# Patient Record
Sex: FEMALE | Race: WHITE | Employment: OTHER | URBAN - METROPOLITAN AREA
[De-identification: names, ages, dates, MRNs, and addresses within clinical notes are randomized per-mention and may not be internally consistent; named-entity substitution may affect disease eponyms.]

---

## 2017-02-21 PROBLEM — D36.9 MULTIPLE ADENOMATOUS POLYPS: Status: ACTIVE | Noted: 2017-02-21

## 2017-02-21 PROBLEM — K59.04 CHRONIC IDIOPATHIC CONSTIPATION: Status: ACTIVE | Noted: 2017-02-21

## 2018-03-14 ENCOUNTER — TELEPHONE (OUTPATIENT)
Dept: PRIMARY CARE CLINIC | Age: 73
End: 2018-03-14

## 2018-03-14 NOTE — TELEPHONE ENCOUNTER
Pt called need you to write her a new rx for her handicap to [facility]. Will  at the end of the week.  Thank you

## 2018-03-15 ENCOUNTER — TELEPHONE (OUTPATIENT)
Dept: PRIMARY CARE CLINIC | Age: 73
End: 2018-03-15

## 2018-04-24 ENCOUNTER — OFFICE VISIT (OUTPATIENT)
Dept: PRIMARY CARE CLINIC | Age: 73
End: 2018-04-24
Payer: MEDICARE

## 2018-04-24 VITALS
WEIGHT: 173 LBS | SYSTOLIC BLOOD PRESSURE: 134 MMHG | HEIGHT: 61 IN | BODY MASS INDEX: 32.66 KG/M2 | HEART RATE: 67 BPM | TEMPERATURE: 97 F | OXYGEN SATURATION: 96 % | DIASTOLIC BLOOD PRESSURE: 86 MMHG

## 2018-04-24 DIAGNOSIS — Z98.890 S/P KYPHOPLASTY: ICD-10-CM

## 2018-04-24 DIAGNOSIS — M94.0 COSTOCHONDRITIS, ACUTE: Primary | ICD-10-CM

## 2018-04-24 PROCEDURE — 4004F PT TOBACCO SCREEN RCVD TLK: CPT | Performed by: PHYSICIAN ASSISTANT

## 2018-04-24 PROCEDURE — 1090F PRES/ABSN URINE INCON ASSESS: CPT | Performed by: PHYSICIAN ASSISTANT

## 2018-04-24 PROCEDURE — G8427 DOCREV CUR MEDS BY ELIG CLIN: HCPCS | Performed by: PHYSICIAN ASSISTANT

## 2018-04-24 PROCEDURE — G8399 PT W/DXA RESULTS DOCUMENT: HCPCS | Performed by: PHYSICIAN ASSISTANT

## 2018-04-24 PROCEDURE — 4040F PNEUMOC VAC/ADMIN/RCVD: CPT | Performed by: PHYSICIAN ASSISTANT

## 2018-04-24 PROCEDURE — G8417 CALC BMI ABV UP PARAM F/U: HCPCS | Performed by: PHYSICIAN ASSISTANT

## 2018-04-24 PROCEDURE — 3017F COLORECTAL CA SCREEN DOC REV: CPT | Performed by: PHYSICIAN ASSISTANT

## 2018-04-24 PROCEDURE — 99213 OFFICE O/P EST LOW 20 MIN: CPT | Performed by: PHYSICIAN ASSISTANT

## 2018-04-24 PROCEDURE — 1123F ACP DISCUSS/DSCN MKR DOCD: CPT | Performed by: PHYSICIAN ASSISTANT

## 2018-04-24 RX ORDER — METHYLPREDNISOLONE 4 MG/1
TABLET ORAL
Qty: 1 KIT | Refills: 0 | Status: SHIPPED | OUTPATIENT
Start: 2018-04-24 | End: 2018-04-30

## 2018-04-24 RX ORDER — TRAMADOL HYDROCHLORIDE 50 MG/1
50 TABLET ORAL EVERY 6 HOURS PRN
Status: ON HOLD | COMMUNITY
Start: 2018-04-20 | End: 2018-12-19 | Stop reason: ALTCHOICE

## 2018-04-24 ASSESSMENT — PATIENT HEALTH QUESTIONNAIRE - PHQ9
8. MOVING OR SPEAKING SO SLOWLY THAT OTHER PEOPLE COULD HAVE NOTICED. OR THE OPPOSITE, BEING SO FIGETY OR RESTLESS THAT YOU HAVE BEEN MOVING AROUND A LOT MORE THAN USUAL: 0
5. POOR APPETITE OR OVEREATING: 0
1. LITTLE INTEREST OR PLEASURE IN DOING THINGS: 3
3. TROUBLE FALLING OR STAYING ASLEEP: 3
SUM OF ALL RESPONSES TO PHQ9 QUESTIONS 1 & 2: 3
6. FEELING BAD ABOUT YOURSELF - OR THAT YOU ARE A FAILURE OR HAVE LET YOURSELF OR YOUR FAMILY DOWN: 0
10. IF YOU CHECKED OFF ANY PROBLEMS, HOW DIFFICULT HAVE THESE PROBLEMS MADE IT FOR YOU TO DO YOUR WORK, TAKE CARE OF THINGS AT HOME, OR GET ALONG WITH OTHER PEOPLE: 2
SUM OF ALL RESPONSES TO PHQ QUESTIONS 1-9: 9
9. THOUGHTS THAT YOU WOULD BE BETTER OFF DEAD, OR OF HURTING YOURSELF: 0
2. FEELING DOWN, DEPRESSED OR HOPELESS: 0
4. FEELING TIRED OR HAVING LITTLE ENERGY: 3
7. TROUBLE CONCENTRATING ON THINGS, SUCH AS READING THE NEWSPAPER OR WATCHING TELEVISION: 0

## 2018-06-29 ENCOUNTER — OFFICE VISIT (OUTPATIENT)
Dept: PRIMARY CARE CLINIC | Age: 73
End: 2018-06-29
Payer: MEDICARE

## 2018-06-29 VITALS
OXYGEN SATURATION: 97 % | HEART RATE: 81 BPM | BODY MASS INDEX: 31.28 KG/M2 | HEIGHT: 62 IN | SYSTOLIC BLOOD PRESSURE: 150 MMHG | WEIGHT: 170 LBS | DIASTOLIC BLOOD PRESSURE: 90 MMHG

## 2018-06-29 DIAGNOSIS — F32.9 REACTIVE DEPRESSION: ICD-10-CM

## 2018-06-29 PROCEDURE — 3017F COLORECTAL CA SCREEN DOC REV: CPT | Performed by: PHYSICIAN ASSISTANT

## 2018-06-29 PROCEDURE — 1123F ACP DISCUSS/DSCN MKR DOCD: CPT | Performed by: PHYSICIAN ASSISTANT

## 2018-06-29 PROCEDURE — 4004F PT TOBACCO SCREEN RCVD TLK: CPT | Performed by: PHYSICIAN ASSISTANT

## 2018-06-29 PROCEDURE — 1090F PRES/ABSN URINE INCON ASSESS: CPT | Performed by: PHYSICIAN ASSISTANT

## 2018-06-29 PROCEDURE — G8427 DOCREV CUR MEDS BY ELIG CLIN: HCPCS | Performed by: PHYSICIAN ASSISTANT

## 2018-06-29 PROCEDURE — 4040F PNEUMOC VAC/ADMIN/RCVD: CPT | Performed by: PHYSICIAN ASSISTANT

## 2018-06-29 PROCEDURE — G8399 PT W/DXA RESULTS DOCUMENT: HCPCS | Performed by: PHYSICIAN ASSISTANT

## 2018-06-29 PROCEDURE — G8417 CALC BMI ABV UP PARAM F/U: HCPCS | Performed by: PHYSICIAN ASSISTANT

## 2018-06-29 PROCEDURE — 99213 OFFICE O/P EST LOW 20 MIN: CPT | Performed by: PHYSICIAN ASSISTANT

## 2018-06-29 RX ORDER — SERTRALINE HYDROCHLORIDE 100 MG/1
TABLET, FILM COATED ORAL
Qty: 90 TABLET | Refills: 1 | Status: SHIPPED | OUTPATIENT
Start: 2018-06-29 | End: 2018-08-10 | Stop reason: SDUPTHER

## 2018-08-10 ENCOUNTER — OFFICE VISIT (OUTPATIENT)
Dept: PRIMARY CARE CLINIC | Age: 73
End: 2018-08-10
Payer: MEDICARE

## 2018-08-10 ENCOUNTER — HOSPITAL ENCOUNTER (OUTPATIENT)
Age: 73
Discharge: HOME | End: 2018-08-12
Payer: MEDICARE

## 2018-08-10 VITALS
HEART RATE: 76 BPM | OXYGEN SATURATION: 93 % | TEMPERATURE: 97.2 F | BODY MASS INDEX: 31.09 KG/M2 | SYSTOLIC BLOOD PRESSURE: 136 MMHG | WEIGHT: 170 LBS | DIASTOLIC BLOOD PRESSURE: 84 MMHG

## 2018-08-10 DIAGNOSIS — E55.9 VITAMIN D DEFICIENCY: ICD-10-CM

## 2018-08-10 DIAGNOSIS — I10 HYPERTENSION, ESSENTIAL, BENIGN: Chronic | ICD-10-CM

## 2018-08-10 DIAGNOSIS — E78.5 HYPERLIPIDEMIA WITH TARGET LDL LESS THAN 100: Chronic | ICD-10-CM

## 2018-08-10 DIAGNOSIS — F32.9 REACTIVE DEPRESSION: ICD-10-CM

## 2018-08-10 DIAGNOSIS — G35 MS (MULTIPLE SCLEROSIS) (HCC): Primary | Chronic | ICD-10-CM

## 2018-08-10 LAB
BASOPHILS ABSOLUTE: 0.04 E9/L (ref 0–0.2)
BASOPHILS RELATIVE PERCENT: 0.8 % (ref 0–2)
EOSINOPHILS ABSOLUTE: 0.19 E9/L (ref 0.05–0.5)
EOSINOPHILS RELATIVE PERCENT: 3.6 % (ref 0–6)
FOLATE: 8.5 NG/ML (ref 4.8–24.2)
HCT VFR BLD CALC: 36.9 % (ref 34–48)
HEMOGLOBIN: 11.6 G/DL (ref 11.5–15.5)
IMMATURE GRANULOCYTES #: 0.01 E9/L
IMMATURE GRANULOCYTES %: 0.2 % (ref 0–5)
LYMPHOCYTES ABSOLUTE: 2.2 E9/L (ref 1.5–4)
LYMPHOCYTES RELATIVE PERCENT: 41.4 % (ref 20–42)
MCH RBC QN AUTO: 30.3 PG (ref 26–35)
MCHC RBC AUTO-ENTMCNC: 31.4 % (ref 32–34.5)
MCV RBC AUTO: 96.3 FL (ref 80–99.9)
MONOCYTES ABSOLUTE: 0.54 E9/L (ref 0.1–0.95)
MONOCYTES RELATIVE PERCENT: 10.2 % (ref 2–12)
NEUTROPHILS ABSOLUTE: 2.34 E9/L (ref 1.8–7.3)
NEUTROPHILS RELATIVE PERCENT: 43.8 % (ref 43–80)
PDW BLD-RTO: 13 FL (ref 11.5–15)
PLATELET # BLD: 290 E9/L (ref 130–450)
PMV BLD AUTO: 10 FL (ref 7–12)
RBC # BLD: 3.83 E12/L (ref 3.5–5.5)
VITAMIN B-12: 349 PG/ML (ref 211–946)
WBC # BLD: 5.3 E9/L (ref 4.5–11.5)

## 2018-08-10 PROCEDURE — 82607 VITAMIN B-12: CPT

## 2018-08-10 PROCEDURE — G8417 CALC BMI ABV UP PARAM F/U: HCPCS | Performed by: PHYSICIAN ASSISTANT

## 2018-08-10 PROCEDURE — G8427 DOCREV CUR MEDS BY ELIG CLIN: HCPCS | Performed by: PHYSICIAN ASSISTANT

## 2018-08-10 PROCEDURE — G8399 PT W/DXA RESULTS DOCUMENT: HCPCS | Performed by: PHYSICIAN ASSISTANT

## 2018-08-10 PROCEDURE — 4004F PT TOBACCO SCREEN RCVD TLK: CPT | Performed by: PHYSICIAN ASSISTANT

## 2018-08-10 PROCEDURE — 85025 COMPLETE CBC W/AUTO DIFF WBC: CPT

## 2018-08-10 PROCEDURE — 1101F PT FALLS ASSESS-DOCD LE1/YR: CPT | Performed by: PHYSICIAN ASSISTANT

## 2018-08-10 PROCEDURE — 4040F PNEUMOC VAC/ADMIN/RCVD: CPT | Performed by: PHYSICIAN ASSISTANT

## 2018-08-10 PROCEDURE — 1123F ACP DISCUSS/DSCN MKR DOCD: CPT | Performed by: PHYSICIAN ASSISTANT

## 2018-08-10 PROCEDURE — 82746 ASSAY OF FOLIC ACID SERUM: CPT

## 2018-08-10 PROCEDURE — 1090F PRES/ABSN URINE INCON ASSESS: CPT | Performed by: PHYSICIAN ASSISTANT

## 2018-08-10 PROCEDURE — 80053 COMPREHEN METABOLIC PANEL: CPT

## 2018-08-10 PROCEDURE — 3017F COLORECTAL CA SCREEN DOC REV: CPT | Performed by: PHYSICIAN ASSISTANT

## 2018-08-10 PROCEDURE — 84443 ASSAY THYROID STIM HORMONE: CPT

## 2018-08-10 PROCEDURE — 80061 LIPID PANEL: CPT

## 2018-08-10 PROCEDURE — 82306 VITAMIN D 25 HYDROXY: CPT

## 2018-08-10 PROCEDURE — 99214 OFFICE O/P EST MOD 30 MIN: CPT | Performed by: PHYSICIAN ASSISTANT

## 2018-08-10 RX ORDER — SERTRALINE HYDROCHLORIDE 100 MG/1
TABLET, FILM COATED ORAL
Qty: 90 TABLET | Refills: 1
Start: 2018-08-10 | End: 2018-10-08 | Stop reason: SDUPTHER

## 2018-08-10 RX ORDER — BUPROPION HYDROCHLORIDE 150 MG/1
150 TABLET ORAL EVERY MORNING
Qty: 30 TABLET | Refills: 0 | COMMUNITY
Start: 2018-08-10 | End: 2018-08-13 | Stop reason: SDUPTHER

## 2018-08-10 NOTE — PROGRESS NOTES
sertraline (ZOLOFT) 100 MG tablet; 1 1/2 tab PO QD  -     buPROPion (WELLBUTRIN XL) 150 MG extended release tablet; Take 1 tablet by mouth every morning  -     VITAMIN B12 & FOLATE; Future  -     TSH; Future  -     CBC WITH AUTO DIFFERENTIAL; Future  -     COMPREHENSIVE METABOLIC PANEL; Future    MS (multiple sclerosis) ([facility] [address]    Hyperlipidemia with target LDL less than 100  -     LIPID PANEL; Future    Hypertension, essential, benign  -     CBC WITH AUTO DIFFERENTIAL; Future  -     COMPREHENSIVE METABOLIC PANEL; Future    Vitamin D deficiency  -     Vitamin D 25 Hydrox, D2 & D3; Future    pt is due for labs for her routine, we will check as above, and she is to FU for routine care in 4 weeks when we FU with her med check as well. I discussed thew wellbutrin and she would like to try this. Samples were given and if she feels well needs to call me in 2 weeks for a refill. Call sooner if FU sooner if needed. Return in about 4 weeks (around 9/7/2018).

## 2018-08-11 LAB
ALBUMIN SERPL-MCNC: 4.2 G/DL (ref 3.5–5.2)
ALP BLD-CCNC: 41 U/L (ref 35–104)
ALT SERPL-CCNC: 14 U/L (ref 0–32)
ANION GAP SERPL CALCULATED.3IONS-SCNC: 17 MMOL/L (ref 7–16)
AST SERPL-CCNC: 20 U/L (ref 0–31)
BILIRUB SERPL-MCNC: 0.3 MG/DL (ref 0–1.2)
BUN BLDV-MCNC: 29 MG/DL (ref 8–23)
CALCIUM SERPL-MCNC: 9.1 MG/DL (ref 8.6–10.2)
CHLORIDE BLD-SCNC: 97 MMOL/L (ref 98–107)
CHOLESTEROL, TOTAL: 179 MG/DL (ref 0–199)
CO2: 26 MMOL/L (ref 22–29)
CREAT SERPL-MCNC: 1.3 MG/DL (ref 0.5–1)
GFR AFRICAN AMERICAN: 49
GFR NON-AFRICAN AMERICAN: 40 ML/MIN/1.73
GLUCOSE BLD-MCNC: 90 MG/DL (ref 74–109)
HDLC SERPL-MCNC: 73 MG/DL
LDL CHOLESTEROL CALCULATED: 89 MG/DL (ref 0–99)
POTASSIUM SERPL-SCNC: 4.3 MMOL/L (ref 3.5–5)
SODIUM BLD-SCNC: 140 MMOL/L (ref 132–146)
TOTAL PROTEIN: 7.2 G/DL (ref 6.4–8.3)
TRIGL SERPL-MCNC: 87 MG/DL (ref 0–149)
TSH SERPL DL<=0.05 MIU/L-ACNC: 0.77 UIU/ML (ref 0.27–4.2)
VITAMIN D 25-HYDROXY: 67 NG/ML (ref 30–100)
VLDLC SERPL CALC-MCNC: 17 MG/DL

## 2018-08-13 DIAGNOSIS — F32.9 REACTIVE DEPRESSION: ICD-10-CM

## 2018-08-13 RX ORDER — BUPROPION HYDROCHLORIDE 150 MG/1
150 TABLET ORAL EVERY MORNING
Qty: 90 TABLET | Refills: 0 | Status: SHIPPED | OUTPATIENT
Start: 2018-08-13 | End: 2018-10-08 | Stop reason: SDUPTHER

## 2018-09-04 RX ORDER — ALENDRONATE SODIUM 70 MG/1
70 TABLET ORAL
Qty: 12 TABLET | Refills: 1 | Status: SHIPPED | OUTPATIENT
Start: 2018-09-04 | End: 2018-12-03 | Stop reason: SDUPTHER

## 2018-09-07 ENCOUNTER — TELEPHONE (OUTPATIENT)
Dept: PRIMARY CARE CLINIC | Age: 73
End: 2018-09-07

## 2018-09-07 DIAGNOSIS — Z12.31 SCREENING MAMMOGRAM, ENCOUNTER FOR: Primary | ICD-10-CM

## 2018-09-07 DIAGNOSIS — G35 MS (MULTIPLE SCLEROSIS) (HCC): Chronic | ICD-10-CM

## 2018-10-08 DIAGNOSIS — F32.9 REACTIVE DEPRESSION: ICD-10-CM

## 2018-10-08 DIAGNOSIS — Z12.31 SCREENING MAMMOGRAM, ENCOUNTER FOR: ICD-10-CM

## 2018-10-08 RX ORDER — SIMVASTATIN 40 MG
40 TABLET ORAL NIGHTLY
Qty: 90 TABLET | Refills: 1 | Status: SHIPPED | OUTPATIENT
Start: 2018-10-08 | End: 2019-01-25 | Stop reason: SDUPTHER

## 2018-10-08 RX ORDER — SERTRALINE HYDROCHLORIDE 100 MG/1
TABLET, FILM COATED ORAL
Qty: 135 TABLET | Refills: 1 | Status: SHIPPED | OUTPATIENT
Start: 2018-10-08 | End: 2019-01-25 | Stop reason: SDUPTHER

## 2018-10-08 RX ORDER — BUPROPION HYDROCHLORIDE 150 MG/1
150 TABLET ORAL EVERY MORNING
Qty: 90 TABLET | Refills: 1 | Status: SHIPPED | OUTPATIENT
Start: 2018-10-08 | End: 2018-10-15 | Stop reason: SDUPTHER

## 2018-10-08 RX ORDER — GABAPENTIN 400 MG/1
CAPSULE ORAL
Qty: 180 CAPSULE | Refills: 1 | Status: SHIPPED | OUTPATIENT
Start: 2018-10-08 | End: 2019-01-25 | Stop reason: SDUPTHER

## 2018-10-08 RX ORDER — CARVEDILOL 25 MG/1
TABLET ORAL
Qty: 180 TABLET | Refills: 1 | Status: SHIPPED | OUTPATIENT
Start: 2018-10-08 | End: 2019-01-25 | Stop reason: SDUPTHER

## 2018-10-15 DIAGNOSIS — F32.9 REACTIVE DEPRESSION: ICD-10-CM

## 2018-10-15 RX ORDER — BUPROPION HYDROCHLORIDE 150 MG/1
150 TABLET ORAL EVERY MORNING
Qty: 90 TABLET | Refills: 1 | Status: SHIPPED | OUTPATIENT
Start: 2018-10-15 | End: 2018-12-19 | Stop reason: ALTCHOICE

## 2018-10-16 ENCOUNTER — NURSE ONLY (OUTPATIENT)
Dept: PRIMARY CARE CLINIC | Age: 73
End: 2018-10-16
Payer: MEDICARE

## 2018-10-16 DIAGNOSIS — Z23 NEED FOR IMMUNIZATION AGAINST INFLUENZA: Primary | ICD-10-CM

## 2018-10-16 PROCEDURE — 90662 IIV NO PRSV INCREASED AG IM: CPT | Performed by: PHYSICIAN ASSISTANT

## 2018-10-16 PROCEDURE — G0008 ADMIN INFLUENZA VIRUS VAC: HCPCS | Performed by: PHYSICIAN ASSISTANT

## 2018-10-19 ENCOUNTER — TELEPHONE (OUTPATIENT)
Dept: NEUROLOGY | Age: 73
End: 2018-10-19

## 2018-10-19 NOTE — TELEPHONE ENCOUNTER
Pt called stating she sent a record request to [facility] 4 weeks ago to obtain records prior to her 10/31 MS clinic appt. Pt called [facility] today for an update on record status and was told she will not get records in time for her appt but our office can send something to [facility] in order for pt's records to be sent directly to our office. MA fax[facility] to [facility]   Electronically signed by Soni Mcknight on 10/19/18 at 9:29 AM

## 2018-10-31 ENCOUNTER — OFFICE VISIT (OUTPATIENT)
Dept: NEUROLOGY | Age: 73
End: 2018-10-31
Payer: MEDICARE

## 2018-10-31 VITALS
SYSTOLIC BLOOD PRESSURE: 153 MMHG | BODY MASS INDEX: 30.96 KG/M2 | HEIGHT: 61 IN | HEART RATE: 87 BPM | DIASTOLIC BLOOD PRESSURE: 87 MMHG | WEIGHT: 164 LBS

## 2018-10-31 VITALS
HEIGHT: 61 IN | DIASTOLIC BLOOD PRESSURE: 70 MMHG | BODY MASS INDEX: 30.96 KG/M2 | WEIGHT: 164 LBS | SYSTOLIC BLOOD PRESSURE: 153 MMHG | HEART RATE: 87 BPM

## 2018-10-31 DIAGNOSIS — M21.372 BILATERAL FOOT-DROP: ICD-10-CM
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
## 2018-10-31 DIAGNOSIS — M21.969 DEFORMITY OF FOOT, UNSPECIFIED LATERALITY: ICD-10-CM

## 2018-10-31 DIAGNOSIS — R26.9 GAIT ABNORMALITY: ICD-10-CM

## 2018-10-31 DIAGNOSIS — G35 MS (MULTIPLE SCLEROSIS) (HCC): Primary | Chronic | ICD-10-CM

## 2018-10-31 DIAGNOSIS — M21.371 BILATERAL FOOT-DROP: ICD-10-CM

## 2018-10-31 DIAGNOSIS — R26.89 BALANCE DISORDER: ICD-10-CM

## 2018-10-31 PROCEDURE — 1101F PT FALLS ASSESS-DOCD LE1/YR: CPT | Performed by: PHYSICAL MEDICINE & REHABILITATION

## 2018-10-31 PROCEDURE — G8417 CALC BMI ABV UP PARAM F/U: HCPCS | Performed by: PHYSICAL MEDICINE & REHABILITATION

## 2018-10-31 PROCEDURE — 99205 OFFICE O/P NEW HI 60 MIN: CPT | Performed by: PSYCHIATRY & NEUROLOGY

## 2018-10-31 PROCEDURE — 4004F PT TOBACCO SCREEN RCVD TLK: CPT | Performed by: PHYSICAL MEDICINE & REHABILITATION

## 2018-10-31 PROCEDURE — 1090F PRES/ABSN URINE INCON ASSESS: CPT | Performed by: PHYSICAL MEDICINE & REHABILITATION

## 2018-10-31 PROCEDURE — 3017F COLORECTAL CA SCREEN DOC REV: CPT | Performed by: PHYSICAL MEDICINE & REHABILITATION

## 2018-10-31 PROCEDURE — 99204 OFFICE O/P NEW MOD 45 MIN: CPT | Performed by: PHYSICAL MEDICINE & REHABILITATION

## 2018-10-31 PROCEDURE — 3017F COLORECTAL CA SCREEN DOC REV: CPT | Performed by: PSYCHIATRY & NEUROLOGY

## 2018-10-31 PROCEDURE — G8427 DOCREV CUR MEDS BY ELIG CLIN: HCPCS | Performed by: PSYCHIATRY & NEUROLOGY

## 2018-10-31 PROCEDURE — G8399 PT W/DXA RESULTS DOCUMENT: HCPCS | Performed by: PHYSICAL MEDICINE & REHABILITATION

## 2018-10-31 PROCEDURE — 1101F PT FALLS ASSESS-DOCD LE1/YR: CPT | Performed by: PSYCHIATRY & NEUROLOGY

## 2018-10-31 PROCEDURE — 4040F PNEUMOC VAC/ADMIN/RCVD: CPT | Performed by: PHYSICAL MEDICINE & REHABILITATION

## 2018-10-31 PROCEDURE — G8482 FLU IMMUNIZE ORDER/ADMIN: HCPCS | Performed by: PHYSICAL MEDICINE & REHABILITATION

## 2018-10-31 PROCEDURE — 1123F ACP DISCUSS/DSCN MKR DOCD: CPT | Performed by: PHYSICAL MEDICINE & REHABILITATION

## 2018-10-31 PROCEDURE — 1090F PRES/ABSN URINE INCON ASSESS: CPT | Performed by: PSYCHIATRY & NEUROLOGY

## 2018-10-31 PROCEDURE — G8482 FLU IMMUNIZE ORDER/ADMIN: HCPCS | Performed by: PSYCHIATRY & NEUROLOGY

## 2018-10-31 PROCEDURE — G8417 CALC BMI ABV UP PARAM F/U: HCPCS | Performed by: PSYCHIATRY & NEUROLOGY

## 2018-10-31 PROCEDURE — G8427 DOCREV CUR MEDS BY ELIG CLIN: HCPCS | Performed by: PHYSICAL MEDICINE & REHABILITATION

## 2018-11-01 ENCOUNTER — TELEPHONE (OUTPATIENT)
Dept: NEUROLOGY | Age: 73
End: 2018-11-01

## 2018-11-01 NOTE — TELEPHONE ENCOUNTER
LM for pt that her MRI brain/spine without contrast was scheduled Nov 9 @3:30pm at the [facility]. Pt would need to arrive at 2:45pm.  Labs were mailed to the patient.

## 2018-11-08 ENCOUNTER — TELEPHONE (OUTPATIENT)
Dept: NEUROLOGY | Age: 73
End: 2018-11-08

## 2018-11-08 NOTE — TELEPHONE ENCOUNTER
11/8 Spoke with pt to remind her that she is scheduled for her MRI's at [facility]. She states that she will also have her labs completed tomorrow. Instructed her that her lab orders are in [facility].

## 2018-11-09 ENCOUNTER — HOSPITAL ENCOUNTER (OUTPATIENT)
Dept: MRI IMAGING | Age: 73
Discharge: HOME | End: 2018-11-11
Payer: MEDICARE

## 2018-11-09 ENCOUNTER — HOSPITAL ENCOUNTER (OUTPATIENT)
Age: 73
Discharge: HOME | End: 2018-11-09
Payer: MEDICARE

## 2018-11-09 DIAGNOSIS — G35 MS (MULTIPLE SCLEROSIS) (HCC): Chronic | ICD-10-CM

## 2018-11-09 PROCEDURE — 87798 DETECT AGENT NOS DNA AMP: CPT

## 2018-11-09 PROCEDURE — 72141 MRI NECK SPINE W/O DYE: CPT

## 2018-11-09 PROCEDURE — 70551 MRI BRAIN STEM W/O DYE: CPT

## 2018-11-09 PROCEDURE — 82652 VIT D 1 25-DIHYDROXY: CPT

## 2018-11-13 ENCOUNTER — TELEPHONE (OUTPATIENT)
Dept: NEUROLOGY | Age: 73
End: 2018-11-13

## 2018-11-13 LAB — VITAMIN D 1,25-DIHYDROXY: 40.1 PG/ML (ref 19.9–79.3)

## 2018-11-19 LAB
Lab: NORMAL
REPORT: NORMAL
THIS TEST SENT TO: NORMAL

## 2018-11-20 ENCOUNTER — TELEPHONE (OUTPATIENT)
Dept: NEUROLOGY | Age: 73
End: 2018-11-20

## 2018-11-26 ENCOUNTER — TELEPHONE (OUTPATIENT)
Dept: PRIMARY CARE CLINIC | Age: 73
End: 2018-11-26

## 2018-12-03 RX ORDER — HYDROCHLOROTHIAZIDE 25 MG/1
25 TABLET ORAL DAILY
Qty: 90 TABLET | Refills: 1 | Status: ON HOLD | OUTPATIENT
Start: 2018-12-03 | End: 2018-12-20 | Stop reason: HOSPADM

## 2018-12-03 RX ORDER — ALENDRONATE SODIUM 70 MG/1
70 TABLET ORAL
Qty: 12 TABLET | Refills: 1 | Status: ON HOLD | OUTPATIENT
Start: 2018-12-03 | End: 2018-12-20 | Stop reason: HOSPADM

## 2018-12-19 ENCOUNTER — HOSPITAL ENCOUNTER (INPATIENT)
Age: 73
LOS: 1 days | Discharge: HOME HEALTH | DRG: 684 | End: 2018-12-20
Attending: EMERGENCY MEDICINE | Admitting: INTERNAL MEDICINE
Payer: MEDICARE

## 2018-12-19 ENCOUNTER — APPOINTMENT (OUTPATIENT)
Dept: GENERAL RADIOLOGY | Age: 73
DRG: 684 | End: 2018-12-19
Payer: MEDICARE

## 2018-12-19 DIAGNOSIS — M25.551 RIGHT HIP PAIN: ICD-10-CM

## 2018-12-19 DIAGNOSIS — N17.9 AKI (ACUTE KIDNEY INJURY) (HCC): Primary | ICD-10-CM

## 2018-12-19 DIAGNOSIS — E86.0 DEHYDRATION: ICD-10-CM

## 2018-12-19 LAB
ALBUMIN SERPL-MCNC: 4 G/DL (ref 3.5–5.2)
ALP BLD-CCNC: 41 U/L (ref 35–104)
ALT SERPL-CCNC: 11 U/L (ref 0–32)
ANION GAP SERPL CALCULATED.3IONS-SCNC: 13 MMOL/L (ref 7–16)
AST SERPL-CCNC: 18 U/L (ref 0–31)
BACTERIA: NORMAL /HPF
BASOPHILS ABSOLUTE: 0.04 E9/L (ref 0–0.2)
BASOPHILS RELATIVE PERCENT: 0.5 % (ref 0–2)
BILIRUB SERPL-MCNC: 0.2 MG/DL (ref 0–1.2)
BILIRUBIN URINE: NEGATIVE
BLOOD, URINE: NEGATIVE
BUN BLDV-MCNC: 43 MG/DL (ref 8–23)
CALCIUM SERPL-MCNC: 8.9 MG/DL (ref 8.6–10.2)
CHLORIDE BLD-SCNC: 99 MMOL/L (ref 98–107)
CLARITY: CLEAR
CO2: 26 MMOL/L (ref 22–29)
COLOR: YELLOW
CREAT SERPL-MCNC: 2.1 MG/DL (ref 0.5–1)
EOSINOPHILS ABSOLUTE: 0.27 E9/L (ref 0.05–0.5)
EOSINOPHILS RELATIVE PERCENT: 3.5 % (ref 0–6)
GFR AFRICAN AMERICAN: 28
GFR NON-AFRICAN AMERICAN: 23 ML/MIN/1.73
GLUCOSE BLD-MCNC: 117 MG/DL (ref 74–99)
GLUCOSE URINE: NEGATIVE MG/DL
HCT VFR BLD CALC: 32.2 % (ref 34–48)
HEMOGLOBIN: 10.7 G/DL (ref 11.5–15.5)
IMMATURE GRANULOCYTES #: 0.03 E9/L
IMMATURE GRANULOCYTES %: 0.4 % (ref 0–5)
KETONES, URINE: NEGATIVE MG/DL
LEUKOCYTE ESTERASE, URINE: NEGATIVE
LYMPHOCYTES ABSOLUTE: 1.71 E9/L (ref 1.5–4)
LYMPHOCYTES RELATIVE PERCENT: 22.2 % (ref 20–42)
MCH RBC QN AUTO: 31.5 PG (ref 26–35)
MCHC RBC AUTO-ENTMCNC: 33.2 % (ref 32–34.5)
MCV RBC AUTO: 94.7 FL (ref 80–99.9)
MONOCYTES ABSOLUTE: 0.8 E9/L (ref 0.1–0.95)
MONOCYTES RELATIVE PERCENT: 10.4 % (ref 2–12)
NEUTROPHILS ABSOLUTE: 4.86 E9/L (ref 1.8–7.3)
NEUTROPHILS RELATIVE PERCENT: 63 % (ref 43–80)
NITRITE, URINE: NEGATIVE
PDW BLD-RTO: 12.9 FL (ref 11.5–15)
PH UA: 5.5 (ref 5–9)
PLATELET # BLD: 230 E9/L (ref 130–450)
PMV BLD AUTO: 9.3 FL (ref 7–12)
POTASSIUM SERPL-SCNC: 3.9 MMOL/L (ref 3.5–5)
PROTEIN UA: NEGATIVE MG/DL
RBC # BLD: 3.4 E12/L (ref 3.5–5.5)
RBC UA: NORMAL /HPF (ref 0–2)
SODIUM BLD-SCNC: 138 MMOL/L (ref 132–146)
SPECIFIC GRAVITY UA: 1.01 (ref 1–1.03)
TOTAL PROTEIN: 6.8 G/DL (ref 6.4–8.3)
UROBILINOGEN, URINE: 0.2 E.U./DL
WBC # BLD: 7.7 E9/L (ref 4.5–11.5)
WBC UA: NORMAL /HPF (ref 0–5)

## 2018-12-19 PROCEDURE — 2580000003 HC RX 258: Performed by: EMERGENCY MEDICINE

## 2018-12-19 PROCEDURE — 81001 URINALYSIS AUTO W/SCOPE: CPT

## 2018-12-19 PROCEDURE — 6370000000 HC RX 637 (ALT 250 FOR IP): Performed by: STUDENT IN AN ORGANIZED HEALTH CARE EDUCATION/TRAINING PROGRAM

## 2018-12-19 PROCEDURE — 85025 COMPLETE CBC W/AUTO DIFF WBC: CPT

## 2018-12-19 PROCEDURE — 80053 COMPREHEN METABOLIC PANEL: CPT

## 2018-12-19 PROCEDURE — 73552 X-RAY EXAM OF FEMUR 2/>: CPT

## 2018-12-19 PROCEDURE — 2060000000 HC ICU INTERMEDIATE R&B

## 2018-12-19 PROCEDURE — 6360000002 HC RX W HCPCS: Performed by: EMERGENCY MEDICINE

## 2018-12-19 PROCEDURE — 73502 X-RAY EXAM HIP UNI 2-3 VIEWS: CPT

## 2018-12-19 PROCEDURE — 71045 X-RAY EXAM CHEST 1 VIEW: CPT

## 2018-12-19 PROCEDURE — 6360000002 HC RX W HCPCS: Performed by: INTERNAL MEDICINE

## 2018-12-19 PROCEDURE — 96375 TX/PRO/DX INJ NEW DRUG ADDON: CPT

## 2018-12-19 PROCEDURE — 99222 1ST HOSP IP/OBS MODERATE 55: CPT | Performed by: INTERNAL MEDICINE

## 2018-12-19 PROCEDURE — 96361 HYDRATE IV INFUSION ADD-ON: CPT

## 2018-12-19 PROCEDURE — 96374 THER/PROPH/DIAG INJ IV PUSH: CPT

## 2018-12-19 PROCEDURE — 2580000003 HC RX 258: Performed by: STUDENT IN AN ORGANIZED HEALTH CARE EDUCATION/TRAINING PROGRAM

## 2018-12-19 PROCEDURE — 93005 ELECTROCARDIOGRAM TRACING: CPT | Performed by: EMERGENCY MEDICINE

## 2018-12-19 PROCEDURE — 2580000003 HC RX 258: Performed by: INTERNAL MEDICINE

## 2018-12-19 PROCEDURE — 94664 DEMO&/EVAL PT USE INHALER: CPT

## 2018-12-19 PROCEDURE — 94760 N-INVAS EAR/PLS OXIMETRY 1: CPT

## 2018-12-19 PROCEDURE — 72100 X-RAY EXAM L-S SPINE 2/3 VWS: CPT

## 2018-12-19 PROCEDURE — 6370000000 HC RX 637 (ALT 250 FOR IP): Performed by: INTERNAL MEDICINE

## 2018-12-19 PROCEDURE — 73060 X-RAY EXAM OF HUMERUS: CPT

## 2018-12-19 PROCEDURE — 99285 EMERGENCY DEPT VISIT HI MDM: CPT

## 2018-12-19 PROCEDURE — 94640 AIRWAY INHALATION TREATMENT: CPT

## 2018-12-19 RX ORDER — SODIUM CHLORIDE 9 MG/ML
INJECTION, SOLUTION INTRAVENOUS CONTINUOUS
Status: ACTIVE | OUTPATIENT
Start: 2018-12-19 | End: 2018-12-20

## 2018-12-19 RX ORDER — SERTRALINE HYDROCHLORIDE 100 MG/1
100 TABLET, FILM COATED ORAL DAILY
Status: DISCONTINUED | OUTPATIENT
Start: 2018-12-19 | End: 2018-12-20 | Stop reason: HOSPADM

## 2018-12-19 RX ORDER — SODIUM CHLORIDE 0.9 % (FLUSH) 0.9 %
10 SYRINGE (ML) INJECTION PRN
Status: DISCONTINUED | OUTPATIENT
Start: 2018-12-19 | End: 2018-12-20 | Stop reason: HOSPADM

## 2018-12-19 RX ORDER — SIMVASTATIN 40 MG
40 TABLET ORAL NIGHTLY
Status: DISCONTINUED | OUTPATIENT
Start: 2018-12-19 | End: 2018-12-20 | Stop reason: HOSPADM

## 2018-12-19 RX ORDER — ONDANSETRON 2 MG/ML
4 INJECTION INTRAMUSCULAR; INTRAVENOUS
Status: COMPLETED | OUTPATIENT
Start: 2018-12-19 | End: 2018-12-19

## 2018-12-19 RX ORDER — CARVEDILOL 25 MG/1
25 TABLET ORAL 2 TIMES DAILY
Status: DISCONTINUED | OUTPATIENT
Start: 2018-12-19 | End: 2018-12-20 | Stop reason: HOSPADM

## 2018-12-19 RX ORDER — BACLOFEN 10 MG/1
10 TABLET ORAL 2 TIMES DAILY
Status: DISCONTINUED | OUTPATIENT
Start: 2018-12-19 | End: 2018-12-20 | Stop reason: HOSPADM

## 2018-12-19 RX ORDER — CHOLECALCIFEROL (VITAMIN D3) 50 MCG
4000 TABLET ORAL EVERY MORNING
Status: DISCONTINUED | OUTPATIENT
Start: 2018-12-20 | End: 2018-12-20 | Stop reason: HOSPADM

## 2018-12-19 RX ORDER — GABAPENTIN 400 MG/1
400 CAPSULE ORAL 2 TIMES DAILY
Status: DISCONTINUED | OUTPATIENT
Start: 2018-12-19 | End: 2018-12-20 | Stop reason: HOSPADM

## 2018-12-19 RX ORDER — OXYCODONE HYDROCHLORIDE AND ACETAMINOPHEN 5; 325 MG/1; MG/1
1 TABLET ORAL EVERY 6 HOURS PRN
Status: DISCONTINUED | OUTPATIENT
Start: 2018-12-19 | End: 2018-12-20 | Stop reason: HOSPADM

## 2018-12-19 RX ORDER — 0.9 % SODIUM CHLORIDE 0.9 %
500 INTRAVENOUS SOLUTION INTRAVENOUS ONCE
Status: COMPLETED | OUTPATIENT
Start: 2018-12-19 | End: 2018-12-19

## 2018-12-19 RX ORDER — TRAMADOL HYDROCHLORIDE 50 MG/1
50 TABLET ORAL EVERY 8 HOURS PRN
Status: DISCONTINUED | OUTPATIENT
Start: 2018-12-19 | End: 2018-12-19

## 2018-12-19 RX ORDER — ASPIRIN 81 MG/1
81 TABLET ORAL EVERY MORNING
Status: DISCONTINUED | OUTPATIENT
Start: 2018-12-20 | End: 2018-12-20 | Stop reason: HOSPADM

## 2018-12-19 RX ORDER — SODIUM CHLORIDE 9 MG/ML
INJECTION, SOLUTION INTRAVENOUS CONTINUOUS
Status: DISCONTINUED | OUTPATIENT
Start: 2018-12-19 | End: 2018-12-19

## 2018-12-19 RX ORDER — HEPARIN SODIUM 10000 [USP'U]/ML
5000 INJECTION, SOLUTION INTRAVENOUS; SUBCUTANEOUS EVERY 12 HOURS
Status: DISCONTINUED | OUTPATIENT
Start: 2018-12-19 | End: 2018-12-20 | Stop reason: HOSPADM

## 2018-12-19 RX ORDER — 0.9 % SODIUM CHLORIDE 0.9 %
1500 INTRAVENOUS SOLUTION INTRAVENOUS ONCE
Status: COMPLETED | OUTPATIENT
Start: 2018-12-19 | End: 2018-12-19

## 2018-12-19 RX ORDER — ACETAMINOPHEN 325 MG/1
650 TABLET ORAL EVERY 4 HOURS PRN
Status: DISCONTINUED | OUTPATIENT
Start: 2018-12-19 | End: 2018-12-20 | Stop reason: HOSPADM

## 2018-12-19 RX ORDER — SODIUM CHLORIDE 0.9 % (FLUSH) 0.9 %
10 SYRINGE (ML) INJECTION EVERY 12 HOURS SCHEDULED
Status: DISCONTINUED | OUTPATIENT
Start: 2018-12-19 | End: 2018-12-20 | Stop reason: HOSPADM

## 2018-12-19 RX ORDER — MORPHINE SULFATE 2 MG/ML
2 INJECTION, SOLUTION INTRAMUSCULAR; INTRAVENOUS
Status: COMPLETED | OUTPATIENT
Start: 2018-12-19 | End: 2018-12-19

## 2018-12-19 RX ADMIN — HEPARIN SODIUM 5000 UNITS: 10000 INJECTION, SOLUTION INTRAVENOUS; SUBCUTANEOUS at 23:01

## 2018-12-19 RX ADMIN — GABAPENTIN 400 MG: 400 CAPSULE ORAL at 20:26

## 2018-12-19 RX ADMIN — SODIUM CHLORIDE 500 ML: 9 INJECTION, SOLUTION INTRAVENOUS at 07:14

## 2018-12-19 RX ADMIN — OXYCODONE AND ACETAMINOPHEN 1 TABLET: 5; 325 TABLET ORAL at 19:21

## 2018-12-19 RX ADMIN — SIMVASTATIN 40 MG: 40 TABLET, FILM COATED ORAL at 20:27

## 2018-12-19 RX ADMIN — ONDANSETRON 4 MG: 2 INJECTION INTRAMUSCULAR; INTRAVENOUS at 08:52

## 2018-12-19 RX ADMIN — SODIUM CHLORIDE 1500 ML: 9 INJECTION, SOLUTION INTRAVENOUS at 11:29

## 2018-12-19 RX ADMIN — SODIUM CHLORIDE 500 ML: 9 INJECTION, SOLUTION INTRAVENOUS at 08:51

## 2018-12-19 RX ADMIN — BACLOFEN 10 MG: 10 TABLET ORAL at 20:27

## 2018-12-19 RX ADMIN — HEPARIN SODIUM 5000 UNITS: 10000 INJECTION, SOLUTION INTRAVENOUS; SUBCUTANEOUS at 13:14

## 2018-12-19 RX ADMIN — SODIUM CHLORIDE: 9 INJECTION, SOLUTION INTRAVENOUS at 15:34

## 2018-12-19 RX ADMIN — SERTRALINE 100 MG: 100 TABLET, FILM COATED ORAL at 20:27

## 2018-12-19 RX ADMIN — Medication 10 ML: at 20:27

## 2018-12-19 RX ADMIN — MORPHINE SULFATE 2 MG: 2 INJECTION, SOLUTION INTRAMUSCULAR; INTRAVENOUS at 08:52

## 2018-12-19 RX ADMIN — MOMETASONE FUROATE AND FORMOTEROL FUMARATE DIHYDRATE 2 PUFF: 200; 5 AEROSOL RESPIRATORY (INHALATION) at 20:07

## 2018-12-19 RX ADMIN — TRAMADOL HYDROCHLORIDE 50 MG: 50 TABLET, FILM COATED ORAL at 17:04

## 2018-12-19 ASSESSMENT — PAIN DESCRIPTION - ORIENTATION
ORIENTATION: RIGHT

## 2018-12-19 ASSESSMENT — PAIN DESCRIPTION - LOCATION
LOCATION: HIP

## 2018-12-19 ASSESSMENT — PAIN DESCRIPTION - FREQUENCY
FREQUENCY: CONTINUOUS
FREQUENCY: OTHER (COMMENT)
FREQUENCY: INTERMITTENT

## 2018-12-19 ASSESSMENT — ENCOUNTER SYMPTOMS
NAUSEA: 0
ABDOMINAL PAIN: 0
SHORTNESS OF BREATH: 0
ABDOMINAL DISTENTION: 0
EYE DISCHARGE: 0
DIARRHEA: 0
COUGH: 0
WHEEZING: 0
BLOOD IN STOOL: 0
RHINORRHEA: 0
PHOTOPHOBIA: 0
EYE PAIN: 0
EYE REDNESS: 0
VOMITING: 0
SORE THROAT: 0
DOUBLE VISION: 0
SINUS PRESSURE: 0
BACK PAIN: 0
CONSTIPATION: 0

## 2018-12-19 ASSESSMENT — PAIN DESCRIPTION - DESCRIPTORS
DESCRIPTORS: STABBING
DESCRIPTORS: STABBING
DESCRIPTORS: ACHING

## 2018-12-19 ASSESSMENT — PAIN DESCRIPTION - PAIN TYPE
TYPE: ACUTE PAIN

## 2018-12-19 ASSESSMENT — PAIN SCALES - GENERAL
PAINLEVEL_OUTOF10: 10
PAINLEVEL_OUTOF10: 0
PAINLEVEL_OUTOF10: 10

## 2018-12-19 ASSESSMENT — PAIN SCALES - WONG BAKER: WONGBAKER_NUMERICALRESPONSE: 10

## 2018-12-20 VITALS
HEIGHT: 61 IN | RESPIRATION RATE: 18 BRPM | HEART RATE: 83 BPM | SYSTOLIC BLOOD PRESSURE: 114 MMHG | BODY MASS INDEX: 33.36 KG/M2 | OXYGEN SATURATION: 98 % | WEIGHT: 176.7 LBS | TEMPERATURE: 97.7 F | DIASTOLIC BLOOD PRESSURE: 54 MMHG

## 2018-12-20 LAB
ANION GAP SERPL CALCULATED.3IONS-SCNC: 11 MMOL/L (ref 7–16)
BASOPHILS ABSOLUTE: 0.03 E9/L (ref 0–0.2)
BASOPHILS RELATIVE PERCENT: 0.5 % (ref 0–2)
BUN BLDV-MCNC: 35 MG/DL (ref 8–23)
CALCIUM SERPL-MCNC: 8 MG/DL (ref 8.6–10.2)
CHLORIDE BLD-SCNC: 104 MMOL/L (ref 98–107)
CO2: 25 MMOL/L (ref 22–29)
CREAT SERPL-MCNC: 1.9 MG/DL (ref 0.5–1)
EOSINOPHILS ABSOLUTE: 0.18 E9/L (ref 0.05–0.5)
EOSINOPHILS RELATIVE PERCENT: 3.3 % (ref 0–6)
FERRITIN: 46 NG/ML
FOLATE: 9.8 NG/ML (ref 4.8–24.2)
GFR AFRICAN AMERICAN: 31
GFR NON-AFRICAN AMERICAN: 26 ML/MIN/1.73
GLUCOSE BLD-MCNC: 108 MG/DL (ref 74–99)
HCT VFR BLD CALC: 30.9 % (ref 34–48)
HEMOGLOBIN: 9.8 G/DL (ref 11.5–15.5)
IMMATURE GRANULOCYTES #: 0.01 E9/L
IMMATURE GRANULOCYTES %: 0.2 % (ref 0–5)
IRON SATURATION: 13 % (ref 15–50)
IRON: 36 MCG/DL (ref 37–145)
LYMPHOCYTES ABSOLUTE: 2.22 E9/L (ref 1.5–4)
LYMPHOCYTES RELATIVE PERCENT: 40.6 % (ref 20–42)
MCH RBC QN AUTO: 31.3 PG (ref 26–35)
MCHC RBC AUTO-ENTMCNC: 31.7 % (ref 32–34.5)
MCV RBC AUTO: 98.7 FL (ref 80–99.9)
MONOCYTES ABSOLUTE: 0.59 E9/L (ref 0.1–0.95)
MONOCYTES RELATIVE PERCENT: 10.8 % (ref 2–12)
NEUTROPHILS ABSOLUTE: 2.44 E9/L (ref 1.8–7.3)
NEUTROPHILS RELATIVE PERCENT: 44.6 % (ref 43–80)
PDW BLD-RTO: 12.9 FL (ref 11.5–15)
PLATELET # BLD: 225 E9/L (ref 130–450)
PMV BLD AUTO: 9.9 FL (ref 7–12)
POTASSIUM SERPL-SCNC: 3.6 MMOL/L (ref 3.5–5)
RBC # BLD: 3.13 E12/L (ref 3.5–5.5)
SODIUM BLD-SCNC: 140 MMOL/L (ref 132–146)
TOTAL IRON BINDING CAPACITY: 267 MCG/DL (ref 250–450)
WBC # BLD: 5.5 E9/L (ref 4.5–11.5)

## 2018-12-20 PROCEDURE — 97161 PT EVAL LOW COMPLEX 20 MIN: CPT

## 2018-12-20 PROCEDURE — G8981 BODY POS CURRENT STATUS: HCPCS

## 2018-12-20 PROCEDURE — G8983 BODY POS D/C STATUS: HCPCS

## 2018-12-20 PROCEDURE — 82728 ASSAY OF FERRITIN: CPT

## 2018-12-20 PROCEDURE — 36415 COLL VENOUS BLD VENIPUNCTURE: CPT

## 2018-12-20 PROCEDURE — G8988 SELF CARE GOAL STATUS: HCPCS

## 2018-12-20 PROCEDURE — 99239 HOSP IP/OBS DSCHRG MGMT >30: CPT | Performed by: INTERNAL MEDICINE

## 2018-12-20 PROCEDURE — 85025 COMPLETE CBC W/AUTO DIFF WBC: CPT

## 2018-12-20 PROCEDURE — 82746 ASSAY OF FOLIC ACID SERUM: CPT

## 2018-12-20 PROCEDURE — 83540 ASSAY OF IRON: CPT

## 2018-12-20 PROCEDURE — 97535 SELF CARE MNGMENT TRAINING: CPT

## 2018-12-20 PROCEDURE — G8982 BODY POS GOAL STATUS: HCPCS

## 2018-12-20 PROCEDURE — 97165 OT EVAL LOW COMPLEX 30 MIN: CPT

## 2018-12-20 PROCEDURE — 6360000002 HC RX W HCPCS: Performed by: INTERNAL MEDICINE

## 2018-12-20 PROCEDURE — 6370000000 HC RX 637 (ALT 250 FOR IP): Performed by: STUDENT IN AN ORGANIZED HEALTH CARE EDUCATION/TRAINING PROGRAM

## 2018-12-20 PROCEDURE — G8987 SELF CARE CURRENT STATUS: HCPCS

## 2018-12-20 PROCEDURE — 99222 1ST HOSP IP/OBS MODERATE 55: CPT | Performed by: ORTHOPAEDIC SURGERY

## 2018-12-20 PROCEDURE — APPSS45 APP SPLIT SHARED TIME 31-45 MINUTES: Performed by: NURSE PRACTITIONER

## 2018-12-20 PROCEDURE — G8989 SELF CARE D/C STATUS: HCPCS

## 2018-12-20 PROCEDURE — 2580000003 HC RX 258: Performed by: INTERNAL MEDICINE

## 2018-12-20 PROCEDURE — 80048 BASIC METABOLIC PNL TOTAL CA: CPT

## 2018-12-20 PROCEDURE — 6370000000 HC RX 637 (ALT 250 FOR IP): Performed by: INTERNAL MEDICINE

## 2018-12-20 PROCEDURE — 83550 IRON BINDING TEST: CPT

## 2018-12-20 RX ORDER — OXYCODONE HYDROCHLORIDE AND ACETAMINOPHEN 5; 325 MG/1; MG/1
1 TABLET ORAL EVERY 6 HOURS PRN
Qty: 12 TABLET | Refills: 0 | Status: SHIPPED | OUTPATIENT
Start: 2018-12-20 | End: 2018-12-23

## 2018-12-20 RX ORDER — 0.9 % SODIUM CHLORIDE 0.9 %
500 INTRAVENOUS SOLUTION INTRAVENOUS ONCE
Status: COMPLETED | OUTPATIENT
Start: 2018-12-20 | End: 2018-12-20

## 2018-12-20 RX ADMIN — GABAPENTIN 400 MG: 400 CAPSULE ORAL at 09:58

## 2018-12-20 RX ADMIN — HEPARIN SODIUM 5000 UNITS: 10000 INJECTION, SOLUTION INTRAVENOUS; SUBCUTANEOUS at 11:25

## 2018-12-20 RX ADMIN — CHOLECALCIFEROL TAB 50 MCG (2000 UNIT) 4000 UNITS: 50 TAB at 09:57

## 2018-12-20 RX ADMIN — OXYCODONE AND ACETAMINOPHEN 1 TABLET: 5; 325 TABLET ORAL at 10:03

## 2018-12-20 RX ADMIN — BACLOFEN 10 MG: 10 TABLET ORAL at 09:57

## 2018-12-20 RX ADMIN — ASPIRIN 81 MG: 81 TABLET, COATED ORAL at 09:58

## 2018-12-20 RX ADMIN — OXYCODONE AND ACETAMINOPHEN 1 TABLET: 5; 325 TABLET ORAL at 03:14

## 2018-12-20 RX ADMIN — SODIUM CHLORIDE 500 ML: 9 INJECTION, SOLUTION INTRAVENOUS at 13:36

## 2018-12-20 RX ADMIN — Medication 10 ML: at 09:57

## 2018-12-20 RX ADMIN — MOMETASONE FUROATE AND FORMOTEROL FUMARATE DIHYDRATE 2 PUFF: 200; 5 AEROSOL RESPIRATORY (INHALATION) at 08:24

## 2018-12-20 ASSESSMENT — PAIN SCALES - GENERAL
PAINLEVEL_OUTOF10: 0
PAINLEVEL_OUTOF10: 7
PAINLEVEL_OUTOF10: 9

## 2018-12-21 ENCOUNTER — CARE COORDINATION (OUTPATIENT)
Dept: CASE MANAGEMENT | Age: 73
End: 2018-12-21

## 2018-12-21 ENCOUNTER — TELEPHONE (OUTPATIENT)
Dept: PRIMARY CARE CLINIC | Age: 73
End: 2018-12-21

## 2018-12-21 DIAGNOSIS — M25.559 ARTHRALGIA OF HIP, UNSPECIFIED LATERALITY: Primary | ICD-10-CM

## 2018-12-21 DIAGNOSIS — N17.9 AKI (ACUTE KIDNEY INJURY) (HCC): Primary | ICD-10-CM

## 2018-12-21 LAB
EKG ATRIAL RATE: 76 BPM
EKG P AXIS: 60 DEGREES
EKG P-R INTERVAL: 162 MS
EKG Q-T INTERVAL: 380 MS
EKG QRS DURATION: 80 MS
EKG QTC CALCULATION (BAZETT): 427 MS
EKG R AXIS: 15 DEGREES
EKG T AXIS: 38 DEGREES
EKG VENTRICULAR RATE: 76 BPM

## 2018-12-21 PROCEDURE — 1111F DSCHRG MED/CURRENT MED MERGE: CPT | Performed by: PHYSICIAN ASSISTANT

## 2018-12-21 RX ORDER — HYDROCODONE BITARTRATE AND ACETAMINOPHEN 5; 325 MG/1; MG/1
1 TABLET ORAL EVERY 8 HOURS PRN
Qty: 21 TABLET | Refills: 0 | Status: SHIPPED | OUTPATIENT
Start: 2018-12-21 | End: 2018-12-27 | Stop reason: SDUPTHER

## 2018-12-21 NOTE — TELEPHONE ENCOUNTER
Patient called and said she was discharged from hospital has severe pain groin area with possible tear she has appointment to see you Thursday and needs you to give her some pain meds to last her until she see's you. She was given percocet 5-325 mg.

## 2018-12-27 DIAGNOSIS — M25.559 ARTHRALGIA OF HIP, UNSPECIFIED LATERALITY: ICD-10-CM

## 2018-12-28 NOTE — TELEPHONE ENCOUNTER
Patient was on reschedule list for hospital follow up for kidney infection. She says she still has pain and was hoping that you would refill this for her.

## 2018-12-29 RX ORDER — HYDROCODONE BITARTRATE AND ACETAMINOPHEN 5; 325 MG/1; MG/1
1 TABLET ORAL EVERY 8 HOURS PRN
Qty: 21 TABLET | Refills: 0 | Status: SHIPPED | OUTPATIENT
Start: 2018-12-29 | End: 2019-01-07 | Stop reason: SDUPTHER

## 2019-01-07 ENCOUNTER — OFFICE VISIT (OUTPATIENT)
Dept: PRIMARY CARE CLINIC | Age: 74
End: 2019-01-07
Payer: MEDICARE

## 2019-01-07 ENCOUNTER — HOSPITAL ENCOUNTER (OUTPATIENT)
Age: 74
Discharge: HOME | End: 2019-01-09
Payer: MEDICARE

## 2019-01-07 VITALS
OXYGEN SATURATION: 95 % | HEART RATE: 76 BPM | DIASTOLIC BLOOD PRESSURE: 82 MMHG | TEMPERATURE: 98.2 F | BODY MASS INDEX: 28.91 KG/M2 | WEIGHT: 153 LBS | SYSTOLIC BLOOD PRESSURE: 130 MMHG

## 2019-01-07 DIAGNOSIS — N17.9 AKI (ACUTE KIDNEY INJURY) (HCC): Primary | ICD-10-CM

## 2019-01-07 DIAGNOSIS — M25.559 ARTHRALGIA OF HIP, UNSPECIFIED LATERALITY: ICD-10-CM

## 2019-01-07 DIAGNOSIS — S76.011D MUSCLE STRAIN OF RIGHT GLUTEAL REGION, SUBSEQUENT ENCOUNTER: ICD-10-CM

## 2019-01-07 DIAGNOSIS — N17.9 AKI (ACUTE KIDNEY INJURY) (HCC): ICD-10-CM

## 2019-01-07 DIAGNOSIS — M25.551 RIGHT HIP PAIN: ICD-10-CM

## 2019-01-07 DIAGNOSIS — M70.61 TROCHANTERIC BURSITIS OF RIGHT HIP: ICD-10-CM

## 2019-01-07 LAB
ALBUMIN SERPL-MCNC: 3.8 G/DL (ref 3.5–5.2)
ALP BLD-CCNC: 65 U/L (ref 35–104)
ALT SERPL-CCNC: 10 U/L (ref 0–32)
ANION GAP SERPL CALCULATED.3IONS-SCNC: 13 MMOL/L (ref 7–16)
AST SERPL-CCNC: 18 U/L (ref 0–31)
BILIRUB SERPL-MCNC: 0.2 MG/DL (ref 0–1.2)
BUN BLDV-MCNC: 30 MG/DL (ref 8–23)
CALCIUM SERPL-MCNC: 9.6 MG/DL (ref 8.6–10.2)
CHLORIDE BLD-SCNC: 100 MMOL/L (ref 98–107)
CO2: 28 MMOL/L (ref 22–29)
CREAT SERPL-MCNC: 1.5 MG/DL (ref 0.5–1)
GFR AFRICAN AMERICAN: 41
GFR NON-AFRICAN AMERICAN: 34 ML/MIN/1.73
GLUCOSE BLD-MCNC: 94 MG/DL (ref 74–99)
POTASSIUM SERPL-SCNC: 4.6 MMOL/L (ref 3.5–5)
SODIUM BLD-SCNC: 141 MMOL/L (ref 132–146)
TOTAL PROTEIN: 7.4 G/DL (ref 6.4–8.3)

## 2019-01-07 PROCEDURE — 80053 COMPREHEN METABOLIC PANEL: CPT

## 2019-01-07 PROCEDURE — 99214 OFFICE O/P EST MOD 30 MIN: CPT | Performed by: PHYSICIAN ASSISTANT

## 2019-01-07 RX ORDER — HYDROCODONE BITARTRATE AND ACETAMINOPHEN 5; 325 MG/1; MG/1
1-2 TABLET ORAL EVERY 6 HOURS PRN
Qty: 30 TABLET | Refills: 0 | Status: SHIPPED | OUTPATIENT
Start: 2019-01-07 | End: 2019-01-14

## 2019-01-10 ENCOUNTER — OFFICE VISIT (OUTPATIENT)
Dept: ORTHOPEDIC SURGERY | Age: 74
End: 2019-01-10
Payer: MEDICARE

## 2019-01-10 VITALS
TEMPERATURE: 97.6 F | HEIGHT: 62 IN | BODY MASS INDEX: 28.52 KG/M2 | WEIGHT: 155 LBS | SYSTOLIC BLOOD PRESSURE: 126 MMHG | DIASTOLIC BLOOD PRESSURE: 70 MMHG | HEART RATE: 76 BPM

## 2019-01-10 DIAGNOSIS — M25.551 RIGHT HIP PAIN: Primary | ICD-10-CM

## 2019-01-10 DIAGNOSIS — S72.111K: ICD-10-CM

## 2019-01-10 DIAGNOSIS — Z96.641 H/O TOTAL HIP ARTHROPLASTY, RIGHT: ICD-10-CM

## 2019-01-10 PROBLEM — S72.113K: Status: ACTIVE | Noted: 2019-01-10

## 2019-01-10 PROCEDURE — 99213 OFFICE O/P EST LOW 20 MIN: CPT | Performed by: ORTHOPAEDIC SURGERY

## 2019-01-25 DIAGNOSIS — F32.9 REACTIVE DEPRESSION: ICD-10-CM

## 2019-01-25 RX ORDER — ASPIRIN 81 MG/1
81 TABLET ORAL DAILY
Qty: 90 TABLET | Refills: 1 | Status: ON HOLD | OUTPATIENT
Start: 2019-01-25 | End: 2022-03-26 | Stop reason: HOSPADM

## 2019-01-25 RX ORDER — CARVEDILOL 25 MG/1
TABLET ORAL
Qty: 180 TABLET | Refills: 1 | Status: SHIPPED | OUTPATIENT
Start: 2019-01-25 | End: 2019-09-16 | Stop reason: SDUPTHER

## 2019-01-25 RX ORDER — FLUTICASONE FUROATE AND VILANTEROL 100; 25 UG/1; UG/1
1 POWDER RESPIRATORY (INHALATION) DAILY
Qty: 3 EACH | Refills: 1 | Status: SHIPPED | OUTPATIENT
Start: 2019-01-25 | End: 2019-03-21 | Stop reason: SDUPTHER

## 2019-01-25 RX ORDER — SERTRALINE HYDROCHLORIDE 100 MG/1
TABLET, FILM COATED ORAL
Qty: 135 TABLET | Refills: 1 | Status: SHIPPED
Start: 2019-01-25 | End: 2020-03-26 | Stop reason: SDUPTHER

## 2019-01-25 RX ORDER — BACLOFEN 10 MG/1
TABLET ORAL
Qty: 180 TABLET | Refills: 1 | Status: SHIPPED | OUTPATIENT
Start: 2019-01-25 | End: 2019-07-30 | Stop reason: SDUPTHER

## 2019-01-25 RX ORDER — GABAPENTIN 400 MG/1
400 CAPSULE ORAL 2 TIMES DAILY
Qty: 180 CAPSULE | Refills: 1 | Status: SHIPPED | OUTPATIENT
Start: 2019-01-25 | End: 2019-10-11 | Stop reason: SDUPTHER

## 2019-01-25 RX ORDER — SIMVASTATIN 40 MG
40 TABLET ORAL NIGHTLY
Qty: 90 TABLET | Refills: 1 | Status: SHIPPED
Start: 2019-01-25 | End: 2020-05-15 | Stop reason: SDUPTHER

## 2019-02-18 RX ORDER — HYDROCHLOROTHIAZIDE 25 MG/1
25 TABLET ORAL EVERY MORNING
Qty: 90 TABLET | Refills: 1 | Status: SHIPPED | OUTPATIENT
Start: 2019-02-18 | End: 2019-07-30

## 2019-02-18 RX ORDER — ALENDRONATE SODIUM 70 MG/1
70 TABLET ORAL
Qty: 12 TABLET | Refills: 1 | Status: SHIPPED | OUTPATIENT
Start: 2019-02-18 | End: 2019-08-04 | Stop reason: SDUPTHER

## 2019-03-21 RX ORDER — FLUTICASONE FUROATE AND VILANTEROL 100; 25 UG/1; UG/1
1 POWDER RESPIRATORY (INHALATION) DAILY
Qty: 1 EACH | Refills: 0 | Status: SHIPPED | OUTPATIENT
Start: 2019-03-21 | End: 2019-06-27 | Stop reason: SDUPTHER

## 2019-05-24 ENCOUNTER — TELEPHONE (OUTPATIENT)
Dept: NEUROLOGY | Age: 74
End: 2019-05-24

## 2019-05-24 DIAGNOSIS — G35 MS (MULTIPLE SCLEROSIS) (HCC): Primary | ICD-10-CM

## 2019-05-24 DIAGNOSIS — H93.13 TINNITUS OF BOTH EARS: ICD-10-CM

## 2019-05-28 NOTE — TELEPHONE ENCOUNTER
Please refer to an ENT doctor ----all these specialists are excellent. MS rarely causes buzzing and ringing in her ears. Her difficulties are likely related to ear problems alone.   Thank you

## 2019-06-27 RX ORDER — FLUTICASONE FUROATE AND VILANTEROL 100; 25 UG/1; UG/1
1 POWDER RESPIRATORY (INHALATION) DAILY
Qty: 1 EACH | Refills: 3 | Status: SHIPPED | OUTPATIENT
Start: 2019-06-27 | End: 2019-07-30 | Stop reason: ALTCHOICE

## 2019-07-29 ENCOUNTER — HOSPITAL ENCOUNTER (OUTPATIENT)
Age: 74
Discharge: HOME | End: 2019-07-31
Payer: MEDICARE

## 2019-07-29 ENCOUNTER — HOSPITAL ENCOUNTER (OUTPATIENT)
Dept: GENERAL RADIOLOGY | Age: 74
Discharge: HOME | End: 2019-07-31
Payer: MEDICARE

## 2019-07-29 ENCOUNTER — TELEPHONE (OUTPATIENT)
Dept: PRIMARY CARE CLINIC | Age: 74
End: 2019-07-29
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
## 2019-07-29 PROCEDURE — 72072 X-RAY EXAM THORAC SPINE 3VWS: CPT

## 2019-07-29 PROCEDURE — 72110 X-RAY EXAM L-2 SPINE 4/>VWS: CPT

## 2019-07-29 NOTE — TELEPHONE ENCOUNTER
Patient notified, orders sent to [facility], she has an appt for tomorrow

## 2019-07-30 ENCOUNTER — OFFICE VISIT (OUTPATIENT)
Dept: PRIMARY CARE CLINIC | Age: 74
End: 2019-07-30
Payer: MEDICARE

## 2019-07-30 ENCOUNTER — HOSPITAL ENCOUNTER (OUTPATIENT)
Age: 74
Discharge: HOME | End: 2019-08-01
Payer: MEDICARE

## 2019-07-30 VITALS
HEIGHT: 62 IN | HEART RATE: 73 BPM | BODY MASS INDEX: 30.73 KG/M2 | OXYGEN SATURATION: 98 % | SYSTOLIC BLOOD PRESSURE: 154 MMHG | WEIGHT: 167 LBS | TEMPERATURE: 97.8 F | DIASTOLIC BLOOD PRESSURE: 88 MMHG

## 2019-07-30 DIAGNOSIS — M54.9 OTHER ACUTE BACK PAIN: ICD-10-CM

## 2019-07-30 DIAGNOSIS — Z23 NEED FOR PNEUMOCOCCAL VACCINATION: ICD-10-CM

## 2019-07-30 DIAGNOSIS — E78.5 HYPERLIPIDEMIA, UNSPECIFIED HYPERLIPIDEMIA TYPE: Chronic | ICD-10-CM

## 2019-07-30 DIAGNOSIS — I10 HYPERTENSION, ESSENTIAL, BENIGN: Primary | Chronic | ICD-10-CM

## 2019-07-30 DIAGNOSIS — J44.9 CHRONIC OBSTRUCTIVE PULMONARY DISEASE, UNSPECIFIED COPD TYPE (HCC): ICD-10-CM

## 2019-07-30 DIAGNOSIS — G35 MS (MULTIPLE SCLEROSIS) (HCC): Chronic | ICD-10-CM

## 2019-07-30 DIAGNOSIS — I10 HYPERTENSION, ESSENTIAL, BENIGN: Chronic | ICD-10-CM

## 2019-07-30 LAB
ALBUMIN SERPL-MCNC: 4.2 G/DL (ref 3.5–5.2)
ALP BLD-CCNC: 41 U/L (ref 35–104)
ALT SERPL-CCNC: 12 U/L (ref 0–32)
ANION GAP SERPL CALCULATED.3IONS-SCNC: 17 MMOL/L (ref 7–16)
AST SERPL-CCNC: 21 U/L (ref 0–31)
BILIRUB SERPL-MCNC: 0.3 MG/DL (ref 0–1.2)
BUN BLDV-MCNC: 38 MG/DL (ref 8–23)
CALCIUM SERPL-MCNC: 9.4 MG/DL (ref 8.6–10.2)
CHLORIDE BLD-SCNC: 97 MMOL/L (ref 98–107)
CHOLESTEROL, TOTAL: 175 MG/DL (ref 0–199)
CO2: 25 MMOL/L (ref 22–29)
CREAT SERPL-MCNC: 1.6 MG/DL (ref 0.5–1)
GFR AFRICAN AMERICAN: 38
GFR NON-AFRICAN AMERICAN: 32 ML/MIN/1.73
GLUCOSE BLD-MCNC: 88 MG/DL (ref 74–99)
HCT VFR BLD CALC: 38.8 % (ref 34–48)
HDLC SERPL-MCNC: 65 MG/DL
HEMOGLOBIN: 12.8 G/DL (ref 11.5–15.5)
LDL CHOLESTEROL CALCULATED: 91 MG/DL (ref 0–99)
MCH RBC QN AUTO: 31.7 PG (ref 26–35)
MCHC RBC AUTO-ENTMCNC: 33 % (ref 32–34.5)
MCV RBC AUTO: 96 FL (ref 80–99.9)
PDW BLD-RTO: 12.8 FL (ref 11.5–15)
PLATELET # BLD: 282 E9/L (ref 130–450)
PMV BLD AUTO: 10.3 FL (ref 7–12)
POTASSIUM SERPL-SCNC: 4 MMOL/L (ref 3.5–5)
RBC # BLD: 4.04 E12/L (ref 3.5–5.5)
SODIUM BLD-SCNC: 139 MMOL/L (ref 132–146)
TOTAL PROTEIN: 7.6 G/DL (ref 6.4–8.3)
TRIGL SERPL-MCNC: 94 MG/DL (ref 0–149)
VLDLC SERPL CALC-MCNC: 19 MG/DL
WBC # BLD: 4.8 E9/L (ref 4.5–11.5)

## 2019-07-30 PROCEDURE — 80061 LIPID PANEL: CPT

## 2019-07-30 PROCEDURE — G0009 ADMIN PNEUMOCOCCAL VACCINE: HCPCS | Performed by: PHYSICIAN ASSISTANT

## 2019-07-30 PROCEDURE — 90670 PCV13 VACCINE IM: CPT | Performed by: PHYSICIAN ASSISTANT

## 2019-07-30 PROCEDURE — 85027 COMPLETE CBC AUTOMATED: CPT

## 2019-07-30 PROCEDURE — 99214 OFFICE O/P EST MOD 30 MIN: CPT | Performed by: PHYSICIAN ASSISTANT

## 2019-07-30 PROCEDURE — 80053 COMPREHEN METABOLIC PANEL: CPT

## 2019-07-30 RX ORDER — BUDESONIDE AND FORMOTEROL FUMARATE DIHYDRATE 160; 4.5 UG/1; UG/1
2 AEROSOL RESPIRATORY (INHALATION) 2 TIMES DAILY
Qty: 1 INHALER | Refills: 0 | COMMUNITY
Start: 2019-07-30 | End: 2019-08-12 | Stop reason: SDUPTHER

## 2019-07-30 RX ORDER — BACLOFEN 10 MG/1
TABLET ORAL
Qty: 180 TABLET | Refills: 1 | Status: SHIPPED | OUTPATIENT
Start: 2019-07-30 | End: 2019-10-16

## 2019-07-30 RX ORDER — ALBUTEROL SULFATE 90 UG/1
2 AEROSOL, METERED RESPIRATORY (INHALATION) 4 TIMES DAILY PRN
Qty: 1 INHALER | Refills: 1 | Status: SHIPPED | OUTPATIENT
Start: 2019-07-30 | End: 2020-01-28

## 2019-07-30 RX ORDER — TRAMADOL HYDROCHLORIDE 50 MG/1
50 TABLET ORAL EVERY 8 HOURS PRN
Qty: 90 TABLET | Refills: 0 | Status: SHIPPED | OUTPATIENT
Start: 2019-07-30 | End: 2019-08-29

## 2019-07-30 RX ORDER — HYDROCHLOROTHIAZIDE 25 MG/1
TABLET ORAL
Qty: 90 TABLET | Refills: 1
Start: 2019-07-30 | End: 2019-12-20

## 2019-07-30 NOTE — PROGRESS NOTES
ELLIPTA) 100-25 MCG/INH AEPB inhaler Inhale 1 puff into the lungs daily 1 each 3    alendronate (FOSAMAX) 70 MG tablet Take 1 tablet by mouth every 7 days 12 tablet 1    simvastatin (ZOCOR) 40 MG tablet Take 1 tablet by mouth nightly 90 tablet 1    sertraline (ZOLOFT) 100 MG tablet 1 1/2 tab PO  tablet 1    gabapentin (NEURONTIN) 400 MG capsule Take 1 capsule by mouth 2 times daily for 181 days. . 180 capsule 1    carvedilol (COREG) 25 MG tablet Take 1 tablet by mouth  twice a day 180 tablet 1    aspirin EC 81 MG EC tablet Take 1 tablet by mouth daily 90 tablet 1    Handicap Placard MISC by Does not apply route Expires 2/2023 1 each 0    Cholecalciferol 4000 UNITS CAPS Take 4,000 Units by mouth every morning        No current facility-administered medications for this visit.         Allergies   Allergen Reactions    Macrodantin [Nitrofurantoin Macrocrystal] Shortness Of Breath       Family History   Problem Relation Age of Onset    Mult Sclerosis Father     Thyroid Cancer Mother     Cirrhosis Mother        Social History     Socioeconomic History    Marital status:      Spouse name: Not on file    Number of children: Not on file    Years of education: Not on file    Highest education level: Not on file   Occupational History     Employer: RETIRED   Social Needs    Financial resource strain: Not on file    Food insecurity:     Worry: Not on file     Inability: Not on file    Transportation needs:     Medical: Not on file     Non-medical: Not on file   Tobacco Use    Smoking status: Current Every Day Smoker     Packs/day: 0.50     Years: 40.00     Pack years: 20.00    Smokeless tobacco: Never Used   Substance and Sexual Activity    Alcohol use: No    Drug use: No    Sexual activity: Yes   Lifestyle    Physical activity:     Days per week: Not on file     Minutes per session: Not on file    Stress: Not on file   Relationships    Social connections:     Talks on phone: Not on file Gets together: Not on file     Attends Cheondoism service: Not on file     Active member of club or organization: Not on file     Attends meetings of clubs or organizations: Not on file     Relationship status: Not on file    Intimate partner violence:     Fear of current or ex partner: Not on file     Emotionally abused: Not on file     Physically abused: Not on file     Forced sexual activity: Not on file   Other Topics Concern    Not on file   Social History Narrative    Not on file       Review of Systems :    Constitutional: Negative forincreased fatigue, malaise, fever, chills, appetite change and unexpected weight change. HENT: Negative. Eyes: Negative for vision changes, double vision, pain  Respiratory: Negative for chest tightness, shortness of breath, chest heaviness, pleuritic pain, no hemoptysis. Cardiovascular: Negative for diaphoresis, chest pain, palpitations, or edema   Gastrointestinal: Negative for nausea, vomiting, abdominal pain, diarrhea, constipation, blood in stool, melena, changes in bowel habits. Musculoskeletal: chronic pain. Uses walker for gait. Skin: Negative for rash, lesions, hair or nail changes. Allergic/Immunologic: Negative for environmental allergies and food allergies. Neurological: Negative for dizziness, weakness, tremors, syncope, speech difficulty, light-headedness, bowel or bladder control changes, numbness and headaches. Hematological: Negative for adenopathy. Does not bruise/bleed easily. Psychiatric/Behavioral: Negative for suicidal ideas, behavioral problems, sleep disturbance and decreased concentration.      Unless otherwise stated in this report or unable to obtain because of the patient's clinical or mental status as evidenced by the medical record, this patients's positive and negative responses for Review of Systems, constitutional, psych, eyes, ENT, cardiovascular, respiratory, gastrointestinal, neurological, genitourinary, musculoskeletal, integument systems and systems related to the presenting problem are either stated in the preceding or were not pertinent or were negative for the symptoms and/or complaints related to the medical problem. Physical Exam:   Vitals:    07/30/19 1302 07/30/19 1312   BP: (!) 158/94 (!) 154/88   Site: Right Upper Arm Left Upper Arm   Position: Sitting Sitting   Cuff Size: Large Adult Large Adult   Pulse: 73    Temp: 97.8 °F (36.6 °C)    SpO2: 98%    Weight: 167 lb (75.8 kg)    Height: 5' 2\" (1.575 m)      Constitutional:  A and O x 3, in NAD. Afebrile. Appears stated age. Head:  NCAT. Eyes:  PERRLA, EOMI without nystagmus. Conjunctiva normal. Sclera anicteric. Ears:  External ears without lesions. TM's clear bilaterally. Throat:  Pharynx without lesions. Airway patient. Mucous membranes pink and moist without lesions. Neck:  Supple. Nontender, no lymphadenopathy noted, no thyromegaly. Lungs:  Clear to auscultation and breath sounds equal.  Heart:  Regular rate and rhythm, normal S1 and S2, no m/r/g.   UE and LE distal pulses intact. Abdomen:  Soft, NTND, NABS x 4, no masses or organomegaly, no rebound or guarding. MS: painful ROM of lumbar spine, no bony or midline ttp, +paraspious muscles ttp edita in lumbar area  No neurovascular deficit. 5/5 strength in UE's/LE's/ bilaterally. Skin:  No abrasions, ecchymoses, or lacerations unless noted elsewhere. Extremities  No cyanosis, clubbing, or edema noted. Neurological:   Oriented. Motor functions intact. Assessment/Plan:     April Diop was seen today for blood work and medication adjustment. Diagnoses and all orders for this visit:    Hypertension, essential, benign  -     hydrochlorothiazide (HYDRODIURIL) 25 MG tablet; Take 2 Po QD in AM    MS (multiple sclerosis) (HCC)    Chronic obstructive pulmonary disease, unspecified COPD type (HCC)  -     albuterol sulfate  (90 Base) MCG/ACT inhaler;  Inhale 2 puffs into

## 2019-08-05 RX ORDER — ALENDRONATE SODIUM 70 MG/1
TABLET ORAL
Qty: 12 TABLET | Refills: 1 | Status: SHIPPED | OUTPATIENT
Start: 2019-08-05 | End: 2020-01-23 | Stop reason: SDUPTHER

## 2019-08-12 ENCOUNTER — PATIENT MESSAGE (OUTPATIENT)
Dept: PRIMARY CARE CLINIC | Age: 74
End: 2019-08-12

## 2019-08-12 DIAGNOSIS — J44.9 CHRONIC OBSTRUCTIVE PULMONARY DISEASE, UNSPECIFIED COPD TYPE (HCC): ICD-10-CM

## 2019-08-12 RX ORDER — BUDESONIDE AND FORMOTEROL FUMARATE DIHYDRATE 160; 4.5 UG/1; UG/1
2 AEROSOL RESPIRATORY (INHALATION) 2 TIMES DAILY
Qty: 1 INHALER | Refills: 1 | Status: SHIPPED
Start: 2019-08-12 | End: 2020-06-24 | Stop reason: SDUPTHER

## 2019-08-30 RX ORDER — AMLODIPINE BESYLATE 5 MG/1
5 TABLET ORAL DAILY
Qty: 30 TABLET | Refills: 5 | Status: SHIPPED | OUTPATIENT
Start: 2019-08-30 | End: 2019-10-28 | Stop reason: SDUPTHER

## 2019-09-16 RX ORDER — CARVEDILOL 25 MG/1
TABLET ORAL
Qty: 180 TABLET | Refills: 4 | Status: SHIPPED
Start: 2019-09-16 | End: 2020-09-25 | Stop reason: SDUPTHER

## 2019-10-08 ENCOUNTER — TELEPHONE (OUTPATIENT)
Dept: PRIMARY CARE CLINIC | Age: 74
End: 2019-10-08

## 2019-10-08 DIAGNOSIS — Z12.31 SCREENING MAMMOGRAM, ENCOUNTER FOR: Primary | ICD-10-CM

## 2019-10-10 ENCOUNTER — NURSE ONLY (OUTPATIENT)
Dept: PRIMARY CARE CLINIC | Age: 74
End: 2019-10-10
Payer: MEDICARE

## 2019-10-10 DIAGNOSIS — Z23 NEED FOR INFLUENZA VACCINATION: Primary | ICD-10-CM

## 2019-10-10 PROCEDURE — 90653 IIV ADJUVANT VACCINE IM: CPT | Performed by: PHYSICIAN ASSISTANT

## 2019-10-10 PROCEDURE — G0008 ADMIN INFLUENZA VIRUS VAC: HCPCS | Performed by: PHYSICIAN ASSISTANT

## 2019-10-11 RX ORDER — GABAPENTIN 400 MG/1
CAPSULE ORAL
Qty: 180 CAPSULE | Refills: 1 | Status: SHIPPED | OUTPATIENT
Start: 2019-10-11 | End: 2020-01-28 | Stop reason: SDUPTHER

## 2019-10-16 ENCOUNTER — OFFICE VISIT (OUTPATIENT)
Dept: NEUROLOGY | Age: 74
End: 2019-10-16
Payer: MEDICARE

## 2019-10-16 VITALS
SYSTOLIC BLOOD PRESSURE: 137 MMHG | OXYGEN SATURATION: 96 % | HEART RATE: 81 BPM | HEIGHT: 62 IN | RESPIRATION RATE: 15 BRPM | BODY MASS INDEX: 29.26 KG/M2 | WEIGHT: 159 LBS | DIASTOLIC BLOOD PRESSURE: 67 MMHG

## 2019-10-16 DIAGNOSIS — G35 MS (MULTIPLE SCLEROSIS) (HCC): Primary | ICD-10-CM

## 2019-10-16 PROCEDURE — 99215 OFFICE O/P EST HI 40 MIN: CPT | Performed by: PSYCHIATRY & NEUROLOGY

## 2019-10-16 RX ORDER — BACLOFEN 20 MG/1
20 TABLET ORAL 2 TIMES DAILY
Qty: 60 TABLET | Refills: 5 | Status: SHIPPED | OUTPATIENT
Start: 2019-10-16 | End: 2020-01-28 | Stop reason: SDUPTHER

## 2019-10-17 ENCOUNTER — TELEPHONE (OUTPATIENT)
Dept: NEUROLOGY | Age: 74
End: 2019-10-17

## 2019-10-21 ENCOUNTER — TELEPHONE (OUTPATIENT)
Dept: PRIMARY CARE CLINIC | Age: 74
End: 2019-10-21

## 2019-10-21 DIAGNOSIS — G35 MS (MULTIPLE SCLEROSIS) (HCC): Primary | Chronic | ICD-10-CM

## 2019-10-28 RX ORDER — AMLODIPINE BESYLATE 5 MG/1
5 TABLET ORAL DAILY
Qty: 90 TABLET | Refills: 1 | Status: SHIPPED
Start: 2019-10-28 | End: 2020-03-26

## 2019-11-25 ENCOUNTER — OFFICE VISIT (OUTPATIENT)
Dept: PRIMARY CARE CLINIC | Age: 74
End: 2019-11-25
Payer: MEDICARE

## 2019-11-25 VITALS
HEART RATE: 74 BPM | WEIGHT: 166 LBS | OXYGEN SATURATION: 96 % | TEMPERATURE: 97.2 F | DIASTOLIC BLOOD PRESSURE: 74 MMHG | BODY MASS INDEX: 30.36 KG/M2 | SYSTOLIC BLOOD PRESSURE: 122 MMHG

## 2019-11-25 DIAGNOSIS — G35 MS (MULTIPLE SCLEROSIS) (HCC): Chronic | ICD-10-CM

## 2019-11-25 DIAGNOSIS — L08.9 INFECTED INCLUSION CYST: Primary | ICD-10-CM

## 2019-11-25 DIAGNOSIS — L72.0 INFECTED INCLUSION CYST: Primary | ICD-10-CM

## 2019-11-25 PROCEDURE — 99213 OFFICE O/P EST LOW 20 MIN: CPT | Performed by: PHYSICIAN ASSISTANT

## 2019-11-25 RX ORDER — CEPHALEXIN 500 MG/1
500 CAPSULE ORAL 3 TIMES DAILY
Qty: 30 CAPSULE | Refills: 0 | Status: SHIPPED | OUTPATIENT
Start: 2019-11-25 | End: 2019-12-05

## 2019-12-20 DIAGNOSIS — I10 HYPERTENSION, ESSENTIAL, BENIGN: Chronic | ICD-10-CM

## 2019-12-20 RX ORDER — HYDROCHLOROTHIAZIDE 25 MG/1
TABLET ORAL
Qty: 90 TABLET | Refills: 4 | Status: SHIPPED
Start: 2019-12-20 | End: 2020-12-29 | Stop reason: SDUPTHER

## 2020-01-23 RX ORDER — ALENDRONATE SODIUM 70 MG/1
TABLET ORAL
Qty: 12 TABLET | Refills: 1 | Status: SHIPPED
Start: 2020-01-23 | End: 2020-07-06

## 2020-01-28 ENCOUNTER — OFFICE VISIT (OUTPATIENT)
Dept: NEUROLOGY | Age: 75
End: 2020-01-28
Payer: MEDICARE

## 2020-01-28 VITALS
TEMPERATURE: 97.9 F | HEIGHT: 62 IN | WEIGHT: 167 LBS | RESPIRATION RATE: 18 BRPM | HEART RATE: 75 BPM | BODY MASS INDEX: 30.73 KG/M2 | OXYGEN SATURATION: 93 % | SYSTOLIC BLOOD PRESSURE: 116 MMHG | DIASTOLIC BLOOD PRESSURE: 69 MMHG

## 2020-01-28 PROCEDURE — 99214 OFFICE O/P EST MOD 30 MIN: CPT | Performed by: PSYCHIATRY & NEUROLOGY

## 2020-01-28 RX ORDER — GABAPENTIN 400 MG/1
400 CAPSULE ORAL 3 TIMES DAILY
Qty: 90 CAPSULE | Refills: 0 | Status: SHIPPED
Start: 2020-01-28 | End: 2020-03-26 | Stop reason: SDUPTHER

## 2020-01-28 RX ORDER — BACLOFEN 20 MG/1
20 TABLET ORAL 3 TIMES DAILY
Qty: 90 TABLET | Refills: 0 | Status: SHIPPED
Start: 2020-01-28 | End: 2020-09-30

## 2020-01-28 RX ORDER — MEMANTINE HYDROCHLORIDE 10 MG/1
10 TABLET ORAL 2 TIMES DAILY
Qty: 30 TABLET | Refills: 0 | Status: SHIPPED
Start: 2020-01-28 | End: 2020-05-07 | Stop reason: SDUPTHER

## 2020-01-28 ASSESSMENT — ENCOUNTER SYMPTOMS
PHOTOPHOBIA: 0
NAUSEA: 0
VOMITING: 0
TROUBLE SWALLOWING: 0
SHORTNESS OF BREATH: 0

## 2020-01-28 NOTE — PROGRESS NOTES
gaze evoked nystagmus and oscillopsia, reports that her vision is jumping especially when she looks to the left or right side. She also complains of jumpy vision when looking upwards. She complains of increased fatigue. She is currently on baclofen and gabapentin. No other aggravating or relieving factors  No other associated symptoms    I have personally reviewed her blood work  WBC: 4.8, RBC: 4.0, hemoglobin: 12.8, hematocrit: 38.8  LDL: 91  I have personally reviewed her medical records    I have personally reviewed her MRI images    MRI brain November 2018:  Periventricular greater than subcortical white matter FLAIR and T2   signal in the cerebral hemispheres is suspicious for multiple   sclerosis, and is slightly more prominent in the right parietal   region.             MRI cervical spine November 2018:  C5-6: The AP diameter of the central canal is narrowed at this level   with the hyperostosis of the vertebra extending transversely, greater   on the right side, and there is narrowing of the posterior central   canal bilaterally at this level with left-sided foraminal stenosis for   the C6 nerve root by hyperostosis. 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 PAST MEDICAL HISTORY:   Past Medical History:   Diagnosis Date    Arthritis     Hyperlipidemia     Hypertension     Multiple sclerosis ([address]     diagnosised 8  yrs ago [facility]     PAST SURGICAL HISTORY:   Past Surgical History:   Procedure Laterality Date    APPENDECTOMY      BREAST SURGERY  4/13/2012    biopsy - negative    HIP ARTHROPLASTY Right 04/13/2011    Right AIDEE  ALLISON Carlton MD    HYSTERECTOMY      KNEE ARTHROPLASTY Left 10/01/2013    Left TKA  ALLISON Carlton MD    KNEE ARTHROPLASTY Right 08/29/2012    Right TKA  ALLISON Carlton MD     FAMILY MEDICAL HISTORY:   MS in father  Family History   Problem Relation Age of Onset    Mult Sclerosis Father     Thyroid Cancer Mother     Cirrhosis Mother      SOCIAL HISTORY:   Social History     Socioeconomic History    Marital status:      Spouse name: None    Number of children: None    Years of education: None    Highest education level: None   Occupational History     Employer: RETIRED   Social Needs    Financial resource strain: None    Food insecurity:     Worry: None     Inability: None    Transportation needs:     Medical: None     Non-medical: None   Tobacco Use    Smoking status: Current Every Day Smoker     Packs/day: 0.50     Years: 40.00     Pack years: 20.00    Smokeless tobacco: Never Used   Substance and Sexual Activity    Alcohol use: No    Drug use: No    Sexual activity: Yes   Lifestyle    Physical activity:     Days per week: None     Minutes per session: None    Stress: None   Relationships    Social connections:     Talks on phone: None     Gets together: None     Attends Gnosticist service: None     Active member of club or organization: None     Attends meetings of clubs or organizations: None     Relationship status: None    Intimate partner violence:     Fear of current or ex partner: None     Emotionally abused: None     Physically abused: None     Forced sexual activity: None   Other Topics Concern    None   Social History Narrative    None     Allergy:   Allergies   Allergen Reactions    Macrodantin [Nitrofurantoin Macrocrystal] Shortness Of Breath     MEDS:   Current Outpatient Medications:     gabapentin (NEURONTIN) 400 MG capsule, Take 1 capsule by mouth 3 times daily for 30 days. , Disp: 90 capsule, Rfl: 0    baclofen (LIORESAL) 20 MG tablet, Take 1 tablet by mouth 3 times daily, Disp: 90 tablet, Rfl: 0    memantine (NAMENDA) 10 MG tablet, Take 1 tablet by mouth 2 times daily, Disp: 30 tablet, Rfl: 0    alendronate (FOSAMAX) 70 MG tablet, TAKE 1 TABLET EVERY 7 DAYS, Disp: 12 tablet, Rfl: 1    hydrochlorothiazide (HYDRODIURIL) 25 MG tablet, TAKE 1 TABLET EVERY MORNING, 141 132 - 146 mmol/L Final   12/20/2018 140 132 - 146 mmol/L Final     Potassium   Date Value Ref Range Status   07/30/2019 4.0 3.5 - 5.0 mmol/L Final   01/07/2019 4.6 3.5 - 5.0 mmol/L Final   12/20/2018 3.6 3.5 - 5.0 mmol/L Final     Chloride   Date Value Ref Range Status   07/30/2019 97 (L) 98 - 107 mmol/L Final   01/07/2019 100 98 - 107 mmol/L Final   12/20/2018 104 98 - 107 mmol/L Final     CO2   Date Value Ref Range Status   07/30/2019 25 22 - 29 mmol/L Final   01/07/2019 28 22 - 29 mmol/L Final   12/20/2018 25 22 - 29 mmol/L Final     BUN   Date Value Ref Range Status   07/30/2019 38 (H) 8 - 23 mg/dL Final   01/07/2019 30 (H) 8 - 23 mg/dL Final   12/20/2018 35 (H) 8 - 23 mg/dL Final     CREATININE   Date Value Ref Range Status   07/30/2019 1.6 (H) 0.5 - 1.0 mg/dL Final   01/07/2019 1.5 (H) 0.5 - 1.0 mg/dL Final   12/20/2018 1.9 (H) 0.5 - 1.0 mg/dL Final     GFR Non-   Date Value Ref Range Status   07/30/2019 32 >=60 mL/min/1.73 Final     Comment:     Chronic Kidney Disease: less than 60 ml/min/1.73 sq.m. Kidney Failure: less than 15 ml/min/1.73 sq.m. Results valid for patients 18 years and older. 01/07/2019 34 >=60 mL/min/1.73 Final     Comment:     Chronic Kidney Disease: less than 60 ml/min/1.73 sq.m. Kidney Failure: less than 15 ml/min/1.73 sq.m. Results valid for patients 18 years and older. 12/20/2018 26 >=60 mL/min/1.73 Final     Comment:     Chronic Kidney Disease: less than 60 ml/min/1.73 sq.m. Kidney Failure: less than 15 ml/min/1.73 sq.m. Results valid for patients 18 years and older.        Calcium   Date Value Ref Range Status   07/30/2019 9.4 8.6 - 10.2 mg/dL Final   01/07/2019 9.6 8.6 - 10.2 mg/dL Final   12/20/2018 8.0 (L) 8.6 - 10.2 mg/dL Final     WBC   Date Value Ref Range Status   07/30/2019 4.8 4.5 - 11.5 E9/L Final   12/20/2018 5.5 4.5 - 11.5 E9/L Final   12/19/2018 7.7 4.5 - 11.5 E9/L Final     Hemoglobin   Date Value Ref Range Status

## 2020-02-06 ENCOUNTER — HOSPITAL ENCOUNTER (OUTPATIENT)
Dept: MRI IMAGING | Age: 75
Discharge: HOME | End: 2020-02-08
Payer: MEDICARE

## 2020-02-06 PROCEDURE — 70544 MR ANGIOGRAPHY HEAD W/O DYE: CPT

## 2020-02-06 PROCEDURE — 70547 MR ANGIOGRAPHY NECK W/O DYE: CPT

## 2020-02-07 ENCOUNTER — TELEPHONE (OUTPATIENT)
Dept: NEUROLOGY | Age: 75
End: 2020-02-07

## 2020-02-07 NOTE — RESULT ENCOUNTER NOTE
I Called the patient and discussed the results of the MRA with her. I informed her about the aneurysms. And has a slight increase in the aneurysm in the left MCA. Compared to her MRI report 12 years ago. And also there is a junctional dilatation of the P-comm origin. I discussed the possibility of seeing a neurosurgeon at this time however currently she would like to hold off on it.

## 2020-02-07 NOTE — TELEPHONE ENCOUNTER
I Called the patient and discussed the results of the MRA with her. I informed her about the aneurysms. And has a slight increase in the aneurysm in the left MCA. Compared to her MRI report 12 years ago. I discussed the possibility of seeing a neurosurgeon at this time however currently she would like to hold off on it.

## 2020-03-26 RX ORDER — BACLOFEN 10 MG/1
TABLET ORAL
Qty: 180 TABLET | Refills: 1 | Status: SHIPPED
Start: 2020-03-26 | End: 2020-11-09

## 2020-03-26 RX ORDER — SERTRALINE HYDROCHLORIDE 100 MG/1
TABLET, FILM COATED ORAL
Qty: 135 TABLET | Refills: 1 | Status: SHIPPED
Start: 2020-03-26 | End: 2020-12-01 | Stop reason: SDUPTHER

## 2020-03-26 RX ORDER — GABAPENTIN 400 MG/1
400 CAPSULE ORAL 3 TIMES DAILY
Qty: 90 CAPSULE | Refills: 0 | Status: SHIPPED
Start: 2020-03-26 | End: 2020-05-15 | Stop reason: SDUPTHER

## 2020-03-26 RX ORDER — AMLODIPINE BESYLATE 5 MG/1
TABLET ORAL
Qty: 90 TABLET | Refills: 1 | Status: SHIPPED
Start: 2020-03-26 | End: 2020-07-10

## 2020-05-15 RX ORDER — SIMVASTATIN 40 MG
40 TABLET ORAL NIGHTLY
Qty: 90 TABLET | Refills: 1 | Status: SHIPPED
Start: 2020-05-15 | End: 2020-05-22 | Stop reason: SDUPTHER

## 2020-05-15 RX ORDER — GABAPENTIN 400 MG/1
400 CAPSULE ORAL 3 TIMES DAILY
Qty: 270 CAPSULE | Refills: 1 | Status: SHIPPED
Start: 2020-05-15 | End: 2020-05-22 | Stop reason: SDUPTHER

## 2020-05-15 RX ORDER — GABAPENTIN 400 MG/1
CAPSULE ORAL
Qty: 90 CAPSULE | Refills: 0 | OUTPATIENT
Start: 2020-05-15

## 2020-05-22 RX ORDER — GABAPENTIN 400 MG/1
400 CAPSULE ORAL 3 TIMES DAILY
Qty: 30 CAPSULE | Refills: 0 | Status: SHIPPED
Start: 2020-05-22 | End: 2020-11-20

## 2020-05-22 RX ORDER — SIMVASTATIN 40 MG
40 TABLET ORAL NIGHTLY
Qty: 10 TABLET | Refills: 0 | Status: SHIPPED
Start: 2020-05-22 | End: 2020-12-29 | Stop reason: SDUPTHER

## 2020-06-01 ENCOUNTER — OFFICE VISIT (OUTPATIENT)
Dept: NEUROLOGY | Age: 75
End: 2020-06-01
Payer: MEDICARE

## 2020-06-01 VITALS
HEART RATE: 74 BPM | RESPIRATION RATE: 16 BRPM | WEIGHT: 174.9 LBS | BODY MASS INDEX: 32.18 KG/M2 | HEIGHT: 62 IN | TEMPERATURE: 98 F | DIASTOLIC BLOOD PRESSURE: 77 MMHG | SYSTOLIC BLOOD PRESSURE: 128 MMHG | OXYGEN SATURATION: 95 %

## 2020-06-01 PROCEDURE — 99213 OFFICE O/P EST LOW 20 MIN: CPT | Performed by: PSYCHIATRY & NEUROLOGY

## 2020-06-01 ASSESSMENT — ENCOUNTER SYMPTOMS
VOMITING: 0
SHORTNESS OF BREATH: 0
PHOTOPHOBIA: 0
TROUBLE SWALLOWING: 0
NAUSEA: 0

## 2020-06-01 NOTE — PROGRESS NOTES
NEUROLOGY FOLLOW UP CONSULTATION NOTE     Date: 6/1/2020  Name: Diana Alarcon  MRN: [id number]  Patient's PCP: Michael Cabrera PA-C     REASON FOR VISIT/CHIEF COMPLAINT: Multiple sclerosis, brain aneurysms    The patient is coming in for a follow-up visit. Reports that she continues to have symptoms of oscillopsia, nystagmus, fatigue, difficulty walking. She is currently on baclofen and gabapentin and did not tolerate the higher dosages. She also tried Namenda for oscillopsia which did not help her symptoms and because of side effects of dizziness and drowsiness and fatigue. Repeat MRA head and neck, February 2020: Showed 3.5 mm left SCA aneurysm and 3 mm aneurysm at the left MCA trifurcation. Currently the patient is asymptomatic. Denies any nausea, vomiting, headache, blurred vision, tingling numbness, speech or swallowing problems. Energy level decreased  Sleep poor  Exercise capacity decreased, she is unsteady on her feet and walks with a Rollator  Cognition normal  Ambulation walks with a Rollator. Motor symptoms no upper or lower extremity weakness, she does have unsteady gait. Sensory symptoms none  Visual symptoms significant horizontal gaze evoked nystagmus and upbeat nystagmus. Bowel and bladder symptoms none  Dysarthria or dysphagia none  Mood problems normal    No other aggravating or real factors  No other associated symptoms     Disease course:     Diana Alarcon is a 69 y.o.  female with a past medical history of multiple sclerosis    Onset of Multiple Sclerosis in 2008 with initial symptom of nystagmus and gait difficulty  Diagnosed with  Multiple Sclerosis in 2010 after MRI brain and CSF testing testing  Disease course at onset, clinically isolated syndrome, current disease course: Progressive,  CSF: 4/4/2008: Normal IgG index, no real clonal bands.   FAUZIA virus serology and VZV IgG: Not done    Previous Disease modifying treatments include none: High-dose disease biotin compromise. Pulses present. No cyanosis, clubbing or edema. SKIN: Clear; no rashes, lesions or skin breaks in exposed areas. NEURO:     Neurological examination     MENTAL STATUS: Patient awake and oriented to time, place, and person. Recent/remote memory normal. Attention span/concentration normal. Speech fluent. Good comprehension, naming, and repetition. Fund of knowledge appropriate for patient's level of education. Affect normal.    CRANIAL NERVES:  CN I: Not tested. CN II: Fundoscopic exam not performed. CN III, IV, VI: Pupils equal, round and reactive to light, the patient is noted to have gaze evoked nystagmus bilaterally and upbeat nystagmus. CN V: Facial sensation normal.  CN VII: No facial asymmetry. CN VIII:  Hearing grossly normal bilaterally. No pathologic nystagmus or skew deviation. CN IX, X: Palate elevates symmetrically. CN XI: Shoulder shrug and chin rotation equal with intact strength. CN XII: Tongue protrusion midline. MOTOR: Normal bulk. Tone is increased and symmetric throughout. Strength 5/5 throughout. ABNORMAL MOVEMENTS/TREMORS: No     REFLEXES: DTRs 3+; normal and symmetric throughout. Plantar response downgoing. SENSATION: Sensation grossly intact to fine touch, pain/temperature, vibration and position. COORDINATION: Finger-to-nose and heel to shin normal for age and symmetric. Finger tapping and alternating movements normal.    STATION: Negative Romberg. GAIT:  Normal heel, toe and tandem; no ataxia.      DIAGNOSTIC TESTS:     I have personally reviewed the most recent lab results:    Sodium   Date Value Ref Range Status   07/30/2019 139 132 - 146 mmol/L Final   01/07/2019 141 132 - 146 mmol/L Final   12/20/2018 140 132 - 146 mmol/L Final     Potassium   Date Value Ref Range Status   07/30/2019 4.0 3.5 - 5.0 mmol/L Final   01/07/2019 4.6 3.5 - 5.0 mmol/L Final   12/20/2018 3.6 3.5 - 5.0 mmol/L Final     Chloride   Date Value Ref Range Status   07/30/2019 97 Final     Platelets   Date Value Ref Range Status   07/30/2019 282 130 - 450 E9/L Final   12/20/2018 225 130 - 450 E9/L Final   12/19/2018 230 130 - 450 E9/L Final     Neutrophils %   Date Value Ref Range Status   12/20/2018 44.6 43.0 - 80.0 % Final   12/19/2018 63.0 43.0 - 80.0 % Final   08/10/2018 43.8 43.0 - 80.0 % Final     Monocytes %   Date Value Ref Range Status   12/20/2018 10.8 2.0 - 12.0 % Final   12/19/2018 10.4 2.0 - 12.0 % Final   08/10/2018 10.2 2.0 - 12.0 % Final     Total Protein   Date Value Ref Range Status   07/30/2019 7.6 6.4 - 8.3 g/dL Final   01/07/2019 7.4 6.4 - 8.3 g/dL Final   12/19/2018 6.8 6.4 - 8.3 g/dL Final     Total Bilirubin   Date Value Ref Range Status   07/30/2019 0.3 0.0 - 1.2 mg/dL Final   01/07/2019 0.2 0.0 - 1.2 mg/dL Final   12/19/2018 0.2 0.0 - 1.2 mg/dL Final     Alkaline Phosphatase   Date Value Ref Range Status   07/30/2019 41 35 - 104 U/L Final   01/07/2019 65 35 - 104 U/L Final   12/19/2018 41 35 - 104 U/L Final     ALT   Date Value Ref Range Status   07/30/2019 12 0 - 32 U/L Final   01/07/2019 10 0 - 32 U/L Final   12/19/2018 11 0 - 32 U/L Final     AST   Date Value Ref Range Status   07/30/2019 21 0 - 31 U/L Final   01/07/2019 18 0 - 31 U/L Final   12/19/2018 18 0 - 31 U/L Final     Lab Results   Component Value Date    INR 1.1 02/23/2018     Lab Results   Component Value Date    TRIG 94 07/30/2019    HDL 65 07/30/2019     No components found for: HGBA1C  No results found for: PROTEINCSF, GLUCCSF, WBCCSF    Controlled Substance Monitoring:    Acute and Chronic Pain Monitoring:   RX Monitoring 1/7/2019   Attestation The Prescription Monitoring Report for this patient was reviewed today. Periodic Controlled Substance Monitoring No signs of potential drug abuse or diversion identified. MEDICAL DECISION MAKING  ASSESSMENT/PLAN    Alyssa Mireya was seen today for multiple sclerosis.     Diagnoses and all orders for this visit:    Primary progressive MS    Brain aneurysms:

## 2020-06-24 RX ORDER — BUDESONIDE AND FORMOTEROL FUMARATE DIHYDRATE 160; 4.5 UG/1; UG/1
2 AEROSOL RESPIRATORY (INHALATION) 2 TIMES DAILY
Qty: 1 INHALER | Refills: 5 | Status: SHIPPED
Start: 2020-06-24 | End: 2021-03-26 | Stop reason: SDUPTHER

## 2020-07-06 RX ORDER — ALENDRONATE SODIUM 70 MG/1
TABLET ORAL
Qty: 12 TABLET | Refills: 1 | Status: SHIPPED
Start: 2020-07-06 | End: 2020-12-21 | Stop reason: SDUPTHER

## 2020-07-10 RX ORDER — AMLODIPINE BESYLATE 5 MG/1
TABLET ORAL
Qty: 90 TABLET | Refills: 1 | Status: SHIPPED
Start: 2020-07-10 | End: 2021-03-26 | Stop reason: SDUPTHER

## 2020-09-25 RX ORDER — CARVEDILOL 25 MG/1
TABLET ORAL
Qty: 180 TABLET | Refills: 0 | Status: SHIPPED
Start: 2020-09-25 | End: 2021-01-04

## 2020-09-25 RX ORDER — CARVEDILOL 25 MG/1
25 TABLET ORAL 2 TIMES DAILY
Qty: 60 TABLET | Refills: 0 | Status: SHIPPED
Start: 2020-09-25 | End: 2020-09-30 | Stop reason: SDUPTHER

## 2020-09-30 ENCOUNTER — OFFICE VISIT (OUTPATIENT)
Dept: ORTHOPEDIC SURGERY | Age: 75
End: 2020-09-30
Payer: MEDICARE

## 2020-09-30 VITALS — HEIGHT: 62 IN | TEMPERATURE: 97.5 F | WEIGHT: 170 LBS | BODY MASS INDEX: 31.28 KG/M2

## 2020-09-30 PROBLEM — Z96.652 HISTORY OF TOTAL KNEE REPLACEMENT, LEFT: Status: ACTIVE | Noted: 2020-09-30

## 2020-09-30 PROCEDURE — 99213 OFFICE O/P EST LOW 20 MIN: CPT | Performed by: ORTHOPAEDIC SURGERY

## 2020-09-30 NOTE — PROGRESS NOTES
Chief Complaint:   Chief Complaint   Patient presents with    Knee Pain     Lt knee injury. Hx Lt TKA 10/1/2013. Tripped and fell 2 weeks ago on concrete floor, left knee took brunt of fall. Pain and swelling entire left knee and shin. No x-rays       HPI   67-year-old woman with 2 weeks of left knee pain after falling forward when she tripped over a rug. She has chronically impaired gait with history of bilateral total knee arthroplasties and right total hip arthroplasty. She does however try to remain active including doing yoga. 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 History of multiple sclerosis. 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    History of total knee replacement, left       Past Medical History:   Diagnosis Date    Arthritis     Hyperlipidemia     Hypertension     Multiple sclerosis ([address]     diagnosised 8  yrs ago [facility]       Past Surgical History:   Procedure Laterality Date    APPENDECTOMY      BREAST SURGERY  4/13/2012    biopsy - negative    HIP ARTHROPLASTY Right 04/13/2011    Right AIDEE  ALLISON Hobbs MD    HYSTERECTOMY      KNEE ARTHROPLASTY Left 10/01/2013    Left TKA  ALLISON Hobbs MD    KNEE ARTHROPLASTY Right 08/29/2012    Right TKA  ALLISON Hernandez MD       Current Outpatient Medications   Medication Sig Dispense Refill    carvedilol (COREG) 25 MG tablet TAKE 1 TABLET TWICE A  tablet 0    amLODIPine (NORVASC) 5 MG tablet TAKE 1 TABLET DAILY 90 tablet 1    alendronate (FOSAMAX) 70 MG tablet TAKE 1 TABLET EVERY 7 DAYS 12 tablet 1    budesonide-formoterol (SYMBICORT) 160-4.5 MCG/ACT AERO Inhale 2 puffs into the lungs 2 times daily 1 Inhaler 5    gabapentin (NEURONTIN) 400 MG capsule Take 1 capsule by mouth 3 times daily for 30 days. (Patient taking differently: Take 400 mg by mouth 2 times daily. ) 30 capsule 0    simvastatin (ZOCOR) 40 MG tablet Take 1 tablet by mouth nightly 10 tablet 0    baclofen (LIORESAL) 10 MG tablet TAKE 1 TABLET TWICE A  tablet 1    sertraline (ZOLOFT) 100 MG tablet 1 1/2 tab PO  tablet 1    hydrochlorothiazide (HYDRODIURIL) 25 MG tablet TAKE 1 TABLET EVERY MORNING 90 tablet 4    aspirin EC 81 MG EC tablet Take 1 tablet by mouth daily 90 tablet 1    Cholecalciferol 4000 UNITS CAPS Take 4,000 Units by mouth every morning        No current facility-administered medications for this visit.         Allergies   Allergen Reactions    Macrodantin [Nitrofurantoin Macrocrystal] Shortness Of Breath       Social History     Socioeconomic History    Marital status:      Spouse name: None    Number of children: None    Years of education: None    Highest education level: None   Occupational History     Employer: RETIRED   Social Needs    Financial resource strain: None    Food insecurity     Worry: None     Inability: None    Transportation needs     Medical: None     Non-medical: None   Tobacco Use    Smoking status: Current Every Day Smoker     Packs/day: 1.00     Years: 40.00     Pack years: 40.00    Smokeless tobacco: Never Used   Substance and Sexual Activity    Alcohol use: No    Drug use: No    Sexual activity: Yes   Lifestyle    Physical activity     Days per week: None     Minutes per session: None    Stress: None   Relationships    Social connections     Talks on phone: None     Gets together: None     Attends Confucianist service: None     Active member of club or organization: None     Attends meetings of clubs or organizations: None     Relationship status: None    Intimate partner violence     Fear of current or ex partner: None     Emotionally abused: None     Physically abused: None     Forced sexual activity: None   Other Topics Concern    None   Social History Narrative    None       Family History   Problem Relation Age of Onset    Mult Sclerosis Father     Thyroid Cancer Mother     Cirrhosis Mother          Review of Systems   No fever, chills, or other constitutionalsymptoms. No numbness or other neuro symptoms. No chest pain. No dyspnea. [unfilled]   No acute distress. Walking full weightbearing with the wheeled walker she typically uses for balance. Left knee exam shows a well-healed surgical incision. No residual discoloration ecchymosis or erythema. Tender to palpation over the tibial tubercle but she is able to actively straight leg raise and actively flex to 130 degrees with little objective pain. There is no pain elicited on varus valgus stress of the left knee. Physical Exam    Patient is alert and oriented. Well-developed well-nourished. Pupils equal and reactive. Scleraeanicteric. Neck supple  Lungs clear. Cardiac rate and rhythm regular. Abdomen soft and nontender. Skin warm and dry. XRAY: Deferred at patient request.                    ASSESSMENT/PLAN:    Siri Kc was seen today for knee pain. Diagnoses and all orders for this visit:    Injury of left knee, initial encounter    History of total knee replacement, left    Clinically she has left knee contusion. Clinical exam does not suggest fracture or implant disruption. Recommend heat, knee exercise program.  May resume yoga as comfort allows. I do anticipate this may be uncomfortable for a number of weeks but no structural disruption is identified clinically. Return if symptoms worsen or fail to improve.        Naresh Lucio MD    9/30/2020  2:09 PM

## 2020-10-12 ENCOUNTER — TELEPHONE (OUTPATIENT)
Dept: PRIMARY CARE CLINIC | Age: 75
End: 2020-10-12

## 2020-10-12 NOTE — TELEPHONE ENCOUNTER
Patient is requesting script for screening mammogram to be sent to [facility]

## 2020-10-14 ENCOUNTER — TELEPHONE (OUTPATIENT)
Dept: PRIMARY CARE CLINIC | Age: 75
End: 2020-10-14

## 2020-11-09 RX ORDER — BACLOFEN 10 MG/1
TABLET ORAL
Qty: 180 TABLET | Refills: 1 | Status: SHIPPED
Start: 2020-11-09 | End: 2021-05-07

## 2020-11-20 RX ORDER — SERTRALINE HYDROCHLORIDE 100 MG/1
TABLET, FILM COATED ORAL
Qty: 135 TABLET | Refills: 1 | OUTPATIENT
Start: 2020-11-20

## 2020-11-20 RX ORDER — GABAPENTIN 400 MG/1
CAPSULE ORAL
Qty: 270 CAPSULE | Refills: 1 | Status: SHIPPED
Start: 2020-11-20 | End: 2021-04-27

## 2020-12-01 RX ORDER — SERTRALINE HYDROCHLORIDE 100 MG/1
TABLET, FILM COATED ORAL
Qty: 135 TABLET | Refills: 1 | Status: SHIPPED
Start: 2020-12-01 | End: 2020-12-08

## 2020-12-08 RX ORDER — SERTRALINE HYDROCHLORIDE 100 MG/1
TABLET, FILM COATED ORAL
Qty: 135 TABLET | Refills: 1 | Status: SHIPPED
Start: 2020-12-08 | End: 2021-01-18 | Stop reason: SDUPTHER

## 2020-12-21 RX ORDER — ALENDRONATE SODIUM 70 MG/1
TABLET ORAL
Qty: 12 TABLET | Refills: 0 | Status: SHIPPED
Start: 2020-12-21 | End: 2020-12-29 | Stop reason: SDUPTHER

## 2020-12-29 RX ORDER — HYDROCHLOROTHIAZIDE 25 MG/1
TABLET ORAL
Qty: 90 TABLET | Refills: 1 | Status: SHIPPED
Start: 2020-12-29 | End: 2021-02-08 | Stop reason: SDUPTHER

## 2020-12-29 RX ORDER — ALENDRONATE SODIUM 70 MG/1
TABLET ORAL
Qty: 12 TABLET | Refills: 0 | Status: SHIPPED
Start: 2020-12-29 | End: 2021-05-07

## 2020-12-29 RX ORDER — SIMVASTATIN 40 MG
40 TABLET ORAL NIGHTLY
Qty: 90 TABLET | Refills: 1 | Status: SHIPPED
Start: 2020-12-29 | End: 2021-03-15

## 2021-01-04 RX ORDER — CARVEDILOL 25 MG/1
TABLET ORAL
Qty: 180 TABLET | Refills: 0 | Status: SHIPPED
Start: 2021-01-04 | End: 2021-04-05

## 2021-01-15 DIAGNOSIS — F32.9 REACTIVE DEPRESSION: ICD-10-CM

## 2021-01-18 RX ORDER — SERTRALINE HYDROCHLORIDE 100 MG/1
TABLET, FILM COATED ORAL
Qty: 135 TABLET | Refills: 1 | Status: SHIPPED
Start: 2021-01-18 | End: 2021-05-14 | Stop reason: SDUPTHER

## 2021-02-08 DIAGNOSIS — I10 HYPERTENSION, ESSENTIAL, BENIGN: Chronic | ICD-10-CM

## 2021-02-08 RX ORDER — HYDROCHLOROTHIAZIDE 25 MG/1
TABLET ORAL
Qty: 90 TABLET | Refills: 1 | Status: SHIPPED
Start: 2021-02-08 | End: 2021-02-09 | Stop reason: SDUPTHER

## 2021-02-08 RX ORDER — HYDROCHLOROTHIAZIDE 25 MG/1
TABLET ORAL
Qty: 30 TABLET | Refills: 0 | OUTPATIENT
Start: 2021-02-08

## 2021-02-09 DIAGNOSIS — I10 HYPERTENSION, ESSENTIAL, BENIGN: Chronic | ICD-10-CM

## 2021-02-09 RX ORDER — HYDROCHLOROTHIAZIDE 25 MG/1
TABLET ORAL
Qty: 90 TABLET | Refills: 1 | Status: SHIPPED
Start: 2021-02-09 | End: 2021-09-03

## 2021-03-26 DIAGNOSIS — J44.9 CHRONIC OBSTRUCTIVE PULMONARY DISEASE, UNSPECIFIED COPD TYPE (HCC): ICD-10-CM

## 2021-03-26 RX ORDER — AMLODIPINE BESYLATE 5 MG/1
TABLET ORAL
Qty: 90 TABLET | Refills: 0 | Status: SHIPPED
Start: 2021-03-26 | End: 2021-07-06

## 2021-03-26 RX ORDER — BUDESONIDE AND FORMOTEROL FUMARATE DIHYDRATE 160; 4.5 UG/1; UG/1
2 AEROSOL RESPIRATORY (INHALATION) 2 TIMES DAILY
Qty: 1 INHALER | Refills: 0 | Status: SHIPPED
Start: 2021-03-26 | End: 2021-12-13

## 2021-04-05 RX ORDER — CARVEDILOL 25 MG/1
TABLET ORAL
Qty: 180 TABLET | Refills: 0 | Status: SHIPPED
Start: 2021-04-05 | End: 2021-07-02

## 2021-04-27 ENCOUNTER — OFFICE VISIT (OUTPATIENT)
Dept: PRIMARY CARE CLINIC | Age: 76
End: 2021-04-27
Payer: MEDICARE

## 2021-04-27 VITALS
SYSTOLIC BLOOD PRESSURE: 134 MMHG | TEMPERATURE: 98.6 F | DIASTOLIC BLOOD PRESSURE: 82 MMHG | BODY MASS INDEX: 32.56 KG/M2 | WEIGHT: 178 LBS | OXYGEN SATURATION: 92 % | RESPIRATION RATE: 16 BRPM | HEART RATE: 76 BPM

## 2021-04-27 DIAGNOSIS — I67.1 BRAIN ANEURYSM: ICD-10-CM

## 2021-04-27 DIAGNOSIS — E78.5 HYPERLIPIDEMIA, UNSPECIFIED HYPERLIPIDEMIA TYPE: ICD-10-CM

## 2021-04-27 DIAGNOSIS — J41.0 SIMPLE CHRONIC BRONCHITIS (HCC): ICD-10-CM

## 2021-04-27 DIAGNOSIS — G35 MS (MULTIPLE SCLEROSIS) (HCC): ICD-10-CM

## 2021-04-27 DIAGNOSIS — I10 HYPERTENSION, ESSENTIAL, BENIGN: ICD-10-CM

## 2021-04-27 DIAGNOSIS — Z72.0 TOBACCO USE: ICD-10-CM

## 2021-04-27 DIAGNOSIS — H65.02 NON-RECURRENT ACUTE SEROUS OTITIS MEDIA OF LEFT EAR: ICD-10-CM

## 2021-04-27 DIAGNOSIS — M81.0 AGE RELATED OSTEOPOROSIS, UNSPECIFIED PATHOLOGICAL FRACTURE PRESENCE: ICD-10-CM

## 2021-04-27 DIAGNOSIS — G35 MS (MULTIPLE SCLEROSIS) (HCC): Primary | ICD-10-CM

## 2021-04-27 DIAGNOSIS — H55.00 NYSTAGMUS: ICD-10-CM

## 2021-04-27 DIAGNOSIS — J02.9 PHARYNGITIS, UNSPECIFIED ETIOLOGY: ICD-10-CM

## 2021-04-27 PROBLEM — N17.9 AKI (ACUTE KIDNEY INJURY) (HCC): Status: RESOLVED | Noted: 2018-12-19 | Resolved: 2021-04-27

## 2021-04-27 PROBLEM — S72.113K: Status: RESOLVED | Noted: 2019-01-10 | Resolved: 2021-04-27

## 2021-04-27 LAB
ALBUMIN SERPL-MCNC: 4.2 G/DL (ref 3.5–5.2)
ALP BLD-CCNC: 41 U/L (ref 35–104)
ALT SERPL-CCNC: 15 U/L (ref 0–32)
ANION GAP SERPL CALCULATED.3IONS-SCNC: 13 MMOL/L (ref 7–16)
AST SERPL-CCNC: 20 U/L (ref 0–31)
BILIRUB SERPL-MCNC: 0.2 MG/DL (ref 0–1.2)
BUN BLDV-MCNC: 31 MG/DL (ref 6–23)
CALCIUM SERPL-MCNC: 9.7 MG/DL (ref 8.6–10.2)
CHLORIDE BLD-SCNC: 102 MMOL/L (ref 98–107)
CHOLESTEROL, TOTAL: 222 MG/DL (ref 0–199)
CO2: 30 MMOL/L (ref 22–29)
CREAT SERPL-MCNC: 1.3 MG/DL (ref 0.5–1)
GFR AFRICAN AMERICAN: 48
GFR NON-AFRICAN AMERICAN: 40 ML/MIN/1.73
GLUCOSE BLD-MCNC: 90 MG/DL (ref 74–99)
HCT VFR BLD CALC: 42.2 % (ref 34–48)
HDLC SERPL-MCNC: 88 MG/DL
HEMOGLOBIN: 13.6 G/DL (ref 11.5–15.5)
LDL CHOLESTEROL CALCULATED: 113 MG/DL (ref 0–99)
MCH RBC QN AUTO: 31.1 PG (ref 26–35)
MCHC RBC AUTO-ENTMCNC: 32.2 % (ref 32–34.5)
MCV RBC AUTO: 96.6 FL (ref 80–99.9)
PDW BLD-RTO: 12.7 FL (ref 11.5–15)
PLATELET # BLD: 253 E9/L (ref 130–450)
PMV BLD AUTO: 9.9 FL (ref 7–12)
POTASSIUM SERPL-SCNC: 4.7 MMOL/L (ref 3.5–5)
RBC # BLD: 4.37 E12/L (ref 3.5–5.5)
SODIUM BLD-SCNC: 145 MMOL/L (ref 132–146)
TOTAL PROTEIN: 7.6 G/DL (ref 6.4–8.3)
TRIGL SERPL-MCNC: 106 MG/DL (ref 0–149)
VLDLC SERPL CALC-MCNC: 21 MG/DL
WBC # BLD: 5.3 E9/L (ref 4.5–11.5)

## 2021-04-27 PROCEDURE — 99214 OFFICE O/P EST MOD 30 MIN: CPT | Performed by: PHYSICIAN ASSISTANT

## 2021-04-27 PROCEDURE — 96372 THER/PROPH/DIAG INJ SC/IM: CPT | Performed by: PHYSICIAN ASSISTANT

## 2021-04-27 RX ORDER — METHYLPREDNISOLONE ACETATE 80 MG/ML
80 INJECTION, SUSPENSION INTRA-ARTICULAR; INTRALESIONAL; INTRAMUSCULAR; SOFT TISSUE ONCE
Status: COMPLETED | OUTPATIENT
Start: 2021-04-27 | End: 2021-04-27

## 2021-04-27 RX ORDER — AZITHROMYCIN 250 MG/1
250 TABLET, FILM COATED ORAL SEE ADMIN INSTRUCTIONS
Qty: 6 TABLET | Refills: 0 | Status: SHIPPED | OUTPATIENT
Start: 2021-04-27 | End: 2021-05-02

## 2021-04-27 RX ORDER — ALBUTEROL SULFATE 90 UG/1
2 AEROSOL, METERED RESPIRATORY (INHALATION) 4 TIMES DAILY PRN
Qty: 1 INHALER | Refills: 5 | Status: SHIPPED | OUTPATIENT
Start: 2021-04-27

## 2021-04-27 RX ORDER — GABAPENTIN 400 MG/1
400 CAPSULE ORAL 2 TIMES DAILY
Qty: 180 CAPSULE | Refills: 1
Start: 2021-04-27 | End: 2021-09-03

## 2021-04-27 RX ADMIN — METHYLPREDNISOLONE ACETATE 80 MG: 80 INJECTION, SUSPENSION INTRA-ARTICULAR; INTRALESIONAL; INTRAMUSCULAR; SOFT TISSUE at 11:47

## 2021-04-27 NOTE — PROGRESS NOTES
Laney Edison  1945 4/27/21      HPI:  Patient presents for her check on her chronic medication ocnditions, HTN, which has been controlled on multiple meds, as well as her lipids which have been treated with statin. She came fastig nfor labs. Since last visit pt did see Dr. Reva Aguilar, for he Real, still on baclofen and gabapentin for this, but however had further assesement of multiple brain aneurysms, and is now see [facility], Dr. Gabriela Roa. Has stable aneurysms, was offered surgery, but refused. States has been stable, and foes for MRAs every 6-8 months per pt. She denies headaches. State sBPs at home have been  stable, and is compliant with her meds. Pt is complaining today of worsening COPD Sx, states last 6 months has had more wheezes, and feels her overall stimna with her lungs not the same, notse wheezes with exertion, and some SOB. Denies CP, no palps, no edema. No pnd, no orthopnea. Last CXR was years ago. She is on Symbicort, which she states helps, but not like it used to. She continues to smoke, is not ready to quit. Denies any purulent drainage, no f/c/n/v, +sore throat, and L otalgia the last 2 months. Denies drainage. Past Medical History:   Diagnosis Date    Arthritis     Hyperlipidemia     Hypertension     Multiple sclerosis ([address]     diagnosised 8  yrs ago [facility]        Past Surgical History:   Procedure Laterality Date    APPENDECTOMY      BREAST SURGERY  4/13/2012    biopsy - negative    HIP ARTHROPLASTY Right 04/13/2011    Right AIDEE  ALLISON Shah MD    HYSTERECTOMY      KNEE ARTHROPLASTY Left 10/01/2013    Left TKA  ALLISON Shah MD    KNEE ARTHROPLASTY Right 08/29/2012    Right TKA  ALLISON Hernandez MD       Current Outpatient Medications   Medication Sig Dispense Refill    gabapentin (NEURONTIN) 400 MG capsule Take 1 capsule by mouth 2 times daily for 90 days.  180 capsule 1    azithromycin (ZITHROMAX) 250 MG tablet Take 1 tablet by mouth See Admin Instructions for 5 days 500mg on day 1 followed by 250mg on days 2 - 5 6 tablet 0    Fluticasone-Umeclidin-Vilant 200-62.5-25 MCG/INH AEPB 1 inhalation once a day 1 each 5    albuterol sulfate HFA (VENTOLIN HFA) 108 (90 Base) MCG/ACT inhaler Inhale 2 puffs into the lungs 4 times daily as needed for Wheezing 1 Inhaler 5    carvedilol (COREG) 25 MG tablet TAKE 1 TABLET TWICE A  tablet 0    amLODIPine (NORVASC) 5 MG tablet TAKE 1 TABLET DAILY 90 tablet 0    budesonide-formoterol (SYMBICORT) 160-4.5 MCG/ACT AERO Inhale 2 puffs into the lungs 2 times daily 1 Inhaler 0    simvastatin (ZOCOR) 40 MG tablet TAKE 1 TABLET NIGHTLY 90 tablet 0    hydroCHLOROthiazide (HYDRODIURIL) 25 MG tablet TAKE 1 TABLET EVERY MORNING 90 tablet 1    sertraline (ZOLOFT) 100 MG tablet TAKE 1 AND 1/2 TABLETS     DAILY 135 tablet 1    alendronate (FOSAMAX) 70 MG tablet One tablet every 7 days 12 tablet 0    baclofen (LIORESAL) 10 MG tablet TAKE 1 TABLET TWICE A  tablet 1    aspirin EC 81 MG EC tablet Take 1 tablet by mouth daily 90 tablet 1    Cholecalciferol 4000 UNITS CAPS Take 4,000 Units by mouth every morning        No current facility-administered medications for this visit.         Allergies   Allergen Reactions    Macrodantin [Nitrofurantoin Macrocrystal] Shortness Of Breath       Family History   Problem Relation Age of Onset    Mult Sclerosis Father     Thyroid Cancer Mother     Cirrhosis Mother        Social History     Socioeconomic History    Marital status:      Spouse name: Not on file    Number of children: Not on file    Years of education: Not on file    Highest education level: Not on file   Occupational History     Employer: RETIRED   Social Needs    Financial resource strain: Not on file    Food insecurity     Worry: Not on file     Inability: Not on file    Transportation needs     Medical: Not on file     Non-medical: Not on file   Tobacco Use    Smoking status: Current Every Day Smoker     Packs/day: 1.00     Years: 40.00     Pack years: 40.00    Smokeless tobacco: Never Used   Substance and Sexual Activity    Alcohol use: No    Drug use: No    Sexual activity: Yes   Lifestyle    Physical activity     Days per week: Not on file     Minutes per session: Not on file    Stress: Not on file   Relationships    Social connections     Talks on phone: Not on file     Gets together: Not on file     Attends Uatsdin service: Not on file     Active member of club or organization: Not on file     Attends meetings of clubs or organizations: Not on file     Relationship status: Not on file    Intimate partner violence     Fear of current or ex partner: Not on file     Emotionally abused: Not on file     Physically abused: Not on file     Forced sexual activity: Not on file   Other Topics Concern    Not on file   Social History Narrative    Not on file       Review of Systems :    Constitutional: Negative for fatigue, malaise, fever, chills, appetite change and unexpected weight change. HENT: Negative for congestion, ear discharge, ear pain, facial swelling, mouth sores, postnasal drip, rhinorrhea, sinus pressure, sore throat, tinnitus and trouble swallowing. Eyes: Negative for vision changes, double vision, pain  Respiratory: Negative for chest heaviness, pleuritic pain. No hemoptysis. Cardiovascular: Negative for diaphoresis, chest pain, palpitations, or edema   Gastrointestinal: Negative for nausea, vomiting, abdominal pain, diarrhea, constipation, blood in stool, melena, changes in bowel habits, abdominal distention, anal bleeding and rectal pain. Endocrine: Negative for polydipsia, polyphagia and polyuria. No heat or cold intolerance. Genitourinary: Negative for dysuria, urgency, frequency, hematuria, difficulty urinating, nocturia. Musculoskeletal: Negative for myalgias, back pain, joint swelling and arthralgias. No gait disturbance. Skin: Negative for rash, lesions, hair or nail changes. Allergic/Immunologic: Negative for environmental allergies and food allergies. Neurological: Negative for dizziness, weakness, tremors, syncope, speech difficulty, light-headedness, bowel or bladder control changes, numbness and headaches. Hematological: Negative for adenopathy. Does not bruise/bleed easily. Psychiatric/Behavioral: Negative for suicidal ideas, behavioral problems, sleep disturbance and decreased concentration. The patient is not nervous/anxious. Unless otherwise stated in this report or unable to obtain because of the patient's clinical or mental status as evidenced by the medical record, this patients's positive and negative responses for Review of Systems, constitutional, psych, eyes, ENT, cardiovascular, respiratory, gastrointestinal, neurological, genitourinary, musculoskeletal, integument systems and systems related to the presenting problem are either stated in the preceding or were not pertinent or were negative for the symptoms and/or complaints related to the medical problem. Physical Exam:   Vitals:    04/27/21 1121   BP: 134/82   Pulse: 76   Resp: 16   Temp: 98.6 °F (37 °C)   SpO2: 92%   Weight: 178 lb (80.7 kg)     Constitutional:  A and O x 3, in NAD. Afebrile. Appears stated age. Head:  NCAT. Eyes:  PERRLA, EOMI without nystagmus. Conjunctiva normal. Sclera anicteric. Ears:  External ears without lesions. TM's clear bilaterally, but +serous fluid noted. Throat:  Pharynx showing +erythema and inflammation to the L tonsil, no exudate. Airway patient. Mucous membranes pink and moist without lesions. Neck:  Supple. Nontender, no lymphadenopathy noted, no thyromegaly. No JVD. Chest:  Symmetrical without visible rash or tenderness. Lungs:  Scattered wheezes throughout with decreased BS throughout, breath sounds equal.  Heart:  Regular rate and rhythm, normal S1 and S2, no m/r/g. NoUE and LE distal pulses intact.    Abdomen:  Soft, NTND, NABS x 4, no masses or organomegaly, no rebound or guarding. MS:  No neurovascular deficit. Using a walker. Skin:  No abrasions, ecchymoses, or lacerations unless noted elsewhere. Extremities  No cyanosis, clubbing, or edema noted. Neurological:   Oriented. Motor functions intact. CN II-XII intact. Finger to nose test intact bilaterally. Heel to shin test intact bilaterally. Negative pronator drift. No nystagmus noted. Ambulates without ataxia. UE/LE/ strength 5/5 bilaterally. DTRs UE and LE intact. Assessment/Plan:     Jose Tubbs was seen today for otalgia, hypertension, hyperlipidemia and blood work. Diagnoses and all orders for this visit:    MS (multiple sclerosis) ([facility]  -     gabapentin (NEURONTIN) 400 MG capsule; Take 1 capsule by mouth 2 times daily for 90 days. -     CBC; Future    Hypertension, essential, benign  -     COMPREHENSIVE METABOLIC PANEL; Future  -     CBC; Future    Hyperlipidemia, unspecified hyperlipidemia type  -     LIPID PANEL; Future    Simple chronic bronchitis (HCC)  -     methylPREDNISolone acetate (DEPO-MEDROL) injection 80 mg. She refuses oral steroids.   -     Fluticasone-Umeclidin-Vilant 200-62.5-25 MCG/INH AEPB; 1 inhalation once a day  -     CT CHEST WO CONTRAST; Future  -     albuterol sulfate HFA (VENTOLIN HFA) 108 (90 Base) MCG/ACT inhaler; Inhale 2 puffs into the lungs 4 times daily as needed for Wheezing   --smoking cessation important, or at least try and first to cut down. She understands. Tobacco use  -     CT CHEST WO CONTRAST; Future    Brain aneurysm, multiple, under care of [facility] neurology, has MRAs, visits every 6 months. Non-recurrent acute serous otitis media of left ear  Pharyngitis, unspecified etiology  -     azithromycin (ZITHROMAX) 250 MG tablet; Take 1 tablet by mouth See Admin Instructions for 5 days 500mg on day 1 followed by 250mg on days 2 - 5  -     methylPREDNISolone acetate (DEPO-MEDROL) injection 80 mg    --?  Related to allergies, to trial OTC

## 2021-04-28 DIAGNOSIS — J41.0 SIMPLE CHRONIC BRONCHITIS (HCC): Primary | ICD-10-CM

## 2021-04-30 DIAGNOSIS — J41.0 SIMPLE CHRONIC BRONCHITIS (HCC): ICD-10-CM

## 2021-05-04 ENCOUNTER — TELEPHONE (OUTPATIENT)
Dept: PRIMARY CARE CLINIC | Age: 76
End: 2021-05-04

## 2021-05-04 DIAGNOSIS — J44.9 CHRONIC OBSTRUCTIVE PULMONARY DISEASE, UNSPECIFIED COPD TYPE (HCC): Primary | ICD-10-CM

## 2021-05-04 DIAGNOSIS — J35.01 TONSILLITIS, CHRONIC: ICD-10-CM

## 2021-05-04 NOTE — TELEPHONE ENCOUNTER
Pt is willing to f/u with ENT and lung dr she would like to go to Dr. Cristiana Fernandez and would like to know who you would recommend for ENT. She would also like us to mail her the Chest Xray results.

## 2021-05-07 RX ORDER — BACLOFEN 10 MG/1
TABLET ORAL
Qty: 180 TABLET | Refills: 1 | Status: SHIPPED
Start: 2021-05-07 | End: 2021-05-14 | Stop reason: SDUPTHER

## 2021-05-07 RX ORDER — ALENDRONATE SODIUM 70 MG/1
TABLET ORAL
Qty: 12 TABLET | Refills: 1 | Status: SHIPPED
Start: 2021-05-07 | End: 2021-05-14

## 2021-05-14 DIAGNOSIS — F32.9 REACTIVE DEPRESSION: ICD-10-CM

## 2021-05-14 RX ORDER — SERTRALINE HYDROCHLORIDE 100 MG/1
TABLET, FILM COATED ORAL
Qty: 135 TABLET | Refills: 0 | Status: SHIPPED
Start: 2021-05-14 | End: 2022-03-07

## 2021-05-14 RX ORDER — BACLOFEN 10 MG/1
TABLET ORAL
Qty: 180 TABLET | Refills: 0 | Status: SHIPPED
Start: 2021-05-14 | End: 2021-11-01

## 2021-05-14 RX ORDER — ALENDRONATE SODIUM 70 MG/1
TABLET ORAL
Qty: 12 TABLET | Refills: 0 | Status: SHIPPED
Start: 2021-05-14 | End: 2021-10-29

## 2021-06-03 ENCOUNTER — OFFICE VISIT (OUTPATIENT)
Dept: ENT CLINIC | Age: 76
End: 2021-06-03
Payer: MEDICARE

## 2021-06-03 VITALS
SYSTOLIC BLOOD PRESSURE: 150 MMHG | WEIGHT: 170 LBS | HEART RATE: 96 BPM | BODY MASS INDEX: 31.09 KG/M2 | DIASTOLIC BLOOD PRESSURE: 83 MMHG

## 2021-06-03 DIAGNOSIS — J35.1 TONSILLAR HYPERTROPHY: ICD-10-CM

## 2021-06-03 DIAGNOSIS — R13.10 DYSPHAGIA, UNSPECIFIED TYPE: ICD-10-CM

## 2021-06-03 DIAGNOSIS — J38.00 VOCAL CORD PARALYSIS: Primary | ICD-10-CM

## 2021-06-03 PROCEDURE — 99204 OFFICE O/P NEW MOD 45 MIN: CPT | Performed by: NURSE PRACTITIONER

## 2021-06-03 PROCEDURE — 31575 DIAGNOSTIC LARYNGOSCOPY: CPT | Performed by: NURSE PRACTITIONER

## 2021-06-03 NOTE — PROGRESS NOTES
daily for 90 days. , Disp: 180 capsule, Rfl: 1    Fluticasone-Umeclidin-Vilant 200-62.5-25 MCG/INH AEPB, 1 inhalation once a day, Disp: 1 each, Rfl: 5    carvedilol (COREG) 25 MG tablet, TAKE 1 TABLET TWICE A DAY, Disp: 180 tablet, Rfl: 0    amLODIPine (NORVASC) 5 MG tablet, TAKE 1 TABLET DAILY, Disp: 90 tablet, Rfl: 0    simvastatin (ZOCOR) 40 MG tablet, TAKE 1 TABLET NIGHTLY, Disp: 90 tablet, Rfl: 0    hydroCHLOROthiazide (HYDRODIURIL) 25 MG tablet, TAKE 1 TABLET EVERY MORNING, Disp: 90 tablet, Rfl: 1    aspirin EC 81 MG EC tablet, Take 1 tablet by mouth daily, Disp: 90 tablet, Rfl: 1    albuterol sulfate HFA (VENTOLIN HFA) 108 (90 Base) MCG/ACT inhaler, Inhale 2 puffs into the lungs 4 times daily as needed for Wheezing (Patient not taking: Reported on 6/3/2021), Disp: 1 Inhaler, Rfl: 5    budesonide-formoterol (SYMBICORT) 160-4.5 MCG/ACT AERO, Inhale 2 puffs into the lungs 2 times daily (Patient not taking: Reported on 6/3/2021), Disp: 1 Inhaler, Rfl: 0    Cholecalciferol 4000 UNITS CAPS, Take 4,000 Units by mouth every morning  (Patient not taking: Reported on 6/3/2021), Disp: , Rfl:   Macrodantin [nitrofurantoin macrocrystal]  Social History     Tobacco Use    Smoking status: Current Every Day Smoker     Packs/day: 1.00     Years: 40.00     Pack years: 40.00    Smokeless tobacco: Never Used   Vaping Use    Vaping Use: Never used   Substance Use Topics    Alcohol use: No    Drug use: No     Family History   Problem Relation Age of Onset    Mult Sclerosis Father     Thyroid Cancer Mother     Cirrhosis Mother        Review of Systems   Constitutional: Negative. Negative for activity change and appetite change. HENT: Positive for sore throat and trouble swallowing. Negative for congestion, postnasal drip and rhinorrhea. Eyes: Negative. Respiratory: Negative. Negative for shortness of breath and stridor. Cardiovascular: Negative. Negative for chest pain and palpitations.    Endocrine: Negative. Musculoskeletal: Negative. Skin: Negative. Neurological: Negative. Negative for dizziness. Hematological: Negative. Psychiatric/Behavioral: Negative. BP (!) 150/83   Pulse 96   Wt 170 lb (77.1 kg)   BMI 31.09 kg/m²   Physical Exam  Constitutional:       Appearance: Normal appearance. HENT:      Head: Normocephalic. Right Ear: Tympanic membrane, ear canal and external ear normal.      Left Ear: Tympanic membrane, ear canal and external ear normal.      Nose: No rhinorrhea. Right Turbinates: Not pale. Left Turbinates: Not pale. Mouth/Throat:      Mouth: Mucous membranes are moist.      Tongue: No lesions. Palate: No mass. Pharynx: Pharyngeal swelling present. No oropharyngeal exudate or posterior oropharyngeal erythema. Tonsils: 1+ on the right. 2+ on the left. Eyes:      Conjunctiva/sclera: Conjunctivae normal.      Pupils: Pupils are equal, round, and reactive to light. Cardiovascular:      Rate and Rhythm: Normal rate and regular rhythm. Pulses: Normal pulses. Pulmonary:      Effort: Pulmonary effort is normal. No respiratory distress. Breath sounds: No stridor. Musculoskeletal:         General: Normal range of motion. Cervical back: Normal range of motion. No rigidity. No muscular tenderness. Skin:     General: Skin is warm and dry. Neurological:      General: No focal deficit present. Mental Status: She is alert and oriented to person, place, and time. Psychiatric:         Mood and Affect: Mood normal.         Behavior: Behavior normal.         Thought Content:  Thought content normal.         Judgment: Judgment normal.         IMPRESSION/PLAN:    Endoscopy Procedure Note    Pre-operative Diagnosis: Dysphagia, globus    Post-operative Diagnosis: Vocal cord paralysis    Indications: Hoarseness, dysphagia or aspiration - not able to be clearly evaluated by indirect laryngoscopy    Anesthesia: Lidocaine 4% and Trace-Synephrine 1/2%    Endoscopy Type:  laryngoscopy    Procedure Details   With the patient sitting upright in the examining chair informed consent was obtained. The right side(s) of the nose was topically anesthetized with spray. After waiting an appropriate period of time for anesthesia/ vasoconstriction to become effective, the flexible fiberoptic  flexible laryngoscope was passed through the right side(s) of the nose, and the nose, nasopharynx, oropharynx, hypopharynx and larynx were examined. An identical procedure was performed on the contralateral side. Examination was performed during quiet respiration and with phonation. I was present for the entire procedure. The following findings were noted. Findings:  Mucosa:  without erythema or discharge   Nasal septum:  normal   Turbinates:  normal   Adenoid:  normal   Eustachian tubes:  normal   Mucous stranding:  present   Lesions:   Small white patch at superior pole of left palentine tonsil   Modified Yoon's Maneuver not indicated   Larynx Supraglottis, false and true vocal cord were normal.  Subglottis is patent. Hooding of right arytenoid with lack of motion in the left vocal cord       Condition:  Stable    Complications:  None    Pictures:              Eugenio Flynn was seen today for other. Diagnoses and all orders for this visit:    Vocal cord paralysis  -     CT SOFT TISSUE NECK W WO CONTRAST; Future  -     CT CHEST W WO CONTRAST; Future  -     76275 - CT LARYNGOSCOPY,FLEX FIBER,DIAGNOSTIC    Tonsillar hypertrophy  -     CT SOFT TISSUE NECK W WO CONTRAST; Future  -     70168 - CT LARYNGOSCOPY,FLEX FIBER,DIAGNOSTIC    Dysphagia, unspecified type  -     CT SOFT TISSUE NECK W WO CONTRAST; Future  -     16269 - CT LARYNGOSCOPY,FLEX FIBER,DIAGNOSTIC        Mild asymmetry noted with the left tonsil on physical exam.  There is a white patch at the superior pole of left palantine tonsil.     Flexible laryngoscopy is performed revealing left vocal cord

## 2021-06-04 ENCOUNTER — TELEPHONE (OUTPATIENT)
Dept: ENT CLINIC | Age: 76
End: 2021-06-04

## 2021-06-19 ENCOUNTER — HOSPITAL ENCOUNTER (OUTPATIENT)
Dept: CT IMAGING | Age: 76
Discharge: HOME | End: 2021-06-21
Payer: MEDICARE

## 2021-06-19 DIAGNOSIS — J35.1 TONSILLAR HYPERTROPHY: ICD-10-CM

## 2021-06-19 DIAGNOSIS — R06.02 SOB (SHORTNESS OF BREATH): ICD-10-CM

## 2021-06-19 DIAGNOSIS — R13.10 DYSPHAGIA, UNSPECIFIED TYPE: ICD-10-CM

## 2021-06-19 DIAGNOSIS — J38.00 VOCAL CORD PARALYSIS: ICD-10-CM

## 2021-06-19 PROCEDURE — 6360000004 HC RX CONTRAST MEDICATION: Performed by: RADIOLOGY

## 2021-06-19 PROCEDURE — 70492 CT SFT TSUE NCK W/O & W/DYE: CPT

## 2021-06-19 PROCEDURE — 71270 CT THORAX DX C-/C+: CPT

## 2021-06-19 RX ADMIN — IOPAMIDOL 75 ML: 755 INJECTION, SOLUTION INTRAVENOUS at 09:52

## 2021-06-30 ASSESSMENT — ENCOUNTER SYMPTOMS
TROUBLE SWALLOWING: 1
SHORTNESS OF BREATH: 0
STRIDOR: 0
RHINORRHEA: 0
EYES NEGATIVE: 1
RESPIRATORY NEGATIVE: 1
SORE THROAT: 1

## 2021-07-02 RX ORDER — CARVEDILOL 25 MG/1
TABLET ORAL
Qty: 180 TABLET | Refills: 0 | Status: SHIPPED
Start: 2021-07-02 | End: 2021-09-17

## 2021-07-06 RX ORDER — AMLODIPINE BESYLATE 5 MG/1
TABLET ORAL
Qty: 90 TABLET | Refills: 1 | Status: SHIPPED
Start: 2021-07-06 | End: 2022-01-03

## 2021-08-30 DIAGNOSIS — G89.29 CHRONIC SHOULDER PAIN, UNSPECIFIED LATERALITY: Primary | ICD-10-CM

## 2021-08-30 DIAGNOSIS — M25.519 CHRONIC SHOULDER PAIN, UNSPECIFIED LATERALITY: Primary | ICD-10-CM

## 2021-09-03 DIAGNOSIS — I10 HYPERTENSION, ESSENTIAL, BENIGN: Chronic | ICD-10-CM

## 2021-09-03 DIAGNOSIS — G35 MS (MULTIPLE SCLEROSIS) (HCC): ICD-10-CM

## 2021-09-03 RX ORDER — SIMVASTATIN 40 MG
TABLET ORAL
Qty: 90 TABLET | Refills: 1 | Status: SHIPPED
Start: 2021-09-03 | End: 2022-04-30 | Stop reason: SDUPTHER

## 2021-09-03 RX ORDER — GABAPENTIN 400 MG/1
CAPSULE ORAL
Qty: 270 CAPSULE | Refills: 1 | Status: ON HOLD
Start: 2021-09-03 | End: 2022-03-27 | Stop reason: HOSPADM

## 2021-09-03 RX ORDER — HYDROCHLOROTHIAZIDE 25 MG/1
TABLET ORAL
Qty: 90 TABLET | Refills: 1 | Status: ON HOLD
Start: 2021-09-03 | End: 2022-03-27 | Stop reason: HOSPADM

## 2021-09-17 RX ORDER — CARVEDILOL 25 MG/1
TABLET ORAL
Qty: 180 TABLET | Refills: 0 | Status: SHIPPED
Start: 2021-09-17 | End: 2021-12-30

## 2021-10-13 ENCOUNTER — TELEPHONE (OUTPATIENT)
Dept: PALLATIVE CARE | Age: 76
End: 2021-10-13

## 2021-10-13 NOTE — TELEPHONE ENCOUNTER
Call from Fred Machuca with questions about Palliative care. Explained Palliative care service, Answered questions to her satisfaction. Suggested she may be better served with pain management for her chronic pain related to osteoarthritis. Fred Machuca appreciated call back.

## 2021-11-01 RX ORDER — BACLOFEN 10 MG/1
TABLET ORAL
Qty: 180 TABLET | Refills: 1 | Status: SHIPPED
Start: 2021-11-01 | End: 2022-03-03

## 2021-11-17 ENCOUNTER — OFFICE VISIT (OUTPATIENT)
Dept: ORTHOPEDIC SURGERY | Age: 76
End: 2021-11-17
Payer: MEDICARE

## 2021-11-17 VITALS — WEIGHT: 160 LBS | BODY MASS INDEX: 29.44 KG/M2 | HEIGHT: 62 IN

## 2021-11-17 DIAGNOSIS — G89.29 HIP PAIN, CHRONIC, LEFT: ICD-10-CM

## 2021-11-17 DIAGNOSIS — M25.552 HIP PAIN, CHRONIC, LEFT: ICD-10-CM

## 2021-11-17 DIAGNOSIS — M25.562 CHRONIC PAIN OF LEFT KNEE: ICD-10-CM

## 2021-11-17 DIAGNOSIS — G89.29 CHRONIC PAIN OF LEFT KNEE: ICD-10-CM

## 2021-11-17 PROCEDURE — 99214 OFFICE O/P EST MOD 30 MIN: CPT | Performed by: ORTHOPAEDIC SURGERY

## 2021-11-17 NOTE — PROGRESS NOTES
Chief Complaint:   Chief Complaint   Patient presents with    Hip Pain     Left hip / groin pain. Pain radiates down into thigh.  Knee Pain     Left knee pain. Had two falls in early October. Hx Bilateral TKA's. Landed on left knee in first fall and fractured right foot in seoond fall. HPI 80-year-old woman here for evaluation of left hip groin and knee pain. Had 2 falls in the last 6 weeks. She does have a history of bilateral total knee arthroplasties and right total hip arthroplasty all 8 or more years before. She has chronically impaired gait due to multiple sclerosis. She states that the left lower extremity pain is improving in the last 2 days. She states the right lower extremity \"feels fine\"    Patient Active Problem List   Diagnosis    Hypotension due to hypovolemia    MS (multiple sclerosis) ([address]    Hyperlipidemia    Hypertension, essential, benign    Multiple adenomatous polyps    Chronic idiopathic constipation    Reactive depression    H/O total hip arthroplasty, right    History of total knee replacement, left    Simple chronic bronchitis ([address]    Brain aneurysm       Past Medical History:   Diagnosis Date    Arthritis     Hyperlipidemia     Hypertension     Multiple sclerosis ([address]     diagnosised 8  yrs ago [facility]       Past Surgical History:   Procedure Laterality Date    APPENDECTOMY      BREAST SURGERY  4/13/2012    biopsy - negative    HIP ARTHROPLASTY Right 04/13/2011    Right AIDEE  ALLISON Bond MD    HYSTERECTOMY      KNEE ARTHROPLASTY Left 10/01/2013    Left TKA  ALLISON Bond MD    KNEE ARTHROPLASTY Right 08/29/2012    Right TKA  ALLISON Hernandez MD       Current Outpatient Medications   Medication Sig Dispense Refill    baclofen (LIORESAL) 10 MG tablet TAKE 1 TABLET TWICE A  tablet 1    alendronate (FOSAMAX) 70 MG tablet TAKE 1 TABLET EVERY 7 DAYS 12 tablet 0    carvedilol (COREG) 25 MG tablet TAKE 1 TABLET TWICE A  tablet 0    simvastatin (ZOCOR) 40 MG tablet TAKE 1 TABLET NIGHTLY 90 tablet 1    gabapentin (NEURONTIN) 400 MG capsule TAKE 1 CAPSULE 3 TIMES A    capsule 1    hydroCHLOROthiazide (HYDRODIURIL) 25 MG tablet TAKE 1 TABLET EVERY MORNING 90 tablet 1    amLODIPine (NORVASC) 5 MG tablet TAKE 1 TABLET DAILY 90 tablet 1    sertraline (ZOLOFT) 100 MG tablet TAKE 1 AND 1/2 TABLETS     DAILY 135 tablet 0    albuterol sulfate HFA (VENTOLIN HFA) 108 (90 Base) MCG/ACT inhaler Inhale 2 puffs into the lungs 4 times daily as needed for Wheezing 1 Inhaler 5    aspirin EC 81 MG EC tablet Take 1 tablet by mouth daily 90 tablet 1    Cholecalciferol 50 MCG (2000 UT) CAPS Take 2,000 Units by mouth every morning       Fluticasone-Umeclidin-Vilant 200-62.5-25 MCG/INH AEPB 1 inhalation once a day (Patient not taking: Reported on 11/17/2021) 1 each 5    budesonide-formoterol (SYMBICORT) 160-4.5 MCG/ACT AERO Inhale 2 puffs into the lungs 2 times daily (Patient not taking: Reported on 6/3/2021) 1 Inhaler 0     No current facility-administered medications for this visit.        Allergies   Allergen Reactions    Macrodantin [Nitrofurantoin Macrocrystal] Shortness Of Breath       Social History     Socioeconomic History    Marital status:      Spouse name: None    Number of children: None    Years of education: None    Highest education level: None   Occupational History     Employer: RETIRED   Tobacco Use    Smoking status: Current Every Day Smoker     Packs/day: 1.00     Years: 40.00     Pack years: 40.00    Smokeless tobacco: Never Used   Vaping Use    Vaping Use: Never used   Substance and Sexual Activity    Alcohol use: No    Drug use: No    Sexual activity: Yes   Other Topics Concern    None   Social History Narrative    None     Social Determinants of Health     Financial Resource Strain:     Difficulty of Paying Living Expenses: Not on file   Food Insecurity:     Worried About Running Out of Food in the Last Year: Not on file    Ran Out of Food in the Last Year: Not on file   Transportation Needs:     Lack of Transportation (Medical): Not on file    Lack of Transportation (Non-Medical): Not on file   Physical Activity:     Days of Exercise per Week: Not on file    Minutes of Exercise per Session: Not on file   Stress:     Feeling of Stress : Not on file   Social Connections:     Frequency of Communication with Friends and Family: Not on file    Frequency of Social Gatherings with Friends and Family: Not on file    Attends Evangelical Services: Not on file    Active Member of [address] [place] [address] [email or web address] or Organizations: Not on file    Attends Club or Organization Meetings: Not on file    Marital Status: Not on file   Intimate Partner Violence:     Fear of Current or Ex-Partner: Not on file    Emotionally Abused: Not on file    Physically Abused: Not on file    Sexually Abused: Not on file   Housing Stability:     Unable to Pay for Housing in the Last Year: Not on file    Number of [place] in the Last Year: Not on file    Unstable Housing in the Last Year: Not on file       Family History   Problem Relation Age of Onset    Mult Sclerosis Father     Thyroid Cancer Mother     Cirrhosis Mother          Review of Systems   No fever, chills, or other constitutionalsymptoms. No numbness or other neuro symptoms. No chest pain. No dyspnea. [unfilled]   Ambulating full weightbearing with a wheeled walker. No acute distress. There is no pain on right hip or knee range of motion. There is some left thigh discomfort primarily lateral and mid thigh with palpation and left hip rotation. There is some stiffness limited to about 10 degrees of internal rotation of the left hip. Left knee has a well-healed incision no acute edema or ecchymosis. Diffuse mild soft tissue tenderness anteriorly. She has full active extension without lag and is able to flex to about 125 degrees with minimal discomfort.     Physical Exam Patient is alert and oriented. Well-developed well-nourished. Pupils equal and reactive. Scleraeanicteric. Neck supple  Lungs clear. Cardiac rate and rhythm regular. Abdomen soft and nontender. Skin warm and dry. XRAY: X-ray today AP pelvis with AP and lateral views left hip. Right total hip arthroplasty with good fit and alignment no evidence of lucency or subsidence. The chronic avulsion of the tip of the greater trochanter is noted. Left hip demonstrate stage IV femoral acetabular joint space loss. This appears chronic. I see no fractures and the femoral head architecture is intact. Impression: Significant osteoarthritic changes left hip. Intact right total hip arthroplasty without acute change. AP and lateral x-ray views left femur today. Some osteopenia but no bone lesions or fractures are seen. Impression: Left femur x-ray negative for fracture. AP and lateral x-ray views left knee today. Cemented total knee arthroplasty with native patella. No wear subsidence lucency or fracture seen. There is narrowing of the anterior joint space but no fractures in the patella. Impression: Intact left total knee arthroplasty in good alignment. ASSESSMENT/PLAN:    Lior Stern was seen today for hip pain and knee pain. Diagnoses and all orders for this visit:    Hip pain, chronic, left  -     XR HIP 2-3 VW W PELVIS LEFT; Future  -     XR FEMUR LEFT (MIN 2 VIEWS); Future    Chronic pain of left knee  -     XR FEMUR LEFT (MIN 2 VIEWS); Future  -     XR KNEE LEFT (1-2 VIEWS); Future    Findings and images reviewed with the patient. She likely has left knee contusion left thigh strain related to her falls. They appear to be improving. She does have significant underlying osteoarthritis left hip. She is very reluctant to consider further surgical treatment. Offered referral to PT but she states she will do home exercises.     She will follow-up if persistent or increasing symptoms. Return if symptoms worsen or fail to improve.        Bibi Aguero MD    11/17/2021  1:39 PM

## 2021-12-13 ENCOUNTER — OFFICE VISIT (OUTPATIENT)
Dept: FAMILY MEDICINE CLINIC | Age: 76
End: 2021-12-13
Payer: MEDICARE

## 2021-12-13 VITALS
SYSTOLIC BLOOD PRESSURE: 144 MMHG | WEIGHT: 170 LBS | HEART RATE: 79 BPM | TEMPERATURE: 97.5 F | DIASTOLIC BLOOD PRESSURE: 70 MMHG | BODY MASS INDEX: 31.09 KG/M2 | OXYGEN SATURATION: 92 %

## 2021-12-13 DIAGNOSIS — M54.50 ACUTE LEFT-SIDED LOW BACK PAIN WITHOUT SCIATICA: Primary | ICD-10-CM

## 2021-12-13 PROCEDURE — 99214 OFFICE O/P EST MOD 30 MIN: CPT | Performed by: PHYSICIAN ASSISTANT

## 2021-12-13 PROCEDURE — 96372 THER/PROPH/DIAG INJ SC/IM: CPT | Performed by: PHYSICIAN ASSISTANT

## 2021-12-13 RX ORDER — PREDNISONE 10 MG/1
TABLET ORAL
Qty: 18 TABLET | Refills: 0 | Status: ON HOLD
Start: 2021-12-13 | End: 2022-03-26 | Stop reason: HOSPADM

## 2021-12-13 RX ORDER — METHYLPREDNISOLONE ACETATE 80 MG/ML
60 INJECTION, SUSPENSION INTRA-ARTICULAR; INTRALESIONAL; INTRAMUSCULAR; SOFT TISSUE ONCE
Status: COMPLETED | OUTPATIENT
Start: 2021-12-13 | End: 2021-12-13

## 2021-12-13 RX ORDER — TIZANIDINE 2 MG/1
2 TABLET ORAL 4 TIMES DAILY PRN
Qty: 20 TABLET | Refills: 0 | Status: SHIPPED
Start: 2021-12-13 | End: 2022-04-29

## 2021-12-13 RX ADMIN — METHYLPREDNISOLONE ACETATE 60 MG: 80 INJECTION, SUSPENSION INTRA-ARTICULAR; INTRALESIONAL; INTRAMUSCULAR; SOFT TISSUE at 11:29

## 2021-12-13 NOTE — PROGRESS NOTES
21  Maru Caldera : 1945 Sex: female  Age 77 y.o. Subjective:  Chief Complaint   Patient presents with    Groin Pain     hx sciatica/has MS/L leg/would like medrol dose pack and pain med          HPI:   Maru Caldera , 77 y.o. female presents to [facility] care for evaluation of back pain    HPI  60-year-old female presents to [facility] for evaluation of left low back pain. The patient has had this left low back pain ongoing for the last couple of weeks. The last 5 days it has gotten worse. The patient does have a history of MS. The patient was worried about sciatica. The patient does have a history of this. Does seem similar nature. The patient did see orthopedic surgeon a couple of weeks ago who did x-rays and all of these were negative. The patient is not having any weakness. The patient denies any recent falls. She did fall back in October. The patient has not any chest pain, shortness of breath. The patient pulse ox is 92%. The patient does have a history of COPD. This is at her baseline according to previous numbers. ROS:   Unless otherwise stated in this report the patient's positive and negative responses for review of systems for constitutional, eyes, ENT, cardiovascular, respiratory, gastrointestinal, neurological, , musculoskeletal, and integument systems and related systems to the presenting problem are either stated in the history of present illness or were not pertinent or were negative for the symptoms and/or complaints related to the presenting medical problem. Positives and pertinent negatives as per HPI. All others reviewed and are negative.       PMH:     Past Medical History:   Diagnosis Date    Arthritis     Hyperlipidemia     Hypertension     Multiple sclerosis ([address]     diagnosised 8  yrs ago [facility]       Past Surgical History:   Procedure Laterality Date    APPENDECTOMY      BREAST SURGERY  2012    biopsy - negative    HIP ARTHROPLASTY Right 04/13/2011    Right AIDEE  ALLISON Cao MD    HYSTERECTOMY      KNEE ARTHROPLASTY Left 10/01/2013    Left TKA  ALLISON Cao MD    KNEE ARTHROPLASTY Right 08/29/2012    Right TKA  ALLISON Cao MD       Family History   Problem Relation Age of Onset    Mult Sclerosis Father     Thyroid Cancer Mother     Cirrhosis Mother        Medications:     Current Outpatient Medications:     predniSONE (DELTASONE) 10 MG tablet, 3 tabs once daily for 3 days, 2 tabs once daily for 3 days, 1 tab once daily for 3 days, Disp: 18 tablet, Rfl: 0    tiZANidine (ZANAFLEX) 2 MG tablet, Take 1 tablet by mouth 4 times daily as needed (back pain), Disp: 20 tablet, Rfl: 0    baclofen (LIORESAL) 10 MG tablet, TAKE 1 TABLET TWICE A DAY, Disp: 180 tablet, Rfl: 1    alendronate (FOSAMAX) 70 MG tablet, TAKE 1 TABLET EVERY 7 DAYS, Disp: 12 tablet, Rfl: 0    carvedilol (COREG) 25 MG tablet, TAKE 1 TABLET TWICE A DAY, Disp: 180 tablet, Rfl: 0    simvastatin (ZOCOR) 40 MG tablet, TAKE 1 TABLET NIGHTLY, Disp: 90 tablet, Rfl: 1    gabapentin (NEURONTIN) 400 MG capsule, TAKE 1 CAPSULE 3 TIMES A   DAY, Disp: 270 capsule, Rfl: 1    hydroCHLOROthiazide (HYDRODIURIL) 25 MG tablet, TAKE 1 TABLET EVERY MORNING, Disp: 90 tablet, Rfl: 1    amLODIPine (NORVASC) 5 MG tablet, TAKE 1 TABLET DAILY, Disp: 90 tablet, Rfl: 1    sertraline (ZOLOFT) 100 MG tablet, TAKE 1 AND 1/2 TABLETS     DAILY, Disp: 135 tablet, Rfl: 0    albuterol sulfate HFA (VENTOLIN HFA) 108 (90 Base) MCG/ACT inhaler, Inhale 2 puffs into the lungs 4 times daily as needed for Wheezing, Disp: 1 Inhaler, Rfl: 5    aspirin EC 81 MG EC tablet, Take 1 tablet by mouth daily, Disp: 90 tablet, Rfl: 1    Cholecalciferol 50 MCG (2000 UT) CAPS, Take 2,000 Units by mouth every morning , Disp: , Rfl:     Allergies:      Allergies   Allergen Reactions    Macrodantin [Nitrofurantoin Macrocrystal] Shortness Of Breath       Social History:     Social History     Tobacco Use    Smoking status: Current Every Day Smoker     Packs/day: 1.00     Years: 40.00     Pack years: 40.00    Smokeless tobacco: Never Used   Vaping Use    Vaping Use: Never used   Substance Use Topics    Alcohol use: No    Drug use: No       Patient lives at home. Physical Exam:     Vitals:    12/13/21 1114   BP: (!) 144/70   Pulse: 79   Temp: 97.5 °F (36.4 °C)   SpO2: 92%   Weight: 170 lb (77.1 kg)       Exam:  Physical Exam  Nurses note and vital signs reviewed and patient is not hypoxic. General: The patient appears well and in no apparent distress. Patient is resting comfortably on cart. Skin: Warm, dry, no pallor noted. There is no rash noted. Head: Normocephalic, atraumatic. Eye: Normal conjunctiva  Ears, Nose, Mouth, and Throat: Wearing a mask  Cardiovascular: Regular Rate and Rhythm  Respiratory: Patient is in no distress, no accessory muscle use, speaks in full complete sentences, no respiratory distress  Back: The patient has diffuse tenderness noted to the left paralumbar musculature into the left SI joint, no right-sided tenderness, no midline tenderness  GI: Normal bowel sounds, no tenderness to palpation, no masses appreciated. No rebound, guarding, or rigidity noted. Musculoskeletal: The patient has no evidence of calf tenderness, no pitting edema, symmetrical pulses noted bilaterally. Positive straight leg raise bilaterally. Neurological: A&O x4, normal speech      Testing:           Medical Decision Making:     Vital signs reviewed    Past medical history reviewed. Allergies reviewed. Medications reviewed. Patient on arrival does not appear to be in any apparent distress or discomfort. The patient has been seen and evaluated. The patient does not appear to be toxic or lethargic. The patient will be treated with IM injection of methylprednisone. She had been using large amounts of ibuprofen at home.   She does have a history of renal insufficiency and we will caution her with proceeding with the ibuprofen. The patient additionally will be given a tapering dose of prednisone and tizanidine for home. Side effects of the medication were discussed with her. The patient will monitor symptoms closely. Follow-up with PCP. The patient is to return to express care or go directly to the emergency department should any of the signs or symptoms worsen. The patient is to followup with primary care physician in 2-3 days for repeat evaluation. The patient has no other questions or concerns at this time the patient will be discharged home. Clinical Impression:   Faiza Toro was seen today for groin pain. Diagnoses and all orders for this visit:    Acute left-sided low back pain without sciatica    Other orders  -     methylPREDNISolone acetate (DEPO-MEDROL) injection 60 mg  -     predniSONE (DELTASONE) 10 MG tablet; 3 tabs once daily for 3 days, 2 tabs once daily for 3 days, 1 tab once daily for 3 days  -     tiZANidine (ZANAFLEX) 2 MG tablet; Take 1 tablet by mouth 4 times daily as needed (back pain)        The patient is to call for any concerns or return if any of the signs or symptoms worsen. The patient is to follow-up with PCP in the next 2-3 days for repeat evaluation repeat assessment or go directly to the emergency department.      SIGNATURE: Argenis Kramer III, PA-C

## 2021-12-19 ENCOUNTER — OFFICE VISIT (OUTPATIENT)
Dept: FAMILY MEDICINE CLINIC | Age: 76
End: 2021-12-19
Payer: MEDICARE

## 2021-12-19 VITALS
OXYGEN SATURATION: 95 % | RESPIRATION RATE: 18 BRPM | TEMPERATURE: 97.7 F | HEART RATE: 80 BPM | DIASTOLIC BLOOD PRESSURE: 68 MMHG | SYSTOLIC BLOOD PRESSURE: 118 MMHG

## 2021-12-19 DIAGNOSIS — M54.42 ACUTE LEFT-SIDED LOW BACK PAIN WITH LEFT-SIDED SCIATICA: Primary | ICD-10-CM

## 2021-12-19 DIAGNOSIS — Z98.890 HISTORY OF KYPHOPLASTY: ICD-10-CM

## 2021-12-19 PROCEDURE — 99214 OFFICE O/P EST MOD 30 MIN: CPT | Performed by: PHYSICIAN ASSISTANT

## 2021-12-19 RX ORDER — TRAMADOL HYDROCHLORIDE 50 MG/1
50 TABLET ORAL EVERY 6 HOURS PRN
Qty: 12 TABLET | Refills: 0 | Status: SHIPPED
Start: 2021-12-19 | End: 2021-12-28 | Stop reason: SDUPTHER

## 2021-12-19 NOTE — PROGRESS NOTES
Chief Complaint:   Leg Problem (left leg pain, hurt it doing yoga)    History of Present Illness   Source of history provided by:  patient. Humberto Sterling is a 77 y.o. old female who has a past medical history of:   Past Medical History:   Diagnosis Date    Arthritis     Hyperlipidemia     Hypertension     Multiple sclerosis ([address]     diagnosised 8  yrs ago [facility]    presents to the walk in clinic for evaluation of lower left back pain. Reports pain and tingling that goes down left leg. Was doing yoga and felt that she pulled something- 3 weeks ago. Saw Dr. Juan Carlos Coronel because she thought it was her hip. Left hip, knee, and femur XR were negative. Came to [facility] last week. Was given steroid shot, medrol dose pack, and tizandine. She states that medications aren't helping much. Pt has had back problems in the past. Had kyphoplasty in the past. Pt states the pain is worse with movement and improves with lying flat. There is radiation of the pain into the buttocks/leg. Pt denies any bowel/bladder incontinence, abdominal pain, hematuria, N/V/D, fever, chills, HA, neck pain, recent illness, dysuria, or lethargy. History of MS.     ROS    Unless otherwise stated in this report or unable to obtain because of the patient's clinical or mental status as evidenced by the medical record, this patients's positive and negative responses for Review of Systems, constitutional, psych, eyes, ENT, cardiovascular, respiratory, gastrointestinal, neurological, genitourinary, musculoskeletal, integument systems and systems related to the presenting problem are either stated in the preceding or were not pertinent or were negative for the symptoms and/or complaints related to the medical problem. Past Medical History:   Past Surgical History:   Procedure Laterality Date    APPENDECTOMY      BREAST SURGERY  4/13/2012    biopsy - negative    HIP ARTHROPLASTY Right 04/13/2011    Right AIDEE  ALLISON Coronel MD    HYSTERECTOMY  KNEE ARTHROPLASTY Left 10/01/2013    Left TKA  ALLISON Coronel MD    KNEE ARTHROPLASTY Right 08/29/2012    Right TKA  ALLISON Coronel MD     Social History:  reports that she has been smoking. She has a 40.00 pack-year smoking history. She has never used smokeless tobacco. She reports that she does not drink alcohol and does not use drugs. Family History: family history includes Cirrhosis in her mother; Mult Sclerosis in her father; Thyroid Cancer in her mother. Allergies: Macrodantin [nitrofurantoin macrocrystal]    Physical Exam         VS:  /68   Pulse 80   Temp 97.7 °F (36.5 °C)   Resp 18   SpO2 95%    Oxygen Saturation Interpretation: Normal.    Constitutional:  Alert, development consistent with age. Neck:  Normal ROM. Supple. No TTP. Lungs:  Clear to auscultation and breath sounds equal.  Heart:  Regular rate and rhythm, normal heart sounds, without pathological murmurs, ectopy, gallops, or rubs. Abdomen:  Soft, nontender, good bowel sounds. No firm or pulsatile mass. Back: Tenderness: TTP over left lumbar paraspinal musculature with no appreciable midline tenderness. Swelling: No edema. Range of Motion: Decreased lateral and front to back ROM due to pain. CVA Tenderness: No CVA tenderness bilaterally. Skin:  No bruising, redness, abrasions, or rashes. Distal Function:              Motor deficit: Strength 5/5 in LE's bilaterally. Sensory deficit: Distal sensation intact. Pulse deficit: Distal pulses 2+ and bounding. Calf Tenderness:  No bilateral calf tenderness. Negative La's sign bilaterally               Reflexes: Intact at knee and achilles bilaterally. Gait:  No antalgia noted. Skin:  Normal turgor. Warm, dry, without visible rash. Neurological:  Alert and oriented. Motor functions intact.     Lab / Imaging Results   (All laboratory and radiology results have been personally reviewed by myself)  Labs:    Imaging: All Radiology results interpreted by Radiologist unless otherwise noted. Assessment / Plan     Impression(s):  1. Acute left-sided low back pain with left-sided sciatica    2. History of kyphoplasty      Disposition:  Disposition: Discharge to home. XR order provided. Discussed possible need for CT scan if XR is unremarkable and symptoms do not improve. Script written for very short course of tramadol, side effects discussed. Advise rest, ice, and/or moist heat for additional relief. PCP in 1-2 weeks if symptoms persist. ED sooner if symptoms worsen or change. ED immediately with any fever, severe or worsening back pain, paresthesias, weakness, or GI/ incontinence. Pt states understanding and is in agreement with this care plan. All questions answered. Controlled Substance Monitoring:  Acute and Chronic Pain Monitoring:   RX Monitoring 12/19/2021   Attestation -   Periodic Controlled Substance Monitoring Possible medication side effects, risk of tolerance/dependence & alternative treatments discussed. ;No signs of potential drug abuse or diversion identified.

## 2021-12-20 ENCOUNTER — HOSPITAL ENCOUNTER (OUTPATIENT)
Dept: GENERAL RADIOLOGY | Age: 76
Discharge: HOME | End: 2021-12-22
Payer: MEDICARE

## 2021-12-20 ENCOUNTER — HOSPITAL ENCOUNTER (OUTPATIENT)
Age: 76
Discharge: HOME | End: 2021-12-20
Payer: MEDICARE

## 2021-12-20 DIAGNOSIS — M54.42 ACUTE LEFT-SIDED LOW BACK PAIN WITH LEFT-SIDED SCIATICA: ICD-10-CM

## 2021-12-20 PROCEDURE — 72110 X-RAY EXAM L-2 SPINE 4/>VWS: CPT

## 2021-12-28 DIAGNOSIS — M54.16 LUMBAR RADICULOPATHY: Primary | ICD-10-CM

## 2021-12-28 DIAGNOSIS — M54.42 ACUTE LEFT-SIDED LOW BACK PAIN WITH LEFT-SIDED SCIATICA: ICD-10-CM

## 2021-12-28 RX ORDER — TRAMADOL HYDROCHLORIDE 50 MG/1
50 TABLET ORAL EVERY 8 HOURS PRN
Qty: 21 TABLET | Refills: 0 | Status: SHIPPED | OUTPATIENT
Start: 2021-12-28 | End: 2022-01-04

## 2021-12-30 RX ORDER — CARVEDILOL 25 MG/1
TABLET ORAL
Qty: 180 TABLET | Refills: 0 | Status: SHIPPED
Start: 2021-12-30 | End: 2022-01-31 | Stop reason: SDUPTHER

## 2022-01-03 RX ORDER — AMLODIPINE BESYLATE 5 MG/1
TABLET ORAL
Qty: 90 TABLET | Refills: 1 | Status: ON HOLD
Start: 2022-01-03 | End: 2022-03-27 | Stop reason: HOSPADM

## 2022-01-17 ENCOUNTER — HOSPITAL ENCOUNTER (OUTPATIENT)
Dept: MRI IMAGING | Age: 77
Discharge: HOME | End: 2022-01-19
Payer: MEDICARE

## 2022-01-17 DIAGNOSIS — M54.16 LUMBAR RADICULOPATHY: ICD-10-CM

## 2022-01-17 PROCEDURE — 72148 MRI LUMBAR SPINE W/O DYE: CPT

## 2022-01-18 DIAGNOSIS — M51.36 DDD (DEGENERATIVE DISC DISEASE), LUMBAR: Primary | ICD-10-CM

## 2022-01-18 DIAGNOSIS — M54.16 LUMBAR RADICULOPATHY: ICD-10-CM

## 2022-01-21 DIAGNOSIS — M54.16 LUMBAR RADICULOPATHY: Primary | ICD-10-CM

## 2022-01-21 RX ORDER — ALENDRONATE SODIUM 70 MG/1
TABLET ORAL
Qty: 12 TABLET | Refills: 0 | Status: SHIPPED
Start: 2022-01-21 | End: 2022-04-19

## 2022-01-21 RX ORDER — TRAMADOL HYDROCHLORIDE 50 MG/1
50 TABLET ORAL EVERY 8 HOURS PRN
Qty: 21 TABLET | Refills: 0 | Status: SHIPPED
Start: 2022-01-21 | End: 2022-02-18 | Stop reason: SDUPTHER

## 2022-01-25 ENCOUNTER — PATIENT MESSAGE (OUTPATIENT)
Dept: PRIMARY CARE CLINIC | Age: 77
End: 2022-01-25

## 2022-01-25 NOTE — TELEPHONE ENCOUNTER
From: Abdias Cervantes  To: Ignacio Bassett  Sent: 1/25/2022 9:06 AM EST  Subject: prior auth    [id number] has not received prior auth for my visit tomorrow. If they don.t get it, I have to private pay a lot of money. Could someone please address ASAP!!

## 2022-01-31 RX ORDER — CARVEDILOL 25 MG/1
TABLET ORAL
Qty: 180 TABLET | Refills: 1 | Status: ON HOLD
Start: 2022-01-31 | End: 2022-03-27 | Stop reason: HOSPADM

## 2022-02-18 DIAGNOSIS — M54.16 LUMBAR RADICULOPATHY: ICD-10-CM

## 2022-02-18 RX ORDER — TRAMADOL HYDROCHLORIDE 50 MG/1
50 TABLET ORAL EVERY 8 HOURS PRN
Qty: 21 TABLET | Refills: 0 | Status: SHIPPED | OUTPATIENT
Start: 2022-02-18 | End: 2022-02-25

## 2022-03-03 RX ORDER — BACLOFEN 10 MG/1
TABLET ORAL
Qty: 180 TABLET | Refills: 1 | Status: SHIPPED
Start: 2022-03-03 | End: 2022-03-31 | Stop reason: SDUPTHER

## 2022-03-04 DIAGNOSIS — M16.12 OSTEOARTHRITIS OF LEFT HIP, UNSPECIFIED OSTEOARTHRITIS TYPE: Primary | ICD-10-CM

## 2022-03-05 DIAGNOSIS — F32.9 REACTIVE DEPRESSION: ICD-10-CM

## 2022-03-07 RX ORDER — SERTRALINE HYDROCHLORIDE 100 MG/1
TABLET, FILM COATED ORAL
Qty: 135 TABLET | Refills: 0 | Status: SHIPPED
Start: 2022-03-07 | End: 2022-03-31

## 2022-03-23 ENCOUNTER — APPOINTMENT (OUTPATIENT)
Dept: GENERAL RADIOLOGY | Age: 77
DRG: 469 | End: 2022-03-23
Payer: MEDICARE

## 2022-03-23 ENCOUNTER — HOSPITAL ENCOUNTER (INPATIENT)
Age: 77
LOS: 4 days | Discharge: NURSING, REHAB OR LONG-TERM CARE | DRG: 469 | End: 2022-03-27
Attending: EMERGENCY MEDICINE | Admitting: INTERNAL MEDICINE
Payer: MEDICARE

## 2022-03-23 DIAGNOSIS — G89.18 POST-OPERATIVE PAIN: ICD-10-CM

## 2022-03-23 DIAGNOSIS — M16.12 OSTEOARTHRITIS OF LEFT HIP, UNSPECIFIED OSTEOARTHRITIS TYPE: Primary | ICD-10-CM

## 2022-03-23 DIAGNOSIS — N17.9 AKI (ACUTE KIDNEY INJURY) (HCC): ICD-10-CM

## 2022-03-23 DIAGNOSIS — R26.2 UNABLE TO AMBULATE: ICD-10-CM

## 2022-03-23 PROBLEM — S72.059A: Status: ACTIVE | Noted: 2022-03-23

## 2022-03-23 LAB
ALBUMIN SERPL-MCNC: 3.6 G/DL (ref 3.5–5.2)
ALP BLD-CCNC: 80 U/L (ref 35–104)
ALT SERPL-CCNC: 8 U/L (ref 0–32)
ANION GAP SERPL CALCULATED.3IONS-SCNC: 13 MMOL/L (ref 7–16)
AST SERPL-CCNC: 16 U/L (ref 0–31)
BASOPHILS ABSOLUTE: 0.04 E9/L (ref 0–0.2)
BASOPHILS RELATIVE PERCENT: 0.6 % (ref 0–2)
BILIRUB SERPL-MCNC: 0.3 MG/DL (ref 0–1.2)
BUN BLDV-MCNC: 30 MG/DL (ref 6–23)
CALCIUM SERPL-MCNC: 8.6 MG/DL (ref 8.6–10.2)
CHLORIDE BLD-SCNC: 96 MMOL/L (ref 98–107)
CO2: 27 MMOL/L (ref 22–29)
CREAT SERPL-MCNC: 1.9 MG/DL (ref 0.5–1)
EOSINOPHILS ABSOLUTE: 0.1 E9/L (ref 0.05–0.5)
EOSINOPHILS RELATIVE PERCENT: 1.5 % (ref 0–6)
GFR AFRICAN AMERICAN: 31
GFR NON-AFRICAN AMERICAN: 26 ML/MIN/1.73
GLUCOSE BLD-MCNC: 96 MG/DL (ref 74–99)
HCT VFR BLD CALC: 39.8 % (ref 34–48)
HEMOGLOBIN: 13 G/DL (ref 11.5–15.5)
IMMATURE GRANULOCYTES #: 0.02 E9/L
IMMATURE GRANULOCYTES %: 0.3 % (ref 0–5)
LYMPHOCYTES ABSOLUTE: 1.82 E9/L (ref 1.5–4)
LYMPHOCYTES RELATIVE PERCENT: 27.5 % (ref 20–42)
MCH RBC QN AUTO: 31.2 PG (ref 26–35)
MCHC RBC AUTO-ENTMCNC: 32.7 % (ref 32–34.5)
MCV RBC AUTO: 95.4 FL (ref 80–99.9)
MONOCYTES ABSOLUTE: 0.72 E9/L (ref 0.1–0.95)
MONOCYTES RELATIVE PERCENT: 10.9 % (ref 2–12)
NEUTROPHILS ABSOLUTE: 3.92 E9/L (ref 1.8–7.3)
NEUTROPHILS RELATIVE PERCENT: 59.2 % (ref 43–80)
PDW BLD-RTO: 13.4 FL (ref 11.5–15)
PLATELET # BLD: 284 E9/L (ref 130–450)
PMV BLD AUTO: 9.3 FL (ref 7–12)
POTASSIUM SERPL-SCNC: 3.2 MMOL/L (ref 3.5–5)
RBC # BLD: 4.17 E12/L (ref 3.5–5.5)
SODIUM BLD-SCNC: 136 MMOL/L (ref 132–146)
TOTAL PROTEIN: 7 G/DL (ref 6.4–8.3)
TROPONIN, HIGH SENSITIVITY: 23 NG/L (ref 0–9)
TROPONIN, HIGH SENSITIVITY: 25 NG/L (ref 0–9)
WBC # BLD: 6.6 E9/L (ref 4.5–11.5)

## 2022-03-23 PROCEDURE — 1200000000 HC SEMI PRIVATE

## 2022-03-23 PROCEDURE — 6370000000 HC RX 637 (ALT 250 FOR IP): Performed by: EMERGENCY MEDICINE

## 2022-03-23 PROCEDURE — 73502 X-RAY EXAM HIP UNI 2-3 VIEWS: CPT

## 2022-03-23 PROCEDURE — 84484 ASSAY OF TROPONIN QUANT: CPT

## 2022-03-23 PROCEDURE — 99223 1ST HOSP IP/OBS HIGH 75: CPT | Performed by: INTERNAL MEDICINE

## 2022-03-23 PROCEDURE — 93005 ELECTROCARDIOGRAM TRACING: CPT | Performed by: EMERGENCY MEDICINE

## 2022-03-23 PROCEDURE — 96376 TX/PRO/DX INJ SAME DRUG ADON: CPT

## 2022-03-23 PROCEDURE — 96374 THER/PROPH/DIAG INJ IV PUSH: CPT

## 2022-03-23 PROCEDURE — 6360000002 HC RX W HCPCS: Performed by: EMERGENCY MEDICINE

## 2022-03-23 PROCEDURE — 71045 X-RAY EXAM CHEST 1 VIEW: CPT

## 2022-03-23 PROCEDURE — 80053 COMPREHEN METABOLIC PANEL: CPT

## 2022-03-23 PROCEDURE — 99285 EMERGENCY DEPT VISIT HI MDM: CPT

## 2022-03-23 PROCEDURE — 85025 COMPLETE CBC W/AUTO DIFF WBC: CPT

## 2022-03-23 RX ORDER — SODIUM CHLORIDE 9 MG/ML
25 INJECTION, SOLUTION INTRAVENOUS PRN
Status: DISCONTINUED | OUTPATIENT
Start: 2022-03-23 | End: 2022-03-27 | Stop reason: SDUPTHER

## 2022-03-23 RX ORDER — ACETAMINOPHEN 650 MG/1
650 SUPPOSITORY RECTAL EVERY 6 HOURS PRN
Status: DISCONTINUED | OUTPATIENT
Start: 2022-03-23 | End: 2022-03-27 | Stop reason: HOSPADM

## 2022-03-23 RX ORDER — MORPHINE SULFATE 8 MG/ML
5 INJECTION, SOLUTION INTRAMUSCULAR; INTRAVENOUS ONCE
Status: DISCONTINUED | OUTPATIENT
Start: 2022-03-23 | End: 2022-03-23

## 2022-03-23 RX ORDER — SODIUM CHLORIDE 0.9 % (FLUSH) 0.9 %
5-40 SYRINGE (ML) INJECTION EVERY 12 HOURS SCHEDULED
Status: DISCONTINUED | OUTPATIENT
Start: 2022-03-24 | End: 2022-03-27 | Stop reason: SDUPTHER

## 2022-03-23 RX ORDER — POTASSIUM CHLORIDE 20 MEQ/1
40 TABLET, EXTENDED RELEASE ORAL ONCE
Status: COMPLETED | OUTPATIENT
Start: 2022-03-23 | End: 2022-03-23

## 2022-03-23 RX ORDER — IBUPROFEN 400 MG/1
400 TABLET ORAL ONCE
Status: DISCONTINUED | OUTPATIENT
Start: 2022-03-23 | End: 2022-03-23

## 2022-03-23 RX ORDER — MORPHINE SULFATE 4 MG/ML
4 INJECTION, SOLUTION INTRAMUSCULAR; INTRAVENOUS ONCE
Status: COMPLETED | OUTPATIENT
Start: 2022-03-23 | End: 2022-03-23

## 2022-03-23 RX ORDER — ONDANSETRON 2 MG/ML
4 INJECTION INTRAMUSCULAR; INTRAVENOUS EVERY 6 HOURS PRN
Status: DISCONTINUED | OUTPATIENT
Start: 2022-03-23 | End: 2022-03-27 | Stop reason: SDUPTHER

## 2022-03-23 RX ORDER — CARVEDILOL 6.25 MG/1
12.5 TABLET ORAL 2 TIMES DAILY
Status: DISCONTINUED | OUTPATIENT
Start: 2022-03-24 | End: 2022-03-27 | Stop reason: HOSPADM

## 2022-03-23 RX ORDER — SODIUM CHLORIDE 9 MG/ML
INJECTION, SOLUTION INTRAVENOUS CONTINUOUS
Status: DISCONTINUED | OUTPATIENT
Start: 2022-03-24 | End: 2022-03-27 | Stop reason: HOSPADM

## 2022-03-23 RX ORDER — ACETAMINOPHEN 325 MG/1
650 TABLET ORAL EVERY 6 HOURS PRN
Status: DISCONTINUED | OUTPATIENT
Start: 2022-03-23 | End: 2022-03-27 | Stop reason: HOSPADM

## 2022-03-23 RX ORDER — SODIUM CHLORIDE 0.9 % (FLUSH) 0.9 %
5-40 SYRINGE (ML) INJECTION PRN
Status: DISCONTINUED | OUTPATIENT
Start: 2022-03-23 | End: 2022-03-27 | Stop reason: SDUPTHER

## 2022-03-23 RX ORDER — POLYETHYLENE GLYCOL 3350 17 G/17G
17 POWDER, FOR SOLUTION ORAL DAILY PRN
Status: DISCONTINUED | OUTPATIENT
Start: 2022-03-23 | End: 2022-03-27 | Stop reason: HOSPADM

## 2022-03-23 RX ORDER — ONDANSETRON 4 MG/1
4 TABLET, ORALLY DISINTEGRATING ORAL EVERY 8 HOURS PRN
Status: DISCONTINUED | OUTPATIENT
Start: 2022-03-23 | End: 2022-03-27 | Stop reason: SDUPTHER

## 2022-03-23 RX ORDER — MORPHINE SULFATE 4 MG/ML
4 INJECTION, SOLUTION INTRAMUSCULAR; INTRAVENOUS DAILY
Status: DISCONTINUED | OUTPATIENT
Start: 2022-03-23 | End: 2022-03-23

## 2022-03-23 RX ADMIN — POTASSIUM CHLORIDE 40 MEQ: 1500 TABLET, EXTENDED RELEASE ORAL at 22:55

## 2022-03-23 RX ADMIN — MORPHINE SULFATE 4 MG: 4 INJECTION, SOLUTION INTRAMUSCULAR; INTRAVENOUS at 19:54

## 2022-03-23 RX ADMIN — MORPHINE SULFATE 4 MG: 4 INJECTION, SOLUTION INTRAMUSCULAR; INTRAVENOUS at 17:51

## 2022-03-23 RX ADMIN — MORPHINE SULFATE 4 MG: 4 INJECTION, SOLUTION INTRAMUSCULAR; INTRAVENOUS at 16:20

## 2022-03-23 ASSESSMENT — ENCOUNTER SYMPTOMS
WHEEZING: 0
EYE REDNESS: 0
EYE DISCHARGE: 0
VOMITING: 0
ABDOMINAL DISTENTION: 0
BACK PAIN: 0
NAUSEA: 0
SORE THROAT: 0
SINUS PRESSURE: 0
SHORTNESS OF BREATH: 0
DIARRHEA: 0
EYE PAIN: 0
COUGH: 0

## 2022-03-23 ASSESSMENT — PAIN DESCRIPTION - PROGRESSION: CLINICAL_PROGRESSION: NOT CHANGED

## 2022-03-23 ASSESSMENT — PAIN SCALES - GENERAL
PAINLEVEL_OUTOF10: 9
PAINLEVEL_OUTOF10: 6
PAINLEVEL_OUTOF10: 0
PAINLEVEL_OUTOF10: 6

## 2022-03-23 ASSESSMENT — PAIN DESCRIPTION - FREQUENCY: FREQUENCY: CONTINUOUS

## 2022-03-23 ASSESSMENT — PAIN DESCRIPTION - ONSET: ONSET: ON-GOING

## 2022-03-23 ASSESSMENT — PAIN - FUNCTIONAL ASSESSMENT: PAIN_FUNCTIONAL_ASSESSMENT: PREVENTS OR INTERFERES WITH ALL ACTIVE AND SOME PASSIVE ACTIVITIES

## 2022-03-23 ASSESSMENT — PAIN DESCRIPTION - LOCATION: LOCATION: HIP

## 2022-03-23 ASSESSMENT — PAIN DESCRIPTION - PAIN TYPE: TYPE: ACUTE PAIN

## 2022-03-23 ASSESSMENT — PAIN DESCRIPTION - DESCRIPTORS: DESCRIPTORS: ACHING;DISCOMFORT

## 2022-03-23 ASSESSMENT — PAIN DESCRIPTION - ORIENTATION: ORIENTATION: LEFT

## 2022-03-23 NOTE — ED PROVIDER NOTES
This patient is scheduled for total left total hip arthroplasty by Dr. Bear Zamora on April 15, 2022. She states her last week, the pain left hip has become worse and worse. Is not to the point where she cannot bear any weight on the hip secondary to pain. There is been no involved trauma. She did not contact Dr. Rhea Vegas office prior to being transported here by ambulance. The history is provided by the patient. Leg Injury  Location:  Leg  Leg location:  L upper leg  Pain details:     Quality:  Aching    Radiates to:  Does not radiate    Severity:  Severe    Onset quality:  Sudden    Timing:  Constant    Progression:  Worsening  Chronicity:  Chronic  Prior injury to area:  No  Relieved by:  None tried  Worsened by: Adduction, abduction, bearing weight, extension, rotation and flexion  Ineffective treatments:  None tried  Associated symptoms: no back pain, no fever, no numbness, no stiffness, no swelling and no tingling    Risk factors: obesity         Review of Systems   Constitutional: Negative for chills and fever. HENT: Negative for sinus pressure and sore throat. Eyes: Negative for pain, discharge and redness. Respiratory: Negative for cough, shortness of breath and wheezing. Cardiovascular: Negative for chest pain. Gastrointestinal: Negative for abdominal distention, diarrhea, nausea and vomiting. Genitourinary: Negative for dysuria and frequency. Musculoskeletal: Positive for arthralgias. Negative for back pain and stiffness. Skin: Negative for rash and wound. Neurological: Negative for weakness and headaches. Hematological: Negative for adenopathy. All other systems reviewed and are negative. Physical Exam  Vitals and nursing note reviewed. Constitutional:       Appearance: She is well-developed. HENT:      Head: Normocephalic and atraumatic. Eyes:      Pupils: Pupils are equal, round, and reactive to light.    Cardiovascular:      Rate and Rhythm: Normal rate and regular rhythm. Heart sounds: Normal heart sounds. No murmur heard. Pulmonary:      Effort: Pulmonary effort is normal. No respiratory distress. Breath sounds: Normal breath sounds. No wheezing or rales. Abdominal:      General: Bowel sounds are normal.      Palpations: Abdomen is soft. Tenderness: There is no abdominal tenderness. There is no guarding or rebound. Musculoskeletal:         General: Tenderness present. No swelling. Cervical back: Normal range of motion and neck supple. Right lower leg: No edema. Left lower leg: No edema. Comments: Tenderness to palpation of the left lateral hip. Decreased range of motion with worsening pain on passive range of motion testing. Skin:     General: Skin is warm and dry. Neurological:      Mental Status: She is alert and oriented to person, place, and time. Cranial Nerves: No cranial nerve deficit. Coordination: Coordination normal.          Procedures     MDM     3:56 PM EDT  Patient refused morphine for pain. Motrin has been accepted in its place. 4:13 PM EDT  Patient has again changed her mind. She now does request the morphine. 6:17 PM EDT  Discussed with Dr. Hernesto Freed for Dr. Liam Conklin. He will discuss this case with Dr. Liam Conklin and call back. 7:29 PM EDT  Discussed with Dr. Stephanie Gatica. Okay for admission. He has to get some basic blood work and if renal function and potassium is okay, general medical.  Otherwise to the monitored  Floor. --------------------------------------------- PAST HISTORY ---------------------------------------------  Past Medical History:  has a past medical history of Arthritis, Hyperlipidemia, Hypertension, and Multiple sclerosis ([facility] [address] Past Surgical History:  has a past surgical history that includes Hysterectomy; Breast surgery (4/13/2012); Appendectomy; Knee Arthroplasty (Left, 10/01/2013);  Knee Arthroplasty (Right, 08/29/2012); and Hip Arthroplasty (Right, 04/13/2011). Social History:  reports that she has been smoking. She has a 40.00 pack-year smoking history. She has never used smokeless tobacco. She reports that she does not drink alcohol and does not use drugs. Family History: family history includes Cirrhosis in her mother; Mult Sclerosis in her father; Thyroid Cancer in her mother. The patients home medications have been reviewed. Allergies: Macrodantin [nitrofurantoin macrocrystal]    -------------------------------------------------- RESULTS -------------------------------------------------    LABS:  No results found for this visit on 03/23/22. RADIOLOGY:  XR HIP 2-3 VW W PELVIS LEFT   Final Result   1. New deformity involving left femoral head with severe femoral head   flattening. 2. Satisfactory alignment of right hip arthroplasty. XR CHEST 1 VIEW    (Results Pending)     EKG: This EKG is signed and interpreted by me. Rate: 75  Rhythm: Sinus  Interpretation: no acute changes and non-specific EKG  Comparison: changes compared to previous EKG of 12/ ------------------------- NURSING NOTES AND VITALS REVIEWED ---------------------------  Date / Time Roomed:  3/23/2022  3:44 PM  ED Bed Assignment:  [facility]/[place] [id number]    The nursing notes within the ED encounter and vital signs as below have been reviewed. Patient Vitals for the past 24 hrs:   BP Temp Pulse Resp SpO2   03/23/22 1557 134/78 97.6 °F (36.4 °C) 73 15 97 %       Oxygen Saturation Interpretation: Normal    ------------------------------------------ PROGRESS NOTES ------------------------------------------  Re-evaluation(s):  Time: 2000  Patients symptoms are improving  Repeat physical examination is improved    Counseling:  I have spoken with the patient and discussed todays results, in addition to providing specific details for the plan of care and counseling regarding the diagnosis and prognosis.   Their questions are answered at this time and they are agreeable with the plan of admission.    --------------------------------- ADDITIONAL PROVIDER NOTES ---------------------------------  Consultations:   Spoke with Dr. Michael Bower. Discussed case. They will admit the patient. This patient's ED course included: a personal history and physicial examination and multiple bedside re-evaluations    This patient has remained hemodynamically stable during their ED course. Diagnosis:  1. Osteoarthritis of left hip, unspecified osteoarthritis type    2. Unable to ambulate        Disposition:  Patient's disposition: Admit  Patient's condition is stable.          Duane Sanchez DO  03/23/22 2003

## 2022-03-23 NOTE — Clinical Note
Discharge Plan[de-identified] Other/[facility])   Telemetry/Cardiac Monitoring Required?: No

## 2022-03-24 ENCOUNTER — APPOINTMENT (OUTPATIENT)
Dept: ULTRASOUND IMAGING | Age: 77
DRG: 469 | End: 2022-03-24
Payer: MEDICARE

## 2022-03-24 ENCOUNTER — ANESTHESIA EVENT (OUTPATIENT)
Dept: OPERATING ROOM | Age: 77
DRG: 469 | End: 2022-03-24
Payer: MEDICARE

## 2022-03-24 ENCOUNTER — APPOINTMENT (OUTPATIENT)
Dept: NUCLEAR MEDICINE | Age: 77
DRG: 469 | End: 2022-03-24
Payer: MEDICARE

## 2022-03-24 PROBLEM — M16.12 PRIMARY OSTEOARTHRITIS OF LEFT HIP: Chronic | Status: ACTIVE | Noted: 2022-03-24

## 2022-03-24 LAB
ADENOVIRUS BY PCR: NOT DETECTED
ALBUMIN SERPL-MCNC: 3.4 G/DL (ref 3.5–5.2)
ALP BLD-CCNC: 72 U/L (ref 35–104)
ALT SERPL-CCNC: 9 U/L (ref 0–32)
ANION GAP SERPL CALCULATED.3IONS-SCNC: 11 MMOL/L (ref 7–16)
AST SERPL-CCNC: 14 U/L (ref 0–31)
B.E.: 0.2 MMOL/L (ref -3–3)
BASOPHILS ABSOLUTE: 0.04 E9/L (ref 0–0.2)
BASOPHILS RELATIVE PERCENT: 0.6 % (ref 0–2)
BILIRUB SERPL-MCNC: 0.3 MG/DL (ref 0–1.2)
BORDETELLA PARAPERTUSSIS BY PCR: NOT DETECTED
BORDETELLA PERTUSSIS BY PCR: NOT DETECTED
BUN BLDV-MCNC: 32 MG/DL (ref 6–23)
CALCIUM SERPL-MCNC: 8.4 MG/DL (ref 8.6–10.2)
CHLAMYDOPHILIA PNEUMONIAE BY PCR: NOT DETECTED
CHLORIDE BLD-SCNC: 100 MMOL/L (ref 98–107)
CO2: 28 MMOL/L (ref 22–29)
COHB: 2.2 % (ref 0–1.5)
CORONAVIRUS 229E BY PCR: NOT DETECTED
CORONAVIRUS HKU1 BY PCR: NOT DETECTED
CORONAVIRUS NL63 BY PCR: NOT DETECTED
CORONAVIRUS OC43 BY PCR: NOT DETECTED
CREAT SERPL-MCNC: 1.8 MG/DL (ref 0.5–1)
CRITICAL: ABNORMAL
D DIMER: 494 NG/ML DDU
DATE ANALYZED: ABNORMAL
DATE OF COLLECTION: ABNORMAL
EKG ATRIAL RATE: 75 BPM
EKG P AXIS: 66 DEGREES
EKG P-R INTERVAL: 168 MS
EKG Q-T INTERVAL: 396 MS
EKG QRS DURATION: 86 MS
EKG QTC CALCULATION (BAZETT): 442 MS
EKG R AXIS: 12 DEGREES
EKG T AXIS: 23 DEGREES
EKG VENTRICULAR RATE: 75 BPM
EOSINOPHILS ABSOLUTE: 0.16 E9/L (ref 0.05–0.5)
EOSINOPHILS RELATIVE PERCENT: 2.4 % (ref 0–6)
GFR AFRICAN AMERICAN: 33
GFR NON-AFRICAN AMERICAN: 27 ML/MIN/1.73
GLUCOSE BLD-MCNC: 91 MG/DL (ref 74–99)
HCO3: 26.2 MMOL/L (ref 22–26)
HCT VFR BLD CALC: 36.6 % (ref 34–48)
HEMOGLOBIN: 12 G/DL (ref 11.5–15.5)
HHB: 7.3 % (ref 0–5)
HUMAN METAPNEUMOVIRUS BY PCR: NOT DETECTED
HUMAN RHINOVIRUS/ENTEROVIRUS BY PCR: NOT DETECTED
IMMATURE GRANULOCYTES #: 0.02 E9/L
IMMATURE GRANULOCYTES %: 0.3 % (ref 0–5)
INFLUENZA A BY PCR: NOT DETECTED
INFLUENZA B BY PCR: NOT DETECTED
LAB: ABNORMAL
LV EF: 60 %
LVEF MODALITY: NORMAL
LYMPHOCYTES ABSOLUTE: 1.57 E9/L (ref 1.5–4)
LYMPHOCYTES RELATIVE PERCENT: 23.6 % (ref 20–42)
Lab: ABNORMAL
MAGNESIUM: 1.8 MG/DL (ref 1.6–2.6)
MCH RBC QN AUTO: 31.4 PG (ref 26–35)
MCHC RBC AUTO-ENTMCNC: 32.8 % (ref 32–34.5)
MCV RBC AUTO: 95.8 FL (ref 80–99.9)
METHB: 0.3 % (ref 0–1.5)
MODE: ABNORMAL
MONOCYTES ABSOLUTE: 0.77 E9/L (ref 0.1–0.95)
MONOCYTES RELATIVE PERCENT: 11.6 % (ref 2–12)
MYCOPLASMA PNEUMONIAE BY PCR: NOT DETECTED
NEUTROPHILS ABSOLUTE: 4.09 E9/L (ref 1.8–7.3)
NEUTROPHILS RELATIVE PERCENT: 61.5 % (ref 43–80)
O2 CONTENT: 16.9 ML/DL
O2 SATURATION: 92.5 % (ref 92–98.5)
O2HB: 90.2 % (ref 94–97)
OPERATOR ID: 3342
PARAINFLUENZA VIRUS 1 BY PCR: NOT DETECTED
PARAINFLUENZA VIRUS 2 BY PCR: NOT DETECTED
PARAINFLUENZA VIRUS 3 BY PCR: NOT DETECTED
PARAINFLUENZA VIRUS 4 BY PCR: NOT DETECTED
PATIENT TEMP: 37 C
PCO2: 47.9 MMHG (ref 35–45)
PDW BLD-RTO: 13.4 FL (ref 11.5–15)
PH BLOOD GAS: 7.36 (ref 7.35–7.45)
PHOSPHORUS: 3.8 MG/DL (ref 2.5–4.5)
PLATELET # BLD: 253 E9/L (ref 130–450)
PMV BLD AUTO: 9.3 FL (ref 7–12)
PO2: 68.2 MMHG (ref 75–100)
POTASSIUM REFLEX MAGNESIUM: 3.6 MMOL/L (ref 3.5–5)
PROCALCITONIN: 0.1 NG/ML (ref 0–0.08)
RBC # BLD: 3.82 E12/L (ref 3.5–5.5)
RESPIRATORY SYNCYTIAL VIRUS BY PCR: NOT DETECTED
SARS-COV-2, NAAT: NOT DETECTED
SARS-COV-2, PCR: NOT DETECTED
SODIUM BLD-SCNC: 139 MMOL/L (ref 132–146)
SOURCE, BLOOD GAS: ABNORMAL
THB: 13.3 G/DL (ref 11.5–16.5)
TIME ANALYZED: 1159
TOTAL PROTEIN: 6.7 G/DL (ref 6.4–8.3)
WBC # BLD: 6.7 E9/L (ref 4.5–11.5)

## 2022-03-24 PROCEDURE — A9540 TC99M MAA: HCPCS | Performed by: RADIOLOGY

## 2022-03-24 PROCEDURE — 80053 COMPREHEN METABOLIC PANEL: CPT

## 2022-03-24 PROCEDURE — 2700000000 HC OXYGEN THERAPY PER DAY

## 2022-03-24 PROCEDURE — 85025 COMPLETE CBC W/AUTO DIFF WBC: CPT

## 2022-03-24 PROCEDURE — 6360000002 HC RX W HCPCS: Performed by: NURSE PRACTITIONER

## 2022-03-24 PROCEDURE — 6370000000 HC RX 637 (ALT 250 FOR IP): Performed by: INTERNAL MEDICINE

## 2022-03-24 PROCEDURE — 85378 FIBRIN DEGRADE SEMIQUANT: CPT

## 2022-03-24 PROCEDURE — 94664 DEMO&/EVAL PT USE INHALER: CPT

## 2022-03-24 PROCEDURE — 1200000000 HC SEMI PRIVATE

## 2022-03-24 PROCEDURE — 2580000003 HC RX 258: Performed by: INTERNAL MEDICINE

## 2022-03-24 PROCEDURE — 84145 PROCALCITONIN (PCT): CPT

## 2022-03-24 PROCEDURE — 87070 CULTURE OTHR SPECIMN AEROBIC: CPT

## 2022-03-24 PROCEDURE — 93010 ELECTROCARDIOGRAM REPORT: CPT | Performed by: INTERNAL MEDICINE

## 2022-03-24 PROCEDURE — 93970 EXTREMITY STUDY: CPT

## 2022-03-24 PROCEDURE — 36415 COLL VENOUS BLD VENIPUNCTURE: CPT

## 2022-03-24 PROCEDURE — 99232 SBSQ HOSP IP/OBS MODERATE 35: CPT | Performed by: INTERNAL MEDICINE

## 2022-03-24 PROCEDURE — 6370000000 HC RX 637 (ALT 250 FOR IP): Performed by: ORTHOPAEDIC SURGERY

## 2022-03-24 PROCEDURE — 3430000000 HC RX DIAGNOSTIC RADIOPHARMACEUTICAL: Performed by: RADIOLOGY

## 2022-03-24 PROCEDURE — 6360000002 HC RX W HCPCS: Performed by: INTERNAL MEDICINE

## 2022-03-24 PROCEDURE — 84100 ASSAY OF PHOSPHORUS: CPT

## 2022-03-24 PROCEDURE — 82805 BLOOD GASES W/O2 SATURATION: CPT

## 2022-03-24 PROCEDURE — APPSS60 APP SPLIT SHARED TIME 46-60 MINUTES: Performed by: NURSE PRACTITIONER

## 2022-03-24 PROCEDURE — 78580 LUNG PERFUSION IMAGING: CPT

## 2022-03-24 PROCEDURE — 0202U NFCT DS 22 TRGT SARS-COV-2: CPT

## 2022-03-24 PROCEDURE — 2580000003 HC RX 258: Performed by: ORTHOPAEDIC SURGERY

## 2022-03-24 PROCEDURE — 99223 1ST HOSP IP/OBS HIGH 75: CPT | Performed by: INTERNAL MEDICINE

## 2022-03-24 PROCEDURE — 94640 AIRWAY INHALATION TREATMENT: CPT

## 2022-03-24 PROCEDURE — 83735 ASSAY OF MAGNESIUM: CPT

## 2022-03-24 PROCEDURE — 87635 SARS-COV-2 COVID-19 AMP PRB: CPT

## 2022-03-24 PROCEDURE — 93306 TTE W/DOPPLER COMPLETE: CPT

## 2022-03-24 RX ORDER — METHYLPREDNISOLONE SODIUM SUCCINATE 40 MG/ML
40 INJECTION, POWDER, LYOPHILIZED, FOR SOLUTION INTRAMUSCULAR; INTRAVENOUS EVERY 12 HOURS
Status: DISCONTINUED | OUTPATIENT
Start: 2022-03-24 | End: 2022-03-25

## 2022-03-24 RX ORDER — ACETAMINOPHEN 500 MG
1000 TABLET ORAL ONCE
Status: COMPLETED | OUTPATIENT
Start: 2022-03-24 | End: 2022-03-24

## 2022-03-24 RX ORDER — IPRATROPIUM BROMIDE AND ALBUTEROL SULFATE 2.5; .5 MG/3ML; MG/3ML
1 SOLUTION RESPIRATORY (INHALATION) EVERY 4 HOURS PRN
Status: DISCONTINUED | OUTPATIENT
Start: 2022-03-24 | End: 2022-03-24

## 2022-03-24 RX ORDER — DEXAMETHASONE SODIUM PHOSPHATE 10 MG/ML
8 INJECTION, SOLUTION INTRAMUSCULAR; INTRAVENOUS ONCE
Status: DISCONTINUED | OUTPATIENT
Start: 2022-03-24 | End: 2022-03-25

## 2022-03-24 RX ORDER — MORPHINE SULFATE 2 MG/ML
2 INJECTION, SOLUTION INTRAMUSCULAR; INTRAVENOUS EVERY 4 HOURS PRN
Status: DISCONTINUED | OUTPATIENT
Start: 2022-03-24 | End: 2022-03-27 | Stop reason: HOSPADM

## 2022-03-24 RX ORDER — SODIUM CHLORIDE, SODIUM LACTATE, POTASSIUM CHLORIDE, CALCIUM CHLORIDE 600; 310; 30; 20 MG/100ML; MG/100ML; MG/100ML; MG/100ML
INJECTION, SOLUTION INTRAVENOUS CONTINUOUS
Status: DISCONTINUED | OUTPATIENT
Start: 2022-03-24 | End: 2022-03-25

## 2022-03-24 RX ORDER — ALBUTEROL SULFATE 2.5 MG/3ML
2.5 SOLUTION RESPIRATORY (INHALATION) EVERY 6 HOURS PRN
Status: DISCONTINUED | OUTPATIENT
Start: 2022-03-24 | End: 2022-03-27 | Stop reason: HOSPADM

## 2022-03-24 RX ORDER — SODIUM CHLORIDE 9 MG/ML
25 INJECTION, SOLUTION INTRAVENOUS PRN
Status: DISCONTINUED | OUTPATIENT
Start: 2022-03-24 | End: 2022-03-25

## 2022-03-24 RX ORDER — GUAIFENESIN 400 MG/1
400 TABLET ORAL 4 TIMES DAILY PRN
Status: DISCONTINUED | OUTPATIENT
Start: 2022-03-24 | End: 2022-03-27 | Stop reason: HOSPADM

## 2022-03-24 RX ORDER — SODIUM CHLORIDE 0.9 % (FLUSH) 0.9 %
5-40 SYRINGE (ML) INJECTION PRN
Status: DISCONTINUED | OUTPATIENT
Start: 2022-03-24 | End: 2022-03-25

## 2022-03-24 RX ORDER — IPRATROPIUM BROMIDE AND ALBUTEROL SULFATE 2.5; .5 MG/3ML; MG/3ML
1 SOLUTION RESPIRATORY (INHALATION)
Status: DISCONTINUED | OUTPATIENT
Start: 2022-03-24 | End: 2022-03-27 | Stop reason: HOSPADM

## 2022-03-24 RX ORDER — SODIUM CHLORIDE 0.9 % (FLUSH) 0.9 %
5-40 SYRINGE (ML) INJECTION EVERY 12 HOURS SCHEDULED
Status: DISCONTINUED | OUTPATIENT
Start: 2022-03-24 | End: 2022-03-25

## 2022-03-24 RX ORDER — ARFORMOTEROL TARTRATE 15 UG/2ML
15 SOLUTION RESPIRATORY (INHALATION) 2 TIMES DAILY
Status: DISCONTINUED | OUTPATIENT
Start: 2022-03-24 | End: 2022-03-27 | Stop reason: HOSPADM

## 2022-03-24 RX ADMIN — ARFORMOTEROL TARTRATE 15 MCG: 15 SOLUTION RESPIRATORY (INHALATION) at 12:22

## 2022-03-24 RX ADMIN — SODIUM CHLORIDE, PRESERVATIVE FREE 10 ML: 5 INJECTION INTRAVENOUS at 10:50

## 2022-03-24 RX ADMIN — CARVEDILOL 12.5 MG: 6.25 TABLET, FILM COATED ORAL at 09:10

## 2022-03-24 RX ADMIN — Medication 6 MILLICURIE: at 10:14

## 2022-03-24 RX ADMIN — METHYLPREDNISOLONE SODIUM SUCCINATE 40 MG: 40 INJECTION, POWDER, LYOPHILIZED, FOR SOLUTION INTRAMUSCULAR; INTRAVENOUS at 17:25

## 2022-03-24 RX ADMIN — IPRATROPIUM BROMIDE AND ALBUTEROL SULFATE 1 AMPULE: .5; 3 SOLUTION RESPIRATORY (INHALATION) at 07:42

## 2022-03-24 RX ADMIN — SODIUM CHLORIDE, POTASSIUM CHLORIDE, SODIUM LACTATE AND CALCIUM CHLORIDE: 600; 310; 30; 20 INJECTION, SOLUTION INTRAVENOUS at 18:45

## 2022-03-24 RX ADMIN — ACETAMINOPHEN 1000 MG: 500 TABLET ORAL at 10:52

## 2022-03-24 RX ADMIN — MORPHINE SULFATE 2 MG: 2 INJECTION, SOLUTION INTRAMUSCULAR; INTRAVENOUS at 16:28

## 2022-03-24 RX ADMIN — ARFORMOTEROL TARTRATE 15 MCG: 15 SOLUTION RESPIRATORY (INHALATION) at 19:07

## 2022-03-24 RX ADMIN — SODIUM CHLORIDE, POTASSIUM CHLORIDE, SODIUM LACTATE AND CALCIUM CHLORIDE: 600; 310; 30; 20 INJECTION, SOLUTION INTRAVENOUS at 10:51

## 2022-03-24 RX ADMIN — ACETAMINOPHEN 650 MG: 325 TABLET ORAL at 05:48

## 2022-03-24 RX ADMIN — CARVEDILOL 12.5 MG: 6.25 TABLET, FILM COATED ORAL at 21:04

## 2022-03-24 RX ADMIN — IPRATROPIUM BROMIDE AND ALBUTEROL SULFATE 1 AMPULE: .5; 3 SOLUTION RESPIRATORY (INHALATION) at 12:22

## 2022-03-24 RX ADMIN — MORPHINE SULFATE 2 MG: 2 INJECTION, SOLUTION INTRAMUSCULAR; INTRAVENOUS at 04:38

## 2022-03-24 RX ADMIN — ENOXAPARIN SODIUM 40 MG: 40 INJECTION SUBCUTANEOUS at 09:09

## 2022-03-24 RX ADMIN — GUAIFENESIN 400 MG: 400 TABLET ORAL at 16:24

## 2022-03-24 RX ADMIN — IPRATROPIUM BROMIDE AND ALBUTEROL SULFATE 1 AMPULE: .5; 3 SOLUTION RESPIRATORY (INHALATION) at 19:07

## 2022-03-24 RX ADMIN — SODIUM CHLORIDE: 9 INJECTION, SOLUTION INTRAVENOUS at 00:44

## 2022-03-24 RX ADMIN — IPRATROPIUM BROMIDE AND ALBUTEROL SULFATE 1 AMPULE: .5; 3 SOLUTION RESPIRATORY (INHALATION) at 15:31

## 2022-03-24 RX ADMIN — CARVEDILOL 12.5 MG: 6.25 TABLET, FILM COATED ORAL at 00:44

## 2022-03-24 RX ADMIN — METHYLPREDNISOLONE SODIUM SUCCINATE 40 MG: 40 INJECTION, POWDER, LYOPHILIZED, FOR SOLUTION INTRAMUSCULAR; INTRAVENOUS at 05:48

## 2022-03-24 ASSESSMENT — LIFESTYLE VARIABLES: SMOKING_STATUS: 1

## 2022-03-24 ASSESSMENT — PAIN SCALES - GENERAL
PAINLEVEL_OUTOF10: 7
PAINLEVEL_OUTOF10: 2
PAINLEVEL_OUTOF10: 3
PAINLEVEL_OUTOF10: 6
PAINLEVEL_OUTOF10: 6

## 2022-03-24 NOTE — PROGRESS NOTES
Dr. Diego Boland covering for pulmonology was notified of new consult via perfect serve. Beto Lopez NP covering for cardiology was notified of new consult via perfect serve.

## 2022-03-24 NOTE — PROGRESS NOTES
Spoke to floor this am regarding poor kidney function outside of injectable range. GFR is only 27.   Advised the floor to contact ordering Dr for an alternative exam.

## 2022-03-24 NOTE — PLAN OF CARE
Problem: Pain:  Goal: Control of acute pain  Description: Control of acute pain  Outcome: Met This Shift     Problem: Falls - Risk of:  Goal: Will remain free from falls  Description: Will remain free from falls  Outcome: Met This Shift  Goal: Absence of physical injury  Description: Absence of physical injury  Outcome: Met This Shift     Problem: Skin Integrity:  Goal: Will show no infection signs and symptoms  Description: Will show no infection signs and symptoms  Outcome: Met This Shift  Goal: Absence of new skin breakdown  Description: Absence of new skin breakdown  Outcome: Met This Shift

## 2022-03-24 NOTE — PROGRESS NOTES
[facility] Hospitalist   Progress Note    Admitting Date and Time: 3/23/2022  3:44 PM  Admit Dx: Unable to ambulate [R26.2]  Closed fracture of head of femur, unspecified laterality, initial encounter ([facility] [S72.059A]  Ambulatory dysfunction [R26.2]  Osteoarthritis of left hip, unspecified osteoarthritis type [M16.12]    Subjective:    Patient was admitted with Unable to ambulate [R26.2]  Closed fracture of head of femur, unspecified laterality, initial encounter ([facility] Thor Agee  Ambulatory dysfunction [R26.2]  Osteoarthritis of left hip, unspecified osteoarthritis type [M16.12].  Patient denies fever, chills, cp, sob, n/v. Pain controlled     ipratropium-albuterol  1 ampule Inhalation Q4H WA    methylPREDNISolone  40 mg IntraVENous Q12H    dexamethasone  8 mg IntraVENous Once    sodium chloride flush  5-40 mL IntraVENous 2 times per day    ceFAZolin (ANCEF) IVPB  2,000 mg IntraVENous 30 Min Pre-Op    tranexamic acid (CYCLOKAPRON) IVPB  1,000 mg IntraVENous On Call to OR    Arformoterol Tartrate  15 mcg Nebulization BID    carvedilol  12.5 mg Oral BID    sodium chloride flush  5-40 mL IntraVENous 2 times per day    enoxaparin  40 mg SubCUTAneous Daily     morphine, 2 mg, Q4H PRN  albuterol, 2.5 mg, Q6H PRN  sodium chloride flush, 5-40 mL, PRN  sodium chloride, 25 mL, PRN  guaiFENesin, 400 mg, 4x Daily PRN  perflutren lipid microspheres, 1.5 mL, ONCE PRN  sodium chloride flush, 5-40 mL, PRN  sodium chloride, 25 mL, PRN  ondansetron, 4 mg, Q8H PRN   Or  ondansetron, 4 mg, Q6H PRN  polyethylene glycol, 17 g, Daily PRN  acetaminophen, 650 mg, Q6H PRN   Or  acetaminophen, 650 mg, Q6H PRN         Objective:    BP 95/65   Pulse 81   Temp 98.6 °F (37 °C) (Oral)   Resp 20   Ht 5' 2\" (1.575 m)   SpO2 93%   BMI 31.09 kg/m²   Skin: warm and dry, no rash or erythema  Pulmonary/Chest: clear to auscultation bilaterally- no wheezes, rales or rhonchi, normal air movement, no respiratory distress  Cardiovascular: rhythm reg at rate of 84  Abdomen: soft, non-tender, non-distended, normal bowel sounds, no masses or organomegaly  Extremities: no cyanosis, no clubbing and no edema      Recent Labs     03/23/22 1941 03/24/22 0630    139   K 3.2* 3.6   CL 96* 100   CO2 27 28   BUN 30* 32*   CREATININE 1.9* 1.8*   GLUCOSE 96 91   CALCIUM 8.6 8.4*       Recent Labs     03/23/22 1941 03/24/22 0630   WBC 6.6 6.7   RBC 4.17 3.82   HGB 13.0 12.0   HCT 39.8 36.6   MCV 95.4 95.8   MCH 31.2 31.4   MCHC 32.7 32.8   RDW 13.4 13.4    253   MPV 9.3 9.3       CBC with Differential:    Lab Results   Component Value Date    WBC 6.7 03/24/2022    RBC 3.82 03/24/2022    HGB 12.0 03/24/2022    HCT 36.6 03/24/2022     03/24/2022    MCV 95.8 03/24/2022    MCH 31.4 03/24/2022    MCHC 32.8 03/24/2022    RDW 13.4 03/24/2022    SEGSPCT 54 10/04/2013    LYMPHOPCT 23.6 03/24/2022    MONOPCT 11.6 03/24/2022    BASOPCT 0.6 03/24/2022    MONOSABS 0.77 03/24/2022    LYMPHSABS 1.57 03/24/2022    EOSABS 0.16 03/24/2022    BASOSABS 0.04 03/24/2022     CMP:    Lab Results   Component Value Date     03/24/2022    K 3.6 03/24/2022     03/24/2022    CO2 28 03/24/2022    BUN 32 03/24/2022    CREATININE 1.8 03/24/2022    GFRAA 33 03/24/2022    LABGLOM 27 03/24/2022    GLUCOSE 91 03/24/2022    GLUCOSE 116 05/30/2012    PROT 6.7 03/24/2022    LABALBU 3.4 03/24/2022    LABALBU 3.9 05/30/2012    CALCIUM 8.4 03/24/2022    BILITOT 0.3 03/24/2022    ALKPHOS 72 03/24/2022    AST 14 03/24/2022    ALT 9 03/24/2022     Magnesium:    Lab Results   Component Value Date    MG 1.8 03/24/2022     Phosphorus:    Lab Results   Component Value Date    PHOS 3.8 03/24/2022        Radiology:   NM LUNG SCAN PERFUSION ONLY   Final Result   Low Probability for Pulmonary Embolus. US DUP LOWER EXTREMITIES BILATERAL VENOUS   Final Result   No evidence of DVT in either lower extremity.       RECOMMENDATIONS:   Unavailable XR CHEST 1 VIEW   Final Result   Focal opacity in the left upper midlung may represent chronic scarring versus   early infiltrates. Follow-up study recommended. XR HIP 2-3 VW W PELVIS LEFT   Final Result   1. New deformity involving left femoral head with severe femoral head   flattening. 2. Satisfactory alignment of right hip arthroplasty. X-ray chest PA and lateral    (Results Pending)   CTA CHEST W CONTRAST    (Results Pending)       Assessment:    Principal Problem:    Ambulatory dysfunction  Active Problems:    Closed fracture of head of femur (HCC)    Acute respiratory failure with hypoxia (HCC)    MARCELLO (acute kidney injury) ([address]    Primary hypertension  Resolved Problems:    * No resolved hospital problems. *      Plan:    1. Acute respiratory failure with hypoxia(o2 sat 78%)POA  Will ask pulmonary to see especially in light of anticipated surgery. Adjust o2 as needed. Will give nebs and IS to maximize pt. Will give trial of steroids as well. 2. Possible copd exacerbation  Nebs and steroids. rec smoking cessation. 3. Femur fracture  Ortho on consult. Anticipate surgery tomorrow. Pain control  4. ekg changes of t wave inversion not present before. Will ask cardio to evaluate. 5. Hypokalemia montior and replace prn  6. marcello iv fluids. 7. htn adjust and continue coreg perioperatively  8. MS monitor    Discussed case with pulmonary. Had pt use IS 10 times while in room. Continue to encourage IS use.        Electronically signed by Davin West DO on 3/24/2022 at 11:52 AM

## 2022-03-24 NOTE — ANESTHESIA PRE PROCEDURE
Department of Anesthesiology  Preprocedure Note       Name:  Tomasz Willingham   Age:  77 y.o.  :  1945                                          MRN:  [id number]         Date:  3/24/2022      Surgeon: Raquel Burns):  Katheryn Perez MD    Procedure: Procedure(s):  LEFT HIP TOTAL ARTHROPLASTY    +++MINH+++    Medications prior to admission:   Prior to Admission medications    Medication Sig Start Date End Date Taking? Authorizing Provider   sertraline (ZOLOFT) 100 MG tablet TAKE 1 AND 1/2 TABLETS     DAILY 3/7/22   Anaheim Favors, PA-C   baclofen (LIORESAL) 10 MG tablet TAKE 1 TABLET TWICE A DAY 3/3/22   Shad Favors, PA-C   carvedilol (COREG) 25 MG tablet TAKE 1 TABLET TWICE A DAY 22   Nannette To PA-C   alendronate (FOSAMAX) 70 MG tablet TAKE 1 TABLET EVERY 7 DAYS 22   Shad Favors, PA-C   amLODIPine (NORVASC) 5 MG tablet TAKE 1 TABLET DAILY 1/3/22   Shad Evaristo, ELISSA   predniSONE (DELTASONE) 10 MG tablet 3 tabs once daily for 3 days, 2 tabs once daily for 3 days, 1 tab once daily for 3 days  Patient not taking: Reported on 3/24/2022 12/13/21   CHAZ Aldrich III   tiZANidine (ZANAFLEX) 2 MG tablet Take 1 tablet by mouth 4 times daily as needed (back pain)  Patient not taking: Reported on 3/24/2022 12/13/21   CHAZ Aldrich III   simvastatin (ZOCOR) 40 MG tablet TAKE 1 TABLET NIGHTLY 9/3/21   Shad Loera PA-C   gabapentin (NEURONTIN) 400 MG capsule TAKE 1 CAPSULE 3 TIMES A   DAY  Patient taking differently: 400 mg in the morning and at bedtime.   9/3/21 3/2/22  Shad Loera, PA-C   hydroCHLOROthiazide (HYDRODIURIL) 25 MG tablet TAKE 1 TABLET EVERY MORNING 9/3/21   Shad Evaristo, PA-C   albuterol sulfate HFA (VENTOLIN HFA) 108 (90 Base) MCG/ACT inhaler Inhale 2 puffs into the lungs 4 times daily as needed for Wheezing 21   Shad Loera PA-C   aspirin EC 81 MG EC tablet Take 1 tablet by mouth daily 19   Shad Loera PA-C   Cholecalciferol 50 MCG ( UT) CAPS Take 2,000 Units by mouth every morning     Historical Provider, MD       Current medications:    Current Facility-Administered Medications   Medication Dose Route Frequency Provider Last Rate Last Admin    morphine (PF) injection 2 mg  2 mg IntraVENous Q4H PRN Memory MelissaHIRA campbell CNP   2 mg at 03/24/22 0438    ipratropium-albuterol (DUONEB) nebulizer solution 1 ampule  1 ampule Inhalation Q4H WA Hugh Hric, DO   1 ampule at 03/24/22 1222    albuterol (PROVENTIL) nebulizer solution 2.5 mg  2.5 mg Nebulization Q6H PRN Hugh Hric, DO        methylPREDNISolone sodium (SOLU-MEDROL) injection 40 mg  40 mg IntraVENous Q12H Hugh Hric, DO   40 mg at 03/24/22 0548    dexamethasone (PF) (DECADRON) injection 8 mg  8 mg IntraVENous Once Rocio Gutierrez MD        lactated ringers infusion   IntraVENous Continuous Rocio Gutierrez  mL/hr at 03/24/22 1051 New Bag at 03/24/22 1051    sodium chloride flush 0.9 % injection 5-40 mL  5-40 mL IntraVENous 2 times per day Rocio Gutierrez MD   10 mL at 03/24/22 1050    sodium chloride flush 0.9 % injection 5-40 mL  5-40 mL IntraVENous PRN Rocio Gutierrez MD        0.9 % sodium chloride infusion  25 mL IntraVENous PRN Rocio Gutierrez MD        ceFAZolin (ANCEF) 2000 mg in sterile water 20 mL IV syringe  2,000 mg IntraVENous 30 Min Pre-Op Rocio Gutierrez MD        tranexamic acid (CYKLOKAPRON) 1,000 mg in dextrose 5 % 100 mL IVPB  1,000 mg IntraVENous On Call to Eliel Royal MD        Arformoterol Tartrate [facility]) nebulizer solution 15 mcg  15 mcg Nebulization BID Ale López MD   15 mcg at 03/24/22 1222    guaiFENesin tablet 400 mg  400 mg Oral 4x Daily PRN Ale López MD        perflutren lipid microspheres (DEFINITY) injection 1.65 mg  1.5 mL IntraVENous ONCE PRN HIRA Richards CNP        carvedilol (COREG) tablet 12.5 mg  12.5 mg Oral BID Hugh Hric, DO   12.5 mg at 03/24/22 0910    sodium chloride flush 0.9 % injection 5-40 mL  5-40 mL IntraVENous 2 times per day Hugh Hric, DO        sodium chloride flush 0.9 % injection 5-40 mL  5-40 mL IntraVENous PRN Hugh Hric, DO        0.9 % sodium chloride infusion  25 mL IntraVENous PRN Hugh Hric, DO        enoxaparin (LOVENOX) injection 40 mg  40 mg SubCUTAneous Daily Hugh Hric, DO   40 mg at 03/24/22 0909    ondansetron (ZOFRAN-ODT) disintegrating tablet 4 mg  4 mg Oral Q8H PRN Hugh Hric, DO        Or    ondansetron (ZOFRAN) injection 4 mg  4 mg IntraVENous Q6H PRN Hugh Hric, DO        polyethylene glycol (GLYCOLAX) packet 17 g  17 g Oral Daily PRN Hugh Hric, DO        acetaminophen (TYLENOL) tablet 650 mg  650 mg Oral Q6H PRN Hugh Hric, DO   650 mg at 03/24/22 0548    Or    acetaminophen (TYLENOL) suppository 650 mg  650 mg Rectal Q6H PRN Hugh Hric, DO        0.9 % sodium chloride infusion   IntraVENous Continuous Hugh Hric, DO   Paused at 03/24/22 1009       Allergies:     Allergies   Allergen Reactions    Macrodantin [Nitrofurantoin Macrocrystal] Shortness Of Breath       Problem List:    Patient Active Problem List   Diagnosis Code    Hypotension due to hypovolemia I95.89, E86.1    MS (multiple sclerosis) ([facility] [address] G35    Hyperlipidemia E78.5    Hypertension, essential, benign I10    Multiple adenomatous polyps D36.9    Chronic idiopathic constipation K59.04    Reactive depression F32.9    H/O total hip arthroplasty, right Z96.641    History of total knee replacement, left Z96.652    Simple chronic bronchitis (HCC) J41.0    Brain aneurysm I67.1    Ambulatory dysfunction R26.2    Closed fracture of head of femur (HCC) S72.059A    [facility] failure with hypoxia ([facility]) J96.01    MARCELLO (acute kidney injury) ([facility] [address] N17.9    Primary hypertension I10       Past Medical History:        Diagnosis Date    Arthritis     Hyperlipidemia     Hypertension     Multiple sclerosis ([facility] [address]     diagnosised 8  yrs ago [facility]       Past Surgical History: Procedure Laterality Date    APPENDECTOMY      BREAST SURGERY  4/13/2012    biopsy - negative    HIP ARTHROPLASTY Right 04/13/2011    Right AIDEE  ALLISON Shaw MD    HYSTERECTOMY      KNEE ARTHROPLASTY Left 10/01/2013    Left TKA  ALLISON Shaw MD    KNEE ARTHROPLASTY Right 08/29/2012    Right TKA  ALLISON Shaw MD       Social History:    Social History     Tobacco Use    Smoking status: Current Every Day Smoker     Packs/day: 1.00     Years: 40.00     Pack years: 40.00    Smokeless tobacco: Never Used   Substance Use Topics    Alcohol use: No                                Ready to quit: Not Answered  Counseling given: Not Answered      Vital Signs (Current):   Vitals:    03/24/22 0727 03/24/22 0744 03/24/22 1222 03/24/22 1239   BP: 95/65   (!) 110/54   Pulse: 81   78   Resp: 20 20 20 18   Temp: 37 °C (98.6 °F)   36.7 °C (98 °F)   TempSrc: Oral   Oral   SpO2: 93% 93% 91%    Height: 5' 2\" (1.575 m)                                                 BP Readings from Last 3 Encounters:   03/24/22 (!) 110/54   12/19/21 118/68   12/13/21 (!) 144/70       NPO Status:                                                                                 BMI:   Wt Readings from Last 3 Encounters:   12/13/21 170 lb (77.1 kg)   11/17/21 160 lb (72.6 kg)   06/03/21 170 lb (77.1 kg)     Body mass index is 31.09 kg/m².     CBC:   Lab Results   Component Value Date    WBC 6.7 03/24/2022    RBC 3.82 03/24/2022    HGB 12.0 03/24/2022    HCT 36.6 03/24/2022    MCV 95.8 03/24/2022    RDW 13.4 03/24/2022     03/24/2022       CMP:   Lab Results   Component Value Date     03/24/2022    K 3.6 03/24/2022     03/24/2022    CO2 28 03/24/2022    BUN 32 03/24/2022    CREATININE 1.8 03/24/2022    GFRAA 33 03/24/2022    LABGLOM 27 03/24/2022    GLUCOSE 91 03/24/2022    GLUCOSE 116 05/30/2012    PROT 6.7 03/24/2022    CALCIUM 8.4 03/24/2022    BILITOT 0.3 03/24/2022    ALKPHOS 72 03/24/2022    AST 14 03/24/2022    ALT 9 03/24/2022       POC Tests: No results for input(s): POCGLU, POCNA, POCK, POCCL, POCBUN, POCHEMO, POCHCT in the last 72 hours. Coags:   Lab Results   Component Value Date    PROTIME 11.6 02/23/2018    PROTIME 13.0 05/30/2012    INR 1.1 02/23/2018    APTT 26.9 05/30/2012       HCG (If Applicable): No results found for: PREGTESTUR, PREGSERUM, HCG, HCGQUANT     ABGs: No results found for: PHART, PO2ART, ETR4TDZ, SDU0MBB, BEART, O0LSNWYO     Type & Screen (If Applicable):  No results found for: LABABO, LABRH    Drug/Infectious Status (If Applicable):  No results found for: HIV, HEPCAB    COVID-19 Screening (If Applicable):   Lab Results   Component Value Date    COVID19 Not Detected 03/24/2022    COVID19 Not Detected 03/24/2022           Anesthesia Evaluation  Patient summary reviewed and Nursing notes reviewed no history of anesthetic complications:   Airway: Mallampati: III  TM distance: >3 FB   Neck ROM: limited  Mouth opening: > = 3 FB Dental:    (+) caps  Comment: Veneers top 4 front teeth    Pulmonary: breath sounds clear to auscultation  (+) COPD: severe,  current smoker          Patient did not smoke on day of surgery. ROS comment: Currently on 4L O2 NC    Uses albuterol inhaler once daily  Chronic hypoxic resp failure   Cardiovascular:  Exercise tolerance: poor (<4 METS),   (+) hypertension:, hyperlipidemia      ECG reviewed  Rhythm: regular  Rate: normal           Beta Blocker:  Dose within 24 Hrs         Neuro/Psych:   (+) neuromuscular disease: multiple sclerosis, psychiatric history: stable with treatmentdepression/anxiety              ROS comment: H/o brain aneurysm following with neuro outpatient  Decreased balance/vision GI/Hepatic/Renal:   (+) GERD:, renal disease: CRI,           Endo/Other:    (+) : arthritis: OA., malignancy/cancer (h/o cervical CA). ROS comment: obese Abdominal:             Vascular: negative vascular ROS.          Other Findings:      EKG 3/23/22  Normal sinus rhythm  Nonspecific ST and T wave abnormality  Abnormal ECG     Anesthesia Plan      general and spinal     ASA 3     (Prefers spinal; understands possible risk d/t MS. GA as backup.)  Induction: intravenous. MIPS: Postoperative opioids intended and Prophylactic antiemetics administered. Anesthetic plan and risks discussed with patient, child/children and spouse. Use of blood products discussed with patient whom consented to blood products. Plan discussed with CRNA. Bobby Zavala RN   3/24/2022    DOS STAFF ADDENDUM:    Patient seen and examined, physical exam updated as needed, chart reviewed. NPO status confirmed. Anesthetic options and risks discussed with patient/legal guardian. Patient/legal guardian verbalized understanding and agrees to proceed.      Diana Britton MD  Staff Anesthesiologist  March 25, 2022  6:16 AM

## 2022-03-24 NOTE — CARE COORDINATION
Social Work/Discharge Planning:  Social Work consult noted. Met with patient and completed initial assessment. Explained Social Work role and discussed transition of care/discharge planning. She lives with her  in a one story house with a ramp to enter. PTA she uses a rollator walker. She has a home health care history with [address] [facility]. She has a snf history with [address] [facility] [address] and [address] at the [place]. Patient PCP is Dr. Manuela Santiago and pharmacy is [facility] on [phone]. She is currently requiring four liters of oxygen. She will have hip surgery tomorrow. Discussed snf choices if rehab is needed and she prefers [address]. Tentative referral made to [facility] with [facility] and facility will review. Will continue to follow and assist with discharge planning. Electronically signed by JIAN Britt on 3/24/2022 at 8:56 AM    Addendum:  [facility] with [facility] facility can accept patient and will follow. Patient will need a pre-cert. Will continue to follow.   Electronically signed by JIAN Britt on 3/24/2022 at 10:29 AM

## 2022-03-24 NOTE — CONSULTS
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 Scheduled for AIDEE on 4/15. Could not manage at home with walker and medication - admitted last evening. Planning to proceed with AIDEE tmrw am.  Her hip pain has been unresponsive to conservative tx including use of assistive device, pain medication, physical therapy, and intra-articular injection. PMH:  Past Medical History[]Expand by Default        Past Medical History:   Diagnosis Date    Arthritis      Hyperlipidemia      Hypertension      Multiple sclerosis ([address]       diagnosised 8  yrs ago [facility]        Surgical History:  Past Surgical History[]Expand by Default         Past Surgical History:   Procedure Laterality Date    APPENDECTOMY        BREAST SURGERY   4/13/2012     biopsy - negative    HIP ARTHROPLASTY Right 04/13/2011     Right AIDEE  ALLISON Painter MD    HYSTERECTOMY        KNEE ARTHROPLASTY Left 10/01/2013     Left TKA  ALLISON Painter MD    KNEE ARTHROPLASTY Right 08/29/2012     Right TKA  ALLISON Painter MD        Medications Prior to Admission:    Home Medications[]Expand by Default           Prior to Admission medications    Medication Sig Start Date End Date Taking?  Authorizing Provider   sertraline (ZOLOFT) 100 MG tablet TAKE 1 AND 1/2 TABLETS     DAILY 3/7/22     Mandy Chew PA-C   baclofen (LIORESAL) 10 MG tablet TAKE 1 TABLET TWICE A DAY 3/3/22     Nannette To PA-C   carvedilol (COREG) 25 MG tablet TAKE 1 TABLET TWICE A DAY 1/31/22     Nannette To PA-C   alendronate (FOSAMAX) 70 MG tablet TAKE 1 TABLET EVERY 7 DAYS 1/21/22     Mandy Chew PA-C   amLODIPine (NORVASC) 5 MG tablet TAKE 1 TABLET DAILY 1/3/22     Mandy Chew PA-C   predniSONE (DELTASONE) 10 MG tablet 3 tabs once daily for 3 days, 2 tabs once daily for 3 days, 1 tab once daily for 3 days 12/13/21     CHAZ Trimble III   tiZANidine (ZANAFLEX) 2 MG tablet Take 1 tablet by mouth 4 times daily as needed (back pain) 12/13/21     CHAZ Yun III   simvastatin (ZOCOR) 40 MG tablet TAKE 1 TABLET NIGHTLY 9/3/21     Verito Roy PA-C   gabapentin (NEURONTIN) 400 MG capsule TAKE 1 CAPSULE 3 TIMES A   DAY 9/3/21 3/2/22   Nannette To PA-C   hydroCHLOROthiazide (HYDRODIURIL) 25 MG tablet TAKE 1 TABLET EVERY MORNING 9/3/21     Verito Roy PA-C   albuterol sulfate HFA (VENTOLIN HFA) 108 (90 Base) MCG/ACT inhaler Inhale 2 puffs into the lungs 4 times daily as needed for Wheezing 4/27/21     Nannette Resendiz PA-C   aspirin EC 81 MG EC tablet Take 1 tablet by mouth daily 1/25/19     Verito Roy PA-C   Cholecalciferol 50 MCG (2000 UT) CAPS Take 2,000 Units by mouth every morning             Allergies:    Macrodantin [nitrofurantoin macrocrystal]     Social History:    reports that she has been smoking. She has a 40.00 pack-year smoking history. She has never used smokeless tobacco. She reports that she does not drink alcohol and does not use drugs. O:    A&O  T-98; P 78, R 18; /54  On O2  Head  Normocephalic  Breathing nonlabored  RRR  Abdomen soft/NT  +L hip pain with logroll  Soft tissues L hip intact  No knee tenderness  Neg Homans  Femoral / sciatic intact  Palpable pedal pulses    Cr -1.8  Wbc 6.7  Hgb 12.0  Platelets 960    X-ray:  Severe Left hip DJD with bone on bone contact and femoral head deformity and superimposed AVN. P:  The nature and prognosis of dx discussed. Discussed role of AIDEE as definitive tx for reduction of pain and improvement in function. Risks, benefits, alternatives, and limitations discussed - informed consent obtained. May need BENSON placement vs HHC. Post op DVT prophylaxis x 30 days. All questions addressed.

## 2022-03-24 NOTE — H&P
[place] [address] [facility]   History and Physical      CHIEF COMPLAINT:  Left hip pain    History of Present Illness: pt is a 77 y.o. female who presents for left hip pain. Pt has been having pain in the hip and has had been noted to have bone on bone on imaging according to pt. Pt has had tried other treatments including injections. Pt had been scheduled for orthopedic surgery mid-April. However, pt presented acutely with severe pain that she could not bear wt. Pt denies any traumatic event. Pt denies fevers, chills, abd pain, n/v, diarrhea, constipation, melena, hematochezia. Pt has hx of copd and using trelegy at home. Pt states she does not see a pulmonologist. Pt denies previous usage of o2. Pt's last cigarette was prior to coming to hospital.     REVIEW OF SYSTEMS:    A detailed system review was conducted with patient and is negative unless stated in hpi. PMH:  Past Medical History:   Diagnosis Date    Arthritis     Hyperlipidemia     Hypertension     Multiple sclerosis ([facility]     diagnosised 8  yrs ago [facility]       Surgical History:  Past Surgical History:   Procedure Laterality Date    APPENDECTOMY      BREAST SURGERY  4/13/2012    biopsy - negative    HIP ARTHROPLASTY Right 04/13/2011    Right AIDEE  ALLISON Vieira MD    HYSTERECTOMY      KNEE ARTHROPLASTY Left 10/01/2013    Left TKA  ALLISON Vieira MD    KNEE ARTHROPLASTY Right 08/29/2012    Right TKA  ALLISON Vieira MD       Medications Prior to Admission:    Prior to Admission medications    Medication Sig Start Date End Date Taking?  Authorizing Provider   sertraline (ZOLOFT) 100 MG tablet TAKE 1 AND 1/2 TABLETS     DAILY 3/7/22   Juan Black PA-C   baclofen (LIORESAL) 10 MG tablet TAKE 1 TABLET TWICE A DAY 3/3/22   Juan Black PA-C   carvedilol (COREG) 25 MG tablet TAKE 1 TABLET TWICE A DAY 1/31/22   Nannette To PA-C   alendronate (FOSAMAX) 70 MG tablet TAKE 1 TABLET EVERY 7 DAYS 1/21/22   Juan Black ELISSA   amLODIPine (NORVASC) 5 MG tablet TAKE 1 TABLET DAILY 1/3/22   Flory Roque PA-C   predniSONE (DELTASONE) 10 MG tablet 3 tabs once daily for 3 days, 2 tabs once daily for 3 days, 1 tab once daily for 3 days 12/13/21   CHAZ Silverman III   tiZANidine (ZANAFLEX) 2 MG tablet Take 1 tablet by mouth 4 times daily as needed (back pain) 12/13/21   CHAZ Silverman III   simvastatin (ZOCOR) 40 MG tablet TAKE 1 TABLET NIGHTLY 9/3/21   Flory Roque PA-C   gabapentin (NEURONTIN) 400 MG capsule TAKE 1 CAPSULE 3 TIMES A   DAY 9/3/21 3/2/22  Flory Roque PA-C   hydroCHLOROthiazide (HYDRODIURIL) 25 MG tablet TAKE 1 TABLET EVERY MORNING 9/3/21   Flory Roque PA-C   albuterol sulfate HFA (VENTOLIN HFA) 108 (90 Base) MCG/ACT inhaler Inhale 2 puffs into the lungs 4 times daily as needed for Wheezing 4/27/21   Flory Roque PA-C   aspirin EC 81 MG EC tablet Take 1 tablet by mouth daily 1/25/19   Flory Roque PA-C   Cholecalciferol 50 MCG (2000 UT) CAPS Take 2,000 Units by mouth every morning     Historical Provider, MD       Allergies:    Macrodantin [nitrofurantoin macrocrystal]    Social History:    reports that she has been smoking. She has a 40.00 pack-year smoking history. She has never used smokeless tobacco. She reports that she does not drink alcohol and does not use drugs. Family History:       Problem Relation Age of Onset    Mult Sclerosis Father     Thyroid Cancer Mother     Cirrhosis Mother              PHYSICAL EXAM:    Vitals:  BP (!) 111/53   Pulse 77   Temp 97.6 °F (36.4 °C)   Resp 15   SpO2 94%     General:  Appears comfortable. Answers questions appropriately and cooperative with exam  HEENT:  Mucous membranes moist. No erythema, rhinorrhea, or post-nasal drip noted. Neck:  No carotid bruits. Heart:  Rhythm regular at rate of 78  Lungs:  Positive for wheeze and rhonchi  Abdomen:  Positive bowel sounds positive. Soft. Non-tender.  No guarding, rebound or rigidity. Breast/Rectal/Genitourinary: not pertinent. Extremities:  Negative for lower extremity edema  Skin:  Warm and dry  Vascular: 2/4 Dorsalis Pedis pulses bilaterally. Neuro:  Cranial nerves 2-12 grossly intact, no focal weakness or change in sensation noted. Extraocular muscles intact. Pupils equal, round, reactive to light. LABS:  Recent Labs     03/23/22 1941      K 3.2*   CL 96*   CO2 27   BUN 30*   CREATININE 1.9*   GLUCOSE 96   CALCIUM 8.6       Recent Labs     03/23/22 1941   WBC 6.6   RBC 4.17   HGB 13.0   HCT 39.8   MCV 95.4   MCH 31.2   MCHC 32.7   RDW 13.4      MPV 9.3       No results for input(s): POCGLU in the last 72 hours. CBC with Differential:    Lab Results   Component Value Date    WBC 6.6 03/23/2022    RBC 4.17 03/23/2022    HGB 13.0 03/23/2022    HCT 39.8 03/23/2022     03/23/2022    MCV 95.4 03/23/2022    MCH 31.2 03/23/2022    MCHC 32.7 03/23/2022    RDW 13.4 03/23/2022    SEGSPCT 54 10/04/2013    LYMPHOPCT 27.5 03/23/2022    MONOPCT 10.9 03/23/2022    BASOPCT 0.6 03/23/2022    MONOSABS 0.72 03/23/2022    LYMPHSABS 1.82 03/23/2022    EOSABS 0.10 03/23/2022    BASOSABS 0.04 03/23/2022     CMP:    Lab Results   Component Value Date     03/23/2022    K 3.2 03/23/2022    CL 96 03/23/2022    CO2 27 03/23/2022    BUN 30 03/23/2022    CREATININE 1.9 03/23/2022    GFRAA 31 03/23/2022    LABGLOM 26 03/23/2022    GLUCOSE 96 03/23/2022    GLUCOSE 116 05/30/2012    PROT 7.0 03/23/2022    LABALBU 3.6 03/23/2022    LABALBU 3.9 05/30/2012    CALCIUM 8.6 03/23/2022    BILITOT 0.3 03/23/2022    ALKPHOS 80 03/23/2022    AST 16 03/23/2022    ALT 8 03/23/2022     Troponin:    Lab Results   Component Value Date    TROPONINI <0.01 05/30/2012       Radiology: XR CHEST 1 VIEW    Result Date: 3/23/2022  EXAMINATION: ONE XRAY VIEW OF THE CHEST 3/23/2022 7:38 pm COMPARISON: None.  HISTORY: ORDERING SYSTEM PROVIDED HISTORY: pre op TECHNOLOGIST PROVIDED HISTORY: Reason for exam:->pre op FINDINGS: There is focal opacity in the left upper midlung and adjacent pleural thickening. No pneumothorax. The heart is prominent in size. The costophrenic angles are clear. Focal opacity in the left upper midlung may represent chronic scarring versus early infiltrates. Follow-up study recommended. XR HIP 2-3 VW W PELVIS LEFT    Result Date: 3/23/2022  EXAMINATION: ONE XRAY VIEW OF THE PELVIS AND TWO XRAY VIEWS LEFT HIP 3/23/2022 4:29 pm COMPARISON: November 17, 2021 HISTORY: ORDERING SYSTEM PROVIDED HISTORY: atraumatic pain TECHNOLOGIST PROVIDED HISTORY: Reason for exam:->atraumatic pain FINDINGS: Stable alignment of right hip arthroplasty. New deformity involving left femoral head with significant flattening and superior migration in left acetabulum. Sclerosis is associated with left femoral head and severe narrowing of superior aspect of left femoroacetabular joint. No evidence of pelvis fracture. 1. New deformity involving left femoral head with severe femoral head flattening. 2. Satisfactory alignment of right hip arthroplasty. ASSESSMENT:      Principal Problem:    Ambulatory dysfunction  Active Problems:    Closed fracture of head of femur (HCC)  Resolved Problems:    * No resolved hospital problems. *      PLAN:    1. Acute respiratory failure with hypoxia(o2 sat 78%)POA  Will ask pulmonary to see especially in light of anticipated surgery. Adjust o2 as needed. Will give nebs and IS to maximize pt. Will give trial of steroids as well. 2. Possible copd exacerbation  Nebs and steroids. rec smoking cessation. 3. Femur fracture  Ortho on consult. Anticipate surgery. Pain control  4. ekg changes of t wave inversion not present before. Will ask cardio to evaluate. 5. Hypokalemia montior and replace prn  6. briana iv fluids. 7. htn adjust and continue coreg perioperatively  8.  MS monitor          Electronically signed by Damion Crowley DO on 3/23/2022 at 10:08 PM

## 2022-03-24 NOTE — CONSULTS
Pulmonary Consultation    Admit Date: 3/23/2022  Requesting Physician: Saulo Gillis PA-C    CC:  New hypoxia in the presence of COPD and anticipated hip surgery. HPI:  This is a 77year old female with a history noted below and significant for COPD, on Trelegy at home who is anticipating a hip replacement on April 15th. She was at home, independent, but with worsening weakness and pain of her legs. She sat in her recliner chair and was unable to get up. She called her daughter to her home and when she got there they required 911 services to help. On EMS arrival she was noted to be hypoxic and placed on O2, and now remains on 4L, pox 92-93%. She denies any shortness of breath, chest pain, leg edema, or new cough leading up to this admission; she denies sick contacts. Her underlying COPD symptoms include daily productive cough of white/gray sputum, no dyspnea. She admits to poor oral intake and poor mobility for the past 2-3 days due to having so much hip discomfort. Denies snoring and daytime sleepiness when in a stable state. Labs and xrays on admission viewed, afebrile, CXR showing a disk like focal opacity in the left upper midlung, WBC 6.6, hgb 12, bun/cr 30/1.9, currently infusing IVF at 100cchr. D-dimer elevated 494. PMH:    Past Medical History:   Diagnosis Date    Arthritis     Hyperlipidemia     Hypertension     Multiple sclerosis [address] [address] [address]     diagnosised 8  yrs ago [facility]     PSH:    Past Surgical History:   Procedure Laterality Date    APPENDECTOMY      BREAST SURGERY  4/13/2012    biopsy - negative    HIP ARTHROPLASTY Right 04/13/2011    Right AIDEE  ALLISON Shah MD    HYSTERECTOMY      KNEE ARTHROPLASTY Left 10/01/2013    Left TKA  ALLISON Shah MD    KNEE ARTHROPLASTY Right 08/29/2012    Right TKA  ALLISON Hernandez MD          Respiratory ROS: no cough, shortness of breath, or wheezing Otherwise, a complete review of systems is undertaken and is negative.     Social History:  Social History     Socioeconomic History    Marital status:      Spouse name: Not on file    Number of children: Not on file    Years of education: Not on file    Highest education level: Not on file   Occupational History     Employer: RETIRED   Tobacco Use    Smoking status: Current Every Day Smoker     Packs/day: 1.00     Years: 40.00     Pack years: 40.00    Smokeless tobacco: Never Used   Vaping Use    Vaping Use: Never used   Substance and Sexual Activity    Alcohol use: No    Drug use: No    Sexual activity: Yes   Other Topics Concern    Not on file   Social History Narrative    Not on file     Social Determinants of Health     Financial Resource Strain:     Difficulty of Paying Living Expenses: Not on file   Food Insecurity:     Worried About Running Out of Food in the Last Year: Not on file    Arleen of Food in the Last Year: Not on file   Transportation Needs:     Lack of Transportation (Medical): Not on file    Lack of Transportation (Non-Medical):  Not on file   Physical Activity:     Days of Exercise per Week: Not on file    Minutes of Exercise per Session: Not on file   Stress:     Feeling of Stress : Not on file   Social Connections:     Frequency of Communication with Friends and Family: Not on file    Frequency of Social Gatherings with Friends and Family: Not on file    Attends Rastafarian Services: Not on file    Active Member of [address] [place] [address] or Organizations: Not on file    Attends Club or Organization Meetings: Not on file    Marital Status: Not on file   Intimate Partner Violence:     Fear of Current or Ex-Partner: Not on file    Emotionally Abused: Not on file    Physically Abused: Not on file    Sexually Abused: Not on file   Housing Stability:     Unable to Pay for Housing in the Last Year: Not on file    Number of [place] in the Last Year: Not on file    Unstable Housing in the Last Year: Not on file       Family History:  Family History Problem Relation Age of Onset    Mult Sclerosis Father     Thyroid Cancer Mother     Cirrhosis Mother        Medications:     sodium chloride      sodium chloride 100 mL/hr at 22 0044      ipratropium-albuterol  1 ampule Inhalation Q4H WA    methylPREDNISolone  40 mg IntraVENous Q12H    carvedilol  12.5 mg Oral BID    sodium chloride flush  5-40 mL IntraVENous 2 times per day    enoxaparin  40 mg SubCUTAneous Daily         Vitals:    VITALS:  BP 95/65   Pulse 81   Temp 98.6 °F (37 °C)   Resp 14   SpO2 93%   24HR INTAKE/OUTPUT:  No intake or output data in the 24 hours ending 22 0741  CURRENT PULSE OXIMETRY:  SpO2: 93 %  24HR PULSE OXIMETRY RANGE:  SpO2  Av %  Min: 78 %  Max: 97 %      EXAM:  General: No distress. Eyes:  No sclera icterus. No conjunctival injection. ENT: No discharge. Pharynx clear. Dry mouth. Neck: Trachea midline. Normal thyroid. Resp: No accessory muscle use. No crackles. No wheezing. No rhonchi. CV: Regular rate. Regular rhythm. No mumur or rub. ABD: Non-tender. Non-distended. No masses. No organmegaly. Normal bowel sounds. Skin: Warm and dry. No nodule on exposed extremities. No rash on exposed extremities. Lymph: No cervical LAD. No supraclavicular LAD. Ext: No joint deformity. No clubbing. No cyanosis. No edema. Neuro: Awake. Follows commands. Pleasantly interactive. Lab Results:  CBC:   Recent Labs     22  0630   WBC 6.6 6.7   HGB 13.0 12.0   HCT 39.8 36.6   MCV 95.4 95.8    253     BMP:   Recent Labs     22      K 3.2*   CL 96*   CO2 27   BUN 30*   CREATININE 1.9*     LFT:   Recent Labs     22   ALKPHOS 80   ALT 8   AST 16   PROT 7.0   BILITOT 0.3   LABALBU 3.6     PT/INR: No results for input(s): PROTIME, INR in the last 72 hours. Cultures:  No results for input(s): CULTRESP in the last 72 hours.     ABG:   No results for input(s): PH, PO2, PCO2, HCO3, BE, O2SAT in the last 72 hours.    Films:  XR CHEST 1 VIEW    Result Date: 3/23/2022  EXAMINATION: ONE XRAY VIEW OF THE CHEST 3/23/2022 7:38 pm COMPARISON: None. HISTORY: ORDERING SYSTEM PROVIDED HISTORY: pre op TECHNOLOGIST PROVIDED HISTORY: Reason for exam:->pre op FINDINGS: There is focal opacity in the left upper midlung and adjacent pleural thickening. No pneumothorax. The heart is prominent in size. The costophrenic angles are clear. Focal opacity in the left upper midlung may represent chronic scarring versus early infiltrates. Follow-up study recommended. 12/2018      Chest CT 6/19/21  There is no pulmonary consolidation or pleural effusion.  No   acute abnormality of the airways.  No lung nodule or mass. Judy Pilon is chronic   scarring in the left upper lobe. XR HIP 2-3 VW W PELVIS LEFT    Result Date: 3/23/2022  EXAMINATION: ONE XRAY VIEW OF THE PELVIS AND TWO XRAY VIEWS LEFT HIP 3/23/2022 4:29 pm COMPARISON: November 17, 2021 HISTORY: ORDERING SYSTEM PROVIDED HISTORY: atraumatic pain TECHNOLOGIST PROVIDED HISTORY: Reason for exam:->atraumatic pain FINDINGS: Stable alignment of right hip arthroplasty. New deformity involving left femoral head with significant flattening and superior migration in left acetabulum. Sclerosis is associated with left femoral head and severe narrowing of superior aspect of left femoroacetabular joint. No evidence of pelvis fracture. 1. New deformity involving left femoral head with severe femoral head flattening. 2. Satisfactory alignment of right hip arthroplasty.          Assessment:  · Hypoxemia, new with d-dimer 494  · COPD, questionable exacerbation  · Current smoker, 1ppd x 40 years  · Deformity of left femoral head with flattening - for possible ortho surgery this week (per patient)  · Multiple sclerosis, dx 8 years ago at [facility]  · Hypertension  · Hyperlipidemia  · Arthritis      Plan:  · Keep pox 90-94%  · On daily lovenox for prophylaxis  · Defer pain control to pcp/ortho team  · Continue duonebs q 4 hours WA (on Trelegy at home)  · Continue solu-medrol 40mg for now  · Procalcitonin pending, with check respiratory panel, and US BLE  · Outside of hypoxemia, not having dyspnea or cough so will attempt PFTs today to evaluate baseline lung function  · Smoking cessation councelling    D-dimer 494, will do chest cta, caution with bun/cr now 32/1.8 (getting IVF). Thank you for allowing us to see this patient in consultation. The above will be reviewed in collaboration with Dr. Radha Vernon. Please contact us with any questions. Addendum: cr 1.8, ct will be deferred and will do VQ scan. Electronically signed by HIRA Mckeon CNP on 3/24/2022 at 7:41 AM     Seen and evaluated, and agree with above assessment and plan  Hypoxemia, unknown acute versus chronic. Intermittent D-dimer, low probability VQ scan. Continue Lovenox as per DVT prophylaxis. Patient with a history of COPD, unknown severity. Poor functional capacity due to multiple reasons, still heavy smoker. Arterial blood gas to follow, may need noninvasive ventilatory assistance.   Bronchodilators nebulizers while in the hospital.  Patient with intermediate to high risk for pulmonary complications during the postop, favor to keep in the ICU overnight after hip surgery

## 2022-03-24 NOTE — CONSULTS
Inpatient Cardiology Consultation      Reason for Consult:  T wave inversions     Consulting Physician: Dr. Emerita Rose     Requesting Physician:  Dr. Althea Reilly     Date of Consultation: 3/24/2022    HISTORY OF PRESENT ILLNESS:   Ms. Cathleen Hurtado is a 42-year-old obese  female who is new to [facility] cardiology physicians. PMH: Hypertension, hyperlipidemia, arthritis, multiple sclerosis (follows with [facility]) and reported history of \"Mitral Valve Prolapse. \"     Patient reported that she has been having worsening left hip pain with gait instability over the past week. She was seen by her orthopedic surgeon and was scheduled for left total hip arthroplasty on 4/15/2022. She reported that over the past 48 hours she was unable to get herself out of her recliner chair and even with assist from her family she was unable to get up or put weight on her hip. Therefore EMS was summoned and she was brought to [facility] on 3/23/2022. Patient normally ambulates with a walker. She has no steps in her home and is able to go from room to room without CRENSHAW or chest pain. She denies lower extremity edema, orthopnea, PND, palpitations or feeling of her heart racing. Upon arrival to the ED: Blood pressure 134/70, heart rate 73, afebrile, 97% on room air. Labs: Sodium 136, potassium 3.2, BUN 30, creatinine 1.9, high-sensitivity troponin 23,25. WBC 6.6, H/H stable, platelet count 427. X-ray left hip: New deformity involving the left femoral head with severe femoral head flattening, satisfactory alignment of the right hip arthroplasty. Chest x-ray: Focal opacity in the left upper midlung may represent chronic scarring versus early infiltrates. EKG: 3/23/2022, normal sinus rhythm, nonspecific ST to T wave changes, rate 75 bpm.  ER medications: Morphine sulfate IV 4 mg x 2, potassium chloride 40 mEq. Patient was started on IV fluids 100 cc/h continuous.   She was admitted to a nontelemetry monitoring unit for further evaluation management. Cardiology was consulted for preoperative or stratification. Please note: past medical records were reviewed per electronic medical record (EMR) - see detailed reports under Past Medical/ Surgical History. Past Medical History:    1. Arthritis  2. Hypertension  3. Hyperlipidemia  4. Multiple sclerosis (progressive): On  No medications; follows with [facility]. 5. History of appendectomy, right knee arthroplasty, left knee arthroplasty, breast biopsy (-). 6. Obesity: BMI 31.09 kg/m2  7. Anxiety/depression  8. Osteoporosis  9. Peripheral neuropathy  10. Cerebral aneurysm (follows with [facility]) ---> MRA neck/head (2/2020) 3.5 mm left SCA aneurysm and a 3 mm aneurysm of the left MCA trifurcation, on aspirin. 11. No prior LHC. 12. Patient reports history of \"Mitral Valve prolapse\"   13. Reported \"Normal stress test > 10 years ago. \"   14. Current smoker: 1 PPD x50 years. Medications Prior to admit:  Prior to Admission medications    Medication Sig Start Date End Date Taking?  Authorizing Provider   sertraline (ZOLOFT) 100 MG tablet TAKE 1 AND 1/2 TABLETS     DAILY 3/7/22   Jn Gaytan PA-C   baclofen (LIORESAL) 10 MG tablet TAKE 1 TABLET TWICE A DAY 3/3/22   Jn Gaytan PA-C   carvedilol (COREG) 25 MG tablet TAKE 1 TABLET TWICE A DAY 1/31/22   Nannette To PA-C   alendronate (FOSAMAX) 70 MG tablet TAKE 1 TABLET EVERY 7 DAYS 1/21/22   Jn Gaytan PA-C   amLODIPine (NORVASC) 5 MG tablet TAKE 1 TABLET DAILY 1/3/22   Jn Gaytan PA-C   predniSONE (DELTASONE) 10 MG tablet 3 tabs once daily for 3 days, 2 tabs once daily for 3 days, 1 tab once daily for 3 days 12/13/21   CHAZ Moreira III   tiZANidine (ZANAFLEX) 2 MG tablet Take 1 tablet by mouth 4 times daily as needed (back pain) 12/13/21   CHAZ Moreira III   simvastatin (ZOCOR) 40 MG tablet TAKE 1 TABLET NIGHTLY 9/3/21   Jn Gaytan PA-C   gabapentin (NEURONTIN) 400 MG capsule TAKE 1 CAPSULE 3 TIMES A   DAY 9/3/21 3/2/22  Mu Rodríguez prolapse\"  3. Hypertension  4. Hyperlipidemia  5. Obesity: BMI 31.09 kg/m2  6. Anxiety/depression  7. Osteoporosis  8. History of cerebral aneurysm (follows with [facility]) --> MRI neck/head (2/2020) 3.5 mm left SCA aneurysm and a 3 mm aneurysm of the left MCA trifurcation. 9. Current smoker: 1 PPD x50 years  10. CKD -- most recent Cr 1.8  11. Multiple sclerosis -- follows at [facility]      For this patient, risk of significant josie-op cardiac complications is Class 1 Risk, 0.4% risk of MACE. Cardiac risk not prohibitive if non-operative risk is greater. No cardiac test or change in medication likely to alter risk. Patient is at an acceptable risk to proceed with surgery. Continue BB. Will order ECHO (can be done post-operatively) to further assess ventricular function and VHD. Continue current cardiac medications. Smoking cessation   Will follow. The above assessment/plan was made in collaboration with Dr. Mitchell Pereira.  Electronically signed by HIRA Cortez CNP on 3/24/22 at 10:52 AM EDT    _______________________________________________________________________________________  I independently interviewed and examined the patient. I have reviewed the above documentation completed by the INDIO. Please see my additional contributions to the HPI, physical exam, and assessment / medical decision making. HPI, ROS, PMH, PSH, [address], [place], and medications independently reviewed (agree; see above documentation)    History of Present Illness:  Currently with no chest pain, respiratory distress, or palpitations. SR on EKG and telemetry.     Review of Systems:   Cardiac: As per HPI  General: No fever, chills  Pulmonary: As per HPI  HEENT: No visual disturbances, difficult swallowing  GI: No nausea, vomiting  : No dysuria, hematuria  Endocrine: No thyroid disease or DM  Musculoskeletal: GARCIA x 4, no focal motor deficits  Skin: Intact, no rashes  Neuro: No headache, seizures  Psych: Currently with no depression, Results   Component Value Date    TRIG 106 04/27/2021    TRIG 94 07/30/2019    TRIG 87 08/10/2018     Lab Results   Component Value Date    HDL 88 04/27/2021    HDL 65 07/30/2019    HDL 73 08/10/2018     Lab Results   Component Value Date    LDLCALC 113 (H) 04/27/2021    LDLCALC 91 07/30/2019    LDLCALC 89 08/10/2018     Lab Results   Component Value Date    LABVLDL 21 04/27/2021    LABVLDL 19 07/30/2019    LABVLDL 17 08/10/2018     No results found for: CHOLHDLRATIO  No results for input(s): PROBNP in the last 72 hours. EKG reviewed: SR, rate 75, PRWP, NSSTT changes    Telemetry reviewed: not on telemetry    - She may proceed with surgery from a cardiology standpoint  - Continue beta blocker in the perioperative period  - Will order echocardiogram  - Continue to replace electrolytes PRN  - Aggressive risk factor modifications / tobacco cessation    Thank you for allowing me to participate in your patient's care. Please feel free to contact me if you have any questions or concerns.     Giovanna Ohara MD  [facility]) Cardiology

## 2022-03-25 ENCOUNTER — APPOINTMENT (OUTPATIENT)
Dept: GENERAL RADIOLOGY | Age: 77
DRG: 469 | End: 2022-03-25
Payer: MEDICARE

## 2022-03-25 ENCOUNTER — ANESTHESIA (OUTPATIENT)
Dept: OPERATING ROOM | Age: 77
DRG: 469 | End: 2022-03-25
Payer: MEDICARE

## 2022-03-25 VITALS — DIASTOLIC BLOOD PRESSURE: 90 MMHG | OXYGEN SATURATION: 95 % | SYSTOLIC BLOOD PRESSURE: 139 MMHG | TEMPERATURE: 99.5 F

## 2022-03-25 LAB
ABO/RH: NORMAL
ANTIBODY SCREEN: NORMAL

## 2022-03-25 PROCEDURE — 6370000000 HC RX 637 (ALT 250 FOR IP): Performed by: ORTHOPAEDIC SURGERY

## 2022-03-25 PROCEDURE — 2580000003 HC RX 258: Performed by: ORTHOPAEDIC SURGERY

## 2022-03-25 PROCEDURE — 6360000002 HC RX W HCPCS: Performed by: ORTHOPAEDIC SURGERY

## 2022-03-25 PROCEDURE — 2580000003 HC RX 258: Performed by: INTERNAL MEDICINE

## 2022-03-25 PROCEDURE — 6360000002 HC RX W HCPCS: Performed by: INTERNAL MEDICINE

## 2022-03-25 PROCEDURE — 86900 BLOOD TYPING SEROLOGIC ABO: CPT

## 2022-03-25 PROCEDURE — 6360000002 HC RX W HCPCS: Performed by: NURSE PRACTITIONER

## 2022-03-25 PROCEDURE — 97530 THERAPEUTIC ACTIVITIES: CPT

## 2022-03-25 PROCEDURE — 3600000015 HC SURGERY LEVEL 5 ADDTL 15MIN: Performed by: ORTHOPAEDIC SURGERY

## 2022-03-25 PROCEDURE — 6360000002 HC RX W HCPCS: Performed by: NURSE ANESTHETIST, CERTIFIED REGISTERED

## 2022-03-25 PROCEDURE — 1200000000 HC SEMI PRIVATE

## 2022-03-25 PROCEDURE — 0SRB0JZ REPLACEMENT OF LEFT HIP JOINT WITH SYNTHETIC SUBSTITUTE, OPEN APPROACH: ICD-10-PCS | Performed by: ORTHOPAEDIC SURGERY

## 2022-03-25 PROCEDURE — 2500000003 HC RX 250 WO HCPCS: Performed by: NURSE ANESTHETIST, CERTIFIED REGISTERED

## 2022-03-25 PROCEDURE — 73501 X-RAY EXAM HIP UNI 1 VIEW: CPT

## 2022-03-25 PROCEDURE — 86901 BLOOD TYPING SEROLOGIC RH(D): CPT

## 2022-03-25 PROCEDURE — 7100000000 HC PACU RECOVERY - FIRST 15 MIN: Performed by: ORTHOPAEDIC SURGERY

## 2022-03-25 PROCEDURE — 2500000003 HC RX 250 WO HCPCS: Performed by: ORTHOPAEDIC SURGERY

## 2022-03-25 PROCEDURE — 3600000005 HC SURGERY LEVEL 5 BASE: Performed by: ORTHOPAEDIC SURGERY

## 2022-03-25 PROCEDURE — 97165 OT EVAL LOW COMPLEX 30 MIN: CPT

## 2022-03-25 PROCEDURE — 7100000001 HC PACU RECOVERY - ADDTL 15 MIN: Performed by: ORTHOPAEDIC SURGERY

## 2022-03-25 PROCEDURE — 3700000001 HC ADD 15 MINUTES (ANESTHESIA): Performed by: ORTHOPAEDIC SURGERY

## 2022-03-25 PROCEDURE — 2700000000 HC OXYGEN THERAPY PER DAY

## 2022-03-25 PROCEDURE — 3700000000 HC ANESTHESIA ATTENDED CARE: Performed by: ORTHOPAEDIC SURGERY

## 2022-03-25 PROCEDURE — 36415 COLL VENOUS BLD VENIPUNCTURE: CPT

## 2022-03-25 PROCEDURE — 2709999900 HC NON-CHARGEABLE SUPPLY: Performed by: ORTHOPAEDIC SURGERY

## 2022-03-25 PROCEDURE — A4217 STERILE WATER/SALINE, 500 ML: HCPCS | Performed by: ORTHOPAEDIC SURGERY

## 2022-03-25 PROCEDURE — 97535 SELF CARE MNGMENT TRAINING: CPT

## 2022-03-25 PROCEDURE — 94640 AIRWAY INHALATION TREATMENT: CPT

## 2022-03-25 PROCEDURE — A4216 STERILE WATER/SALINE, 10 ML: HCPCS | Performed by: ORTHOPAEDIC SURGERY

## 2022-03-25 PROCEDURE — 99232 SBSQ HOSP IP/OBS MODERATE 35: CPT | Performed by: INTERNAL MEDICINE

## 2022-03-25 PROCEDURE — C1776 JOINT DEVICE (IMPLANTABLE): HCPCS | Performed by: ORTHOPAEDIC SURGERY

## 2022-03-25 PROCEDURE — 88304 TISSUE EXAM BY PATHOLOGIST: CPT

## 2022-03-25 PROCEDURE — 88311 DECALCIFY TISSUE: CPT

## 2022-03-25 PROCEDURE — 97161 PT EVAL LOW COMPLEX 20 MIN: CPT

## 2022-03-25 PROCEDURE — 86850 RBC ANTIBODY SCREEN: CPT

## 2022-03-25 PROCEDURE — 2580000003 HC RX 258: Performed by: NURSE ANESTHETIST, CERTIFIED REGISTERED

## 2022-03-25 DEVICE — G7 DUAL MOBILITY LINER 40MM D: Type: IMPLANTABLE DEVICE | Site: HIP | Status: FUNCTIONAL

## 2022-03-25 DEVICE — SHELL ACET SZ D DIA50MM HIP PPS LIMIT H G7: Type: IMPLANTABLE DEVICE | Site: HIP | Status: FUNCTIONAL

## 2022-03-25 DEVICE — HEAD FEM DIA28MM HIP BIOLOX DELT OPT FOR G7 ACET SYS: Type: IMPLANTABLE DEVICE | Site: HIP | Status: FUNCTIONAL

## 2022-03-25 DEVICE — SLEEVE FEM -3MM OFFSET HIP TYP 1 TAPR FOR CERAMIC BIOLOX: Type: IMPLANTABLE DEVICE | Site: HIP | Status: FUNCTIONAL

## 2022-03-25 DEVICE — LINER ACET SZ D OD40MM ID28MM ACT ARTC E1 BEAR G7: Type: IMPLANTABLE DEVICE | Site: HIP | Status: FUNCTIONAL

## 2022-03-25 DEVICE — TPRLC 133 FP TYPE1 PPS SO 6.0: Type: IMPLANTABLE DEVICE | Site: HIP | Status: FUNCTIONAL

## 2022-03-25 RX ORDER — SODIUM CHLORIDE 9 MG/ML
25 INJECTION, SOLUTION INTRAVENOUS PRN
Status: DISCONTINUED | OUTPATIENT
Start: 2022-03-25 | End: 2022-03-25

## 2022-03-25 RX ORDER — DIPHENHYDRAMINE HYDROCHLORIDE 50 MG/ML
25 INJECTION INTRAMUSCULAR; INTRAVENOUS EVERY 6 HOURS PRN
Status: DISCONTINUED | OUTPATIENT
Start: 2022-03-25 | End: 2022-03-27 | Stop reason: HOSPADM

## 2022-03-25 RX ORDER — DEXAMETHASONE SODIUM PHOSPHATE 4 MG/ML
INJECTION, SOLUTION INTRA-ARTICULAR; INTRALESIONAL; INTRAMUSCULAR; INTRAVENOUS; SOFT TISSUE PRN
Status: DISCONTINUED | OUTPATIENT
Start: 2022-03-25 | End: 2022-03-25 | Stop reason: SDUPTHER

## 2022-03-25 RX ORDER — DIPHENHYDRAMINE HYDROCHLORIDE 50 MG/ML
12.5 INJECTION INTRAMUSCULAR; INTRAVENOUS
Status: DISCONTINUED | OUTPATIENT
Start: 2022-03-25 | End: 2022-03-25

## 2022-03-25 RX ORDER — SODIUM CHLORIDE 0.9 % (FLUSH) 0.9 %
5-40 SYRINGE (ML) INJECTION EVERY 12 HOURS SCHEDULED
Status: DISCONTINUED | OUTPATIENT
Start: 2022-03-25 | End: 2022-03-25

## 2022-03-25 RX ORDER — OXYCODONE HYDROCHLORIDE 5 MG/1
5 TABLET ORAL EVERY 4 HOURS PRN
Status: DISCONTINUED | OUTPATIENT
Start: 2022-03-25 | End: 2022-03-27 | Stop reason: HOSPADM

## 2022-03-25 RX ORDER — PROPOFOL 10 MG/ML
INJECTION, EMULSION INTRAVENOUS PRN
Status: DISCONTINUED | OUTPATIENT
Start: 2022-03-25 | End: 2022-03-25 | Stop reason: SDUPTHER

## 2022-03-25 RX ORDER — SODIUM CHLORIDE 9 MG/ML
INJECTION, SOLUTION INTRAVENOUS CONTINUOUS PRN
Status: DISCONTINUED | OUTPATIENT
Start: 2022-03-25 | End: 2022-03-25 | Stop reason: SDUPTHER

## 2022-03-25 RX ORDER — ACETAMINOPHEN 325 MG/1
650 TABLET ORAL EVERY 6 HOURS
Status: DISCONTINUED | OUTPATIENT
Start: 2022-03-25 | End: 2022-03-27 | Stop reason: HOSPADM

## 2022-03-25 RX ORDER — FAMOTIDINE 20 MG/1
20 TABLET, FILM COATED ORAL DAILY
Status: DISCONTINUED | OUTPATIENT
Start: 2022-03-25 | End: 2022-03-27 | Stop reason: HOSPADM

## 2022-03-25 RX ORDER — GLYCOPYRROLATE 1 MG/5 ML
SYRINGE (ML) INTRAVENOUS PRN
Status: DISCONTINUED | OUTPATIENT
Start: 2022-03-25 | End: 2022-03-25 | Stop reason: SDUPTHER

## 2022-03-25 RX ORDER — SODIUM CHLORIDE 0.9 % (FLUSH) 0.9 %
5-40 SYRINGE (ML) INJECTION PRN
Status: DISCONTINUED | OUTPATIENT
Start: 2022-03-25 | End: 2022-03-25

## 2022-03-25 RX ORDER — ONDANSETRON 4 MG/1
4 TABLET, ORALLY DISINTEGRATING ORAL EVERY 8 HOURS PRN
Status: DISCONTINUED | OUTPATIENT
Start: 2022-03-25 | End: 2022-03-27 | Stop reason: HOSPADM

## 2022-03-25 RX ORDER — FENTANYL CITRATE 50 UG/ML
INJECTION, SOLUTION INTRAMUSCULAR; INTRAVENOUS PRN
Status: DISCONTINUED | OUTPATIENT
Start: 2022-03-25 | End: 2022-03-25 | Stop reason: SDUPTHER

## 2022-03-25 RX ORDER — FENTANYL CITRATE 50 UG/ML
50 INJECTION, SOLUTION INTRAMUSCULAR; INTRAVENOUS EVERY 5 MIN PRN
Status: DISCONTINUED | OUTPATIENT
Start: 2022-03-25 | End: 2022-03-25

## 2022-03-25 RX ORDER — METHYLPREDNISOLONE SODIUM SUCCINATE 40 MG/ML
40 INJECTION, POWDER, LYOPHILIZED, FOR SOLUTION INTRAMUSCULAR; INTRAVENOUS DAILY
Status: DISCONTINUED | OUTPATIENT
Start: 2022-03-26 | End: 2022-03-26

## 2022-03-25 RX ORDER — SODIUM CHLORIDE 9 MG/ML
25 INJECTION, SOLUTION INTRAVENOUS PRN
Status: DISCONTINUED | OUTPATIENT
Start: 2022-03-25 | End: 2022-03-27 | Stop reason: HOSPADM

## 2022-03-25 RX ORDER — SODIUM CHLORIDE, SODIUM LACTATE, POTASSIUM CHLORIDE, CALCIUM CHLORIDE 600; 310; 30; 20 MG/100ML; MG/100ML; MG/100ML; MG/100ML
INJECTION, SOLUTION INTRAVENOUS CONTINUOUS
Status: DISCONTINUED | OUTPATIENT
Start: 2022-03-25 | End: 2022-03-27 | Stop reason: HOSPADM

## 2022-03-25 RX ORDER — MIDAZOLAM HYDROCHLORIDE 1 MG/ML
INJECTION INTRAMUSCULAR; INTRAVENOUS PRN
Status: DISCONTINUED | OUTPATIENT
Start: 2022-03-25 | End: 2022-03-25 | Stop reason: SDUPTHER

## 2022-03-25 RX ORDER — KETAMINE HYDROCHLORIDE 10 MG/ML
INJECTION, SOLUTION INTRAMUSCULAR; INTRAVENOUS PRN
Status: DISCONTINUED | OUTPATIENT
Start: 2022-03-25 | End: 2022-03-25 | Stop reason: SDUPTHER

## 2022-03-25 RX ORDER — SENNA AND DOCUSATE SODIUM 50; 8.6 MG/1; MG/1
1 TABLET, FILM COATED ORAL 2 TIMES DAILY
Status: DISCONTINUED | OUTPATIENT
Start: 2022-03-25 | End: 2022-03-27 | Stop reason: HOSPADM

## 2022-03-25 RX ORDER — MEPERIDINE HYDROCHLORIDE 25 MG/ML
12.5 INJECTION INTRAMUSCULAR; INTRAVENOUS; SUBCUTANEOUS EVERY 10 MIN PRN
Status: DISCONTINUED | OUTPATIENT
Start: 2022-03-25 | End: 2022-03-25

## 2022-03-25 RX ORDER — VANCOMYCIN HYDROCHLORIDE 1 G/20ML
INJECTION, POWDER, LYOPHILIZED, FOR SOLUTION INTRAVENOUS PRN
Status: DISCONTINUED | OUTPATIENT
Start: 2022-03-25 | End: 2022-03-25 | Stop reason: ALTCHOICE

## 2022-03-25 RX ORDER — ONDANSETRON 2 MG/ML
INJECTION INTRAMUSCULAR; INTRAVENOUS PRN
Status: DISCONTINUED | OUTPATIENT
Start: 2022-03-25 | End: 2022-03-25 | Stop reason: SDUPTHER

## 2022-03-25 RX ORDER — ASPIRIN 81 MG/1
81 TABLET ORAL 2 TIMES DAILY
Status: DISCONTINUED | OUTPATIENT
Start: 2022-03-25 | End: 2022-03-27 | Stop reason: HOSPADM

## 2022-03-25 RX ORDER — SODIUM CHLORIDE 0.9 % (FLUSH) 0.9 %
5-40 SYRINGE (ML) INJECTION PRN
Status: DISCONTINUED | OUTPATIENT
Start: 2022-03-25 | End: 2022-03-27 | Stop reason: HOSPADM

## 2022-03-25 RX ORDER — DIPHENHYDRAMINE HCL 25 MG
25 TABLET ORAL EVERY 6 HOURS PRN
Status: DISCONTINUED | OUTPATIENT
Start: 2022-03-25 | End: 2022-03-27 | Stop reason: HOSPADM

## 2022-03-25 RX ORDER — MEPERIDINE HYDROCHLORIDE 25 MG/ML
12.5 INJECTION INTRAMUSCULAR; INTRAVENOUS; SUBCUTANEOUS EVERY 5 MIN PRN
Status: DISCONTINUED | OUTPATIENT
Start: 2022-03-25 | End: 2022-03-25

## 2022-03-25 RX ORDER — SODIUM CHLORIDE 0.9 % (FLUSH) 0.9 %
5-40 SYRINGE (ML) INJECTION EVERY 12 HOURS SCHEDULED
Status: DISCONTINUED | OUTPATIENT
Start: 2022-03-25 | End: 2022-03-27 | Stop reason: HOSPADM

## 2022-03-25 RX ORDER — ONDANSETRON 2 MG/ML
4 INJECTION INTRAMUSCULAR; INTRAVENOUS EVERY 6 HOURS PRN
Status: DISCONTINUED | OUTPATIENT
Start: 2022-03-25 | End: 2022-03-27 | Stop reason: HOSPADM

## 2022-03-25 RX ADMIN — Medication 0.2 MG: at 07:50

## 2022-03-25 RX ADMIN — TRANEXAMIC ACID 1000 MG: 1 INJECTION, SOLUTION INTRAVENOUS at 09:16

## 2022-03-25 RX ADMIN — OXYCODONE 5 MG: 5 TABLET ORAL at 13:11

## 2022-03-25 RX ADMIN — SODIUM CHLORIDE: 9 INJECTION, SOLUTION INTRAVENOUS at 07:00

## 2022-03-25 RX ADMIN — MIDAZOLAM 1 MG: 1 INJECTION INTRAMUSCULAR; INTRAVENOUS at 07:45

## 2022-03-25 RX ADMIN — OXYCODONE 5 MG: 5 TABLET ORAL at 21:30

## 2022-03-25 RX ADMIN — DEXAMETHASONE SODIUM PHOSPHATE 10 MG: 4 INJECTION, SOLUTION INTRAMUSCULAR; INTRAVENOUS at 07:45

## 2022-03-25 RX ADMIN — PROPOFOL 50 MG: 10 INJECTION, EMULSION INTRAVENOUS at 08:17

## 2022-03-25 RX ADMIN — SODIUM CHLORIDE, POTASSIUM CHLORIDE, SODIUM LACTATE AND CALCIUM CHLORIDE: 600; 310; 30; 20 INJECTION, SOLUTION INTRAVENOUS at 13:10

## 2022-03-25 RX ADMIN — ONDANSETRON 4 MG: 2 INJECTION INTRAMUSCULAR; INTRAVENOUS at 07:45

## 2022-03-25 RX ADMIN — PROPOFOL 50 MG: 10 INJECTION, EMULSION INTRAVENOUS at 08:00

## 2022-03-25 RX ADMIN — SODIUM CHLORIDE, POTASSIUM CHLORIDE, SODIUM LACTATE AND CALCIUM CHLORIDE: 600; 310; 30; 20 INJECTION, SOLUTION INTRAVENOUS at 02:42

## 2022-03-25 RX ADMIN — PROPOFOL 50 MG: 10 INJECTION, EMULSION INTRAVENOUS at 08:15

## 2022-03-25 RX ADMIN — IPRATROPIUM BROMIDE AND ALBUTEROL SULFATE 1 AMPULE: .5; 3 SOLUTION RESPIRATORY (INHALATION) at 20:11

## 2022-03-25 RX ADMIN — IPRATROPIUM BROMIDE AND ALBUTEROL SULFATE 1 AMPULE: .5; 3 SOLUTION RESPIRATORY (INHALATION) at 17:30

## 2022-03-25 RX ADMIN — ACETAMINOPHEN 650 MG: 325 TABLET ORAL at 13:15

## 2022-03-25 RX ADMIN — MORPHINE SULFATE 2 MG: 2 INJECTION, SOLUTION INTRAMUSCULAR; INTRAVENOUS at 03:39

## 2022-03-25 RX ADMIN — ACETAMINOPHEN 650 MG: 325 TABLET ORAL at 19:29

## 2022-03-25 RX ADMIN — ASPIRIN 81 MG: 81 TABLET, COATED ORAL at 21:30

## 2022-03-25 RX ADMIN — Medication 2000 MG: at 07:50

## 2022-03-25 RX ADMIN — DOCUSATE SODIUM 50 MG AND SENNOSIDES 8.6 MG 1 TABLET: 8.6; 5 TABLET, FILM COATED ORAL at 13:16

## 2022-03-25 RX ADMIN — TRANEXAMIC ACID 1000 MG: 1 INJECTION, SOLUTION INTRAVENOUS at 12:06

## 2022-03-25 RX ADMIN — KETAMINE HYDROCHLORIDE 30 MG: 10 INJECTION INTRAMUSCULAR; INTRAVENOUS at 08:17

## 2022-03-25 RX ADMIN — Medication 10 ML: at 05:36

## 2022-03-25 RX ADMIN — METHYLPREDNISOLONE SODIUM SUCCINATE 40 MG: 40 INJECTION, POWDER, LYOPHILIZED, FOR SOLUTION INTRAMUSCULAR; INTRAVENOUS at 05:36

## 2022-03-25 RX ADMIN — Medication 2000 MG: at 17:04

## 2022-03-25 RX ADMIN — ARFORMOTEROL TARTRATE 15 MCG: 15 SOLUTION RESPIRATORY (INHALATION) at 20:11

## 2022-03-25 RX ADMIN — FENTANYL CITRATE 25 MCG: 50 INJECTION, SOLUTION INTRAMUSCULAR; INTRAVENOUS at 07:56

## 2022-03-25 RX ADMIN — FAMOTIDINE 20 MG: 10 INJECTION, SOLUTION INTRAVENOUS at 13:16

## 2022-03-25 RX ADMIN — MIDAZOLAM 1 MG: 1 INJECTION INTRAMUSCULAR; INTRAVENOUS at 08:17

## 2022-03-25 RX ADMIN — CARVEDILOL 12.5 MG: 6.25 TABLET, FILM COATED ORAL at 21:30

## 2022-03-25 ASSESSMENT — PULMONARY FUNCTION TESTS
PIF_VALUE: 1

## 2022-03-25 ASSESSMENT — PAIN DESCRIPTION - FREQUENCY: FREQUENCY: INTERMITTENT

## 2022-03-25 ASSESSMENT — PAIN SCALES - GENERAL
PAINLEVEL_OUTOF10: 7
PAINLEVEL_OUTOF10: 8
PAINLEVEL_OUTOF10: 7

## 2022-03-25 ASSESSMENT — COPD QUESTIONNAIRES: CAT_SEVERITY: SEVERE

## 2022-03-25 ASSESSMENT — PAIN DESCRIPTION - ONSET: ONSET: ON-GOING

## 2022-03-25 ASSESSMENT — PAIN - FUNCTIONAL ASSESSMENT
PAIN_FUNCTIONAL_ASSESSMENT: 0-10
PAIN_FUNCTIONAL_ASSESSMENT: PREVENTS OR INTERFERES SOME ACTIVE ACTIVITIES AND ADLS

## 2022-03-25 ASSESSMENT — PAIN DESCRIPTION - PROGRESSION: CLINICAL_PROGRESSION: NOT CHANGED

## 2022-03-25 ASSESSMENT — PAIN DESCRIPTION - PAIN TYPE: TYPE: ACUTE PAIN

## 2022-03-25 ASSESSMENT — PAIN DESCRIPTION - ORIENTATION: ORIENTATION: LEFT

## 2022-03-25 ASSESSMENT — PAIN DESCRIPTION - DESCRIPTORS: DESCRIPTORS: ACHING;DISCOMFORT

## 2022-03-25 ASSESSMENT — PAIN DESCRIPTION - LOCATION: LOCATION: HIP

## 2022-03-25 NOTE — PROGRESS NOTES
Physical Therapy    Facility/Department: [facility] SURGERY  Initial Assessment    NAME: Sue Mccollum  : 1945  MRN: [id number]    Date of Service: 3/25/2022      Attending Provider:  Victoria Martin DO    Evaluating PT:  Alanna Erazo P.T. Room #:  9697/9220-X  Diagnosis:  Unable to ambulate [R26.2]  Closed fracture of head of femur, unspecified laterality, initial encounter ([facility] [address] [S76.426J]  Ambulatory dysfunction [R26.2]  Osteoarthritis of left hip, unspecified osteoarthritis type [M16.12]  Pertinent PMHx/PSHx:  R AIDEE, Multiple Sclerosis  Procedure/Surgery:  3/25/22 L AIDEE  Precautions:  WBAT LLE, L hip precautions, Falls  Equipment Needs:  Wheeled walker if she needs to reduce WB L hip due to pain control other wise she may be able to use her rollator ww    SUBJECTIVE:    Pt lives with  (he has poor health and unable to assist her physically) in a 1 story home with a ramp to enter. Pt ambulated with a rollator ww PTA. OBJECTIVE:   Initial Evaluation  Date: 3/25/22 Treatment Short Term/ Long Term   Goals   Was pt agreeable to Eval/treatment? yes     Does pt have pain? No c/o pain     Bed Mobility  Rolling: MIN A  Supine to sit: MOD A  Sit to supine: NA  Scooting: MOD A  Independent    Transfers Sit to stand: MOD A  Stand to sit: MOD A  Stand pivot: MIN A with ww  supervision   Ambulation   3 feet with ww MIN A    150 feet with ww SBA   Stair negotiation: ascended and descended NA  NA, pt has ramp to enter   AM-PAC 6 Clicks 93/77       BLE ROM is WFL, and L hip is WFL allowed by hip precautions. RLE strength is grossly 4-/5 to 4/5 and L hip is 2/5 to 3-/5, knee is 3-/5, ankle is 4-/5.    Sensation:  Pt denies numbness and tingling to extremities  Balance: sitting is SBA used BUE to support herself and prevent posterior lean and standing with ww is MIN A  Endurance: fair    Patient education  Pt educated on bed mobility, transfers, gait sequence with ww, L hip precautions, and WBAT treatment:  Increase amb distance as pt is able. Current Treatment Recommendations:     [x] Strengthening to improve independence with functional mobility   [x] ROM to maintain ROM and decrease spasm and pain which will help promote independence with functional mobility   [x] Balance Training to improve static/dynamic balance and to reduce fall risk  [x] Endurance Training to improve activity tolerance during functional mobility   [x] Transfer Training to improve safety and independence with all functional transfers   [x] Gait Training to improve gait mechanics, endurance and assess need for appropriate assistive device  [] Stair Training in preparation for safe discharge home and/or into the community   [] Positioning to prevent skin breakdown and contractures  [] Safety and Education Training   [x] Patient/Caregiver Education   [x] HEP  [] Other     PT long term treatment goals are located in above grid    Frequency of treatments: 2-5x/week x 1-2 weeks. Time in  14:00  Time out  14:30    Total Treatment Time 12 minutes     Evaluation Time includes thorough review of current medical information, gathering information on past medical history/social history and prior level of function, completion of standardized testing/informal observation of tasks, assessment of data and education on plan of care and goals. CPT codes:  [x] Low Complexity PT evaluation 67940  [] Moderate Complexity PT evaluation 57007  [] High Complexity PT evaluation 14920  [] PT Re-evaluation 07932  [] Gait training 52587 ** minutes  [] Manual therapy 45403 ** minutes  [x] Therapeutic activities 85433 12 minutes  [] Therapeutic exercises 65133 ** minutes  [] Neuromuscular reeducation 79692 ** minutes     Hugh Lopez, P.T.   License Number: [id number]

## 2022-03-25 NOTE — ANESTHESIA PRE PROCEDURE
Department of Anesthesiology  Preprocedure Note       Name:  Liliane Motta   Age:  77 y.o.  :  1945                                          MRN:  [id number]         Date:  3/25/2022      Surgeon: Nisa Roche):  Donna Neville MD    Procedure: Procedure(s):  LEFT HIP TOTAL ARTHROPLASTY    +++MINH+++    Medications prior to admission:   Prior to Admission medications    Medication Sig Start Date End Date Taking? Authorizing Provider   sertraline (ZOLOFT) 100 MG tablet TAKE 1 AND 1/2 TABLETS     DAILY 3/7/22   Rancho Tejada PA-C   baclofen (LIORESAL) 10 MG tablet TAKE 1 TABLET TWICE A DAY 3/3/22   Rancho Tejada PA-C   carvedilol (COREG) 25 MG tablet TAKE 1 TABLET TWICE A DAY 22   Nannette To PA-C   alendronate (FOSAMAX) 70 MG tablet TAKE 1 TABLET EVERY 7 DAYS 22   Rancho Tejada PA-C   amLODIPine (NORVASC) 5 MG tablet TAKE 1 TABLET DAILY 1/3/22   Rancho Tejada PA-C   predniSONE (DELTASONE) 10 MG tablet 3 tabs once daily for 3 days, 2 tabs once daily for 3 days, 1 tab once daily for 3 days  Patient not taking: Reported on 3/24/2022 12/13/21   CHAZ Rene III   tiZANidine (ZANAFLEX) 2 MG tablet Take 1 tablet by mouth 4 times daily as needed (back pain)  Patient not taking: Reported on 3/24/2022 12/13/21   CHAZ Rene III   simvastatin (ZOCOR) 40 MG tablet TAKE 1 TABLET NIGHTLY 9/3/21   Rancho Tejada PA-C   gabapentin (NEURONTIN) 400 MG capsule TAKE 1 CAPSULE 3 TIMES A   DAY  Patient taking differently: 400 mg in the morning and at bedtime.   9/3/21 3/2/22  Rancho Tejada PA-C   hydroCHLOROthiazide (HYDRODIURIL) 25 MG tablet TAKE 1 TABLET EVERY MORNING 9/3/21   Rancho Tejada PA-C   albuterol sulfate HFA (VENTOLIN HFA) 108 (90 Base) MCG/ACT inhaler Inhale 2 puffs into the lungs 4 times daily as needed for Wheezing 21   Rancho Tejada PA-C   aspirin EC 81 MG EC tablet Take 1 tablet by mouth daily 19   Rancho Tejada PA-C   Cholecalciferol 50 MCG ( UT) CAPS Take 2,000 Units by mouth every morning     Historical Provider, MD       Current medications:    Current Facility-Administered Medications   Medication Dose Route Frequency Provider Last Rate Last Admin    sodium chloride flush 0.9 % injection 5-40 mL  5-40 mL IntraVENous 2 times per day Piter German MD        sodium chloride flush 0.9 % injection 5-40 mL  5-40 mL IntraVENous PRN Piter German MD        0.9 % sodium chloride infusion  25 mL IntraVENous PRN Piter German MD        meperidine (DEMEROL) injection 12.5 mg  12.5 mg IntraVENous Q5 Min PRN Piter German MD        diphenhydrAMINE (BENADRYL) injection 12.5 mg  12.5 mg IntraVENous Once PRN Piter German MD        HYDROmorphone (DILAUDID) injection 0.5 mg  0.5 mg IntraVENous Q5 Min PRN Piter German MD        HYDROmorphone (DILAUDID) injection 0.25 mg  0.25 mg IntraVENous Q5 Min PRN Piter German MD        sodium chloride flush 0.9 % injection 5-40 mL  5-40 mL IntraVENous 2 times per day Priya Lyons MD        sodium chloride flush 0.9 % injection 5-40 mL  5-40 mL IntraVENous PRN Priya Lyons MD        0.9 % sodium chloride infusion  25 mL IntraVENous PRN Priya Lyons MD        meperidine (DEMEROL) injection 12.5 mg  12.5 mg IntraVENous Q10 Min PRN Priya Lyons MD        fentaNYL (SUBLIMAZE) injection 50 mcg  50 mcg IntraVENous Q5 Min PRN Priya Lyons MD        prochlorperazine (COMPAZINE) 5 mg in sodium chloride (PF) 10 mL injection  5 mg IntraVENous Once PRN Priya Lyons MD        diphenhydrAMINE (BENADRYL) injection 12.5 mg  12.5 mg IntraVENous Once PRN Priya Lyons MD        morphine (PF) injection 2 mg  2 mg IntraVENous Q4H PRN HIRA Malagon - CNP   2 mg at 03/25/22 0339    ipratropium-albuterol (DUONEB) nebulizer solution 1 ampule  1 ampule Inhalation Q4H TSERING Ferrell DO   1 ampule at 03/24/22 1907    albuterol (PROVENTIL) nebulizer solution 2.5 mg  2.5 mg [facility]) injection 4 mg  4 mg IntraVENous Q6H PRN Hugh Hric, DO        polyethylene glycol (GLYCOLAX) packet 17 g  17 g Oral Daily PRN Hugh Hric, DO        acetaminophen (TYLENOL) tablet 650 mg  650 mg Oral Q6H PRN Hugh Hric, DO   650 mg at 03/24/22 0548    Or    acetaminophen (TYLENOL) suppository 650 mg  650 mg Rectal Q6H PRN Hugh Hric, DO        0.9 % sodium chloride infusion   IntraVENous Continuous Hugh Hric, DO   Paused at 03/24/22 1009       Allergies: Allergies   Allergen Reactions    Macrodantin [Nitrofurantoin Macrocrystal] Shortness Of Breath       Problem List:    Patient Active Problem List   Diagnosis Code    Hypotension due to hypovolemia I95.89, E86.1    MS (multiple sclerosis) ([facility] [address] G35    Hyperlipidemia E78.5    Hypertension, essential, benign I10    Multiple adenomatous polyps D36.9    Chronic idiopathic constipation K59.04    Reactive depression F32.9    H/O total hip arthroplasty, right Z96.641    History of total knee replacement, left Z96.652    Simple chronic bronchitis (HCC) J41.0    Brain aneurysm I67.1    Ambulatory dysfunction R26.2    Closed fracture of head of femur ([facility]) S72.059A    Acute respiratory failure with hypoxia ([facility]) J96.01    MARCELLO (acute kidney injury) ([facility] [address] N17.9    Primary hypertension I10    Primary osteoarthritis of left hip M16.12       Past Medical History:        Diagnosis Date    Arthritis     Hyperlipidemia     Hypertension     Multiple sclerosis ([facility] [address]     diagnosised 8  yrs ago [facility]       Past Surgical History:        Procedure Laterality Date    APPENDECTOMY      BREAST SURGERY  4/13/2012    biopsy - negative    HIP ARTHROPLASTY Right 04/13/2011    Right AIDEE  ALLISON Garcia MD    HYSTERECTOMY      KNEE ARTHROPLASTY Left 10/01/2013    Left TKA  ALLISON Garcia MD    KNEE ARTHROPLASTY Right 08/29/2012    Right TKA  ALLISON Garcia MD       Social History:    Social History     Tobacco Use    Smoking status: Current Every Day Smoker     Packs/day: 1.00     Years: 40.00     Pack years: 40.00    Smokeless tobacco: Never Used   Substance Use Topics    Alcohol use: No                                Ready to quit: Not Answered  Counseling given: Not Answered      Vital Signs (Current):   Vitals:    03/24/22 1642 03/24/22 1930 03/24/22 2345 03/25/22 0403   BP: 131/62 (!) 147/68 (!) 148/69 135/66   Pulse: 79 76 82 80   Resp: 16 18 18 18   Temp: 36.5 °C (97.7 °F) 36.9 °C (98.4 °F) 36.7 °C (98 °F) 36.7 °C (98 °F)   TempSrc: Oral Oral Oral Oral   SpO2: 96% 94% 95% 93%   Height:                                                  BP Readings from Last 3 Encounters:   03/25/22 135/66   12/19/21 118/68   12/13/21 (!) 144/70       NPO Status: Time of last liquid consumption: 2200                        Time of last solid consumption: 1800                        Date of last liquid consumption: 03/24/22                        Date of last solid food consumption: 03/24/22    BMI:   Wt Readings from Last 3 Encounters:   12/13/21 170 lb (77.1 kg)   11/17/21 160 lb (72.6 kg)   06/03/21 170 lb (77.1 kg)     Body mass index is 31.09 kg/m². CBC:   Lab Results   Component Value Date    WBC 6.7 03/24/2022    RBC 3.82 03/24/2022    HGB 12.0 03/24/2022    HCT 36.6 03/24/2022    MCV 95.8 03/24/2022    RDW 13.4 03/24/2022     03/24/2022       CMP:   Lab Results   Component Value Date     03/24/2022    K 3.6 03/24/2022     03/24/2022    CO2 28 03/24/2022    BUN 32 03/24/2022    CREATININE 1.8 03/24/2022    GFRAA 33 03/24/2022    LABGLOM 27 03/24/2022    GLUCOSE 91 03/24/2022    GLUCOSE 116 05/30/2012    PROT 6.7 03/24/2022    CALCIUM 8.4 03/24/2022    BILITOT 0.3 03/24/2022    ALKPHOS 72 03/24/2022    AST 14 03/24/2022    ALT 9 03/24/2022       POC Tests: No results for input(s): POCGLU, POCNA, POCK, POCCL, POCBUN, POCHEMO, POCHCT in the last 72 hours.     Coags:   Lab Results   Component Value Date    PROTIME 11.6 02/23/2018 PROTIME 13.0 05/30/2012    INR 1.1 02/23/2018    APTT 26.9 05/30/2012       HCG (If Applicable): No results found for: PREGTESTUR, PREGSERUM, HCG, HCGQUANT     ABGs: No results found for: PHART, PO2ART, ULA0ICD, UYJ2QSX, BEART, C2XEZCFN     Type & Screen (If Applicable):  No results found for: LABABO, LABRH    Drug/Infectious Status (If Applicable):  No results found for: HIV, HEPCAB    COVID-19 Screening (If Applicable):   Lab Results   Component Value Date    COVID19 Not Detected 03/24/2022    COVID19 Not Detected 03/24/2022           Anesthesia Evaluation     Anesthesia Plan        dimas mccarthy, APRN - CRNA   3/25/2022

## 2022-03-25 NOTE — PROGRESS NOTES
Report given to RN on 7W. Patient delayed return to floor due to 2nd dose TXA start time. My manager is aware of this, and family has been updated.

## 2022-03-25 NOTE — CARE COORDINATION
Social Work:    Reviewed chart notes and met with Mrs. Rashid & her daughter Ankur Gallegos. Social service update patient & daughter about acceptance at [place] [id number] pending insurance auth. Social service prompted for PT/OT for ins. Auth.  ([place], [address], ambulance/ambulette done)    Electronically signed by JIAN Brink on 3/25/2022 at 3:14 PM

## 2022-03-25 NOTE — PROGRESS NOTES
[facility]   [facility] Note    Admitting Date and Time: 3/23/2022  3:44 PM  Admit Dx: Unable to ambulate [R26.2]  Closed fracture of head of femur, unspecified laterality, initial encounter ([facility] [address] [S72.059A]  Ambulatory dysfunction [R26.2]  Osteoarthritis of left hip, unspecified osteoarthritis type [M16.12]     Pt seen in preop earlier prior to surgery    Subjective:    Patient was admitted with Unable to ambulate [R26.2]  Closed fracture of head of femur, unspecified laterality, initial encounter ([facility] Harshil Witt  Ambulatory dysfunction [R26.2]  Osteoarthritis of left hip, unspecified osteoarthritis type [M16.12].  Patient denies fever, chills, cp, sob, n/v.     sodium chloride flush  5-40 mL IntraVENous 2 times per day    sodium chloride flush  5-40 mL IntraVENous 2 times per day    tranexamic acid (CYCLOKAPRON) IVPB  1,000 mg IntraVENous Once    ipratropium-albuterol  1 ampule Inhalation Q4H WA    methylPREDNISolone  40 mg IntraVENous Q12H    dexamethasone  8 mg IntraVENous Once    sodium chloride flush  5-40 mL IntraVENous 2 times per day    ceFAZolin (ANCEF) IVPB  2,000 mg IntraVENous 30 Min Pre-Op    Arformoterol Tartrate  15 mcg Nebulization BID    carvedilol  12.5 mg Oral BID    sodium chloride flush  5-40 mL IntraVENous 2 times per day     sodium chloride flush, 5-40 mL, PRN  sodium chloride, 25 mL, PRN  meperidine, 12.5 mg, Q5 Min PRN  diphenhydrAMINE, 12.5 mg, Once PRN  HYDROmorphone, 0.5 mg, Q5 Min PRN  HYDROmorphone, 0.25 mg, Q5 Min PRN  sodium chloride flush, 5-40 mL, PRN  sodium chloride, 25 mL, PRN  meperidine, 12.5 mg, Q10 Min PRN  fentanNYL, 50 mcg, Q5 Min PRN  prochlorperazine (COMPAZINE) with normal saline injection, 5 mg, Once PRN  diphenhydrAMINE, 12.5 mg, Once PRN  ortho joint cocktail custom mixture, , PRN  vancomycin, , PRN  tranexamic acid (CYCLOKAPRON) IVPB, , Continuous PRN  morphine, 2 mg, Q4H PRN  albuterol, 2.5 mg, Q6H PRN  sodium chloride flush, 5-40 mL, PRN  sodium chloride, 25 mL, PRN  guaiFENesin, 400 mg, 4x Daily PRN  perflutren lipid microspheres, 1.5 mL, ONCE PRN  sodium chloride flush, 5-40 mL, PRN  sodium chloride, 25 mL, PRN  ondansetron, 4 mg, Q8H PRN   Or  ondansetron, 4 mg, Q6H PRN  polyethylene glycol, 17 g, Daily PRN  acetaminophen, 650 mg, Q6H PRN   Or  acetaminophen, 650 mg, Q6H PRN         Objective:    /69   Pulse 77   Temp 99 °F (37.2 °C) (Temporal)   Resp 13   Ht 5' 2\" (1.575 m)   SpO2 95%   BMI 31.09 kg/m²   Skin: warm and dry, no rash or erythema  Pulmonary/Chest: clear to auscultation bilaterally- no wheezes, rales or rhonchi, normal air movement, no respiratory distress  Cardiovascular: rhythm reg at rate of 78  Abdomen: soft, non-tender, non-distended, normal bowel sounds, no masses or organomegaly  Extremities: no cyanosis, no clubbing and no edema      Recent Labs     03/23/22 1941 03/24/22  0630    139   K 3.2* 3.6   CL 96* 100   CO2 27 28   BUN 30* 32*   CREATININE 1.9* 1.8*   GLUCOSE 96 91   CALCIUM 8.6 8.4*       Recent Labs     03/23/22 1941 03/24/22  0630   WBC 6.6 6.7   RBC 4.17 3.82   HGB 13.0 12.0   HCT 39.8 36.6   MCV 95.4 95.8   MCH 31.2 31.4   MCHC 32.7 32.8   RDW 13.4 13.4    253   MPV 9.3 9.3            Radiology:   NM LUNG SCAN PERFUSION ONLY   Final Result   Low Probability for Pulmonary Embolus. US DUP LOWER EXTREMITIES BILATERAL VENOUS   Final Result   No evidence of DVT in either lower extremity. RECOMMENDATIONS:   Unavailable         XR CHEST 1 VIEW   Final Result   Focal opacity in the left upper midlung may represent chronic scarring versus   early infiltrates. Follow-up study recommended. XR HIP 2-3 VW W PELVIS LEFT   Final Result   1. New deformity involving left femoral head with severe femoral head   flattening. 2. Satisfactory alignment of right hip arthroplasty.          X-ray chest PA and lateral    (Results Pending)   XR HIP LEFT (1 VIEW)    (Results Pending)       Assessment:    Principal Problem:    Primary osteoarthritis of left hip  Active Problems:    Ambulatory dysfunction    Closed fracture of head of femur (HCC)    Acute respiratory failure with hypoxia (HCC)    MARCELLO (acute kidney injury) ([address]    Primary hypertension  Resolved Problems:    * No resolved hospital problems. *      Plan:    1. Acute respiratory failure with hypoxia(o2 sat 78%)POA  Will ask pulmonary to see especially in light of anticipated surgery. Adjust o2 as needed. Will give nebs and IS to maximize pt. Will give trial of steroids as well. 2. Possible copd exacerbation  Nebs and steroids. rec smoking cessation. 3. 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 Pt had surgery. . Pain control. Encourage IS  4. ekg changes of t wave inversion not present before. Appreciate cardiology's input. 5. Hypokalemia montior and replace prn  6. marcello iv fluids. 7. htn adjust and continue coreg perioperatively  8.  MS monitor          Electronically signed by Connie Gagnon DO on 3/25/2022 at 11:09 AM

## 2022-03-25 NOTE — PROGRESS NOTES
Occupational Therapy  OCCUPATIONAL THERAPY INITIAL EVALUATION  [facility] [address]    Date: 3/25/2022     Patient Name: Antonina Childers  MRN: [id number]  : 1945  Room: [address] [place] [address]    Evaluating OT: Ny Sellers, OTR/L - [id number]    Referring Provider: Martin Hammans, MD  Specific Provider Orders/Date: \"OT eval and treat\" - 3/25/2022    Diagnosis: Unable to ambulate [R26.2], Closed fracture of head of femur, unspecified laterality, initial encounter ([facility] [address] [S72.059A], Ambulatory dysfunction [R26.2], Osteoarthritis of left hip, unspecified osteoarthritis type [M16.12]    Surgery: Patient underwent LEFT HIP TOTAL ARTHROPLASTY on 3/24/2022. Pertinent Medical History: multiple sclerosis (MS), HTN, arthritis, hyperlipidemia, h/o R AIDEE ()    Precautions: fall risk, FWBAT, posterior hip precautions    Assessment of Current Deficits:   [x] Functional mobility   [x]ADLs  [x] Strength               [x]Cognition   [x] Functional transfers   [x] IADLs         [x] Safety Awareness   [x]Endurance   [] Fine Coordination              [x] Balance      [] Vision/perception   [x]Sensation    []Gross Motor Coordination  [] ROM  [] Delirium                   [] Motor Control     OT PLAN OF CARE   OT POC is based on physician orders, patient diagnosis, and results of clinical assessment.   Frequency/Duration 2-5 days/week for 2 weeks PRN   Specific OT Treatment Interventions to Include:   * Instruction/training on adapted ADL techniques and AE recommendations to increase functional independence within precautions       * Training on energy conservation strategies, correct breathing pattern and techniques to improve independence/tolerance for self-care routine  * Functional transfer/mobility training/DME recommendations for increased independence, safety, and fall prevention  * Patient/Family education to increase follow through with safety techniques and functional independence  * Recommendation of environmental modifications for increased safety with functional transfers/mobility and ADLs  * Therapeutic exercise to improve motor endurance, ROM, and functional strength for ADLs/functional transfers  * Therapeutic activities to facilitate/challenge dynamic balance, stand tolerance for increased safety and independence with ADLs  * Neuro-muscular re-education: facilitation of righting/equilibrium reactions, midline orientation, scapular stability/mobility, normalization of muscle tone, and facilitation of volitional active controled movement    Recommended Adaptive Equipment: TBD    Home Living: Patient lives with her  (who is unable to provide physical assistance) in a one-floor home (ramp entry). Laundry is on the main living level. Bathroom Setup: walk-in shower (with grab bars, but no seat)  Equipment Owned: rollator    Prior Level of Function (PLOF): Patient was independent with ADLs, primarily responsible for completion of IADLs, and functional mobility (with rollator) prior to this hospitalization. Patient's daughter lives locally and can provide intermittent assistance. Driving: Yes    Pain Level: Patient reported experiencing pain, but did not rate her pain. Cognition: Patient alert and oriented x3. WFL command follow demonstrated. Patient pleasant, cooperative, and motivated to return to [facility] and home environment. Memory: WFL  Sequencing: WFL  Problem Solving: WFL  Judgement/Safety: WFL grossly    Functional Assessment:  AM-PAC Daily Activity Raw Score: 16/24   Initial Eval Status  Date: 3/25/2022 Treatment Status  Date:  Short Term Goals = Long Term Goals   Feeding Independent  N/A   Grooming Min A  Mod I  (seated/standing at sink)   UB Dressing SBA  Mod I  (including item retrieval)   LB Dressing Max to thread feet into disposable brief. Max A to pull up and arrange brief while standing with walker.   Mod I - while demonstrating Good adherence to posterior hip precautions and Good understanding of AE use. Bathing Mod A  Mod I - while demonstrating Good adherence to posterior hip precautions and Good understanding of AE use. Toileting Max A needed for posterior perineal hygiene following BSC use. Cues given to maximize safety with transfer. Mod I   Bed Mobility  Not assessed. Patient seated on [facility] upon start of session; patient seated in bedside chair at conclusion of this session. Mod I / Whitney Diver in order to maximize patient's independence with ADLs, re-positioning, and other functional tasks. Functional Transfers Sit-to-Stand: Mod A   from [facility]  Cues given to maximize safety. Independent   Functional Mobility Min A   (with walker) for few steps from [facility] to bedside chair. Increased time and cues needed to maximize safety. Mod I with functional mobility (with device, as needed/appropriate) in order to maximize independence with ADLs/IADLs and other functional tasks. Balance Sitting: Good  (at edge of chair)  Standing: Fair-  (with walker)  Fair+ dynamic standing balance during completion of ADLs/IADLs and other functional tasks. Activity Tolerance Fair  Patient will demonstrate Good understanding and consistent implementation of energy conservation techniques and work simplification techniques into ADL/IADL routines. Visual/  Perceptual WFL     N/A   B UE Strength R Shoulder: 2-/5  L Shoulder: 3/5  Distal B UEs: 3+/5 grossly  Patient will demonstrate 4/5 B UE strength in order to maximize independence with ADLs/IADLs and functional transfers. Additional Long-Term Goal: Patient will increase functional independence to PLOF in order to allow patient to live in least restrictive environment. ROM: Additional Information:    R UE  WFL    L UE WFL      Hearing: WFL  Sensation: No complaints of numbness/tingling in B UEs. Tone: WFL  Edema: No    Comments: RN approved patient's participation in [address] [place] [address] activities.  Upon arrival, patient seated on BSC. At end of session, patient seated in bedside chair with call light and phone within reach, patient's daughter present, and all lines and tubes intact. Patient would benefit from continued skilled OT to increase safety and independence with completion of ADL/IADL tasks for functional independence and quality of life. Treatment: OT treatment provided this date included:    Instruction/training on safety and adapted techniques for completion of ADLs.   Instruction/training on safe functional mobility/transfer techniques.   Instruction/training on posterior hip precautions and implications of these precautions on ADLs, IADLs, and functional transfers/mobility. Patient education provided regardin) importance of having staff assistance with ADLs and other OOB activities to prevent falls/injury during hospitalization. Patient verbalized understanding. Further skilled OT treatment indicated to increase patient's safety and independence with completion of ADL/IADL tasks in order to maximize patient's functional independence and quality of life. Rehab Potential: Good for established goals. Patient / Family Goal: Patient anticipates going to SNF/[facility] upon discharge. Patient and/or family were instructed on functional diagnosis, prognosis/goals, and OT plan of care. Demonstrated Good understanding.     Eval Complexity: Low    Time In: 1455  Time Out: 1525  Total Treatment Time: 20 minutes      Minutes Units   OT Eval Low 42788 15 1   OT Eval Medium 18027     OT Eval High 22260     OT Re-Eval [id number]     Therapeutic Ex 06579     Therapeutic Activities 67392     ADL/Self Care 84967 15 1   Orthotic Management 09708     Neuro Re-Ed 08866     Non-Billable Time N/A ---     Evaluation time includes thorough review of current medical information, gathering information on past medical history/social history and prior level of function, completion of standardized testing/informal observation of tasks, assessment of data, and education on plan of care and goals. Aliza Ashraf, OTR/L  License Number: [id number]

## 2022-03-25 NOTE — PLAN OF CARE
Problem: Pain:  Goal: Pain level will decrease  Description: Pain level will decrease  Outcome: Met This Shift     Problem: Falls - Risk of:  Goal: Will remain free from falls  Description: Will remain free from falls  3/25/2022 0013 by Eusebio Jay RN  Outcome: Met This Shift     Problem: Skin Integrity:  Goal: Will show no infection signs and symptoms  Description: Will show no infection signs and symptoms  3/25/2022 0013 by Eusebio Jay RN  Outcome: Met This Shift

## 2022-03-25 NOTE — BRIEF OP NOTE
Brief Postoperative Note      Patient: Andrew Guillermo  YOB: 1945  MRN: [id number]    Date of Procedure: 3/25/2022    Pre-Op Diagnosis: Severe left hip OSTEOARTHRITIS with superimposed avascular necrosis and femoral head collapse. Post-Op Diagnosis: Same       Procedure(s):  LEFT HIP TOTAL ARTHROPLASTY    Surgeon(s):  Yuki Maya MD    Assistant:  Surgical Assistant: Isabelle Vásquez RN    Anesthesia: Spinal    Estimated Blood Loss (mL): 436     Complications: None    Specimens:   ID Type Source Tests Collected by Time Destination   A :  Bone Bone SURGICAL PATHOLOGY Yuki Maya MD 3/25/2022 6903        Implants:  Implant Name Type Inv.  Item Serial No.  Lot No. LRB No. Used Action   SHELL ACET SZ D [id number] HIP PPS LIMIT H G7 - [id number]  SHELL ACET SZ D [id number] HIP PPS LIMIT H G7  [employer]- [id number] Left 1 Implanted   G7 DUAL MOBILITY LINER 40MM D - [id number]  G7 DUAL MOBILITY LINER 40MM D  [employer]- [id number] Left 1 Implanted   SLEEVE FEM -3MM OFFSET HIP TYP 1 TAPR FOR CERAMIC BIOLOX - [id number]  SLEEVE FEM -3MM OFFSET HIP TYP 1 TAPR FOR CERAMIC BIOLOX  [employer]- [id number] Left 1 Implanted   TPRLC 133 FP TYPE1 PPS SO 6.0 - [id number]  TPRLC 133 FP TYPE1 PPS SO 6.0  [employer]- [id number] Left 1 Implanted   LINER ACET SZ D OD40MM [id number] ACT ARTC E1 BEAR G7 - [id number]  LINER ACET SZ D OD40MM ID28MM ACT ARTC E1 BEAR G7  [employer]- [id number] Left 1 Implanted   HEAD FEM [id number] HIP BIOLOX DELT OPT FOR G7 ACET SYS - [id number]  HEAD FEM [id number] HIP BIOLOX DELT OPT FOR G7 ACET SYS  [employer]- [id number] Left 1 Implanted         Drains: * No LDAs found *    Findings: As above    Electronically signed by Yuki Maya MD on 3/25/2022 at 9:36 AM

## 2022-03-25 NOTE — PROGRESS NOTES
Denise Mello MD, 1 ampule at 03/24/22 1907    albuterol (PROVENTIL) nebulizer solution 2.5 mg, 2.5 mg, Nebulization, Q6H PRN, Denise Mello MD    Arformoterol Tartrate [facility]) nebulizer solution 15 mcg, 15 mcg, Nebulization, BID, Denise Mello MD, 15 mcg at 03/24/22 1907    guaiFENesin tablet 400 mg, 400 mg, Oral, 4x Daily PRN, Denise Mello MD, 400 mg at 03/24/22 1624    perflutren lipid microspheres (DEFINITY) injection 1.65 mg, 1.5 mL, IntraVENous, ONCE PRN, Denise Mello MD    carvedilol (COREG) tablet 12.5 mg, 12.5 mg, Oral, BID, Denise Mello MD, 12.5 mg at 03/24/22 2104    sodium chloride flush 0.9 % injection 5-40 mL, 5-40 mL, IntraVENous, 2 times per day, Denise Mello MD    sodium chloride flush 0.9 % injection 5-40 mL, 5-40 mL, IntraVENous, PRN, Denise Mello MD, 10 mL at 03/25/22 0536    0.9 % sodium chloride infusion, 25 mL, IntraVENous, PRN, Denise Mello MD    ondansetron (ZOFRAN-ODT) disintegrating tablet 4 mg, 4 mg, Oral, Q8H PRN **OR** ondansetron (ZOFRAN) injection 4 mg, 4 mg, IntraVENous, Q6H PRN, Denise Mello MD    polyethylene glycol (GLYCOLAX) packet 17 g, 17 g, Oral, Daily PRN, Denise Mello MD    acetaminophen (TYLENOL) tablet 650 mg, 650 mg, Oral, Q6H PRN, 650 mg at 03/24/22 0548 **OR** acetaminophen (TYLENOL) suppository 650 mg, 650 mg, Rectal, Q6H PRN, Denise Mello MD    0.9 % sodium chloride infusion, , IntraVENous, Continuous, Denise Mello MD, Last Rate: 75 mL/hr at 03/25/22 1207, Rate Change at 03/25/22 1207  /74   Pulse 78   Temp 97.9 °F (36.6 °C) (Oral)   Resp 16   Ht 5' 2\" (1.575 m)   SpO2 94%   BMI 31.09 kg/m²     General: No distress  Chest: Symmetric, no accessory use  Heart: RRR  Lungs: Couple of soft wheezes anteriorly  Abdomen: Soft, nt  Extremities: No edema    Intake/Output Summary (Last 24 hours) at 3/25/2022 1303  Last data filed at 3/25/2022 1002  Gross per 24 hour   Intake 3325.42 ml   Output 1500 ml   Net 1825.42 ml       IMPRESSION:   S/P hip arthroplasty doing great  Hypoxemia is improving. COPD with probable acute exacerbation is better and will drop steroids to daily while continuing nebs.   Katarina Yip MD  3/25/2022  1:03 PM

## 2022-03-25 NOTE — CARE COORDINATION
Social Work:    Received word from liaison at [facility] that patient meets click six under [place] and can transfer this weekend if stable.     Electronically signed by Lucinda Bamberger, LSW on 3/25/2022 at 3:29 PM

## 2022-03-25 NOTE — DISCHARGE INSTR - COC
Continuity of Care Form    Patient Name: Marcia Gregory   :  1945  MRN:  [id number]    Admit date:  3/23/2022  Discharge date:  3/27/22    Code Status Order: Full Code   Advance Directives:   Advance Care Flowsheet Documentation       Date/Time Healthcare Directive Type of Healthcare Directive Copy in [address] Agent's Name Healthcare Agent's Phone Number    22 3636 No, patient does not have an advance directive for healthcare treatment -- -- -- -- --    22 0615 No, patient does not have an advance directive for healthcare treatment -- -- -- -- --            Admitting Physician:  Ibeth Valverde DO  PCP: Lisy Quiles PA-C    Discharging Nurse: [facility] Unit/Room#: 0510/4603-N  Discharging Unit Phone Number: [phone]    Emergency Contact:   Extended Emergency Contact Information  Primary Emergency Contact: LanceSharif reyes  Address: [address] [place] [address] [address] [facility] Phone: [phone]  Mobile Phone: [phone]  Relation: Spouse  Secondary Emergency Contact: [facility] [address] [facility] Phone: [phone]  Mobile Phone: [phone]  Relation: Child  Preferred language: English   needed? No    Past Surgical History:  Past Surgical History:   Procedure Laterality Date    APPENDECTOMY      BREAST SURGERY  2012    biopsy - negative    HIP ARTHROPLASTY Right 2011    Right AIDEE  ALLISON Herzog MD    HYSTERECTOMY      KNEE ARTHROPLASTY Left 10/01/2013    Left TKA  ALLISON Herzog MD    KNEE ARTHROPLASTY Right 2012    Right TKA  ALLISON Herzog MD    TOTAL HIP ARTHROPLASTY Left 3/25/2022    LEFT HIP TOTAL ARTHROPLASTY performed by Mary Recinos MD at [address]       Immunization History:   Immunization History   Administered Date(s) Administered    COVID-19, Chriss Alvarez, Primary or Immunocompromised, PF, 100mcg/0.5mL 2021, 2021    Influenza, High Dose (Fluzone 72 yrs and older) 10/16/2018    Influenza, Triv, inactivated, subunit, adjuvanted, IM (Fluad 65 yrs and older) 10/10/2019    Pneumococcal Conjugate 13-valent (Betancourt Clear) 07/30/2019       Active Problems:  Patient Active Problem List   Diagnosis Code    Hypotension due to hypovolemia I95.89, E86.1    MS (multiple sclerosis) ([facility] G35    Hyperlipidemia E78.5    Hypertension, essential, benign I10    Multiple adenomatous polyps D36.9    Chronic idiopathic constipation K59.04    Reactive depression F32.9    H/O total hip arthroplasty, right Z96.641    History of total knee replacement, left Z96.652    Simple chronic bronchitis ([facility] J41.0    Brain aneurysm I67.1    Ambulatory dysfunction R26.2    Closed fracture of head of femur (HCC) S72.059A    Acute respiratory failure with hypoxia ([facility]) J96.01    MARCELLO (acute kidney injury) ([facility] N17.9    Primary hypertension I10    Primary osteoarthritis of left hip M16.12       Isolation/Infection:   Isolation            No Isolation          Patient Infection Status       Infection Onset Added Last Indicated Last Indicated By Review Planned Expiration Resolved Resolved By    None active    Resolved    COVID-19 (Rule Out) 03/24/22 03/24/22 03/24/22 Respiratory Panel, Molecular, with COVID-19 (Restricted: peds pts or suitable admitted adults) (Ordered)   03/24/22 Rule-Out Test Resulted            Nurse Assessment:  Last Vital Signs: /74   Pulse 78   Temp 97.9 °F (36.6 °C) (Oral)   Resp 16   Ht 5' 2\" (1.575 m)   SpO2 94%   BMI 31.09 kg/m²     Last documented pain score (0-10 scale): Pain Level: 5  Last Weight:   Wt Readings from Last 1 Encounters:   12/13/21 170 lb (77.1 kg)     Mental Status:  oriented    IV Access:  - None    Nursing Mobility/ADLs:  Walking   Assisted  Transfer  Assisted  Bathing  Assisted  Dressing  Assisted  Toileting  Assisted  Feeding  Independent  Med Admin  Independent  Med Delivery   whole    Wound Care Documentation and Therapy:  Incision 08/29/12 Knee Right (Active)   Number of days: 3495       Incision 10/01/13 Knee Left (Active)   Number of days: 8872        Elimination:  Continence: Bowel: Yes  Bladder: Yes  Urinary Catheter: None   Colostomy/Ileostomy/Ileal Conduit: No       Date of Last BM: 3/26/22    Intake/Output Summary (Last 24 hours) at 3/25/2022 1447  Last data filed at 3/25/2022 1002  Gross per 24 hour   Intake 3325.42 ml   Output 1500 ml   Net 1825.42 ml     I/O last 3 completed shifts: In: 3334.6 [I.V.:3334.6]  Out: 1250 [Urine:1250]    Safety Concerns:     None    Impairments/Disabilities:      None    Nutrition Therapy:  Current Nutrition Therapy:   - Oral Diet:  General    Routes of Feeding: Oral  Liquids: No Restrictions  Daily Fluid Restriction: no  Last Modified Barium Swallow with Video (Video Swallowing Test): not done    Treatments at the Time of Hospital Discharge:   Respiratory Treatments: ***  Oxygen Therapy:  is not on home oxygen therapy.   Ventilator:    - No ventilator support    Rehab Therapies: Physical Therapy  Weight Bearing Status/Restrictions: No weight bearing restrictions  Other Medical Equipment (for information only, NOT a DME order):  walker  Other Treatments: ***    Patient's personal belongings (please select all that are sent with patient):  None    RN SIGNATURE:  Electronically signed by Nj Garibay RN on 3/27/22 at 11:45 AM EDT    CASE MANAGEMENT/SOCIAL WORK SECTION    Inpatient Status Date: ***    Readmission Risk Assessment Score:  Readmission Risk              Risk of Unplanned Readmission:  21           Discharging to Facility/ Agency   Name: [address]         Phone: [phone]       Fax: [phone]          Dialysis Facility (if applicable)   Name:  Address:  Dialysis Schedule:  Phone:  Fax:    / signature: Electronically signed by JIAN Avalos on 3/25/2022 at 2:47 PM    PHYSICIAN SECTION    Prognosis: {Prognosis:3338809064}    Condition at Discharge: [address] Patient Condition:258325227}    Rehab Potential (if transferring to Rehab): {Prognosis:7532214606}    Recommended Labs or Other Treatments After Discharge: ***    Physician Certification: I certify the above information and transfer of Abdias Cervantes  is necessary for the continuing treatment of the diagnosis listed and that she requires [place] for less 30 days.      Update Admission H&P: {CHP DME Changes in AMCPS:534257484}    PHYSICIAN SIGNATURE:  Electronically signed by Jesu Maloney DO on 3/27/22 at 10:11 AM EDTElectronically signed by Carmenza Lawson MD on 3/26/2022 at 10:24 AM

## 2022-03-25 NOTE — ANESTHESIA PROCEDURE NOTES
Spinal Block    Patient location during procedure: OR  Start time: 3/25/2022 7:50 AM  End time: 3/25/2022 7:55 AM  Reason for block: primary anesthetic  Staffing  Anesthesiologist: Blaine De Guzman MD  Resident/CRNA: HIRA Cervantes CRNA  Preanesthetic Checklist  Completed: patient identified, IV checked, site marked, risks and benefits discussed, surgical consent, monitors and equipment checked, pre-op evaluation, timeout performed, anesthesia consent given, oxygen available and patient being monitored  Spinal Block  Patient position: sitting  Prep: ChloraPrep and site prepped and draped  Patient monitoring: continuous pulse ox  Approach: midline  Location: L3/L4  Guidance: paresthesia technique  Provider prep: mask and sterile gloves  Local infiltration: lidocaine  Dose: 0.5  Agent: bupivacaine  Adjuvant: fentanyl  Dose: 2  Dose: 2  Needle  Needle type: Quincke   Needle gauge: 22 G  Needle length: 3.5 in  Catheter size: 25G.   Catheter at skin depth: 0 cm  Kit: [id number]  Lot number: [id number]  Expiration date: 9/6/2023  Assessment  Sensory level: T4  Events: paresthesia  Swirl obtained: Yes  CSF: clear  Attempts: 1  Hemodynamics: stable

## 2022-03-25 NOTE — OP NOTE
right side, secured on a pegboard, and axillary was placed. All  bony prominences were well padded. The left lower extremity prepped and  draped in the sterile fashion. Following a surgical timeout, I accessed  the left hip through a posterolateral incision. Skin was incised  sharply through subcutaneous and adipose tissue. Fascia was split in  line with the incision. Charnley retractor was placed. Posterior  capsule was released with Bovie electrocautery and tagged with #1  Ethibond for later repair. The hip was then gently dislocated  posteriorly. She had advanced collapse of the femoral head with loss of  about 50% of the height as well as delaminated cartilage and soft  cancellus bone. I reproduced the femoral neck osteotomy above the  lesser trochanter. Bone was then sent as specimen. The femur was then  mobilized anteriorly. I placed our acetabular retractors. The labrum  and the pulvinar were excised. I carefully and sequentially reamed the  socket into a hemisphere up to size 49 mm. Size 50 G7 acetabular shell  was impacted in about 40 to 45 degrees of vertical opening and 20  degrees of anteversion. There was full seating of the cup with a firm  press fit. The 40 mm metal liner was then inserted for dual mobility  with an intact taper. I then turned our attention to the femur. I  gained access to the canal with an awl. I used a tapered reamer and bur  for appropriate lateralization. I broached the hip in native  anteversion up to size 6, at which time we had good rotational stability  of the broach. Trial reduction with the standard offset neck and the  dual mobility trial produced a stable hip in all planes with no evidence  of bony or component impingement. Leg-lengths were restored. Trials  were removed. The definitive femoral stem was seated. Final trial  reduction confirmed the 40 dual mobility bearing with the 28 head, -3  neck.   The dual mobility components were mated on the back table in  recommended fashion. The hip was lavaged with dilute one liter of  Betadine solution followed by saline. The construct was then impacted  over a clean and dry trunnion. The hip was reduced with stability  confirmed in all planes. I then closed the posterior capsule with #1  Ethibond. One gram of vancomycin powder was placed in the joint. The  fascial and adipose layers were closed with a running #1 Stratafix. I  injected a gram of diluted tranexamic acid deep to the fascia to aid  with postoperative hemostasis. The dermal layer was closed with 2-0  Vicryl followed by 3-0 Stratafix subcuticular suture for the skin. Sterile dressing was applied followed by hip abduction pillow. The  patient tolerated the procedure well without intraoperative  complications. At the conclusion of the case, all sponge and needle  counts were correct. She was transferred from the operating room table  onto her hospital bed and transported to the recovery room in stable  condition.         Kaylen Kuhn MD    D: 03/25/2022 9:44:50       T: 03/25/2022 9:47:22     [email or web address]  Job#: [id number]     Doc#: [id number]    CC:

## 2022-03-25 NOTE — ANESTHESIA POSTPROCEDURE EVALUATION
Department of Anesthesiology  Postprocedure Note    Patient: Milla Rivera  MRN: [id number]  YOB: 1945  Date of evaluation: 3/25/2022  Time:  10:01 AM     Procedure Summary     Date: 03/25/22 Room / Location: [address]    Anesthesia Start: 4569 Anesthesia Stop:     Procedure: LEFT HIP TOTAL ARTHROPLASTY (Left Hip) Diagnosis: (OSTEOARTHRITIS)    Surgeons: Reagan Olivares MD Responsible Provider: Wilma Enciso MD    Anesthesia Type: Not recorded ASA Status: 3          Anesthesia Type: No value filed. Florinda Phase I: Florinda Score: 8    Florinda Phase II:      Last vitals: Reviewed and per EMR flowsheets.        Anesthesia Post Evaluation    Patient location during evaluation: PACU  Patient participation: complete - patient participated  Level of consciousness: awake and alert  Pain score: 0  Airway patency: patent  Nausea & Vomiting: no nausea and no vomiting  Complications: no  Cardiovascular status: hemodynamically stable  Respiratory status: acceptable  Hydration status: euvolemic

## 2022-03-26 LAB
ALBUMIN SERPL-MCNC: 2.9 G/DL (ref 3.5–5.2)
ALP BLD-CCNC: 58 U/L (ref 35–104)
ALT SERPL-CCNC: 8 U/L (ref 0–32)
ANION GAP SERPL CALCULATED.3IONS-SCNC: 9 MMOL/L (ref 7–16)
AST SERPL-CCNC: 17 U/L (ref 0–31)
BASOPHILS ABSOLUTE: 0.01 E9/L (ref 0–0.2)
BASOPHILS RELATIVE PERCENT: 0.1 % (ref 0–2)
BILIRUB SERPL-MCNC: <0.2 MG/DL (ref 0–1.2)
BUN BLDV-MCNC: 27 MG/DL (ref 6–23)
CALCIUM SERPL-MCNC: 8 MG/DL (ref 8.6–10.2)
CHLORIDE BLD-SCNC: 98 MMOL/L (ref 98–107)
CO2: 27 MMOL/L (ref 22–29)
CREAT SERPL-MCNC: 1.5 MG/DL (ref 0.5–1)
EOSINOPHILS ABSOLUTE: 0 E9/L (ref 0.05–0.5)
EOSINOPHILS RELATIVE PERCENT: 0 % (ref 0–6)
GFR AFRICAN AMERICAN: 41
GFR NON-AFRICAN AMERICAN: 34 ML/MIN/1.73
GLUCOSE BLD-MCNC: 113 MG/DL (ref 74–99)
HCT VFR BLD CALC: 29.8 % (ref 34–48)
HEMOGLOBIN: 10.1 G/DL (ref 11.5–15.5)
IMMATURE GRANULOCYTES #: 0.05 E9/L
IMMATURE GRANULOCYTES %: 0.6 % (ref 0–5)
LYMPHOCYTES ABSOLUTE: 1.2 E9/L (ref 1.5–4)
LYMPHOCYTES RELATIVE PERCENT: 15 % (ref 20–42)
MAGNESIUM: 1.4 MG/DL (ref 1.6–2.6)
MCH RBC QN AUTO: 31.9 PG (ref 26–35)
MCHC RBC AUTO-ENTMCNC: 33.9 % (ref 32–34.5)
MCV RBC AUTO: 94 FL (ref 80–99.9)
MONOCYTES ABSOLUTE: 0.73 E9/L (ref 0.1–0.95)
MONOCYTES RELATIVE PERCENT: 9.1 % (ref 2–12)
NEUTROPHILS ABSOLUTE: 6.02 E9/L (ref 1.8–7.3)
NEUTROPHILS RELATIVE PERCENT: 75.2 % (ref 43–80)
PDW BLD-RTO: 13.3 FL (ref 11.5–15)
PHOSPHORUS: 3.1 MG/DL (ref 2.5–4.5)
PLATELET # BLD: 264 E9/L (ref 130–450)
PMV BLD AUTO: 9.5 FL (ref 7–12)
POTASSIUM SERPL-SCNC: 3.5 MMOL/L (ref 3.5–5)
RBC # BLD: 3.17 E12/L (ref 3.5–5.5)
SODIUM BLD-SCNC: 134 MMOL/L (ref 132–146)
TOTAL PROTEIN: 5.5 G/DL (ref 6.4–8.3)
WBC # BLD: 8 E9/L (ref 4.5–11.5)

## 2022-03-26 PROCEDURE — 6370000000 HC RX 637 (ALT 250 FOR IP): Performed by: ORTHOPAEDIC SURGERY

## 2022-03-26 PROCEDURE — 36415 COLL VENOUS BLD VENIPUNCTURE: CPT

## 2022-03-26 PROCEDURE — 83735 ASSAY OF MAGNESIUM: CPT

## 2022-03-26 PROCEDURE — 6360000002 HC RX W HCPCS: Performed by: INTERNAL MEDICINE

## 2022-03-26 PROCEDURE — 97530 THERAPEUTIC ACTIVITIES: CPT

## 2022-03-26 PROCEDURE — 99232 SBSQ HOSP IP/OBS MODERATE 35: CPT | Performed by: INTERNAL MEDICINE

## 2022-03-26 PROCEDURE — 2580000003 HC RX 258: Performed by: ORTHOPAEDIC SURGERY

## 2022-03-26 PROCEDURE — 85025 COMPLETE CBC W/AUTO DIFF WBC: CPT

## 2022-03-26 PROCEDURE — 2700000000 HC OXYGEN THERAPY PER DAY

## 2022-03-26 PROCEDURE — 1200000000 HC SEMI PRIVATE

## 2022-03-26 PROCEDURE — 97116 GAIT TRAINING THERAPY: CPT

## 2022-03-26 PROCEDURE — 6360000002 HC RX W HCPCS: Performed by: ORTHOPAEDIC SURGERY

## 2022-03-26 PROCEDURE — 80053 COMPREHEN METABOLIC PANEL: CPT

## 2022-03-26 PROCEDURE — 6370000000 HC RX 637 (ALT 250 FOR IP): Performed by: INTERNAL MEDICINE

## 2022-03-26 PROCEDURE — 94640 AIRWAY INHALATION TREATMENT: CPT

## 2022-03-26 PROCEDURE — 84100 ASSAY OF PHOSPHORUS: CPT

## 2022-03-26 PROCEDURE — 6370000000 HC RX 637 (ALT 250 FOR IP): Performed by: NURSE PRACTITIONER

## 2022-03-26 RX ORDER — PREDNISONE 20 MG/1
40 TABLET ORAL
Status: DISCONTINUED | OUTPATIENT
Start: 2022-03-26 | End: 2022-03-27 | Stop reason: HOSPADM

## 2022-03-26 RX ORDER — BACLOFEN 10 MG/1
10 TABLET ORAL 2 TIMES DAILY
Status: DISCONTINUED | OUTPATIENT
Start: 2022-03-26 | End: 2022-03-27 | Stop reason: HOSPADM

## 2022-03-26 RX ORDER — GABAPENTIN 400 MG/1
400 CAPSULE ORAL 2 TIMES DAILY
Status: DISCONTINUED | OUTPATIENT
Start: 2022-03-26 | End: 2022-03-27 | Stop reason: HOSPADM

## 2022-03-26 RX ORDER — OXYCODONE HYDROCHLORIDE 5 MG/1
5 TABLET ORAL EVERY 4 HOURS PRN
Qty: 42 TABLET | Refills: 0 | Status: SHIPPED | OUTPATIENT
Start: 2022-03-26 | End: 2022-03-28 | Stop reason: SDUPTHER

## 2022-03-26 RX ORDER — SENNA AND DOCUSATE SODIUM 50; 8.6 MG/1; MG/1
1 TABLET, FILM COATED ORAL 2 TIMES DAILY
Qty: 60 TABLET | Refills: 0 | Status: SHIPPED | OUTPATIENT
Start: 2022-03-26 | End: 2022-04-25

## 2022-03-26 RX ORDER — MAGNESIUM SULFATE IN WATER 40 MG/ML
2000 INJECTION, SOLUTION INTRAVENOUS ONCE
Status: COMPLETED | OUTPATIENT
Start: 2022-03-26 | End: 2022-03-26

## 2022-03-26 RX ORDER — ASPIRIN 81 MG/1
81 TABLET ORAL 2 TIMES DAILY
Qty: 60 TABLET | Refills: 0 | Status: SHIPPED | OUTPATIENT
Start: 2022-03-26 | End: 2022-10-27

## 2022-03-26 RX ADMIN — ACETAMINOPHEN 650 MG: 325 TABLET ORAL at 15:57

## 2022-03-26 RX ADMIN — Medication 2000 MG: at 00:44

## 2022-03-26 RX ADMIN — CARVEDILOL 12.5 MG: 6.25 TABLET, FILM COATED ORAL at 21:11

## 2022-03-26 RX ADMIN — ARFORMOTEROL TARTRATE 15 MCG: 15 SOLUTION RESPIRATORY (INHALATION) at 07:50

## 2022-03-26 RX ADMIN — SERTRALINE HYDROCHLORIDE 100 MG: 50 TABLET ORAL at 12:27

## 2022-03-26 RX ADMIN — CARVEDILOL 12.5 MG: 6.25 TABLET, FILM COATED ORAL at 08:47

## 2022-03-26 RX ADMIN — ACETAMINOPHEN 650 MG: 325 TABLET ORAL at 08:45

## 2022-03-26 RX ADMIN — GABAPENTIN 400 MG: 400 CAPSULE ORAL at 21:12

## 2022-03-26 RX ADMIN — Medication 10 ML: at 12:54

## 2022-03-26 RX ADMIN — IPRATROPIUM BROMIDE AND ALBUTEROL SULFATE 1 AMPULE: .5; 3 SOLUTION RESPIRATORY (INHALATION) at 19:45

## 2022-03-26 RX ADMIN — ONDANSETRON 4 MG: 2 INJECTION INTRAMUSCULAR; INTRAVENOUS at 04:42

## 2022-03-26 RX ADMIN — MAGNESIUM SULFATE HEPTAHYDRATE 2000 MG: 40 INJECTION, SOLUTION INTRAVENOUS at 13:03

## 2022-03-26 RX ADMIN — IPRATROPIUM BROMIDE AND ALBUTEROL SULFATE 1 AMPULE: .5; 3 SOLUTION RESPIRATORY (INHALATION) at 16:13

## 2022-03-26 RX ADMIN — ASPIRIN 81 MG: 81 TABLET, COATED ORAL at 08:46

## 2022-03-26 RX ADMIN — PREDNISONE 40 MG: 20 TABLET ORAL at 08:59

## 2022-03-26 RX ADMIN — BACLOFEN 10 MG: 10 TABLET ORAL at 21:11

## 2022-03-26 RX ADMIN — DOCUSATE SODIUM 50 MG AND SENNOSIDES 8.6 MG 1 TABLET: 8.6; 5 TABLET, FILM COATED ORAL at 21:11

## 2022-03-26 RX ADMIN — OXYCODONE 5 MG: 5 TABLET ORAL at 08:47

## 2022-03-26 RX ADMIN — BACLOFEN 10 MG: 10 TABLET ORAL at 12:27

## 2022-03-26 RX ADMIN — ASPIRIN 81 MG: 81 TABLET, COATED ORAL at 21:12

## 2022-03-26 RX ADMIN — IPRATROPIUM BROMIDE AND ALBUTEROL SULFATE 1 AMPULE: .5; 3 SOLUTION RESPIRATORY (INHALATION) at 11:32

## 2022-03-26 RX ADMIN — GABAPENTIN 400 MG: 400 CAPSULE ORAL at 12:27

## 2022-03-26 RX ADMIN — SODIUM CHLORIDE, PRESERVATIVE FREE 10 ML: 5 INJECTION INTRAVENOUS at 21:15

## 2022-03-26 RX ADMIN — ARFORMOTEROL TARTRATE 15 MCG: 15 SOLUTION RESPIRATORY (INHALATION) at 19:45

## 2022-03-26 RX ADMIN — IPRATROPIUM BROMIDE AND ALBUTEROL SULFATE 1 AMPULE: .5; 3 SOLUTION RESPIRATORY (INHALATION) at 07:50

## 2022-03-26 RX ADMIN — ACETAMINOPHEN 650 MG: 325 TABLET ORAL at 21:10

## 2022-03-26 ASSESSMENT — PAIN DESCRIPTION - PROGRESSION
CLINICAL_PROGRESSION: NOT CHANGED

## 2022-03-26 ASSESSMENT — PAIN DESCRIPTION - PAIN TYPE
TYPE: ACUTE PAIN
TYPE: ACUTE PAIN;SURGICAL PAIN
TYPE: ACUTE PAIN;SURGICAL PAIN

## 2022-03-26 ASSESSMENT — PAIN SCALES - GENERAL
PAINLEVEL_OUTOF10: 2
PAINLEVEL_OUTOF10: 6
PAINLEVEL_OUTOF10: 3
PAINLEVEL_OUTOF10: 5
PAINLEVEL_OUTOF10: 0
PAINLEVEL_OUTOF10: 3

## 2022-03-26 ASSESSMENT — PAIN DESCRIPTION - ORIENTATION
ORIENTATION: LEFT

## 2022-03-26 ASSESSMENT — PAIN DESCRIPTION - FREQUENCY
FREQUENCY: INTERMITTENT

## 2022-03-26 ASSESSMENT — PAIN DESCRIPTION - DESCRIPTORS
DESCRIPTORS: DISCOMFORT;SORE;TENDER
DESCRIPTORS: ACHING;DISCOMFORT;CRUSHING

## 2022-03-26 ASSESSMENT — PAIN DESCRIPTION - ONSET
ONSET: ON-GOING

## 2022-03-26 ASSESSMENT — PAIN DESCRIPTION - LOCATION
LOCATION: HIP

## 2022-03-26 ASSESSMENT — PAIN - FUNCTIONAL ASSESSMENT
PAIN_FUNCTIONAL_ASSESSMENT: ACTIVITIES ARE NOT PREVENTED
PAIN_FUNCTIONAL_ASSESSMENT: ACTIVITIES ARE NOT PREVENTED

## 2022-03-26 NOTE — PROGRESS NOTES
bed rail assistance. No dizziness expressed during and post all positional changes. Transfer to stand from bed was CGA in the beginning of Tx and was later @ Mod assist from stack chair; walk and there-ex with reported fatigue prior to chair transfer mod assist.  Amb with [employer] CGA/SBA in a partial step through pattern slow but steady. No drifting, lateral veering or LOB with [employer]. No static standing rest breaks PM session as had 3 AM session 2/2 reported fatigue. There-ex in chair was highly engaged and motivated. No inc pain with ROM/ther-ex LT hip expressed. AA ROM LT hip 85*, knee/ankle WNL AROM. MMT LT hip 3-/5, knee/ankle 5/5. Wanted to return to bed following Tx. Mod assist for sit-supine positioning. Call light and wall phone on bed. Dtr in room assisting care. Very appreciative for care. Comments AM :  In chair on arrival in no distress. Off NCO2 from 2L prior day. Has Hx COPD and sats lied in low 90's with .5 to 1L and reports sits there off w/o O2. No conversational dyspnea with care this AM.   Transfers with CGA w/o dizziness and no dizziness during and post all positional changes. Amb with [employer] with ultra slow step to gait pattern with no drifting, lateral veering or LOB. Wanted to take static standing rest break after first 35-40' walk 2/2 c/o fatigue. Gait was non-antalgic LTLE. There-ex was well tolerated. MMT LT hip 3-/5, knee 4-5/5. 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 Call light and wall phone on bed to LT and reachable. Tray to RT side and height good for seated positions. Very appreciative for care. Treatment:  Patient practiced and was instructed in the following treatment:     Education   Transfers/bed mob   Amb   There-ex    PLAN:    Pt is making + progress toward established Physical Therapy goals. Continue with physical therapy current plan of care.     Total Treatment Time  30 minutes     Evaluation Time includes thorough review of current medical information, gathering information on past medical history/social history and prior level of function, completion of standardized testing/informal observation of tasks, assessment of data and education on plan of care and goals.     CPT codes:  [] Low Complexity PT evaluation 53490  [] Moderate Complexity PT evaluation 33964  [] High Complexity PT evaluation 41426  [] PT Re-evaluation 40154  [x] Gait training 94458 ** minutes  [] Manual therapy 39067 ** minutes  [x] Therapeutic activities 31963 ** minutes  [x] Therapeutic exercises 35439 ** minutes  [] Neuromuscular reeducation 02051 ** minutes       Junaid Pay PT

## 2022-03-26 NOTE — PROGRESS NOTES
Total Joint -left AIDEE    Post op Day 1      S: Pain controlled. Mobilizing with walker. Tolerating diet. Positive flatus. O:     Vitals:    03/26/22 0845   BP:    Pulse:    Resp:    Temp:    SpO2: 90%        Dressing c/d/i   NV exam -intact left lower extremity   Calf - soft/nontender     H/H:   Recent Labs     03/26/22  0350   HGB 10.1*   HCT 29.8*        PT/INR:   Recent Labs     03/26/22  0350   PROT 5.5*       A/P:   Acute postsurgical blood loss anemia-expected   Renal function improving   Con't PT/OT-weightbearing as tolerated   Dressing change postoperative day #7             Aspirin for DVT prophylaxis x30 days   D/C planning-stable for transfer to [facility] from Ortho standpoint. Follow-up office 2 weeks.

## 2022-03-26 NOTE — PROGRESS NOTES
Daily Treat            Evaluating PT:  Melodie Markham. Eboni Bernabe PDANTE.     Room #:  8546/5113-L  Diagnosis:  Unable to ambulate [R26.2]  Closed fracture of head of femur, unspecified laterality, initial encounter [facility]) [U86.759S]  Ambulatory dysfunction [R26.2]  Osteoarthritis of left hip, unspecified osteoarthritis type [M16.12]  Pertinent PMHx/PSHx:  R AIDEE, Multiple Sclerosis  Procedure/Surgery:  3/25/22 L AIDEE  Precautions:  WBAT LLE, L hip precautions, Falls  Equipment Needs:  Wheeled walker if she needs to reduce WB L hip due to pain control other wise she may be able to use her rollator ww     SUBJECTIVE:     Pt lives with  (he has poor health and unable to assist her physically) in a 1 story home with a ramp to enter. Pt ambulated with a rollator ww PTA.     OBJECTIVE:    Initial Evaluation  Date: 3/25/22 Treatment  3/26/22 AM Short Term/ Long Term   Goals   Was pt agreeable to Eval/treatment? yes  Yes     Does pt have pain? No c/o pain Min LT Hip      Bed Mobility  Rolling: MIN A  Supine to sit: MOD A  Sit to supine: NA  Scooting: MOD A  NA-in chair on arrival Independent    Transfers Sit to stand: MOD A  Stand to sit: MOD A  Stand pivot: MIN A with ww  Chair-stand CGA   Stand-chair: SBA/S supervision   Ambulation   3 feet with ww MIN A    WW CGA 60' total 150 feet with ww SBA   Stair negotiation: ascended and descended NA  NA NA, pt has ramp to enter   AM-PAC 6 Clicks 15/49  40/32      Pt is alert and oriented x3  Balance: Requires [employer] @ CGA      Therapeutic Exercises:  LTLE x 30 reps ea    Patient education  Pt educated on Universal hip, gait pattern,     Patient response to education:   Pt verbalized understanding Pt demonstrated skill Pt requires further education in this area   Y Y N     ASSESSMENT:    Comments: In chair on arrival in no distress. Off NCO2 from 2L prior day. Has Hx COPD and sats lied in low 90's with .5 to 1L and reports sits there off w/o O2.    No conversational dyspnea with care

## 2022-03-26 NOTE — PROGRESS NOTES
[facility] Hospitalist   Progress Note    Admitting Date and Time: 3/23/2022  3:44 PM  Admit Dx: Unable to ambulate [R26.2]  Closed fracture of head of femur, unspecified laterality, initial encounter ([facility] [S72.059A]  Ambulatory dysfunction [R26.2]  Osteoarthritis of left hip, unspecified osteoarthritis type [M16.12]    Subjective:    Patient was admitted with Unable to ambulate [R26.2]  Closed fracture of head of femur, unspecified laterality, initial encounter ([facility] Isaackg Luzber  Ambulatory dysfunction [R26.2]  Osteoarthritis of left hip, unspecified osteoarthritis type [M16.12].  Patient denies fever, chills, cp, sob, n/v.     predniSONE  40 mg Oral Daily with breakfast    magnesium sulfate  2,000 mg IntraVENous Once    sodium chloride flush  5-40 mL IntraVENous 2 times per day    acetaminophen  650 mg Oral Q6H    sennosides-docusate sodium  1 tablet Oral BID    famotidine  20 mg Oral Daily    Or    famotidine (PEPCID) injection  20 mg IntraVENous Daily    aspirin  81 mg Oral BID    ipratropium-albuterol  1 ampule Inhalation Q4H WA    Arformoterol Tartrate  15 mcg Nebulization BID    carvedilol  12.5 mg Oral BID    sodium chloride flush  5-40 mL IntraVENous 2 times per day     sodium chloride flush, 5-40 mL, PRN  sodium chloride, 25 mL, PRN  ondansetron, 4 mg, Q8H PRN   Or  ondansetron, 4 mg, Q6H PRN  oxyCODONE, 5 mg, Q4H PRN  diphenhydrAMINE, 25 mg, Q6H PRN   Or  diphenhydrAMINE, 25 mg, Q6H PRN  morphine, 2 mg, Q4H PRN  albuterol, 2.5 mg, Q6H PRN  guaiFENesin, 400 mg, 4x Daily PRN  perflutren lipid microspheres, 1.5 mL, ONCE PRN  sodium chloride flush, 5-40 mL, PRN  sodium chloride, 25 mL, PRN  ondansetron, 4 mg, Q8H PRN   Or  ondansetron, 4 mg, Q6H PRN  polyethylene glycol, 17 g, Daily PRN  acetaminophen, 650 mg, Q6H PRN   Or  acetaminophen, 650 mg, Q6H PRN         Objective:    /62   Pulse 73   Temp 97.6 °F (36.4 °C) (Oral)   Resp 16   Ht 5' 2\" (1.575 m)   Wt 179 lb 9.6 oz (81.5 kg)   SpO2 90%   BMI 32.85 kg/m²   Skin: warm and dry, no rash or erythema  Pulmonary/Chest: clear to auscultation bilaterally- no wheezes, rales or rhonchi, normal air movement, no respiratory distress  Cardiovascular: rhythm reg at rate of 76  Abdomen: soft, non-tender, non-distended, normal bowel sounds, no masses or organomegaly  Extremities: no cyanosis, no clubbing and no edema      Recent Labs     03/23/22 1941 03/24/22  0630 03/26/22  0350    139 134   K 3.2* 3.6 3.5   CL 96* 100 98   CO2 27 28 27   BUN 30* 32* 27*   CREATININE 1.9* 1.8* 1.5*   GLUCOSE 96 91 113*   CALCIUM 8.6 8.4* 8.0*       Recent Labs     03/23/22 1941 03/24/22 0630 03/26/22  0350   WBC 6.6 6.7 8.0   RBC 4.17 3.82 3.17*   HGB 13.0 12.0 10.1*   HCT 39.8 36.6 29.8*   MCV 95.4 95.8 94.0   MCH 31.2 31.4 31.9   MCHC 32.7 32.8 33.9   RDW 13.4 13.4 13.3    253 264   MPV 9.3 9.3 9.5       CBC with Differential:    Lab Results   Component Value Date    WBC 8.0 03/26/2022    RBC 3.17 03/26/2022    HGB 10.1 03/26/2022    HCT 29.8 03/26/2022     03/26/2022    MCV 94.0 03/26/2022    MCH 31.9 03/26/2022    MCHC 33.9 03/26/2022    RDW 13.3 03/26/2022    SEGSPCT 54 10/04/2013    LYMPHOPCT 15.0 03/26/2022    MONOPCT 9.1 03/26/2022    BASOPCT 0.1 03/26/2022    MONOSABS 0.73 03/26/2022    LYMPHSABS 1.20 03/26/2022    EOSABS 0.00 03/26/2022    BASOSABS 0.01 03/26/2022     CMP:    Lab Results   Component Value Date     03/26/2022    K 3.5 03/26/2022    K 3.6 03/24/2022    CL 98 03/26/2022    CO2 27 03/26/2022    BUN 27 03/26/2022    CREATININE 1.5 03/26/2022    GFRAA 41 03/26/2022    LABGLOM 34 03/26/2022    GLUCOSE 113 03/26/2022    GLUCOSE 116 05/30/2012    PROT 5.5 03/26/2022    LABALBU 2.9 03/26/2022    LABALBU 3.9 05/30/2012    CALCIUM 8.0 03/26/2022    BILITOT <0.2 03/26/2022    ALKPHOS 58 03/26/2022    AST 17 03/26/2022    ALT 8 03/26/2022     Magnesium:    Lab Results   Component Value Date    MG 1.4 03/26/2022 Phosphorus:    Lab Results   Component Value Date    PHOS 3.1 03/26/2022        Radiology:   XR HIP LEFT (1 VIEW)   Final Result   Placement of left hip arthroplasty expected alignment. NM LUNG SCAN PERFUSION ONLY   Final Result   Low Probability for Pulmonary Embolus. US DUP LOWER EXTREMITIES BILATERAL VENOUS   Final Result   No evidence of DVT in either lower extremity. RECOMMENDATIONS:   Unavailable         XR CHEST 1 VIEW   Final Result   Focal opacity in the left upper midlung may represent chronic scarring versus   early infiltrates. Follow-up study recommended. XR HIP 2-3 VW W PELVIS LEFT   Final Result   1. New deformity involving left femoral head with severe femoral head   flattening. 2. Satisfactory alignment of right hip arthroplasty. X-ray chest PA and lateral    (Results Pending)       Assessment:    Principal Problem:    Primary osteoarthritis of left hip  Active Problems:    Ambulatory dysfunction    Closed fracture of head of femur (HCC)    Acute respiratory failure with hypoxia (HCC)    MARCELLO (acute kidney injury) ([facility]    Primary hypertension  Resolved Problems:    * No resolved hospital problems. *      Plan:  1. Acute respiratory failure with hypoxia(o2 sat 78%)POA  Will ask pulmonary to see especially in light of anticipated surgery. Adjust o2 as needed. improving  2. Possible copd exacerbation  Nebs and steroids. rec smoking cessation. 3. 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 Pt had surgery. . Pain control. Encourage IS  4. ekg changes of t wave inversion not present before. Appreciate cardiology's input. 5. Hypokalemia montior and replace prn  6. marcello iv fluids. 7. htn adjust and continue coreg perioperatively  8. MS monitor  9. Hypomagnesemia monitor and replace prn      Continue to use IS. Pt switched to prednisone. if tolerates, consider discharge tomorrow.          Electronically signed by Krystyna Olguin DO on 3/26/2022 at 10:19 AM

## 2022-03-26 NOTE — PLAN OF CARE
Problem: Pain:  Goal: Pain level will decrease  Description: Pain level will decrease  3/26/2022 1159 by Nahed Urban RN  Outcome: Met This Shift     Problem: Falls - Risk of:  Goal: Will remain free from falls  Description: Will remain free from falls  3/26/2022 1159 by Nahed Urban RN  Outcome: Met This Shift     Problem: Skin Integrity:  Goal: Will show no infection signs and symptoms  Description: Will show no infection signs and symptoms  Outcome: Met This Shift

## 2022-03-26 NOTE — PROGRESS NOTES
Pulmonary Progress Note      Sitting up in chair, pain controlled  On room air with pox 90-91%  Denies dyspnea but has a moist cough with clear sputum  No wheezing  Did not sleep well due to  IV beeping      Vent settings:Vent Information  SpO2: 91 %  Additional Respiratory  Assessments  Pulse: 73  Resp: 16  SpO2: 91 %      Current Facility-Administered Medications:     sodium chloride flush 0.9 % injection 5-40 mL, 5-40 mL, IntraVENous, 2 times per day, Yuki Maya MD    sodium chloride flush 0.9 % injection 5-40 mL, 5-40 mL, IntraVENous, PRN, Yuki Maya MD    0.9 % sodium chloride infusion, 25 mL, IntraVENous, PRN, Yuki Maya MD    acetaminophen (TYLENOL) tablet 650 mg, 650 mg, Oral, Q6H, Yuki Maya MD, 650 mg at 03/25/22 1929    ondansetron (ZOFRAN-ODT) disintegrating tablet 4 mg, 4 mg, Oral, Q8H PRN **OR** ondansetron (ZOFRAN) injection 4 mg, 4 mg, IntraVENous, Q6H PRN, Yuki Maya MD, 4 mg at 03/26/22 5282    lactated ringers infusion, , IntraVENous, Continuous, Yuki Maya MD, Last Rate: 75 mL/hr at 03/25/22 1310, New Bag at 03/25/22 1310    oxyCODONE (ROXICODONE) immediate release tablet 5 mg, 5 mg, Oral, Q4H PRN, Yuki Maya MD, 5 mg at 03/25/22 2130    diphenhydrAMINE (BENADRYL) tablet 25 mg, 25 mg, Oral, Q6H PRN **OR** diphenhydrAMINE (BENADRYL) injection 25 mg, 25 mg, IntraVENous, Q6H PRN, Yuki Maya MD    sennosides-docusate sodium (SENOKOT-S) 8.6-50 MG tablet 1 tablet, 1 tablet, Oral, BID, Yuki Maya MD, 1 tablet at 03/25/22 1316    famotidine (PEPCID) tablet 20 mg, 20 mg, Oral, Daily **OR** famotidine (PEPCID) 20 mg in sodium chloride (PF) 10 mL injection, 20 mg, IntraVENous, Daily, Yuki Maya MD, 20 mg at 03/25/22 1316    aspirin EC tablet 81 mg, 81 mg, Oral, BID, Yuki Maya MD, 81 mg at 03/25/22 2130    methylPREDNISolone sodium (SOLU-MEDROL) injection 40 mg, 40 mg, IntraVENous, Daily, Jimmy Andrade MD    morphine (PF) injection 2 mg, 2 mg, IntraVENous, Q4H PRN, Bobo Oden MD, 2 mg at 03/25/22 0339    ipratropium-albuterol (DUONEB) nebulizer solution 1 ampule, 1 ampule, Inhalation, Q4H WA, Bobo Oden MD, 1 ampule at 03/26/22 0750    albuterol (PROVENTIL) nebulizer solution 2.5 mg, 2.5 mg, Nebulization, Q6H PRN, Bobo Oden MD    Arformoterol Tartrate [facility]) nebulizer solution 15 mcg, 15 mcg, Nebulization, BID, Bobo Oden MD, 15 mcg at 03/26/22 0750    guaiFENesin tablet 400 mg, 400 mg, Oral, 4x Daily PRN, Bobo Oden MD, 400 mg at 03/24/22 1624    perflutren lipid microspheres (DEFINITY) injection 1.65 mg, 1.5 mL, IntraVENous, ONCE PRN, Bobo Oden MD    carvedilol (COREG) tablet 12.5 mg, 12.5 mg, Oral, BID, Bobo Oden MD, 12.5 mg at 03/25/22 2130    sodium chloride flush 0.9 % injection 5-40 mL, 5-40 mL, IntraVENous, 2 times per day, Bobo Oden MD    sodium chloride flush 0.9 % injection 5-40 mL, 5-40 mL, IntraVENous, PRN, Bobo Oden MD, 10 mL at 03/25/22 0536    0.9 % sodium chloride infusion, 25 mL, IntraVENous, PRN, Bobo Oden MD    ondansetron (ZOFRAN-ODT) disintegrating tablet 4 mg, 4 mg, Oral, Q8H PRN **OR** ondansetron (ZOFRAN) injection 4 mg, 4 mg, IntraVENous, Q6H PRN, Bobo Oden MD    polyethylene glycol (GLYCOLAX) packet 17 g, 17 g, Oral, Daily PRN, Bobo Oden MD    acetaminophen (TYLENOL) tablet 650 mg, 650 mg, Oral, Q6H PRN, 650 mg at 03/24/22 0548 **OR** acetaminophen (TYLENOL) suppository 650 mg, 650 mg, Rectal, Q6H PRN, Bobo Oden MD    0.9 % sodium chloride infusion, , IntraVENous, Continuous, Bobo Oden MD, Last Rate: 75 mL/hr at 03/25/22 1207, Rate Change at 03/25/22 1207  /62   Pulse 73   Temp 97.6 °F (36.4 °C) (Oral)   Resp 16   Ht 5' 2\" (1.575 m)   Wt 179 lb 9.6 oz (81.5 kg)   SpO2 91%   BMI 32.85 kg/m²     General: No distress  Chest: Symmetric, no accessory use  Heart: RRR  Lungs: Diminished throughout with no wheezing or rhonchi. L base with few faint rales  Abdomen: Soft, nt  Extremities: BLE trace edema R>L, L hip with drsg and edema    Intake/Output Summary (Last 24 hours) at 3/26/2022 0840  Last data filed at 3/25/2022 1002  Gross per 24 hour   Intake 925 ml   Output 250 ml   Net 675 ml       IMPRESSION:   S/P hip arthroplasty doing well - per orthopedics  Hypoxemia resolved - now on room air. O2 as needed to keep pox >90%  COPD with probable acute exacerbation - improving with no wheezing. Continue brovana and cheng. Will change solu medrol to prednisone. IS - correct use  Ambulate as able    Karla Porter, HIRA - CNP  3/26/2022  8:40 AM     Seen and examined, agree with above. Off O2 and breathing comfortably. Okay to resume Trelegy inhaler on discharge. Continue IS. Agree with rehab placement and steroid taper.

## 2022-03-27 VITALS
DIASTOLIC BLOOD PRESSURE: 75 MMHG | WEIGHT: 179.6 LBS | SYSTOLIC BLOOD PRESSURE: 155 MMHG | HEIGHT: 62 IN | TEMPERATURE: 98.2 F | RESPIRATION RATE: 16 BRPM | HEART RATE: 91 BPM | BODY MASS INDEX: 33.05 KG/M2 | OXYGEN SATURATION: 93 %

## 2022-03-27 LAB
ALBUMIN SERPL-MCNC: 3 G/DL (ref 3.5–5.2)
ALP BLD-CCNC: 75 U/L (ref 35–104)
ALT SERPL-CCNC: <5 U/L (ref 0–32)
ANION GAP SERPL CALCULATED.3IONS-SCNC: 11 MMOL/L (ref 7–16)
AST SERPL-CCNC: 28 U/L (ref 0–31)
BASOPHILS ABSOLUTE: 0.01 E9/L (ref 0–0.2)
BASOPHILS RELATIVE PERCENT: 0.1 % (ref 0–2)
BILIRUB SERPL-MCNC: 0.3 MG/DL (ref 0–1.2)
BUN BLDV-MCNC: 27 MG/DL (ref 6–23)
CALCIUM SERPL-MCNC: 8.4 MG/DL (ref 8.6–10.2)
CHLORIDE BLD-SCNC: 100 MMOL/L (ref 98–107)
CO2: 26 MMOL/L (ref 22–29)
CREAT SERPL-MCNC: 1.4 MG/DL (ref 0.5–1)
CULTURE NOSE: NORMAL
EOSINOPHILS ABSOLUTE: 0.01 E9/L (ref 0.05–0.5)
EOSINOPHILS RELATIVE PERCENT: 0.1 % (ref 0–6)
GFR AFRICAN AMERICAN: 44
GFR NON-AFRICAN AMERICAN: 37 ML/MIN/1.73
GLUCOSE BLD-MCNC: 111 MG/DL (ref 74–99)
HCT VFR BLD CALC: 28.5 % (ref 34–48)
HEMOGLOBIN: 9.7 G/DL (ref 11.5–15.5)
IMMATURE GRANULOCYTES #: 0.03 E9/L
IMMATURE GRANULOCYTES %: 0.4 % (ref 0–5)
LYMPHOCYTES ABSOLUTE: 1.49 E9/L (ref 1.5–4)
LYMPHOCYTES RELATIVE PERCENT: 20.5 % (ref 20–42)
MAGNESIUM: 2 MG/DL (ref 1.6–2.6)
MCH RBC QN AUTO: 31.7 PG (ref 26–35)
MCHC RBC AUTO-ENTMCNC: 34 % (ref 32–34.5)
MCV RBC AUTO: 93.1 FL (ref 80–99.9)
MONOCYTES ABSOLUTE: 0.79 E9/L (ref 0.1–0.95)
MONOCYTES RELATIVE PERCENT: 10.9 % (ref 2–12)
NEUTROPHILS ABSOLUTE: 4.95 E9/L (ref 1.8–7.3)
NEUTROPHILS RELATIVE PERCENT: 68 % (ref 43–80)
PDW BLD-RTO: 13.4 FL (ref 11.5–15)
PLATELET # BLD: 249 E9/L (ref 130–450)
PMV BLD AUTO: 9.2 FL (ref 7–12)
POTASSIUM SERPL-SCNC: 3.6 MMOL/L (ref 3.5–5)
RBC # BLD: 3.06 E12/L (ref 3.5–5.5)
SODIUM BLD-SCNC: 137 MMOL/L (ref 132–146)
TOTAL PROTEIN: 5.7 G/DL (ref 6.4–8.3)
WBC # BLD: 7.3 E9/L (ref 4.5–11.5)

## 2022-03-27 PROCEDURE — 6360000002 HC RX W HCPCS: Performed by: ORTHOPAEDIC SURGERY

## 2022-03-27 PROCEDURE — 83735 ASSAY OF MAGNESIUM: CPT

## 2022-03-27 PROCEDURE — 6370000000 HC RX 637 (ALT 250 FOR IP): Performed by: ORTHOPAEDIC SURGERY

## 2022-03-27 PROCEDURE — 85025 COMPLETE CBC W/AUTO DIFF WBC: CPT

## 2022-03-27 PROCEDURE — 97530 THERAPEUTIC ACTIVITIES: CPT

## 2022-03-27 PROCEDURE — 6370000000 HC RX 637 (ALT 250 FOR IP): Performed by: NURSE PRACTITIONER

## 2022-03-27 PROCEDURE — 99239 HOSP IP/OBS DSCHRG MGMT >30: CPT | Performed by: INTERNAL MEDICINE

## 2022-03-27 PROCEDURE — 80053 COMPREHEN METABOLIC PANEL: CPT

## 2022-03-27 PROCEDURE — 6370000000 HC RX 637 (ALT 250 FOR IP): Performed by: INTERNAL MEDICINE

## 2022-03-27 PROCEDURE — 97116 GAIT TRAINING THERAPY: CPT

## 2022-03-27 PROCEDURE — 94640 AIRWAY INHALATION TREATMENT: CPT

## 2022-03-27 PROCEDURE — 36415 COLL VENOUS BLD VENIPUNCTURE: CPT

## 2022-03-27 PROCEDURE — 2580000003 HC RX 258: Performed by: ORTHOPAEDIC SURGERY

## 2022-03-27 RX ORDER — SERTRALINE HYDROCHLORIDE 100 MG/1
100 TABLET, FILM COATED ORAL DAILY
Status: DISCONTINUED | OUTPATIENT
Start: 2022-03-27 | End: 2022-03-27 | Stop reason: HOSPADM

## 2022-03-27 RX ORDER — PREDNISONE 10 MG/1
TABLET ORAL
Qty: 10 TABLET | Refills: 0 | DISCHARGE
Start: 2022-03-27 | End: 2022-04-29

## 2022-03-27 RX ORDER — CARVEDILOL 12.5 MG/1
12.5 TABLET ORAL 2 TIMES DAILY
Qty: 60 TABLET | Refills: 3 | DISCHARGE
Start: 2022-03-27 | End: 2022-04-11 | Stop reason: SDUPTHER

## 2022-03-27 RX ORDER — GABAPENTIN 400 MG/1
400 CAPSULE ORAL 2 TIMES DAILY
Qty: 90 CAPSULE | Refills: 3 | DISCHARGE
Start: 2022-03-27 | End: 2022-04-16

## 2022-03-27 RX ADMIN — IPRATROPIUM BROMIDE AND ALBUTEROL SULFATE 1 AMPULE: .5; 3 SOLUTION RESPIRATORY (INHALATION) at 08:23

## 2022-03-27 RX ADMIN — ASPIRIN 81 MG: 81 TABLET, COATED ORAL at 09:02

## 2022-03-27 RX ADMIN — ACETAMINOPHEN 650 MG: 325 TABLET ORAL at 06:55

## 2022-03-27 RX ADMIN — BACLOFEN 10 MG: 10 TABLET ORAL at 09:02

## 2022-03-27 RX ADMIN — IPRATROPIUM BROMIDE AND ALBUTEROL SULFATE 1 AMPULE: .5; 3 SOLUTION RESPIRATORY (INHALATION) at 11:52

## 2022-03-27 RX ADMIN — SERTRALINE 100 MG: 100 TABLET, FILM COATED ORAL at 09:02

## 2022-03-27 RX ADMIN — CARVEDILOL 12.5 MG: 6.25 TABLET, FILM COATED ORAL at 10:07

## 2022-03-27 RX ADMIN — ACETAMINOPHEN 650 MG: 325 TABLET ORAL at 00:40

## 2022-03-27 RX ADMIN — ARFORMOTEROL TARTRATE 15 MCG: 15 SOLUTION RESPIRATORY (INHALATION) at 08:23

## 2022-03-27 RX ADMIN — GABAPENTIN 400 MG: 400 CAPSULE ORAL at 09:04

## 2022-03-27 RX ADMIN — SODIUM CHLORIDE, PRESERVATIVE FREE 10 ML: 5 INJECTION INTRAVENOUS at 09:01

## 2022-03-27 RX ADMIN — OXYCODONE 5 MG: 5 TABLET ORAL at 13:01

## 2022-03-27 RX ADMIN — PREDNISONE 40 MG: 20 TABLET ORAL at 09:01

## 2022-03-27 RX ADMIN — DOCUSATE SODIUM 50 MG AND SENNOSIDES 8.6 MG 1 TABLET: 8.6; 5 TABLET, FILM COATED ORAL at 09:03

## 2022-03-27 ASSESSMENT — PAIN DESCRIPTION - PROGRESSION
CLINICAL_PROGRESSION: NOT CHANGED

## 2022-03-27 ASSESSMENT — PAIN DESCRIPTION - DESCRIPTORS
DESCRIPTORS: DULL;DISCOMFORT;SORE
DESCRIPTORS: DULL;SORE;TENDER
DESCRIPTORS: ACHING;DISCOMFORT;CRUSHING

## 2022-03-27 ASSESSMENT — PAIN DESCRIPTION - FREQUENCY
FREQUENCY: INTERMITTENT

## 2022-03-27 ASSESSMENT — PAIN SCALES - GENERAL
PAINLEVEL_OUTOF10: 5
PAINLEVEL_OUTOF10: 3
PAINLEVEL_OUTOF10: 5
PAINLEVEL_OUTOF10: 3
PAINLEVEL_OUTOF10: 4

## 2022-03-27 ASSESSMENT — PAIN DESCRIPTION - PAIN TYPE
TYPE: ACUTE PAIN
TYPE: ACUTE PAIN;SURGICAL PAIN
TYPE: ACUTE PAIN;SURGICAL PAIN

## 2022-03-27 ASSESSMENT — PAIN DESCRIPTION - LOCATION
LOCATION: HIP

## 2022-03-27 ASSESSMENT — PAIN DESCRIPTION - ONSET
ONSET: ON-GOING
ONSET: ON-GOING
ONSET: GRADUAL

## 2022-03-27 ASSESSMENT — PAIN DESCRIPTION - ORIENTATION
ORIENTATION: LEFT

## 2022-03-27 NOTE — PROGRESS NOTES
Daily Treat            Evaluating PT:  Reddy . Lory Iniguez P.TElinor     Room #:  3167/8502-K  Diagnosis:  Unable to ambulate [R26.2]  Closed fracture of head of femur, unspecified laterality, initial encounter [facility]) [B12.665T]  Ambulatory dysfunction [R26.2]  Osteoarthritis of left hip, unspecified osteoarthritis type [M16.12]  Pertinent PMHx/PSHx:  R AIDEE, Multiple Sclerosis  Procedure/Surgery:  3/25/22 L AIDEE  Precautions:  WBAT LLE, L hip precautions, Falls  Equipment Needs:  Wheeled walker if she needs to reduce WB L hip due to pain control other wise she may be able to use her rollator ww     SUBJECTIVE:     Pt lives with  (he has poor health and unable to assist her physically) in a 1 story home with a ramp to enter. Pt ambulated with a rollator ww PTA.     OBJECTIVE:    Initial Evaluation  Date: 3/25/22 Treatment  3/27/22 PM Short Term/ Long Term   Goals   Was pt agreeable to Eval/treatment? yes  Yes     Does pt have pain? No c/o pain Min LT Hip      Bed Mobility  Rolling: MIN A  Supine to sit: MOD A  Sit to supine: NA  Scooting: MOD A  Sup-sit EOB CGA/Min   Sit-sup: Mod A-after walk and there-ex was highly fatigued Independent    Transfers Sit to stand: MOD A  Stand to sit: MOD A  Stand pivot: MIN A with ww   Bed-stand CGA/SBA    Chair-stand CGA/SBA  supervision   Ambulation   3 feet with ww MIN A    WW S/'  150 feet with ww SBA   Stair negotiation: ascended and descended NA  NA NA, pt has ramp to enter   AM-PAC 6 Clicks 32/59 28/20      Pt is alert and oriented x3  Balance: Requires [employer] @ CGA/SBA      Therapeutic Exercises:  LTLE x 30 reps ea    Patient education  Pt educated on Universal hip, gait pattern,     Patient response to education:   Pt verbalized understanding Pt demonstrated skill Pt requires further education in this area   Y Y N     ASSESSMENT:    Comments 3/27/22 PM: In bed on arrival receptive for PM session.   Pain under control and has not taken any pain meds 2/2 low BP in the AM; does not need any as pain good. Going to [facility] at 3 PM today. Requiring min to mod assist into bed and Min/CGA out of bed. No dizziness expressed during and post all positional changes. Transfer to stand was also w/o dizziness. Amb with [employer] with reciprocal step through pattern boid of any drifting, lateral veering or LOB. No requests to stop and break as walk continued. Increased walk distances over weekend. Some conversational dyspnea PM session but this only occurred at end stage 200' walk and had a rapid recovery once seated @ walk. There-ex was well tolerated w/o any inc pain expressed. Wanted to return to bed until transfer to [facility]. Mod assisted into bed LE as well as trunk. Call light and wall phone on bed. Tray centrally to access lunch which just arrived. Very appreciative weekend care. Comments 3/27/22 AM: In stack chair on arrival in no distress. Going to rehab later today. Reviewed universal hip prec with good understanding and payton over knowledge of all of them. Had RT AIDEE and remembered them from that. SPO2 check on RA prior to mobility @ 90%; no cyanosis or SOB. No conversational dyspnea prior to mobility or throughout mobility. SPO2 drop to 71% on RA post walk and again no SOB. Rapid rise to 90% within 1' or <.  Transfer to stand was SBA/CGA without any positional change dizziness. Amb with [employer] with improved pace from prior day with a reciprocal step through pattern. No drifting, lateral veering or LOB observed. No requests to stop and break while walking and inc walk distance to 225' from [de-identified]' prior day. Reports slept well and feels much stronger this AM.  There-x in chair was well tolerated. Cont with weakness LT hip 3-/5 with distal 5/5. Highly engaged and motivated. Call light and wall phone on bed to LT and reachable. Tray to RT side. Very appreciative for care. Comments 3/26/22 PM: In bed on arrival receptive for PM session. Pain reported as under control. Supine to sit was CGA with bed rail assistance. No dizziness expressed during and post all positional changes. Transfer to stand from bed was CGA in the beginning of Tx and was later @ Mod assist from stack chair; walk and there-ex with reported fatigue prior to chair transfer mod assist.  Amb with [employer] CGA/SBA in a partial step through pattern slow but steady. No drifting, lateral veering or LOB with [employer]. No static standing rest breaks PM session as had 3 AM session 2/2 reported fatigue. There-ex in chair was highly engaged and motivated. No inc pain with ROM/ther-ex LT hip expressed. AA ROM LT hip 85*, knee/ankle WNL AROM. MMT LT hip 3-/5, knee/ankle 5/5. Wanted to return to bed following Tx. Mod assist for sit-supine positioning. Call light and wall phone on bed. Dtr in room assisting care. Very appreciative for care. Comments 3/26/22 AM :  In chair on arrival in no distress. Off NCO2 from 2L prior day. Has Hx COPD and sats lied in low 90's with .5 to 1L and reports sits there off w/o O2. No conversational dyspnea with care this AM.   Transfers with CGA w/o dizziness and no dizziness during and post all positional changes. Amb with [employer] with ultra slow step to gait pattern with no drifting, lateral veering or LOB. Wanted to take static standing rest break after first 35-40' walk 2/2 c/o fatigue. Gait was non-antalgic LTLE. There-ex was well tolerated. MMT LT hip 3-/5, knee 4-5/5. 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 Call light and wall phone on bed to LT and reachable. Tray to RT side and height good for seated positions. Very appreciative for care. Treatment:  Patient practiced and was instructed in the following treatment:     Education   Transfers   Amb   Bed mob   There-ex    PLAN:    Pt is making + progress toward established Physical Therapy goals. Continue with physical therapy current plan of care.     Total Treatment Time  30 minutes

## 2022-03-27 NOTE — PROGRESS NOTES
Call placed to Dr. Ivan Gomez (covering ) to clarify if ok to administer morning coreg dose that was held in am

## 2022-03-27 NOTE — PROGRESS NOTES
2nd attempt to call nurse to nurse report to [facility] was made. Message left. Awaiting callback.

## 2022-03-27 NOTE — CARE COORDINATION
CASE MANAGEMENT. .. Discharge order on chart. Met with patient at the bedside. She confirmed dc plan is [facility]. Requesting transport  later today. Arrangements made for [facility] to  at 3p via ambulette. Confirmed with Salena from the facility that no precert is required and no covid test needed. Patient and nursing updated on plans. N 16 in epic. Forms/HENS in chart.

## 2022-03-27 NOTE — DISCHARGE SUMMARY
[facility]       Hospitalist Physician Discharge Summary       [facility] [address]  [phone]          Activity level: as katie    Diet: ADULT DIET; Regular  ADULT ORAL NUTRITION SUPPLEMENT; Dinner; Renal Oral Supplement    Dispo:snf    Condition at discharge: fair        Patient ID:  Henrique Sargent  [id number]  77 y.o.  1945    Admit date: 3/23/2022    Discharge date and time:  3/27/2022  11:35 AM    Admission Diagnoses: Principal Problem:    Primary osteoarthritis of left hip  Active Problems:    Ambulatory dysfunction    Closed fracture of head of femur (HCC)    Acute respiratory failure with hypoxia (HCC)    MARCELLO (acute kidney injury) ([facility] [address]    Primary hypertension    COPD exacerbation (HCC)  Resolved Problems:    * No resolved hospital problems. *      Discharge Diagnoses: Principal Problem:    Primary osteoarthritis of left hip  Active Problems:    Ambulatory dysfunction    Closed fracture of head of femur (HCC)    Acute respiratory failure with hypoxia (HCC)    MARCELLO (acute kidney injury) ([facility] [address]    Primary hypertension    COPD exacerbation (HCC)  Resolved Problems:    * No resolved hospital problems. 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 Ejection fraction is visually estimated at 60%. Normal right ventricular size and function (TAPSE 1.9 cm). There is doppler evidence of stage I diastolic dysfunction. No evidence of hemodynamically significant valve disease.     Left total hip arthroplasty      Hospital Course: Patient was admitted with Unable to ambulate [R26.2]  Closed fracture of head of femur, unspecified laterality, initial encounter ([facility] [address] Sendy Cherry  Ambulatory dysfunction [R26.2]  Osteoarthritis of left hip, unspecified osteoarthritis type [M16.12]. Patient is a 77 y.o. female who presents for left hip pain. Pt has been having pain in the hip and has had been noted to have bone on bone on imaging according to pt. Pt has had tried other treatments including injections. Pt had been scheduled for orthopedic surgery mid-April. However, pt presented acutely with severe pain that she could not bear wt. Pt denies any traumatic event. Pt denies fevers, chills, abd pain, n/v, diarrhea, constipation, melena, hematochezia. Pt has hx of copd and using trelegy at home. Pt states she does not see a pulmonologist. Pt denies previous usage of o2. Pt's last cigarette was prior to coming to hospital.     Pt seen and examined by consultants. Pt started on nebs, iv fluids, and steroids. Pt had procedure as noted above. Pt improved s/p surgery. On day of discharge, pt denied fevers, chills,n/v. Discharge planning d/w pt. Time given for questions and all questions answered. Discharge Exam:  Vitals:    03/27/22 0415 03/27/22 0824 03/27/22 0845 03/27/22 0915   BP: (!) 148/75  (!) 97/52 (!) 103/46   Pulse: 84  81 79   Resp: 16  16    Temp: 98 °F (36.7 °C)  97.7 °F (36.5 °C)    TempSrc: Oral  Oral    SpO2: 93% 92% 93%    Weight:       Height:           Skin: warm and dry, no rash or erythema  Pulmonary/Chest: clear to auscultation bilaterally- no wheezes, rales or rhonchi, normal air movement, no respiratory distress  Cardiovascular: rhythm reg at rate of 82  Abdomen: soft, non-tender, non-distended, normal bowel sounds, no masses or organomegaly  Extremities: no cyanosis, no clubbing and no edema  I/O last 3 completed shifts:   In: 9361 [I.V.:1064.1; IV Piggyback:44.9]  Out: 200 [Urine:200]  I/O this shift:  In: -   Out: 300 [Urine:300]      LABS:  Recent Labs     03/26/22  0350 03/27/22 0417    137   K 3.5 3.6   CL 98 100   CO2 27 26   BUN 27* 27*   CREATININE 1.5* 1.4*   GLUCOSE 113* 111*   CALCIUM 8.0* 8.4*       Recent Labs     03/26/22  0350 03/27/22 0417   WBC 8.0 7.3   RBC 3.17* 3.06*   HGB 10.1* 9.7*   HCT 29.8* 28.5*   MCV 94.0 93.1   MCH 31.9 31.7   MCHC 33.9 34.0   RDW 13.3 13.4    249   MPV 9.5 9.2       No results for input(s): POCGLU in the last 72 hours. CBC with Differential:    Lab Results   Component Value Date    WBC 7.3 03/27/2022    RBC 3.06 03/27/2022    HGB 9.7 03/27/2022    HCT 28.5 03/27/2022     03/27/2022    MCV 93.1 03/27/2022    MCH 31.7 03/27/2022    MCHC 34.0 03/27/2022    RDW 13.4 03/27/2022    SEGSPCT 54 10/04/2013    LYMPHOPCT 20.5 03/27/2022    MONOPCT 10.9 03/27/2022    BASOPCT 0.1 03/27/2022    MONOSABS 0.79 03/27/2022    LYMPHSABS 1.49 03/27/2022    EOSABS 0.01 03/27/2022    BASOSABS 0.01 03/27/2022     CMP:    Lab Results   Component Value Date     03/27/2022    K 3.6 03/27/2022    K 3.6 03/24/2022     03/27/2022    CO2 26 03/27/2022    BUN 27 03/27/2022    CREATININE 1.4 03/27/2022    GFRAA 44 03/27/2022    LABGLOM 37 03/27/2022    GLUCOSE 111 03/27/2022    GLUCOSE 116 05/30/2012    PROT 5.7 03/27/2022    LABALBU 3.0 03/27/2022    LABALBU 3.9 05/30/2012    CALCIUM 8.4 03/27/2022    BILITOT 0.3 03/27/2022    ALKPHOS 75 03/27/2022    AST 28 03/27/2022    ALT <5 03/27/2022     Magnesium:    Lab Results   Component Value Date    MG 2.0 03/27/2022       Imaging:   XR HIP LEFT (1 VIEW)   Final Result   Placement of left hip arthroplasty expected alignment. NM LUNG SCAN PERFUSION ONLY   Final Result   Low Probability for Pulmonary Embolus. US DUP LOWER EXTREMITIES BILATERAL VENOUS   Final Result   No evidence of DVT in either lower extremity.       RECOMMENDATIONS:   Unavailable         XR CHEST 1 VIEW   Final Result Focal opacity in the left upper midlung may represent chronic scarring versus   early infiltrates. Follow-up study recommended. XR HIP 2-3 VW W PELVIS LEFT   Final Result   1. New deformity involving left femoral head with severe femoral head   flattening. 2. Satisfactory alignment of right hip arthroplasty. X-ray chest PA and lateral    (Results Pending)       Patient Instructions:      Medication List      START taking these medications    oxyCODONE 5 MG immediate release tablet  Commonly known as: ROXICODONE  Take 1 tablet by mouth every 4 hours as needed for Pain for up to 3 days. sennosides-docusate sodium 8.6-50 MG tablet  Commonly known as: SENOKOT-S  Take 1 tablet by mouth 2 times daily Prevention of constipation        CHANGE how you take these medications    aspirin 81 MG EC tablet  Take 1 tablet by mouth 2 times daily for 30 doses DVT prophylaxis x30 days  What changed:   · when to take this  · additional instructions     carvedilol 12.5 MG tablet  Commonly known as: COREG  Take 1 tablet by mouth 2 times daily  What changed:   · medication strength  · how much to take  · how to take this  · when to take this  · additional instructions     gabapentin 400 MG capsule  Commonly known as: NEURONTIN  Take 1 capsule by mouth 2 times daily for 180 days. What changed: See the new instructions.      predniSONE 10 MG tablet  Commonly known as: DELTASONE  Take 4 tabs on 3/28-3/29; 3 tabs on 3/30-4/1; 2 tabs on 4/2-4/4; 1 tab on 4/5-4/7  What changed: additional instructions        CONTINUE taking these medications    albuterol sulfate  (90 Base) MCG/ACT inhaler  Commonly known as: Ventolin HFA  Inhale 2 puffs into the lungs 4 times daily as needed for Wheezing     alendronate 70 MG tablet  Commonly known as: FOSAMAX  TAKE 1 TABLET EVERY 7 DAYS     baclofen 10 MG tablet  Commonly known as: LIORESAL  TAKE 1 TABLET TWICE A DAY     Cholecalciferol 50 MCG (2000 UT) Caps     sertraline 100 MG tablet  Commonly known as: ZOLOFT  TAKE 1 AND 1/2 TABLETS     DAILY     simvastatin 40 MG tablet  Commonly known as: ZOCOR  TAKE 1 TABLET NIGHTLY     tiZANidine 2 MG tablet  Commonly known as: ZANAFLEX  Take 1 tablet by mouth 4 times daily as needed (back pain)        STOP taking these medications    amLODIPine 5 MG tablet  Commonly known as: NORVASC     hydroCHLOROthiazide 25 MG tablet  Commonly known as: HYDRODIURIL           Where to Get Your Medications      You can get these medications from any pharmacy    Bring a paper prescription for each of these medications  · aspirin 81 MG EC tablet  · oxyCODONE 5 MG immediate release tablet  · sennosides-docusate sodium 8.6-50 MG tablet     Information about where to get these medications is not yet available    Ask your nurse or doctor about these medications  · carvedilol 12.5 MG tablet  · gabapentin 400 MG capsule  · predniSONE 10 MG tablet         Total time for discharge is 37 min    Signed:  Electronically signed by Jai Qureshi DO on 3/27/2022 at 11:35 AM

## 2022-03-27 NOTE — PROGRESS NOTES
Pulmonary Progress Note      Sitting up in chair, pain controlled  On room air  To go to rehab today, kept yesterday for low mag  Breathing much better, still coughing but improved      Vent settings:Vent Information  SpO2: 93 %  Additional Respiratory  Assessments  Pulse: 81  Resp: 16  SpO2: 93 %      Current Facility-Administered Medications:     sertraline (ZOLOFT) tablet 100 mg, 100 mg, Oral, Daily, Hugh Hric, DO, 100 mg at 03/27/22 0902    predniSONE (DELTASONE) tablet 40 mg, 40 mg, Oral, Daily with breakfast, Rochelle Hickey, APRN - CNP, 40 mg at 03/27/22 0901    baclofen (LIORESAL) tablet 10 mg, 10 mg, Oral, BID, Hugh Hric, DO, 10 mg at 03/27/22 0902    gabapentin (NEURONTIN) capsule 400 mg, 400 mg, Oral, BID, Hugh Hric, DO, 400 mg at 03/27/22 0904    sodium chloride flush 0.9 % injection 5-40 mL, 5-40 mL, IntraVENous, 2 times per day, Opal Lepe MD, 10 mL at 03/27/22 0901    sodium chloride flush 0.9 % injection 5-40 mL, 5-40 mL, IntraVENous, PRN, Opal Lepe MD, 10 mL at 03/26/22 2115    0.9 % sodium chloride infusion, 25 mL, IntraVENous, PRN, Opal Lepe MD    acetaminophen (TYLENOL) tablet 650 mg, 650 mg, Oral, Q6H, Opal Lepe MD, 650 mg at 03/27/22 0655    ondansetron (ZOFRAN-ODT) disintegrating tablet 4 mg, 4 mg, Oral, Q8H PRN **OR** ondansetron (ZOFRAN) injection 4 mg, 4 mg, IntraVENous, Q6H PRN, Opal Lepe MD, 4 mg at 03/26/22 9811    lactated ringers infusion, , IntraVENous, Continuous, Opal Lepe MD, Paused at 03/26/22 1379    oxyCODONE (ROXICODONE) immediate release tablet 5 mg, 5 mg, Oral, Q4H PRN, Opal Lepe MD, 5 mg at 03/26/22 0847    diphenhydrAMINE (BENADRYL) tablet 25 mg, 25 mg, Oral, Q6H PRN **OR** diphenhydrAMINE (BENADRYL) injection 25 mg, 25 mg, IntraVENous, Q6H PRN, Opal Lepe MD    sennosides-docusate sodium (SENOKOT-S) 8.6-50 MG tablet 1 tablet, 1 tablet, Oral, BID, Opal Lepe MD, 1 tablet at 03/27/22 7634    famotidine (PEPCID) tablet 20 mg, 20 mg, Oral, Daily **OR** famotidine (PEPCID) 20 mg in sodium chloride (PF) 10 mL injection, 20 mg, IntraVENous, Daily, Jese Mejia MD, 20 mg at 03/25/22 1316    aspirin EC tablet 81 mg, 81 mg, Oral, BID, Jese Mejia MD, 81 mg at 03/27/22 0902    morphine (PF) injection 2 mg, 2 mg, IntraVENous, Q4H PRN, Jese Mejia MD, 2 mg at 03/25/22 0339    ipratropium-albuterol (DUONEB) nebulizer solution 1 ampule, 1 ampule, Inhalation, Q4H WA, Jese Mejia MD, 1 ampule at 03/27/22 8276    albuterol (PROVENTIL) nebulizer solution 2.5 mg, 2.5 mg, Nebulization, Q6H PRN, Jese Mejia MD    Arformoterol Tartrate [facility]) nebulizer solution 15 mcg, 15 mcg, Nebulization, BID, Jese Mejia MD, 15 mcg at 03/27/22 8164    guaiFENesin tablet 400 mg, 400 mg, Oral, 4x Daily PRN, Jese Mejia MD, 400 mg at 03/24/22 1624    perflutren lipid microspheres (DEFINITY) injection 1.65 mg, 1.5 mL, IntraVENous, ONCE PRN, Jese Mejia MD    carvedilol (COREG) tablet 12.5 mg, 12.5 mg, Oral, BID, Jese Mejia MD, 12.5 mg at 03/26/22 2111    polyethylene glycol (GLYCOLAX) packet 17 g, 17 g, Oral, Daily PRN, Jese Mejia MD    acetaminophen (TYLENOL) tablet 650 mg, 650 mg, Oral, Q6H PRN, 650 mg at 03/24/22 0548 **OR** acetaminophen (TYLENOL) suppository 650 mg, 650 mg, Rectal, Q6H PRN, Jese Mejia MD    0.9 % sodium chloride infusion, , IntraVENous, Continuous, Jese Mejia MD, Last Rate: 75 mL/hr at 03/25/22 1207, Rate Change at 03/25/22 1207  BP (!) 97/52   Pulse 81   Temp 97.7 °F (36.5 °C) (Oral)   Resp 16   Ht 5' 2\" (1.575 m)   Wt 179 lb 9.6 oz (81.5 kg)   SpO2 93%   BMI 32.85 kg/m²     General: No distress  Chest: Symmetric, no accessory use  Heart: RRR  Lungs: Diminished throughout with no wheezing or rhonchi.  L base with few faint rales  Abdomen: Soft, nt  Extremities: BLE trace edema R>L, L hip with drsg and edema    Intake/Output Summary (Last 24 hours) at 3/27/2022 6901  Last data filed at 3/27/2022 7315  Gross per 24 hour   Intake 1109.01 ml   Output 500 ml   Net 609.01 ml       IMPRESSION:   S/P hip arthroplasty doing well - per orthopedics. To go to rehab today. Hypoxemia resolved - now on room air. O2 as needed to keep pox >90%  COPD with probable acute exacerbation - improving with no wheezing. Continue brovana and duonebs. To resume Trelegy at discharge. Taper prednisone 40mg x2 days, 30mgx3 days, 20mgx3 days, 10mgx3 days on discharge  IS - correct use  Ambulate as able  Ok to rehab. Casandra Barboza, APRN - CNP  3/27/2022  9:23 AM       Seen and examined, agree with above. Okay to resume Trelegy at [facility] from rehab while continuing nebs there. Taper prednisone as above. Okay to dc and fu with me 4 weeks with a spirometry in office.

## 2022-03-27 NOTE — PROGRESS NOTES
Educated patient on the importance of Incentive Spirometer, Patient returned demonstration of 1250, tolerated good.  Will continue to promote hourly usage during hourly rounds

## 2022-03-27 NOTE — PROGRESS NOTES
Daily Treat            Evaluating PT:  Deshawn Weller. John Steinberg, P.T.     Room #:  5818/4313-S  Diagnosis:  Unable to ambulate [R26.2]  Closed fracture of head of femur, unspecified laterality, initial encounter [facility]) [H77.935A]  Ambulatory dysfunction [R26.2]  Osteoarthritis of left hip, unspecified osteoarthritis type [M16.12]  Pertinent PMHx/PSHx:  R AIDEE, Multiple Sclerosis  Procedure/Surgery:  3/25/22 L AIDEE  Precautions:  WBAT LLE, L hip precautions, Falls  Equipment Needs:  Wheeled walker if she needs to reduce WB L hip due to pain control other wise she may be able to use her rollator ww     SUBJECTIVE:     Pt lives with  (he has poor health and unable to assist her physically) in a 1 story home with a ramp to enter. Pt ambulated with a rollator ww PTA.     OBJECTIVE:    Initial Evaluation  Date: 3/25/22 Treatment  3/27/22 AM Short Term/ Long Term   Goals   Was pt agreeable to Eval/treatment? yes  Yes     Does pt have pain? No c/o pain Min LT Hip      Bed Mobility  Rolling: MIN A  Supine to sit: MOD A  Sit to supine: NA  Scooting: MOD A  Sup-sit EOB CGA   Sit-sup: Mod A-after walk and there-ex was highly fatigued Independent    Transfers Sit to stand: MOD A  Stand to sit: MOD A  Stand pivot: MIN A with ww   Bed-stand CGA    Chair-stand @ walk, there-ex was mod assist  supervision   Ambulation   3 feet with ww MIN A    WW CGA/SBA 80' total 150 feet with ww SBA   Stair negotiation: ascended and descended NA  NA NA, pt has ramp to enter   AM-PAC 6 Clicks 50/73  79/51      Pt is alert and oriented x3  Balance: Requires [employer] @ CGA      Therapeutic Exercises:  LTLE x 30 reps ea    Patient education  Pt educated on Universal hip, gait pattern,     Patient response to education:   Pt verbalized understanding Pt demonstrated skill Pt requires further education in this area   Y Y N     ASSESSMENT:    Comments 3/27/22 AM: In stack chair on arrival in no distress. Going to rehab later today.   Reviewed universal hip prec with good understanding and payton over knowledge of all of them. Had RT AIDEE and remembered them from that. SPO2 check on RA prior to mobility @ 90%; no cyanosis or SOB. No conversational dyspnea prior to mobility or throughout mobility. SPO2 drop to 71% on RA post walk and again no SOB. Rapid rise to 90% within 1' or <.  Transfer to stand was SBA/CGA without any positional change dizziness. Amb with [employer] with improved pace from prior day with a reciprocal step through pattern. No drifting, lateral veering or LOB observed. No requests to stop and break while walking and inc walk distance to 225' from [de-identified]' prior day. Reports slept well and feels much stronger this AM.  There-x in chair was well tolerated. Cont with weakness LT hip 3-/5 with distal 5/5. Highly engaged and motivated. Call light and wall phone on bed to LT and reachable. Tray to RT side. Very appreciative for care. Comments 3/26/22 PM: In bed on arrival receptive for PM session. Pain reported as under control. Supine to sit was CGA with bed rail assistance. No dizziness expressed during and post all positional changes. Transfer to stand from bed was CGA in the beginning of Tx and was later @ Mod assist from stack chair; walk and there-ex with reported fatigue prior to chair transfer mod assist.  Amb with [employer] CGA/SBA in a partial step through pattern slow but steady. No drifting, lateral veering or LOB with [employer]. No static standing rest breaks PM session as had 3 AM session 2/2 reported fatigue. There-ex in chair was highly engaged and motivated. No inc pain with ROM/ther-ex LT hip expressed. AA ROM LT hip 85*, knee/ankle WNL AROM. MMT LT hip 3-/5, knee/ankle 5/5. Wanted to return to bed following Tx. Mod assist for sit-supine positioning. Call light and wall phone on bed. Dtr in room assisting care. Very appreciative for care. Comments 3/26/22 AM :  In chair on arrival in no distress. Off NCO2 from 2L prior day.   Has Hx COPD and sats lied in low 90's with .5 to 1L and reports sits there off w/o O2. No conversational dyspnea with care this AM.   Transfers with CGA w/o dizziness and no dizziness during and post all positional changes. Amb with [employer] with ultra slow step to gait pattern with no drifting, lateral veering or LOB. Wanted to take static standing rest break after first 35-40' walk 2/2 c/o fatigue. Gait was non-antalgic LTLE. There-ex was well tolerated. MMT LT hip 3-/5, knee 4-5/5. 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 Call light and wall phone on bed to LT and reachable. Tray to RT side and height good for seated positions. Very appreciative for care. Treatment:  Patient practiced and was instructed in the following treatment:     Education   Transfers   Amb   There-ex    PLAN:    Pt is making + progress toward established Physical Therapy goals. Continue with physical therapy current plan of care. Total Treatment Time  30 minutes     Evaluation Time includes thorough review of current medical information, gathering information on past medical history/social history and prior level of function, completion of standardized testing/informal observation of tasks, assessment of data and education on plan of care and goals.     CPT codes:  [] Low Complexity PT evaluation 26348  [] Moderate Complexity PT evaluation 29993  [] High Complexity PT evaluation 60403  [] PT Re-evaluation 68940  [x] Gait training 07708 ** minutes  [] Manual therapy 73318 ** minutes  [x] Therapeutic activities 47622 ** minutes  [] Therapeutic exercises 96073 ** minutes  [] Neuromuscular reeducation 46050 ** minutes       Renetta More PT

## 2022-03-28 ENCOUNTER — OUTSIDE SERVICES (OUTPATIENT)
Dept: PRIMARY CARE CLINIC | Age: 77
End: 2022-03-28
Payer: MEDICARE

## 2022-03-28 VITALS
OXYGEN SATURATION: 93 % | RESPIRATION RATE: 18 BRPM | HEART RATE: 82 BPM | TEMPERATURE: 97.3 F | SYSTOLIC BLOOD PRESSURE: 130 MMHG | DIASTOLIC BLOOD PRESSURE: 87 MMHG

## 2022-03-28 DIAGNOSIS — M16.12 PRIMARY OSTEOARTHRITIS OF LEFT HIP: ICD-10-CM

## 2022-03-28 DIAGNOSIS — I10 ESSENTIAL HYPERTENSION: ICD-10-CM

## 2022-03-28 DIAGNOSIS — S72.052A CLOSED FRACTURE OF HEAD OF LEFT FEMUR, INITIAL ENCOUNTER (HCC): Primary | ICD-10-CM

## 2022-03-28 DIAGNOSIS — G89.18 POST-OPERATIVE PAIN: ICD-10-CM

## 2022-03-28 DIAGNOSIS — J44.1 ACUTE EXACERBATION OF CHRONIC OBSTRUCTIVE PULMONARY DISEASE (COPD) (HCC): ICD-10-CM

## 2022-03-28 DIAGNOSIS — N18.32 STAGE 3B CHRONIC KIDNEY DISEASE (HCC): ICD-10-CM

## 2022-03-28 DIAGNOSIS — E78.5 HYPERLIPIDEMIA, UNSPECIFIED HYPERLIPIDEMIA TYPE: ICD-10-CM

## 2022-03-28 DIAGNOSIS — F32.A DEPRESSION, UNSPECIFIED DEPRESSION TYPE: ICD-10-CM

## 2022-03-28 DIAGNOSIS — Z72.0 TOBACCO ABUSE: ICD-10-CM

## 2022-03-28 DIAGNOSIS — G35 MULTIPLE SCLEROSIS (HCC): ICD-10-CM

## 2022-03-28 DIAGNOSIS — R53.81 DEBILITY: ICD-10-CM

## 2022-03-28 PROCEDURE — 99306 1ST NF CARE HIGH MDM 50: CPT | Performed by: STUDENT IN AN ORGANIZED HEALTH CARE EDUCATION/TRAINING PROGRAM

## 2022-03-28 RX ORDER — OXYCODONE HYDROCHLORIDE 5 MG/1
5 TABLET ORAL EVERY 4 HOURS PRN
Qty: 120 TABLET | Refills: 0 | Status: SHIPPED
Start: 2022-03-28 | End: 2022-04-07 | Stop reason: SDUPTHER

## 2022-03-28 ASSESSMENT — ENCOUNTER SYMPTOMS
SORE THROAT: 0
WHEEZING: 0
NAUSEA: 0
ABDOMINAL PAIN: 0
BACK PAIN: 0
CONSTIPATION: 0
TROUBLE SWALLOWING: 0
VOMITING: 0
SHORTNESS OF BREATH: 0
COUGH: 1

## 2022-03-28 NOTE — PROGRESS NOTES
[facility] Admission History and Physical  [address] [facility]  : 1945  Visit Date: 3/28/2022  Facility:  [facility]      CC: Admission History and Physical for:   Chief Complaint   Patient presents with    Arthritis    Hip Pain    H&P     for skilled rehabilitation       History of Present Illness:      Hospital Course:  Liliane Motta is a 77 y.o. female with a past medical history of multiple sclerosis, hypertension, COPD, arthritis, hyperlipidemia who was admitted to [facility] on 3/23/2022 for evaluation and treatment of severe hip pain. She was noted to have a hip fracture and was seen by orthopedic surgery. She was also found to be hypoxic on presentation and was started on oxygen with pulmonology evaluation. Pulmonology felt that it was low probability for pulmonary embolism and noted an unknown baseline severity of COPD. She was started on bronchodilators. She was seen by cardiology preoperatively. On 3/25/2022 she underwent left total hip arthroplasty which was uncomplicated. She was able to be weaned off of supplemental oxygen and pulmonology felt that she likely had an acute exacerbation of COPD. She was placed on a steroid taper. Her condition stabilized and she was discharged to [facility] on 3/27/2022 for skilled rehabilitation. Functional Status Prior to Admission: The patient lives with her  in a one-story home. She ambulates with a Rollator. Hospital Labs: Labs on presentation: CBC with WBC 6.6, hemoglobin 13.0, MCV 95.4, platelets 660; CMP with potassium 3.2, BUN 30, creatinine 1.9. Labs prior to discharge: CBC with diabetes of 0.3, hemoglobin 9.7, MCV 93.1; CMP with BUN 27, creatinine 1.4, calcium 8.4, protein 5.7, albumin 3.0. Hospital Imaging: XR hip: Left femoral head flattening. CXR: Focal opacity in left upper midlung may represent scarring versus infiltrates.     [address] duplex lower extremities: No evidence of DVT. VQ scan: Low probability for PE.    XR hip: New left hip arthroplasty with expected alignment. Interval History: The patient was seen and examined in her room. Nursing reports no acute issues since arrival to the facility. She states that she is doing well. She says that she was critically ill when she presented to the hospital, but feels she is doing back on her feet. Prior to admission, her pain was increasing significantly and she found herself unable to ambulate or perform her normal functions. Due to this, she has had deconditioning. She reports that she has been on Trelegy for COPD for about 6 months. She states that she was diagnosed by her PCP with COPD due to smoking history however has not had formal PFTs. She states that she has not had a cigarette since admission to the hospital, and is not currently experiencing cravings or withdrawals. Currently her hip is doing well. She has some pain when getting up, but has been able to ambulate to the bathroom independently. Her biggest concern functionally is of decreased endurance that she feels is related to deconditioning. Oxycodone was effective in the hospital.  The pain gets up to a 6-7/10. She is taking alendronate but states she has had osteoporosis for years. She has had previous replacements of her other hip and both knees. She reports that she has had multiple sclerosis for years. She previously was followed at [facility] but now does not see a neurologist.  She takes baclofen and gabapentin for spasms which are helpful. She does not feel her current weakness is related to MS but instead to deconditioning.     Advance Care Planning: Full code    Lab Results   Component Value Date    WBC 7.3 03/27/2022    HGB 9.7 (L) 03/27/2022    HCT 28.5 (L) 03/27/2022    MCV 93.1 03/27/2022     03/27/2022    LYMPHOPCT 20.5 03/27/2022    RBC 3.06 (L) 03/27/2022    MCH 31.7 03/27/2022    MCHC 34.0 03/27/2022    RDW 13.4 03/27/2022       Lab Results   Component Value Date     03/27/2022    K 3.6 03/27/2022     03/27/2022    CO2 26 03/27/2022    BUN 27 (H) 03/27/2022    CREATININE 1.4 (H) 03/27/2022    GLUCOSE 111 (H) 03/27/2022    CALCIUM 8.4 (L) 03/27/2022    PROT 5.7 (L) 03/27/2022    LABALBU 3.0 (L) 03/27/2022    BILITOT 0.3 03/27/2022    ALKPHOS 75 03/27/2022    AST 28 03/27/2022    ALT <5 03/27/2022    LABGLOM 37 03/27/2022    GFRAA 44 03/27/2022       Lab Results   Component Value Date    VITD25 67 08/10/2018    TSH 0.769 08/10/2018       Hemoglobin A1C   Date Value Ref Range Status   08/30/2012 5.5 4.8 - 6.0 % Final       Lab Results   Component Value Date    INR 1.1 02/23/2018    INR 1.3 05/30/2012    PROTIME 11.6 02/23/2018    PROTIME 13.0 (H) 05/30/2012         Allergies:  Macrodantin [nitrofurantoin macrocrystal]    Past Medical History:   Diagnosis Date    Arthritis     [facility]     COPD (chronic obstructive pulmonary disease) ([facility]     Hyperlipidemia     Hypertension     [facility]     diagnosised 8  yrs ago [facility]       Current Outpatient Medications   Medication Sig Dispense Refill    oxyCODONE (ROXICODONE) 5 MG immediate release tablet Take 1 tablet by mouth every 4 hours as needed for Pain for up to 30 days. 120 tablet 0    gabapentin (NEURONTIN) 400 MG capsule Take 1 capsule by mouth 2 times daily for 180 days.  90 capsule 3    carvedilol (COREG) 12.5 MG tablet Take 1 tablet by mouth 2 times daily 60 tablet 3    predniSONE (DELTASONE) 10 MG tablet Take 4 tabs on 3/28-3/29; 3 tabs on 3/30-4/1; 2 tabs on 4/2-4/4; 1 tab on 4/5-4/7 10 tablet 0    aspirin 81 MG EC tablet Take 1 tablet by mouth 2 times daily for 30 doses DVT prophylaxis x30 days 60 tablet 0    sennosides-docusate sodium (SENOKOT-S) 8.6-50 MG tablet Take 1 tablet by mouth 2 times daily Prevention of constipation 60 tablet 0    sertraline (ZOLOFT) 100 MG tablet TAKE 1 AND 1/2 TABLETS     DAILY 135 tablet 0    baclofen (LIORESAL) 10 MG tablet TAKE 1 TABLET TWICE A  tablet 1    alendronate (FOSAMAX) 70 MG tablet TAKE 1 TABLET EVERY 7 DAYS 12 tablet 0    tiZANidine (ZANAFLEX) 2 MG tablet Take 1 tablet by mouth 4 times daily as needed (back pain) 20 tablet 0    simvastatin (ZOCOR) 40 MG tablet TAKE 1 TABLET NIGHTLY 90 tablet 1    albuterol sulfate HFA (VENTOLIN HFA) 108 (90 Base) MCG/ACT inhaler Inhale 2 puffs into the lungs 4 times daily as needed for Wheezing 1 Inhaler 5    Cholecalciferol 50 MCG (2000 UT) CAPS Take 2,000 Units by mouth every morning        No current facility-administered medications for this visit. Past Surgical History:   Procedure Laterality Date    APPENDECTOMY      BREAST SURGERY  4/13/2012    biopsy - negative    HIP ARTHROPLASTY Right 04/13/2011    Right AIDEE  ALLISON Macedo MD    HYSTERECTOMY      KNEE ARTHROPLASTY Left 10/01/2013    Left TKA  ALLISON Macedo MD    KNEE ARTHROPLASTY Right 08/29/2012    Right TKA  ALLISON Macedo MD    TOTAL HIP ARTHROPLASTY Left 3/25/2022    LEFT HIP TOTAL ARTHROPLASTY performed by Jese Mejia MD at [address] [place] [address] History   Problem Relation Age of Onset    Mult Sclerosis Father     Thyroid Cancer Mother     Cirrhosis Mother        Social History     Socioeconomic History    Marital status:      Spouse name: None    Number of children: None    Years of education: None    Highest education level: None   Occupational History     Employer: RETIRED   Tobacco Use    Smoking status: Current Every Day Smoker     Packs/day: 1.00     Years: 40.00     Pack years: 40.00    Smokeless tobacco: Never Used   Vaping Use    Vaping Use: Never used   Substance and Sexual Activity    Alcohol use: No    Drug use: No    Sexual activity: Yes   Other Topics Concern    None   Social History Narrative    None     Social Determinants of Health     Financial Resource Strain:     Difficulty of Paying Living Expenses: Not on file   Food Insecurity:     Worried About Running Out of Food in the Last Year: Not on file    Arleen of Food in the Last Year: Not on file   Transportation Needs:     Lack of Transportation (Medical): Not on file    Lack of Transportation (Non-Medical): Not on file   Physical Activity:     Days of Exercise per Week: Not on file    Minutes of Exercise per Session: Not on file   Stress:     Feeling of Stress : Not on file   Social Connections:     Frequency of Communication with Friends and Family: Not on file    Frequency of Social Gatherings with Friends and Family: Not on file    Attends Gnosticist Services: Not on file    Active Member of [address] [place] [address] [employer] or Organizations: Not on file    Attends Club or Organization Meetings: Not on file    Marital Status: Not on file   Intimate Partner Violence:     Fear of Current or Ex-Partner: Not on file    Emotionally Abused: Not on file    Physically Abused: Not on file    Sexually Abused: Not on file   Housing Stability:     Unable to Pay for Housing in the Last Year: Not on file    Number of [place] in the Last Year: Not on file    Unstable Housing in the Last Year: Not on file       Review of Systems   Constitutional: Negative for appetite change, chills and fever. HENT: Negative for congestion, sore throat and trouble swallowing. Eyes: Positive for visual disturbance (diplopia looking sideways, related to MS). Respiratory: Positive for cough. Negative for shortness of breath and wheezing. Cardiovascular: Positive for leg swelling (mild since the surgery). Negative for chest pain. Gastrointestinal: Negative for abdominal pain, constipation, nausea and vomiting. Genitourinary: Negative for dysuria and frequency. Musculoskeletal: Positive for arthralgias. Negative for back pain and myalgias. Skin: Positive for wound. Neurological: Positive for numbness (chronic in hands from MS).  Negative for dizziness, light-headedness and headaches. Psychiatric/Behavioral: Negative for dysphoric mood. The patient is not nervous/anxious. /87   Pulse 82   Temp 97.3 °F (36.3 °C)   Resp 18   SpO2 93%     Physical Exam  Vitals reviewed. Constitutional:       General: She is not in acute distress. Appearance: She is well-developed. HENT:      Head: Normocephalic and atraumatic. Right Ear: External ear normal.      Left Ear: External ear normal.      Nose: Nose normal.      Mouth/Throat:      Mouth: Mucous membranes are moist.      Pharynx: Oropharynx is clear. Eyes:      Extraocular Movements: Extraocular movements intact. Right eye: Nystagmus present. Left eye: Nystagmus present. Conjunctiva/sclera: Conjunctivae normal.      Pupils: Pupils are equal, round, and reactive to light. Neck:      Thyroid: No thyromegaly. Vascular: No carotid bruit. Cardiovascular:      Rate and Rhythm: Normal rate and regular rhythm. Pulses: Normal pulses. Heart sounds: Normal heart sounds. Pulmonary:      Effort: Pulmonary effort is normal.      Breath sounds: Examination of the right-lower field reveals wheezing. Examination of the left-lower field reveals wheezing. Wheezing present. No rales. Abdominal:      General: Bowel sounds are normal. There is no distension. Palpations: Abdomen is soft. There is no hepatomegaly or splenomegaly. Tenderness: There is no abdominal tenderness. Musculoskeletal:         General: Tenderness present. Cervical back: Neck supple. Right lower leg: No edema. Left lower leg: No edema. Lymphadenopathy:      Cervical: No cervical adenopathy. Skin:     General: Skin is warm and dry. Neurological:      Mental Status: She is alert. Cranial Nerves: Cranial nerves are intact. Sensory: Sensory deficit (fingertips) present. Motor: Motor function is intact. No weakness or tremor.       Deep Tendon Reflexes:      Reflex Scores: Bicep reflexes are 2+ on the right side and 2+ on the left side. Patellar reflexes are 1+ on the right side and 1+ on the left side. Psychiatric:         Attention and Perception: Attention normal.         Mood and Affect: Mood and affect normal.         Behavior: Behavior normal. Behavior is cooperative. Cognition and Memory: Cognition normal.         Assessment and Plan:    I have spent over 51% of the total time (50 minutes) of this patient encounter reviewing hospital, EPIC and facility records, coordinating care with facility staff and counseling/educating the patient/family regarding the patient's plan of care as outlined below. Left hip fracture and osteoarthritis with postoperative pain  · Patient with left femoral head fracture related to osteoporosis and osteoarthritis now status post AIDEE. · Patient reports she is able to ambulate and that her pain is controlled on oxycodone. · Continue oxycodone 5 mg every 4 hours as needed. · PT/OT evaluation and treatment. · Follow-up with orthopedic surgery. Acute exacerbation of chronic obstructive pulmonary disease  · Patient with history of COPD related to tobacco history and on Trelegy at home. · Patient presented to the hospital with hypoxia and was felt to have a COPD. · Patient evaluated by pulmonology with plans for outpatient follow-up. · Continue steroid taper. Continue albuterol as needed. · Anticipate patient will resume Trelegy after discharge. Acute blood loss anemia  · Patient with acute drop in hemoglobin levels after surgery likely related to surgical losses. · Start iron every other day and monitor CBC. Chronic kidney disease, stage 3B  · Likely secondary to longstanding hypertension. · Avoid nephrotoxic medications and monitor chemistry. Essential hypertension  · Per history with blood pressure controlled since arrival to the facility. · Continue carvedilol 12.5 mg twice daily.     Hyperlipidemia  · Continue home simvastatin 40 mg nightly. Depression  · Continue home sertraline 150 mg daily. Multiple sclerosis  · Per history without obvious exacerbation. · Patient is not currently following with neurology or taking disease modifying agents. · Continue gabapentin and baclofen and follow-up as outpatient. Osteoporosis  · Likely contributing to patient's femoral head fracture. · Continue alendronate weekly. Tobacco abuse  · Contributing to patient's history of COPD. · Patient has not had a cigarette since admission to the hospital.  · No obvious signs of withdrawal or cravings. · Monitor for need for nicotine replacement. Encourage complete cessation. Debility  · Multifactorial related to recent hip fracture, osteoarthritis with chronic pain, multiple sclerosis, advancing age. · PT/OT evaluation and treatment. · Patient is appropriate for skilled rehabilitation level of care. Diagnosis Orders   1. Closed fracture of head of left femur, initial encounter ([facility]     2. Post-operative pain  oxyCODONE (ROXICODONE) 5 MG immediate release tablet   3. Primary osteoarthritis of left hip     4. Acute exacerbation of chronic obstructive pulmonary disease (COPD) ([facility]     5. Stage 3b chronic kidney disease ([facility]     6. Essential hypertension     7. Hyperlipidemia, unspecified hyperlipidemia type     8. Depression, unspecified depression type     9. Multiple sclerosis ([facility]     10. Tobacco abuse     11. [address]  continues to require nursing level care due to need for assistance with ADLs. Advanced directives, recent labs, MAR, TAR were reviewed. At risk for unavoidable skin breakdown due to incontinence and limited mobility. Continue preventive measures. Follow up: Return in about 1 week (around 4/4/2022), or for acute issues. Isabelle Odonnell MD  3/28/2022     This report was generated using a computer driven dictation system.  This system may results in transcription errors. Every effort is made to identify and correct errors, however errors may still occur.

## 2022-03-30 ENCOUNTER — CARE COORDINATION (OUTPATIENT)
Dept: CASE MANAGEMENT | Age: 77
End: 2022-03-30

## 2022-03-30 ENCOUNTER — TELEPHONE (OUTPATIENT)
Dept: CARDIOLOGY CLINIC | Age: 77
End: 2022-03-30

## 2022-03-30 NOTE — CARE COORDINATION
[facility] Transitions Initial Follow Up Call    Call within 2 business days of discharge: Yes    Patient: Efren Moody Patient : 1945   MRN: <[id number]>  Reason for Admission: L Hip replacement  Discharge Date: 3/27/22 RARS: Readmission Risk Score: 14.6 ( )      Last Discharge [facility]       Complaint Diagnosis Description Type Department Provider    3/23/22 Hip Pain Osteoarthritis of left hip, unspecified osteoarthritis type . .. ED to Hosp-Admission (Discharged) (ADMITTED) DORI Ferrell DO; Mario Horn,... Acute Care Course: Patient admitted to [address] [place] [address] for a L hip replacement from 3/23-3/27. She discharged [facility] for 2 days. Sig Hx: Osteoarthritis of L hip    DME: Rollator    Conversation: Pleasant 77 old female s/p L Hip replacement. Patient states she is doing okay. States she is weak. Denies sob, pain, fever, chills. Patient has not had a BM in 5 days. 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 States she drinks warm prune juice with Miralax. She will start It today. States that usually takes care of it. States she is taking stool softeners. Appetite fair. States she is hydrating. Medications reviewed. Patient is weight bearing, She ambulates using a rollator. She performs ADL's with assistance of spouse if needed. [facility] has made contact. She states she was picking the phone to schedule a f/u when this call came in. Patient has family support. She states the only thing she needs to do is work on weakness. States she left snf early due to conditions were bad. States she understands because she was in the same line of work. Follow up plan: Will hand off.     Non-face-to-face services provided:      Care Transitions 24 Hour Call    Do you have any ongoing symptoms?: No  Do you have a copy of your discharge instructions?: Yes  Do you have all of your prescriptions and are they filled?: Yes  Have you been contacted by a [facility]?: No  Have you scheduled your follow up appointment?: Yes (Comment: Patient will call following interview)  Were you discharged with any Home Care or Post Acute Services: Yes  Post Acute Services: Home Health (Comment: [facility])  Do you feel like you have everything you need to keep you well at home?: Yes  Care Transitions Interventions       Transitions of Care Initial Call    Was this an external facility discharge? No Discharge Facility: [facility] to be reviewed by the provider   Additional needs identified to be addressed with provider: No  none             Method of communication with provider : none      Advance Care Planning:   Does patient have an Advance Directive: reviewed and current, reviewed and needs to be updated, not on file; education provided, not on file, patient declined education, decision maker updated and referral to internal ACP facilitator. Was this a readmission? No  Patient stated reason for admission: L Hip replacement  Patients top risk factors for readmission: Chronic illnesses    Care Transition Nurse (CTN) contacted the patient by telephone to perform post hospital discharge assessment. Verified name and  with patient as identifiers. Provided introduction to self, and explanation of the CTN role. CTN reviewed discharge instructions, medical action plan and red flags with patient who verbalized understanding. Patient given an opportunity to ask questions and does not have any further questions or concerns at this time. Were discharge instructions available to patient? Yes. Reviewed appropriate site of care based on symptoms and resources available to patient including: PCP  Specialist  When to call 911. The patient agrees to contact the PCP office for questions related to their healthcare. Medication reconciliation was performed with patient, who verbalizes understanding of administration of home medications.  Advised obtaining a 90-day supply of all daily and as-needed medications. Covid Risk Education     Educated patient about risk for severe COVID-19 due to risk factors according to CDC guidelines. CTN reviewed discharge instructions, medical action plan and red flag symptoms with the patient who verbalized understanding. Discussed COVID vaccination status: Yes. Education provided on COVID-19 vaccination as appropriate. Discussed exposure protocols and quarantine with CDC Guidelines. Patient was given an opportunity to verbalize any questions and concerns and agrees to contact CTN or health care provider for questions related to their healthcare. Reviewed and educated patient on any new and changed medications related to discharge diagnosis. Was patient discharged with a pulse oximeter? No Discussed and confirmed pulse oximeter discharge instructions and when to notify provider or seek emergency care. CTN provided contact information. Plan for follow-up call in 3-5 days based on severity of symptoms and risk factors. Plan for next call: symptom management-weakness/ changes in care requirements        Follow Up  No future appointments.     Jade Miranda RN

## 2022-03-31 DIAGNOSIS — F32.9 REACTIVE DEPRESSION: ICD-10-CM

## 2022-03-31 RX ORDER — BACLOFEN 10 MG/1
TABLET ORAL
Qty: 60 TABLET | Refills: 0 | Status: SHIPPED | OUTPATIENT
Start: 2022-03-31 | End: 2022-06-17 | Stop reason: SDUPTHER

## 2022-03-31 RX ORDER — SERTRALINE HYDROCHLORIDE 100 MG/1
TABLET, FILM COATED ORAL
Qty: 135 TABLET | Refills: 0 | Status: SHIPPED | OUTPATIENT
Start: 2022-03-31

## 2022-03-31 RX ORDER — AMLODIPINE BESYLATE 5 MG/1
TABLET ORAL
Qty: 90 TABLET | Refills: 1 | OUTPATIENT
Start: 2022-03-31

## 2022-04-06 DIAGNOSIS — J41.0 SIMPLE CHRONIC BRONCHITIS (HCC): ICD-10-CM

## 2022-04-06 RX ORDER — FLUTICASONE FUROATE, UMECLIDINIUM BROMIDE AND VILANTEROL TRIFENATATE 200; 62.5; 25 UG/1; UG/1; UG/1
POWDER RESPIRATORY (INHALATION)
Qty: 1 EACH | Refills: 3 | OUTPATIENT
Start: 2022-04-06

## 2022-04-07 DIAGNOSIS — G89.18 POST-OPERATIVE PAIN: ICD-10-CM

## 2022-04-07 RX ORDER — OXYCODONE HYDROCHLORIDE 5 MG/1
5 TABLET ORAL EVERY 4 HOURS PRN
Qty: 12 TABLET | Refills: 0 | Status: SHIPPED
Start: 2022-04-07 | End: 2022-04-12

## 2022-04-07 NOTE — TELEPHONE ENCOUNTER
Patient was provided an emergency authorization fell on admission to the nursing home due to a prescription not being sent from the hospital.  Received request from pharmacy for this prescription. Apparently the previous attempt to fax her oxycodone prescription was unsuccessful. New prescription sent to pharmacy to reconcile the emergency fill.     Mike Oscar MD  4/7/2022

## 2022-04-11 RX ORDER — CARVEDILOL 12.5 MG/1
12.5 TABLET ORAL 2 TIMES DAILY
Qty: 180 TABLET | Refills: 0 | Status: SHIPPED
Start: 2022-04-11 | End: 2022-05-02

## 2022-04-12 ENCOUNTER — CARE COORDINATION (OUTPATIENT)
Dept: CARE COORDINATION | Age: 77
End: 2022-04-12

## 2022-04-12 RX ORDER — OXYCODONE HYDROCHLORIDE 5 MG/1
5 TABLET ORAL EVERY 4 HOURS PRN
Qty: 12 TABLET | Refills: 0 | Status: SHIPPED | OUTPATIENT
Start: 2022-04-12 | End: 2022-04-15

## 2022-04-12 NOTE — TELEPHONE ENCOUNTER
Received notification that the emergency prescription was sent to the incorrect long-term care pharmacy. Prescription was sent to the correct pharmacy for her facility.     Lara Dominguez MD  4/12/2022

## 2022-04-12 NOTE — CARE COORDINATION
AC contacted Kelly Austin to offer enrollment in care coordination. Care coordination services explained to Kelly Austin. She states she has had a follow up with Dr. Aisha Wasserman and has been released from restrictions. She denies any needs and declines enrollment in care coordination.

## 2022-04-13 DIAGNOSIS — I10 HYPERTENSION, ESSENTIAL, BENIGN: Chronic | ICD-10-CM

## 2022-04-19 RX ORDER — ALENDRONATE SODIUM 70 MG/1
TABLET ORAL
Qty: 12 TABLET | Refills: 0 | Status: SHIPPED | OUTPATIENT
Start: 2022-04-19 | End: 2022-08-23 | Stop reason: ALTCHOICE

## 2022-04-19 RX ORDER — HYDROCHLOROTHIAZIDE 25 MG/1
TABLET ORAL
Qty: 90 TABLET | Refills: 1 | OUTPATIENT
Start: 2022-04-19

## 2022-04-19 RX ORDER — AMLODIPINE BESYLATE 5 MG/1
TABLET ORAL
Qty: 90 TABLET | Refills: 1 | OUTPATIENT
Start: 2022-04-19

## 2022-04-19 RX ORDER — GABAPENTIN 400 MG/1
CAPSULE ORAL
Qty: 270 CAPSULE | Refills: 0 | Status: SHIPPED | OUTPATIENT
Start: 2022-04-19 | End: 2022-10-27

## 2022-04-29 ENCOUNTER — OFFICE VISIT (OUTPATIENT)
Dept: FAMILY MEDICINE CLINIC | Age: 77
End: 2022-04-29
Payer: COMMERCIAL

## 2022-04-29 VITALS
HEART RATE: 82 BPM | RESPIRATION RATE: 18 BRPM | TEMPERATURE: 97.3 F | WEIGHT: 170.4 LBS | SYSTOLIC BLOOD PRESSURE: 142 MMHG | OXYGEN SATURATION: 90 % | BODY MASS INDEX: 31.36 KG/M2 | DIASTOLIC BLOOD PRESSURE: 70 MMHG | HEIGHT: 62 IN

## 2022-04-29 DIAGNOSIS — M25.511 ACUTE PAIN OF RIGHT SHOULDER: Primary | ICD-10-CM

## 2022-04-29 PROCEDURE — 99214 OFFICE O/P EST MOD 30 MIN: CPT | Performed by: PHYSICIAN ASSISTANT

## 2022-04-29 RX ORDER — METHYLPREDNISOLONE 4 MG/1
TABLET ORAL
Qty: 1 KIT | Refills: 0 | Status: SHIPPED
Start: 2022-04-29 | End: 2022-06-06

## 2022-05-02 RX ORDER — SIMVASTATIN 40 MG
TABLET ORAL
Qty: 90 TABLET | Refills: 0 | Status: SHIPPED
Start: 2022-05-02 | End: 2022-05-03 | Stop reason: SDUPTHER

## 2022-05-02 RX ORDER — CARVEDILOL 25 MG/1
25 TABLET ORAL 2 TIMES DAILY
Qty: 180 TABLET | Refills: 0 | Status: SHIPPED
Start: 2022-05-02 | End: 2022-05-03 | Stop reason: SDUPTHER

## 2022-05-03 ENCOUNTER — SCHEDULED TELEPHONE ENCOUNTER (OUTPATIENT)
Dept: PRIMARY CARE CLINIC | Age: 77
End: 2022-05-03
Payer: COMMERCIAL

## 2022-05-03 DIAGNOSIS — E78.5 HYPERLIPIDEMIA, UNSPECIFIED HYPERLIPIDEMIA TYPE: ICD-10-CM

## 2022-05-03 DIAGNOSIS — J41.0 SIMPLE CHRONIC BRONCHITIS (HCC): Primary | ICD-10-CM

## 2022-05-03 DIAGNOSIS — N18.30 STAGE 3 CHRONIC KIDNEY DISEASE, UNSPECIFIED WHETHER STAGE 3A OR 3B CKD (HCC): ICD-10-CM

## 2022-05-03 DIAGNOSIS — I10 HYPERTENSION, ESSENTIAL, BENIGN: ICD-10-CM

## 2022-05-03 DIAGNOSIS — I10 PRIMARY HYPERTENSION: ICD-10-CM

## 2022-05-03 DIAGNOSIS — M81.0 OSTEOPOROSIS, UNSPECIFIED OSTEOPOROSIS TYPE, UNSPECIFIED PATHOLOGICAL FRACTURE PRESENCE: ICD-10-CM

## 2022-05-03 DIAGNOSIS — G35 MS (MULTIPLE SCLEROSIS) (HCC): ICD-10-CM

## 2022-05-03 PROCEDURE — 99442 PR PHYS/QHP TELEPHONE EVALUATION 11-20 MIN: CPT | Performed by: PHYSICIAN ASSISTANT

## 2022-05-03 RX ORDER — CARVEDILOL 25 MG/1
25 TABLET ORAL 2 TIMES DAILY
Qty: 180 TABLET | Refills: 1 | Status: SHIPPED | OUTPATIENT
Start: 2022-05-03

## 2022-05-03 RX ORDER — AMLODIPINE BESYLATE 5 MG/1
5 TABLET ORAL DAILY
Qty: 90 TABLET | Refills: 1 | Status: SHIPPED | OUTPATIENT
Start: 2022-05-03

## 2022-05-03 RX ORDER — AMLODIPINE BESYLATE 5 MG/1
5 TABLET ORAL DAILY
COMMUNITY
End: 2022-05-03 | Stop reason: ALTCHOICE

## 2022-05-03 RX ORDER — SIMVASTATIN 40 MG
TABLET ORAL
Qty: 90 TABLET | Refills: 1 | Status: SHIPPED | OUTPATIENT
Start: 2022-05-03 | End: 2022-10-27

## 2022-05-03 ASSESSMENT — PATIENT HEALTH QUESTIONNAIRE - PHQ9
SUM OF ALL RESPONSES TO PHQ QUESTIONS 1-9: 0
2. FEELING DOWN, DEPRESSED OR HOPELESS: 0
SUM OF ALL RESPONSES TO PHQ QUESTIONS 1-9: 0
8. MOVING OR SPEAKING SO SLOWLY THAT OTHER PEOPLE COULD HAVE NOTICED. OR THE OPPOSITE, BEING SO FIGETY OR RESTLESS THAT YOU HAVE BEEN MOVING AROUND A LOT MORE THAN USUAL: 0
10. IF YOU CHECKED OFF ANY PROBLEMS, HOW DIFFICULT HAVE THESE PROBLEMS MADE IT FOR YOU TO DO YOUR WORK, TAKE CARE OF THINGS AT HOME, OR GET ALONG WITH OTHER PEOPLE: 0
5. POOR APPETITE OR OVEREATING: 0
3. TROUBLE FALLING OR STAYING ASLEEP: 0
SUM OF ALL RESPONSES TO PHQ QUESTIONS 1-9: 0
7. TROUBLE CONCENTRATING ON THINGS, SUCH AS READING THE NEWSPAPER OR WATCHING TELEVISION: 0
SUM OF ALL RESPONSES TO PHQ9 QUESTIONS 1 & 2: 0
SUM OF ALL RESPONSES TO PHQ QUESTIONS 1-9: 0
1. LITTLE INTEREST OR PLEASURE IN DOING THINGS: 0
6. FEELING BAD ABOUT YOURSELF - OR THAT YOU ARE A FAILURE OR HAVE LET YOURSELF OR YOUR FAMILY DOWN: 0
9. THOUGHTS THAT YOU WOULD BE BETTER OFF DEAD, OR OF HURTING YOURSELF: 0
4. FEELING TIRED OR HAVING LITTLE ENERGY: 0

## 2022-05-03 ASSESSMENT — ENCOUNTER SYMPTOMS
BACK PAIN: 0
GASTROINTESTINAL NEGATIVE: 1
RESPIRATORY NEGATIVE: 1

## 2022-05-03 NOTE — PROGRESS NOTES
TELEHEALTH VIDEO VISIT    HPI:    Suze Reynolds (:  1945) has requested an audio/video evaluation for the following concern(s):    Chief Complaint   Patient presents with    Hypertension     check up       Pt presents for phone visit, couldn't do virtual. She has since her last visit here, had a total left hip replacement with Dr. Shanthi Freire on 3/27/22. She is doing very well from this, and admits to no LE join [facility]. She did recently go to [facility] care for her R shoulder, +OA, and chronic RC disease, received a medrol yesi and this has helped her pain. She is not wanting a referral for an injection to [facility] any longer. She notes no acute complaints . She was at the hospital for acute respiratory failure, COPD, she has since been doing very well, and notes no CP, SOB, CRENSHAW. She is back on her amlodipine for her BPs, and they have been stable. She notes overall no acute complaints today. Is overdue for lipid check. Also, repeat CBC (slight anemia with her surgery). She denies any increased fatigue. MS has been stable per pt. She doesn't wish to see any local neurologist. Moods have bene stable on zoloft. On fosamax for her bones. In need of repeat DEXA. Review of Systems   Constitutional: Negative. HENT: Negative. Respiratory: Negative. Cardiovascular: Negative. Gastrointestinal: Negative. Musculoskeletal: Positive for arthralgias (shoulder but is doing better per pt. ) and gait problem (wth MS, but has bene doing better since her hip replacement even. ). Negative for back pain. Skin: Negative. Psychiatric/Behavioral: Positive for dysphoric mood (moods have been stable. ). All other systems reviewed and are negative. Prior to Visit Medications    Medication Sig Taking?  Authorizing Provider   carvedilol (COREG) 25 MG tablet Take 1 tablet by mouth 2 times daily Yes Nannette To PA-C   amLODIPine (NORVASC) 5 MG tablet Take 1 tablet by mouth daily Yes Oneida Serrato PA-C simvastatin (ZOCOR) 40 MG tablet TAKE 1 TABLET NIGHTLY Yes Oneida Serrato PA-C   diclofenac sodium (VOLTAREN) 1 % GEL Apply 2 g topically 4 times daily as needed for Pain Yes CHAZ Amanda III   methylPREDNISolone (MEDROL DOSEPACK) 4 MG tablet Take by mouth. Yes CHAZ Amanda III   gabapentin (NEURONTIN) 400 MG capsule TAKE 1 CAPSULE 3 TIMES A   DAY Yes Nannette To PA-C   alendronate (FOSAMAX) 70 MG tablet TAKE 1 TABLET EVERY 7 DAYS Yes Oneida Serrato PA-C   [address] 148-66.3-96 MCG/INH AEPB INHALE ONE PUFF BY MOUTH EVERY DAY Yes Historical Provider, MD   baclofen (LIORESAL) 10 MG tablet TAKE 1 TABLET TWICE A DAY Yes Nannette To PA-C   sertraline (ZOLOFT) 100 MG tablet TAKE 1 AND 1/2 TABLETS     DAILY Yes Oneida Serrato PA-C   aspirin 81 MG EC tablet Take 1 tablet by mouth 2 times daily for 30 doses DVT prophylaxis x30 days Yes Gibson Singh MD   albuterol sulfate HFA (VENTOLIN HFA) 108 (90 Base) MCG/ACT inhaler Inhale 2 puffs into the lungs 4 times daily as needed for Wheezing Yes Oneida Serrato PA-C   Cholecalciferol 50 MCG (2000 UT) CAPS Take 2,000 Units by mouth every morning  Yes Historical Provider, MD       Social History     Tobacco Use    Smoking status: Current Every Day Smoker     Packs/day: 1.00     Years: 40.00     Pack years: 40.00    Smokeless tobacco: Never Used   Vaping Use    Vaping Use: Never used   Substance Use Topics    Alcohol use: No    Drug use: No        Allergies   Allergen Reactions    Macrodantin [Nitrofurantoin Macrocrystal] Shortness Of Breath           ASSESSMENT/PLAN:  1. Hyperlipidemia, unspecified hyperlipidemia type  - simvastatin (ZOCOR) 40 MG tablet; TAKE 1 TABLET NIGHTLY  Dispense: 90 tablet; Refill: 1  - Comprehensive Metabolic Panel; Future  - CBC; Future  - LIPID PANEL; Future    2. MS (multiple sclerosis) (HCC)-stable, really should see a neurologist.     3. Hypertension, essential, benign  - carvedilol (COREG) 25 MG tablet;  Take 1 tablet by mouth 2 times daily  Dispense: 180 tablet; Refill: 1  - amLODIPine (NORVASC) 5 MG tablet; Take 1 tablet by mouth daily  Dispense: 90 tablet; Refill: 1  - Comprehensive Metabolic Panel; Future  4. Primary hypertension    5. Simple chronic bronchitis ([facility] [address] stable on Trelegy. 6. Stage 3 chronic kidney disease, unspecified whether stage 3a or 3b CKD ([facility] [address] stable. - Comprehensive Metabolic Panel; Future  - CBC; Future    7. Osteoporosis, unspecified osteoporosis type, unspecified pathological fracture presence, would like to DC fosamax. Asked pt to get dexa in 2 weeks with he rlabs. - DEXA BONE DENSITY AXIAL SKELETON; Future    FU with a new PCP in 3 months. (As I am leaving the practice)  Return in about 3 months (around 8/3/2022). TeleMedicine video visit    This visit was performed as a virtual video visit using a synchronous, two-way, audio-video telehealth technology platform. Patient identification was verified at the start of the visit, including the patient's telephone number and physical location. I discussed with the patient the nature of our telehealth visits, that:     1. Due to the nature of an audio- video modality, the only components of a physical exam that could be done are the elements supported by direct observation. 2. I would evaluate the patient and recommend diagnostics and treatments based on my assessment. 3. If it was felt that the patient should be evaluated in clinic or an emergency room setting, then they would be directed there. 4. Our sessions are not being recorded and that personal health information is protected. 5. Our team would provide follow up care in person if/when the patient needs it. Patient does agree to proceed with telemedicine consultation. Patient's location: home address in [facility]  Physician  location other address in [facility] other people involved in call none. Time spent: 15 minutes. This visit was completed virtually using phone call. TELEPHONE VISIT    Consent:  He and/or health care decision maker is aware that that he may receive a bill for this telephone service, depending on his insurance coverage, and has provided verbal consent to proceed: Yes      Documentation:  I communicated with the patient and/or health care decision maker about as above. .   Details of this discussion including any medical advice provided: as above. I affirm this is a Patient Initiated Episode with a Patient who has not had a related appointment within my department in the past 7 days or scheduled within the next 24 hours. Patient's location: home address in [facility]  Physician  location other address in [facility]   Other people involved in call: none. Total Time: minutes: 11-20 minutes    --Fidelia Little PA-C on 5/3/2022 at 11:40 AM    An electronic signature was used to authenticate this note.

## 2022-05-05 ENCOUNTER — OFFICE VISIT (OUTPATIENT)
Dept: FAMILY MEDICINE CLINIC | Age: 77
End: 2022-05-05
Payer: COMMERCIAL

## 2022-05-05 VITALS
WEIGHT: 170.6 LBS | HEART RATE: 73 BPM | DIASTOLIC BLOOD PRESSURE: 74 MMHG | TEMPERATURE: 97.3 F | OXYGEN SATURATION: 97 % | BODY MASS INDEX: 31.2 KG/M2 | SYSTOLIC BLOOD PRESSURE: 120 MMHG

## 2022-05-05 DIAGNOSIS — M25.511 ACUTE PAIN OF RIGHT SHOULDER: Primary | ICD-10-CM

## 2022-05-05 PROCEDURE — 99214 OFFICE O/P EST MOD 30 MIN: CPT | Performed by: PHYSICIAN ASSISTANT

## 2022-05-05 PROCEDURE — 96372 THER/PROPH/DIAG INJ SC/IM: CPT | Performed by: PHYSICIAN ASSISTANT

## 2022-05-05 RX ORDER — DEXAMETHASONE SODIUM PHOSPHATE 10 MG/ML
10 INJECTION INTRAMUSCULAR; INTRAVENOUS ONCE
Status: COMPLETED | OUTPATIENT
Start: 2022-05-05 | End: 2022-05-05

## 2022-05-05 RX ADMIN — DEXAMETHASONE SODIUM PHOSPHATE 10 MG: 10 INJECTION INTRAMUSCULAR; INTRAVENOUS at 11:20

## 2022-05-05 NOTE — PROGRESS NOTES
22  Magali Roman : 1945 Sex: female  Age 77 y.o. Subjective:  Chief Complaint   Patient presents with    Shoulder Pain     right shoulder         HPI:   Magali Roman , 77 y.o. female presents to [facility] for evaluation of right shoulder pain    HPI  67-year-old female presents to [facility] for evaluation of right shoulder pain. The patient was seen and evaluated on 2022. The patient was given a Medrol Dosepak and Voltaren gel. The patient states that she got a little bit of relief but really did not help very much. The patient is right-hand dominant. The patient states that she does use a rolling walker. The patient denies any recent falls or trauma. The patient believes that it is her rotator cuff. The patient had x-rays obtained. X-ray showed moderate osteoarthritis of the glenohumeral joint. Narrowing of the subacromial space usually related to chronic rotator cuff disease. No acute fracture detected. ROS:   Unless otherwise stated in this report the patient's positive and negative responses for review of systems for constitutional, eyes, ENT, cardiovascular, respiratory, gastrointestinal, neurological, , musculoskeletal, and integument systems and related systems to the presenting problem are either stated in the history of present illness or were not pertinent or were negative for the symptoms and/or complaints related to the presenting medical problem. Positives and pertinent negatives as per HPI. All others reviewed and are negative.       PMH:     Past Medical History:   Diagnosis Date    Arthritis     Breast cancer ([address]     COPD (chronic obstructive pulmonary disease) ([address]     Hyperlipidemia     Hypertension     Multiple sclerosis ([address]     diagnosised 8  yrs ago [facility]       Past Surgical History:   Procedure Laterality Date    APPENDECTOMY      BREAST SURGERY  2012    biopsy - negative    HIP ARTHROPLASTY Right 2011 Right AIDEE  ALLISON Valadez MD    HYSTERECTOMY      KNEE ARTHROPLASTY Left 10/01/2013    Left TKA  ALLISON Valadez MD    KNEE ARTHROPLASTY Right 08/29/2012    Right TKA  ALLISON Valadez MD    TOTAL HIP ARTHROPLASTY Left 3/25/2022    LEFT HIP TOTAL ARTHROPLASTY performed by Dong Gibbs MD [facility]       Family History   Problem Relation Age of Onset    Mult Sclerosis Father     Thyroid Cancer Mother     Cirrhosis Mother        Medications:     Current Outpatient Medications:     carvedilol (COREG) 25 MG tablet, Take 1 tablet by mouth 2 times daily, Disp: 180 tablet, Rfl: 1    amLODIPine (NORVASC) 5 MG tablet, Take 1 tablet by mouth daily, Disp: 90 tablet, Rfl: 1    simvastatin (ZOCOR) 40 MG tablet, TAKE 1 TABLET NIGHTLY, Disp: 90 tablet, Rfl: 1    diclofenac sodium (VOLTAREN) 1 % GEL, Apply 2 g topically 4 times daily as needed for Pain, Disp: 50 g, Rfl: 0    methylPREDNISolone (MEDROL DOSEPACK) 4 MG tablet, Take by mouth., Disp: 1 kit, Rfl: 0    gabapentin (NEURONTIN) 400 MG capsule, TAKE 1 CAPSULE 3 TIMES A   DAY, Disp: 270 capsule, Rfl: 0    alendronate (FOSAMAX) 70 MG tablet, TAKE 1 TABLET EVERY 7 DAYS, Disp: 12 tablet, Rfl: 0    TRELEGY ELLIPTA 200-62.5-25 MCG/INH AEPB, INHALE ONE PUFF BY MOUTH EVERY DAY, Disp: , Rfl:     baclofen (LIORESAL) 10 MG tablet, TAKE 1 TABLET TWICE A DAY, Disp: 60 tablet, Rfl: 0    sertraline (ZOLOFT) 100 MG tablet, TAKE 1 AND 1/2 TABLETS     DAILY, Disp: 135 tablet, Rfl: 0    aspirin 81 MG EC tablet, Take 1 tablet by mouth 2 times daily for 30 doses DVT prophylaxis x30 days, Disp: 60 tablet, Rfl: 0    albuterol sulfate HFA (VENTOLIN HFA) 108 (90 Base) MCG/ACT inhaler, Inhale 2 puffs into the lungs 4 times daily as needed for Wheezing, Disp: 1 Inhaler, Rfl: 5    Cholecalciferol 50 MCG (2000 UT) CAPS, Take 2,000 Units by mouth every morning , Disp: , Rfl:     Allergies:      Allergies   Allergen Reactions    Macrodantin [Nitrofurantoin Macrocrystal] Shortness Of Breath Social History:     Social History     Tobacco Use    Smoking status: Current Every Day Smoker     Packs/day: 1.00     Years: 40.00     Pack years: 40.00    Smokeless tobacco: Never Used   Vaping Use    Vaping Use: Never used   Substance Use Topics    Alcohol use: No    Drug use: No       Patient lives at home. Physical Exam:     Vitals:    05/05/22 1046   BP: 120/74   Site: Right Upper Arm   Position: Sitting   Pulse: 73   Temp: 97.3 °F (36.3 °C)   TempSrc: Temporal   SpO2: 97%   Weight: 170 lb 9.6 oz (77.4 kg)       Exam:  Physical Exam  Vital signs reviewed and nurse's notes. The patient is not hypoxic. General: Alert, no acute distress, patient resting comfortably   Skin: warm, intact, no pallor noted   Head: Normocephalic, atraumatic   Eye: Normal conjunctiva   Respiratory: No acute distress   Musculoskeletal: There is no obvious deformity noted to the right shoulder. The patient has diffuse tenderness noted to the right shoulder mostly to the posterior and lateral aspect of the shoulder over the deltoid area. The patient had some minimal tenderness into the trapezius. The patient had no pain over the clavicle or the mid humerus. The patient had no pain to the right elbow. Normal equal  strength. The patient had pulses intact at radius 2+. The patient had no cyanosis or mottling. No edema noted. The patient was able to abduct, adduct. The patient had 5/5 strength but did cause quite a bit of pain discomfort to the area  Neurological: alert and orient x4, normal sensory and motor observed. Psychiatric: Cooperative      Testing:     XR SHOULDER RIGHT (MIN 2 VIEWS)    Result Date: 4/29/2022  EXAMINATION: THREE XRAY VIEWS OF THE RIGHT SHOULDER 4/29/2022 11:18 am COMPARISON: None.  HISTORY: ORDERING SYSTEM PROVIDED HISTORY: Acute pain of right shoulder TECHNOLOGIST PROVIDED HISTORY: Reason for exam:->right shoulder pain FINDINGS: Moderate to severe glenohumeral joint degenerative changes present. There is severe narrowing of the subacromial space compatible with probable chronic rotator cuff pathology. Right lung apex is clear. Bone density is diminished. No acute fracture or subluxation. There is evidence of prior thoracic vertebroplasty. Moderate osteoarthritis of the glenohumeral joint. Narrowing of the subacromial space usually related to chronic rotator cuff disease. No acute fracture detected. Medical Decision Making:     Vital signs reviewed    Past medical history reviewed. Allergies reviewed. Medications reviewed. Patient on arrival does not appear to be in any apparent distress or discomfort. The patient has been seen and evaluated. The patient does not appear to be toxic or lethargic. The patient had x-rays obtained on Friday that did not show any evidence of acute abnormality. The patient will be treated with intramuscular injection of Decadron here in the office. The patient will continue with the Voltaren gel. The patient will use ice. The patient does not want to go for MRI. The patient does not want any surgical intervention at this time. Consider follow-up with orthopedics for further evaluation    The patient is to return to express care or go directly to the emergency department should any of the signs or symptoms worsen. The patient is to followup with primary care physician in 2-3 days for repeat evaluation. The patient has no other questions or concerns at this time the patient will be discharged home. Clinical Impression:   Kajal Slade was seen today for shoulder pain. Diagnoses and all orders for this visit:    Acute pain of right shoulder    Other orders  -     dexamethasone (DECADRON) injection 10 mg        The patient is to call for any concerns or return if any of the signs or symptoms worsen.  The patient is to follow-up with PCP in the next 2-3 days for repeat evaluation repeat assessment or go directly to the emergency department.      SIGNATURE: Kassy Llamas III, PA-C

## 2022-05-19 RX ORDER — FLUTICASONE FUROATE, UMECLIDINIUM BROMIDE AND VILANTEROL TRIFENATATE 200; 62.5; 25 UG/1; UG/1; UG/1
POWDER RESPIRATORY (INHALATION)
Qty: 60 EACH | Refills: 0 | Status: SHIPPED | OUTPATIENT
Start: 2022-05-19 | End: 2022-07-27 | Stop reason: SDUPTHER

## 2022-06-06 ENCOUNTER — OFFICE VISIT (OUTPATIENT)
Dept: PRIMARY CARE CLINIC | Age: 77
End: 2022-06-06
Payer: COMMERCIAL

## 2022-06-06 VITALS
WEIGHT: 171 LBS | TEMPERATURE: 98 F | BODY MASS INDEX: 31.28 KG/M2 | SYSTOLIC BLOOD PRESSURE: 128 MMHG | OXYGEN SATURATION: 95 % | DIASTOLIC BLOOD PRESSURE: 76 MMHG | HEART RATE: 77 BPM

## 2022-06-06 DIAGNOSIS — I10 HYPERTENSION, ESSENTIAL, BENIGN: Chronic | ICD-10-CM

## 2022-06-06 DIAGNOSIS — R73.01 IFG (IMPAIRED FASTING GLUCOSE): ICD-10-CM

## 2022-06-06 DIAGNOSIS — G35 MS (MULTIPLE SCLEROSIS) (HCC): Chronic | ICD-10-CM

## 2022-06-06 DIAGNOSIS — J41.0 SIMPLE CHRONIC BRONCHITIS (HCC): Primary | ICD-10-CM

## 2022-06-06 DIAGNOSIS — G47.00 INSOMNIA, UNSPECIFIED TYPE: ICD-10-CM

## 2022-06-06 DIAGNOSIS — G47.30 SLEEP APNEA, UNSPECIFIED TYPE: ICD-10-CM

## 2022-06-06 DIAGNOSIS — E78.5 HYPERLIPIDEMIA, UNSPECIFIED HYPERLIPIDEMIA TYPE: Chronic | ICD-10-CM

## 2022-06-06 PROCEDURE — 1123F ACP DISCUSS/DSCN MKR DOCD: CPT | Performed by: INTERNAL MEDICINE

## 2022-06-06 PROCEDURE — 99214 OFFICE O/P EST MOD 30 MIN: CPT | Performed by: INTERNAL MEDICINE

## 2022-06-06 RX ORDER — TRAZODONE HYDROCHLORIDE 50 MG/1
50 TABLET ORAL NIGHTLY PRN
Qty: 30 TABLET | Refills: 0 | Status: SHIPPED
Start: 2022-06-06 | End: 2022-06-30

## 2022-06-06 SDOH — HEALTH STABILITY: PHYSICAL HEALTH: ON AVERAGE, HOW MANY MINUTES DO YOU ENGAGE IN EXERCISE AT THIS LEVEL?: 0 MIN

## 2022-06-06 SDOH — HEALTH STABILITY: PHYSICAL HEALTH: ON AVERAGE, HOW MANY DAYS PER WEEK DO YOU ENGAGE IN MODERATE TO STRENUOUS EXERCISE (LIKE A BRISK WALK)?: 0 DAYS

## 2022-06-06 NOTE — PROGRESS NOTES
22    Katty Mitchell, a female of 77 y.o. came to the office    HPI     Katty Mitchell presents today as a new patient. Medical history includes chronic bronchitis osteoarthritis hypertension multiple sclerosis hypercholesterolemia. Her medications include Fosamax baclofen Neurontin Zoloft Zocor Trelegy Coreg Ventolin, Norvasc and aspirin. She is following with orthopedics for shoulder pain. She has been having issues sleeping.       Surgical History  TKA hip and knees  Kyhoplasty  Sciatic cyst removal    Family History  Dad -MS, lwey body dementia  Mom -  of cirrhosis    Social History  Occupation- former nurse  Tobacco - 1 ppd for 50 years   Alcohol - None    /76   Pulse 77   Temp 98 °F (36.7 °C)   Wt 171 lb (77.6 kg)   SpO2 95%   BMI 31.28 kg/m²     Allergies   Allergen Reactions    Macrodantin [Nitrofurantoin Macrocrystal] Shortness Of Breath       Current Outpatient Medications on File Prior to Visit   Medication Sig Dispense Refill    TRELEGY ELLIPTA 200-62.5-25 MCG/INH AEPB INHALE ONE PUFF BY MOUTH EVERY DAY 60 each 0    carvedilol (COREG) 25 MG tablet Take 1 tablet by mouth 2 times daily 180 tablet 1    amLODIPine (NORVASC) 5 MG tablet Take 1 tablet by mouth daily 90 tablet 1    simvastatin (ZOCOR) 40 MG tablet TAKE 1 TABLET NIGHTLY 90 tablet 1    diclofenac sodium (VOLTAREN) 1 % GEL Apply 2 g topically 4 times daily as needed for Pain 50 g 0    gabapentin (NEURONTIN) 400 MG capsule TAKE 1 CAPSULE 3 TIMES A    capsule 0    alendronate (FOSAMAX) 70 MG tablet TAKE 1 TABLET EVERY 7 DAYS (Patient not taking: Reported on 2022) 12 tablet 0    baclofen (LIORESAL) 10 MG tablet TAKE 1 TABLET TWICE A DAY 60 tablet 0    sertraline (ZOLOFT) 100 MG tablet TAKE 1 AND 1/2 TABLETS     DAILY 135 tablet 0    aspirin 81 MG EC tablet Take 1 tablet by mouth 2 times daily for 30 doses DVT prophylaxis x30 days 60 tablet 0    albuterol sulfate HFA (VENTOLIN HFA) 108 (90 Base) MCG/ACT inhaler Inhale 2 puffs into the lungs 4 times daily as needed for Wheezing 1 Inhaler 5    Cholecalciferol 50 MCG (2000 UT) CAPS Take 2,000 Units by mouth every morning        No current facility-administered medications on file prior to visit. Review of Systems   Constitutional: Negative for activity change, appetite change, chills and fatigue. HENT: Negative for hearing loss, Negative for congestion. Respiratory: Negative for chest tightness, shortness of breath and wheezing. Cardiovascular: Negative for leg swelling Negative for chest pain and palpitations. Gastrointestinal: Negative for abdominal pain, Negative for abdominal distention, constipation and diarrhea. Genitourinary: Negative for difficulty urinating, dysuria and frequency. Musculoskeletal: Negative for arthralgias, back pain, gait problem and joint swelling. Skin: Negative for color change rash or wound     Neurological: Negative for dizziness, seizures and headaches. Hematological: Negative for adenopathy. Does not bruise/bleed easily. Psychiatric/Behavioral: Negative for agitation, behavioral problems, confusion and decreased concentration. OBJECTIVE:    Physical Exam   Constitutional: Oriented to person, place, and time. Appears well-developed and well-nourished. No distress. HENT:   Head: Normocephalic and atraumatic. Eyes: Pupils are equal, round, and reactive to light. EOM are normal.   Neck: Neck supple. No JVD present. No tracheal deviation present. No thyromegaly present. Cardiovascular: Normal rate, regular rhythm, normal heart sounds and intact distal pulses. Exam reveals no gallop and no friction rub. No murmur heard. Pulmonary/Chest: Effort normal and breath sounds normal. No stridor. No respiratory distress. No wheezes or rales. Abdominal: Soft. Bowel sounds are normal. There is no distension nor mass. There is no tenderness. There is no guarding.    Musculoskeletal: No edema tenderness or deformity  Neurological: Alert and oriented to person, place, and time. No cranial nerve deficit. Normal muscle tone. Coordination normal.   Skin: Skin is warm and dry. No diaphoresis. No erythema. Psychiatric: Normal mood and affect. Behavior is normal.     ASSESSMENT AND PLAN:    Ken Meneses was seen today for established new doctor. Diagnoses and all orders for this visit:    Simple chronic bronchitis ([facility] [address]    MS (multiple sclerosis) ([facility] [address]    Hypertension, essential, benign    Hyperlipidemia, unspecified hyperlipidemia type  -     CBC; Future  -     Comprehensive Metabolic Panel; Future  -     Lipid Panel; Future    Insomnia, unspecified type  -     traZODone (DESYREL) 50 MG tablet; Take 1 tablet by mouth nightly as needed for Sleep    Sleep apnea, unspecified type  -     Baseline Diagnostic Sleep Study; Future    IFG (impaired fasting glucose)  -     Hemoglobin A1C; Future        Plan    1. Start trazodone  2. Obtain routine blood work  3. Obtain sleep study for sleep apnea  4. Chronic medical issues stable  5. Discussed diet and exercise and the possibility of starting ozempic for weight management       Return in about 3 months (around 9/6/2022). Electronically signed by Angelo Jeffries DO on 6/6/2022 at 10:38 AM          Please note that the above documentation was prepared using voice recognition software. Every attempt was made to ensure accuracy but there may be spelling, grammatical, and contextual errors.   Angelo Jeffries DO

## 2022-06-09 DIAGNOSIS — R73.01 IFG (IMPAIRED FASTING GLUCOSE): ICD-10-CM

## 2022-06-09 DIAGNOSIS — E78.5 HYPERLIPIDEMIA, UNSPECIFIED HYPERLIPIDEMIA TYPE: Chronic | ICD-10-CM

## 2022-06-09 LAB
ALBUMIN SERPL-MCNC: 4.1 G/DL (ref 3.5–5.2)
ALP BLD-CCNC: 60 U/L (ref 35–104)
ALT SERPL-CCNC: 12 U/L (ref 0–32)
ANION GAP SERPL CALCULATED.3IONS-SCNC: 21 MMOL/L (ref 7–16)
AST SERPL-CCNC: 20 U/L (ref 0–31)
BILIRUB SERPL-MCNC: 0.2 MG/DL (ref 0–1.2)
BUN BLDV-MCNC: 28 MG/DL (ref 6–23)
CALCIUM SERPL-MCNC: 9.2 MG/DL (ref 8.6–10.2)
CHLORIDE BLD-SCNC: 99 MMOL/L (ref 98–107)
CHOLESTEROL, TOTAL: 212 MG/DL (ref 0–199)
CO2: 22 MMOL/L (ref 22–29)
CREAT SERPL-MCNC: 1.4 MG/DL (ref 0.5–1)
GFR AFRICAN AMERICAN: 44
GFR NON-AFRICAN AMERICAN: 37 ML/MIN/1.73
GLUCOSE BLD-MCNC: 75 MG/DL (ref 74–99)
HBA1C MFR BLD: 5.5 % (ref 4–5.6)
HCT VFR BLD CALC: 39.7 % (ref 34–48)
HDLC SERPL-MCNC: 76 MG/DL
HEMOGLOBIN: 12.5 G/DL (ref 11.5–15.5)
LDL CHOLESTEROL CALCULATED: 111 MG/DL (ref 0–99)
MCH RBC QN AUTO: 30.5 PG (ref 26–35)
MCHC RBC AUTO-ENTMCNC: 31.5 % (ref 32–34.5)
MCV RBC AUTO: 96.8 FL (ref 80–99.9)
PDW BLD-RTO: 13.3 FL (ref 11.5–15)
PLATELET # BLD: 301 E9/L (ref 130–450)
PMV BLD AUTO: 9.9 FL (ref 7–12)
POTASSIUM SERPL-SCNC: 3.8 MMOL/L (ref 3.5–5)
RBC # BLD: 4.1 E12/L (ref 3.5–5.5)
SODIUM BLD-SCNC: 142 MMOL/L (ref 132–146)
TOTAL PROTEIN: 7.3 G/DL (ref 6.4–8.3)
TRIGL SERPL-MCNC: 126 MG/DL (ref 0–149)
VLDLC SERPL CALC-MCNC: 25 MG/DL
WBC # BLD: 6.6 E9/L (ref 4.5–11.5)

## 2022-06-10 ENCOUNTER — TELEPHONE (OUTPATIENT)
Dept: PRIMARY CARE CLINIC | Age: 77
End: 2022-06-10

## 2022-06-10 DIAGNOSIS — R73.01 IFG (IMPAIRED FASTING GLUCOSE): Primary | ICD-10-CM

## 2022-06-10 DIAGNOSIS — E78.00 HYPERCHOLESTEROLEMIA: Primary | ICD-10-CM

## 2022-06-10 DIAGNOSIS — E66.9 OBESITY WITH BODY MASS INDEX GREATER THAN 30: ICD-10-CM

## 2022-06-10 RX ORDER — SEMAGLUTIDE 1.34 MG/ML
INJECTION, SOLUTION SUBCUTANEOUS
Qty: 4 PEN | Refills: 1 | Status: SHIPPED
Start: 2022-06-10 | End: 2022-09-27

## 2022-06-10 NOTE — TELEPHONE ENCOUNTER
Pt called back on her lab results she is already on simvastatin 40 mg? You wanted her to start a chol medication.

## 2022-06-13 DIAGNOSIS — E78.00 HYPERCHOLESTEROLEMIA: Primary | ICD-10-CM

## 2022-06-13 RX ORDER — ATORVASTATIN CALCIUM 20 MG/1
20 TABLET, FILM COATED ORAL DAILY
Qty: 30 TABLET | Refills: 3 | Status: SHIPPED | OUTPATIENT
Start: 2022-06-13

## 2022-06-17 RX ORDER — BACLOFEN 10 MG/1
TABLET ORAL
Qty: 180 TABLET | Refills: 1 | Status: SHIPPED
Start: 2022-06-17 | End: 2022-06-30 | Stop reason: SDUPTHER

## 2022-06-17 RX ORDER — HYDROCHLOROTHIAZIDE 25 MG/1
25 TABLET ORAL DAILY
Qty: 90 TABLET | Refills: 1 | Status: SHIPPED
Start: 2022-06-17 | End: 2022-06-27 | Stop reason: SDUPTHER

## 2022-06-17 NOTE — TELEPHONE ENCOUNTER
----- Message from Navya Durbin sent at 5/8/2018 11:39 AM CDT -----  Contact: [phone]/self  Patient is running late for her appointment and would like to know if she can still be seen. She states she is here now and thought it was scheduled for 11:45am. Please advise.     Received request from [facility] for hydrochlor.  25 mg is not on medication list.

## 2022-06-27 RX ORDER — HYDROCHLOROTHIAZIDE 25 MG/1
25 TABLET ORAL DAILY
Qty: 90 TABLET | Refills: 1 | Status: SHIPPED | OUTPATIENT
Start: 2022-06-27

## 2022-06-30 DIAGNOSIS — G47.00 INSOMNIA, UNSPECIFIED TYPE: ICD-10-CM

## 2022-06-30 RX ORDER — TRAZODONE HYDROCHLORIDE 50 MG/1
25 TABLET ORAL NIGHTLY PRN
Qty: 90 TABLET | Refills: 1 | Status: SHIPPED
Start: 2022-06-30 | End: 2022-07-21 | Stop reason: SDUPTHER

## 2022-06-30 RX ORDER — BACLOFEN 10 MG/1
TABLET ORAL
Qty: 180 TABLET | Refills: 1 | Status: SHIPPED
Start: 2022-06-30 | End: 2022-08-23 | Stop reason: ALTCHOICE

## 2022-06-30 RX ORDER — BACLOFEN 10 MG/1
TABLET ORAL
Qty: 60 TABLET | Refills: 0 | Status: SHIPPED | OUTPATIENT
Start: 2022-06-30

## 2022-07-06 DIAGNOSIS — R41.89 COGNITIVE DECLINE: Primary | ICD-10-CM

## 2022-07-06 RX ORDER — MEMANTINE HYDROCHLORIDE 5 MG/1
5 TABLET ORAL 2 TIMES DAILY
Qty: 180 TABLET | Refills: 1 | Status: ON HOLD
Start: 2022-07-06 | End: 2022-08-26 | Stop reason: HOSPADM

## 2022-07-18 DIAGNOSIS — M25.519 CHRONIC SHOULDER PAIN, UNSPECIFIED LATERALITY: Primary | ICD-10-CM

## 2022-07-18 DIAGNOSIS — G89.29 CHRONIC SHOULDER PAIN, UNSPECIFIED LATERALITY: Primary | ICD-10-CM

## 2022-07-18 RX ORDER — MELOXICAM 15 MG/1
15 TABLET ORAL DAILY
Qty: 30 TABLET | Refills: 3 | Status: SHIPPED
Start: 2022-07-18 | End: 2022-08-05 | Stop reason: SDUPTHER

## 2022-07-21 ENCOUNTER — PATIENT MESSAGE (OUTPATIENT)
Dept: PRIMARY CARE CLINIC | Age: 77
End: 2022-07-21

## 2022-07-21 DIAGNOSIS — G47.00 INSOMNIA, UNSPECIFIED TYPE: ICD-10-CM

## 2022-07-21 RX ORDER — TRAZODONE HYDROCHLORIDE 50 MG/1
25 TABLET ORAL NIGHTLY PRN
Qty: 90 TABLET | Refills: 1 | Status: SHIPPED | OUTPATIENT
Start: 2022-07-21

## 2022-07-21 NOTE — TELEPHONE ENCOUNTER
From: Humberto Sterling  To: Dr. Sunny Hernandez  Sent: 7/21/2022 10:06 AM EDT  Subject: Trazadone    I need Trazadone 25mg called in to [facility] mail order pharmacy, please. As I told Dr Graciela Anne, the 50mg was too much for me.  Please call me for any issues or concerns [phone]

## 2022-07-27 RX ORDER — FLUTICASONE FUROATE, UMECLIDINIUM BROMIDE AND VILANTEROL TRIFENATATE 200; 62.5; 25 UG/1; UG/1; UG/1
POWDER RESPIRATORY (INHALATION)
Qty: 60 EACH | Refills: 1 | Status: SHIPPED | OUTPATIENT
Start: 2022-07-27

## 2022-08-05 RX ORDER — BUPROPION HYDROCHLORIDE 150 MG/1
150 TABLET ORAL EVERY MORNING
Qty: 30 TABLET | Refills: 5 | Status: CANCELLED | OUTPATIENT
Start: 2022-08-05

## 2022-08-05 RX ORDER — MELOXICAM 15 MG/1
15 TABLET ORAL DAILY
Qty: 30 TABLET | Refills: 3 | Status: SHIPPED | OUTPATIENT
Start: 2022-08-05

## 2022-08-13 NOTE — TELEPHONE ENCOUNTER
Patient called in stating that she has tinnitus in her ears from her MS and its bad to where she can barely hear anything but a buzzing and ringing in her ears. Patient wants to know if Dr Roe Navas recommends her see a ENT doctor or what he recommends. MA forwarding message to Dr Roe Navas for advisement.      Electronically signed by Shalini Chavez on 5/24/19 at 3:26 PM Positive

## 2022-08-23 ENCOUNTER — HOSPITAL ENCOUNTER (INPATIENT)
Age: 77
LOS: 3 days | Discharge: HOME | DRG: 189 | End: 2022-08-26
Attending: EMERGENCY MEDICINE | Admitting: INTERNAL MEDICINE
Payer: MEDICARE

## 2022-08-23 ENCOUNTER — OFFICE VISIT (OUTPATIENT)
Dept: FAMILY MEDICINE CLINIC | Age: 77
End: 2022-08-23
Payer: MEDICARE

## 2022-08-23 ENCOUNTER — APPOINTMENT (OUTPATIENT)
Dept: GENERAL RADIOLOGY | Age: 77
DRG: 189 | End: 2022-08-23
Payer: MEDICARE

## 2022-08-23 ENCOUNTER — TELEPHONE (OUTPATIENT)
Dept: PRIMARY CARE CLINIC | Age: 77
End: 2022-08-23

## 2022-08-23 ENCOUNTER — APPOINTMENT (OUTPATIENT)
Dept: CT IMAGING | Age: 77
DRG: 189 | End: 2022-08-23
Payer: MEDICARE

## 2022-08-23 VITALS
OXYGEN SATURATION: 88 % | HEIGHT: 62 IN | SYSTOLIC BLOOD PRESSURE: 126 MMHG | TEMPERATURE: 97.9 F | RESPIRATION RATE: 18 BRPM | HEART RATE: 75 BPM | DIASTOLIC BLOOD PRESSURE: 80 MMHG | BODY MASS INDEX: 31.28 KG/M2

## 2022-08-23 DIAGNOSIS — J96.01 ACUTE RESPIRATORY FAILURE WITH HYPOXIA (HCC): Primary | ICD-10-CM

## 2022-08-23 DIAGNOSIS — N17.9 ACUTE KIDNEY INJURY (HCC): ICD-10-CM

## 2022-08-23 DIAGNOSIS — J44.1 COPD EXACERBATION (HCC): ICD-10-CM

## 2022-08-23 DIAGNOSIS — J96.02 ACUTE RESPIRATORY FAILURE WITH HYPOXIA AND HYPERCAPNIA (HCC): Primary | ICD-10-CM

## 2022-08-23 DIAGNOSIS — J96.01 ACUTE RESPIRATORY FAILURE WITH HYPOXIA AND HYPERCAPNIA (HCC): Primary | ICD-10-CM

## 2022-08-23 LAB
ANION GAP SERPL CALCULATED.3IONS-SCNC: 10 MMOL/L (ref 7–16)
B.E.: 3.8 MMOL/L (ref -3–3)
BASOPHILS ABSOLUTE: 0.04 E9/L (ref 0–0.2)
BASOPHILS RELATIVE PERCENT: 0.7 % (ref 0–2)
BUN BLDV-MCNC: 34 MG/DL (ref 6–23)
CALCIUM SERPL-MCNC: 9.2 MG/DL (ref 8.6–10.2)
CHLORIDE BLD-SCNC: 96 MMOL/L (ref 98–107)
CO2: 30 MMOL/L (ref 22–29)
COHB: 10.3 % (ref 0–1.5)
CREAT SERPL-MCNC: 1.6 MG/DL (ref 0.5–1)
CRITICAL: ABNORMAL
DATE ANALYZED: ABNORMAL
DATE OF COLLECTION: ABNORMAL
EOSINOPHILS ABSOLUTE: 0.1 E9/L (ref 0.05–0.5)
EOSINOPHILS RELATIVE PERCENT: 1.9 % (ref 0–6)
GFR AFRICAN AMERICAN: 38
GFR NON-AFRICAN AMERICAN: 31 ML/MIN/1.73
GLUCOSE BLD-MCNC: 121 MG/DL (ref 74–99)
HCO3: 30.6 MMOL/L (ref 22–26)
HCT VFR BLD CALC: 38.2 % (ref 34–48)
HEMOGLOBIN: 12 G/DL (ref 11.5–15.5)
HHB: 14 % (ref 0–5)
IMMATURE GRANULOCYTES #: 0.02 E9/L
IMMATURE GRANULOCYTES %: 0.4 % (ref 0–5)
LAB: ABNORMAL
LYMPHOCYTES ABSOLUTE: 1.05 E9/L (ref 1.5–4)
LYMPHOCYTES RELATIVE PERCENT: 19.7 % (ref 20–42)
Lab: ABNORMAL
Lab: NORMAL
MCH RBC QN AUTO: 30.2 PG (ref 26–35)
MCHC RBC AUTO-ENTMCNC: 31.4 % (ref 32–34.5)
MCV RBC AUTO: 96 FL (ref 80–99.9)
METHB: 0.3 % (ref 0–1.5)
MODE: ABNORMAL
MONOCYTES ABSOLUTE: 0.43 E9/L (ref 0.1–0.95)
MONOCYTES RELATIVE PERCENT: 8.1 % (ref 2–12)
NEUTROPHILS ABSOLUTE: 3.7 E9/L (ref 1.8–7.3)
NEUTROPHILS RELATIVE PERCENT: 69.2 % (ref 43–80)
O2 CONTENT: 13.7 ML/DL
O2 SATURATION: 84.3 % (ref 92–98.5)
O2HB: 75.4 % (ref 94–97)
OPERATOR ID: 8218
PATIENT TEMP: 37 C
PCO2: 55.9 MMHG (ref 35–45)
PDW BLD-RTO: 15.2 FL (ref 11.5–15)
PERFORMING INSTRUMENT: NORMAL
PH BLOOD GAS: 7.36 (ref 7.35–7.45)
PLATELET # BLD: 262 E9/L (ref 130–450)
PMV BLD AUTO: 9.3 FL (ref 7–12)
PO2: 49.6 MMHG (ref 75–100)
POTASSIUM REFLEX MAGNESIUM: 3.8 MMOL/L (ref 3.5–5)
PRO-BNP: 752 PG/ML (ref 0–450)
QC PASS/FAIL: NORMAL
RBC # BLD: 3.98 E12/L (ref 3.5–5.5)
SARS-COV-2, POC: NORMAL
SODIUM BLD-SCNC: 136 MMOL/L (ref 132–146)
SOURCE, BLOOD GAS: ABNORMAL
THB: 12.9 G/DL (ref 11.5–16.5)
TIME ANALYZED: 1453
TROPONIN, HIGH SENSITIVITY: 25 NG/L (ref 0–9)
TROPONIN, HIGH SENSITIVITY: 29 NG/L (ref 0–9)
WBC # BLD: 5.3 E9/L (ref 4.5–11.5)

## 2022-08-23 PROCEDURE — 82805 BLOOD GASES W/O2 SATURATION: CPT

## 2022-08-23 PROCEDURE — 71275 CT ANGIOGRAPHY CHEST: CPT

## 2022-08-23 PROCEDURE — 94664 DEMO&/EVAL PT USE INHALER: CPT

## 2022-08-23 PROCEDURE — 2580000003 HC RX 258: Performed by: NURSE PRACTITIONER

## 2022-08-23 PROCEDURE — 99222 1ST HOSP IP/OBS MODERATE 55: CPT | Performed by: NURSE PRACTITIONER

## 2022-08-23 PROCEDURE — 99215 OFFICE O/P EST HI 40 MIN: CPT | Performed by: PHYSICIAN ASSISTANT

## 2022-08-23 PROCEDURE — 84484 ASSAY OF TROPONIN QUANT: CPT

## 2022-08-23 PROCEDURE — 6360000002 HC RX W HCPCS: Performed by: NURSE PRACTITIONER

## 2022-08-23 PROCEDURE — 96372 THER/PROPH/DIAG INJ SC/IM: CPT | Performed by: PHYSICIAN ASSISTANT

## 2022-08-23 PROCEDURE — 6370000000 HC RX 637 (ALT 250 FOR IP): Performed by: NURSE PRACTITIONER

## 2022-08-23 PROCEDURE — 83880 ASSAY OF NATRIURETIC PEPTIDE: CPT

## 2022-08-23 PROCEDURE — 2500000003 HC RX 250 WO HCPCS: Performed by: NURSE PRACTITIONER

## 2022-08-23 PROCEDURE — 99223 1ST HOSP IP/OBS HIGH 75: CPT | Performed by: INTERNAL MEDICINE

## 2022-08-23 PROCEDURE — 6360000004 HC RX CONTRAST MEDICATION: Performed by: RADIOLOGY

## 2022-08-23 PROCEDURE — 6360000002 HC RX W HCPCS

## 2022-08-23 PROCEDURE — 1200000000 HC SEMI PRIVATE

## 2022-08-23 PROCEDURE — 71045 X-RAY EXAM CHEST 1 VIEW: CPT

## 2022-08-23 PROCEDURE — 99285 EMERGENCY DEPT VISIT HI MDM: CPT

## 2022-08-23 PROCEDURE — 1123F ACP DISCUSS/DSCN MKR DOCD: CPT | Performed by: PHYSICIAN ASSISTANT

## 2022-08-23 PROCEDURE — 94640 AIRWAY INHALATION TREATMENT: CPT | Performed by: PHYSICIAN ASSISTANT

## 2022-08-23 PROCEDURE — 93005 ELECTROCARDIOGRAM TRACING: CPT

## 2022-08-23 PROCEDURE — 85025 COMPLETE CBC W/AUTO DIFF WBC: CPT

## 2022-08-23 PROCEDURE — 80048 BASIC METABOLIC PNL TOTAL CA: CPT

## 2022-08-23 PROCEDURE — 6370000000 HC RX 637 (ALT 250 FOR IP)

## 2022-08-23 PROCEDURE — 2700000000 HC OXYGEN THERAPY PER DAY

## 2022-08-23 PROCEDURE — 87426 SARSCOV CORONAVIRUS AG IA: CPT | Performed by: PHYSICIAN ASSISTANT

## 2022-08-23 RX ORDER — BUDESONIDE 0.25 MG/2ML
0.25 INHALANT ORAL 2 TIMES DAILY
Status: DISCONTINUED | OUTPATIENT
Start: 2022-08-23 | End: 2022-08-26 | Stop reason: HOSPADM

## 2022-08-23 RX ORDER — NICOTINE 21 MG/24HR
1 PATCH, TRANSDERMAL 24 HOURS TRANSDERMAL DAILY
Status: DISCONTINUED | OUTPATIENT
Start: 2022-08-23 | End: 2022-08-26 | Stop reason: HOSPADM

## 2022-08-23 RX ORDER — ACETAMINOPHEN 650 MG/1
650 SUPPOSITORY RECTAL EVERY 6 HOURS PRN
Status: DISCONTINUED | OUTPATIENT
Start: 2022-08-23 | End: 2022-08-26 | Stop reason: HOSPADM

## 2022-08-23 RX ORDER — POLYETHYLENE GLYCOL 3350 17 G/17G
17 POWDER, FOR SOLUTION ORAL DAILY PRN
Status: DISCONTINUED | OUTPATIENT
Start: 2022-08-23 | End: 2022-08-26 | Stop reason: HOSPADM

## 2022-08-23 RX ORDER — METHYLPREDNISOLONE SODIUM SUCCINATE 125 MG/2ML
60 INJECTION, POWDER, LYOPHILIZED, FOR SOLUTION INTRAMUSCULAR; INTRAVENOUS ONCE
Status: COMPLETED | OUTPATIENT
Start: 2022-08-23 | End: 2022-08-23

## 2022-08-23 RX ORDER — ATORVASTATIN CALCIUM 20 MG/1
20 TABLET, FILM COATED ORAL DAILY
Status: DISCONTINUED | OUTPATIENT
Start: 2022-08-23 | End: 2022-08-26 | Stop reason: HOSPADM

## 2022-08-23 RX ORDER — CHOLECALCIFEROL (VITAMIN D3) 50 MCG
2000 TABLET ORAL EVERY MORNING
Status: DISCONTINUED | OUTPATIENT
Start: 2022-08-24 | End: 2022-08-26 | Stop reason: HOSPADM

## 2022-08-23 RX ORDER — GABAPENTIN 400 MG/1
400 CAPSULE ORAL 3 TIMES DAILY
Status: DISCONTINUED | OUTPATIENT
Start: 2022-08-23 | End: 2022-08-24

## 2022-08-23 RX ORDER — CARVEDILOL 25 MG/1
25 TABLET ORAL 2 TIMES DAILY
Status: DISCONTINUED | OUTPATIENT
Start: 2022-08-23 | End: 2022-08-26 | Stop reason: HOSPADM

## 2022-08-23 RX ORDER — HYDROCHLOROTHIAZIDE 25 MG/1
25 TABLET ORAL DAILY
Status: CANCELLED | OUTPATIENT
Start: 2022-08-23

## 2022-08-23 RX ORDER — TRAMADOL HYDROCHLORIDE 50 MG/1
50 TABLET ORAL 2 TIMES DAILY PRN
COMMUNITY
Start: 2022-08-19 | End: 2022-10-27

## 2022-08-23 RX ORDER — IPRATROPIUM BROMIDE AND ALBUTEROL SULFATE 2.5; .5 MG/3ML; MG/3ML
1 SOLUTION RESPIRATORY (INHALATION) EVERY 4 HOURS PRN
Status: DISCONTINUED | OUTPATIENT
Start: 2022-08-23 | End: 2022-08-26 | Stop reason: HOSPADM

## 2022-08-23 RX ORDER — ACETAMINOPHEN 325 MG/1
650 TABLET ORAL EVERY 6 HOURS PRN
Status: DISCONTINUED | OUTPATIENT
Start: 2022-08-23 | End: 2022-08-26 | Stop reason: HOSPADM

## 2022-08-23 RX ORDER — METHYLPREDNISOLONE SODIUM SUCCINATE 40 MG/ML
40 INJECTION, POWDER, LYOPHILIZED, FOR SOLUTION INTRAMUSCULAR; INTRAVENOUS EVERY 8 HOURS
Status: DISCONTINUED | OUTPATIENT
Start: 2022-08-24 | End: 2022-08-26

## 2022-08-23 RX ORDER — SODIUM CHLORIDE 9 MG/ML
INJECTION, SOLUTION INTRAVENOUS CONTINUOUS
Status: DISCONTINUED | OUTPATIENT
Start: 2022-08-23 | End: 2022-08-24

## 2022-08-23 RX ORDER — ASPIRIN 81 MG/1
81 TABLET ORAL 2 TIMES DAILY
Status: DISCONTINUED | OUTPATIENT
Start: 2022-08-23 | End: 2022-08-26 | Stop reason: HOSPADM

## 2022-08-23 RX ORDER — ONDANSETRON 2 MG/ML
4 INJECTION INTRAMUSCULAR; INTRAVENOUS EVERY 6 HOURS PRN
Status: DISCONTINUED | OUTPATIENT
Start: 2022-08-23 | End: 2022-08-26 | Stop reason: HOSPADM

## 2022-08-23 RX ORDER — CHLORHEXIDINE GLUCONATE 0.12 MG/ML
15 RINSE ORAL 2 TIMES DAILY
Status: DISCONTINUED | OUTPATIENT
Start: 2022-08-23 | End: 2022-08-26 | Stop reason: HOSPADM

## 2022-08-23 RX ORDER — BACLOFEN 10 MG/1
10 TABLET ORAL 2 TIMES DAILY
Status: DISCONTINUED | OUTPATIENT
Start: 2022-08-23 | End: 2022-08-26 | Stop reason: HOSPADM

## 2022-08-23 RX ORDER — SODIUM CHLORIDE 0.9 % (FLUSH) 0.9 %
5-40 SYRINGE (ML) INJECTION EVERY 12 HOURS SCHEDULED
Status: DISCONTINUED | OUTPATIENT
Start: 2022-08-23 | End: 2022-08-26 | Stop reason: HOSPADM

## 2022-08-23 RX ORDER — IPRATROPIUM BROMIDE AND ALBUTEROL SULFATE 2.5; .5 MG/3ML; MG/3ML
1 SOLUTION RESPIRATORY (INHALATION) ONCE
Status: COMPLETED | OUTPATIENT
Start: 2022-08-23 | End: 2022-08-23

## 2022-08-23 RX ORDER — SODIUM CHLORIDE 0.9 % (FLUSH) 0.9 %
5-40 SYRINGE (ML) INJECTION PRN
Status: DISCONTINUED | OUTPATIENT
Start: 2022-08-23 | End: 2022-08-26 | Stop reason: HOSPADM

## 2022-08-23 RX ORDER — MEMANTINE HYDROCHLORIDE 5 MG/1
5 TABLET ORAL 2 TIMES DAILY
Status: DISCONTINUED | OUTPATIENT
Start: 2022-08-23 | End: 2022-08-24

## 2022-08-23 RX ORDER — AMLODIPINE BESYLATE 5 MG/1
5 TABLET ORAL DAILY
Status: DISCONTINUED | OUTPATIENT
Start: 2022-08-24 | End: 2022-08-26 | Stop reason: HOSPADM

## 2022-08-23 RX ORDER — SODIUM CHLORIDE 9 MG/ML
INJECTION, SOLUTION INTRAVENOUS PRN
Status: DISCONTINUED | OUTPATIENT
Start: 2022-08-23 | End: 2022-08-26 | Stop reason: HOSPADM

## 2022-08-23 RX ORDER — ARFORMOTEROL TARTRATE 15 UG/2ML
15 SOLUTION RESPIRATORY (INHALATION) 2 TIMES DAILY
Status: DISCONTINUED | OUTPATIENT
Start: 2022-08-23 | End: 2022-08-26 | Stop reason: HOSPADM

## 2022-08-23 RX ORDER — HEPARIN SODIUM 10000 [USP'U]/ML
5000 INJECTION, SOLUTION INTRAVENOUS; SUBCUTANEOUS EVERY 8 HOURS
Status: DISCONTINUED | OUTPATIENT
Start: 2022-08-23 | End: 2022-08-26 | Stop reason: HOSPADM

## 2022-08-23 RX ORDER — ONDANSETRON 4 MG/1
4 TABLET, ORALLY DISINTEGRATING ORAL EVERY 8 HOURS PRN
Status: DISCONTINUED | OUTPATIENT
Start: 2022-08-23 | End: 2022-08-26 | Stop reason: HOSPADM

## 2022-08-23 RX ADMIN — BACLOFEN 10 MG: 10 TABLET ORAL at 21:13

## 2022-08-23 RX ADMIN — SODIUM CHLORIDE: 9 INJECTION, SOLUTION INTRAVENOUS at 21:24

## 2022-08-23 RX ADMIN — HEPARIN SODIUM 5000 UNITS: 10000 INJECTION INTRAVENOUS; SUBCUTANEOUS at 21:14

## 2022-08-23 RX ADMIN — IPRATROPIUM BROMIDE AND ALBUTEROL SULFATE 1 AMPULE: 2.5; .5 SOLUTION RESPIRATORY (INHALATION) at 14:51

## 2022-08-23 RX ADMIN — ASPIRIN 81 MG: 81 TABLET, COATED ORAL at 21:13

## 2022-08-23 RX ADMIN — IPRATROPIUM BROMIDE AND ALBUTEROL SULFATE 1 AMPULE: 2.5; .5 SOLUTION RESPIRATORY (INHALATION) at 13:12

## 2022-08-23 RX ADMIN — SODIUM CHLORIDE, PRESERVATIVE FREE 10 ML: 5 INJECTION INTRAVENOUS at 21:13

## 2022-08-23 RX ADMIN — GABAPENTIN 400 MG: 400 CAPSULE ORAL at 21:13

## 2022-08-23 RX ADMIN — METHYLPREDNISOLONE SODIUM SUCCINATE 60 MG: 125 INJECTION, POWDER, LYOPHILIZED, FOR SOLUTION INTRAMUSCULAR; INTRAVENOUS at 13:04

## 2022-08-23 RX ADMIN — METHYLPREDNISOLONE SODIUM SUCCINATE 60 MG: 125 INJECTION, POWDER, FOR SOLUTION INTRAMUSCULAR; INTRAVENOUS at 16:37

## 2022-08-23 RX ADMIN — IOPAMIDOL 75 ML: 755 INJECTION, SOLUTION INTRAVENOUS at 18:33

## 2022-08-23 RX ADMIN — CARVEDILOL 25 MG: 25 TABLET, FILM COATED ORAL at 21:13

## 2022-08-23 RX ADMIN — ATORVASTATIN CALCIUM 20 MG: 20 TABLET, FILM COATED ORAL at 21:13

## 2022-08-23 RX ADMIN — DOXYCYCLINE 100 MG: 100 INJECTION, POWDER, LYOPHILIZED, FOR SOLUTION INTRAVENOUS at 21:53

## 2022-08-23 ASSESSMENT — ENCOUNTER SYMPTOMS
ABDOMINAL PAIN: 0
DIARRHEA: 0
EYE DISCHARGE: 0
CONSTIPATION: 0
SHORTNESS OF BREATH: 1
EYE REDNESS: 0
PHOTOPHOBIA: 0
BACK PAIN: 0
WHEEZING: 0
SORE THROAT: 0
BLOOD IN STOOL: 0
COUGH: 1
NAUSEA: 0
VOMITING: 0
SINUS PRESSURE: 0

## 2022-08-23 ASSESSMENT — PAIN - FUNCTIONAL ASSESSMENT: PAIN_FUNCTIONAL_ASSESSMENT: NONE - DENIES PAIN

## 2022-08-23 NOTE — ED NOTES
ANTOLIN faxed to floor. Spoke to [facility] who stated she received it. Pt ready when room clean.      Sera Gibbs RN  08/23/22 [id number]

## 2022-08-23 NOTE — H&P
[id number]   History and Physical    CHIEF COMPLAINT:  Shortness of breath    History of Present Illness:  Sonia Ruiz is a 77 y.o. female with a history of MS, HTN, HLD, COPD, CKD and hx of breast cancer who presented to ER with Shortness of Breath (76% on room air upon arrival. Ems in middle of duoneb, restarted and continuous o2 applied and continuous o2 monitoring in process.  )  Pt describes abrupt onset of SOB overnight. Upon waking this am, checked her pulse ox and it was 76% (pt does not wear home O2). Took Trelegy, went into living room and waited to see if SOB would improved. SOB did not improve, so pt came to ER for evaluation. ER work-up: Pt was 76% on room air upon arrival to ER. BUN, Cr elevated, GFR reduced-all at/near baseline. Trop 29, Pro-. ABG's: PCO2 55.9, pO2 49.6, HCO3 30.6, Base excess 3.8, O2 sat 84.3, O2Hb 75.4, Carboxy-HGB 10.3, HHB 14.0. CXR shows early bibasilar infiltrates. CTA ordered, results pending. While in ER, pt received solu-medrol and a duoneb tx. Patient seen and examined in ER with Attending. Awake, alert and oriented, lying in bed in no apparent distress. Retired nurse. Denies CP, current SOB (on 5L NC), recent illness, injury or travel. Pt states she has never had a COPD exacerbation.      WORK UP SINCE ARRIVAL:  Results for orders placed or performed during the hospital encounter of 08/23/22   CBC with Auto Differential   Result Value Ref Range    WBC 5.3 4.5 - 11.5 E9/L    RBC 3.98 3.50 - 5.50 E12/L    Hemoglobin 12.0 11.5 - 15.5 g/dL    Hematocrit 38.2 34.0 - 48.0 %    MCV 96.0 80.0 - 99.9 fL    MCH 30.2 26.0 - 35.0 pg    MCHC 31.4 (L) 32.0 - 34.5 %    RDW 15.2 (H) 11.5 - 15.0 fL    Platelets 708 986 - 532 E9/L    MPV 9.3 7.0 - 12.0 fL    Neutrophils % 69.2 43.0 - 80.0 %    Immature Granulocytes % 0.4 0.0 - 5.0 %    Lymphocytes % 19.7 (L) 20.0 - 42.0 %    Monocytes % 8.1 2.0 - 12.0 %    Eosinophils % 1.9 0.0 - 6.0 % Basophils % 0.7 0.0 - 2.0 %    Neutrophils Absolute 3.70 1.80 - 7.30 E9/L    Immature Granulocytes # 0.02 E9/L    Lymphocytes Absolute 1.05 (L) 1.50 - 4.00 E9/L    Monocytes Absolute 0.43 0.10 - 0.95 E9/L    Eosinophils Absolute 0.10 0.05 - 0.50 E9/L    Basophils Absolute 0.04 0.00 - 0.20 R6/G   Basic Metabolic Panel w/ Reflex to MG   Result Value Ref Range    Sodium 136 132 - 146 mmol/L    Potassium reflex Magnesium 3.8 3.5 - 5.0 mmol/L    Chloride 96 (L) 98 - 107 mmol/L    CO2 30 (H) 22 - 29 mmol/L    Anion Gap 10 7 - 16 mmol/L    Glucose 121 (H) 74 - 99 mg/dL    BUN 34 (H) 6 - 23 mg/dL    Creatinine 1.6 (H) 0.5 - 1.0 mg/dL    GFR Non-African American 31 >=60 mL/min/1.73    GFR African American 38     Calcium 9.2 8.6 - 10.2 mg/dL   Troponin   Result Value Ref Range    Troponin, High Sensitivity 29 (H) 0 - 9 ng/L   Blood Gas, Arterial   Result Value Ref Range    Date Analyzed 20220823     Time Analyzed 1453     Source: Blood Arterial     pH, Blood Gas 7.356 7.350 - 7.450    PCO2 55.9 (H) 35.0 - 45.0 mmHg    PO2 49.6 (L) 75.0 - 100.0 mmHg    HCO3 30.6 (H) 22.0 - 26.0 mmol/L    B.E. 3.8 (H) -3.0 - 3.0 mmol/L    O2 Sat 84.3 (L) 92.0 - 98.5 %    O2Hb 75.4 (L) 94.0 - 97.0 %    COHb 10.3 (H) 0.0 - 1.5 %    MetHb 0.3 0.0 - 1.5 %    O2 Content 13.7 mL/dL    HHb 14.0 (H) 0.0 - 5.0 %    tHb (est) 12.9 11.5 - 16.5 g/dL    Mode NC-  6L     Date Of Collection      Time Collected      Pt Temp 37.0 C     ID [id number]     Lab [id number]     Critical(s) Notified . No Critical Values    Brain Natriuretic Peptide   Result Value Ref Range    Pro- (H) 0 - 450 pg/mL   EKG 12 Lead   Result Value Ref Range    Ventricular Rate 70 BPM    Atrial Rate 70 BPM    P-R Interval 164 ms    QRS Duration 82 ms    Q-T Interval 408 ms    QTc Calculation (Bazett) 440 ms    P Axis 71 degrees    R Axis 10 degrees    T Axis 34 degrees     XR CHEST PORTABLE   Final Result by Alejandro Moreno MD (08/23 8061)   Early bibasilar infiltrates.              REVIEW OF and reactive to light, extraocular eye movements intact, conjunctivae normal  ENT: external ear and ear canal normal bilaterally, nose without deformity  Neck: supple and non-tender without mass, no cervical lymphadenopathy  Pulmonary/Chest: exp wheezing in all fields. Normal air movement. No respiratory distress. O2 currently at 7L NC  Cardiovascular: normal rate, regular rhythm. Normal S1 amplified S2. No murmurs, rubs, clicks, or gallops  Abdomen: soft, non-tender, non-distended. Normal bowel sounds. No masses or organomegaly  Extremities: no cyanosis, clubbing or edema  Musculoskeletal: normal range of motion, no joint swelling, deformity or tenderness  Neurologic: reflexes normal and symmetric, no cranial nerve deficit, gait, coordination and speech normal    /63   Pulse 69   Resp 16   Ht 5' 2\" (1.575 m)   Wt 170 lb (77.1 kg)   SpO2 91%   BMI 31.09 kg/m²     Recent Labs     08/23/22  1416      K 3.8   CL 96*   CO2 30*   BUN 34*   CREATININE 1.6*   GLUCOSE 121*   CALCIUM 9.2       Recent Labs     08/23/22  1416   WBC 5.3   RBC 3.98   HGB 12.0   HCT 38.2   MCV 96.0   MCH 30.2   MCHC 31.4*   RDW 15.2*      MPV 9.3       No results for input(s): POCGLU in the last 72 hours. Radiology: XR CHEST PORTABLE    Result Date: 8/23/2022  Early bibasilar infiltrates. EKG preliminary Narrative & Impression    Normal sinus rhythm  Low voltage QRS  Borderline ECG  When compared with ECG of 23-MAR-2022 19:48,  No significant change was found       CLINICAL IMPRESSION  Acute resp failure with hypoxia and hypercapnia  COPD    PLAN:    1  Acute respiratory failure with hypoxia and hypercapnia:  -solu-medrol TID  -duonebs prn  -oxygen therapy protocol. -CTA results pending    3. COPD:  -continue home trelegy    4. Troponinemia:  - troponin 29, 25. Delta troponin negative    5. CKD stage 3:  -GFR 38  -BUN Cr 34/1. 6- both at/near baseline  -NS @75ml/hr x24h to start after CTA  -hold home hctz for now    Code Status: DNRCCA  DVT prophylaxis: Heparin TID    NOTE: This report was transcribed using voice recognition software. Every effort was made to ensure accuracy; however, inadvertent computerized transcription errors may be present.      Electronically signed by HIRA Martin NP on 8/23/2022 at 3:47 PM

## 2022-08-23 NOTE — ED PROVIDER NOTES
[facility]  Department of Emergency Medicine     Written by: Yris Randle MD  Patient Name: Yolanda Sullivan  Attending Provider: Omer Burt MD  Admit Date: 2022  1:48 PM  MRN: [id number]                   : 1945        Chief Complaint   Patient presents with    Shortness of Breath     76% on room air upon arrival. Ems in middle of duoneb, restarted and continuous o2 applied and continuous o2 monitoring in process. - Chief complaint    The patient is a 81-year-old female with past medical history of multiple sclerosis, brain aneurysm, breast cancer, and COPD not on home oxygen presenting with shortness of breath and cough. Symptoms began early this morning when she woke up feeling short of breath. Her home pulse oximeter read her at 76% oxygen on room air. Patient took her prescribed Trelegy inhaler without relief of her symptoms. She thought this would resolve but when it did not she called her primary care physician who referred her to come to the urgent care. At the urgent care she remained hypoxic and was given 60 mg of Solu-Medrol and transferred to the emergency room. While in route she started a DuoNeb treatment. When she first arrived in the ER her oxygen saturation was 76%. She was placed on nonrebreather at 6 L and was saturating in the mid 80s. Patient ambulates with a walker at home and had a left hip replacement surgery performed back in May with Dr. Samantha Marquez. Patient denies fever, headache, lightheadedness, dizziness, sore throat, chest pain, nausea, vomiting, abdominal pain, dysuria, hematuria, increasing or decreasing urinary frequency, constipation, diarrhea, blood in the stool, leg swelling, leg pain, recent travel, recent immobilization, or sick contacts. Review of Systems   Constitutional:  Negative for activity change, chills, fatigue and fever. HENT:  Negative for congestion, postnasal drip, sinus pressure and sore throat.     Eyes: Negative for photophobia, discharge, redness and visual disturbance. Respiratory:  Positive for cough and shortness of breath. Negative for wheezing. Cardiovascular:  Negative for chest pain and leg swelling. Gastrointestinal:  Negative for abdominal pain, blood in stool, constipation, diarrhea, nausea and vomiting. Endocrine: Negative for cold intolerance and heat intolerance. Genitourinary:  Negative for decreased urine volume, difficulty urinating, dysuria, flank pain, frequency, hematuria and urgency. Musculoskeletal:  Negative for arthralgias, back pain, joint swelling and myalgias. Skin:  Negative for rash and wound. Allergic/Immunologic: Negative for immunocompromised state. Neurological:  Negative for dizziness, syncope, facial asymmetry, weakness, light-headedness, numbness and headaches. Hematological:  Does not bruise/bleed easily. Psychiatric/Behavioral:  Negative for behavioral problems and hallucinations. Physical Exam  Constitutional:       General: She is not in acute distress. Appearance: Normal appearance. She is not toxic-appearing. HENT:      Head: Normocephalic and atraumatic. Right Ear: Hearing normal.      Left Ear: Hearing normal.      Nose: Nose normal.      Mouth/Throat:      Lips: Pink. Mouth: Mucous membranes are moist.      Pharynx: Oropharynx is clear. Eyes:      Extraocular Movements: Extraocular movements intact. Conjunctiva/sclera: Conjunctivae normal.      Pupils: Pupils are equal, round, and reactive to light. Cardiovascular:      Rate and Rhythm: Normal rate and regular rhythm. Pulses: Normal pulses. Radial pulses are 2+ on the right side and 2+ on the left side. Dorsalis pedis pulses are 2+ on the right side and 2+ on the left side. Heart sounds: S1 normal and S2 normal.   Pulmonary:      Effort: Pulmonary effort is normal. No tachypnea, accessory muscle usage or respiratory distress.       Breath sounds: Normal air entry. Examination of the right-middle field reveals wheezing and rhonchi. Examination of the right-lower field reveals wheezing and rhonchi. Examination of the left-lower field reveals wheezing and rhonchi. Wheezing and rhonchi present. No rales. Abdominal:      General: Abdomen is flat. Bowel sounds are normal.      Palpations: Abdomen is soft. There is no mass. Tenderness: There is no abdominal tenderness. There is no right CVA tenderness, left CVA tenderness or guarding. Musculoskeletal:         General: No swelling or tenderness. Normal range of motion. Cervical back: Normal range of motion and neck supple. Right lower leg: No edema. Left lower leg: No edema. Skin:     General: Skin is warm and dry. Capillary Refill: Capillary refill takes less than 2 seconds. Findings: No bruising, lesion or rash. Neurological:      General: No focal deficit present. Mental Status: She is alert and oriented to person, place, and time. Mental status is at baseline. Motor: No weakness. Psychiatric:         Attention and Perception: Attention normal.         Mood and Affect: Mood and affect normal.         Behavior: Behavior is cooperative. EKG Interpretation    Interpreted by emergency department physician    Rhythm: normal sinus   Rate: normal  Axis: normal  Ectopy: none  Conduction: normal  ST Segments: no acute change  T Waves: no acute change and inversion in  v1  Q Waves: none    Clinical Impression: no acute changes; T wave inversion in V1 is chronic; compared with old EKG 3/23/2022. Subha Contreras MD    Procedures       MDM  Number of Diagnoses or Management Options  Acute kidney injury ([address]  Acute respiratory failure with hypoxia and hypercapnia ([address]  Diagnosis management comments: The patient is a 67-year-old female with past medical history of multiple sclerosis, brain aneurysm, breast cancer, and COPD presenting with shortness of breath.   At the time of examination the patient is hypoxic. She was moved to nasal cannula after finishing her DuoNeb treatment and is saturating at 85% and tolerating it well. She will be given another DuoNeb treatment as well as another 60 mg of Solu-Medrol. CBC is reassuring. EKG as above. CBC was reassuring. CXR shows early bibasilar infiltrates. BMP shows elevated BUN/Creatinine. Initial troponin is 29. Delta troponin was 25. BNP elevated at 752. CTA chest shows no PE, no thoracic aortic aneurysm or dissection. Small amount of fluid in right bronchus intermedius. No aspiration pneumonia. Patient was reassessed and is still requiring oxygen supplementation. At 2 L she is sitting comfortably but saturating at 85%. The patient likely had subclinical hypoxia which allowed her to establish new baseline at a lower oxygen saturation. Case was discussed with Dr. Maria Teresa Jeter who agreed to admit the patient. The patient was educated about her condition and demonstrated good understanding of her condition and the need for admission. The patient was had no questions and was vitally stable at admission. ED Course as of 08/24/22 1015   Tue Aug 23, 2022   1551 Patient explained her imaging and blood results and the need for further work-up in the hospital.  Although she was reluctant for she understands that she needs further work-up to determine the cause of her condition. She was agreeable for admission. She remains vitally stable at this time. [VG]      ED Course User Index  [VG] Jessica Hernandez MD       --------------------------------------------- PAST HISTORY ---------------------------------------------  Past Medical History:  has a past medical history of Arthritis, Breast cancer ([facility] CKD (chronic kidney disease), COPD (chronic obstructive pulmonary disease) ([facility] Hyperlipidemia, Hypertension, and Multiple sclerosis ([facility]     Past Surgical History:  has a past surgical history that includes Hysterectomy; Breast surgery (4/13/2012); Appendectomy; Knee Arthroplasty (Left, 10/01/2013); Knee Arthroplasty (Right, 08/29/2012); Hip Arthroplasty (Right, 04/13/2011); and Total hip arthroplasty (Left, 3/25/2022). Social History:  reports that she has been smoking cigarettes. She has a 50.00 pack-year smoking history. She has never used smokeless tobacco. She reports that she does not drink alcohol and does not use drugs. Family History: family history includes Cirrhosis in her mother; Mult Sclerosis in her father; Thyroid Cancer in her mother. The patients home medications have been reviewed.     Allergies: Macrodantin [nitrofurantoin macrocrystal]    -------------------------------------------------- RESULTS -------------------------------------------------    LABS:  Results for orders placed or performed during the hospital encounter of 08/23/22   CBC with Auto Differential   Result Value Ref Range    WBC 5.3 4.5 - 11.5 E9/L    RBC 3.98 3.50 - 5.50 E12/L    Hemoglobin 12.0 11.5 - 15.5 g/dL    Hematocrit 38.2 34.0 - 48.0 %    MCV 96.0 80.0 - 99.9 fL    MCH 30.2 26.0 - 35.0 pg    MCHC 31.4 (L) 32.0 - 34.5 %    RDW 15.2 (H) 11.5 - 15.0 fL    Platelets 067 247 - 329 E9/L    MPV 9.3 7.0 - 12.0 fL    Neutrophils % 69.2 43.0 - 80.0 %    Immature Granulocytes % 0.4 0.0 - 5.0 %    Lymphocytes % 19.7 (L) 20.0 - 42.0 %    Monocytes % 8.1 2.0 - 12.0 %    Eosinophils % 1.9 0.0 - 6.0 %    Basophils % 0.7 0.0 - 2.0 %    Neutrophils Absolute 3.70 1.80 - 7.30 E9/L    Immature Granulocytes # 0.02 E9/L    Lymphocytes Absolute 1.05 (L) 1.50 - 4.00 E9/L    Monocytes Absolute 0.43 0.10 - 0.95 E9/L    Eosinophils Absolute 0.10 0.05 - 0.50 E9/L    Basophils Absolute 0.04 0.00 - 0.20 L2/W   Basic Metabolic Panel w/ Reflex to MG   Result Value Ref Range    Sodium 136 132 - 146 mmol/L    Potassium reflex Magnesium 3.8 3.5 - 5.0 mmol/L    Chloride 96 (L) 98 - 107 mmol/L    CO2 30 (H) 22 - 29 mmol/L    Anion Gap 10 7 - 16 mmol/L    Glucose 121 (H) 74 - 99 mg/dL    BUN 34 (H) 6 - 23 mg/dL    Creatinine 1.6 (H) 0.5 - 1.0 mg/dL    GFR Non-African American 31 >=60 mL/min/1.73    GFR African American 38     Calcium 9.2 8.6 - 10.2 mg/dL   Troponin   Result Value Ref Range    Troponin, High Sensitivity 29 (H) 0 - 9 ng/L   Blood Gas, Arterial   Result Value Ref Range    Date Analyzed 20220823     Time Analyzed 1453     Source: Blood Arterial     pH, Blood Gas 7.356 7.350 - 7.450    PCO2 55.9 (H) 35.0 - 45.0 mmHg    PO2 49.6 (L) 75.0 - 100.0 mmHg    HCO3 30.6 (H) 22.0 - 26.0 mmol/L    B.E. 3.8 (H) -3.0 - 3.0 mmol/L    O2 Sat 84.3 (L) 92.0 - 98.5 %    O2Hb 75.4 (L) 94.0 - 97.0 %    COHb 10.3 (H) 0.0 - 1.5 %    MetHb 0.3 0.0 - 1.5 %    O2 Content 13.7 mL/dL    HHb 14.0 (H) 0.0 - 5.0 %    tHb (est) 12.9 11.5 - 16.5 g/dL    Mode NC-  6L     Date Of Collection      Time Collected      Pt Temp 37.0 C     ID [id number]     Lab [id number]     Critical(s) Notified .  No Critical Values    Brain Natriuretic Peptide   Result Value Ref Range    Pro- (H) 0 - 450 pg/mL   Troponin   Result Value Ref Range    Troponin, High Sensitivity 25 (H) 0 - 9 ng/L   Basic Metabolic Panel   Result Value Ref Range    Sodium 136 132 - 146 mmol/L    Potassium 3.9 3.5 - 5.0 mmol/L    Chloride 98 98 - 107 mmol/L    CO2 28 22 - 29 mmol/L    Anion Gap 10 7 - 16 mmol/L    Glucose 159 (H) 74 - 99 mg/dL    BUN 25 (H) 6 - 23 mg/dL    Creatinine 1.3 (H) 0.5 - 1.0 mg/dL    GFR Non-African American 40 >=60 mL/min/1.73    GFR African American 48     Calcium 8.8 8.6 - 10.2 mg/dL   CBC with Auto Differential   Result Value Ref Range    WBC 4.2 (L) 4.5 - 11.5 E9/L    RBC 4.09 3.50 - 5.50 E12/L    Hemoglobin 12.1 11.5 - 15.5 g/dL    Hematocrit 38.4 34.0 - 48.0 %    MCV 93.9 80.0 - 99.9 fL    MCH 29.6 26.0 - 35.0 pg    MCHC 31.5 (L) 32.0 - 34.5 %    RDW 15.1 (H) 11.5 - 15.0 fL    Platelets 185 308 - 929 E9/L    MPV 9.6 7.0 - 12.0 fL    Neutrophils % 88.8 (H) 43.0 - 80.0 %    Lymphocytes % 6.9 (L) 20.0 - 42.0 % Monocytes % 1.7 (L) 2.0 - 12.0 %    Eosinophils % 0.0 0.0 - 6.0 %    Basophils % 1.7 0.0 - 2.0 %    Neutrophils Absolute 3.74 1.80 - 7.30 E9/L    Lymphocytes Absolute 0.34 (L) 1.50 - 4.00 E9/L    Monocytes Absolute 0.08 (L) 0.10 - 0.95 E9/L    Eosinophils Absolute 0.00 (L) 0.05 - 0.50 E9/L    Basophils Absolute 0.07 0.00 - 0.20 E9/L    Atypical Lymphocytes Relative 0.9 0.0 - 4.0 %    nRBC 0.0 /100 WBC    Polychromasia 1+     Hypochromia 1+     Ovalocytes 1+    EKG 12 Lead   Result Value Ref Range    Ventricular Rate 70 BPM    Atrial Rate 70 BPM    P-R Interval 164 ms    QRS Duration 82 ms    Q-T Interval 408 ms    QTc Calculation (Bazett) 440 ms    P Axis 71 degrees    R Axis 10 degrees    T Axis 34 degrees       RADIOLOGY:  CTA CHEST W CONTRAST   Final Result   No pulmonary embolism. No thoracic aortic aneurysm or dissection. Small amount of fluid in the right bronchus intermedius. No aspiration   pneumonia currently. Incidental findings as above. XR CHEST PORTABLE   Final Result   Early bibasilar infiltrates. ------------------------- NURSING NOTES AND VITALS REVIEWED ---------------------------  Date / Time Roomed:  8/23/2022  1:48 PM  ED Bed Assignment:  [id number]    The nursing notes within the ED encounter and vital signs as below have been reviewed.      Patient Vitals for the past 24 hrs:   BP Temp Temp src Pulse Resp SpO2 Height Weight   08/24/22 0746 (!) 141/64 98 °F (36.7 °C) Oral 87 18 98 % -- --   08/24/22 0020 -- -- -- -- -- -- -- 175 lb (79.4 kg)   08/23/22 1937 (!) 166/71 97.5 °F (36.4 °C) Oral 76 18 95 % 5' 2\" (1.575 m) 175 lb 3.2 oz (79.5 kg)   08/23/22 1630 (!) 157/65 97.2 °F (36.2 °C) -- 76 20 94 % -- --   08/23/22 1621 -- 98.7 °F (37.1 °C) Oral -- -- -- -- --   08/23/22 1459 -- -- -- 69 16 91 % -- --   08/23/22 1451 -- -- -- 70 16 92 % -- --   08/23/22 1359 108/63 -- -- 71 18 -- 5' 2\" (1.575 m) 170 lb (77.1 kg)       Oxygen Saturation Interpretation: Improved after treatment    ------------------------------------------ PROGRESS NOTES ------------------------------------------  Re-evaluation(s):  Time: 2074  Patients symptoms show no change  Repeat physical examination is not changed. She is still requiring O2 supplementation and desaturating when at 2L HFNC    Counseling:  I have spoken with the patient and discussed todays results, in addition to providing specific details for the plan of care and counseling regarding the diagnosis and prognosis. Their questions are answered at this time and they are agreeable with the plan of admission.    --------------------------------- ADDITIONAL PROVIDER NOTES ---------------------------------  Consultations:  Time: 1540. Spoke with Dr. Tiffany Mcelroy. Discussed case. They will admit the patient. This patient's ED course included: a personal history and physicial examination, re-evaluation prior to disposition, multiple bedside re-evaluations, IV medications, cardiac monitoring, and continuous pulse oximetry    This patient has remained hemodynamically stable during their ED course. Diagnosis:  1. Acute respiratory failure with hypoxia and hypercapnia (HCC)    2. Acute kidney injury ([facility]        Disposition:  Patient's disposition: Admit to telemetry  Patient's condition is stable. Patient was seen and evaluated by myself and my attending Primo Almeida DO. Assessment and Plan discussed with attending provider, please see attestation for final plan of care.      Janan Libman, MD Janan Libman, MD  Resident  08/24/22 2080

## 2022-08-23 NOTE — PROGRESS NOTES
Database initiated pharmacy and medications verified with the patient. She is A&O from home with . Sates she ambulates with a walker and is RA at baseline.

## 2022-08-23 NOTE — TELEPHONE ENCOUNTER
Pt was nurse triaged for a pulse ox ranging in the 60-70s. Upon the pt being transferred to the office she explained to me that she felt very winded while in bed and had a hard time catching her breath. She explained the highest reading she could get was low 80s. Pt was advised to go to the ER.

## 2022-08-23 NOTE — PROGRESS NOTES
22  Coral Curry : 1945 Sex: female  Age 77 y.o. Subjective:  Chief Complaint   Patient presents with    Shortness of Breath     Started last night          HPI:   Coral Curry , 77 y.o. female presents to express care for evaluation of shortness of breath    HPI  52-year-old female presents to express care for evaluation of shortness of breath. The patient has a history of COPD, MS, hyperlipidemia, hypertension presenting today for evaluation of shortness of breath. The patient has a pulse ox at 64% on arrival.  The patient had repeat pulse ox that was in the low 70s anywhere between 71 and 74%. The patient states that she is really not coughing. She just increasingly short of breath. The patient has not had any fever, chills. The patient does not wear oxygen at home. ROS:   Unless otherwise stated in this report the patient's positive and negative responses for review of systems for constitutional, eyes, ENT, cardiovascular, respiratory, gastrointestinal, neurological, , musculoskeletal, and integument systems and related systems to the presenting problem are either stated in the history of present illness or were not pertinent or were negative for the symptoms and/or complaints related to the presenting medical problem. Positives and pertinent negatives as per HPI. All others reviewed and are negative. PMH:     Past Medical History:   Diagnosis Date    Arthritis     Breast cancer ([address]     COPD (chronic obstructive pulmonary disease) ([address]     Hyperlipidemia     Hypertension     Multiple sclerosis ([address]     diagnosised 8  yrs ago [facility]       Past Surgical History:   Procedure Laterality Date    APPENDECTOMY      BREAST SURGERY  2012    biopsy - negative    HIP ARTHROPLASTY Right 2011    Right AIDEE  ALLISON Llanos MD    HYSTERECTOMY (CERVIX STATUS UNKNOWN)      KNEE ARTHROPLASTY Left 10/01/2013    Left TKA  ALLISON Llanos MD    KNEE ARTHROPLASTY Right 08/29/2012    Right TKA  ALLISON Hernandez MD    TOTAL HIP ARTHROPLASTY Left 3/25/2022    LEFT HIP TOTAL ARTHROPLASTY performed by Nicolás Bah MD at [facility] OR       Family History   Problem Relation Age of Onset    Mult Sclerosis Father     Thyroid Cancer Mother     Cirrhosis Mother        Medications:     Current Outpatient Medications:     baclofen (LIORESAL) 10 MG tablet, TAKE 1 TABLET TWICE A DAY, Disp: 60 tablet, Rfl: 0    meloxicam (MOBIC) 15 MG tablet, Take 1 tablet by mouth in the morning., Disp: 30 tablet, Rfl: 3    Fluticasone-Umeclidin-Vilant (TRELEGY ELLIPTA) 200-62.5-25 MCG/INH AEPB, INHALE ONE PUFF BY MOUTH EVERY DAY, Disp: 60 each, Rfl: 1    diclofenac sodium (VOLTAREN) 1 % GEL, Apply 2 g topically 4 times daily as needed for Pain, Disp: 50 g, Rfl: 1    traZODone (DESYREL) 25 mg TABS, Take 25 mg by mouth nightly, Disp: , Rfl:     traZODone (DESYREL) 50 MG tablet, Take 0.5 tablets by mouth nightly as needed for Sleep, Disp: 90 tablet, Rfl: 1    memantine (NAMENDA) 5 MG tablet, Take 1 tablet by mouth 2 times daily, Disp: 180 tablet, Rfl: 1    baclofen (LIORESAL) 10 MG tablet, TAKE 1 TABLET TWICE A DAY, Disp: 180 tablet, Rfl: 1    hydroCHLOROthiazide (HYDRODIURIL) 25 MG tablet, Take 1 tablet by mouth daily, Disp: 90 tablet, Rfl: 1    atorvastatin (LIPITOR) 20 MG tablet, Take 1 tablet by mouth daily, Disp: 30 tablet, Rfl: 3    Semaglutide,0.25 or 0.5MG/DOS, (OZEMPIC, 0.25 OR 0.5 MG/DOSE,) 2 MG/1.5ML SOPN, Inject 0.25 mg once a week for 4 weeks.   Then increase to 0.5 mg once a week thereafter, Disp: 4 pen, Rfl: 1    carvedilol (COREG) 25 MG tablet, Take 1 tablet by mouth 2 times daily, Disp: 180 tablet, Rfl: 1    amLODIPine (NORVASC) 5 MG tablet, Take 1 tablet by mouth daily, Disp: 90 tablet, Rfl: 1    simvastatin (ZOCOR) 40 MG tablet, TAKE 1 TABLET NIGHTLY, Disp: 90 tablet, Rfl: 1    gabapentin (NEURONTIN) 400 MG capsule, TAKE 1 CAPSULE 3 TIMES A   DAY, Disp: 270 capsule, Rfl: 0    alendronate (FOSAMAX) 70 MG tablet, TAKE 1 TABLET EVERY 7 DAYS (Patient not taking: Reported on 6/6/2022), Disp: 12 tablet, Rfl: 0    sertraline (ZOLOFT) 100 MG tablet, TAKE 1 AND 1/2 TABLETS     DAILY, Disp: 135 tablet, Rfl: 0    aspirin 81 MG EC tablet, Take 1 tablet by mouth 2 times daily for 30 doses DVT prophylaxis x30 days, Disp: 60 tablet, Rfl: 0    albuterol sulfate HFA (VENTOLIN HFA) 108 (90 Base) MCG/ACT inhaler, Inhale 2 puffs into the lungs 4 times daily as needed for Wheezing, Disp: 1 Inhaler, Rfl: 5    Cholecalciferol 50 MCG (2000 UT) CAPS, Take 2,000 Units by mouth every morning , Disp: , Rfl:     Allergies: Allergies   Allergen Reactions    Macrodantin [Nitrofurantoin Macrocrystal] Shortness Of Breath       Social History:     Social History     Tobacco Use    Smoking status: Every Day     Packs/day: 1.00     Years: 40.00     Pack years: 40.00     Types: Cigarettes    Smokeless tobacco: Never   Vaping Use    Vaping Use: Never used   Substance Use Topics    Alcohol use: No    Drug use: No       Patient lives at home. Physical Exam:     Vitals:    08/23/22 1248 08/23/22 1311   BP: 126/80    Pulse: 75    Resp: 18    Temp: 97.9 °F (36.6 °C)    SpO2: (!) 70% (!) 88%   Weight: Comment: cant stand    Height: 5' 2\" (1.575 m)        Exam:  Physical Exam  Nurses note reviewed, the patient is hypoxic. The patient's not tachycardic. General: The patient appears well and in no apparent distress. Patient is resting comfortably on cart. Skin: Warm, dry, no pallor noted. There is no rash noted. Head: Normocephalic, atraumatic. Eye: Normal conjunctiva  Ears, Nose, Mouth, and Throat: Oral mucosa is moist  Cardiovascular: Regular Rate and Rhythm  Respiratory: Patient is in no distress, rhonchi and crackles noted throughout, mild wheeze, diminished lung sounds  Back: Non-tender, no CVA tenderness bilaterally to percussion. GI: Normal bowel sounds, no tenderness to palpation, no masses appreciated.  No rebound, guarding, or rigidity noted. Musculoskeletal: Trace to 1+ edema noted to the bilateral lower extremities, no significant calf tenderness  Neurological: A&O x4, normal speech        Testing:           Medical Decision Making:     Vital signs reviewed    Past medical history reviewed. Allergies reviewed. Medications reviewed. The patient is noted to be hypoxic. The patient's pulse ox is 70% on room air. The patient was fluctuating weight between 64% and 74% on room air. The patient will be given DuoNeb treatment, Solu-Medrol, and had COVID obtained. EMS was contacted. The patient was placed on 2 L nasal cannula. The patient was increased to 4 L and went up to 88%. The patient was taken off the breathing treatment and had decreased to 84%. The patient will be increased to 6 L. We are awaiting EMS arrival.    COVID test was negative. The patient is now on 6 L, EMS is here at  for transport      Clinical Impression:   Lan Gary was seen today for shortness of breath.     Diagnoses and all orders for this visit:    Acute respiratory failure with hypoxia ([facility]  -     [address] - RI NONINVASV OXYGEN SATUR,MULTIPLE  -     POCT COVID-19, Antigen    COPD exacerbation ([facility]    Other orders  -     ipratropium-albuterol (DUONEB) nebulizer solution 1 ampule  -     methylPREDNISolone sodium (SOLU-MEDROL) injection 60 mg      Go directly to the ED via EMS    SIGNATURE: Elida Adams III, PA-C

## 2022-08-24 LAB
ANION GAP SERPL CALCULATED.3IONS-SCNC: 10 MMOL/L (ref 7–16)
ATYPICAL LYMPHOCYTE RELATIVE PERCENT: 0.9 % (ref 0–4)
BASOPHILS ABSOLUTE: 0.07 E9/L (ref 0–0.2)
BASOPHILS RELATIVE PERCENT: 1.7 % (ref 0–2)
BUN BLDV-MCNC: 25 MG/DL (ref 6–23)
CALCIUM SERPL-MCNC: 8.8 MG/DL (ref 8.6–10.2)
CHLORIDE BLD-SCNC: 98 MMOL/L (ref 98–107)
CO2: 28 MMOL/L (ref 22–29)
CREAT SERPL-MCNC: 1.3 MG/DL (ref 0.5–1)
EKG ATRIAL RATE: 70 BPM
EKG P AXIS: 71 DEGREES
EKG P-R INTERVAL: 164 MS
EKG Q-T INTERVAL: 408 MS
EKG QRS DURATION: 82 MS
EKG QTC CALCULATION (BAZETT): 440 MS
EKG R AXIS: 10 DEGREES
EKG T AXIS: 34 DEGREES
EKG VENTRICULAR RATE: 70 BPM
EOSINOPHILS ABSOLUTE: 0 E9/L (ref 0.05–0.5)
EOSINOPHILS RELATIVE PERCENT: 0 % (ref 0–6)
GFR AFRICAN AMERICAN: 48
GFR NON-AFRICAN AMERICAN: 40 ML/MIN/1.73
GLUCOSE BLD-MCNC: 159 MG/DL (ref 74–99)
HCT VFR BLD CALC: 38.4 % (ref 34–48)
HEMOGLOBIN: 12.1 G/DL (ref 11.5–15.5)
HYPOCHROMIA: ABNORMAL
LYMPHOCYTES ABSOLUTE: 0.34 E9/L (ref 1.5–4)
LYMPHOCYTES RELATIVE PERCENT: 6.9 % (ref 20–42)
MCH RBC QN AUTO: 29.6 PG (ref 26–35)
MCHC RBC AUTO-ENTMCNC: 31.5 % (ref 32–34.5)
MCV RBC AUTO: 93.9 FL (ref 80–99.9)
MONOCYTES ABSOLUTE: 0.08 E9/L (ref 0.1–0.95)
MONOCYTES RELATIVE PERCENT: 1.7 % (ref 2–12)
NEUTROPHILS ABSOLUTE: 3.74 E9/L (ref 1.8–7.3)
NEUTROPHILS RELATIVE PERCENT: 88.8 % (ref 43–80)
NUCLEATED RED BLOOD CELLS: 0 /100 WBC
OVALOCYTES: ABNORMAL
PDW BLD-RTO: 15.1 FL (ref 11.5–15)
PLATELET # BLD: 266 E9/L (ref 130–450)
PMV BLD AUTO: 9.6 FL (ref 7–12)
POLYCHROMASIA: ABNORMAL
POTASSIUM SERPL-SCNC: 3.9 MMOL/L (ref 3.5–5)
RBC # BLD: 4.09 E12/L (ref 3.5–5.5)
SODIUM BLD-SCNC: 136 MMOL/L (ref 132–146)
WBC # BLD: 4.2 E9/L (ref 4.5–11.5)

## 2022-08-24 PROCEDURE — 94640 AIRWAY INHALATION TREATMENT: CPT

## 2022-08-24 PROCEDURE — 6370000000 HC RX 637 (ALT 250 FOR IP): Performed by: INTERNAL MEDICINE

## 2022-08-24 PROCEDURE — 36415 COLL VENOUS BLD VENIPUNCTURE: CPT

## 2022-08-24 PROCEDURE — APPSS30 APP SPLIT SHARED TIME 16-30 MINUTES: Performed by: NURSE PRACTITIONER

## 2022-08-24 PROCEDURE — 2580000003 HC RX 258: Performed by: NURSE PRACTITIONER

## 2022-08-24 PROCEDURE — 6360000002 HC RX W HCPCS: Performed by: NURSE PRACTITIONER

## 2022-08-24 PROCEDURE — 6370000000 HC RX 637 (ALT 250 FOR IP): Performed by: NURSE PRACTITIONER

## 2022-08-24 PROCEDURE — 85025 COMPLETE CBC W/AUTO DIFF WBC: CPT

## 2022-08-24 PROCEDURE — 97530 THERAPEUTIC ACTIVITIES: CPT

## 2022-08-24 PROCEDURE — 1200000000 HC SEMI PRIVATE

## 2022-08-24 PROCEDURE — 80048 BASIC METABOLIC PNL TOTAL CA: CPT

## 2022-08-24 PROCEDURE — 97161 PT EVAL LOW COMPLEX 20 MIN: CPT

## 2022-08-24 PROCEDURE — 2500000003 HC RX 250 WO HCPCS: Performed by: NURSE PRACTITIONER

## 2022-08-24 PROCEDURE — 99232 SBSQ HOSP IP/OBS MODERATE 35: CPT | Performed by: INTERNAL MEDICINE

## 2022-08-24 PROCEDURE — 2700000000 HC OXYGEN THERAPY PER DAY

## 2022-08-24 RX ORDER — DOXYCYCLINE HYCLATE 100 MG/1
100 CAPSULE ORAL EVERY 12 HOURS SCHEDULED
Status: DISCONTINUED | OUTPATIENT
Start: 2022-08-24 | End: 2022-08-26 | Stop reason: HOSPADM

## 2022-08-24 RX ORDER — GABAPENTIN 400 MG/1
400 CAPSULE ORAL 2 TIMES DAILY
Status: DISCONTINUED | OUTPATIENT
Start: 2022-08-24 | End: 2022-08-24 | Stop reason: SDUPTHER

## 2022-08-24 RX ORDER — GABAPENTIN 300 MG/1
300 CAPSULE ORAL 3 TIMES DAILY
Status: DISCONTINUED | OUTPATIENT
Start: 2022-08-24 | End: 2022-08-24

## 2022-08-24 RX ORDER — GABAPENTIN 400 MG/1
400 CAPSULE ORAL 2 TIMES DAILY
Status: DISCONTINUED | OUTPATIENT
Start: 2022-08-24 | End: 2022-08-26 | Stop reason: HOSPADM

## 2022-08-24 RX ADMIN — BACLOFEN 10 MG: 10 TABLET ORAL at 10:08

## 2022-08-24 RX ADMIN — CARVEDILOL 25 MG: 25 TABLET, FILM COATED ORAL at 20:30

## 2022-08-24 RX ADMIN — HEPARIN SODIUM 5000 UNITS: 10000 INJECTION INTRAVENOUS; SUBCUTANEOUS at 20:31

## 2022-08-24 RX ADMIN — SODIUM CHLORIDE, PRESERVATIVE FREE 10 ML: 5 INJECTION INTRAVENOUS at 20:38

## 2022-08-24 RX ADMIN — METHYLPREDNISOLONE SODIUM SUCCINATE 40 MG: 40 INJECTION, POWDER, LYOPHILIZED, FOR SOLUTION INTRAMUSCULAR; INTRAVENOUS at 16:39

## 2022-08-24 RX ADMIN — SODIUM CHLORIDE: 9 INJECTION, SOLUTION INTRAVENOUS at 10:06

## 2022-08-24 RX ADMIN — ARFORMOTEROL TARTRATE 15 MCG: 15 SOLUTION RESPIRATORY (INHALATION) at 08:15

## 2022-08-24 RX ADMIN — HEPARIN SODIUM 5000 UNITS: 10000 INJECTION INTRAVENOUS; SUBCUTANEOUS at 12:10

## 2022-08-24 RX ADMIN — IPRATROPIUM BROMIDE 0.5 MG: 0.5 SOLUTION RESPIRATORY (INHALATION) at 08:15

## 2022-08-24 RX ADMIN — HEPARIN SODIUM 5000 UNITS: 10000 INJECTION INTRAVENOUS; SUBCUTANEOUS at 04:30

## 2022-08-24 RX ADMIN — ARFORMOTEROL TARTRATE 15 MCG: 15 SOLUTION RESPIRATORY (INHALATION) at 19:52

## 2022-08-24 RX ADMIN — ATORVASTATIN CALCIUM 20 MG: 20 TABLET, FILM COATED ORAL at 10:08

## 2022-08-24 RX ADMIN — CARVEDILOL 25 MG: 25 TABLET, FILM COATED ORAL at 10:07

## 2022-08-24 RX ADMIN — SERTRALINE 150 MG: 100 TABLET, FILM COATED ORAL at 10:08

## 2022-08-24 RX ADMIN — GABAPENTIN 400 MG: 400 CAPSULE ORAL at 20:28

## 2022-08-24 RX ADMIN — SODIUM CHLORIDE, PRESERVATIVE FREE 10 ML: 5 INJECTION INTRAVENOUS at 10:09

## 2022-08-24 RX ADMIN — BACLOFEN 10 MG: 10 TABLET ORAL at 20:28

## 2022-08-24 RX ADMIN — ACETAMINOPHEN 650 MG: 325 TABLET ORAL at 20:27

## 2022-08-24 RX ADMIN — GABAPENTIN 400 MG: 400 CAPSULE ORAL at 10:06

## 2022-08-24 RX ADMIN — IPRATROPIUM BROMIDE 0.5 MG: 0.5 SOLUTION RESPIRATORY (INHALATION) at 19:52

## 2022-08-24 RX ADMIN — DOXYCYCLINE 100 MG: 100 INJECTION, POWDER, LYOPHILIZED, FOR SOLUTION INTRAVENOUS at 10:05

## 2022-08-24 RX ADMIN — DOXYCYCLINE HYCLATE 100 MG: 100 CAPSULE ORAL at 18:54

## 2022-08-24 RX ADMIN — ASPIRIN 81 MG: 81 TABLET, COATED ORAL at 20:27

## 2022-08-24 RX ADMIN — IPRATROPIUM BROMIDE 0.5 MG: 0.5 SOLUTION RESPIRATORY (INHALATION) at 11:52

## 2022-08-24 RX ADMIN — ASPIRIN 81 MG: 81 TABLET, COATED ORAL at 10:06

## 2022-08-24 RX ADMIN — AMLODIPINE BESYLATE 5 MG: 5 TABLET ORAL at 10:08

## 2022-08-24 RX ADMIN — ACETAMINOPHEN 650 MG: 325 TABLET ORAL at 10:07

## 2022-08-24 RX ADMIN — IPRATROPIUM BROMIDE 0.5 MG: 0.5 SOLUTION RESPIRATORY (INHALATION) at 17:07

## 2022-08-24 RX ADMIN — METHYLPREDNISOLONE SODIUM SUCCINATE 40 MG: 40 INJECTION, POWDER, LYOPHILIZED, FOR SOLUTION INTRAMUSCULAR; INTRAVENOUS at 00:31

## 2022-08-24 RX ADMIN — BUDESONIDE 250 MCG: 0.25 SUSPENSION RESPIRATORY (INHALATION) at 08:15

## 2022-08-24 RX ADMIN — BUDESONIDE 250 MCG: 0.25 SUSPENSION RESPIRATORY (INHALATION) at 19:53

## 2022-08-24 RX ADMIN — Medication 2000 UNITS: at 10:08

## 2022-08-24 RX ADMIN — METHYLPREDNISOLONE SODIUM SUCCINATE 40 MG: 40 INJECTION, POWDER, LYOPHILIZED, FOR SOLUTION INTRAMUSCULAR; INTRAVENOUS at 10:09

## 2022-08-24 ASSESSMENT — PAIN DESCRIPTION - FREQUENCY: FREQUENCY: INTERMITTENT

## 2022-08-24 ASSESSMENT — PAIN DESCRIPTION - PAIN TYPE: TYPE: CHRONIC PAIN

## 2022-08-24 ASSESSMENT — PAIN SCALES - GENERAL
PAINLEVEL_OUTOF10: 7
PAINLEVEL_OUTOF10: 7

## 2022-08-24 ASSESSMENT — PAIN DESCRIPTION - ORIENTATION
ORIENTATION: RIGHT
ORIENTATION: RIGHT

## 2022-08-24 ASSESSMENT — PAIN DESCRIPTION - DESCRIPTORS
DESCRIPTORS: ACHING
DESCRIPTORS: ACHING

## 2022-08-24 ASSESSMENT — PAIN DESCRIPTION - LOCATION
LOCATION: SHOULDER
LOCATION: HEAD

## 2022-08-24 ASSESSMENT — PULMONARY FUNCTION TESTS: PEFR_L/MIN: 16

## 2022-08-24 ASSESSMENT — PAIN - FUNCTIONAL ASSESSMENT: PAIN_FUNCTIONAL_ASSESSMENT: ACTIVITIES ARE NOT PREVENTED

## 2022-08-24 NOTE — PROGRESS NOTES
Physical Therapy  Facility/Department: [facility] SURG  Physical Therapy Initial Assessment    Name: Chelly Terry  : 1945  MRN: [id number]  Date of Service: 2022          Patient Diagnosis(es): The primary encounter diagnosis was Acute respiratory failure with hypoxia and hypercapnia ([facility] A diagnosis of Acute kidney injury [facility]) was also pertinent to this visit. Past Medical History:  has a past medical history of Arthritis, Breast cancer ([facility] CKD (chronic kidney disease), COPD (chronic obstructive pulmonary disease) ([facility] Hyperlipidemia, Hypertension, and Multiple sclerosis ([facility] Past Surgical History:  has a past surgical history that includes Hysterectomy; Breast surgery (2012); Appendectomy; Knee Arthroplasty (Left, 10/01/2013); Knee Arthroplasty (Right, 2012); Hip Arthroplasty (Right, 2011); and Total hip arthroplasty (Left, 3/25/2022). Requires PT Follow-Up: Yes     Evaluating Therapist: Jadon Martin, PT     Rec ww     Referring Provider:  Mar Ponce MD    PT order : PT eval and treat     Room #: 503   DIAGNOSIS:  acute respiratory failure     PRECAUTIONS: falls, O2     Social:  Pt lives with  spouse  in a  1  floor plan  and ramp  to enter. Prior to admission pt walked with  rollator      Initial Evaluation  Date:  2022  Treatment      Short Term/ Long Term   Goals   Was pt agreeable to Eval/treatment? Yes      Does pt have pain? L shoulder pain      Bed Mobility  Rolling:  NT   Supine to sit:  min assist   Sit to supine: NT   Scooting: independent in sit    Independent    Transfers Sit to stand:  CGA   Stand to sit:  CGA   Stand pivot:  NT    Independent    Ambulation     15 and 50  feet with ww  with  SBA   150  feet with  ww  with  independent        Stair negotiation: ascended and descended NT   N/A    LE ROM  WFL     LE strength  4-/5   4/ 5    AM- PAC RAW score              Pt is alert and Oriented x  4      Balance: SBA .  Fall risk due to  decreased activity tolerance, weakness, and pain   Endurance: decreased   Bed/Chair alarm:  yes      ASSESSMENT  Pt displays functional ability as noted in the objective portion of this evaluation. Conditions Requiring Skilled Therapeutic Intervention:    [x]Decreased strength     []Decreased ROM  [x]Decreased functional mobility  [x]Decreased balance   [x]Decreased endurance   []Decreased posture  []Decreased sensation  []Decreased coordination   []Decreased vision  []Decreased safety awareness   [x]Increased pain         Treatment/Education:    Pt in bed   upon arrival ; agreeable to PT. Mobility as above. Cues for hand placement with transfers  and ww safety with gait. Pt's daughter present for session and reports pt has difficulty folding her rollator when going out. Pt and daughter feel ww may be easier to manage due to lighter weight ; less cumbersome. O2@ 3 LNC. SpO2 at rest 90%, decreased to 85% with gait, recovered to 91%. Instructed on PLB. Pt educated on fall risk, safe and proper technique with mobility        Patient response to education:   Pt verbalized understanding Pt demonstrated skill Pt requires further education in this area   x With cues   x       Comments:  Pt left  in chair after session, with call light in reach. Waffle cushion and chair alarm placed       Rehab potential is Good for reaching above PT goals. Pts/ family goals   1. Breathe better     Patient and or family understand(s) diagnosis, prognosis, and plan of care. -  yes     PLAN  PT care will be provided in accordance with the objectives noted above. Whenever appropriate, clear delegation orders will be provided for nursing staff. Exercises and functional mobility practice will be used as well as appropriate assistive devices or modalities to obtain goals. Patient and family education will also be administered as needed.         PLAN OF CARE:    Current Treatment Recommendations     [x] Strengthening to improve independence with functional mobility   [] ROM to improve independence with functional mobility   [x] Balance Training to improve static/dynamic balance and to reduce fall risk  [x] Endurance Training to improve activity tolerance during functional mobility   [x] Transfer Training to improve safety and independence with all functional transfers   [x] Gait Training to improve gait mechanics, endurance and assess need for appropriate assistive device  [] Stair Training in preparation for safe discharge home and/or into the community   [x] Positioning to prevent skin breakdown and contractures  [x] Safety and Education Training   [x] Patient/Caregiver Education   [] HEP  [] Other     Frequency of treatments will be 2-5x/week x  7 -10 days. Time in: 1435   Time out: 1459      Evaluation Time includes thorough review of current medical information, gathering information on past medical history/social history and prior level of function, completion of standardized testing/informal observation of tasks, assessment of data and education on plan of care and goals.     CPT codes:  [x] Low Complexity PT evaluation 55265  [] Moderate Complexity PT evaluation 85736  [] High Complexity PT evaluation 96361  [] PT Re-evaluation 90524  [] Gait training 37341  minutes  [x] Therapeutic activities 81247 10 minutes  [] Therapeutic exercises 53110  minutes  [] Neuromuscular reeducation 51183  minutes       Donya 18 number:  PT 5725

## 2022-08-24 NOTE — PROGRESS NOTES
Patient takes Trazodone at home and was not ordered it here on admission. Spoke with Diana Dolan NP and she said she will review patient's chart and decide if it should be ordered or if it can depress respirations. She will order it if feels patient can have it.

## 2022-08-24 NOTE — PROGRESS NOTES
[facility] Progress Note    Admitting Date and Time: 8/23/2022  1:48 PM  Admit Dx: Acute respiratory failure with hypoxia and hypercapnia (HCC) [J96.01, J96.02]    Subjective:  Patient is being followed for Acute respiratory failure with hypoxia and hypercapnia ([facility] [J96.01, J96.02]     PMH:  has a past medical history of Arthritis, Breast cancer ([facility] CKD (chronic kidney disease), COPD (chronic obstructive pulmonary disease) ([facility] Hyperlipidemia, Hypertension, and Multiple sclerosis ([facility] Brief: Pt presented to ER on 8/23 for abrupt onset of dyspnea overnight. Checked pulse ox upon waking and was in low 70's. Pt does wear home O2, presented to ER and pulse ox was 64% on RA upon arrival.     Patient seen and examined. Awake, A&O, sitting in bed in no apparent distress. States she was able to get some sleep last night. Ate breakfast, states the food was 'tolerable'. Feels that her breathing has improved. Discussed CTA results, atbx and steroids. Pt wondering when she will be able to go home. Denies CP, SOB. Pt still on 7L NC, but most recent pulse ox was 97%, will advised nursing to try to wean O2.     ROS: denies fever, chills, cp, sob, n/v, HA unless stated above.      sertraline  150 mg Oral Daily    memantine  5 mg Oral BID    gabapentin  400 mg Oral TID    aspirin  81 mg Oral BID    atorvastatin  20 mg Oral Daily    amLODIPine  5 mg Oral Daily    baclofen  10 mg Oral BID    carvedilol  25 mg Oral BID    vitamin D  2,000 Units Oral QAM    sodium chloride flush  5-40 mL IntraVENous 2 times per day    methylPREDNISolone  40 mg IntraVENous Q8H    nicotine  1 patch TransDERmal Daily    heparin (porcine)  5,000 Units SubCUTAneous Q8H    chlorhexidine  15 mL Mouth/Throat BID    doxycycline (VIBRAMYCIN) IV  100 mg IntraVENous Q12H    Arformoterol Tartrate  15 mcg Nebulization BID    And    ipratropium  0.5 mg Nebulization 4x daily    And    budesonide  0.25 mg Nebulization BID     diclofenac sodium, 2 g, 4x Daily PRN  sodium chloride flush, 5-40 mL, PRN  sodium chloride, , PRN  ondansetron, 4 mg, Q8H PRN   Or  ondansetron, 4 mg, Q6H PRN  polyethylene glycol, 17 g, Daily PRN  acetaminophen, 650 mg, Q6H PRN   Or  acetaminophen, 650 mg, Q6H PRN  ipratropium-albuterol, 1 ampule, Q4H PRN       Objective:    BP (!) 141/64   Pulse 87   Temp 98 °F (36.7 °C) (Oral)   Resp 18   Ht 5' 2\" (1.575 m)   Wt 175 lb (79.4 kg)   SpO2 98%   BMI 32.01 kg/m²     PHYSICAL EXAM  General Appearance: Awake, alert and oriented. In no acute distress  Skin: Warm and dry, no rash or erythema  Head: normocephalic and atraumatic  Eyes: pupils equal, round, and reactive to light, extraocular eye movements intact, conjunctivae normal  ENT: external ear and ear canal normal bilaterally, nose without deformity  Neck: supple and non-tender without mass, no cervical lymphadenopathy  Pulmonary/Chest: exp wheezing in all fields. Normal air movement. No respiratory distress. O2 currently at 7L NC. Moist, non-productive cough  Cardiovascular: normal rate, regular rhythm. Normal S1 amplified S2. No murmurs, rubs, clicks, or gallops  Abdomen: soft, non-tender, non-distended. Normal bowel sounds. No masses or organomegaly  Extremities: no cyanosis, clubbing or edema  Musculoskeletal: normal range of motion, no joint swelling, deformity or tenderness  Neurologic: reflexes normal and symmetric, no cranial nerve deficit, gait, coordination and speech normal      Recent Labs     08/23/22  1416 08/24/22  0302    136   K 3.8 3.9   CL 96* 98   CO2 30* 28   BUN 34* 25*   CREATININE 1.6* 1.3*   GLUCOSE 121* 159*   CALCIUM 9.2 8.8       Recent Labs     08/23/22  1416 08/24/22  0302   WBC 5.3 4.2*   RBC 3.98 4.09   HGB 12.0 12.1   HCT 38.2 38.4   MCV 96.0 93.9   MCH 30.2 29.6   MCHC 31.4* 31.5*   RDW 15.2* 15.1*    266   MPV 9.3 9.6       Radiology:   CTA    Impression   No pulmonary embolism. No thoracic aortic aneurysm or dissection. Small amount of fluid in the right bronchus intermedius. No aspiration   pneumonia currently. Incidental findings as above. Assessment:    Principal Problem:    Acute respiratory failure with hypoxia and hypercapnia (HCC)  Resolved Problems:    * No resolved hospital problems. *      Plan:     1. Acute respiratory failure with hypoxia and hypercapnia:  -CTA negative for PE  -97% on O2 7L NC.   -nursing communication to attempt to wean O2  -solu-medrol TID  -duonebs prn  -oxygen therapy protocol. 3. COPD exacerbation:  -as above  -doxycycline IV BID  -continue home trelegy    4. Troponinemia:  - troponin 29, 25. Delta troponin negative     5. CKD stage 3:  -GFR 38  -BUN Cr 34/1. 6- both at/near baseline  -NS @75ml/hr x24h to start after CTA  -hold home hctz for now  -gabapentin dose decreased to 300mg, frequency unchanged    6. HTN:   -continue home amlodipine and carvedilol  -home hctz held due to kidney function      Code Status: DNRCCA  DVT prophylaxis: Heparin TID    NOTE: This report was transcribed using voice recognition software. Every effort was made to ensure accuracy; however, inadvertent computerized transcription errors may be present.   Electronically signed by HIRA Rodriguez NP on 8/24/2022 at 8:21 AM

## 2022-08-24 NOTE — CARE COORDINATION
8/24/2022  Social Work Discharge Planning: This worker met with Pt to discuss  role and transition of care/discharge planning. Pt is independent with her spouse in a one story home with a ramp. Pt has a rollator. Pt is on 8l o2 here and uses none at home. Pt has no nebulizer either. Wean o2. If home o2 is needed Pt has no provider preference. Pt has been to [place] [id number] in the [facility] and [facility]. Pt is currently on IVATB. [facility]acy is [facility] in [facility] and PCP is Dr. Jenni Christina.  Electronically signed by JIAN Jewell on 8/24/2022 at 1:13 PM

## 2022-08-24 NOTE — PLAN OF CARE
Problem: Pain  Goal: Verbalizes/displays adequate comfort level or baseline comfort level  8/24/2022 0056 by Natasha Alanis  Outcome: Progressing  8/24/2022 0055 by Natasha Alanis  Outcome: Progressing     Problem: Safety - Adult  Goal: Free from fall injury  Outcome: Progressing

## 2022-08-24 NOTE — PROGRESS NOTES
This patient's CrCl is 36 mL/min. Please consider decreasing the gabapentin dose to 300 mg three times daily.   Luana Magallanes RPh 8/24/2022 7:03 AM

## 2022-08-24 NOTE — PATIENT CARE CONFERENCE
P Quality Flow/Interdisciplinary Rounds Progress Note        Quality Flow Rounds held on August 24, 2022    Disciplines Attending:  Bedside Nurse, , , and Nursing Unit Ciarra Tijerina was admitted on 8/23/2022  1:48 PM    Anticipated Discharge Date:       Disposition:    Von Score:  Von Scale Score: 20    Readmission Risk              Risk of Unplanned Readmission:  15           Discussed patient goal for the day, patient clinical progression, and barriers to discharge.   The following Goal(s) of the Day/Commitment(s) have been identified:  Labs - Report Results      Melo Du RN  August 24, 2022

## 2022-08-25 LAB
ANION GAP SERPL CALCULATED.3IONS-SCNC: 10 MMOL/L (ref 7–16)
BASOPHILS ABSOLUTE: 0.01 E9/L (ref 0–0.2)
BASOPHILS RELATIVE PERCENT: 0.1 % (ref 0–2)
BUN BLDV-MCNC: 31 MG/DL (ref 6–23)
CALCIUM SERPL-MCNC: 8.3 MG/DL (ref 8.6–10.2)
CHLORIDE BLD-SCNC: 98 MMOL/L (ref 98–107)
CO2: 28 MMOL/L (ref 22–29)
CREAT SERPL-MCNC: 1.3 MG/DL (ref 0.5–1)
EOSINOPHILS ABSOLUTE: 0 E9/L (ref 0.05–0.5)
EOSINOPHILS RELATIVE PERCENT: 0 % (ref 0–6)
GFR AFRICAN AMERICAN: 48
GFR NON-AFRICAN AMERICAN: 40 ML/MIN/1.73
GLUCOSE BLD-MCNC: 137 MG/DL (ref 74–99)
HCT VFR BLD CALC: 34.1 % (ref 34–48)
HEMOGLOBIN: 10.8 G/DL (ref 11.5–15.5)
IMMATURE GRANULOCYTES #: 0.04 E9/L
IMMATURE GRANULOCYTES %: 0.5 % (ref 0–5)
LYMPHOCYTES ABSOLUTE: 0.72 E9/L (ref 1.5–4)
LYMPHOCYTES RELATIVE PERCENT: 8.9 % (ref 20–42)
MCH RBC QN AUTO: 29.8 PG (ref 26–35)
MCHC RBC AUTO-ENTMCNC: 31.7 % (ref 32–34.5)
MCV RBC AUTO: 94.2 FL (ref 80–99.9)
MONOCYTES ABSOLUTE: 0.35 E9/L (ref 0.1–0.95)
MONOCYTES RELATIVE PERCENT: 4.3 % (ref 2–12)
NEUTROPHILS ABSOLUTE: 6.99 E9/L (ref 1.8–7.3)
NEUTROPHILS RELATIVE PERCENT: 86.2 % (ref 43–80)
PDW BLD-RTO: 15 FL (ref 11.5–15)
PLATELET # BLD: 249 E9/L (ref 130–450)
PMV BLD AUTO: 9.4 FL (ref 7–12)
POTASSIUM SERPL-SCNC: 3.6 MMOL/L (ref 3.5–5)
RBC # BLD: 3.62 E12/L (ref 3.5–5.5)
SODIUM BLD-SCNC: 136 MMOL/L (ref 132–146)
WBC # BLD: 8.1 E9/L (ref 4.5–11.5)

## 2022-08-25 PROCEDURE — 6370000000 HC RX 637 (ALT 250 FOR IP): Performed by: NURSE PRACTITIONER

## 2022-08-25 PROCEDURE — 36415 COLL VENOUS BLD VENIPUNCTURE: CPT

## 2022-08-25 PROCEDURE — 85025 COMPLETE CBC W/AUTO DIFF WBC: CPT

## 2022-08-25 PROCEDURE — 6360000002 HC RX W HCPCS: Performed by: NURSE PRACTITIONER

## 2022-08-25 PROCEDURE — 94640 AIRWAY INHALATION TREATMENT: CPT

## 2022-08-25 PROCEDURE — APPSS30 APP SPLIT SHARED TIME 16-30 MINUTES: Performed by: NURSE PRACTITIONER

## 2022-08-25 PROCEDURE — 6370000000 HC RX 637 (ALT 250 FOR IP): Performed by: INTERNAL MEDICINE

## 2022-08-25 PROCEDURE — 2700000000 HC OXYGEN THERAPY PER DAY

## 2022-08-25 PROCEDURE — 1200000000 HC SEMI PRIVATE

## 2022-08-25 PROCEDURE — 97165 OT EVAL LOW COMPLEX 30 MIN: CPT

## 2022-08-25 PROCEDURE — 2580000003 HC RX 258: Performed by: NURSE PRACTITIONER

## 2022-08-25 PROCEDURE — 80048 BASIC METABOLIC PNL TOTAL CA: CPT

## 2022-08-25 RX ADMIN — IPRATROPIUM BROMIDE 0.5 MG: 0.5 SOLUTION RESPIRATORY (INHALATION) at 16:32

## 2022-08-25 RX ADMIN — METHYLPREDNISOLONE SODIUM SUCCINATE 40 MG: 40 INJECTION, POWDER, LYOPHILIZED, FOR SOLUTION INTRAMUSCULAR; INTRAVENOUS at 09:52

## 2022-08-25 RX ADMIN — METHYLPREDNISOLONE SODIUM SUCCINATE 40 MG: 40 INJECTION, POWDER, LYOPHILIZED, FOR SOLUTION INTRAMUSCULAR; INTRAVENOUS at 00:25

## 2022-08-25 RX ADMIN — IPRATROPIUM BROMIDE 0.5 MG: 0.5 SOLUTION RESPIRATORY (INHALATION) at 13:18

## 2022-08-25 RX ADMIN — BUDESONIDE 250 MCG: 0.25 SUSPENSION RESPIRATORY (INHALATION) at 20:38

## 2022-08-25 RX ADMIN — BACLOFEN 10 MG: 10 TABLET ORAL at 09:49

## 2022-08-25 RX ADMIN — ARFORMOTEROL TARTRATE 15 MCG: 15 SOLUTION RESPIRATORY (INHALATION) at 20:38

## 2022-08-25 RX ADMIN — HEPARIN SODIUM 5000 UNITS: 10000 INJECTION INTRAVENOUS; SUBCUTANEOUS at 20:15

## 2022-08-25 RX ADMIN — IPRATROPIUM BROMIDE 0.5 MG: 0.5 SOLUTION RESPIRATORY (INHALATION) at 20:38

## 2022-08-25 RX ADMIN — SODIUM CHLORIDE, PRESERVATIVE FREE 10 ML: 5 INJECTION INTRAVENOUS at 20:15

## 2022-08-25 RX ADMIN — CARVEDILOL 25 MG: 25 TABLET, FILM COATED ORAL at 09:50

## 2022-08-25 RX ADMIN — Medication 2000 UNITS: at 09:50

## 2022-08-25 RX ADMIN — SERTRALINE 150 MG: 100 TABLET, FILM COATED ORAL at 09:50

## 2022-08-25 RX ADMIN — AMLODIPINE BESYLATE 5 MG: 5 TABLET ORAL at 09:50

## 2022-08-25 RX ADMIN — ATORVASTATIN CALCIUM 20 MG: 20 TABLET, FILM COATED ORAL at 09:49

## 2022-08-25 RX ADMIN — BUDESONIDE 250 MCG: 0.25 SUSPENSION RESPIRATORY (INHALATION) at 08:41

## 2022-08-25 RX ADMIN — ASPIRIN 81 MG: 81 TABLET, COATED ORAL at 20:14

## 2022-08-25 RX ADMIN — DOXYCYCLINE HYCLATE 100 MG: 100 CAPSULE ORAL at 20:14

## 2022-08-25 RX ADMIN — DOXYCYCLINE HYCLATE 100 MG: 100 CAPSULE ORAL at 09:49

## 2022-08-25 RX ADMIN — BACLOFEN 10 MG: 10 TABLET ORAL at 20:14

## 2022-08-25 RX ADMIN — METHYLPREDNISOLONE SODIUM SUCCINATE 40 MG: 40 INJECTION, POWDER, LYOPHILIZED, FOR SOLUTION INTRAMUSCULAR; INTRAVENOUS at 16:50

## 2022-08-25 RX ADMIN — METHYLPREDNISOLONE SODIUM SUCCINATE 40 MG: 40 INJECTION, POWDER, LYOPHILIZED, FOR SOLUTION INTRAMUSCULAR; INTRAVENOUS at 23:51

## 2022-08-25 RX ADMIN — CARVEDILOL 25 MG: 25 TABLET, FILM COATED ORAL at 20:14

## 2022-08-25 RX ADMIN — HEPARIN SODIUM 5000 UNITS: 10000 INJECTION INTRAVENOUS; SUBCUTANEOUS at 04:52

## 2022-08-25 RX ADMIN — HEPARIN SODIUM 5000 UNITS: 10000 INJECTION INTRAVENOUS; SUBCUTANEOUS at 12:30

## 2022-08-25 RX ADMIN — ASPIRIN 81 MG: 81 TABLET, COATED ORAL at 09:50

## 2022-08-25 RX ADMIN — SODIUM CHLORIDE, PRESERVATIVE FREE 10 ML: 5 INJECTION INTRAVENOUS at 09:53

## 2022-08-25 RX ADMIN — ARFORMOTEROL TARTRATE 15 MCG: 15 SOLUTION RESPIRATORY (INHALATION) at 08:41

## 2022-08-25 RX ADMIN — GABAPENTIN 400 MG: 400 CAPSULE ORAL at 09:50

## 2022-08-25 RX ADMIN — ACETAMINOPHEN 650 MG: 325 TABLET ORAL at 09:50

## 2022-08-25 RX ADMIN — IPRATROPIUM BROMIDE 0.5 MG: 0.5 SOLUTION RESPIRATORY (INHALATION) at 08:41

## 2022-08-25 RX ADMIN — GABAPENTIN 400 MG: 400 CAPSULE ORAL at 20:14

## 2022-08-25 ASSESSMENT — PAIN DESCRIPTION - DESCRIPTORS: DESCRIPTORS: ACHING

## 2022-08-25 ASSESSMENT — PAIN DESCRIPTION - ORIENTATION: ORIENTATION: RIGHT

## 2022-08-25 ASSESSMENT — PAIN SCALES - GENERAL: PAINLEVEL_OUTOF10: 7

## 2022-08-25 ASSESSMENT — PAIN DESCRIPTION - LOCATION: LOCATION: SHOULDER

## 2022-08-25 NOTE — PROGRESS NOTES
[facility] Quality Flow/Interdisciplinary Rounds Progress Note        Quality Flow Rounds held on August 25, 2022    Disciplines Attending:  Bedside Nurse, , , and Nursing Unit Ciarra Tijerina was admitted on 8/23/2022  1:48 PM    Anticipated Discharge Date:       Disposition:    Von Score:  Von Scale Score: 20    Readmission Risk              Risk of Unplanned Readmission:  19           Discussed patient goal for the day, patient clinical progression, and barriers to discharge.   The following Goal(s) of the Day/Commitment(s) have been identified:  Diagnostics - Report Results and Labs - Report Results      Bart Perea RN  August 25, 2022

## 2022-08-25 NOTE — PROGRESS NOTES
[facility] Progress Note    Admitting Date and Time: 8/23/2022  1:48 PM  Admit Dx: Acute respiratory failure with hypoxia and hypercapnia (HCC) [J96.01, J96.02]    Subjective:  Patient is being followed for Acute respiratory failure with hypoxia and hypercapnia ([facility] [address] [J96.01, J96.02]     PMH:  has a past medical history of Arthritis, Breast cancer ([facility] [address] CKD (chronic kidney disease), COPD (chronic obstructive pulmonary disease) ([facility] Hyperlipidemia, Hypertension, and Multiple sclerosis ([facility] Brief: Pt presented to ER on 8/23 for abrupt onset of dyspnea overnight. Checked pulse ox upon waking and was in low 70's. Pt does not wear home O2, presented to ER and pulse ox was 64% on RA upon arrival. Pt denied prior COPD exacerbations. Pt placed on O2 8L NC. CTA negative for PE, and pt received IVF after contrast due to CKD. She was placed on breathing tx's, IV steroids and doxycycline. On 8/24, O2 was able to be weaned to 3L. Patient seen and examined. Awake, A&O, sitting in bed in no apparent distress. States she was able to get some sleep last night. Did not eat much breakfast. States she is ambulating to bathroom (on supplemental O2) without difficulty or SOB. O2 has been weaned to 3L. States her cough has been productive. Would like to go home with oxygen for 'peace of mind'. Discussed requirements for home O2, advised pt that she will be evaluated for home O2 prior to discharge. Denies CP, SOB, or any other symptoms at this time.      ROS: denies fever, chills, cp, sob, n/v, HA unless stated above.      gabapentin  400 mg Oral BID    doxycycline hyclate  100 mg Oral 2 times per day    sertraline  150 mg Oral Daily    aspirin  81 mg Oral BID    atorvastatin  20 mg Oral Daily    amLODIPine  5 mg Oral Daily    baclofen  10 mg Oral BID    carvedilol  25 mg Oral BID    vitamin D  2,000 Units Oral QAM    sodium chloride flush  5-40 mL IntraVENous 2 times per day    methylPREDNISolone  40 mg 159* 137*   CALCIUM 9.2 8.8 8.3*         Recent Labs     08/23/22  1416 08/24/22  0302 08/25/22  0035   WBC 5.3 4.2* 8.1   RBC 3.98 4.09 3.62   HGB 12.0 12.1 10.8*   HCT 38.2 38.4 34.1   MCV 96.0 93.9 94.2   MCH 30.2 29.6 29.8   MCHC 31.4* 31.5* 31.7*   RDW 15.2* 15.1* 15.0    266 249   MPV 9.3 9.6 9.4         Radiology:   CTA    Impression   No pulmonary embolism. No thoracic aortic aneurysm or dissection. Small amount of fluid in the right bronchus intermedius. No aspiration   pneumonia currently. Incidental findings as above. Assessment:    Principal Problem:    Acute respiratory failure with hypoxia and hypercapnia (HCC)  Resolved Problems:    * No resolved hospital problems. *      Plan:     1. Acute respiratory failure with hypoxia and hypercapnia:  -CTA negative for PE  -97% on O2 3L NC.   -nursing communication to continue weaning O2 as tolerated  -home O2 eval  -continue solu-medrol TID  -Atrovent qid  -oxygen therapy protocol. 3. COPD exacerbation:  -as above  -doxycycline po BID  -continue home trelegy bid    4. Troponinemia:  - troponin 29, 25. Delta troponin negative     5. CKD stage 3:  -GFR 40  -initial BUN Cr 34/1. 6- both at/near baseline. Improved to 31/1.3  -hold home hctz for now    6. HTN:   -continue home amlodipine and carvedilol  -home hctz held due to kidney function      Code Status: DNRCCA  DVT prophylaxis: Heparin TID    NOTE: This report was transcribed using voice recognition software. Every effort was made to ensure accuracy; however, inadvertent computerized transcription errors may be present. Electronically signed by HIRA Carvalho NP on 8/25/2022 at 7:53 AM     Addendum: I have personally participated in the history, exam, medical decision making with Shameka Bill on the date of service and I agree with all of the pertinent clinical information unless otherwise noted.  I have also reviewed and agree with the past medical, family, and social history unless otherwise noted. Patient was admitted with shortness of breath. PHYSICAL EXAM:  Vitals:  BP (!) 166/75   Pulse 82   Temp 98.6 °F (37 °C) (Oral)   Resp 17   Ht 5' 2\" (1.575 m)   Wt 175 lb (79.4 kg)   SpO2 92%   BMI 32.01 kg/m²   Gen: awake, alert, NAD  Lungs: clear to auscultation bilaterally no crackles no wheezing. Heart: RRR, no murmur   Abdomen: soft nontender nondistended positive bowel sounds. Extremities: full range of motion no peripheral edema. Impression:  Principal Problem:    Acute respiratory failure with hypoxia and hypercapnia (HCC)  Resolved Problems:    * No resolved hospital problems. *      My findings/plan include:    77year old female with copd exacerbation and acute hypoxic respiratory failure currently on 3 L NC. Patient is currently on doxycycline, solumedrol 40 mg IV q8, brovana/atrovent/pulmicort. Continue nicotine patch. Patient is on 2 L NC at rest and 3 L NC with exertion. NOTE: This report was transcribed using voice recognition software. Every effort was made to ensure accuracy; however, inadvertent computerized transcription errors may be present.     Electronically signed by Song Maradiaga DO on 8/25/2022 at 2:38 PM

## 2022-08-25 NOTE — PROGRESS NOTES
Bedside nurse tried to wean patient off of O2. Patient dropped to 86%-88% on RA while supine. Put O2 back on and increased to 2L. Patient was brought up to 91-92%. While ambulating patient was on 2L and dropped to 88% so bedside nurse increased O2 to 3L. Patient was then 92%. Returned to bed and kept 3L on.      Electronically signed by Chery Chilel RN on 8/25/2022 at 1:32 PM

## 2022-08-25 NOTE — PROGRESS NOTES
[facility]      Date:2022                                                  Patient Name: Radha Barrios  MRN: [id number]  : 1945  Room: [address] [place], [place] [address]    Evaluating OT: HARSHIL Edouard, OTR/L [id number]  Referring Brittanie Malhotra MD  Specific Provider Orders: OT eval and treat   Recommended Adaptive Equipment: none     Diagnosis: respiratory failure   Surgery: none   Pertinent Medical History: breast CA, COPD, HTN, HLD, MD, CKD, RUE rotator cuff tear (per pt report)  Precautions:  Fall Risk, O2    Assessment of current deficits   [x] Functional mobility  [x]ADLs  [x] Strength               [x]Cognition   [x] Functional transfers   [x] IADLs         [x] Safety Awareness   [x]Endurance   [] Fine Coordination              [x] Balance      [] Vision/perception   [x]Sensation    []Gross Motor Coordination  [] ROM  [] Delirium                   [] Motor Control     OT PLAN OF CARE   OT POC based on physician orders, patient diagnosis and results of clinical assessment    Frequency/Duration: 2-3 days/wk for 2 weeks PRN   Specific OT Treatment to include:   * Instruction/training on adapted ADL techniques and AE recommendations to increase functional independence within precautions       * Training on energy conservation strategies, correct breathing pattern and techniques to improve independence/tolerance for self-care routine  * Functional transfer/mobility training/DME recommendations for increased independence, safety, and fall prevention  * Patient/Family education to increase follow through with safety techniques and functional independence  * Recommendation of environmental modifications for increased safety with functional transfers/mobility and ADLs  * Therapeutic exercise to improve motor endurance, ROM, and functional strength for ADLs/functional transfers  * Therapeutic activities to facilitate/challenge dynamic balance, stand tolerance for increased safety and independence with ADLs  * Neuro-muscular re-education: facilitation of righting/equilibrium reactions, midline orientation, scapular stability/mobility, normalization of muscle tone, and facilitation of volitional active controled movement    Home Living: Pt lives with  in a 1 story home with ramp to enter; bed/bath on main level   Bathroom setup: walk in shower   Equipment owned: shower chair, stnd walker  Prior Level of Function: IND with ADLs/IADLs; using rollatorfor functional mobility     Pain Level: 010  Cognition: A&O: 4/4; Follows multi step directions    Memory:  good    Sequencing:  good    Problem solving:  fair    Judgement/safety:  fair     Functional Assessment:  AM-PAC Daily Activity Raw Score: 19/24   Initial Eval Status  Date: 8/25/22 Treatment Status  Date: STGs=LTGs  Time Frame: 10-14 days   Feeding IND (pt able to open packages and self feed)      Grooming SUP (standing at sink)   IND   UB Dressing sba   ind   LB Dressing SBA (pt able to use crossing of leg technique to doff/robin B socks)  IND    Bathing SBA (simulated)  SUP    Toileting SBA (pt able to complete hygiene and brief management)  IND   Bed Mobility  Log roll: sba  Supine to sit: sba   Sit to supine: sba   Log roll IND  Supine to sit: IND   Sit to supine: IND   Functional Transfers Sit to stand:SBA   Stand to sit:SBA  Commode: SBA  IND   Functional Mobility SBA (using rollator, to/from bathroom)  IND   Balance Sitting: SBA  Standing: SBA     Activity Tolerance fair     Visual/  Perceptual Glasses: yes              UE ROM: RUE: elbow flex WFL, shoulder flex grossly 90'  LUE: elbow flex WFL, shoulder flex grossly 110'  Strength: RUE: grossly 3/5 LUE: grossly 3/5   Strength: B WFL  Fine Motor Coordination:  WFL     Hearing: WFL  Sensation:  No c/o numbness/tingling   Tone:  WFL  Edema: none noted                            Comments:Cleared by RN to see pt.  Upon arrival, patient supine in bed and agreeable to OT session. At end of session, patient supine in bed with call light and phone within reach, all lines and tubes intact. Pt would benefit from continued OT to increase functional independence and quality of life. Treatment: Completed ADLs/functional transfers, see above for assessment. Pt required skilled monitoring of SpO2 during session, which decreased to 83% 3L, requiring ~1 min for recovery. Pt appeared to have tolerated session well and appears motivated/cooperative/pleasant . Pt instructed on use of call light for assistance and fall prevention. Pt demo'ing fair understanding of education provided. Continue to educate. Eval Complexity: Low    Rehab Potential: Good for established goals, pt. assisted in establishment of goals. LTG: maximize independence with ADLs to return to PLOF    Patient instructed on diagnosis, prognosis/goals and plan of care. Demonstrated fair understanding. [] Malnutrition indicators have been identified and nursing has been notified to ensure a dietitian consult is ordered. Evaluation time includes thorough review of current medical information, gathering information on past medical & social history & PLOF, completion of standardized testing, informal observation of tasks, consultation with other medical professions/disciplines, assessment of data & development of POC/goals.      Time In: 080 0       Time Out: 0810     Total treatment time: 0       Treatment Charges: Mins Units   OT Eval Low 49906 X    OT Eval Medium 03575     OT Eval High [id number]     OT Re-Eval [id number]     Ther Ex  [id number]       Manual Therapy [id number]       Thera Activities [id number]       ADL/Home Mgt 32526     Neuro Re-ed 66775       Group Therapy        Orthotic manage/training  [id number]       Non-Billable Time           Suri Painting, OTR/L [id number]

## 2022-08-26 VITALS
TEMPERATURE: 98.6 F | SYSTOLIC BLOOD PRESSURE: 169 MMHG | RESPIRATION RATE: 18 BRPM | HEART RATE: 84 BPM | HEIGHT: 62 IN | OXYGEN SATURATION: 97 % | BODY MASS INDEX: 31.28 KG/M2 | DIASTOLIC BLOOD PRESSURE: 72 MMHG | WEIGHT: 170 LBS

## 2022-08-26 LAB
ANION GAP SERPL CALCULATED.3IONS-SCNC: 11 MMOL/L (ref 7–16)
ANISOCYTOSIS: ABNORMAL
BASOPHILS ABSOLUTE: 0 E9/L (ref 0–0.2)
BASOPHILS RELATIVE PERCENT: 0 % (ref 0–2)
BUN BLDV-MCNC: 25 MG/DL (ref 6–23)
CALCIUM SERPL-MCNC: 8.6 MG/DL (ref 8.6–10.2)
CHLORIDE BLD-SCNC: 96 MMOL/L (ref 98–107)
CO2: 28 MMOL/L (ref 22–29)
CREAT SERPL-MCNC: 1 MG/DL (ref 0.5–1)
EOSINOPHILS ABSOLUTE: 0 E9/L (ref 0.05–0.5)
EOSINOPHILS RELATIVE PERCENT: 0 % (ref 0–6)
GFR AFRICAN AMERICAN: >60
GFR NON-AFRICAN AMERICAN: 54 ML/MIN/1.73
GLUCOSE BLD-MCNC: 132 MG/DL (ref 74–99)
HCT VFR BLD CALC: 37.3 % (ref 34–48)
HEMOGLOBIN: 11.8 G/DL (ref 11.5–15.5)
IMMATURE GRANULOCYTES #: 0.04 E9/L
IMMATURE GRANULOCYTES %: 0.6 % (ref 0–5)
LYMPHOCYTES ABSOLUTE: 0.56 E9/L (ref 1.5–4)
LYMPHOCYTES RELATIVE PERCENT: 8.3 % (ref 20–42)
MCH RBC QN AUTO: 29.9 PG (ref 26–35)
MCHC RBC AUTO-ENTMCNC: 31.6 % (ref 32–34.5)
MCV RBC AUTO: 94.7 FL (ref 80–99.9)
MONOCYTES ABSOLUTE: 0.3 E9/L (ref 0.1–0.95)
MONOCYTES RELATIVE PERCENT: 4.4 % (ref 2–12)
NEUTROPHILS ABSOLUTE: 5.85 E9/L (ref 1.8–7.3)
NEUTROPHILS RELATIVE PERCENT: 86.7 % (ref 43–80)
PDW BLD-RTO: 15 FL (ref 11.5–15)
PLATELET # BLD: 263 E9/L (ref 130–450)
PMV BLD AUTO: 9.4 FL (ref 7–12)
POLYCHROMASIA: ABNORMAL
POTASSIUM SERPL-SCNC: 3.4 MMOL/L (ref 3.5–5)
RBC # BLD: 3.94 E12/L (ref 3.5–5.5)
SODIUM BLD-SCNC: 135 MMOL/L (ref 132–146)
WBC # BLD: 6.8 E9/L (ref 4.5–11.5)

## 2022-08-26 PROCEDURE — 6370000000 HC RX 637 (ALT 250 FOR IP): Performed by: INTERNAL MEDICINE

## 2022-08-26 PROCEDURE — APPSS45 APP SPLIT SHARED TIME 31-45 MINUTES: Performed by: NURSE PRACTITIONER

## 2022-08-26 PROCEDURE — 36415 COLL VENOUS BLD VENIPUNCTURE: CPT

## 2022-08-26 PROCEDURE — 6370000000 HC RX 637 (ALT 250 FOR IP): Performed by: NURSE PRACTITIONER

## 2022-08-26 PROCEDURE — 6360000002 HC RX W HCPCS: Performed by: NURSE PRACTITIONER

## 2022-08-26 PROCEDURE — 2700000000 HC OXYGEN THERAPY PER DAY

## 2022-08-26 PROCEDURE — 2580000003 HC RX 258: Performed by: NURSE PRACTITIONER

## 2022-08-26 PROCEDURE — 80048 BASIC METABOLIC PNL TOTAL CA: CPT

## 2022-08-26 PROCEDURE — 85025 COMPLETE CBC W/AUTO DIFF WBC: CPT

## 2022-08-26 PROCEDURE — 94640 AIRWAY INHALATION TREATMENT: CPT

## 2022-08-26 RX ORDER — POTASSIUM CHLORIDE 20 MEQ/1
40 TABLET, EXTENDED RELEASE ORAL ONCE
Status: DISCONTINUED | OUTPATIENT
Start: 2022-08-26 | End: 2022-08-26 | Stop reason: HOSPADM

## 2022-08-26 RX ORDER — PREDNISONE 20 MG/1
TABLET ORAL
Qty: 18 TABLET | Refills: 0 | Status: SHIPPED | OUTPATIENT
Start: 2022-08-26 | End: 2022-09-03

## 2022-08-26 RX ORDER — DOXYCYCLINE HYCLATE 100 MG
100 TABLET ORAL 2 TIMES DAILY
Qty: 10 TABLET | Refills: 0 | Status: SHIPPED | OUTPATIENT
Start: 2022-08-26 | End: 2022-08-31

## 2022-08-26 RX ORDER — METHYLPREDNISOLONE SODIUM SUCCINATE 40 MG/ML
40 INJECTION, POWDER, LYOPHILIZED, FOR SOLUTION INTRAMUSCULAR; INTRAVENOUS EVERY 12 HOURS
Status: DISCONTINUED | OUTPATIENT
Start: 2022-08-26 | End: 2022-08-26 | Stop reason: HOSPADM

## 2022-08-26 RX ADMIN — ACETAMINOPHEN 650 MG: 325 TABLET ORAL at 03:27

## 2022-08-26 RX ADMIN — IPRATROPIUM BROMIDE 0.5 MG: 0.5 SOLUTION RESPIRATORY (INHALATION) at 11:36

## 2022-08-26 RX ADMIN — ASPIRIN 81 MG: 81 TABLET, COATED ORAL at 08:03

## 2022-08-26 RX ADMIN — SERTRALINE 150 MG: 100 TABLET, FILM COATED ORAL at 08:03

## 2022-08-26 RX ADMIN — BUDESONIDE 250 MCG: 0.25 SUSPENSION RESPIRATORY (INHALATION) at 07:51

## 2022-08-26 RX ADMIN — CARVEDILOL 25 MG: 25 TABLET, FILM COATED ORAL at 08:03

## 2022-08-26 RX ADMIN — IPRATROPIUM BROMIDE 0.5 MG: 0.5 SOLUTION RESPIRATORY (INHALATION) at 07:49

## 2022-08-26 RX ADMIN — HEPARIN SODIUM 5000 UNITS: 10000 INJECTION INTRAVENOUS; SUBCUTANEOUS at 04:30

## 2022-08-26 RX ADMIN — ARFORMOTEROL TARTRATE 15 MCG: 15 SOLUTION RESPIRATORY (INHALATION) at 07:50

## 2022-08-26 RX ADMIN — BACLOFEN 10 MG: 10 TABLET ORAL at 08:04

## 2022-08-26 RX ADMIN — METHYLPREDNISOLONE SODIUM SUCCINATE 40 MG: 40 INJECTION, POWDER, LYOPHILIZED, FOR SOLUTION INTRAMUSCULAR; INTRAVENOUS at 08:04

## 2022-08-26 RX ADMIN — SODIUM CHLORIDE, PRESERVATIVE FREE 10 ML: 5 INJECTION INTRAVENOUS at 08:02

## 2022-08-26 RX ADMIN — AMLODIPINE BESYLATE 5 MG: 5 TABLET ORAL at 08:04

## 2022-08-26 RX ADMIN — Medication 2000 UNITS: at 08:03

## 2022-08-26 RX ADMIN — HEPARIN SODIUM 5000 UNITS: 10000 INJECTION INTRAVENOUS; SUBCUTANEOUS at 11:51

## 2022-08-26 RX ADMIN — DOXYCYCLINE HYCLATE 100 MG: 100 CAPSULE ORAL at 08:03

## 2022-08-26 RX ADMIN — ATORVASTATIN CALCIUM 20 MG: 20 TABLET, FILM COATED ORAL at 08:03

## 2022-08-26 RX ADMIN — GABAPENTIN 400 MG: 400 CAPSULE ORAL at 08:03

## 2022-08-26 ASSESSMENT — PAIN - FUNCTIONAL ASSESSMENT: PAIN_FUNCTIONAL_ASSESSMENT: ACTIVITIES ARE NOT PREVENTED

## 2022-08-26 ASSESSMENT — PAIN DESCRIPTION - ORIENTATION: ORIENTATION: RIGHT;LEFT

## 2022-08-26 ASSESSMENT — PAIN DESCRIPTION - LOCATION: LOCATION: HEAD

## 2022-08-26 ASSESSMENT — PAIN SCALES - GENERAL: PAINLEVEL_OUTOF10: 6

## 2022-08-26 ASSESSMENT — PAIN DESCRIPTION - DESCRIPTORS: DESCRIPTORS: ACHING;DULL

## 2022-08-26 NOTE — PLAN OF CARE
Problem: Pain  Goal: Verbalizes/displays adequate comfort level or baseline comfort level  8/25/2022 2230 by Daksha Ponce  Outcome: Progressing  8/25/2022 1026 by Sander Wright RN  Outcome: Progressing     Problem: Safety - Adult  Goal: Free from fall injury  8/25/2022 2230 by Daksha Ponce  Outcome: Progressing  8/25/2022 1026 by Sander Wright RN  Outcome: Progressing

## 2022-08-26 NOTE — PROGRESS NOTES
[facility] Quality Flow/Interdisciplinary Rounds Progress Note        Quality Flow Rounds held on August 26, 2022    Disciplines Attending:  Bedside Nurse, , , and Nursing Unit Ciarra Tijerina was admitted on 8/23/2022  1:48 PM    Anticipated Discharge Date:       Disposition:    Von Score:  Von Scale Score: 20    Readmission Risk              Risk of Unplanned Readmission:  15           Discussed patient goal for the day, patient clinical progression, and barriers to discharge.   The following Goal(s) of the Day/Commitment(s) have been identified:  Diagnostics - Report Results and Labs - Report Results, DC planning      Rohit Cordova RN  August 26, 2022

## 2022-08-26 NOTE — DISCHARGE SUMMARY
[facility] Hospitalist Group   Discharge Summary      Robert Hawkins MD  [address] [facility]  [address]  [phone]    Follow up in 1 month(s)       [address] [facility] [address] [phone]        Activity level: As tolerated    Diet: ADULT DIET; Regular    Labs:Per PCP    Condition at discharge: Stable    Dispo:Discharge home    Continue supplemental oxygen via nasal canula @ 2-3 LPM as needed    Patient ID:  Maru Caldera  [id number]  77 y.o.  1945      Discharge date and time:  8/26/2022  10:44 AM    Admission Diagnoses: Principal Problem:    Acute respiratory failure with hypoxia and hypercapnia (HCC)  Resolved Problems:    * No resolved hospital problems. *      Discharge Diagnoses: Principal Problem:    Acute respiratory failure with hypoxia and hypercapnia (HCC)  Resolved Problems:    * No resolved hospital problems. *      Consults:  IP CONSULT TO HOME CARE NEEDS    Procedures: NA    Hospital Course:   Pt presented to ER on 8/23 for abrupt onset of dyspnea overnight. Checked pulse ox upon waking and was in low 70's. Pt does not wear home O2, presented to ER and pulse ox was 64% on RA upon arrival. Pt denied prior COPD exacerbations. Pt placed on O2 8L NC. CTA negative for PE, and pt received IVF after contrast due to CKD. She was placed on breathing tx's, IV steroids and doxycycline. On 8/24, O2 was able to be weaned to 3L NC and atbx were changed to oral.   During the course of her hospitalization, pt had productive cough, lung sounds improved, and O2 was weaned to 2L NC at rest. Home oxygen eval was performed, pt required 3L with ambulation, and home supplemental oxygen was ordered. At this time, pt is feeling much better and would like to go home. Lung sounds have improved, though she does still have end-expiratory wheezing, more so in upper lobes. At this time, pt is stable for discharge.  She will be going home on oral antibiotics for an additional 5 days and a steroid taper dose. She will discharged with home oxygen as needed. The remainder of her medications should be continued unchanged. She does not currently have a pulmonologist, so a referral was placed for Dr. Aisha Moncada. Pt was instructed to follow-up with Dr. Aisha Moncada within the month. Discharge Exam:  Vitals:    08/25/22 1900 08/25/22 2038 08/26/22 0034 08/26/22 0659   BP: (!) 180/74   (!) 169/72   Pulse: 84   84   Resp: 16   18   Temp: 98.5 °F (36.9 °C)   98.6 °F (37 °C)   TempSrc: Oral   Oral   SpO2: 94% 94%  97%   Weight:   170 lb (77.1 kg)    Height:           PHYSICAL EXAM  General Appearance: Awake, alert and oriented. In no acute distress  Skin: Warm and dry, no rash or erythema  Head: normocephalic and atraumatic  Eyes: pupils equal, round, and reactive to light, extraocular eye movements intact, conjunctivae normal  ENT: external ear and ear canal normal bilaterally, nose without deformity  Neck: supple and non-tender without mass, no cervical lymphadenopathy  Pulmonary/Chest: end-exp wheezing in all fields, greater in upper lobes. Normal air movement. No respiratory distress. O2 currently at 2L NC. Dry cough  Cardiovascular: normal rate, regular rhythm. Normal S1 amplified S2. No murmurs, rubs, clicks, or gallops  Abdomen: soft, non-tender, non-distended. Normal bowel sounds.  No masses or organomegaly  Extremities: no cyanosis, clubbing or edema  Musculoskeletal: normal range of motion, no joint swelling, deformity or tenderness  Neurologic: reflexes normal and symmetric, no cranial nerve deficit, gait, coordination and speech normal      LABS:  Recent Labs     08/24/22  0302 08/25/22  0035 08/26/22  0144    136 135   K 3.9 3.6 3.4*   CL 98 98 96*   CO2 28 28 28   BUN 25* 31* 25*   CREATININE 1.3* 1.3* 1.0   GLUCOSE 159* 137* 132*   CALCIUM 8.8 8.3* 8.6       Recent Labs     08/24/22  0302 08/25/22  0035 08/26/22  0144   WBC 4.2* 8.1 6.8   RBC 4.09 3.62 3.94   HGB 12.1 10.8* 11.8   HCT 38.4 34.1 37.3   MCV 93.9 94.2 94.7   MCH 29.6 29.8 29.9   MCHC 31.5* 31.7* 31.6*   RDW 15.1* 15.0 15.0    249 263   MPV 9.6 9.4 9.4       No results for input(s): POCGLU in the last 72 hours. Imaging:  XR CHEST PORTABLE    Result Date: 8/23/2022  EXAMINATION: ONE XRAY VIEW OF THE CHEST 8/23/2022 2:43 pm COMPARISON: None. HISTORY: ORDERING SYSTEM PROVIDED HISTORY: shortness of breath TECHNOLOGIST PROVIDED HISTORY: Reason for exam:->shortness of breath FINDINGS: There are mild patchy opacities at the lung bases. The heart is mildly enlarged. Linear scar/atelectasis in the left upper lung. There is no pneumothorax. The costophrenic angles are clear. Early bibasilar infiltrates. CTA CHEST W CONTRAST    Result Date: 8/23/2022  EXAMINATION: CTA OF THE CHEST 8/23/2022 3:35 pm TECHNIQUE: CTA of the chest was performed after the administration of intravenous contrast.  Multiplanar reformatted images are provided for review. MIP images are provided for review. Automated exposure control, iterative reconstruction, and/or weight based adjustment of the mA/kV was utilized to reduce the radiation dose to as low as reasonably achievable. COMPARISON: CT chest 06/19/2021 HISTORY: ORDERING SYSTEM PROVIDED HISTORY: ro PE TECHNOLOGIST PROVIDED HISTORY: Reason for exam:->ro PE FINDINGS: Pulmonary Arteries: Pulmonary arteries are adequately opacified for evaluation. No evidence of intraluminal filling defect to suggest pulmonary embolism. Main pulmonary artery is normal in caliber. Mediastinum: No thoracic aortic aneurysm or dissection. Atherosclerotic disease. No cardiomegaly or pericardial effusion. Unremarkable thyroid. Small amount of debris in the esophagus. Subcentimeter short axis lymph nodes are noted, except for a roughly 11 mm x 15 mm right paratracheal node (series 302, image 82). Lungs/pleura: No pleural effusion or pneumothorax.   Small amount of fluid in the right bronchus intermedius (series 302, image 118). There are bands of linear atelectasis versus scarring in both lungs. No pulmonary consolidation. Upper Abdomen: Atherosclerotic disease. Partially imaged right renal cysts. Soft Tissues/Bones: Stable height loss of L1. Again seen are kyphoplasty changes in L1, T12, and T8. Degenerative changes of the spine. Severe degenerative changes of the right shoulder. No pulmonary embolism. No thoracic aortic aneurysm or dissection. Small amount of fluid in the right bronchus intermedius. No aspiration pneumonia currently. Incidental findings as above. Patient Instructions:   Current Discharge Medication List        START taking these medications    Details   predniSONE (DELTASONE) 20 MG tablet Take 2 tablets by mouth 2 times daily for 2 days, THEN 2 tablets daily for 2 days, THEN 1 tablet 2 times daily for 2 days, THEN 1 tablet daily for 2 days. Qty: 18 tablet, Refills: 0      doxycycline hyclate (VIBRA-TABS) 100 MG tablet Take 1 tablet by mouth 2 times daily for 5 days  Qty: 10 tablet, Refills: 0           CONTINUE these medications which have NOT CHANGED    Details   traMADol (ULTRAM) 50 MG tablet Take 50 mg by mouth 2 times daily as needed. meloxicam (MOBIC) 15 MG tablet Take 1 tablet by mouth in the morning.   Qty: 30 tablet, Refills: 3      Fluticasone-Umeclidin-Vilant (TRELEGY ELLIPTA) 200-62.5-25 MCG/INH AEPB INHALE ONE PUFF BY MOUTH EVERY DAY  Qty: 60 each, Refills: 1      diclofenac sodium (VOLTAREN) 1 % GEL Apply 2 g topically 4 times daily as needed for Pain  Qty: 50 g, Refills: 1      traZODone (DESYREL) 50 MG tablet Take 0.5 tablets by mouth nightly as needed for Sleep  Qty: 90 tablet, Refills: 1    Associated Diagnoses: Insomnia, unspecified type      baclofen (LIORESAL) 10 MG tablet TAKE 1 TABLET TWICE A DAY  Qty: 60 tablet, Refills: 0      hydroCHLOROthiazide (HYDRODIURIL) 25 MG tablet Take 1 tablet by mouth daily  Qty: 90 tablet, Refills: 1      atorvastatin (LIPITOR) 20 MG tablet Take 1 tablet by mouth daily  Qty: 30 tablet, Refills: 3      Semaglutide,0.25 or 0.5MG/DOS, (OZEMPIC, 0.25 OR 0.5 MG/DOSE,) 2 MG/1.5ML SOPN Inject 0.25 mg once a week for 4 weeks. Then increase to 0.5 mg once a week thereafter  Qty: 4 pen, Refills: 1      carvedilol (COREG) 25 MG tablet Take 1 tablet by mouth 2 times daily  Qty: 180 tablet, Refills: 1    Associated Diagnoses: Hypertension, essential, benign      amLODIPine (NORVASC) 5 MG tablet Take 1 tablet by mouth daily  Qty: 90 tablet, Refills: 1    Associated Diagnoses: Hypertension, essential, benign      simvastatin (ZOCOR) 40 MG tablet TAKE 1 TABLET NIGHTLY  Qty: 90 tablet, Refills: 1    Associated Diagnoses: Hyperlipidemia, unspecified hyperlipidemia type      gabapentin (NEURONTIN) 400 MG capsule TAKE 1 CAPSULE 3 TIMES A   DAY  Qty: 270 capsule, Refills: 0    Comments: NEED TO SCHEDULE APPOINTMENT NO REFILL WILL BE GIVEN      sertraline (ZOLOFT) 100 MG tablet TAKE 1 AND 1/2 TABLETS     DAILY  Qty: 135 tablet, Refills: 0    Associated Diagnoses: Reactive depression      aspirin 81 MG EC tablet Take 1 tablet by mouth 2 times daily for 30 doses DVT prophylaxis x30 days  Qty: 60 tablet, Refills: 0      albuterol sulfate HFA (VENTOLIN HFA) 108 (90 Base) MCG/ACT inhaler Inhale 2 puffs into the lungs 4 times daily as needed for Wheezing  Qty: 1 Inhaler, Refills: 5    Associated Diagnoses: Simple chronic bronchitis (HCC)      Cholecalciferol 50 MCG (2000 UT) CAPS Take 2,000 Units by mouth every morning            STOP taking these medications       memantine (NAMENDA) 5 MG tablet Comments:   Reason for Stopping:               NOTE: This report was transcribed using voice recognition software. Every effort was made to ensure accuracy; however, inadvertent computerized transcription errors may be present.      Signed:  Electronically signed by HIRA Ogden NP on 8/26/2022 at 10:44 AM    Addendum: I have

## 2022-08-26 NOTE — PROGRESS NOTES
PULSE OX WAS 92% ON ROOM AIR AT REST. AMBULATED PATIENT ON ROOM AIR   OXYGEN SATURATION WAS 85% ON ROOM AIR WHILE AMBULATING. OXYGEN APPLIED. RECOVERY PULSE OX WAS 99% ON 3 LITERS OF OXYGEN. AMBULATED PATIENT ON 3L O2  OXYGEN SATURATION WAS 87% on 3L. At rest, pt was 99% on 3L O2. Ambulated pt to bathroom, SPO2 dropped to 90% on 3L O2. Proceeded to do multiple laps around the room, pt dropped to 87% on 3L but came up to 89% after controlled breathing. After returning to bed, pt recovered to 97% on 3L. On room air, pt ambulated around room, SPO2 dropped to 85%. After pt was returned to bed and oxygen replaced SPO2 restored to 98% on 3L.          Electronically signed by Cameron Sainz RN on 8/26/2022 at 12:02 PM

## 2022-08-29 ENCOUNTER — TELEPHONE (OUTPATIENT)
Dept: PRIMARY CARE CLINIC | Age: 77
End: 2022-08-29

## 2022-08-29 NOTE — TELEPHONE ENCOUNTER
[facility] Trans[facility] Initial Follow Up Call    Outreach made within 2 business days of discharge: Yes    Patient: Verito Bradley Patient : 1945   MRN: [id number]  Reason for Admission: Acute respiratory failure with hypoxia and hypercapnia  Discharge Date: 22       Spoke with: Tahir Quigley    Discharge department/facility: [facility] Interactive Patient Contact:  Was patient able to fill all prescriptions: Yes  Was patient instructed to bring all medications to the follow-up visit: Yes  Is patient taking all medications as directed in the discharge summary?  Yes  Does patient understand their discharge instructions: Yes  Does patient have questions or concerns that need addressed prior to 7-14 day follow up office visit: no    Scheduled appointment with PCP within 7-14 days    Follow Up  Future Appointments   Date Time Provider Lizeth Acuña   2022 11:30 AM Narinder Estrella DO [address] [place], [place]

## 2022-09-09 ENCOUNTER — TELEPHONE (OUTPATIENT)
Dept: PRIMARY CARE CLINIC | Age: 77
End: 2022-09-09

## 2022-09-09 DIAGNOSIS — M25.519 CHRONIC SHOULDER PAIN, UNSPECIFIED LATERALITY: Primary | ICD-10-CM

## 2022-09-09 DIAGNOSIS — G89.29 CHRONIC SHOULDER PAIN, UNSPECIFIED LATERALITY: Primary | ICD-10-CM

## 2022-09-09 RX ORDER — MELOXICAM 15 MG/1
15 TABLET ORAL DAILY
Qty: 30 TABLET | Refills: 3 | Status: SHIPPED
Start: 2022-09-09 | End: 2022-10-27

## 2022-09-20 ENCOUNTER — TELEPHONE (OUTPATIENT)
Dept: PRIMARY CARE CLINIC | Age: 77
End: 2022-09-20

## 2022-09-20 NOTE — TELEPHONE ENCOUNTER
----- Message from Margareth Cash sent at 9/20/2022  9:12 AM EDT -----  Subject: Hospital Follow Up    QUESTIONS  What hospital was the Patient Discharged from? [address] [facility]  Date of Discharge? 2022-09-06  Discharge Location? Home  Reason for hospitalization as patient stated? acute respiratory distress   syndrome  What question does the patient have, if applicable? Patient needs to   schedule a hospital follow up visit asap. I am not able to reach the front   desk. Please call patient asap.  ---------------------------------------------------------------------------  --------------  CALL BACK INFO  What is the best way for the office to contact you? OK to leave message on   voicemail  Preferred Call Back Phone Number? [phone]  ---------------------------------------------------------------------------  --------------  SCRIPT ANSWERS  Relationship to Patient?  Self

## 2022-09-27 ENCOUNTER — OFFICE VISIT (OUTPATIENT)
Dept: PRIMARY CARE CLINIC | Age: 77
End: 2022-09-27
Payer: MEDICARE

## 2022-09-27 VITALS
DIASTOLIC BLOOD PRESSURE: 72 MMHG | WEIGHT: 171 LBS | TEMPERATURE: 97.5 F | SYSTOLIC BLOOD PRESSURE: 128 MMHG | HEART RATE: 76 BPM | OXYGEN SATURATION: 94 % | BODY MASS INDEX: 31.28 KG/M2

## 2022-09-27 DIAGNOSIS — R73.01 IFG (IMPAIRED FASTING GLUCOSE): ICD-10-CM

## 2022-09-27 DIAGNOSIS — M79.89 LEG SWELLING: ICD-10-CM

## 2022-09-27 DIAGNOSIS — E78.00 HYPERCHOLESTEROLEMIA: ICD-10-CM

## 2022-09-27 DIAGNOSIS — G35 MS (MULTIPLE SCLEROSIS) (HCC): ICD-10-CM

## 2022-09-27 DIAGNOSIS — F41.9 ANXIETY DISORDER, UNSPECIFIED TYPE: ICD-10-CM

## 2022-09-27 DIAGNOSIS — G47.00 INSOMNIA, UNSPECIFIED TYPE: Primary | ICD-10-CM

## 2022-09-27 LAB
ALBUMIN SERPL-MCNC: 3.8 G/DL (ref 3.5–5.2)
ALP BLD-CCNC: 64 U/L (ref 35–104)
ALT SERPL-CCNC: 10 U/L (ref 0–32)
ANION GAP SERPL CALCULATED.3IONS-SCNC: 11 MMOL/L (ref 7–16)
AST SERPL-CCNC: 16 U/L (ref 0–31)
BILIRUB SERPL-MCNC: 0.3 MG/DL (ref 0–1.2)
BUN BLDV-MCNC: 21 MG/DL (ref 6–23)
CALCIUM SERPL-MCNC: 9.3 MG/DL (ref 8.6–10.2)
CHLORIDE BLD-SCNC: 98 MMOL/L (ref 98–107)
CO2: 29 MMOL/L (ref 22–29)
CREAT SERPL-MCNC: 1.5 MG/DL (ref 0.5–1)
GFR AFRICAN AMERICAN: 41
GFR NON-AFRICAN AMERICAN: 34 ML/MIN/1.73
GLUCOSE BLD-MCNC: 97 MG/DL (ref 74–99)
HCT VFR BLD CALC: 38.6 % (ref 34–48)
HEMOGLOBIN: 12.4 G/DL (ref 11.5–15.5)
MCH RBC QN AUTO: 30.6 PG (ref 26–35)
MCHC RBC AUTO-ENTMCNC: 32.1 % (ref 32–34.5)
MCV RBC AUTO: 95.3 FL (ref 80–99.9)
PDW BLD-RTO: 16.2 FL (ref 11.5–15)
PLATELET # BLD: 382 E9/L (ref 130–450)
PMV BLD AUTO: 9.1 FL (ref 7–12)
POTASSIUM SERPL-SCNC: 4.4 MMOL/L (ref 3.5–5)
RBC # BLD: 4.05 E12/L (ref 3.5–5.5)
SODIUM BLD-SCNC: 138 MMOL/L (ref 132–146)
TOTAL PROTEIN: 7.3 G/DL (ref 6.4–8.3)
TSH SERPL DL<=0.05 MIU/L-ACNC: 0.47 UIU/ML (ref 0.27–4.2)
WBC # BLD: 7.1 E9/L (ref 4.5–11.5)

## 2022-09-27 PROCEDURE — 99213 OFFICE O/P EST LOW 20 MIN: CPT | Performed by: INTERNAL MEDICINE

## 2022-09-27 PROCEDURE — 1123F ACP DISCUSS/DSCN MKR DOCD: CPT | Performed by: INTERNAL MEDICINE

## 2022-09-27 NOTE — PROGRESS NOTES
Hyperlipidemia 05/31/2012    Hypertension, essential, benign 05/31/2012     Allergies   Allergen Reactions    Macrodantin [Nitrofurantoin Macrocrystal] Shortness Of Breath     Current Outpatient Medications on File Prior to Visit   Medication Sig Dispense Refill    meloxicam (MOBIC) 15 MG tablet Take 1 tablet by mouth daily 30 tablet 3    traMADol (ULTRAM) 50 MG tablet Take 50 mg by mouth 2 times daily as needed. meloxicam (MOBIC) 15 MG tablet Take 1 tablet by mouth in the morning.  30 tablet 3    Fluticasone-Umeclidin-Vilant (TRELEGY ELLIPTA) 200-62.5-25 MCG/INH AEPB INHALE ONE PUFF BY MOUTH EVERY DAY 60 each 1    diclofenac sodium (VOLTAREN) 1 % GEL Apply 2 g topically 4 times daily as needed for Pain 50 g 1    traZODone (DESYREL) 50 MG tablet Take 0.5 tablets by mouth nightly as needed for Sleep 90 tablet 1    [DISCONTINUED] memantine (NAMENDA) 5 MG tablet Take 1 tablet by mouth 2 times daily (Patient not taking: Reported on 8/23/2022) 180 tablet 1    baclofen (LIORESAL) 10 MG tablet TAKE 1 TABLET TWICE A DAY 60 tablet 0    hydroCHLOROthiazide (HYDRODIURIL) 25 MG tablet Take 1 tablet by mouth daily 90 tablet 1    atorvastatin (LIPITOR) 20 MG tablet Take 1 tablet by mouth daily 30 tablet 3    carvedilol (COREG) 25 MG tablet Take 1 tablet by mouth 2 times daily 180 tablet 1    amLODIPine (NORVASC) 5 MG tablet Take 1 tablet by mouth daily 90 tablet 1    simvastatin (ZOCOR) 40 MG tablet TAKE 1 TABLET NIGHTLY 90 tablet 1    gabapentin (NEURONTIN) 400 MG capsule TAKE 1 CAPSULE 3 TIMES A    capsule 0    sertraline (ZOLOFT) 100 MG tablet TAKE 1 AND 1/2 TABLETS     DAILY 135 tablet 0    aspirin 81 MG EC tablet Take 1 tablet by mouth 2 times daily for 30 doses DVT prophylaxis x30 days (Patient taking differently: Take 81 mg by mouth daily DVT prophylaxis x30 days) 60 tablet 0    albuterol sulfate HFA (VENTOLIN HFA) 108 (90 Base) MCG/ACT inhaler Inhale 2 puffs into the lungs 4 times daily as needed for Wheezing 1 Inhaler 5    Cholecalciferol 50 MCG (2000 UT) CAPS Take 2,000 Units by mouth every morning        No current facility-administered medications on file prior to visit. Physical Exam   Constitutional:  Oriented to person, place, and time. Appears well-developed and well-nourished. No acute distress. HENT: No sinus tenderness or lymphadenopathy  Head: Normocephalic and atraumatic. Eyes: Eyes exhibits no discharge. No scleral icterus present. Neck: No tracheal deviation present. No thyromegaly present. Cardiovascular: Normal rate, regular rhythm, normal heart sounds and intact distal pulses. Exam reveals no gallop nor friction rub. No murmur heard. Pulmonary: Effort normal and breath sounds normal. No respiratory distress. No wheezes or rales. Abdomen: No signs of rigidity rebound or organomegaly  Musculoskeletal:  No tenderness to palpation  Neurological:Alert and oriented to person, place, and time. Skin: No diaphoresis. Psychiatric: Normal mood and affect. Behavior is Normal.     ASSESSMENT AND PLAN:    Assessment  Diagnoses and all orders for this visit:  Insomnia, unspecified type  MS (multiple sclerosis) (HCC)  IFG (impaired fasting glucose)  Hypercholesterolemia  Leg swelling  -     Comprehensive Metabolic Panel; Future  -     CBC; Future  -     TSH; Future  Anxiety disorder, unspecified type   -     TSH; Future  Other orders  -     Compression Stockings MISC; by Does not apply route 15-25 mm hg    Plan    Start compression stockings  I will obtain blood work to rule out any metabolic or hematologic causes of their symptoms. Consider low dose of lasix    Return in about 3 months (around 12/27/2022) for AWV - please schedule AWV.     Electronically signed by Parmjit Hayden DO on 9/28/2022 at 11:45 AM    Parmjit Hayden DO

## 2022-10-03 ENCOUNTER — TELEPHONE (OUTPATIENT)
Dept: PRIMARY CARE CLINIC | Age: 77
End: 2022-10-03

## 2022-10-03 DIAGNOSIS — R26.81 GAIT INSTABILITY: Primary | ICD-10-CM

## 2022-10-03 NOTE — TELEPHONE ENCOUNTER
Pt daughter called and is concerned about the frequency of patient falling. Wasn't sure if physical therapy would help reduce this or if home care can be placed. Please advise.

## 2022-10-27 ENCOUNTER — APPOINTMENT (OUTPATIENT)
Dept: CT IMAGING | Age: 77
DRG: 177 | End: 2022-10-27
Payer: MEDICARE

## 2022-10-27 ENCOUNTER — HOSPITAL ENCOUNTER (INPATIENT)
Age: 77
LOS: 5 days | Discharge: NURSING, REHAB OR LONG-TERM CARE | DRG: 177 | End: 2022-11-01
Attending: STUDENT IN AN ORGANIZED HEALTH CARE EDUCATION/TRAINING PROGRAM | Admitting: STUDENT IN AN ORGANIZED HEALTH CARE EDUCATION/TRAINING PROGRAM
Payer: MEDICARE

## 2022-10-27 ENCOUNTER — APPOINTMENT (OUTPATIENT)
Dept: GENERAL RADIOLOGY | Age: 77
DRG: 177 | End: 2022-10-27
Payer: MEDICARE

## 2022-10-27 DIAGNOSIS — J44.1 COPD EXACERBATION (HCC): Primary | ICD-10-CM

## 2022-10-27 PROBLEM — J47.1 BRONCHIECTASIS WITH ACUTE EXACERBATION (HCC): Status: ACTIVE | Noted: 2022-10-27

## 2022-10-27 LAB
ABO/RH: NORMAL
ANION GAP SERPL CALCULATED.3IONS-SCNC: 9 MMOL/L (ref 7–16)
ANTIBODY SCREEN: NORMAL
BASOPHILS ABSOLUTE: 0.03 E9/L (ref 0–0.2)
BASOPHILS RELATIVE PERCENT: 0.4 % (ref 0–2)
BUN BLDV-MCNC: 19 MG/DL (ref 6–23)
CALCIUM SERPL-MCNC: 9.2 MG/DL (ref 8.6–10.2)
CHLORIDE BLD-SCNC: 100 MMOL/L (ref 98–107)
CO2: 29 MMOL/L (ref 22–29)
CREAT SERPL-MCNC: 1.3 MG/DL (ref 0.5–1)
D DIMER: 758 NG/ML DDU
EOSINOPHILS ABSOLUTE: 0.11 E9/L (ref 0.05–0.5)
EOSINOPHILS RELATIVE PERCENT: 1.4 % (ref 0–6)
GFR SERPL CREATININE-BSD FRML MDRD: 43 ML/MIN/1.73
GLUCOSE BLD-MCNC: 114 MG/DL (ref 74–99)
HCT VFR BLD CALC: 35.1 % (ref 34–48)
HEMOGLOBIN: 10.9 G/DL (ref 11.5–15.5)
IMMATURE GRANULOCYTES #: 0.03 E9/L
IMMATURE GRANULOCYTES %: 0.4 % (ref 0–5)
INFLUENZA A BY PCR: NOT DETECTED
INFLUENZA B BY PCR: NOT DETECTED
LYMPHOCYTES ABSOLUTE: 1.08 E9/L (ref 1.5–4)
LYMPHOCYTES RELATIVE PERCENT: 14 % (ref 20–42)
MCH RBC QN AUTO: 28.8 PG (ref 26–35)
MCHC RBC AUTO-ENTMCNC: 31.1 % (ref 32–34.5)
MCV RBC AUTO: 92.6 FL (ref 80–99.9)
MONOCYTES ABSOLUTE: 0.7 E9/L (ref 0.1–0.95)
MONOCYTES RELATIVE PERCENT: 9.1 % (ref 2–12)
NEUTROPHILS ABSOLUTE: 5.77 E9/L (ref 1.8–7.3)
NEUTROPHILS RELATIVE PERCENT: 74.7 % (ref 43–80)
PDW BLD-RTO: 17.1 FL (ref 11.5–15)
PLATELET # BLD: 312 E9/L (ref 130–450)
PMV BLD AUTO: 9.7 FL (ref 7–12)
POTASSIUM REFLEX MAGNESIUM: 4.1 MMOL/L (ref 3.5–5)
RBC # BLD: 3.79 E12/L (ref 3.5–5.5)
SODIUM BLD-SCNC: 138 MMOL/L (ref 132–146)
WBC # BLD: 7.7 E9/L (ref 4.5–11.5)

## 2022-10-27 PROCEDURE — 6370000000 HC RX 637 (ALT 250 FOR IP)

## 2022-10-27 PROCEDURE — 99285 EMERGENCY DEPT VISIT HI MDM: CPT

## 2022-10-27 PROCEDURE — 6360000004 HC RX CONTRAST MEDICATION: Performed by: RADIOLOGY

## 2022-10-27 PROCEDURE — 87502 INFLUENZA DNA AMP PROBE: CPT

## 2022-10-27 PROCEDURE — 2060000000 HC ICU INTERMEDIATE R&B

## 2022-10-27 PROCEDURE — 85378 FIBRIN DEGRADE SEMIQUANT: CPT

## 2022-10-27 PROCEDURE — 71275 CT ANGIOGRAPHY CHEST: CPT

## 2022-10-27 PROCEDURE — 6360000002 HC RX W HCPCS: Performed by: INTERNAL MEDICINE

## 2022-10-27 PROCEDURE — 0202U NFCT DS 22 TRGT SARS-COV-2: CPT

## 2022-10-27 PROCEDURE — 71045 X-RAY EXAM CHEST 1 VIEW: CPT

## 2022-10-27 PROCEDURE — 86850 RBC ANTIBODY SCREEN: CPT

## 2022-10-27 PROCEDURE — 86900 BLOOD TYPING SEROLOGIC ABO: CPT

## 2022-10-27 PROCEDURE — 6360000002 HC RX W HCPCS

## 2022-10-27 PROCEDURE — 99222 1ST HOSP IP/OBS MODERATE 55: CPT | Performed by: INTERNAL MEDICINE

## 2022-10-27 PROCEDURE — 94664 DEMO&/EVAL PT USE INHALER: CPT

## 2022-10-27 PROCEDURE — 85025 COMPLETE CBC W/AUTO DIFF WBC: CPT

## 2022-10-27 PROCEDURE — 94640 AIRWAY INHALATION TREATMENT: CPT

## 2022-10-27 PROCEDURE — 6370000000 HC RX 637 (ALT 250 FOR IP): Performed by: INTERNAL MEDICINE

## 2022-10-27 PROCEDURE — 86901 BLOOD TYPING SEROLOGIC RH(D): CPT

## 2022-10-27 PROCEDURE — 80048 BASIC METABOLIC PNL TOTAL CA: CPT

## 2022-10-27 RX ORDER — GABAPENTIN 400 MG/1
400 CAPSULE ORAL 3 TIMES DAILY
Status: DISCONTINUED | OUTPATIENT
Start: 2022-10-27 | End: 2022-11-01 | Stop reason: HOSPADM

## 2022-10-27 RX ORDER — BACLOFEN 10 MG/1
10 TABLET ORAL 2 TIMES DAILY
Status: DISCONTINUED | OUTPATIENT
Start: 2022-10-27 | End: 2022-11-01 | Stop reason: HOSPADM

## 2022-10-27 RX ORDER — ATORVASTATIN CALCIUM 20 MG/1
20 TABLET, FILM COATED ORAL NIGHTLY
Status: DISCONTINUED | OUTPATIENT
Start: 2022-10-27 | End: 2022-11-01 | Stop reason: HOSPADM

## 2022-10-27 RX ORDER — MELOXICAM 7.5 MG/1
15 TABLET ORAL DAILY
Status: DISCONTINUED | OUTPATIENT
Start: 2022-10-27 | End: 2022-11-01 | Stop reason: HOSPADM

## 2022-10-27 RX ORDER — BUDESONIDE 0.5 MG/2ML
0.5 INHALANT ORAL 2 TIMES DAILY
Status: DISCONTINUED | OUTPATIENT
Start: 2022-10-27 | End: 2022-10-28

## 2022-10-27 RX ORDER — IPRATROPIUM BROMIDE AND ALBUTEROL SULFATE 2.5; .5 MG/3ML; MG/3ML
1 SOLUTION RESPIRATORY (INHALATION)
Status: COMPLETED | OUTPATIENT
Start: 2022-10-27 | End: 2022-10-27

## 2022-10-27 RX ORDER — METHYLPREDNISOLONE SODIUM SUCCINATE 40 MG/ML
40 INJECTION, POWDER, LYOPHILIZED, FOR SOLUTION INTRAMUSCULAR; INTRAVENOUS DAILY
Status: DISCONTINUED | OUTPATIENT
Start: 2022-10-28 | End: 2022-10-28

## 2022-10-27 RX ORDER — CARVEDILOL 25 MG/1
25 TABLET ORAL 2 TIMES DAILY WITH MEALS
Status: DISCONTINUED | OUTPATIENT
Start: 2022-10-27 | End: 2022-11-01 | Stop reason: HOSPADM

## 2022-10-27 RX ORDER — METHYLPREDNISOLONE SODIUM SUCCINATE 125 MG/2ML
125 INJECTION, POWDER, LYOPHILIZED, FOR SOLUTION INTRAMUSCULAR; INTRAVENOUS ONCE
Status: COMPLETED | OUTPATIENT
Start: 2022-10-27 | End: 2022-10-27

## 2022-10-27 RX ORDER — CHOLECALCIFEROL (VITAMIN D3) 50 MCG
2000 TABLET ORAL EVERY MORNING
Status: DISCONTINUED | OUTPATIENT
Start: 2022-10-28 | End: 2022-11-01 | Stop reason: HOSPADM

## 2022-10-27 RX ORDER — TRAZODONE HYDROCHLORIDE 50 MG/1
25 TABLET ORAL NIGHTLY
Status: DISCONTINUED | OUTPATIENT
Start: 2022-10-27 | End: 2022-11-01 | Stop reason: HOSPADM

## 2022-10-27 RX ORDER — ASPIRIN 81 MG/1
81 TABLET ORAL DAILY
Status: DISCONTINUED | OUTPATIENT
Start: 2022-10-27 | End: 2022-11-01 | Stop reason: HOSPADM

## 2022-10-27 RX ORDER — ARFORMOTEROL TARTRATE 15 UG/2ML
15 SOLUTION RESPIRATORY (INHALATION) 2 TIMES DAILY
Status: DISCONTINUED | OUTPATIENT
Start: 2022-10-27 | End: 2022-11-01 | Stop reason: HOSPADM

## 2022-10-27 RX ORDER — SERTRALINE HYDROCHLORIDE 100 MG/1
100 TABLET, FILM COATED ORAL NIGHTLY
Status: DISCONTINUED | OUTPATIENT
Start: 2022-10-27 | End: 2022-10-31

## 2022-10-27 RX ORDER — IPRATROPIUM BROMIDE AND ALBUTEROL SULFATE 2.5; .5 MG/3ML; MG/3ML
1 SOLUTION RESPIRATORY (INHALATION) EVERY 4 HOURS PRN
Status: DISCONTINUED | OUTPATIENT
Start: 2022-10-27 | End: 2022-11-01 | Stop reason: HOSPADM

## 2022-10-27 RX ADMIN — IPRATROPIUM BROMIDE AND ALBUTEROL SULFATE 1 AMPULE: .5; 2.5 SOLUTION RESPIRATORY (INHALATION) at 13:41

## 2022-10-27 RX ADMIN — ASPIRIN 81 MG: 81 TABLET, COATED ORAL at 23:02

## 2022-10-27 RX ADMIN — METHYLPREDNISOLONE SODIUM SUCCINATE 125 MG: 125 INJECTION, POWDER, FOR SOLUTION INTRAMUSCULAR; INTRAVENOUS at 19:22

## 2022-10-27 RX ADMIN — IPRATROPIUM BROMIDE 0.5 MG: 0.5 SOLUTION RESPIRATORY (INHALATION) at 23:43

## 2022-10-27 RX ADMIN — IPRATROPIUM BROMIDE AND ALBUTEROL SULFATE 1 AMPULE: .5; 2.5 SOLUTION RESPIRATORY (INHALATION) at 13:39

## 2022-10-27 RX ADMIN — IPRATROPIUM BROMIDE AND ALBUTEROL SULFATE 1 AMPULE: .5; 2.5 SOLUTION RESPIRATORY (INHALATION) at 13:40

## 2022-10-27 RX ADMIN — TRAZODONE HYDROCHLORIDE 25 MG: 50 TABLET ORAL at 23:03

## 2022-10-27 RX ADMIN — IOPAMIDOL 75 ML: 755 INJECTION, SOLUTION INTRAVENOUS at 15:56

## 2022-10-27 RX ADMIN — BACLOFEN 10 MG: 10 TABLET ORAL at 23:02

## 2022-10-27 RX ADMIN — IPRATROPIUM BROMIDE AND ALBUTEROL SULFATE 1 AMPULE: .5; 3 SOLUTION RESPIRATORY (INHALATION) at 17:32

## 2022-10-27 RX ADMIN — GABAPENTIN 400 MG: 400 CAPSULE ORAL at 23:03

## 2022-10-27 RX ADMIN — ARFORMOTEROL TARTRATE 15 MCG: 15 SOLUTION RESPIRATORY (INHALATION) at 23:43

## 2022-10-27 RX ADMIN — IPRATROPIUM BROMIDE AND ALBUTEROL SULFATE 1 AMPULE: .5; 3 SOLUTION RESPIRATORY (INHALATION) at 17:31

## 2022-10-27 RX ADMIN — ATORVASTATIN CALCIUM 20 MG: 20 TABLET, FILM COATED ORAL at 23:03

## 2022-10-27 RX ADMIN — MELOXICAM 15 MG: 7.5 TABLET ORAL at 23:02

## 2022-10-27 RX ADMIN — BUDESONIDE 500 MCG: 0.5 SUSPENSION RESPIRATORY (INHALATION) at 23:43

## 2022-10-27 RX ADMIN — IPRATROPIUM BROMIDE AND ALBUTEROL SULFATE 1 AMPULE: .5; 3 SOLUTION RESPIRATORY (INHALATION) at 17:33

## 2022-10-27 RX ADMIN — CARVEDILOL 25 MG: 25 TABLET, FILM COATED ORAL at 23:03

## 2022-10-27 ASSESSMENT — ENCOUNTER SYMPTOMS
ABDOMINAL PAIN: 0
CONSTIPATION: 0
SHORTNESS OF BREATH: 0
DIARRHEA: 0
COUGH: 0
VOMITING: 0
NAUSEA: 0
EYE PAIN: 0
RHINORRHEA: 0

## 2022-10-27 ASSESSMENT — PAIN DESCRIPTION - ORIENTATION
ORIENTATION: RIGHT;LEFT
ORIENTATION: RIGHT;LEFT

## 2022-10-27 ASSESSMENT — PAIN DESCRIPTION - DESCRIPTORS
DESCRIPTORS: DULL
DESCRIPTORS: ACHING

## 2022-10-27 ASSESSMENT — PAIN DESCRIPTION - PAIN TYPE: TYPE: CHRONIC PAIN

## 2022-10-27 ASSESSMENT — PAIN SCALES - GENERAL
PAINLEVEL_OUTOF10: 6
PAINLEVEL_OUTOF10: 2

## 2022-10-27 ASSESSMENT — PAIN DESCRIPTION - LOCATION
LOCATION: KNEE
LOCATION: KNEE

## 2022-10-27 ASSESSMENT — PAIN - FUNCTIONAL ASSESSMENT: PAIN_FUNCTIONAL_ASSESSMENT: NONE - DENIES PAIN

## 2022-10-27 ASSESSMENT — PAIN DESCRIPTION - FREQUENCY: FREQUENCY: CONTINUOUS

## 2022-10-27 NOTE — ED PROVIDER NOTES
Georgia Crane is a 77 y.o. female    Chief Complaint   Patient presents with    Shortness of Breath     Pt arrives from home with ems , c/o sob. Pt was found to be 61% ra at home. Squad call for lift assist when arrived found pt to be sob with low spo2. Denies any o2. HPI   Georgia Crane is a 77 y.o. female presenting to the ED for Shortness of Breath (Pt arrives from home with ems , c/o sob. Pt was found to be 61% ra at home. Squad call for lift assist when arrived found pt to be sob with low spo2. Denies any o2. )    History comes primarily from the patient. She is a 69-year-old female with a history of COPD on 3 L of oxygen at night via nasal cannula presenting for a fall earlier today. Severity is moderate. Nothing makes it better or worse. Patient states she was walking from the kitchen to the living room when her legs felt weak and she fell. Denies dizziness, chest pain, shortness of breath at the time. Denies head trauma. Denies blood thinner use. She states she landed on her knees able to get up so she called EMS. Per EMS, patient had low pulse ox to 61% at the time. She endorses bilateral knee pain, however has full range of motion of both knees. Denies headache, abdominal pain, nausea, vomiting, any source of bleeding. Review of Systems   Constitutional:  Negative for chills and fever. HENT:  Negative for ear pain and rhinorrhea. Eyes:  Negative for pain. Respiratory:  Negative for cough and shortness of breath. Cardiovascular:  Negative for chest pain and palpitations. Gastrointestinal:  Negative for abdominal pain, constipation, diarrhea, nausea and vomiting. Genitourinary:  Negative for difficulty urinating and dysuria. Musculoskeletal:  Negative for arthralgias and myalgias. Bilateral knee pain   Skin:  Negative for rash. Neurological:  Negative for dizziness and headaches. All other systems reviewed and are negative.      Physical Exam  Constitutional:       Appearance: Normal appearance. HENT:      Head: Normocephalic and atraumatic. Nose: Nose normal.   Eyes:      Extraocular Movements: Extraocular movements intact. Pupils: Pupils are equal, round, and reactive to light. Cardiovascular:      Rate and Rhythm: Normal rate and regular rhythm. Pulmonary:      Breath sounds: Wheezing and rales present. Abdominal:      General: Abdomen is flat. Palpations: Abdomen is soft. Tenderness: There is no abdominal tenderness. Musculoskeletal:         General: Normal range of motion. Cervical back: Normal range of motion. Skin:     General: Skin is warm and dry. Neurological:      General: No focal deficit present. Mental Status: She is alert and oriented to person, place, and time. Psychiatric:         Behavior: Behavior normal.        Procedures     MDM     Patient presented to the Emergency Department for Shortness of Breath (Pt arrives from home with ems , c/o sob. Pt was found to be 61% ra at home. Squad call for lift assist when arrived found pt to be sob with low spo2. Denies any o2. )    She is a 78-year-old female with a history of COPD on 3 L of oxygen at night via nasal cannula presenting for a fall earlier today. CBC shows HB 10.9 (drop from 12.4 1 month ago, however close to her baseline). CMP unremarkable. Influenza negative. D-dimer elevated 758. CTA pulmonary shows no PE. CXR shows no acute process. Given DuoNeb x3 twice as well as Solumedrol 125mg. Patient has remained stable in the ED. O2 sat remains around 89% on 4L O2. When tried off O2, she desaturated to 70% then 59% off O2 after a few minutes. Improvement back to high 80s when placed back on 4L of O2. Labs and imaging were discussed with the patient. The plan for admission was discussed with the patient and she agrees with the plan.          ED Course as of 10/30/22 1543   u Oct 27, 2022   1643 Nursing updated me that patient's symptoms and O2 sat had improved after breathing treatments, however now she is dropping to about 85% O2 sat. Patient remains asymptomatic. Will give another round of DuoNeb and Solumedrol.  [KM]   1645 Patient feels well. O2 sat at 89%. She states breathing treatments have helped improve her breathing even more. Will try patient off O2 and monitor for desaturation. [KM]   4804 Per nursing, pulse ox dropped to 70s then 59% when taken off oxygen. Pulse ox improved when placed back on O2, however patient will be admitted. [KM]      ED Course User Index  [KM] Catracho Jimenez MD       --------------------------------------------- PAST HISTORY ---------------------------------------------  Past Medical History:  has a past medical history of Arthritis, Breast cancer ([facility] [address] CKD (chronic kidney disease), COPD (chronic obstructive pulmonary disease) ([facility] [address] Hyperlipidemia, Hypertension, MS (multiple sclerosis) ([facility] [address] and Multiple sclerosis ([facility] [address] Past Surgical History:  has a past surgical history that includes Hysterectomy; Breast surgery (4/13/2012); Appendectomy; Knee Arthroplasty (Left, 10/01/2013); Knee Arthroplasty (Right, 08/29/2012); Hip Arthroplasty (Right, 04/13/2011); and Total hip arthroplasty (Left, 3/25/2022). Social History:  reports that she has been smoking cigarettes. She has a 50.00 pack-year smoking history. She has never used smokeless tobacco. She reports that she does not drink alcohol and does not use drugs. Family History: family history includes Cirrhosis in her mother; Mult Sclerosis in her father; Thyroid Cancer in her mother. The patients home medications have been reviewed.     Allergies: Macrodantin [nitrofurantoin macrocrystal]    -------------------------------------------------- RESULTS -------------------------------------------------    LABS:  Results for orders placed or performed during the hospital encounter of 10/27/22   Respiratory Panel, Molecular, with COVID-19 (Restricted: peds pts or suitable admitted adults)    Specimen: Nasopharyngeal   Result Value Ref Range    Adenovirus by PCR Not Detected Not Detected    Bordetella parapertussis by PCR Not Detected Not Detected    Bordetella pertussis by PCR Not Detected Not Detected    Chlamydophilia pneumoniae by PCR Not Detected Not Detected    Coronavirus 229E by PCR Not Detected Not Detected    Coronavirus HKU1 by PCR Not Detected Not Detected    Coronavirus NL63 by PCR Not Detected Not Detected    Coronavirus OC43 by PCR Not Detected Not Detected    SARS-CoV-2, PCR Not Detected Not Detected    Human Metapneumovirus by PCR Not Detected Not Detected    Human Rhinovirus/Enterovirus by PCR Not Detected Not Detected    Influenza A by PCR Not Detected Not Detected    Influenza B by PCR Not Detected Not Detected    Mycoplasma pneumoniae by PCR Not Detected Not Detected    Parainfluenza Virus 1 by PCR Not Detected Not Detected    Parainfluenza Virus 2 by PCR Not Detected Not Detected    Parainfluenza Virus 3 by PCR Not Detected Not Detected    Parainfluenza Virus 4 by PCR Not Detected Not Detected    Respiratory Syncytial Virus by PCR Not Detected Not Detected   RAPID INFLUENZA A/B ANTIGENS    Specimen: Nasopharyngeal   Result Value Ref Range    Influenza A by PCR Not Detected Not Detected    Influenza B by PCR Not Detected Not Detected   Culture, MRSA, Screening    Specimen: Nares   Result Value Ref Range    Organism MRSA nasal colonization (A)    LEGIONELLA ANTIGEN, URINE    Specimen: Urine   Result Value Ref Range    L. pneumophila Serogp 1 Ur Ag       Presumptive Negative -suggesting no recent or current infections  with Legionella pneumophila serogroup 1. Infection to Legionella cannot be ruled out since other serogroups  and species may cause infection, antigen may not be present in  early infection, or level of antigen may be below the  detection limit.   Normal Range: Presumptive Negative     STREP PNEUMONIAE ANTIGEN    Specimen: Urine, clean catch   Result Value Ref Range    STREP PNEUMONIAE ANTIGEN, URINE       Presumptive negative- suggests no current or recent  pneumococcal infection.   Infection due to Strep pneumoniae cannot be  ruled out since the antigen present in the sample  may be below the detection limit of the test.  Normal Range:Presumptive Negative     Culture, Respiratory    Specimen: Sputum Expectorated   Result Value Ref Range    CULTURE, RESPIRATORY (A)      Oral Pharyngeal Jenae present  Additional growth present, also evaluating for;  Moraxella (Branhamella) catarrhalis      Smear, Respiratory       Moderate Polymorphonuclear leukocytes  Epithelial cells not seen  Few Gram positive cocci  Abundant Gram negative cocci      Organism Gram negative santy (A)     CULTURE, RESPIRATORY       Light growth  Identification and sensitivity to follow     CBC with Auto Differential   Result Value Ref Range    WBC 7.7 4.5 - 11.5 E9/L    RBC 3.79 3.50 - 5.50 E12/L    Hemoglobin 10.9 (L) 11.5 - 15.5 g/dL    Hematocrit 35.1 34.0 - 48.0 %    MCV 92.6 80.0 - 99.9 fL    MCH 28.8 26.0 - 35.0 pg    MCHC 31.1 (L) 32.0 - 34.5 %    RDW 17.1 (H) 11.5 - 15.0 fL    Platelets 996 311 - 756 E9/L    MPV 9.7 7.0 - 12.0 fL    Neutrophils % 74.7 43.0 - 80.0 %    Immature Granulocytes % 0.4 0.0 - 5.0 %    Lymphocytes % 14.0 (L) 20.0 - 42.0 %    Monocytes % 9.1 2.0 - 12.0 %    Eosinophils % 1.4 0.0 - 6.0 %    Basophils % 0.4 0.0 - 2.0 %    Neutrophils Absolute 5.77 1.80 - 7.30 E9/L    Immature Granulocytes # 0.03 E9/L    Lymphocytes Absolute 1.08 (L) 1.50 - 4.00 E9/L    Monocytes Absolute 0.70 0.10 - 0.95 E9/L    Eosinophils Absolute 0.11 0.05 - 0.50 E9/L    Basophils Absolute 0.03 0.00 - 0.20 O7/I   Basic Metabolic Panel w/ Reflex to MG   Result Value Ref Range    Sodium 138 132 - 146 mmol/L    Potassium reflex Magnesium 4.1 3.5 - 5.0 mmol/L    Chloride 100 98 - 107 mmol/L    CO2 29 22 - 29 mmol/L    Anion Gap 9 7 - 16 mmol/L    Glucose 114 (H) 74 - 99 mg/dL BUN 19 6 - 23 mg/dL    Creatinine 1.3 (H) 0.5 - 1.0 mg/dL    Est, Glom Filt Rate 43 >=60 mL/min/1.73    Calcium 9.2 8.6 - 10.2 mg/dL   D-Dimer, Quantitative   Result Value Ref Range    D-Dimer, Quant 758 ng/mL DDU   Prolactin   Result Value Ref Range    Prolactin 5.62 ng/mL   Comprehensive Metabolic Panel   Result Value Ref Range    Sodium 137 132 - 146 mmol/L    Potassium 4.3 3.5 - 5.0 mmol/L    Chloride 99 98 - 107 mmol/L    CO2 30 (H) 22 - 29 mmol/L    Anion Gap 8 7 - 16 mmol/L    Glucose 197 (H) 74 - 99 mg/dL    BUN 16 6 - 23 mg/dL    Creatinine 1.2 (H) 0.5 - 1.0 mg/dL    Est, Glom Filt Rate 47 >=60 mL/min/1.73    Calcium 9.2 8.6 - 10.2 mg/dL    Total Protein 6.6 6.4 - 8.3 g/dL    Albumin 3.3 (L) 3.5 - 5.2 g/dL    Total Bilirubin 0.3 0.0 - 1.2 mg/dL    Alkaline Phosphatase 61 35 - 104 U/L    ALT <5 0 - 32 U/L    AST 12 0 - 31 U/L   CBC with Auto Differential   Result Value Ref Range    WBC 6.4 4.5 - 11.5 E9/L    RBC 3.90 3.50 - 5.50 E12/L    Hemoglobin 11.2 (L) 11.5 - 15.5 g/dL    Hematocrit 35.7 34.0 - 48.0 %    MCV 91.5 80.0 - 99.9 fL    MCH 28.7 26.0 - 35.0 pg    MCHC 31.4 (L) 32.0 - 34.5 %    RDW 16.6 (H) 11.5 - 15.0 fL    Platelets 434 556 - 282 E9/L    MPV 9.4 7.0 - 12.0 fL    Neutrophils % 98.2 (H) 43.0 - 80.0 %    Lymphocytes % 0.0 (LL) 20.0 - 42.0 %    Monocytes % 0.0 (L) 2.0 - 12.0 %    Eosinophils % 0.0 0.0 - 6.0 %    Basophils % 0.0 0.0 - 2.0 %    Neutrophils Absolute 6.27 1.80 - 7.30 E9/L    Lymphocytes Absolute 0.13 (L) 1.50 - 4.00 E9/L    Monocytes Absolute 0.00 (L) 0.10 - 0.95 E9/L    Eosinophils Absolute 0.00 (L) 0.05 - 0.50 E9/L    Basophils Absolute 0.00 0.00 - 0.20 E9/L    Atypical Lymphocytes Relative 1.8 0.0 - 4.0 %    nRBC 0.0 /100 WBC    Anisocytosis 1+     Polychromasia 1+     Poikilocytes 2+     Bethelridge Cells 2+     Target Cells 1+    Hemoglobin A1C   Result Value Ref Range    Hemoglobin A1C 5.6 4.0 - 5.6 %   Blood Gas, Arterial   Result Value Ref Range    Date Analyzed 20221028     Time Analyzed 7434     Source: Blood Arterial     pH, Blood Gas 7.408 7.350 - 7.450    PCO2 47.7 (H) 35.0 - 45.0 mmHg    PO2 77.3 75.0 - 100.0 mmHg    HCO3 29.4 (H) 22.0 - 26.0 mmol/L    B.E. 4.0 (H) -3.0 - 3.0 mmol/L    O2 Sat 95.3 92.0 - 98.5 %    O2Hb 93.8 (L) 94.0 - 97.0 %    COHb 1.3 0.0 - 1.5 %    MetHb 0.3 0.0 - 1.5 %    O2 Content 15.9 mL/dL    HHb 4.6 0.0 - 5.0 %    tHb (est) 12.0 11.5 - 16.5 g/dL    Mode HFNC11   L     Date Of Collection      Time Collected      Pt Temp 37.0 C     ID [id number]     Lab [id number]     Critical(s) Notified .  No Critical Values    Sedimentation Rate   Result Value Ref Range    Sed Rate 44 (H) 0 - 20 mm/Hr   C-Reactive Protein   Result Value Ref Range    CRP 3.7 (H) 0.0 - 0.4 mg/dL   Procalcitonin   Result Value Ref Range    Procalcitonin 0.03 0.00 - 0.08 ng/mL   CBC   Result Value Ref Range    WBC 11.4 4.5 - 11.5 E9/L    RBC 3.98 3.50 - 5.50 E12/L    Hemoglobin 11.6 11.5 - 15.5 g/dL    Hematocrit 37.5 34.0 - 48.0 %    MCV 94.2 80.0 - 99.9 fL    MCH 29.1 26.0 - 35.0 pg    MCHC 30.9 (L) 32.0 - 34.5 %    RDW 16.5 (H) 11.5 - 15.0 fL    Platelets 323 445 - 811 E9/L    MPV 9.4 7.0 - 12.0 fL   Basic Metabolic Panel   Result Value Ref Range    Sodium 141 132 - 146 mmol/L    Potassium 4.0 3.5 - 5.0 mmol/L    Chloride 100 98 - 107 mmol/L    CO2 31 (H) 22 - 29 mmol/L    Anion Gap 10 7 - 16 mmol/L    Glucose 213 (H) 74 - 99 mg/dL    BUN 22 6 - 23 mg/dL    Creatinine 1.2 (H) 0.5 - 1.0 mg/dL    Est, Glom Filt Rate 47 >=60 mL/min/1.73    Calcium 9.0 8.6 - 10.2 mg/dL   CBC   Result Value Ref Range    WBC 11.2 4.5 - 11.5 E9/L    RBC 3.77 3.50 - 5.50 E12/L    Hemoglobin 10.9 (L) 11.5 - 15.5 g/dL    Hematocrit 35.0 34.0 - 48.0 %    MCV 92.8 80.0 - 99.9 fL    MCH 28.9 26.0 - 35.0 pg    MCHC 31.1 (L) 32.0 - 34.5 %    RDW 16.4 (H) 11.5 - 15.0 fL    Platelets 542 372 - 433 E9/L    MPV 9.7 7.0 - 12.0 fL   Basic Metabolic Panel   Result Value Ref Range    Sodium 140 132 - 146 mmol/L    Potassium 3.8 3.5 - 5.0 mmol/L    Chloride 100 98 - 107 mmol/L    CO2 31 (H) 22 - 29 mmol/L    Anion Gap 9 7 - 16 mmol/L    Glucose 149 (H) 74 - 99 mg/dL    BUN 21 6 - 23 mg/dL    Creatinine 1.0 0.5 - 1.0 mg/dL    Est, Glom Filt Rate 58 >=60 mL/min/1.73    Calcium 8.7 8.6 - 10.2 mg/dL   TYPE AND SCREEN   Result Value Ref Range    ABO/Rh A POS     Antibody Screen NEG        RADIOLOGY:  CTA PULMONARY W CONTRAST   Final Result   No evidence of pulmonary embolism. There is trace bilateral pleural   effusions with consolidative infiltrate or atelectatic change seen at the   lung bases bilaterally and right middle lobe. Mild bronchiectatic changes   centrally and scarring in the left upper lobe continued follow-up can be   performed if clinically indicated. XR CHEST PORTABLE   Final Result   No acute cardiopulmonary process. Focal scar/atelectasis in the left upper lung, no significant change.                 ------------------------- NURSING NOTES AND VITALS REVIEWED ---------------------------  Date / Time Roomed:  10/27/2022 12:50 PM  ED Bed Assignment:  [id number]    The nursing notes within the ED encounter and vital signs as below have been reviewed.      Patient Vitals for the past 24 hrs:   BP Temp Temp src Pulse Resp SpO2   10/30/22 1511 (!) 104/90 97.7 °F (36.5 °C) Oral 84 21 --   10/30/22 1239 -- -- -- 94 24 93 %   10/30/22 0952 -- -- Axillary -- -- 94 %   10/30/22 0827 -- -- -- 92 22 94 %   10/30/22 0433 -- -- -- -- -- 96 %   10/30/22 0300 110/62 98.2 °F (36.8 °C) Axillary 64 16 --   10/30/22 0019 -- -- -- -- 12 98 %   10/29/22 2040 -- -- -- 65 -- 96 %   10/29/22 2039 -- -- -- -- 15 --   10/29/22 1903 (!) 105/58 98.6 °F (37 °C) Axillary 88 15 94 %   10/29/22 1623 -- -- -- -- 14 93 %       Oxygen Saturation Interpretation: Abnormal and Improved after treatment    ------------------------------------------ PROGRESS NOTES ------------------------------------------  Re-evaluation(s):  Time: 4980  Patients symptoms are improving  Repeat physical examination is not changed    Counseling:  I have spoken with the patient and discussed todays results, in addition to providing specific details for the plan of care and counseling regarding the diagnosis and prognosis. Their questions are answered at this time and they are agreeable with the plan of admission.    --------------------------------- ADDITIONAL PROVIDER NOTES ---------------------------------  Consultations:  Time: 4998. Spoke with Dr. Dina Fernandez. Discussed case. They will admit the patient. This patient's ED course included: a personal history and physicial examination, re-evaluation prior to disposition, multiple bedside re-evaluations, IV medications, cardiac monitoring, continuous pulse oximetry, and complex medical decision making and emergency management    This patient has remained hemodynamically stable during their ED course. Diagnosis:  1. COPD exacerbation ([facility] [address]        Disposition:  Patient's disposition: Admit to med/surg floor  Patient's condition is stable. Strict return precautions were discussed including but not limited too new or worsening symtpoms. They verbalized understanding and were agreeable with the plan. All questions were answered and patient was discharged.        Tania Earl MD  Resident  10/30/22 8196

## 2022-10-27 NOTE — PROGRESS NOTES
Admission database completed to best of this RN's ability. Care plan and education initiated. Pt from home with . Uses a rolator with ambulation at baseline. Wears 3L n/c HS via concentrator through [facility]. HX with Cheryl Thacker for PT/OT. Spoke with daughter, Román Wilson, who states that her mother has been falling frequently at home and not been taking care of herself. Requesting placement, prefers [facility].

## 2022-10-28 LAB
ADENOVIRUS BY PCR: NOT DETECTED
ALBUMIN SERPL-MCNC: 3.3 G/DL (ref 3.5–5.2)
ALP BLD-CCNC: 61 U/L (ref 35–104)
ALT SERPL-CCNC: <5 U/L (ref 0–32)
ANION GAP SERPL CALCULATED.3IONS-SCNC: 8 MMOL/L (ref 7–16)
ANISOCYTOSIS: ABNORMAL
AST SERPL-CCNC: 12 U/L (ref 0–31)
ATYPICAL LYMPHOCYTE RELATIVE PERCENT: 1.8 % (ref 0–4)
B.E.: 4 MMOL/L (ref -3–3)
BASOPHILS ABSOLUTE: 0 E9/L (ref 0–0.2)
BASOPHILS RELATIVE PERCENT: 0 % (ref 0–2)
BILIRUB SERPL-MCNC: 0.3 MG/DL (ref 0–1.2)
BORDETELLA PARAPERTUSSIS BY PCR: NOT DETECTED
BORDETELLA PERTUSSIS BY PCR: NOT DETECTED
BUN BLDV-MCNC: 16 MG/DL (ref 6–23)
BURR CELLS: ABNORMAL
CALCIUM SERPL-MCNC: 9.2 MG/DL (ref 8.6–10.2)
CHLAMYDOPHILIA PNEUMONIAE BY PCR: NOT DETECTED
CHLORIDE BLD-SCNC: 99 MMOL/L (ref 98–107)
CO2: 30 MMOL/L (ref 22–29)
COHB: 1.3 % (ref 0–1.5)
CORONAVIRUS 229E BY PCR: NOT DETECTED
CORONAVIRUS HKU1 BY PCR: NOT DETECTED
CORONAVIRUS NL63 BY PCR: NOT DETECTED
CORONAVIRUS OC43 BY PCR: NOT DETECTED
CREAT SERPL-MCNC: 1.2 MG/DL (ref 0.5–1)
CRITICAL: ABNORMAL
DATE ANALYZED: ABNORMAL
DATE OF COLLECTION: ABNORMAL
EOSINOPHILS ABSOLUTE: 0 E9/L (ref 0.05–0.5)
EOSINOPHILS RELATIVE PERCENT: 0 % (ref 0–6)
GFR SERPL CREATININE-BSD FRML MDRD: 47 ML/MIN/1.73
GLUCOSE BLD-MCNC: 197 MG/DL (ref 74–99)
HBA1C MFR BLD: 5.6 % (ref 4–5.6)
HCO3: 29.4 MMOL/L (ref 22–26)
HCT VFR BLD CALC: 35.7 % (ref 34–48)
HEMOGLOBIN: 11.2 G/DL (ref 11.5–15.5)
HHB: 4.6 % (ref 0–5)
HUMAN METAPNEUMOVIRUS BY PCR: NOT DETECTED
HUMAN RHINOVIRUS/ENTEROVIRUS BY PCR: NOT DETECTED
INFLUENZA A BY PCR: NOT DETECTED
INFLUENZA B BY PCR: NOT DETECTED
L. PNEUMOPHILA SEROGP 1 UR AG: NORMAL
LAB: ABNORMAL
LYMPHOCYTES ABSOLUTE: 0.13 E9/L (ref 1.5–4)
LYMPHOCYTES RELATIVE PERCENT: 0 % (ref 20–42)
Lab: ABNORMAL
MCH RBC QN AUTO: 28.7 PG (ref 26–35)
MCHC RBC AUTO-ENTMCNC: 31.4 % (ref 32–34.5)
MCV RBC AUTO: 91.5 FL (ref 80–99.9)
METHB: 0.3 % (ref 0–1.5)
MODE: ABNORMAL
MONOCYTES ABSOLUTE: 0 E9/L (ref 0.1–0.95)
MONOCYTES RELATIVE PERCENT: 0 % (ref 2–12)
MYCOPLASMA PNEUMONIAE BY PCR: NOT DETECTED
NEUTROPHILS ABSOLUTE: 6.27 E9/L (ref 1.8–7.3)
NEUTROPHILS RELATIVE PERCENT: 98.2 % (ref 43–80)
NUCLEATED RED BLOOD CELLS: 0 /100 WBC
O2 CONTENT: 15.9 ML/DL
O2 SATURATION: 95.3 % (ref 92–98.5)
O2HB: 93.8 % (ref 94–97)
OPERATOR ID: 316
PARAINFLUENZA VIRUS 1 BY PCR: NOT DETECTED
PARAINFLUENZA VIRUS 2 BY PCR: NOT DETECTED
PARAINFLUENZA VIRUS 3 BY PCR: NOT DETECTED
PARAINFLUENZA VIRUS 4 BY PCR: NOT DETECTED
PATIENT TEMP: 37 C
PCO2: 47.7 MMHG (ref 35–45)
PDW BLD-RTO: 16.6 FL (ref 11.5–15)
PH BLOOD GAS: 7.41 (ref 7.35–7.45)
PLATELET # BLD: 297 E9/L (ref 130–450)
PMV BLD AUTO: 9.4 FL (ref 7–12)
PO2: 77.3 MMHG (ref 75–100)
POIKILOCYTES: ABNORMAL
POLYCHROMASIA: ABNORMAL
POTASSIUM SERPL-SCNC: 4.3 MMOL/L (ref 3.5–5)
PROLACTIN: 5.62 NG/ML
RBC # BLD: 3.9 E12/L (ref 3.5–5.5)
RESPIRATORY SYNCYTIAL VIRUS BY PCR: NOT DETECTED
SARS-COV-2, PCR: NOT DETECTED
SODIUM BLD-SCNC: 137 MMOL/L (ref 132–146)
SOURCE, BLOOD GAS: ABNORMAL
STREP PNEUMONIAE ANTIGEN, URINE: NORMAL
TARGET CELLS: ABNORMAL
THB: 12 G/DL (ref 11.5–16.5)
TIME ANALYZED: 1757
TOTAL PROTEIN: 6.6 G/DL (ref 6.4–8.3)
WBC # BLD: 6.4 E9/L (ref 4.5–11.5)

## 2022-10-28 PROCEDURE — 94660 CPAP INITIATION&MGMT: CPT

## 2022-10-28 PROCEDURE — 82805 BLOOD GASES W/O2 SATURATION: CPT

## 2022-10-28 PROCEDURE — 99233 SBSQ HOSP IP/OBS HIGH 50: CPT | Performed by: INTERNAL MEDICINE

## 2022-10-28 PROCEDURE — 2060000000 HC ICU INTERMEDIATE R&B

## 2022-10-28 PROCEDURE — 6360000002 HC RX W HCPCS: Performed by: INTERNAL MEDICINE

## 2022-10-28 PROCEDURE — 84146 ASSAY OF PROLACTIN: CPT

## 2022-10-28 PROCEDURE — 83036 HEMOGLOBIN GLYCOSYLATED A1C: CPT

## 2022-10-28 PROCEDURE — 80053 COMPREHEN METABOLIC PANEL: CPT

## 2022-10-28 PROCEDURE — 87449 NOS EACH ORGANISM AG IA: CPT

## 2022-10-28 PROCEDURE — 2700000000 HC OXYGEN THERAPY PER DAY

## 2022-10-28 PROCEDURE — 87081 CULTURE SCREEN ONLY: CPT

## 2022-10-28 PROCEDURE — 94640 AIRWAY INHALATION TREATMENT: CPT

## 2022-10-28 PROCEDURE — 36415 COLL VENOUS BLD VENIPUNCTURE: CPT

## 2022-10-28 PROCEDURE — 85025 COMPLETE CBC W/AUTO DIFF WBC: CPT

## 2022-10-28 PROCEDURE — 6370000000 HC RX 637 (ALT 250 FOR IP): Performed by: INTERNAL MEDICINE

## 2022-10-28 PROCEDURE — 97165 OT EVAL LOW COMPLEX 30 MIN: CPT

## 2022-10-28 PROCEDURE — 97161 PT EVAL LOW COMPLEX 20 MIN: CPT

## 2022-10-28 PROCEDURE — 94667 MNPJ CHEST WALL 1ST: CPT

## 2022-10-28 RX ORDER — ENOXAPARIN SODIUM 100 MG/ML
30 INJECTION SUBCUTANEOUS DAILY
Status: DISCONTINUED | OUTPATIENT
Start: 2022-10-28 | End: 2022-10-31

## 2022-10-28 RX ORDER — ALBUTEROL SULFATE 2.5 MG/3ML
2.5 SOLUTION RESPIRATORY (INHALATION) 4 TIMES DAILY
Status: DISCONTINUED | OUTPATIENT
Start: 2022-10-28 | End: 2022-11-01 | Stop reason: HOSPADM

## 2022-10-28 RX ORDER — METHYLPREDNISOLONE SODIUM SUCCINATE 125 MG/2ML
60 INJECTION, POWDER, LYOPHILIZED, FOR SOLUTION INTRAMUSCULAR; INTRAVENOUS EVERY 8 HOURS
Status: DISCONTINUED | OUTPATIENT
Start: 2022-10-28 | End: 2022-10-30

## 2022-10-28 RX ADMIN — METHYLPREDNISOLONE SODIUM SUCCINATE 60 MG: 125 INJECTION, POWDER, LYOPHILIZED, FOR SOLUTION INTRAMUSCULAR; INTRAVENOUS at 17:21

## 2022-10-28 RX ADMIN — IPRATROPIUM BROMIDE 0.5 MG: 0.5 SOLUTION RESPIRATORY (INHALATION) at 15:54

## 2022-10-28 RX ADMIN — ATORVASTATIN CALCIUM 20 MG: 20 TABLET, FILM COATED ORAL at 21:50

## 2022-10-28 RX ADMIN — ARFORMOTEROL TARTRATE 15 MCG: 15 SOLUTION RESPIRATORY (INHALATION) at 08:37

## 2022-10-28 RX ADMIN — METHYLPREDNISOLONE SODIUM SUCCINATE 60 MG: 125 INJECTION, POWDER, LYOPHILIZED, FOR SOLUTION INTRAMUSCULAR; INTRAVENOUS at 23:45

## 2022-10-28 RX ADMIN — GABAPENTIN 400 MG: 400 CAPSULE ORAL at 08:18

## 2022-10-28 RX ADMIN — BACLOFEN 10 MG: 10 TABLET ORAL at 08:18

## 2022-10-28 RX ADMIN — IPRATROPIUM BROMIDE 0.5 MG: 0.5 SOLUTION RESPIRATORY (INHALATION) at 08:37

## 2022-10-28 RX ADMIN — ARFORMOTEROL TARTRATE 15 MCG: 15 SOLUTION RESPIRATORY (INHALATION) at 20:50

## 2022-10-28 RX ADMIN — IPRATROPIUM BROMIDE 0.5 MG: 0.5 SOLUTION RESPIRATORY (INHALATION) at 12:41

## 2022-10-28 RX ADMIN — CARVEDILOL 25 MG: 25 TABLET, FILM COATED ORAL at 08:18

## 2022-10-28 RX ADMIN — ALBUTEROL SULFATE 2.5 MG: 2.5 SOLUTION RESPIRATORY (INHALATION) at 20:50

## 2022-10-28 RX ADMIN — TRAZODONE HYDROCHLORIDE 25 MG: 50 TABLET ORAL at 21:49

## 2022-10-28 RX ADMIN — Medication 2000 UNITS: at 08:19

## 2022-10-28 RX ADMIN — ASPIRIN 81 MG: 81 TABLET, COATED ORAL at 08:18

## 2022-10-28 RX ADMIN — CARVEDILOL 25 MG: 25 TABLET, FILM COATED ORAL at 17:21

## 2022-10-28 RX ADMIN — ENOXAPARIN SODIUM 30 MG: 100 INJECTION SUBCUTANEOUS at 11:24

## 2022-10-28 RX ADMIN — GABAPENTIN 400 MG: 400 CAPSULE ORAL at 21:50

## 2022-10-28 RX ADMIN — MELOXICAM 15 MG: 7.5 TABLET ORAL at 08:19

## 2022-10-28 RX ADMIN — BACLOFEN 10 MG: 10 TABLET ORAL at 21:50

## 2022-10-28 RX ADMIN — BUDESONIDE 500 MCG: 0.5 SUSPENSION RESPIRATORY (INHALATION) at 08:37

## 2022-10-28 RX ADMIN — GABAPENTIN 400 MG: 400 CAPSULE ORAL at 15:00

## 2022-10-28 ASSESSMENT — PAIN SCALES - GENERAL
PAINLEVEL_OUTOF10: 0

## 2022-10-28 NOTE — PROGRESS NOTES
Event Note    Patient known to Dr. Lady Kirk, will change consult. Lis Mccarty. Kimmy Watkins M.D., F.C.C.P.     [facility] [address] [facility], [address] [place] [address], [address] [facility]

## 2022-10-28 NOTE — PROGRESS NOTES
Occupational Therapy    OCCUPATIONAL THERAPY INITIAL EVALUATION     Rubi Becker [place], [place]         CNQR:                                                  Patient Name: Izzy Bernal    MRN: [id number]    : 1945    Room: [address] [place] [address]      Evaluating OT: Rafaela Lares OTR/L   [id number]      Referring Rosa Elena Roach MD    Specific Provider Orders/Date:OT eval and treat 10/27/2022      Diagnosis:  COPD exacerbation ([address] [J44.1]     Pertinent Medical History: MS, R rotator injury       Precautions:  Fall Risk, 11 L O2     Assessment of current deficits    [x] Functional mobility  [x]ADLs  [x] Strength               []Cognition    [x] Functional transfers   [x] IADLs         [x] Safety Awareness   [x]Endurance    [] Fine Coordination              [x] Balance      [] Vision/perception   []Sensation     []Gross Motor Coordination  [] ROM  [] Delirium                   [] Motor Control     OT PLAN OF CARE   OT POC based on physician orders, patient diagnosis and results of clinical assessment    Frequency/Duration  2-4 days/wk for 2 weeks PRN   Specific OT Treatment Interventions to include:   ADL retraining/adapted techniques and AE recommendations to increase functional independence within precautions                    Energy conservation techniques to improve tolerance for selfcare routine   Functional transfer/mobility training/DME recommendations for increased independence, safety and fall prevention         Patient/family education to increase safety and functional independence             Environmental modifications for safe mobility and completion of ADLs                             Therapeutic activity to improve functional performance during ADLs.                                          Therapeutic exercise to improve tolerance and functional strength for ADLs    Balance retraining/tolerance tasks for facilitation of postural control with dynamic challenges during ADLs .       Positioning to improve functional independence      Recommended Adaptive Equipment: TBD     Home Living: Pt lives with , 1 story with ramp    Bathroom setup: walk in shower, seat    Equipment owned: rollator, home O2 at night, pulse oximeter     Prior Level of Function: assist as needed  with ADLs , assist as needed  with IADLs, cleaning lady once a month ; ambulated with rollator    Pain Level: no pain this session ;   Cognition: A&O: 4/4;    Memory:  good    Sequencing:  good    Problem solving:  good    Judgement/safety:  good      Functional Assessment:  AM-PAC Daily Activity Raw Score: 17/24   Initial Eval Status  Date: 10/28/22 Treatment Status  Date: STGs = LTGs  Time frame: 10-14 days   Feeding Independent      Grooming Min A  Combing back of her head   Supervision    UB Dressing Min A  Don/doff gown   Patient reports her  sometimes helps her d/t R rotator injury   SBA   LB Dressing Min A   SBA    Bathing Min A   SBA    Toileting Supervision   Independent    Bed Mobility  Supervision   Supine <> sit   Independent    Functional Transfers SBA  Sit- stand from bed   Mod I    Functional Mobility SBA,w/walker,O2  Steps next to bed  Mod I  with good tolerance    Balance Sitting:     Static:  Independent     Dynamic:SBA   Standing: SBA   Independent    Activity Tolerance No SOB   Patient reports she checks her O2 sats at home with her COPD she runs 89-90% - states if \"she gets up higher its a good day\"    O2 sats on 11 L dropped lowest 87%- improved to 92% once lying back in bed   Good  with ADL activity    Visual/  Perceptual Glasses: yes                 Hand Dominance right    AROM (PROM) Strength Additional Info:    RUE  R shoulder limited from rotator injury, distally WFLs Fair fair + good  and wfl FMC/dexterity noted during ADL tasks       LINDYE [facility] WFL good  and wfl FMC/dexterity noted during ADL tasks       Hearing: [facility] Sensation:  No c/o numbness or tingling  Tone: WFL   Edema: none observed     Comments: Upon arrival patient lying in bed . At end of session, patient returned to bed  with call light and phone within reach, all lines and tubes intact. *ALARM  ON     Overall patient demonstrated  decreased independence and safety during completion of ADL/functional transfer/mobility tasks. Pt would benefit from continued skilled OT to increase safety and independence with completion of ADL/IADL tasks for functional independence and quality of life. Rehab Potential: good for established goals     Patient / Family Goal: return home      Patient and/or family were instructed on functional diagnosis, prognosis/goals and OT plan of care. Demonstrated good  understanding. Eval Complexity: Low    Time In: 1405  Time Out: 1425      Min Units   OT Eval Low 97165 x  1   OT Eval Medium 47467      OT Eval High 54001      OT Re-Eval [id number]       Therapeutic Ex 61202      Therapeutic Activities 49757       ADL/Self Care 03053       Orthotic Management 45793       Manual 42205     Neuro Re-Ed 44659       Non-Billable Time          Evaluation Time additionally includes thorough review of current medical information, gathering information on past medical history/social history and prior level of function, interpretation of standardized testing/informal observation of tasks, assessment of data and development of plan of care and goals.             Fabiola Henderson  OTR/L  OT [id number]

## 2022-10-28 NOTE — PROGRESS NOTES
[facility] Quality Flow/Interdisciplinary Rounds Progress Note        Quality Flow Rounds held on October 28, 2022    Disciplines Attending:  Bedside Nurse, , , and Nursing Unit Ciarra Tijerina was admitted on 10/27/2022 12:50 PM    Anticipated Discharge Date:       Disposition:    Von Score:  Von Scale Score: 19    Readmission Risk              Risk of Unplanned Readmission:  13           Discussed patient goal for the day, patient clinical progression, and barriers to discharge. The following Goal(s) of the Day/Commitment(s) have been identified: IV steroids, wean O2?       Aimee Kincaid RN  October 28, 2022

## 2022-10-28 NOTE — PLAN OF CARE
Problem: ABCDS Injury Assessment  Goal: Absence of physical injury  Outcome: Progressing     Problem: Safety - Adult  Goal: Free from fall injury  Outcome: Progressing     Problem: Discharge Planning  Goal: Discharge to home or other facility with appropriate resources  Outcome: Progressing     Problem: Pain  Goal: Verbalizes/displays adequate comfort level or baseline comfort level  Outcome: Progressing

## 2022-10-28 NOTE — PROGRESS NOTES
[facility] Progress Note    --------------------------------------------------------------------------------------  Assessment / Plan  COPD exacerbation and acute-on-chronic hypoxia suspect due to medical noncompliance - O2 as needed (11L currently) / nebs (Brovana, Atrovent, Pulmicort) / steroids. Imaging w small bl effusions and bibasilar / RML infiltrate vs atelectasis. Viral panel was negative. Clinical picture not suggestive of infection so holding abx. Pulm to see    Weakness / fatigue - pt reports issues ambulating w a fall at home. Fall precautions, PT and OT evals    Hx chronic anemia (near baseline), CKD3 (near baseline), HPL, HTN, MS, past breast Ca - resume home meds, monitor vitals / labs    Please see orders for further plan of care  Code status  Full Code  DVT prophylaxis Lovenox  Disposition  Anticipate home when ready  --------------------------------------------------------------------------------------    Admission Date  10/27/2022 12:50 PM  Chief Complaint SOB    Subjective  History of Present Illness  77 y.o. female w PMH COPD without chronic hypoxia, MS, CKD3, anemia, HTN, HPL, past breast Ca who presented to ED 10/27/2022 from home with complaints of SOB. Was here 8/23-8/26 for same and dc'd on 2-3LNC O2. She reports that she had only been wearing it at night and even then only intermittently. Also reports she doesn't follow with pulmonology. Initial evaluation in the ED showed pt was hemodynamically stable. Workup showed WBC normal, hgb 10.9 (baseline ~11-12), Cr 1.3 (at baseline), CO2 30. Imaging studies included CXR and CTA, which showed scarring of the ELICIA and trace pleural effusions and consolidation vs atelectasis of the bl bases and RML. EKG not performed. Pt was admitted for further evaluation and treatment with consult request to pulmonology.     Pt anxious and maybe a bit confused  No overt distress  On 11L HFNC w RR acceptable  Pt had asked about rehab  Def not ready for dc  No family present during my visit  No new issues identified today  Case was discussed with nursing    Review of Systems - 12-point review of systems has been reviewed and is otherwise negative except as listed in the HPI    Objective  Physical Exam  Vitals: /66   Pulse 82   Temp 98.2 °F (36.8 °C) (Oral)   Resp 18   Ht 5' 2\" (1.575 m)   Wt 160 lb (72.6 kg)   SpO2 94%   BMI 29.26 kg/m²   General: well-developed, well-nourished, anxious but no acute distress, generally cooperative  Skin: generally warm, dry, and intact, with normal color  HEENT: normocephalic, atraumatic, no gross abnormalities, HFNC in place  Respiratory: coarse to auscultation bilaterally without respiratory distress  Cardiovascular: regular rate and rhythm without murmur / rub / gallop  Abdominal: soft, nontender, nondistended, normoactive bowel sounds  Extremities: no obvious edema or deformity  Neurologic: awake, alert, no gross deficits  Psychiatric: normal affect, cooperative    Electronically signed by Zay Zapata DO on 10/28/2022 at 10:41 AM

## 2022-10-28 NOTE — CARE COORDINATION
Introduced myself and provided explanation of CM role to patient w/initial assessment completed. Pt resides w/spouse in a one story home w/ramp to enter and is independent w/the use of a walker. Has O2 3L HS only per patient. Chart reviewed w/DME supplier found to be [facility]. Order for O2 per [facility] DME is for 3L continious, no cpap or neb. High flow O2 11L at this time, will need new oxygen orders if unable to wean. Still drives and is established w/Dr. Kia Freed and uses [facility]. Hx of [facility]. No hx of SNF stay, but pt expresses interest in rehab at [facility]. PT/OT evals pending. Spouse will transport home upon discharge if rehab is not needed. Will follow along with  and assist with discharge planning as necessary. MACRINA Phelps, RN  [facility] Case Management  [phone]    Revisited patient after PT: 18/24. [facility] per her request w/acceptance per Crissy. Orders in place. Please notify [facility] upon dc. The Plan for Transition of Care is related to the following treatment goals: [facility]    The Patient was provided with a choice of provider and agrees   with the discharge plan. [x] Yes [] No    Freedom of choice list was provided with basic dialogue that supports the patient's individualized plan of care/goals, treatment preferences and shares the quality data associated with the providers.  [x] Yes [] No

## 2022-10-28 NOTE — PROGRESS NOTES
Physical Therapy  Facility/Department: [address] INTERMEDIATE  Physical Therapy Initial Assessment    Name: Arianne Rutledge  : 1945  MRN: [id number]  Date of Service: 10/28/2022      Patient Diagnosis(es): The encounter diagnosis was COPD exacerbation ([facility] Past Medical History:  has a past medical history of Arthritis, Breast cancer ([facility] CKD (chronic kidney disease), COPD (chronic obstructive pulmonary disease) ([facility] Hyperlipidemia, Hypertension, MS (multiple sclerosis) ([facility] and Multiple sclerosis ([facility] Past Surgical History:  has a past surgical history that includes Hysterectomy; Breast surgery (2012); Appendectomy; Knee Arthroplasty (Left, 10/01/2013); Knee Arthroplasty (Right, 2012); Hip Arthroplasty (Right, 2011); and Total hip arthroplasty (Left, 3/25/2022). Evaluating Therapist: Coni Knight PT    Room #:  9253/3723-Q  Diagnosis:  COPD exacerbation ([facility] [J44.1]  PMHx/PSHx:  CKD, COPD  Precautions:  falls, O2 high flow. Social:  Pt lives with  in a 1 floor plan with ramped entrance. Ambulates with rollator. Uses O2 at night. Initial Evaluation  Date: 10/28/22 Treatment      Short Term/ Long Term   Goals   Was pt agreeable to Eval/treatment? yes     Does pt have pain? No c/o pain     Bed Mobility  Rolling: SBA  Supine to sit: SBA  Sit to supine: NT  Scooting: SBA  independent   Transfers Sit to stand: SBA  Stand to sit: SBA  Stand pivot: SBA  independent   Ambulation    25 feet x1 with rollator SBA  100 feet with ww/rolator SBA   Stair Negotiation  Ascended and descended  NT   N/A   LE strength     3+/5    4/5   balance      fair     AM-PAC Raw score                        Pt is alert and Oriented   LE ROM: WFL  Sensation: intact  Edema: none  Endurance: fair  Chair alarm: yes     ASSESSMENT:    Pt displays functional ability as noted in the objective portion of this evaluation.       Patient education  Pt educated on PT objectives    Patient response to education:   Pt verbalized understanding Pt demonstrated skill Pt requires further education in this area   yes           Comments:  no c/o shortness of breath with ambulation    Pt's/ family goals   1. To return home    Conditions Requiring Skilled Therapeutic Intervention:    [x]Decreased strength     []Decreased ROM  [x]Decreased functional mobility  [x]Decreased balance   [x]Decreased endurance   []Decreased posture  []Decreased sensation  []Decreased coordination   []Decreased vision  []Decreased safety awareness   []Increased pain       Patient and or family understand(s) diagnosis, prognosis, and plan of care. Prognosis is good for reaching above PT goals    PHYSICAL THERAPY PLAN OF CARE:    PT POC is established based on physician order and patient diagnosis     Referring provider/PT Order: David Fernandez MD/ PT eval and treat      Current Treatment Recommendations:     [x] Strengthening to improve independence with functional mobility   [] ROM to improve independence with functional mobility   [x] Balance Training to improve static/dynamic balance and to reduce fall risk  [x] Endurance Training to improve activity tolerance during functional mobility   [x] Transfer Training to improve safety and independence with all functional transfers   [x] Gait Training to improve gait mechanics, endurance and assess need for appropriate assistive device  [] Stair Training in preparation for safe discharge home and/or into the community   [] Positioning to prevent skin breakdown and contractures  [x] Safety and Education Training   [x] Patient/Caregiver Education   [] HEP  [] Other     PT long term treatment goals are located in above grid    Frequency of treatments: 2-5x/week x 5 days.     Time in  1105  Time out  1123        Evaluation Time includes thorough review of current medical information, gathering information on past medical history/social history and prior level of function, completion of standardized testing/informal observation of tasks, assessment of data and education on plan of care and goals.       CPT codes:  [x] Low Complexity PT evaluation 83613  [] Moderate Complexity PT evaluation 70565  [] High Complexity PT evaluation 47451  [] PT Re-evaluation 35668  [] Gait training 89034 minutes  [] Manual therapy 81308 minutes  [] Therapeutic activities 91496 minutes  [] Therapeutic exercises 71396 minutes  [] Neuromuscular reeducation 17663 minutes     [facility] PT [id number]

## 2022-10-28 NOTE — H&P
[facility] History and Physical        Chief Complaint:  sob  History of Present Illness   The patient is a 77 y.o. female    Sob worse at rest, severe with exertion  Difficulties chronically with ambulation but walking at home- felt much weaker, generalized fatigue and fell to her knees. -- she needed to call EMS to get help off floor  She was been feeling sob now over past few days, more productive cough as well than baseline  - .+tobacco, ?copd but doesn't follow with pulmonary doctor  She recently Rx home o2 which she only uses at night time  She reeports no cpap use and doesn't use breathing Tx at home, but on trilegy. With onset of worsening sob, and productive yellowish cough, she denies fever, HA, sorethroat, GI symptoms at that time. Per ER/EMS:  \"Pt arrives from home with ems , c/o sob. Pt was found to be 61% ra at home. Squad call for lift assist when arrived found pt to be sob with low spo2. Denies any o2\"      Noted last admission for hypoxia 2months ago:  Per DC summary:  \". ..8/23 for abrupt onset of dyspnea overnight. Checked pulse ox upon waking and was in low 70's. Pt does not wear home O2,    pulse ox was 64% on RA upon arrival. Pt denied prior COPD exacerbations. Pt placed on O2 8L NC. CTA negative for PE, and pt received IVF after contrast due to CKD. She was placed on breathing tx's, IV steroids and doxycycline. On 8/24, O2 was able to be weaned to 3L NC   + productive cough, lung sounds improved, and O2 was weaned to 2L NC at rest. Home oxygen eval was performed, pt required 3L with ambulation, and home supplemental oxygen was ordered. ... though she does still have end-expiratory wheezing, more so in upper lobes. \"      - hx taken from the patient and records  REVIEW OF SYSTEMS:  no fevers, chills, cp, sob, n/v, ha, vision/hearing changes, wt changes, hot/cold flashes, other open skin lesions, diarrhea, constipation, dysuria/hematuria unless noted in HPI. Complete ROS performed with the patient and is otherwise negative. Past Medical History:      Diagnosis Date    Arthritis     Breast cancer ([facility] [address]     CKD (chronic kidney disease)     COPD (chronic obstructive pulmonary disease) (HCC)     Hyperlipidemia     Hypertension     MS (multiple sclerosis) ([facility] [address] 5/31/2012    Multiple sclerosis ([facility] [address]     diagnosised 8  yrs ago [facility]       Past Surgical History:        Procedure Laterality Date    APPENDECTOMY      BREAST SURGERY  4/13/2012    biopsy - negative    HIP ARTHROPLASTY Right 04/13/2011    Right AIDEE  ALLISON Morejon MD    HYSTERECTOMY (CERVIX STATUS UNKNOWN)      KNEE ARTHROPLASTY Left 10/01/2013    Left TKA  ALLISON Morejon MD    KNEE ARTHROPLASTY Right 08/29/2012    Right TKA  ALLISON Morejon MD    TOTAL HIP ARTHROPLASTY Left 3/25/2022    LEFT HIP TOTAL ARTHROPLASTY performed by Bonilla Gibbs MD at [place] [id number] Medications:  Prior to Admission medications    Medication Sig Start Date End Date Taking? Authorizing Provider   Compression Stockings MISC by Does not apply route 15-25 mm hg 9/27/22   Luis A Ayala DO   meloxicam (MOBIC) 15 MG tablet Take 1 tablet by mouth daily 9/9/22   Luis A Ayala DO   meloxicam (MOBIC) 15 MG tablet Take 1 tablet by mouth in the morning.  8/5/22   Shahla Ceballos, DO   Fluticasone-Umeclidin-Vilant (TRELEGY ELLIPTA) 446-65.7-17 MCG/INH AEPB INHALE ONE PUFF BY MOUTH EVERY DAY 7/27/22   Luis A Ayala DO   diclofenac sodium (VOLTAREN) 1 % GEL Apply 2 g topically 4 times daily as needed for Pain 7/25/22   Luis A Ayala DO   traZODone (DESYREL) 50 MG tablet Take 0.5 tablets by mouth nightly as needed for Sleep  Patient taking differently: Take 25 mg by mouth nightly 7/21/22   Luis A Ayala DO   memantine (NAMENDA) 5 MG tablet Take 1 tablet by mouth 2 times daily  Patient not taking: Reported on 8/23/2022 7/6/22 8/26/22  Luis A Ayala DO   baclofen (LIORESAL) 10 MG tablet TAKE 1 TABLET TWICE A DAY 6/30/22   Faiza Coronel DO   hydroCHLOROthiazide (HYDRODIURIL) 25 MG tablet Take 1 tablet by mouth daily 6/27/22   Miguel Ayala DO   atorvastatin (LIPITOR) 20 MG tablet Take 1 tablet by mouth daily  Patient taking differently: Take 20 mg by mouth nightly 6/13/22   Miguel Ayala DO   carvedilol (COREG) 25 MG tablet Take 1 tablet by mouth 2 times daily 5/3/22   Alexi Tay PA-C   amLODIPine (NORVASC) 5 MG tablet Take 1 tablet by mouth daily 5/3/22   Alexi Tay PA-C   gabapentin (NEURONTIN) 400 MG capsule TAKE 1 CAPSULE 3 TIMES A   DAY 4/19/22 10/27/22  Alexi Tay PA-C   sertraline (ZOLOFT) 100 MG tablet TAKE 1 AND 1/2 TABLETS     DAILY  Patient taking differently: Take 100 mg by mouth nightly 3/31/22   Alexi Tay PA-C   aspirin 81 MG EC tablet Take 1 tablet by mouth 2 times daily for 30 doses DVT prophylaxis x30 days  Patient taking differently: Take 81 mg by mouth daily DVT prophylaxis x30 days 3/26/22 10/27/22  Kylee Mcfarland MD   albuterol sulfate HFA (VENTOLIN HFA) 108 (90 Base) MCG/ACT inhaler Inhale 2 puffs into the lungs 4 times daily as needed for Wheezing 4/27/21   Alexi Tay PA-C   Cholecalciferol 50 MCG (2000 UT) CAPS Take 2,000 Units by mouth every morning     Historical Provider, MD       Allergies:  Macrodantin [nitrofurantoin macrocrystal]    Social History:   TOBACCO:   reports that she has been smoking cigarettes. She has a 50.00 pack-year smoking history. She has never used smokeless tobacco.  ETOH:   reports no history of alcohol use. Family History:       Problem Relation Age of Onset    Mult Sclerosis Father     Thyroid Cancer Mother     Cirrhosis Mother       Or deferred/otherwise considered non contributory to current admission  PHYSICAL EXAM:    VS: BP (!) 99/41   Pulse 85   Temp 97.6 °F (36.4 °C) (Oral)   Resp 24   Ht 5' 2\" (1.575 m)   Wt 160 lb (72.6 kg)   SpO2 (!) 89%   BMI 29.26 kg/m²     General Appearance:     no acute distress. Psych:  HEENT:    A.O. As per HPI details  NC/AT, PERRL, + pallor no icterus,   lips/ext mucous membrane grossly dry    Neck:   Supple, trachea midline, no obvious JVD   Resp:     Dec bs  ++ wheezes, +rhonchi   Chest wall:    No tenderness or deformity   Heart:    Regular rate and rhythm, S1 and S2 normal, no rub or gallop. Abdomen:     Soft, non-tender, bowel sounds active    no suspicious obvious masses/organomegaly   Genitalia & Rectal:    Deferred.    Extremities x4:   Extremities normal, atraumatic, no cyanosis, no clubbing   Musculoskeletal:      NO active synovitis or swollen b/l wrists, 2-5 MCPs examined   Skin:   Skin color, texture, turgor dry   Lymph nodes:   Cervical nodes grossly normal   Neurologic:  .Grossly symmetric  strength in UEs and LEs with symmetric grossly dec  to light touch sensation     LABS:  CBC:   Lab Results   Component Value Date/Time    WBC 7.7 10/27/2022 01:37 PM    RBC 3.79 10/27/2022 01:37 PM    HGB 10.9 10/27/2022 01:37 PM    HCT 35.1 10/27/2022 01:37 PM     10/27/2022 01:37 PM    MCV 92.6 10/27/2022 01:37 PM     BMP:    Lab Results   Component Value Date/Time     10/27/2022 01:37 PM    K 4.1 10/27/2022 01:37 PM     10/27/2022 01:37 PM    CO2 29 10/27/2022 01:37 PM    BUN 19 10/27/2022 01:37 PM    CREATININE 1.3 10/27/2022 01:37 PM    GLUCOSE 114 10/27/2022 01:37 PM    GLUCOSE 116 05/30/2012 10:40 PM    CALCIUM 9.2 10/27/2022 01:37 PM     Hepatic Function Panel:    Lab Results   Component Value Date/Time    ALKPHOS 64 09/27/2022 04:18 PM    AST 16 09/27/2022 04:18 PM    ALT 10 09/27/2022 04:18 PM    PROT 7.3 09/27/2022 04:18 PM    LABALBU 3.8 09/27/2022 04:18 PM    LABALBU 3.9 05/30/2012 10:40 PM    BILITOT 0.3 09/27/2022 04:18 PM     Magnesium:    Lab Results   Component Value Date/Time    MG 2.0 03/27/2022 04:17 AM       PT/INR:    Lab Results   Component Value Date/Time    PROTIME 11.6 02/23/2018 12:00 PM    PROTIME 13.0 05/30/2012 10:40 PM    INR 1.1 02/23/2018 12:00 PM     U/A:   Lab Results   Component Value Date/Time    LEUKOCYTESUR Negative 12/19/2018 07:01 AM    PHUR 5.5 12/19/2018 07:01 AM    WBCUA NONE 12/19/2018 07:01 AM    WBCUA NONE 04/11/2011 11:55 AM    RBCUA NONE 12/19/2018 07:01 AM    RBCUA NONE 04/11/2011 11:55 AM    BACTERIA NONE 12/19/2018 07:01 AM    SPECGRAV 1.010 12/19/2018 07:01 AM    BLOODU Negative 12/19/2018 07:01 AM    GLUCOSEU Negative 12/19/2018 07:01 AM    GLUCOSEU NEGATIVE 05/30/2012 10:50 PM     ABG:  No results found for: PHART, GDJ7YJY, PO2ART, U2SMRMIS, XWZ2WHQ, BEART  TSH:    Lab Results   Component Value Date/Time    TSH 0.475 09/27/2022 04:18 PM     Cardiac Enzymes:   Lab Results   Component Value Date    CKTOTAL 102 05/30/2012    CKMB <0.2 05/30/2012    TROPONINI <0.01 05/30/2012       Radiology: XR CHEST PORTABLE    Result Date: 10/27/2022  EXAMINATION: ONE XRAY VIEW OF THE CHEST 10/27/2022 1:23 pm COMPARISON: 3 months prior. HISTORY: ORDERING SYSTEM PROVIDED HISTORY: shortness of breath TECHNOLOGIST PROVIDED HISTORY: Reason for exam:->shortness of breath FINDINGS: There is focal scarring/atelectasis in the left upper lung. The right lung is clear. The heart is prominent in size. No pneumothorax or pleural effusion. No acute cardiopulmonary process. Focal scar/atelectasis in the left upper lung, no significant change. CTA PULMONARY W CONTRAST    Result Date: 10/27/2022  EXAMINATION: CTA OF THE CHEST 10/27/2022 3:59 pm TECHNIQUE: CTA of the chest was performed after the administration of intravenous contrast.  Multiplanar reformatted images are provided for review. MIP images are provided for review. Automated exposure control, iterative reconstruction, and/or weight based adjustment of the mA/kV was utilized to reduce the radiation dose to as low as reasonably achievable. COMPARISON: None.  HISTORY: ORDERING SYSTEM PROVIDED HISTORY: elevated ddimer; hypoxia TECHNOLOGIST PROVIDED HISTORY: Reason for exam:->elevated ddimer; hypoxia Decision Support Exception - unselect if not a suspected or confirmed emergency medical condition->Emergency Medical Condition (MA) FINDINGS: Pulmonary Arteries: Pulmonary arteries are adequately opacified for evaluation. No evidence of intraluminal filling defect to suggest pulmonary embolism. Main pulmonary artery is normal in caliber. Mediastinum: No evidence of mediastinal lymphadenopathy. The heart and pericardium demonstrate no acute abnormality. There is no acute abnormality of the thoracic aorta. Thyroid is homogeneous in appearance. Small reactive lymph nodes seen scattered throughout the mediastinum. Lungs/pleura: There is elevation identified of the left hemidiaphragm. Atelectatic changes identified lung bases bilaterally. There is trace bilateral pleural effusions. Infiltrate cannot be completely excluded at the lung bases. There are air bronchograms present within the lower lobe consolidations. No pulmonary mass or nodule. Mild bronchiectatic changes centrally with scarring inferiorly within the left upper lobe. Upper Abdomen: Stones identified in the gallbladder. Cyst identified on the right kidney. Vascular calcifications seen within the abdominal aorta and iliac vessels. Soft Tissues/Bones: There is extensive degenerative changes identified within the spine. Kyphoplasty seen of the thoracic spine. Multiple levels present with kyphotic curvature centered at the thoracolumbar spine. No evidence of pulmonary embolism. There is trace bilateral pleural effusions with consolidative infiltrate or atelectatic change seen at the lung bases bilaterally and right middle lobe. Mild bronchiectatic changes centrally and scarring in the left upper lobe continued follow-up can be performed if clinically indicated.        EK  Normal sinus rhythm  Low voltage QRS  Borderline ECG  When compared with ECG of 23-MAR-2022 19:48,  No significant change was found   Assessment & Plan ACTIVE hospital problems being addressed/reassessed for this admission:  Principal Problem:    COPD exacerbation ([facility] [address]  Active Problems:    Acute respiratory failure with hypoxia and hypercapnia (HCC)    Hypotension due to hypovolemia    Ambulatory dysfunction    Acute respiratory failure with hypoxia (HCC)    MARCELLO (acute kidney injury) ([facility] [address]  Resolved Problems:    * No resolved hospital problems. *    Code status/DVT prophylaxis and PLAN --see orders   Note extensive time spent coordinating care between ER docs, ER and floor nurses, and transitioning care over to day providers  Plan of care/ clinical impressions/communication specifics detailed below:    shows HB 10.9 (drop from 12.4 1 month ago, however close to her baseline-- noted hgb 11 range in past). CMP unremarkable. - except 1.3   1.0-1.5 chronic baseline- stage 3 ckd   Influenza negative. D-dimer elevated 758. CTA pulmonary shows no PE. - but see findings below      -plan DuoNeb + Solumedrol [id number]-- then 40mg QD  Desaturation to 70 then 59% off O2. Improvement back to high 80s when placed back on 4L of O2   flu A/b neg  Respiratory panel pending  +bronchiectasis - new hypoxia, new productive sputum ?copd exac  (respiratory panel pending)  Consult pulmonay, dr Flores Robb, (consult placed for him last admission?)  CT shows bronchiectatic changes and scarring:  \"  No evidence of pulmonary embolism. There is trace bilateral pleural   effusions with consolidative infiltrate or atelectatic change seen at the   lung bases bilaterally and right middle lobe.   Mild bronchiectatic changes   centrally and scarring in the left upper lobe \"    Felt lightheaded -- check orthosttics  She claims BP usually always \"low\"  BP in   Will hold norvasc and home hctz      Debilitation = PT/OT/  Knee pain b/l sp falling unto knees    Inc BS, check Edelmira Rushing MD   Night Officer, overnight admitting doctor at [facility] call day time doctor   for questions after 7:30am    Covering for [address] [facility]  If Qs please call [phone]  Electronically signed by Stephanie Joseph MD on 10/27/2022 at 8:01 PM

## 2022-10-28 NOTE — PLAN OF CARE
Problem: Safety - Adult  Goal: Free from fall injury  Outcome: Progressing  Flowsheets (Taken 10/28/2022 8364)  Free From Fall Injury: Instruct family/caregiver on patient safety

## 2022-10-28 NOTE — CONSULTS
Pulmonary Consultation    Admit Date: 10/27/2022  Requesting Physician: Jose Beard DO    CC:  Acute on chronic respiratory failure  HPI:  77years old lady with a history of COPD and noncompliance who was admitted after an episode of fall associated with hypoxemia with saturation between 60 and 70% on room air. Patient referred to have increased cough productive but unable to expectorate associated with shortness of breath for the last several days. Denies fever, chills or hemoptysis. Denies chest pain. The patient was last time seen as an inpatient back in March 2022 when the patient presented with an episode of hypoxemia during hip fracture. Patient was advised to follow-up with pulmonary after discharge but unfortunately she never did. PMH:    Past Medical History:   Diagnosis Date    Arthritis     Breast cancer ([facility] [address]     CKD (chronic kidney disease)     COPD (chronic obstructive pulmonary disease) (HCC)     Hyperlipidemia     Hypertension     MS (multiple sclerosis) ([facility] [address] 5/31/2012    Multiple sclerosis ([facility] [address]     diagnosised 8  yrs ago [facility]     PSH:   Past Surgical History:   Procedure Laterality Date    APPENDECTOMY      BREAST SURGERY  4/13/2012    biopsy - negative    HIP ARTHROPLASTY Right 04/13/2011    Right AIDEE  ALLISON Green MD    HYSTERECTOMY (CERVIX STATUS UNKNOWN)      KNEE ARTHROPLASTY Left 10/01/2013    Left TKA  ALLISON Green MD    KNEE ARTHROPLASTY Right 08/29/2012    Right TKA  ALLISON Green MD    TOTAL HIP ARTHROPLASTY Left 3/25/2022    LEFT HIP TOTAL ARTHROPLASTY performed by Shey Skelton MD at [facility] OR          Respiratory ROS: positive for - cough, SOB. Otherwise, a complete review of systems is undertaken and is negative. Social History:  Alcohol:   Social drinker   Tobacco: 1 pack a day for more than 50 years.   Employment: no silica or asbestos exposure  Medications:       enoxaparin  30 mg SubCUTAneous Daily    aspirin  81 mg Oral Daily    atorvastatin  20 mg Oral Nightly    baclofen  10 mg Oral BID    carvedilol  25 mg Oral BID WC    vitamin D  2,000 Units Oral QAM    meloxicam  15 mg Oral Daily    sertraline  100 mg Oral Nightly    traZODone  25 mg Oral Nightly    gabapentin  400 mg Oral TID    methylPREDNISolone  40 mg IntraVENous Daily    Arformoterol Tartrate  15 mcg Nebulization BID    And    ipratropium  0.5 mg Nebulization 4x daily    And    budesonide  0.5 mg Nebulization BID         Vitals:  Tmax:  VITALS:  /66   Pulse 82   Temp 98.2 °F (36.8 °C) (Oral)   Resp 18   Ht 5' 2\" (1.575 m)   Wt 160 lb (72.6 kg)   SpO2 94%   BMI 29.26 kg/m²   24HR INTAKE/OUTPUT:    Intake/Output Summary (Last 24 hours) at 10/28/2022 1544  Last data filed at 10/28/2022 0248  Gross per 24 hour   Intake 400 ml   Output 300 ml   Net 100 ml     CURRENT PULSE OXIMETRY:  SpO2: 94 %  24HR PULSE OXIMETRY RANGE:  SpO2  Av.1 %  Min: 59 %  Max: 94 %        EXAM:  General: No distress. Alert. Eyes: PERRL. No sclera icterus. No conjunctival injection. ENT: No discharge. Pharynx clear. Neck: Trachea midline. Normal thyroid. Resp: No accessory muscle use. No crackles. No wheezing. Mild expiratory bronchial sounds and rhonchi's, left more than right. CV: Regular rate. Regular rhythm. No mumur or rub. ABD: Non-tender. Non-distended. No masses. No organmegaly. Normal bowel sounds. Skin: Warm and dry. No nodule on exposed extremities. No rash on exposed extremities. Lymph: No cervical LAD. No supraclavicular LAD. Ext: No joint deformity. No clubbing. No cyanosis. 1+ edema  Neuro: Awake. Follows commands. Positive pupils/gag/corneals. Normal pain response.      Lab Results:  CBC:   Recent Labs     10/27/22  1337 10/28/22  0237   WBC 7.7 6.4   HGB 10.9* 11.2*   HCT 35.1 35.7   MCV 92.6 91.5    297     BMP:   Recent Labs     10/27/22  1337 10/28/22  0237    137   K 4.1 4.3    99   CO2 29 30*   BUN 19 16   CREATININE 1.3* 1.2* ALB:3,BILIDIR:3,BILITOT:3,ALKPHOS:3)@  PT/INR: No results for input(s): PROTIME, INR in the last 72 hours. Cultures:  -    ABG: noted  Films:  CT scan of the chest: No pulmonary embolism. Bilateral lower lobe infiltrates associated with atelectasis of the posterior segments of the lower lobes, right more than left. Worse when compared with March 2022      Assessment:  Acute on chronic respiratory failure  Exacerbation of COPD  Pneumonia, concern with possibility of aspiration  Tobacco abuse  History of MS      Plan:  DC ipratropium. Increase use of albuterol and Mucinex to enhance expectoration. Chest physiotherapy with vest.  Noninvasive ventilation 4 hours on and 4 hours off with hope to improve gas exchange and atelectasis. Hold inhaled corticosteroid while using systemic steroid  Adjust IV Solu-Medrol  Antibiotic for gram stain and culture  Antibiotics for 7 days  Swallow evaluation      Thanks for letting us see this patient in consultation. Please contact us with any questions.     Juan C Baer MD,  M.D., F.C.C.P.                    pulp

## 2022-10-28 NOTE — PROGRESS NOTES
Known to Alina. Will change consult.     Electronically signed by Abbe Humphries MD on 10/28/2022 at 8:41 AM

## 2022-10-29 LAB
ANION GAP SERPL CALCULATED.3IONS-SCNC: 10 MMOL/L (ref 7–16)
BUN BLDV-MCNC: 22 MG/DL (ref 6–23)
C-REACTIVE PROTEIN: 3.7 MG/DL (ref 0–0.4)
CALCIUM SERPL-MCNC: 9 MG/DL (ref 8.6–10.2)
CHLORIDE BLD-SCNC: 100 MMOL/L (ref 98–107)
CO2: 31 MMOL/L (ref 22–29)
CREAT SERPL-MCNC: 1.2 MG/DL (ref 0.5–1)
GFR SERPL CREATININE-BSD FRML MDRD: 47 ML/MIN/1.73
GLUCOSE BLD-MCNC: 213 MG/DL (ref 74–99)
HCT VFR BLD CALC: 37.5 % (ref 34–48)
HEMOGLOBIN: 11.6 G/DL (ref 11.5–15.5)
MCH RBC QN AUTO: 29.1 PG (ref 26–35)
MCHC RBC AUTO-ENTMCNC: 30.9 % (ref 32–34.5)
MCV RBC AUTO: 94.2 FL (ref 80–99.9)
ORGANISM: ABNORMAL
PDW BLD-RTO: 16.5 FL (ref 11.5–15)
PLATELET # BLD: 348 E9/L (ref 130–450)
PMV BLD AUTO: 9.4 FL (ref 7–12)
POTASSIUM SERPL-SCNC: 4 MMOL/L (ref 3.5–5)
PROCALCITONIN: 0.03 NG/ML (ref 0–0.08)
RBC # BLD: 3.98 E12/L (ref 3.5–5.5)
SEDIMENTATION RATE, ERYTHROCYTE: 44 MM/HR (ref 0–20)
SODIUM BLD-SCNC: 141 MMOL/L (ref 132–146)
WBC # BLD: 11.4 E9/L (ref 4.5–11.5)

## 2022-10-29 PROCEDURE — 94761 N-INVAS EAR/PLS OXIMETRY MLT: CPT

## 2022-10-29 PROCEDURE — 87206 SMEAR FLUORESCENT/ACID STAI: CPT

## 2022-10-29 PROCEDURE — 6360000002 HC RX W HCPCS: Performed by: INTERNAL MEDICINE

## 2022-10-29 PROCEDURE — 87186 SC STD MICRODIL/AGAR DIL: CPT

## 2022-10-29 PROCEDURE — 99233 SBSQ HOSP IP/OBS HIGH 50: CPT | Performed by: INTERNAL MEDICINE

## 2022-10-29 PROCEDURE — 84145 PROCALCITONIN (PCT): CPT

## 2022-10-29 PROCEDURE — 87077 CULTURE AEROBIC IDENTIFY: CPT

## 2022-10-29 PROCEDURE — 94660 CPAP INITIATION&MGMT: CPT

## 2022-10-29 PROCEDURE — 6370000000 HC RX 637 (ALT 250 FOR IP): Performed by: INTERNAL MEDICINE

## 2022-10-29 PROCEDURE — 94668 MNPJ CHEST WALL SBSQ: CPT

## 2022-10-29 PROCEDURE — 80048 BASIC METABOLIC PNL TOTAL CA: CPT

## 2022-10-29 PROCEDURE — 36415 COLL VENOUS BLD VENIPUNCTURE: CPT

## 2022-10-29 PROCEDURE — 94640 AIRWAY INHALATION TREATMENT: CPT

## 2022-10-29 PROCEDURE — 87070 CULTURE OTHR SPECIMN AEROBIC: CPT

## 2022-10-29 PROCEDURE — 2060000000 HC ICU INTERMEDIATE R&B

## 2022-10-29 PROCEDURE — 2700000000 HC OXYGEN THERAPY PER DAY

## 2022-10-29 PROCEDURE — 85651 RBC SED RATE NONAUTOMATED: CPT

## 2022-10-29 PROCEDURE — 86140 C-REACTIVE PROTEIN: CPT

## 2022-10-29 PROCEDURE — 85027 COMPLETE CBC AUTOMATED: CPT

## 2022-10-29 PROCEDURE — 94669 MECHANICAL CHEST WALL OSCILL: CPT

## 2022-10-29 RX ADMIN — GABAPENTIN 400 MG: 400 CAPSULE ORAL at 21:04

## 2022-10-29 RX ADMIN — ARFORMOTEROL TARTRATE 15 MCG: 15 SOLUTION RESPIRATORY (INHALATION) at 07:59

## 2022-10-29 RX ADMIN — ALBUTEROL SULFATE 2.5 MG: 2.5 SOLUTION RESPIRATORY (INHALATION) at 07:59

## 2022-10-29 RX ADMIN — CARVEDILOL 25 MG: 25 TABLET, FILM COATED ORAL at 16:44

## 2022-10-29 RX ADMIN — ALBUTEROL SULFATE 2.5 MG: 2.5 SOLUTION RESPIRATORY (INHALATION) at 20:40

## 2022-10-29 RX ADMIN — ALBUTEROL SULFATE 2.5 MG: 2.5 SOLUTION RESPIRATORY (INHALATION) at 16:23

## 2022-10-29 RX ADMIN — MELOXICAM 15 MG: 7.5 TABLET ORAL at 08:14

## 2022-10-29 RX ADMIN — ARFORMOTEROL TARTRATE 15 MCG: 15 SOLUTION RESPIRATORY (INHALATION) at 20:40

## 2022-10-29 RX ADMIN — TRAZODONE HYDROCHLORIDE 25 MG: 50 TABLET ORAL at 21:04

## 2022-10-29 RX ADMIN — CARVEDILOL 25 MG: 25 TABLET, FILM COATED ORAL at 08:12

## 2022-10-29 RX ADMIN — ATORVASTATIN CALCIUM 20 MG: 20 TABLET, FILM COATED ORAL at 21:04

## 2022-10-29 RX ADMIN — ALBUTEROL SULFATE 2.5 MG: 2.5 SOLUTION RESPIRATORY (INHALATION) at 11:27

## 2022-10-29 RX ADMIN — BACLOFEN 10 MG: 10 TABLET ORAL at 21:04

## 2022-10-29 RX ADMIN — GABAPENTIN 400 MG: 400 CAPSULE ORAL at 08:14

## 2022-10-29 RX ADMIN — ASPIRIN 81 MG: 81 TABLET, COATED ORAL at 08:12

## 2022-10-29 RX ADMIN — Medication 2000 UNITS: at 08:14

## 2022-10-29 RX ADMIN — BACLOFEN 10 MG: 10 TABLET ORAL at 08:13

## 2022-10-29 RX ADMIN — ENOXAPARIN SODIUM 30 MG: 100 INJECTION SUBCUTANEOUS at 08:12

## 2022-10-29 RX ADMIN — METHYLPREDNISOLONE SODIUM SUCCINATE 60 MG: 125 INJECTION, POWDER, LYOPHILIZED, FOR SOLUTION INTRAMUSCULAR; INTRAVENOUS at 16:44

## 2022-10-29 RX ADMIN — METHYLPREDNISOLONE SODIUM SUCCINATE 60 MG: 125 INJECTION, POWDER, LYOPHILIZED, FOR SOLUTION INTRAMUSCULAR; INTRAVENOUS at 08:11

## 2022-10-29 ASSESSMENT — PAIN SCALES - GENERAL
PAINLEVEL_OUTOF10: 0

## 2022-10-29 NOTE — PLAN OF CARE
Problem: ABCDS Injury Assessment  Goal: Absence of physical injury  10/29/2022 0054 by Amber Brooks RN  Outcome: Progressing  10/28/2022 1336 by Sylvester Johnston RN  Outcome: Progressing  Flowsheets (Taken 10/28/2022 1334)  Absence of Physical Injury: Implement safety measures based on patient assessment     Problem: Safety - Adult  Goal: Free from fall injury  10/29/2022 0054 by Amber Brooks RN  Outcome: Progressing  10/28/2022 1336 by Sylvester Johnston RN  Outcome: Progressing  Flowsheets (Taken 10/28/2022 1334)  Free From Fall Injury: Instruct family/caregiver on patient safety     Problem: Pain  Goal: Verbalizes/displays adequate comfort level or baseline comfort level  10/29/2022 0054 by Amber Brooks RN  Outcome: Progressing  10/28/2022 1336 by Sylvester Johnston RN  Outcome: Progressing

## 2022-10-29 NOTE — PROGRESS NOTES
Date: 10/28/2022    Time: 9:02 PM    Patient Placed On BIPAP/CPAP/ Non-Invasive Ventilation? Yes    If no must comment. Facial area red/color change? No           If YES are Blister/Lesion present? No   If yes must notify nursing staff  BIPAP/CPAP skin barrier?   Yes    Skin barrier type:mepilexlite       Alec Carmona RCP

## 2022-10-29 NOTE — PROGRESS NOTES
Pulmonary Progress Note    Admit Date: 10/27/2022                            PCP: Farheen Fraser DO  Principal Problem:    Bronchiectasis with acute exacerbation [facility])  Active Problems:    Acute respiratory failure with hypoxia and hypercapnia (HCC)    Hypotension due to hypovolemia    Ambulatory dysfunction    Acute respiratory failure with hypoxia (HCC)    MARCELLO (acute kidney injury) ([facility] [address]    COPD exacerbation (HCC)  Resolved Problems:    * No resolved hospital problems. *      Subjective:  Resting in bed on 11LHF. Denies dyspnea or cough at present     Medications:       enoxaparin  30 mg SubCUTAneous Daily    methylPREDNISolone  60 mg IntraVENous Q8H    albuterol  2.5 mg Nebulization 4x daily    aspirin  81 mg Oral Daily    atorvastatin  20 mg Oral Nightly    baclofen  10 mg Oral BID    carvedilol  25 mg Oral BID WC    vitamin D  2,000 Units Oral QAM    meloxicam  15 mg Oral Daily    sertraline  100 mg Oral Nightly    traZODone  25 mg Oral Nightly    gabapentin  400 mg Oral TID    Arformoterol Tartrate  15 mcg Nebulization BID       Vitals:  VITALS:  /72   Pulse 81   Temp 98.1 °F (36.7 °C) (Oral)   Resp 22   Ht 5' 2\" (1.575 m)   Wt 158 lb 4.8 oz (71.8 kg)   SpO2 91%   BMI 28.95 kg/m²   24HR INTAKE/OUTPUT:    Intake/Output Summary (Last 24 hours) at 10/29/2022 1204  Last data filed at 10/29/2022 0555  Gross per 24 hour   Intake --   Output 400 ml   Net -400 ml     CURRENT PULSE OXIMETRY:  SpO2: 91 %  24HR PULSE OXIMETRY RANGE:  SpO2  Av %  Min: 91 %  Max: 96 %  CVP:    VENT SETTINGS:      Additional Respiratory Assessments  Heart Rate: 81  Resp: 22  SpO2: 91 %      EXAM:  General: Alert, in NAD  ENT: No discharge. Pharynx clear. membranes moist   Neck: Supple, Trachea midline. Resp: No accessory muscle use. Non-labored. Lungs diminished   CV: Regular rate. Regular rhythm. No murmur . No edema. ABD: Non-tender. Non-distended. Soft, round . Normal bowel sounds. Skin: Warm and dry. M/S: No cyanosis. No joint deformity. No clubbing. Neuro: Awake. Follows commands. O x 3, GARCIA  Psych:  calm and interactive     I/O: I/O last 3 completed shifts: In: 400 [P.O.:400]  Out: 700 [Urine:700]  No intake/output data recorded. Results:  CBC:   Recent Labs     10/27/22  1337 10/28/22  0237   WBC 7.7 6.4   HGB 10.9* 11.2*   HCT 35.1 35.7   MCV 92.6 91.5    297     BMP:   Recent Labs     10/27/22  1337 10/28/22  0237    137   K 4.1 4.3    99   CO2 29 30*   BUN 19 16   CREATININE 1.3* 1.2*     LFT:   Recent Labs     10/28/22  0237   ALKPHOS 61   ALT <5   AST 12   PROT 6.6   BILITOT 0.3   LABALBU 3.3*     PT/INR: No results for input(s): PROTIME, INR in the last 72 hours. Procalcitonin: No results for input(s): PROCAL in the last 72 hours. Cultures:  No results for input(s): CULTRESP in the last 72 hours. ABG:   Recent Labs     10/28/22  1757   PH 7.408   PO2 77.3   PCO2 47.7*   HCO3 29.4*   BE 4.0*   O2SAT 95.3       Films:  XR CHEST PORTABLE    Result Date: 10/27/2022  EXAMINATION: ONE XRAY VIEW OF THE CHEST 10/27/2022 1:23 pm COMPARISON: 3 months prior. HISTORY: ORDERING SYSTEM PROVIDED HISTORY: shortness of breath TECHNOLOGIST PROVIDED HISTORY: Reason for exam:->shortness of breath FINDINGS: There is focal scarring/atelectasis in the left upper lung. The right lung is clear. The heart is prominent in size. No pneumothorax or pleural effusion. No acute cardiopulmonary process. Focal scar/atelectasis in the left upper lung, no significant change. CTA PULMONARY W CONTRAST    Result Date: 10/27/2022  EXAMINATION: CTA OF THE CHEST 10/27/2022 3:59 pm TECHNIQUE: CTA of the chest was performed after the administration of intravenous contrast.  Multiplanar reformatted images are provided for review. MIP images are provided for review.  Automated exposure control, iterative reconstruction, and/or weight based adjustment of the mA/kV was utilized to reduce the radiation dose to as low as reasonably achievable. COMPARISON: None. HISTORY: ORDERING SYSTEM PROVIDED HISTORY: elevated ddimer; hypoxia TECHNOLOGIST PROVIDED HISTORY: Reason for exam:->elevated ddimer; hypoxia Decision Support Exception - unselect if not a suspected or confirmed emergency medical condition->Emergency Medical Condition (MA) FINDINGS: Pulmonary Arteries: Pulmonary arteries are adequately opacified for evaluation. No evidence of intraluminal filling defect to suggest pulmonary embolism. Main pulmonary artery is normal in caliber. Mediastinum: No evidence of mediastinal lymphadenopathy. The heart and pericardium demonstrate no acute abnormality. There is no acute abnormality of the thoracic aorta. Thyroid is homogeneous in appearance. Small reactive lymph nodes seen scattered throughout the mediastinum. Lungs/pleura: There is elevation identified of the left hemidiaphragm. Atelectatic changes identified lung bases bilaterally. There is trace bilateral pleural effusions. Infiltrate cannot be completely excluded at the lung bases. There are air bronchograms present within the lower lobe consolidations. No pulmonary mass or nodule. Mild bronchiectatic changes centrally with scarring inferiorly within the left upper lobe. Upper Abdomen: Stones identified in the gallbladder. Cyst identified on the right kidney. Vascular calcifications seen within the abdominal aorta and iliac vessels. Soft Tissues/Bones: There is extensive degenerative changes identified within the spine. Kyphoplasty seen of the thoracic spine. Multiple levels present with kyphotic curvature centered at the thoracolumbar spine. No evidence of pulmonary embolism. There is trace bilateral pleural effusions with consolidative infiltrate or atelectatic change seen at the lung bases bilaterally and right middle lobe.   Mild bronchiectatic changes centrally and scarring in the left upper lobe continued follow-up can be performed if clinically indicated. Assessment:  Acute on chronic respiratory failure  Exacerbation of COPD  Pneumonia, concern with possibility of aspiration  Tobacco abuse  History of MS        Plan:  Currently on 11LHF, wean to keep POX >90%, baseline is 3L at HS  DC'd ipratropium. Increase use of albuterol and Mucinex to enhance expectoration. Chest physiotherapy with vest.  Noninvasive ventilation 4 hours on and 4 hours off with hope to improve gas exchange and atelectasis.   Hold inhaled corticosteroid while using systemic steroid  IV Solu-Medrol 60q8H to continue high o2 needs   Antoine casarez to continue   Sputum for gram stain and culture-sent today   Swallow evaluation  Supportive care               Electronically signed by HIRA Marquez on 10/29/2022 at 12:04 PM

## 2022-10-29 NOTE — PROGRESS NOTES
[facility] Progress Note    --------------------------------------------------------------------------------------  Assessment / Plan  COPD exacerbation and acute-on-chronic hypoxia suspect due to medical noncompliance - continue O2 as needed (11L->9L currently) / nebs (albuterol and Brovana) / steroids. Viral panel was negative. Pulm input appreciated - poss aspiration pna so getting swallow eval; breathing tx adjusted; intermittent BiPAP ordered. Also looks like they recommend abx x7d but none started; will follow w their recs today but also check septic / inflammatory markers for further characterization. Weakness / fatigue - pt reports issues ambulating w a fall at home. Fall precautions, PT and OT evals - [facility]    ? Depression - comments that she is \"done with this\" and \"wants to die;\" not sure if just overwhelmed or if she truly feels this way; have asked palliative to touch base to hopefully help determine what pt wants in general    Hx chronic anemia (near baseline), CKD3 (near baseline), HPL, HTN, MS, past breast Ca - resume home meds, monitor vitals / labs    Please see orders for further plan of care  Code status  Full Code  DVT prophylaxis Lovenox  Disposition  Anticipate home when ready  --------------------------------------------------------------------------------------    Admission Date  10/27/2022 12:50 PM  Chief Complaint SOB    Subjective  History of Present Illness  77 y.o. female w PMH COPD without chronic hypoxia, MS, CKD3, anemia, HTN, HPL, past breast Ca who presented to ED 10/27/2022 from home with complaints of SOB. Was here 8/23-8/26 for same and dc'd on 2-3LNC O2. She reports that she had only been wearing it at night and even then only intermittently. Also reports she doesn't follow with pulmonology. Initial evaluation in the ED showed pt was hemodynamically stable. Workup showed WBC normal, hgb 10.9 (baseline ~11-12), Cr 1.3 (at baseline), CO2 30. Imaging studies included CXR and CTA, which showed scarring of the ELICIA and trace pleural effusions and consolidation vs atelectasis of the bl bases and RML. EKG not performed. Pt was admitted for further evaluation and treatment with consult request to pulmonology.     Seems less confused but now saying she is \"done with all this\" and she \"wants to die\"  This wasn't an issue yesterday  She is requiring significant O2 but doesn't appear to be in any distress  Currently on 9L down from 6 yesterday  Pt's daughter noted she would prefer SNF placement but [facility] likely precludes this possibility; looks like plans now for [facility]  No family present during my visit  No new issues identified today  Case was discussed with nursing    Review of Systems - 12-point review of systems has been reviewed and is otherwise negative except as listed in the HPI    Objective  Physical Exam  Vitals: /72   Pulse 81   Temp 98.1 °F (36.7 °C) (Oral)   Resp 22   Ht 5' 2\" (1.575 m)   Wt 158 lb 4.8 oz (71.8 kg)   SpO2 91%   BMI 28.95 kg/m²   General: well-developed, well-nourished, anxious but no acute distress, generally cooperative  Skin: generally warm, dry, and intact, with normal color  HEENT: normocephalic, atraumatic, no gross abnormalities, HFNC in place  Respiratory: coarse to auscultation bilaterally without respiratory distress  Cardiovascular: regular rate and rhythm without murmur / rub / gallop  Abdominal: soft, nontender, nondistended, normoactive bowel sounds  Extremities: no obvious edema or deformity  Neurologic: awake, alert, no gross deficits  Psychiatric: normal affect, cooperative    Electronically signed by Torrey Mcdonough DO on 10/29/2022 at 11:29 AM

## 2022-10-30 LAB
ANION GAP SERPL CALCULATED.3IONS-SCNC: 9 MMOL/L (ref 7–16)
BUN BLDV-MCNC: 21 MG/DL (ref 6–23)
CALCIUM SERPL-MCNC: 8.7 MG/DL (ref 8.6–10.2)
CHLORIDE BLD-SCNC: 100 MMOL/L (ref 98–107)
CO2: 31 MMOL/L (ref 22–29)
CREAT SERPL-MCNC: 1 MG/DL (ref 0.5–1)
GFR SERPL CREATININE-BSD FRML MDRD: 58 ML/MIN/1.73
GLUCOSE BLD-MCNC: 149 MG/DL (ref 74–99)
HCT VFR BLD CALC: 35 % (ref 34–48)
HEMOGLOBIN: 10.9 G/DL (ref 11.5–15.5)
MCH RBC QN AUTO: 28.9 PG (ref 26–35)
MCHC RBC AUTO-ENTMCNC: 31.1 % (ref 32–34.5)
MCV RBC AUTO: 92.8 FL (ref 80–99.9)
PDW BLD-RTO: 16.4 FL (ref 11.5–15)
PLATELET # BLD: 330 E9/L (ref 130–450)
PMV BLD AUTO: 9.7 FL (ref 7–12)
POTASSIUM SERPL-SCNC: 3.8 MMOL/L (ref 3.5–5)
RBC # BLD: 3.77 E12/L (ref 3.5–5.5)
SODIUM BLD-SCNC: 140 MMOL/L (ref 132–146)
WBC # BLD: 11.2 E9/L (ref 4.5–11.5)

## 2022-10-30 PROCEDURE — 94640 AIRWAY INHALATION TREATMENT: CPT

## 2022-10-30 PROCEDURE — 6360000002 HC RX W HCPCS: Performed by: INTERNAL MEDICINE

## 2022-10-30 PROCEDURE — 2060000000 HC ICU INTERMEDIATE R&B

## 2022-10-30 PROCEDURE — 94660 CPAP INITIATION&MGMT: CPT

## 2022-10-30 PROCEDURE — 6370000000 HC RX 637 (ALT 250 FOR IP): Performed by: INTERNAL MEDICINE

## 2022-10-30 PROCEDURE — 97530 THERAPEUTIC ACTIVITIES: CPT

## 2022-10-30 PROCEDURE — 99233 SBSQ HOSP IP/OBS HIGH 50: CPT | Performed by: INTERNAL MEDICINE

## 2022-10-30 PROCEDURE — 94668 MNPJ CHEST WALL SBSQ: CPT

## 2022-10-30 PROCEDURE — 2700000000 HC OXYGEN THERAPY PER DAY

## 2022-10-30 PROCEDURE — 99221 1ST HOSP IP/OBS SF/LOW 40: CPT | Performed by: NURSE PRACTITIONER

## 2022-10-30 PROCEDURE — 94761 N-INVAS EAR/PLS OXIMETRY MLT: CPT

## 2022-10-30 PROCEDURE — 80048 BASIC METABOLIC PNL TOTAL CA: CPT

## 2022-10-30 PROCEDURE — 94645 CONT INHLJ TX EACH ADDL HOUR: CPT

## 2022-10-30 PROCEDURE — 2580000003 HC RX 258: Performed by: INTERNAL MEDICINE

## 2022-10-30 PROCEDURE — 94669 MECHANICAL CHEST WALL OSCILL: CPT

## 2022-10-30 PROCEDURE — 6360000002 HC RX W HCPCS: Performed by: CLINICAL NURSE SPECIALIST

## 2022-10-30 PROCEDURE — 36415 COLL VENOUS BLD VENIPUNCTURE: CPT

## 2022-10-30 PROCEDURE — 85027 COMPLETE CBC AUTOMATED: CPT

## 2022-10-30 RX ORDER — WATER 1000 ML/1000ML
INJECTION, SOLUTION INTRAVENOUS
Status: DISPENSED
Start: 2022-10-30 | End: 2022-10-30

## 2022-10-30 RX ORDER — METHYLPREDNISOLONE SODIUM SUCCINATE 40 MG/ML
40 INJECTION, POWDER, LYOPHILIZED, FOR SOLUTION INTRAMUSCULAR; INTRAVENOUS EVERY 8 HOURS
Status: DISCONTINUED | OUTPATIENT
Start: 2022-10-30 | End: 2022-10-31

## 2022-10-30 RX ADMIN — AMPICILLIN SODIUM AND SULBACTAM SODIUM 3000 MG: 2; 1 INJECTION, POWDER, FOR SOLUTION INTRAMUSCULAR; INTRAVENOUS at 18:07

## 2022-10-30 RX ADMIN — MUPIROCIN: 20 OINTMENT TOPICAL at 12:33

## 2022-10-30 RX ADMIN — ALBUTEROL SULFATE 2.5 MG: 2.5 SOLUTION RESPIRATORY (INHALATION) at 20:43

## 2022-10-30 RX ADMIN — ALBUTEROL SULFATE 2.5 MG: 2.5 SOLUTION RESPIRATORY (INHALATION) at 08:27

## 2022-10-30 RX ADMIN — TRAZODONE HYDROCHLORIDE 25 MG: 50 TABLET ORAL at 20:12

## 2022-10-30 RX ADMIN — MUPIROCIN: 20 OINTMENT TOPICAL at 20:19

## 2022-10-30 RX ADMIN — METHYLPREDNISOLONE SODIUM SUCCINATE 60 MG: 125 INJECTION, POWDER, LYOPHILIZED, FOR SOLUTION INTRAMUSCULAR; INTRAVENOUS at 09:52

## 2022-10-30 RX ADMIN — ARFORMOTEROL TARTRATE 15 MCG: 15 SOLUTION RESPIRATORY (INHALATION) at 20:43

## 2022-10-30 RX ADMIN — CARVEDILOL 25 MG: 25 TABLET, FILM COATED ORAL at 09:52

## 2022-10-30 RX ADMIN — CARVEDILOL 25 MG: 25 TABLET, FILM COATED ORAL at 16:42

## 2022-10-30 RX ADMIN — GABAPENTIN 400 MG: 400 CAPSULE ORAL at 09:52

## 2022-10-30 RX ADMIN — BACLOFEN 10 MG: 10 TABLET ORAL at 20:12

## 2022-10-30 RX ADMIN — AMPICILLIN SODIUM AND SULBACTAM SODIUM 3000 MG: 2; 1 INJECTION, POWDER, FOR SOLUTION INTRAMUSCULAR; INTRAVENOUS at 12:29

## 2022-10-30 RX ADMIN — ASPIRIN 81 MG: 81 TABLET, COATED ORAL at 09:52

## 2022-10-30 RX ADMIN — MELOXICAM 15 MG: 7.5 TABLET ORAL at 09:52

## 2022-10-30 RX ADMIN — BACLOFEN 10 MG: 10 TABLET ORAL at 09:52

## 2022-10-30 RX ADMIN — ALBUTEROL SULFATE 2.5 MG: 2.5 SOLUTION RESPIRATORY (INHALATION) at 12:38

## 2022-10-30 RX ADMIN — ENOXAPARIN SODIUM 30 MG: 100 INJECTION SUBCUTANEOUS at 09:53

## 2022-10-30 RX ADMIN — ATORVASTATIN CALCIUM 20 MG: 20 TABLET, FILM COATED ORAL at 20:12

## 2022-10-30 RX ADMIN — ALBUTEROL SULFATE 2.5 MG: 2.5 SOLUTION RESPIRATORY (INHALATION) at 15:57

## 2022-10-30 RX ADMIN — GABAPENTIN 400 MG: 400 CAPSULE ORAL at 20:12

## 2022-10-30 RX ADMIN — Medication 2000 UNITS: at 09:52

## 2022-10-30 RX ADMIN — METHYLPREDNISOLONE SODIUM SUCCINATE 40 MG: 40 INJECTION, POWDER, FOR SOLUTION INTRAMUSCULAR; INTRAVENOUS at 16:42

## 2022-10-30 RX ADMIN — ARFORMOTEROL TARTRATE 15 MCG: 15 SOLUTION RESPIRATORY (INHALATION) at 08:27

## 2022-10-30 RX ADMIN — METHYLPREDNISOLONE SODIUM SUCCINATE 60 MG: 125 INJECTION, POWDER, LYOPHILIZED, FOR SOLUTION INTRAMUSCULAR; INTRAVENOUS at 00:58

## 2022-10-30 ASSESSMENT — PAIN SCALES - GENERAL
PAINLEVEL_OUTOF10: 0

## 2022-10-30 NOTE — PROGRESS NOTES
Pulmonary Progress Note    Admit Date: 10/27/2022                            PCP: Hailey Laura DO  Principal Problem:    Bronchiectasis with acute exacerbation [facility])  Active Problems:    Acute respiratory failure with hypoxia and hypercapnia (HCC)    Hypotension due to hypovolemia    Ambulatory dysfunction    Acute respiratory failure with hypoxia (HCC)    MARCELLO (acute kidney injury) ([facility] [address]    COPD exacerbation (HCC)  Resolved Problems:    * No resolved hospital problems. *      Subjective:  Resting in bed on 9LHF. Denies dyspnea or cough at present   Family at bedside    Medications:       sterile water        enoxaparin  30 mg SubCUTAneous Daily    methylPREDNISolone  60 mg IntraVENous Q8H    albuterol  2.5 mg Nebulization 4x daily    aspirin  81 mg Oral Daily    atorvastatin  20 mg Oral Nightly    baclofen  10 mg Oral BID    carvedilol  25 mg Oral BID WC    vitamin D  2,000 Units Oral QAM    meloxicam  15 mg Oral Daily    sertraline  100 mg Oral Nightly    traZODone  25 mg Oral Nightly    gabapentin  400 mg Oral TID    Arformoterol Tartrate  15 mcg Nebulization BID       Vitals:  VITALS:  /62   Pulse 92   Temp 98.2 °F (36.8 °C) (Axillary)   Resp 22   Ht 5' 2\" (1.575 m)   Wt 158 lb 4.8 oz (71.8 kg)   SpO2 94%   BMI 28.95 kg/m²   24HR INTAKE/OUTPUT:  No intake or output data in the 24 hours ending 10/30/22 1010    CURRENT PULSE OXIMETRY:  SpO2: 94 %  24HR PULSE OXIMETRY RANGE:  SpO2  Av.5 %  Min: 91 %  Max: 98 %  CVP:    VENT SETTINGS:      Additional Respiratory Assessments  Heart Rate: 92  Resp: 22  SpO2: 94 %      EXAM:  General: Alert, in NAD  ENT: No discharge. Pharynx clear. membranes moist   Neck: Supple, Trachea midline. Resp: No accessory muscle use. Non-labored. Lungs diminished scattered rhonchi   CV: Regular rate. Regular rhythm. No murmur . No edema. ABD: Non-tender. Non-distended. Soft, round . Normal bowel sounds. Skin: Warm and dry. M/S: No cyanosis.  No joint deformity. No clubbing. Neuro: Awake. Follows commands. O x 3, GARCIA  Psych:  calm and interactive     I/O: I/O last 3 completed shifts:  In: -   Out: 400 [Urine:400]  No intake/output data recorded. Results:  CBC:   Recent Labs     10/28/22  0237 10/29/22  1450 10/30/22  0150   WBC 6.4 11.4 11.2   HGB 11.2* 11.6 10.9*   HCT 35.7 37.5 35.0   MCV 91.5 94.2 92.8    348 330     BMP:   Recent Labs     10/28/22  0237 10/29/22  1450 10/30/22  0150    141 140   K 4.3 4.0 3.8   CL 99 100 100   CO2 30* 31* 31*   BUN 16 22 21   CREATININE 1.2* 1.2* 1.0     LFT:   Recent Labs     10/28/22  0237   ALKPHOS 61   ALT <5   AST 12   PROT 6.6   BILITOT 0.3   LABALBU 3.3*     PT/INR: No results for input(s): PROTIME, INR in the last 72 hours.   Procalcitonin:   Recent Labs     10/29/22  1450   PROCAL 0.03     Cultures:  Recent Labs     10/29/22  1227   CULTRESP Oral Pharyngeal Jenae present  Additional growth present, also evaluating for;  Moraxella (Branhamella) catarrhalis  *  Light growth  Identification and sensitivity to follow       Culture, Respiratory  Order: [id number]  Status: Preliminary result    Visible to patient: No (not released)    Next appt: None    Specimen Information: Sputum Expectorated   0 Result Notes  Component 10/29/22 1227    CULTURE, RESPIRATORY  Abnormal   Oral Pharyngeal Jenae present   Additional growth present, also evaluating for;   Moraxella (Branhamella) catarrhalis   P      Smear, Respiratory Moderate Polymorphonuclear leukocytes   Epithelial cells not seen   Few Gram positive cocci   Abundant Gram negative cocci    Organism Gram negative santy Abnormal  P    CULTURE, RESPIRATORY Light growth   Identification and sensitivity to follow              ABG:   Recent Labs     10/28/22  1757   PH 7.408   PO2 77.3   PCO2 47.7*   HCO3 29.4*   BE 4.0*   O2SAT 95.3       Films:  XR CHEST PORTABLE    Result Date: 10/27/2022  EXAMINATION: ONE XRAY VIEW OF THE CHEST 10/27/2022 1:23 pm COMPARISON: 3 months prior. HISTORY: ORDERING SYSTEM PROVIDED HISTORY: shortness of breath TECHNOLOGIST PROVIDED HISTORY: Reason for exam:->shortness of breath FINDINGS: There is focal scarring/atelectasis in the left upper lung. The right lung is clear. The heart is prominent in size. No pneumothorax or pleural effusion. No acute cardiopulmonary process. Focal scar/atelectasis in the left upper lung, no significant change. CTA PULMONARY W CONTRAST    Result Date: 10/27/2022  EXAMINATION: CTA OF THE CHEST 10/27/2022 3:59 pm TECHNIQUE: CTA of the chest was performed after the administration of intravenous contrast.  Multiplanar reformatted images are provided for review. MIP images are provided for review. Automated exposure control, iterative reconstruction, and/or weight based adjustment of the mA/kV was utilized to reduce the radiation dose to as low as reasonably achievable. COMPARISON: None. HISTORY: ORDERING SYSTEM PROVIDED HISTORY: elevated ddimer; hypoxia TECHNOLOGIST PROVIDED HISTORY: Reason for exam:->elevated ddimer; hypoxia Decision Support Exception - unselect if not a suspected or confirmed emergency medical condition->Emergency Medical Condition (MA) FINDINGS: Pulmonary Arteries: Pulmonary arteries are adequately opacified for evaluation. No evidence of intraluminal filling defect to suggest pulmonary embolism. Main pulmonary artery is normal in caliber. Mediastinum: No evidence of mediastinal lymphadenopathy. The heart and pericardium demonstrate no acute abnormality. There is no acute abnormality of the thoracic aorta. Thyroid is homogeneous in appearance. Small reactive lymph nodes seen scattered throughout the mediastinum. Lungs/pleura: There is elevation identified of the left hemidiaphragm. Atelectatic changes identified lung bases bilaterally. There is trace bilateral pleural effusions. Infiltrate cannot be completely excluded at the lung bases.   There are air bronchograms present within the lower lobe consolidations. No pulmonary mass or nodule. Mild bronchiectatic changes centrally with scarring inferiorly within the left upper lobe. Upper Abdomen: Stones identified in the gallbladder. Cyst identified on the right kidney. Vascular calcifications seen within the abdominal aorta and iliac vessels. Soft Tissues/Bones: There is extensive degenerative changes identified within the spine. Kyphoplasty seen of the thoracic spine. Multiple levels present with kyphotic curvature centered at the thoracolumbar spine. No evidence of pulmonary embolism. There is trace bilateral pleural effusions with consolidative infiltrate or atelectatic change seen at the lung bases bilaterally and right middle lobe. Mild bronchiectatic changes centrally and scarring in the left upper lobe continued follow-up can be performed if clinically indicated. Assessment:  Acute on chronic respiratory failure  Exacerbation of COPD  Pneumonia, concern with possibility of aspiration+Moraxella (Branhamella) catarrhalis    Tobacco abuse  History of MS        Plan:  Currently on 9LHF, wean to keep POX >90%, baseline is 3L at HS  DC'd ipratropium. Increase use of albuterol and Mucinex to enhance expectoration. Chest physiotherapy with vest.  Noninvasive ventilation 4 hours on and 4 hours off with hope to improve gas exchange and atelectasis.  Last Abg improved   Hold inhaled corticosteroid while using systemic steroid  IV Solu-Medrol 60q8H to continue high o2 needs ok to decrease to 40 Q8H  Brovana nebs to continue   Sputum for gram stain and culture-sent today positive Moraxella (Branhamella) catarrhalis  , now on unasyn   Swallow evaluation  Supportive care               Electronically signed by HIRA Roche on 10/30/2022 at 10:10 AM

## 2022-10-30 NOTE — PROGRESS NOTES
Physical Therapy  Facility/Department: [address] INTERMEDIATE  Physical Therapy Treatment Note    Name: Jomar Brambila  : 1945  MRN: [id number]  Date of Service: 10/30/2022      Patient Diagnosis(es): The encounter diagnosis was COPD exacerbation ([facility] Past Medical History:  has a past medical history of Arthritis, Breast cancer ([facility] CKD (chronic kidney disease), COPD (chronic obstructive pulmonary disease) ([facility] Hyperlipidemia, Hypertension, MS (multiple sclerosis) ([facility] and Multiple sclerosis ([facility] Past Surgical History:  has a past surgical history that includes Hysterectomy; Breast surgery (2012); Appendectomy; Knee Arthroplasty (Left, 10/01/2013); Knee Arthroplasty (Right, 2012); Hip Arthroplasty (Right, 2011); and Total hip arthroplasty (Left, 3/25/2022). Evaluating Therapist: Eyal Hernandez PT    Room #:  2973/3661-P  Diagnosis:  COPD exacerbation ([facility] [J44.1]  PMHx/PSHx:  CKD, COPD  Precautions:  falls, O2 high flow. Social:  Pt lives with  in a 1 floor plan with ramped entrance. Ambulates with rollator. Uses O2 at night. Initial Evaluation  Date: 10/28/22 Treatment  10/30/2022      Short Term/ Long Term   Goals   Was pt agreeable to Eval/treatment? yes yes    Does pt have pain?  No c/o pain No complaints    Bed Mobility  Rolling: SBA  Supine to sit: SBA  Sit to supine: NT  Scooting: SBA Rolling: SBA  Supine to sit: SBA  Scooting; SBA seated to EOB independent   Transfers Sit to stand: SBA  Stand to sit: SBA  Stand pivot: SBA Sit <> stand: SBA independent   Ambulation    25 feet x1 with rollator SBA 40 feet x 1 using rollator [employer] SBA for balance 100 feet with ww/rolator SBA   Stair Negotiation  Ascended and descended  NT   N/A   LE strength     3+/5    4/5   balance      fair     AM-PAC Raw score                       Pt is alert and able to follow instruction, states upset about being in bed too much, wants to get up and move more  Balance: fair dynamic using rollator [employer] for support    Pt performed therapeutic exercise of the following: NT    Patient education/treatment  Pt was educated on deep breathing promoting increased 02 saturation    Patient response to education:   Pt verbalized understanding Pt demonstrated skill Pt requires further education in this area   yes With instruction yes     ASSESSMENT:   Comments: Approached by Nurse to see Pt. Pt on 9 to 10 L high flow NC all rx. Pt sat EOB SBA, stood off EOB CGA while holding bed rail to receive hygiene care. Gait very slow and consistent, no loss of balance or dizziness noted. Pt slightly short of breath after activity, states her 02 saturation is naturally low. 02 saturation 85 to 90% during activity, was 92% at rest after instruction for deep breathing. Much time devoted to Pt recovering 02 saturation after activity. Pt was left in a bedside chair with call light in reach. Chair/bed alarm: chair alarm active    Time in 1316   Time out 1345   Total Treatment Time 29 minutes   CPT codes:     Therapeutic activities 78445 29 minutes   Therapeutic exercises 94867 0 minutes       Pt is making fair progress toward established Physical Therapy goals. Continue with physical therapy current plan of care.     Geovanny Lung PTA   License Number: [id number]

## 2022-10-30 NOTE — PROGRESS NOTES
[facility] Progress Note    --------------------------------------------------------------------------------------  Assessment / Plan  COPD exacerbation and acute-on-chronic hypoxia suspect due to medical noncompliance - continue O2 as needed (11L currently) / nebs (albuterol and Brovana) / steroids. Viral panel was negative. Pulm input appreciated - poss aspiration pna so getting swallow eval; breathing tx adjusted; intermittent BiPAP ordered. WBC 11.2 / procal 0.03 but pulm recommends 7d abx and now resp cx w mod growth of GNR; started on Unasyn 2g IV q6h this AM    Also looks like they recommend abx x7d but none started; will follow w their recs today but also check septic / inflammatory markers for further characterization. Weakness / fatigue - pt reports issues ambulating w a fall at home. Fall precautions, PT and OT evals - [facility] 18    ? Depression - comments that she is \"done with this\" and \"wants to die;\" not sure if just overwhelmed or if she truly feels this way; have asked palliative to touch base to hopefully help determine what pt wants in general - doing better today    Hx chronic anemia (near baseline), CKD3 (near baseline), HPL, HTN, MS, past breast Ca - resume home meds, monitor vitals / labs    Please see orders for further plan of care  Code status  Full Code  DVT prophylaxis Lovenox  Disposition  Anticipate home when ready  --------------------------------------------------------------------------------------    Admission Date  10/27/2022 12:50 PM  Chief Complaint SOB    Subjective  History of Present Illness  77 y.o. female w PMH COPD without chronic hypoxia, MS, CKD3, anemia, HTN, HPL, past breast Ca who presented to ED 10/27/2022 from home with complaints of SOB. Was here 8/23-8/26 for same and dc'd on 2-3LNC O2. She reports that she had only been wearing it at night and even then only intermittently. Also reports she doesn't follow with pulmonology.   Initial evaluation in the ED showed pt was hemodynamically stable. Workup showed WBC normal, hgb 10.9 (baseline ~11-12), Cr 1.3 (at baseline), CO2 30. Imaging studies included CXR and CTA, which showed scarring of the ELICIA and trace pleural effusions and consolidation vs atelectasis of the bl bases and RML. EKG not performed. Pt was admitted for further evaluation and treatment with consult request to pulmonology. Given breathing tx, steroids, but requiring HFNC to maintain sats.      Seen at bedside w  present  Says breathing somewhat better  Still needing 11L HFNC  +Productive cough  Denies fevers  Says sometimes things like salad dressing get stuck in her throat but says she is not concerned that she is aspirating-not that this means she isn't  No distress  No new obvious issues today  Case discussed with nursing    Review of Systems - 12-point review of systems has been reviewed and is otherwise negative except as listed in the HPI    Objective  Physical Exam  Vitals: /62   Pulse 92   Temp 98.2 °F (36.8 °C) (Axillary)   Resp 22   Ht 5' 2\" (1.575 m)   Wt 158 lb 4.8 oz (71.8 kg)   SpO2 94%   BMI 28.95 kg/m²   General: well-developed, well-nourished, anxious but no acute distress, generally cooperative  Skin: generally warm, dry, and intact, with normal color  HEENT: normocephalic, atraumatic, no gross abnormalities, HFNC in place  Respiratory: coarse to auscultation bilaterally without respiratory distress  Cardiovascular: regular rate and rhythm without murmur / rub / gallop  Abdominal: soft, nontender, nondistended, normoactive bowel sounds  Extremities: no obvious edema or deformity  Neurologic: awake, alert, no gross deficits  Psychiatric: normal affect, cooperative    Electronically signed by Sabra Sidhu DO on 10/30/2022 at 10:54 AM

## 2022-10-31 LAB
ANION GAP SERPL CALCULATED.3IONS-SCNC: 11 MMOL/L (ref 7–16)
BUN BLDV-MCNC: 24 MG/DL (ref 6–23)
CALCIUM SERPL-MCNC: 8.3 MG/DL (ref 8.6–10.2)
CHLORIDE BLD-SCNC: 96 MMOL/L (ref 98–107)
CO2: 31 MMOL/L (ref 22–29)
CREAT SERPL-MCNC: 1 MG/DL (ref 0.5–1)
CULTURE, RESPIRATORY: ABNORMAL
GFR SERPL CREATININE-BSD FRML MDRD: 58 ML/MIN/1.73
GLUCOSE BLD-MCNC: 155 MG/DL (ref 74–99)
HCT VFR BLD CALC: 36.1 % (ref 34–48)
HEMOGLOBIN: 11.4 G/DL (ref 11.5–15.5)
MCH RBC QN AUTO: 28.6 PG (ref 26–35)
MCHC RBC AUTO-ENTMCNC: 31.6 % (ref 32–34.5)
MCV RBC AUTO: 90.5 FL (ref 80–99.9)
ORGANISM: ABNORMAL
ORGANISM: ABNORMAL
PDW BLD-RTO: 16.2 FL (ref 11.5–15)
PLATELET # BLD: 330 E9/L (ref 130–450)
PMV BLD AUTO: 9.7 FL (ref 7–12)
POTASSIUM SERPL-SCNC: 3.3 MMOL/L (ref 3.5–5)
RBC # BLD: 3.99 E12/L (ref 3.5–5.5)
SMEAR, RESPIRATORY: ABNORMAL
SODIUM BLD-SCNC: 138 MMOL/L (ref 132–146)
WBC # BLD: 8 E9/L (ref 4.5–11.5)

## 2022-10-31 PROCEDURE — 36415 COLL VENOUS BLD VENIPUNCTURE: CPT

## 2022-10-31 PROCEDURE — 6360000002 HC RX W HCPCS: Performed by: CLINICAL NURSE SPECIALIST

## 2022-10-31 PROCEDURE — 2580000003 HC RX 258: Performed by: INTERNAL MEDICINE

## 2022-10-31 PROCEDURE — 2700000000 HC OXYGEN THERAPY PER DAY

## 2022-10-31 PROCEDURE — 6360000002 HC RX W HCPCS: Performed by: INTERNAL MEDICINE

## 2022-10-31 PROCEDURE — 6370000000 HC RX 637 (ALT 250 FOR IP): Performed by: INTERNAL MEDICINE

## 2022-10-31 PROCEDURE — 94761 N-INVAS EAR/PLS OXIMETRY MLT: CPT

## 2022-10-31 PROCEDURE — 85027 COMPLETE CBC AUTOMATED: CPT

## 2022-10-31 PROCEDURE — 99233 SBSQ HOSP IP/OBS HIGH 50: CPT | Performed by: INTERNAL MEDICINE

## 2022-10-31 PROCEDURE — 94660 CPAP INITIATION&MGMT: CPT

## 2022-10-31 PROCEDURE — 94668 MNPJ CHEST WALL SBSQ: CPT

## 2022-10-31 PROCEDURE — 94640 AIRWAY INHALATION TREATMENT: CPT

## 2022-10-31 PROCEDURE — 2060000000 HC ICU INTERMEDIATE R&B

## 2022-10-31 PROCEDURE — 80048 BASIC METABOLIC PNL TOTAL CA: CPT

## 2022-10-31 PROCEDURE — 97530 THERAPEUTIC ACTIVITIES: CPT

## 2022-10-31 PROCEDURE — 94669 MECHANICAL CHEST WALL OSCILL: CPT

## 2022-10-31 RX ORDER — METHYLPREDNISOLONE SODIUM SUCCINATE 40 MG/ML
40 INJECTION, POWDER, LYOPHILIZED, FOR SOLUTION INTRAMUSCULAR; INTRAVENOUS EVERY 12 HOURS
Status: DISCONTINUED | OUTPATIENT
Start: 2022-10-31 | End: 2022-11-01

## 2022-10-31 RX ORDER — ENOXAPARIN SODIUM 100 MG/ML
40 INJECTION SUBCUTANEOUS DAILY
Status: DISCONTINUED | OUTPATIENT
Start: 2022-10-31 | End: 2022-11-01 | Stop reason: HOSPADM

## 2022-10-31 RX ADMIN — METHYLPREDNISOLONE SODIUM SUCCINATE 40 MG: 40 INJECTION, POWDER, FOR SOLUTION INTRAMUSCULAR; INTRAVENOUS at 09:40

## 2022-10-31 RX ADMIN — MELOXICAM 15 MG: 7.5 TABLET ORAL at 09:40

## 2022-10-31 RX ADMIN — ALBUTEROL SULFATE 2.5 MG: 2.5 SOLUTION RESPIRATORY (INHALATION) at 09:02

## 2022-10-31 RX ADMIN — GABAPENTIN 400 MG: 400 CAPSULE ORAL at 21:44

## 2022-10-31 RX ADMIN — ALBUTEROL SULFATE 2.5 MG: 2.5 SOLUTION RESPIRATORY (INHALATION) at 21:19

## 2022-10-31 RX ADMIN — ASPIRIN 81 MG: 81 TABLET, COATED ORAL at 09:39

## 2022-10-31 RX ADMIN — GABAPENTIN 400 MG: 400 CAPSULE ORAL at 13:49

## 2022-10-31 RX ADMIN — ALBUTEROL SULFATE 2.5 MG: 2.5 SOLUTION RESPIRATORY (INHALATION) at 13:07

## 2022-10-31 RX ADMIN — ARFORMOTEROL TARTRATE 15 MCG: 15 SOLUTION RESPIRATORY (INHALATION) at 21:19

## 2022-10-31 RX ADMIN — GABAPENTIN 400 MG: 400 CAPSULE ORAL at 09:39

## 2022-10-31 RX ADMIN — ATORVASTATIN CALCIUM 20 MG: 20 TABLET, FILM COATED ORAL at 21:44

## 2022-10-31 RX ADMIN — AMPICILLIN SODIUM AND SULBACTAM SODIUM 3000 MG: 2; 1 INJECTION, POWDER, FOR SOLUTION INTRAMUSCULAR; INTRAVENOUS at 12:23

## 2022-10-31 RX ADMIN — Medication 2000 UNITS: at 09:39

## 2022-10-31 RX ADMIN — CARVEDILOL 25 MG: 25 TABLET, FILM COATED ORAL at 17:32

## 2022-10-31 RX ADMIN — ARFORMOTEROL TARTRATE 15 MCG: 15 SOLUTION RESPIRATORY (INHALATION) at 09:00

## 2022-10-31 RX ADMIN — AMPICILLIN SODIUM AND SULBACTAM SODIUM 3000 MG: 2; 1 INJECTION, POWDER, FOR SOLUTION INTRAMUSCULAR; INTRAVENOUS at 00:37

## 2022-10-31 RX ADMIN — CARVEDILOL 25 MG: 25 TABLET, FILM COATED ORAL at 09:39

## 2022-10-31 RX ADMIN — TRAZODONE HYDROCHLORIDE 25 MG: 50 TABLET ORAL at 21:43

## 2022-10-31 RX ADMIN — MUPIROCIN: 20 OINTMENT TOPICAL at 09:40

## 2022-10-31 RX ADMIN — ENOXAPARIN SODIUM 40 MG: 100 INJECTION SUBCUTANEOUS at 09:40

## 2022-10-31 RX ADMIN — AMPICILLIN SODIUM AND SULBACTAM SODIUM 3000 MG: 2; 1 INJECTION, POWDER, FOR SOLUTION INTRAMUSCULAR; INTRAVENOUS at 17:36

## 2022-10-31 RX ADMIN — METHYLPREDNISOLONE SODIUM SUCCINATE 40 MG: 40 INJECTION, POWDER, FOR SOLUTION INTRAMUSCULAR; INTRAVENOUS at 00:36

## 2022-10-31 RX ADMIN — SERTRALINE 150 MG: 100 TABLET, FILM COATED ORAL at 21:43

## 2022-10-31 RX ADMIN — IPRATROPIUM BROMIDE AND ALBUTEROL SULFATE 1 AMPULE: .5; 2.5 SOLUTION RESPIRATORY (INHALATION) at 17:41

## 2022-10-31 RX ADMIN — BACLOFEN 10 MG: 10 TABLET ORAL at 21:43

## 2022-10-31 RX ADMIN — AMPICILLIN SODIUM AND SULBACTAM SODIUM 3000 MG: 2; 1 INJECTION, POWDER, FOR SOLUTION INTRAMUSCULAR; INTRAVENOUS at 06:38

## 2022-10-31 RX ADMIN — METHYLPREDNISOLONE SODIUM SUCCINATE 40 MG: 40 INJECTION, POWDER, LYOPHILIZED, FOR SOLUTION INTRAMUSCULAR; INTRAVENOUS at 21:44

## 2022-10-31 RX ADMIN — BACLOFEN 10 MG: 10 TABLET ORAL at 09:39

## 2022-10-31 NOTE — PROGRESS NOTES
Dr. Kendy Cole, DO,    Your patient is on a medication that requires a weight dose adjustment. Weight Dosing Assessment:    Date Body Weight IBW  Adjusted BW SCr  CrCl Dialysis status   10/31/2022 157 lb 6.4 oz (71.4 kg)  Ideal body weight: 50.1 kg (110 lb 7.2 oz)  Adjusted ideal body weight: 58.6 kg (129 lb 3.7 oz) Serum creatinine: 1 mg/dL 10/31/22 0240  Estimated creatinine clearance: 45 mL/min N/a       Pharmacy has weight dose-adjusted the following medication(s):    Date Original Order Weight Adjusted Order   10/31/2022 Lovenox 30mg daily Lovenox 40mg daily       These changes were made per protocol according to the [facility] Weight Based Dose Adjustments Policy    *Please note this dose was adjusted based on your patient's weight. Please contact pharmacy with any questions regarding these changes.     ELIS Cash [facility]  10/31/2022

## 2022-10-31 NOTE — PROGRESS NOTES
Occupational Therapy  OT BEDSIDE TREATMENT NOTE      Date:10/31/2022  Patient Name: Nisreen Jj  MRN: [id number]  : 1945  Room: [address] [place], [place] [address]     Evaluating OT: Beto SULTANA/ZOE   [id number]       Referring Ross Garcia MD    Specific Provider Orders/Date:OT eval and treat 10/27/2022       Diagnosis:  COPD exacerbation ([facility] [address] [J44.1]     Pertinent Medical History: MS, R rotator injury       Precautions:  Fall Risk, 4 L O2      Assessment of current deficits    [x] Functional mobility            [x]ADLs           [x] Strength                  []Cognition    [x] Functional transfers          [x] IADLs         [x] Safety Awareness   [x]Endurance    [] Fine Coordination                         [x] Balance      [] Vision/perception   []Sensation      []Gross Motor Coordination             [] ROM           [] Delirium                   [] Motor Control      OT PLAN OF CARE   OT POC based on physician orders, patient diagnosis and results of clinical assessment     Frequency/Duration  2-4 days/wk for 2 weeks PRN   Specific OT Treatment Interventions to include:   ADL retraining/adapted techniques and AE recommendations to increase functional independence within precautions                    Energy conservation techniques to improve tolerance for selfcare routine   Functional transfer/mobility training/DME recommendations for increased independence, safety and fall prevention         Patient/family education to increase safety and functional independence             Environmental modifications for safe mobility and completion of ADLs                             Therapeutic activity to improve functional performance during ADLs. Therapeutic exercise to improve tolerance and functional strength for ADLs    Balance retraining/tolerance tasks for facilitation of postural control with dynamic challenges during ADLs .       Positioning to improve functional independence Recommended Adaptive Equipment: TBD      Home Living: Pt lives with , 1 story with ramp    Bathroom setup: walk in shower, seat    Equipment owned: rollator, home O2 at night, pulse oximeter      Prior Level of Function: assist as needed  with ADLs , assist as needed  with IADLs, cleaning lady once a month ; ambulated with rollator     Pain Level: no pain this session ;   Cognition: A&O: 4/4;               Memory:  good               Sequencing:  good               Problem solving:  good               Judgement/safety:  good                 Functional Assessment:  AM-PAC Daily Activity Raw Score: 17/24    Initial Eval Status  Date: 10/28/22 Treatment Status  Date: 10/31/22 STGs = LTGs  Time frame: 10-14 days   Feeding Independent        Grooming Min A  Combing back of her head  CGA standing at sink  Supervision    UB Dressing Min A  Don/doff gown   Patient reports her  sometimes helps her d/t R rotator injury  CGa to arrange gown standing  SBA   LB Dressing Min A  SBA to don socks seated  SBA    Bathing Min A    SBA    Toileting Supervision    Independent    Bed Mobility  Supervision   Supine <> sit   supine <> sit supervision Independent    Functional Transfers SBA  Sit- stand from bed   sit <> stand CGA Mod I    Functional Mobility SBA,w/walker,O2  Steps next to bed  CGA with rollator in room Mod I  with good tolerance    Balance Sitting:     Static:  Independent     Dynamic:SBA   Standing: SBA  Sitting balance supervision  Standing balance CGA  Independent    Activity Tolerance No SOB   Patient reports she checks her O2 sats at home with her COPD she runs 89-90% - states if \"she gets up higher its a good day\"     O2 sats on 11 L dropped lowest 87%- improved to 92% once lying back in bed  No SOB but fatigued with mobility. O2 sat 88-94 throughout session on 4L. Good  with ADL activity      Comments:  patient cleared with nursing and agreeable to therapy.  Patient fatigued and expresses concern for decreased safety at home due to limited assist from  and patient being a fall risk. Good participation demonstrated despite fatigue. Patient in bed with call light in reach alarm on    Education/treatment: ADL and functional transfer/activity performed to increase safety and independence during self care tasks. Education provided on safety awareness, adl reeducation, functional transfer training    Pt has made progress towards set goals.      Time In: 2:45  Time Out: 3:15     Min Units   Therapeutic Ex 78985     Therapeutic Activities 46394 30 2   ADL/Self Care 30482     Orthotic Management 92236     Neuro Re-Ed 12953     Non-Billable Time     TOTAL TIMED TREATMENT [address] PAZ/L 73939

## 2022-10-31 NOTE — CONSULTS
Palliative Care Department  Palliative Care Initial Consult  Provider: Harshil Whitfield, APRN - CNP  [phone]    Hospital Day: 4  Date of Initial Consult: 10/29/22  Referring Provider: Dr. Mildred Palomo was consulted for assistance with: Code status Discussion and Assist with goals of care    Chief Complaint: Jada Christie is a 77 y.o. female with chief complaint of weakness, fall, shortness of breath    HPI:   Jada Christie is a 77 y.o. female with significant medical history of COPD with noncompliance, has not been following with pulmonology who was admitted on 10/27/2022 with worsening weakness and generalized fatigue, following at home, also with worsening shortness of breath and nonproductive cough from baseline. She has been followed by pulmonology. She additionally made statements to the provider regarding her being \"done with this\" and \"wants to die\", thus [facility] surgical consult to assist with goals of care consult. ASSESSMENT/PLAN:     Pertinent Hospital Diagnoses:  Current medical issues leading to Palliative Medicine involvement include   Active Hospital Problems    Diagnosis Date Noted    Bronchiectasis with acute exacerbation ([facility] [address] [J47.1] 10/27/2022     Priority: Medium    Acute respiratory failure with hypoxia and hypercapnia (HCC) [J96.01, J96.02] 08/23/2022     Priority: Medium    COPD exacerbation (HCC) [J44.1]     Acute respiratory failure with hypoxia ([facility] [address] [J96.01]     MARCELLO (acute kidney injury) ([facility] [address] [N17.9]     Ambulatory dysfunction [R26.2] 03/23/2022    Hypotension due to hypovolemia [I95.89, E86.1] 05/31/2012     Palliative Care Encounter / Counseling Regarding Goals of Care:  Please see detailed goals of care discussion as below. At this time, Jada Christie, Does have capacity for medical decision-making. Capacity is time limited and situation/question specific.   Outcome of goals of care meeting: live longer, improve or maintain function/quality of life, remain at home, continue current management, and to be determined  Code status: DNR-CCA  Advanced Directives: Living Will and HC-POA  Surrogate/Legal NOK:   Vianney Friend ([phone]) is the patient's     There are no further PM needs at this time. PM will now sign off. If new PM needs arise, please re-consult. Thank you. SUBJECTIVE:   Events/Discussions:  Patricia Mejia is seen today, no family present. She is alert and oriented able to voice needs concerns well. She is seen resting in bed comfortably, no acute distress. Overall she states that she feels improved. Introduced palliative medicine service and we discussed her goals of care as well as her wishes regarding CODE STATUS. She states that her previous status remain out of frustration, and while she is tired and fatigued she is not prepared to pursue comfort focused care, hospice, nor does she wish to die at this point time. Right now she wished to continue with her current management, and hopes to return home. She does states she has advanced directives completed, family knows what her long-term wishes are, she also states that she is a DNR, agreeing to [address] of DNR CCA. She otherwise is appreciative the support provided,  CODE STATUS be changed to DNR CCA, but declines further palliative medicine needs at this time. Past Medical History:   Diagnosis Date    Arthritis     Breast cancer ([facility] [address]     CKD (chronic kidney disease)     COPD (chronic obstructive pulmonary disease) (HCC)     Hyperlipidemia     Hypertension     MS (multiple sclerosis) ([facility] [address] 5/31/2012    Multiple sclerosis ([facility] [address]     diagnosised 8  yrs ago [facility]       Past Surgical History:   Procedure Laterality Date    APPENDECTOMY      BREAST SURGERY  4/13/2012    biopsy - negative    HIP ARTHROPLASTY Right 04/13/2011    Right AIDEE  ALLISON Caal MD    HYSTERECTOMY (CERVIX STATUS UNKNOWN)      KNEE ARTHROPLASTY Left 10/01/2013    Left TKA  ALLISON Caal MD    KNEE ARTHROPLASTY Right 08/29/2012    Right TKA  SOFIA. Isak Green MD    TOTAL HIP ARTHROPLASTY Left 3/25/2022    LEFT HIP TOTAL ARTHROPLASTY performed by Shey Skelton MD at [facility] [place]       Family History   Problem Relation Age of Onset    Mult Sclerosis Father     Thyroid Cancer Mother     Cirrhosis Mother        Allergies   Allergen Reactions    Macrodantin [Nitrofurantoin Macrocrystal] Shortness Of Breath       ROS: UNLESS STATED ABOVE PATIENT DENIES:  CONSTITUTIONAL:  fever, chill, rigors, nausea, vomiting, fatigue. HEENT: blurry vision, double vision, hearing problem, tinnitus, hoarseness, dysphagia, odynophagia  RESPIRATORY: cough, shortness of breath, sputum expectoration. CARDIOVASCULAR:  Chest pain/pressure, palpitation, syncope, irregular beats  GASTROINTESTINAL:  abdominal or rectal pain, diarrhea, constipation, . GENITOURINARY:  Burning, frequency, urgency, incontinence, discharge  INTEGUMENTARY: rash, wound, pruritis  HEMATOLOGIC/LYMPHATIC:  Swelling, sores, gum bleeding, easy bruising, pica.   MUSCULOSKELETAL:  pain, edema, joint swelling or redness  NEUROLOGICAL:  light headed, dizziness, loss of consciousness, weakness, change in memory, seizures, tremors    OBJECTIVE:   Prognosis: unknown    Physical Exam:  BP (!) 127/56   Pulse 93   Temp 98 °F (36.7 °C) (Oral)   Resp 20   Ht 5' 2\" (1.575 m)   Wt 158 lb 4.8 oz (71.8 kg)   SpO2 95%   BMI 28.95 kg/m²     Gen:  Appears stated age, chronically ill-appearing, in no acute distress  HEENT:  Normocephalic, conjunctiva pink, no drainage, mucosa moist  Neck:  Supple  Lungs: No increased work of breathing noted  Heart: RRR  Abd:  Soft, non tender, non distended, BS+2  M/S/Ext:  Moving all extremities, no edema, pulses present  Skin:  Warm and dry  Neuro:  PERRL, Alert, oriented x 3; following commands    Objective data reviewed: labs, images, records, medication use, vitals, and chart    Time/Communication:  Greater than 50% of time spent, total 30 minutes in counseling and coordination of care at the bedside regarding goals of care and see above. Susie Goldmann, HIRA - CNP  Palliative Medicine    Patient and the plan of care discussed with the other IDT members of Palliative Care Team, and with consultants, Primary Attending, patient, family, and floor nurses, as appropriate and available. Thank you for allowing Palliative Medicine to participate in the care of Jose Miguel Mc. Note: This report was completed using computer[employer] voiced recognition software. Every effort has been made to ensure accuracy; however, inadvertent computerized transcription errors may be present.

## 2022-10-31 NOTE — PROGRESS NOTES
[facility] Progress Note    --------------------------------------------------------------------------------------  Assessment / Plan  COPD exacerbation and acute-on-chronic hypoxia suspect due to medical noncompliance - continue O2 as needed (11L currently) / nebs (albuterol and Brovana) / steroids. Viral panel was negative. Pulm input appreciated - poss aspiration pna so getting swallow eval ?hopefully today; breathing tx adjusted; intermittent BiPAP ordered. WBC 11.2 / procal 0.03 but pulm recommends 7d abx and now resp cx w mod growth of Klebsiella and ESBL Moraxella; started on Unasyn 2g IV q6h on 10/30 should should cover these organisms; follow w pulm recs    Weakness / fatigue - pt reports issues ambulating w a fall at home. Fall precautions, PT and OT evals - [facility] 18 -> 17; looking into [facility]    Hx chronic anemia (near baseline), CKD3 (near baseline), HPL, HTN, MS, past breast Ca - resume home meds, monitor vitals / labs    Please see orders for further plan of care  Code status  DNR-CCA  DVT prophylaxis Lovenox  Disposition  To be determined; [facility] vs home w [facility]  --------------------------------------------------------------------------------------    Admission Date  10/27/2022 12:50 PM  Chief Complaint SOB    Subjective  History of Present Illness  77 y.o. female w PMH COPD without chronic hypoxia, MS, CKD3, anemia, HTN, HPL, past breast Ca who presented to ED 10/27/2022 from home with complaints of SOB. Was here 8/23-8/26 for same and dc'd on 2-3LNC O2. She reports that she had only been wearing it at night and even then only intermittently. Also reports she doesn't follow with pulmonology. Initial evaluation in the ED showed pt was hemodynamically stable. Workup showed WBC normal, hgb 10.9 (baseline ~11-12), Cr 1.3 (at baseline), CO2 30.   Imaging studies included CXR and CTA, which showed scarring of the ELICIA and trace pleural effusions and consolidation vs atelectasis of the bl bases and RML. EKG not performed. Pt was admitted for further evaluation and treatment with consult request to pulmonology. Given breathing tx, steroids, but requiring HFNC to maintain sats.      Seen in bed, no family present  All the way down to [facility] from 11L yesterday morning  Says breathing better  Has not gotten around much  Encouraged activity  Pt asking about SNF - looking into [facility] is backup option  No new issues today  Discussed w nursing    Review of Systems - 12-point review of systems has been reviewed and is otherwise negative except as listed in the HPI    Objective  Physical Exam  Vitals: BP (!) 150/85   Pulse 90   Temp 98.4 °F (36.9 °C) (Oral)   Resp 16   Ht 5' 2\" (1.575 m)   Wt 157 lb 6.4 oz (71.4 kg)   SpO2 95%   BMI 28.79 kg/m²   General: well-developed, well-nourished, anxious but no acute distress, generally cooperative  Skin: generally warm, dry, and intact, with normal color  HEENT: normocephalic, atraumatic, no gross abnormalities, HFNC in place but on 4L  Respiratory: coarse to auscultation bilaterally without respiratory distress  Cardiovascular: regular rate and rhythm without murmur / rub / gallop  Abdominal: soft, nontender, nondistended, normoactive bowel sounds  Extremities: no obvious edema or deformity  Neurologic: awake, alert, no gross deficits  Psychiatric: normal affect, cooperative    Electronically signed by Nicole Jorge DO on 10/31/2022 at 11:29 AM

## 2022-10-31 NOTE — PROGRESS NOTES
round . Normal bowel sounds. Skin: Warm and dry. M/S: No cyanosis. No joint deformity. No clubbing. Neuro: Awake. Follows commands. O x 3, GARCIA  Psych:  calm and interactive     I/O: No intake/output data recorded. No intake/output data recorded. Results:  CBC:   Recent Labs     10/29/22  1450 10/30/22  0150 10/31/22  0240   WBC 11.4 11.2 8.0   HGB 11.6 10.9* 11.4*   HCT 37.5 35.0 36.1   MCV 94.2 92.8 90.5    330 330     BMP:   Recent Labs     10/29/22  1450 10/30/22  0150 10/31/22  0240    140 138   K 4.0 3.8 3.3*    100 96*   CO2 31* 31* 31*   BUN 22 21 24*   CREATININE 1.2* 1.0 1.0     LFT:   No results for input(s): ALKPHOS, ALT, AST, PROT, BILITOT, BILIDIR, LABALBU in the last 72 hours. PT/INR: No results for input(s): PROTIME, INR in the last 72 hours. Procalcitonin:   Recent Labs     10/29/22  1450   PROCAL 0.03     Cultures:  Recent Labs     10/29/22  1227   CULTRESP Oral Pharyngeal Jenae present*  Light growth  Heavy growth  Susceptibility testing of this isolate is not routinely  indicated to guide therapy.   Beta Lactamase POSITIVE       Culture, Respiratory  Order: [id number]  Status: Preliminary result    Visible to patient: No (not released)    Next appt: None    Specimen Information: Sputum Expectorated   0 Result Notes  Component 10/29/22 1227    CULTURE, RESPIRATORY  Abnormal   Oral Pharyngeal Jenae present   Additional growth present, also evaluating for;   Moraxella (Branhamella) catarrhalis   P      Smear, Respiratory Moderate Polymorphonuclear leukocytes   Epithelial cells not seen   Few Gram positive cocci   Abundant Gram negative cocci    Organism Gram negative santy Abnormal  P    CULTURE, RESPIRATORY Light growth   Identification and sensitivity to follow              ABG:   Recent Labs     10/28/22  1757   PH 7.408   PO2 77.3   PCO2 47.7*   HCO3 29.4*   BE 4.0*   O2SAT 95.3       Films:  XR CHEST PORTABLE    Result Date: 10/27/2022  EXAMINATION: ONE XRAY VIEW OF THE CHEST 10/27/2022 1:23 pm COMPARISON: 3 months prior. HISTORY: ORDERING SYSTEM PROVIDED HISTORY: shortness of breath TECHNOLOGIST PROVIDED HISTORY: Reason for exam:->shortness of breath FINDINGS: There is focal scarring/atelectasis in the left upper lung. The right lung is clear. The heart is prominent in size. No pneumothorax or pleural effusion. No acute cardiopulmonary process. Focal scar/atelectasis in the left upper lung, no significant change. CTA PULMONARY W CONTRAST    Result Date: 10/27/2022  EXAMINATION: CTA OF THE CHEST 10/27/2022 3:59 pm TECHNIQUE: CTA of the chest was performed after the administration of intravenous contrast.  Multiplanar reformatted images are provided for review. MIP images are provided for review. Automated exposure control, iterative reconstruction, and/or weight based adjustment of the mA/kV was utilized to reduce the radiation dose to as low as reasonably achievable. COMPARISON: None. HISTORY: ORDERING SYSTEM PROVIDED HISTORY: elevated ddimer; hypoxia TECHNOLOGIST PROVIDED HISTORY: Reason for exam:->elevated ddimer; hypoxia Decision Support Exception - unselect if not a suspected or confirmed emergency medical condition->Emergency Medical Condition (MA) FINDINGS: Pulmonary Arteries: Pulmonary arteries are adequately opacified for evaluation. No evidence of intraluminal filling defect to suggest pulmonary embolism. Main pulmonary artery is normal in caliber. Mediastinum: No evidence of mediastinal lymphadenopathy. The heart and pericardium demonstrate no acute abnormality. There is no acute abnormality of the thoracic aorta. Thyroid is homogeneous in appearance. Small reactive lymph nodes seen scattered throughout the mediastinum. Lungs/pleura: There is elevation identified of the left hemidiaphragm. Atelectatic changes identified lung bases bilaterally. There is trace bilateral pleural effusions.   Infiltrate cannot be completely excluded at the lung bases.  There are air bronchograms present within the lower lobe consolidations. No pulmonary mass or nodule. Mild bronchiectatic changes centrally with scarring inferiorly within the left upper lobe. Upper Abdomen: Stones identified in the gallbladder. Cyst identified on the right kidney. Vascular calcifications seen within the abdominal aorta and iliac vessels. Soft Tissues/Bones: There is extensive degenerative changes identified within the spine. Kyphoplasty seen of the thoracic spine. Multiple levels present with kyphotic curvature centered at the thoracolumbar spine. No evidence of pulmonary embolism. There is trace bilateral pleural effusions with consolidative infiltrate or atelectatic change seen at the lung bases bilaterally and right middle lobe. Mild bronchiectatic changes centrally and scarring in the left upper lobe continued follow-up can be performed if clinically indicated. Assessment:  Acute on chronic respiratory failure  Exacerbation of COPD  Trace B/L Pleural Effusions   Subsegmental Atelectasis   Pneumonia, concern with possibility of aspiration+Moraxella (Branhamella) catarrhalis    Tobacco abuse  History of MS        Plan:  Currently on 4LHF,weaned from 11L 2 days ago  wean to keep POX >90%, baseline is 3L at HS  DC'd ipratropium. Increase use of albuterol and Mucinex to enhance expectoration. Chest physiotherapy with vest.  Noninvasive ventilation QHS and PRN -Last Abg improved -will nee arranged at DC   Hold inhaled corticosteroid while using systemic steroid  IV Solu-Medrol 40 q8H decrease to q12  Brovana nebs to continue   Sputum for gram stain and culture-sent today positive Moraxella (Branhamella) catarrhalis  , now on unasyn   Swallow evaluation  Supportive care         Electronically signed by HIRA Hurley on 10/31/2022 at 10:40 AM    Attending Attestation Note:    Patient seen and examined with Hospital Staff, NP.   I have extensively reviewed the chart lab work and imaging. I agree with above. Modifications and amendment of note made as necessary.     In addition, the following apply:    Current Facility-Administered Medications   Medication Dose Route Frequency Provider Last Rate Last Admin    enoxaparin (LOVENOX) injection 40 mg  40 mg SubCUTAneous Daily Hugh SOFIA Zhao, DO   40 mg at 10/31/22 0940    sertraline (ZOLOFT) tablet 150 mg  150 mg Oral Nightly Hugh R Lockso, DO        methylPREDNISolone sodium (SOLU-MEDROL) injection 40 mg  40 mg IntraVENous Q12H HIRA He - CNS        ampicillin-sulbactam (UNASYN) 3,000 mg in sodium chloride 0.9 % 100 mL IVPB (mini-bag)  3,000 mg IntraVENous Q6H Hughvenkata Woodsso, DO   Stopped at 10/31/22 1342    mupirocin (BACTROBAN) 2 % ointment   Nasal BID Tracy Zhao, DO   Given at 10/31/22 0940    albuterol (PROVENTIL) nebulizer solution 2.5 mg  2.5 mg Nebulization 4x daily Félix Carter MD   2.5 mg at 10/31/22 1307    aspirin EC tablet 81 mg  81 mg Oral Daily Nilson Marie MD   81 mg at 10/31/22 8890    atorvastatin (LIPITOR) tablet 20 mg  20 mg Oral Nightly Nilson Marie MD   20 mg at 10/30/22 2012    baclofen (LIORESAL) tablet 10 mg  10 mg Oral BID Nilson Marie MD   10 mg at 10/31/22 1237    carvedilol (COREG) tablet 25 mg  25 mg Oral BID  Nilson Marie MD   25 mg at 10/31/22 9735    diclofenac sodium (VOLTAREN) 1 % gel 2 g  2 g Topical 4x Daily PRN Nilson Marie MD        vitamin D (CHOLECALCIFEROL) tablet 2,000 Units  2,000 Units Oral QAM Nilson Marie MD   2,000 Units at 10/31/22 0126    meloxicam (MOBIC) tablet 15 mg  15 mg Oral Daily Nilson Marie MD   15 mg at 10/31/22 0940    traZODone (DESYREL) tablet 25 mg  25 mg Oral Nightly Nilson Marie MD   25 mg at 10/30/22 2012    gabapentin (NEURONTIN) capsule 400 mg  400 mg Oral TID Nilson Marie MD   400 mg at 10/31/22 1349    ipratropium-albuterol (DUONEB) nebulizer solution 1 ampule  1 ampule Inhalation Q4H PRN Nilson Marie MD Arformoterol Tartrate (BROVANA) nebulizer solution 15 mcg  15 mcg Nebulization BID Razia Nichols MD   15 mcg at 10/31/22 0900      - Unasyn   - IV steroids to q 12 hours   - supportive care  - await D/C planning, plan for total of 7 days of ABX, so Unasyn to Augmentn if needed for course duration of ABX    Jose R Nelson MD  10/31/2022  2:46 PM

## 2022-10-31 NOTE — PLAN OF CARE
Problem: ABCDS Injury Assessment  Goal: Absence of physical injury  Outcome: Progressing     Problem: Safety - Adult  Goal: Free from fall injury  Outcome: Progressing     Problem: Discharge Planning  Goal: Discharge to home or other facility with appropriate resources  Outcome: Progressing     Problem: Pain  Goal: Verbalizes/displays adequate comfort level or baseline comfort level  Outcome: Progressing     Problem: Skin/Tissue Integrity  Goal: Absence of new skin breakdown  Description: 1. Monitor for areas of redness and/or skin breakdown  2. Assess vascular access sites hourly  3. Every 4-6 hours minimum:  Change oxygen saturation probe site  4. Every 4-6 hours:  If on nasal continuous positive airway pressure, respiratory therapy assess nares and determine need for appliance change or resting period.   Outcome: Progressing

## 2022-11-01 VITALS
HEIGHT: 62 IN | OXYGEN SATURATION: 94 % | WEIGHT: 155 LBS | DIASTOLIC BLOOD PRESSURE: 57 MMHG | HEART RATE: 83 BPM | BODY MASS INDEX: 28.52 KG/M2 | TEMPERATURE: 97.5 F | RESPIRATION RATE: 16 BRPM | SYSTOLIC BLOOD PRESSURE: 117 MMHG

## 2022-11-01 LAB
ANION GAP SERPL CALCULATED.3IONS-SCNC: 10 MMOL/L (ref 7–16)
BUN BLDV-MCNC: 26 MG/DL (ref 6–23)
CALCIUM SERPL-MCNC: 7.8 MG/DL (ref 8.6–10.2)
CHLORIDE BLD-SCNC: 95 MMOL/L (ref 98–107)
CO2: 33 MMOL/L (ref 22–29)
CREAT SERPL-MCNC: 1 MG/DL (ref 0.5–1)
GFR SERPL CREATININE-BSD FRML MDRD: 58 ML/MIN/1.73
GLUCOSE BLD-MCNC: 151 MG/DL (ref 74–99)
HCT VFR BLD CALC: 36.3 % (ref 34–48)
HEMOGLOBIN: 11.5 G/DL (ref 11.5–15.5)
MCH RBC QN AUTO: 29.1 PG (ref 26–35)
MCHC RBC AUTO-ENTMCNC: 31.7 % (ref 32–34.5)
MCV RBC AUTO: 91.9 FL (ref 80–99.9)
PDW BLD-RTO: 16.1 FL (ref 11.5–15)
PLATELET # BLD: 284 E9/L (ref 130–450)
PMV BLD AUTO: 9.2 FL (ref 7–12)
POTASSIUM SERPL-SCNC: 3.6 MMOL/L (ref 3.5–5)
RBC # BLD: 3.95 E12/L (ref 3.5–5.5)
SARS-COV-2, NAAT: NOT DETECTED
SODIUM BLD-SCNC: 138 MMOL/L (ref 132–146)
WBC # BLD: 7.7 E9/L (ref 4.5–11.5)

## 2022-11-01 PROCEDURE — 6360000002 HC RX W HCPCS: Performed by: CLINICAL NURSE SPECIALIST

## 2022-11-01 PROCEDURE — 6370000000 HC RX 637 (ALT 250 FOR IP): Performed by: INTERNAL MEDICINE

## 2022-11-01 PROCEDURE — 2580000003 HC RX 258: Performed by: INTERNAL MEDICINE

## 2022-11-01 PROCEDURE — 6360000002 HC RX W HCPCS: Performed by: INTERNAL MEDICINE

## 2022-11-01 PROCEDURE — 94660 CPAP INITIATION&MGMT: CPT

## 2022-11-01 PROCEDURE — 2700000000 HC OXYGEN THERAPY PER DAY

## 2022-11-01 PROCEDURE — 87635 SARS-COV-2 COVID-19 AMP PRB: CPT

## 2022-11-01 PROCEDURE — 2580000003 HC RX 258

## 2022-11-01 PROCEDURE — 97530 THERAPEUTIC ACTIVITIES: CPT

## 2022-11-01 PROCEDURE — 94761 N-INVAS EAR/PLS OXIMETRY MLT: CPT

## 2022-11-01 PROCEDURE — 85027 COMPLETE CBC AUTOMATED: CPT

## 2022-11-01 PROCEDURE — 99239 HOSP IP/OBS DSCHRG MGMT >30: CPT | Performed by: INTERNAL MEDICINE

## 2022-11-01 PROCEDURE — 80048 BASIC METABOLIC PNL TOTAL CA: CPT

## 2022-11-01 PROCEDURE — 97535 SELF CARE MNGMENT TRAINING: CPT

## 2022-11-01 PROCEDURE — 94669 MECHANICAL CHEST WALL OSCILL: CPT

## 2022-11-01 PROCEDURE — 94640 AIRWAY INHALATION TREATMENT: CPT

## 2022-11-01 PROCEDURE — 36415 COLL VENOUS BLD VENIPUNCTURE: CPT

## 2022-11-01 RX ORDER — SODIUM CHLORIDE 0.9 % (FLUSH) 0.9 %
10 SYRINGE (ML) INJECTION 2 TIMES DAILY
Status: DISCONTINUED | OUTPATIENT
Start: 2022-11-01 | End: 2022-11-01 | Stop reason: HOSPADM

## 2022-11-01 RX ORDER — PREDNISONE 20 MG/1
40 TABLET ORAL DAILY
Status: DISCONTINUED | OUTPATIENT
Start: 2022-11-02 | End: 2022-11-01 | Stop reason: HOSPADM

## 2022-11-01 RX ORDER — AMOXICILLIN AND CLAVULANATE POTASSIUM 875; 125 MG/1; MG/1
1 TABLET, FILM COATED ORAL 2 TIMES DAILY
Qty: 16 TABLET | Refills: 0 | DISCHARGE
Start: 2022-11-01 | End: 2022-11-09

## 2022-11-01 RX ORDER — SODIUM CHLORIDE 0.9 % (FLUSH) 0.9 %
SYRINGE (ML) INJECTION
Status: COMPLETED
Start: 2022-11-01 | End: 2022-11-01

## 2022-11-01 RX ORDER — PREDNISONE 1 MG/1
10 TABLET ORAL DAILY
Status: DISCONTINUED | OUTPATIENT
Start: 2022-11-11 | End: 2022-11-01 | Stop reason: HOSPADM

## 2022-11-01 RX ORDER — PREDNISONE 20 MG/1
20 TABLET ORAL DAILY
Status: DISCONTINUED | OUTPATIENT
Start: 2022-11-08 | End: 2022-11-01 | Stop reason: HOSPADM

## 2022-11-01 RX ORDER — PREDNISONE 10 MG/1
TABLET ORAL
Qty: 30 TABLET | Refills: 0 | Status: ON HOLD | DISCHARGE
Start: 2022-11-01 | End: 2022-11-17 | Stop reason: HOSPADM

## 2022-11-01 RX ORDER — SODIUM CHLORIDE 0.9 % (FLUSH) 0.9 %
10 SYRINGE (ML) INJECTION PRN
Status: DISCONTINUED | OUTPATIENT
Start: 2022-11-01 | End: 2022-11-01 | Stop reason: HOSPADM

## 2022-11-01 RX ADMIN — GABAPENTIN 400 MG: 400 CAPSULE ORAL at 08:55

## 2022-11-01 RX ADMIN — METHYLPREDNISOLONE SODIUM SUCCINATE 40 MG: 40 INJECTION, POWDER, LYOPHILIZED, FOR SOLUTION INTRAMUSCULAR; INTRAVENOUS at 08:55

## 2022-11-01 RX ADMIN — ENOXAPARIN SODIUM 40 MG: 100 INJECTION SUBCUTANEOUS at 08:55

## 2022-11-01 RX ADMIN — BACLOFEN 10 MG: 10 TABLET ORAL at 08:55

## 2022-11-01 RX ADMIN — ALBUTEROL SULFATE 2.5 MG: 2.5 SOLUTION RESPIRATORY (INHALATION) at 09:10

## 2022-11-01 RX ADMIN — ALBUTEROL SULFATE 2.5 MG: 2.5 SOLUTION RESPIRATORY (INHALATION) at 12:26

## 2022-11-01 RX ADMIN — CARVEDILOL 25 MG: 25 TABLET, FILM COATED ORAL at 08:54

## 2022-11-01 RX ADMIN — MUPIROCIN: 20 OINTMENT TOPICAL at 08:54

## 2022-11-01 RX ADMIN — Medication 10 ML: at 08:56

## 2022-11-01 RX ADMIN — AMPICILLIN SODIUM AND SULBACTAM SODIUM 3000 MG: 2; 1 INJECTION, POWDER, FOR SOLUTION INTRAMUSCULAR; INTRAVENOUS at 00:13

## 2022-11-01 RX ADMIN — MELOXICAM 15 MG: 7.5 TABLET ORAL at 08:54

## 2022-11-01 RX ADMIN — SODIUM CHLORIDE, PRESERVATIVE FREE 10 ML: 5 INJECTION INTRAVENOUS at 08:56

## 2022-11-01 RX ADMIN — ASPIRIN 81 MG: 81 TABLET, COATED ORAL at 09:09

## 2022-11-01 RX ADMIN — Medication 2000 UNITS: at 08:55

## 2022-11-01 RX ADMIN — AMPICILLIN SODIUM AND SULBACTAM SODIUM 3000 MG: 2; 1 INJECTION, POWDER, FOR SOLUTION INTRAMUSCULAR; INTRAVENOUS at 05:35

## 2022-11-01 RX ADMIN — ARFORMOTEROL TARTRATE 15 MCG: 15 SOLUTION RESPIRATORY (INHALATION) at 09:10

## 2022-11-01 ASSESSMENT — PAIN SCALES - GENERAL
PAINLEVEL_OUTOF10: 0
PAINLEVEL_OUTOF10: 0

## 2022-11-01 NOTE — PROGRESS NOTES
Occupational Therapy  OT BEDSIDE TREATMENT NOTE      Date:2022  Patient Name: Stephon Porter  MRN: [id number]  : 1945  Room: [address] [place], [place] [address]     Evaluating OT: Felipa Oakes OTR/L   [id number]       Referring Jolly Starr MD    Specific Provider Orders/Date:OT eval and treat 10/27/2022       Diagnosis:  COPD exacerbation ([address] [J44.1]     Pertinent Medical History: MS, R rotator injury       Precautions:  Fall Risk,  O2 HF      Assessment of current deficits    [x] Functional mobility            [x]ADLs           [x] Strength                  []Cognition    [x] Functional transfers          [x] IADLs         [x] Safety Awareness   [x]Endurance    [] Fine Coordination                         [x] Balance      [] Vision/perception   []Sensation      []Gross Motor Coordination             [] ROM           [] Delirium                   [] Motor Control      OT PLAN OF CARE   OT POC based on physician orders, patient diagnosis and results of clinical assessment     Frequency/Duration  2-4 days/wk for 2 weeks PRN   Specific OT Treatment Interventions to include:   ADL retraining/adapted techniques and AE recommendations to increase functional independence within precautions                    Energy conservation techniques to improve tolerance for selfcare routine   Functional transfer/mobility training/DME recommendations for increased independence, safety and fall prevention         Patient/family education to increase safety and functional independence             Environmental modifications for safe mobility and completion of ADLs                             Therapeutic activity to improve functional performance during ADLs. Therapeutic exercise to improve tolerance and functional strength for ADLs    Balance retraining/tolerance tasks for facilitation of postural control with dynamic challenges during ADLs .       Positioning to improve functional independence Recommended Adaptive Equipment: continue to assess       Home Living: Pt lives with , 1 story with ramp    Bathroom setup: walk in shower, seat    Equipment owned: rollator, home O2 at night, pulse oximeter      Prior Level of Function: assist as needed  with ADLs , assist as needed  with IADLs, cleaning lady once a month ; ambulated with rollator     Pain Level: no pain this session   Cognition: Awake and alert. Functional Assessment:  AM-PAC Daily Activity Raw Score: 17/24    Initial Eval Status  Date: 10/28/22 Treatment Status  Date: 11/1/22 STGs = LTGs  Time frame: 10-14 days   Feeding Independent        Grooming Min A  Combing back of her head  Min A to reach back of hair. Supervision    UB Dressing Min A  Don/doff gown   Patient reports her  sometimes helps her d/t R rotator injury   Min A to change gown  SBA   LB Dressing Min A   SBA    Bathing Min A    SBA    Toileting Supervision    Independent    Bed Mobility  Supervision   Supine <> sit   SBA supine to sit using bed rail for support. Independent    Functional Transfers SBA  Sit- stand from bed   CGA Mod I    Functional Mobility SBA,w/walker,O2  Steps next to bed  CGA with rollator in room. Limited distance due to pt on short line high flow O2 tubing. Mod I  with good tolerance    Balance Sitting:     Static:  Independent     Dynamic:SBA   Standing: SBA  Sitting balance supervision  Standing balance CGA  Independent    Activity Tolerance No SOB   Patient reports she checks her O2 sats at home with her COPD she runs 89-90% - states if \"she gets up higher its a good day\"     O2 sats on 11 L dropped lowest 87%- improved to 92% once lying back in bed  O2 saturation 89-91% on 6L O2 this session. Good  with ADL activity      Comments:  Pt pleasant and cooperative. Cues for safety during functional activity. She remained seated in the chair at the end of the session. Nursing present.         Education/treatment:  ADL retraining with facilitation of movement to increase self care skills. Therapeutic activity to address balance and endurance for ADL and transfers. Pt education of transfer safety and energy conservation. Pt has made  progress towards set goals.        Time In: 8:45   Time Out: 9:09      Min Units   Therapeutic Ex [id number]     Therapeutic Activities 83929 71 9   ADL/Self Care 99468 14 1   Orthotic Management 50668     Neuro Re-Ed 80369     Non-Billable Time     TOTAL TIMED TREATMENT [facility] ALAINA/L [id number]

## 2022-11-01 NOTE — PROGRESS NOTES
AVS paperwork and Sky paperwork faxed to [address] for discharge to room 301. Nurse report called to \"Walter\"---all questions answered. Awaiting pickup by PAS.

## 2022-11-01 NOTE — CARE COORDINATION
Precert received for January ADAME per Lacie Krabbe, facility liaison. Covid test ordered and will need results prior to discharge. Transportation arranged via [facility] for 36. JUSTYNA, dtr Bela Gray, pt and facility notified of  time. INA, envelope, yash form and 87014 completed. Will follow.   ANNABEL PhoenixN, RN  [address] [facility] Case Management  [phone]

## 2022-11-01 NOTE — DISCHARGE INSTR - COC
Continuity of Care Form    Patient Name: Arianne Rutledge   :  1945  MRN:  [id number]    Admit date:  10/27/2022  Discharge date:  22    Code Status Order: DNR-CCA   Advance Directives:     Admitting Physician:  Agustina Aquino MD  PCP: Saintclair Dasen, DO    Discharging Nurse: Lali Villa Unit/Room#: 3578/4685-V  Discharging Unit Phone Number: [phone]    Emergency Contact:   Extended Emergency Contact Information  Primary Emergency Contact: Sharif Rashid  Address: [address] [facility] [address] [facility] [address] Phone: [phone]  Mobile Phone: [phone]  Relation: Spouse  Secondary Emergency Contact: Fadi Goodwin [facility] [address] Phone: [phone]  Mobile Phone: [phone]  Relation: Child  Preferred language: English   needed? No    Past Surgical History:  Past Surgical History:   Procedure Laterality Date    APPENDECTOMY      BREAST SURGERY  2012    biopsy - negative    HIP ARTHROPLASTY Right 2011    Right AIDEE  ALLISON Jones MD    HYSTERECTOMY (CERVIX STATUS UNKNOWN)      KNEE ARTHROPLASTY Left 10/01/2013    Left TKA  ALLISON Jones MD    KNEE ARTHROPLASTY Right 2012    Right TKA  ALLISON Jones MD    TOTAL HIP ARTHROPLASTY Left 3/25/2022    LEFT HIP TOTAL ARTHROPLASTY performed by Gama Lyons MD at [facility] OR       Immunization History:   Immunization History   Administered Date(s) Administered    COVID-19, MODERNA BLUE border, Primary or Immunocompromised, (age 12y+), IM, 100 mcg/0.5mL 2021, 2021    Influenza, High Dose (Fluzone 65 yrs and older) 10/16/2018    Influenza, Triv, inactivated, subunit, adjuvanted, IM (Fluad 65 yrs and older) 10/10/2019    Pneumococcal Conjugate 13-valent (Merilee Duvall) 2019       Active Problems:  Patient Active Problem List   Diagnosis Code    Hypotension due to hypovolemia I95.89, E86.1    Hyperlipidemia E78.5    Hypertension, essential, benign I10    Multiple adenomatous polyps D36.9    Chronic idiopathic constipation K59.04    Reactive depression F32.9    H/O total hip arthroplasty, right Z96.641    History of total knee replacement, left Z96.652    Simple chronic bronchitis ([address] J41.0    Brain aneurysm I67.1    Ambulatory dysfunction R26.2    Closed fracture of head of femur ([address] S72.059A    Acute respiratory failure with hypoxia ([address] J96.01    MARCELLO (acute kidney injury) ([address] N17.9    Primary hypertension I10    Primary osteoarthritis of left hip M16.12    COPD exacerbation (HCC) J44.1    Acute respiratory failure with hypoxia and hypercapnia (HCC) J96.01, J96.02    Bronchiectasis with acute exacerbation ([address] J47.1       Isolation/Infection:   Isolation            No Isolation          Patient Infection Status       Infection Onset Added Last Indicated Last Indicated By Review Planned Expiration Resolved Resolved By    MRSA 10/28/22 10/29/22 10/28/22 Culture, MRSA, Screening        MRSA nares 10/28/2022    Resolved    COVID-19 (Rule Out) 10/27/22 10/27/22 10/27/22 Respiratory Panel, Molecular, with COVID-19 (Restricted: peds pts or suitable admitted adults) (Ordered)   10/28/22 Rule-Out Test Resulted    COVID-19 (Rule Out) 03/24/22 03/24/22 03/24/22 Respiratory Panel, Molecular, with COVID-19 (Restricted: peds pts or suitable admitted adults) (Ordered)   03/24/22 Rule-Out Test Resulted            Nurse Assessment:  Last Vital Signs: BP (!) 117/57   Pulse 83   Temp 97.5 °F (36.4 °C) (Oral)   Resp 16   Ht 5' 2\" (1.575 m)   Wt 155 lb (70.3 kg)   SpO2 94%   BMI 28.35 kg/m²     Last documented pain score (0-10 scale): Pain Level: 0  Last Weight:   Wt Readings from Last 1 Encounters:   11/01/22 155 lb (70.3 kg)     Mental Status:  oriented, alert, coherent, logical, thought processes intact, and able to concentrate and follow conversation    IV Access:  - None    Nursing Mobility/ADLs:  Walking   Assisted  Transfer Assisted  Bathing  Assisted  Dressing  Assisted  Toileting  Assisted  Feeding  Independent  Med Admin  Independent  Med Delivery   whole    Wound Care Documentation and Therapy:  Incision 08/29/12 Knee Right (Active)   Number of days: 5988       Incision 10/01/13 Knee Left (Active)   Number of days: 3317       Incision 03/25/22 Hip Left;Lateral (Active)   Number of days: 221        Elimination:  Continence: Bowel: Yes  Bladder: Yes  Urinary Catheter: None   Colostomy/Ileostomy/Ileal Conduit: No       Date of Last BM: Tue 11/1/22  No intake or output data in the 24 hours ending 11/01/22 0934  No intake/output data recorded.     Safety Concerns:     History of Falls (last 30 days) and At Risk for Falls    Impairments/Disabilities:      Weakness, oxygen dependence     Nutrition Therapy:  Current Nutrition Therapy:   - Oral Diet:  General    Routes of Feeding: Oral  Liquids: No Restrictions  Daily Fluid Restriction: no  Last Modified Barium Swallow with Video (Video Swallowing Test): not done    Treatments at the Time of Hospital Discharge:   Respiratory Treatments: continuous [place] oxygen while awake, PAP while asleep  Oxygen Therapy:  is on oxygen at 4 L/min per nasal cannula. & chest vest, nebulizers  Ventilator:    - BiPAP   IPAP: 10 cmH20, CPAP/EPAP: 5 cmH2O only when sleeping and with 4 L/min oxygen    Rehab Therapies: Physical Therapy and Occupational Therapy  Weight Bearing Status/Restrictions: No weight bearing restrictions  Other Medical Equipment (for information only, NOT a DME order):  hospital bed and rollater/4-wheeled-walker with seat  Other Treatments: PT/OT, nebulizers, oral steroid taper    Patient's personal belongings (please select all that are sent with patient):  Glasses, mobile phone & /cord, pants, top, purse, rollater/walker    RN SIGNATURE:  Electronically signed by Omer Corley RN on 11/1/22 at 12:07 PM EDT    CASE MANAGEMENT/SOCIAL WORK SECTION    Inpatient Status Date: 10/27/22    Readmission Risk Assessment Score:  Readmission Risk              Risk of Unplanned Readmission:  16           Discharging to Facility/ Agency   Name: [facility]  Paula Lockett [address] [facility], [facility]  Phone: [phone]  [phone]    Dialysis Facility (if applicable)   Name:  Address:  Dialysis Schedule:  Phone:  Fax:    / signature: Electronically signed by Maureen Kingsley RN on 11/1/22 at 9:38 AM EDT    PHYSICIAN SECTION    Prognosis: Good    Condition at Discharge: Stable    Rehab Potential (if transferring to Rehab): Good    Recommended Labs or Other Treatments After Discharge: monitor respiratory status    Physician Certification: I certify the above information and transfer of Bridgett Sanchez  is necessary for the continuing treatment of the diagnosis listed and that she requires [place] for less 30 days.      Update Admission H&P: No change in H&P    PHYSICIAN SIGNATURE:  Electronically signed by Marina Campos DO on 11/1/22 at 9:37 AM EDT

## 2022-11-01 NOTE — PROGRESS NOTES
Pulmonary Progress Note    Admit Date: 10/27/2022                            PCP: Goyo Parr DO  Principal Problem:    Bronchiectasis with acute exacerbation [facility])  Active Problems:    Acute respiratory failure with hypoxia and hypercapnia (HCC)    Hypotension due to hypovolemia    Ambulatory dysfunction    Acute respiratory failure with hypoxia (HCC)    MARCELLO (acute kidney injury) ([facility] [address]    COPD exacerbation (HCC)  Resolved Problems:    * No resolved hospital problems. *      Subjective:  Resting in bedside chair  on 4LHF. Denies dyspnea or cough at present   Feeling better, able to cough up some mucous   Going to [facility] today     Medications:       sodium chloride flush  10 mL IntraVENous BID    enoxaparin  40 mg SubCUTAneous Daily    sertraline  150 mg Oral Nightly    methylPREDNISolone  40 mg IntraVENous Q12H    ampicillin-sulbactam  3,000 mg IntraVENous Q6H    mupirocin   Nasal BID    albuterol  2.5 mg Nebulization 4x daily    aspirin  81 mg Oral Daily    atorvastatin  20 mg Oral Nightly    baclofen  10 mg Oral BID    carvedilol  25 mg Oral BID WC    vitamin D  2,000 Units Oral QAM    meloxicam  15 mg Oral Daily    traZODone  25 mg Oral Nightly    gabapentin  400 mg Oral TID    Arformoterol Tartrate  15 mcg Nebulization BID       Vitals:  VITALS:  BP (!) 117/57   Pulse 83   Temp 97.5 °F (36.4 °C) (Oral)   Resp 16   Ht 5' 2\" (1.575 m)   Wt 155 lb (70.3 kg)   SpO2 94%   BMI 28.35 kg/m²   24HR INTAKE/OUTPUT:  No intake or output data in the 24 hours ending 22 1101    CURRENT PULSE OXIMETRY:  SpO2: 94 %  24HR PULSE OXIMETRY RANGE:  SpO2  Av.9 %  Min: 90 %  Max: 98 %  CVP:    VENT SETTINGS:      Additional Respiratory Assessments  Heart Rate: 83  Resp: 16  SpO2: 94 %      EXAM:  General: Alert, in NAD  ENT: No discharge. Pharynx clear. membranes moist   Neck: Supple, Trachea midline. Resp: No accessory muscle use. Non-labored. Lungs diminished scattered rhonchi   CV: Regular rate. Regular rhythm. No murmur . No edema. ABD: Non-tender. Non-distended. Soft, round . Normal bowel sounds. Skin: Warm and dry. M/S: No cyanosis. No joint deformity. No clubbing. Neuro: Awake. Follows commands. O x 3, GARCIA  Psych:  calm and interactive     I/O: No intake/output data recorded. No intake/output data recorded. Results:  CBC:   Recent Labs     10/30/22  0150 10/31/22  0240 11/01/22  0226   WBC 11.2 8.0 7.7   HGB 10.9* 11.4* 11.5   HCT 35.0 36.1 36.3   MCV 92.8 90.5 91.9    330 284     BMP:   Recent Labs     10/30/22  0150 10/31/22  0240 11/01/22  0226    138 138   K 3.8 3.3* 3.6    96* 95*   CO2 31* 31* 33*   BUN 21 24* 26*   CREATININE 1.0 1.0 1.0     LFT:   No results for input(s): ALKPHOS, ALT, AST, PROT, BILITOT, BILIDIR, LABALBU in the last 72 hours. PT/INR: No results for input(s): PROTIME, INR in the last 72 hours. Procalcitonin:   Recent Labs     10/29/22  1450   PROCAL 0.03     Cultures:  Recent Labs     10/29/22  1227   CULTRESP Oral Pharyngeal Jenae present*  Light growth  Heavy growth  Susceptibility testing of this isolate is not routinely  indicated to guide therapy. Beta Lactamase POSITIVE       Culture, Respiratory  Order: [id number]  Status: Preliminary result    Visible to patient: No (not released)    Next appt: None    Specimen Information: Sputum Expectorated   0 Result Notes  Component 10/29/22 1227    CULTURE, RESPIRATORY  Abnormal   Oral Pharyngeal Jenae present   Additional growth present, also evaluating for;   Moraxella (Branhamella) catarrhalis   P      Smear, Respiratory Moderate Polymorphonuclear leukocytes   Epithelial cells not seen   Few Gram positive cocci   Abundant Gram negative cocci    Organism Gram negative santy Abnormal  P    CULTURE, RESPIRATORY Light growth   Identification and sensitivity to follow              ABG:   No results for input(s): PH, PO2, PCO2, HCO3, BE, O2SAT in the last 72 hours.       Films:  XR CHEST PORTABLE    Result Date: 10/27/2022  EXAMINATION: ONE XRAY VIEW OF THE CHEST 10/27/2022 1:23 pm COMPARISON: 3 months prior. HISTORY: ORDERING SYSTEM PROVIDED HISTORY: shortness of breath TECHNOLOGIST PROVIDED HISTORY: Reason for exam:->shortness of breath FINDINGS: There is focal scarring/atelectasis in the left upper lung. The right lung is clear. The heart is prominent in size. No pneumothorax or pleural effusion. No acute cardiopulmonary process. Focal scar/atelectasis in the left upper lung, no significant change. CTA PULMONARY W CONTRAST    Result Date: 10/27/2022  EXAMINATION: CTA OF THE CHEST 10/27/2022 3:59 pm TECHNIQUE: CTA of the chest was performed after the administration of intravenous contrast.  Multiplanar reformatted images are provided for review. MIP images are provided for review. Automated exposure control, iterative reconstruction, and/or weight based adjustment of the mA/kV was utilized to reduce the radiation dose to as low as reasonably achievable. COMPARISON: None. HISTORY: ORDERING SYSTEM PROVIDED HISTORY: elevated ddimer; hypoxia TECHNOLOGIST PROVIDED HISTORY: Reason for exam:->elevated ddimer; hypoxia Decision Support Exception - unselect if not a suspected or confirmed emergency medical condition->Emergency Medical Condition (MA) FINDINGS: Pulmonary Arteries: Pulmonary arteries are adequately opacified for evaluation. No evidence of intraluminal filling defect to suggest pulmonary embolism. Main pulmonary artery is normal in caliber. Mediastinum: No evidence of mediastinal lymphadenopathy. The heart and pericardium demonstrate no acute abnormality. There is no acute abnormality of the thoracic aorta. Thyroid is homogeneous in appearance. Small reactive lymph nodes seen scattered throughout the mediastinum. Lungs/pleura: There is elevation identified of the left hemidiaphragm. Atelectatic changes identified lung bases bilaterally.   There is trace bilateral pleural effusions. Infiltrate cannot be completely excluded at the lung bases. There are air bronchograms present within the lower lobe consolidations. No pulmonary mass or nodule. Mild bronchiectatic changes centrally with scarring inferiorly within the left upper lobe. Upper Abdomen: Stones identified in the gallbladder. Cyst identified on the right kidney. Vascular calcifications seen within the abdominal aorta and iliac vessels. Soft Tissues/Bones: There is extensive degenerative changes identified within the spine. Kyphoplasty seen of the thoracic spine. Multiple levels present with kyphotic curvature centered at the thoracolumbar spine. No evidence of pulmonary embolism. There is trace bilateral pleural effusions with consolidative infiltrate or atelectatic change seen at the lung bases bilaterally and right middle lobe. Mild bronchiectatic changes centrally and scarring in the left upper lobe continued follow-up can be performed if clinically indicated. Assessment:  Acute on chronic respiratory failure  Exacerbation of COPD  Trace B/L Pleural Effusions   Subsegmental Atelectasis   Pneumonia, concern with possibility of aspiration+Moraxella (Branhamella) catarrhalis    Tobacco abuse  History of MS        Plan:  Currently on 4LHF,weaned from 11L -2 days ago  wean to keep POX >90%, baseline is 3L at HS  Will need amb pox prior to dc for liter flow  Increase use of albuterol and Mucinex to enhance expectoration.   Chest physiotherapy with vest. TID  Noninvasive ventilation QHS and PRN -Last Abg improved -will nee arranged at DC   Hold inhaled corticosteroid while using systemic steroid  IV Solu-Medrol 40 q8H decrease to q12 ok for daily today and oral taper to start tomorrow [id number],30x3,20x3,10x3  Brovana nebs to continue   Sputum for gram stain and culture-sent today positive Moraxella (Branhamella) catarrhalis  , now on unasyn   Swallow evaluation  Supportive care   [id number] Sydney Quinonez [place], with oral steroids, nebs, vest, oxygen and NIV          Electronically signed by HIRA Logan on 11/1/2022 at 11:01 AM    Attending Attestation Note:    Patient seen and examined with Hospital Staff, NP. I have extensively reviewed the chart lab work and imaging. I agree with above. Modifications and amendment of note made as necessary.     In addition, the following apply:    Current Facility-Administered Medications   Medication Dose Route Frequency Provider Last Rate Last Admin    sodium chloride flush 0.9 % injection 10 mL  10 mL IntraVENous BID Hugh R Lockso, DO   10 mL at 11/01/22 0899    And    sodium chloride flush 0.9 % injection 10 mL  10 mL IntraVENous PRN Flornirmal Locke Lockso, DO        [START ON 11/2/2022] predniSONE (DELTASONE) tablet 40 mg  40 mg Oral Daily HIRA Logan        Followed by    Whit Mayers ON 11/5/2022] predniSONE (DELTASONE) tablet 30 mg  30 mg Oral Daily HIRA Logan        Followed by    Whit Mayers ON 11/8/2022] predniSONE (DELTASONE) tablet 20 mg  20 mg Oral Daily HIRA Logan        Followed by    Whit Mayers ON 11/11/2022] predniSONE (DELTASONE) tablet 10 mg  10 mg Oral Daily HIRA Logan        enoxaparin (LOVENOX) injection 40 mg  40 mg SubCUTAneous Daily Hugh R Lockso, DO   40 mg at 11/01/22 0855    sertraline (ZOLOFT) tablet 150 mg  150 mg Oral Nightly Hugh R Lockso, DO   150 mg at 10/31/22 2143    ampicillin-sulbactam (UNASYN) 3,000 mg in sodium chloride 0.9 % 100 mL IVPB (mini-bag)  3,000 mg IntraVENous Q6H Hugh R Lockso, DO   Stopped at 11/01/22 0720    mupirocin (BACTROBAN) 2 % ointment   Nasal BID [facility] Lock[facility]   Given at 11/01/22 0854    albuterol (PROVENTIL) nebulizer solution 2.5 mg  2.5 mg Nebulization 4x daily Adam Torres MD   2.5 mg at 11/01/22 1226    aspirin EC tablet 81 mg  81 mg Oral Daily Rin Thompson MD   81 mg at 11/01/22 0909    atorvastatin (LIPITOR) tablet 20 mg  20 mg Oral Nightly Rin Thompson MD   20 mg at 10/31/22 2144    baclofen (LIORESAL) tablet 10 mg  10 mg Oral BID Mikki Garg MD   10 mg at 11/01/22 0855    carvedilol (COREG) tablet 25 mg  25 mg Oral BID WC Mikki Garg MD   25 mg at 11/01/22 6683    diclofenac sodium (VOLTAREN) 1 % gel 2 g  2 g Topical 4x Daily PRN Mikki Garg MD        vitamin D (CHOLECALCIFEROL) tablet 2,000 Units  2,000 Units Oral QAM Mikki Garg MD   2,000 Units at 11/01/22 0855    meloxicam (MOBIC) tablet 15 mg  15 mg Oral Daily Mikki Garg MD   15 mg at 11/01/22 0854    traZODone (DESYREL) tablet 25 mg  25 mg Oral Nightly Mikki Garg MD   25 mg at 10/31/22 2143    gabapentin (NEURONTIN) capsule 400 mg  400 mg Oral TID Mikki Garg MD   400 mg at 11/01/22 0855    ipratropium-albuterol (DUONEB) nebulizer solution 1 ampule  1 ampule Inhalation Q4H PRN Mikki Garg MD   1 ampule at 10/31/22 1741    Arformoterol Tartrate (BROVANA) nebulizer solution 15 mcg  15 mcg Nebulization BID Mikki Garg MD   15 mcg at 11/01/22 0910     Current Outpatient Medications   Medication Sig Dispense Refill    amoxicillin-clavulanate (AUGMENTIN) 875-125 MG per tablet Take 1 tablet by mouth 2 times daily for 8 days 16 tablet 0    predniSONE (DELTASONE) 10 MG tablet Take 4 tablets by mouth daily for 3 days, THEN 3 tablets daily for 3 days, THEN 2 tablets daily for 3 days, THEN 1 tablet daily for 3 days. 30 tablet 0    Compression Stockings MISC by Does not apply route 15-25 mm hg 1 each 0    meloxicam (MOBIC) 15 MG tablet Take 1 tablet by mouth in the morning.  30 tablet 3    Fluticasone-Umeclidin-Vilant (TRELEGY ELLIPTA) 200-62.5-25 MCG/INH AEPB INHALE ONE PUFF BY MOUTH EVERY DAY 60 each 1    diclofenac sodium (VOLTAREN) 1 % GEL Apply 2 g topically 4 times daily as needed for Pain 50 g 1    traZODone (DESYREL) 50 MG tablet Take 0.5 tablets by mouth nightly as needed for Sleep 90 tablet 1    baclofen (LIORESAL) 10 MG tablet TAKE 1 TABLET TWICE A DAY 60 tablet 0 hydroCHLOROthiazide (HYDRODIURIL) 25 MG tablet Take 1 tablet by mouth daily 90 tablet 1    atorvastatin (LIPITOR) 20 MG tablet Take 1 tablet by mouth daily (Patient taking differently: Take 20 mg by mouth nightly) 30 tablet 3    carvedilol (COREG) 25 MG tablet Take 1 tablet by mouth 2 times daily 180 tablet 1    amLODIPine (NORVASC) 5 MG tablet Take 1 tablet by mouth daily 90 tablet 1    gabapentin (NEURONTIN) 400 MG capsule TAKE 1 CAPSULE 3 TIMES A    capsule 0    sertraline (ZOLOFT) 100 MG tablet TAKE 1 AND 1/2 TABLETS     DAILY (Patient taking differently: Take 100 mg by mouth nightly) 135 tablet 0    aspirin 81 MG EC tablet Take 1 tablet by mouth 2 times daily for 30 doses DVT prophylaxis x30 days (Patient taking differently: Take 81 mg by mouth daily DVT prophylaxis x30 days) 60 tablet 0    albuterol sulfate HFA (VENTOLIN HFA) 108 (90 Base) MCG/ACT inhaler Inhale 2 puffs into the lungs 4 times daily as needed for Wheezing 1 Inhaler 5    Cholecalciferol 50 MCG (2000 UT) CAPS Take 2,000 Units by mouth every morning         - abx to Augmentin  - IV steroids to PO with taper  - supportive care  - await D/C planning, plan for total of 7 days of ABX, so Unasyn to Augmentn if needed for course duration of ABX  - OK to SNF  - pulmonary to follow up in 2 weeks time       Kyle Montiel MD  11/1/2022  2:48 PM

## 2022-11-01 NOTE — DISCHARGE SUMMARY
[facility] Physician Discharge Summary     Timbo Mahmood, 77 y. o.female /  1945  / MRN [id number]    Admission date  10/27/2022  Admission diagnoses   COPD exacerbation and acute-on-chronic hypoxia suspect due to medical noncompliance  Weakness / fatigue  Hx anemia, CKD3, HPL, HTN, MS, past breast Ca  Consults   Pulmonology, palliative care  Procedures   See hospital course  Discharge date  2022  12:10 PM  Discharge diagnoses Same  Discharge condition  Stable    Discharge disposition SNF  Activity level   As per physical therapy  Follow-up     MARTHA Luna [address]        [address]  [address] [facility]  [address]  [phone]    Call  for outpatient follow-up with pulmonology      Hospital Course  77 y.o. female w PMH COPD without chronic hypoxia, MS, CKD3, anemia, HTN, HPL, past breast Ca who presented to ED 10/27/2022 from home with complaints of SOB. Was here - for same and dc'd on 2-3LNC O2. She reports that she had only been wearing it at night and even then only intermittently. Also reports she doesn't follow with pulmonology. Initial evaluation in the ED showed pt was hemodynamically stable. Workup showed WBC normal, hgb 10.9 (baseline ~11-12), Cr 1.3 (at baseline), CO2 30. Imaging studies included CXR and CTA, which showed scarring of the ELICIA and trace pleural effusions and consolidation vs atelectasis of the bl bases and RML. Viral panel negative. EKG not performed. Pt was admitted for further evaluation and treatment with consult request to pulmonology. Given breathing tx, steroids, but initially requiring 11L HFNC to maintain sats. Pulm saw 10/28, recommended abx and swallow eval as well as intermittent NIV. Abx actually started on 10/30 w Unasyn.   Pt apparently seemed to be swallowing well and swallow eval canceled. Resp cx ultimately showed Klebsiella and Moraxella. Pt improved over the next few days on abx, O2 weaned down to 4L. Weak working w therapy teams and SNF placement recommended. Pt was amenable and has been accepted at [facility] and I feel she is stable for transition there on PO Augmentin and prednisone. Discharge Exam  Vitals: BP (!) 117/57   Pulse 83   Temp 97.5 °F (36.4 °C) (Oral)   Resp 16   Ht 5' 2\" (1.575 m)   Wt 155 lb (70.3 kg)   SpO2 94%   BMI 28.35 kg/m²   General: well-developed, well-nourished, no acute distress, cooperative  Skin: warm, dry, and generally intact with normal color  HEENT: no gross abnormalities, NCO2 in place  Respiratory: diminished to auscultation bilaterally without respiratory distress  Cardiovascular: regular rate and rhythm   Abdominal: soft, nontender, nondistended, positive bowel sounds  Extremities: no obvious edema or deformity  Neurologic: awake, alert, no gross deficits  Psychiatric: normal affect, cooperative       Medication List        START taking these medications      amoxicillin-clavulanate 875-125 MG per tablet  Commonly known as: AUGMENTIN  Take 1 tablet by mouth 2 times daily for 8 days     predniSONE 10 MG tablet  Commonly known as: DELTASONE  Take 4 tablets by mouth daily for 3 days, THEN 3 tablets daily for 3 days, THEN 2 tablets daily for 3 days, THEN 1 tablet daily for 3 days. Start taking on: November 1, 2022            CHANGE how you take these medications      aspirin 81 MG EC tablet  Take 1 tablet by mouth 2 times daily for 30 doses DVT prophylaxis x30 days  What changed: when to take this     atorvastatin 20 MG tablet  Commonly known as: Lipitor  Take 1 tablet by mouth daily  What changed: when to take this     meloxicam 15 MG tablet  Commonly known as: Mobic  Take 1 tablet by mouth in the morning. What changed: Another medication with the same name was removed.  Continue taking this medication, and follow the directions you see here. sertraline 100 MG tablet  Commonly known as: ZOLOFT  TAKE 1 AND 1/2 TABLETS     DAILY  What changed: See the new instructions.      traZODone 50 MG tablet  Commonly known as: DESYREL  Take 0.5 tablets by mouth nightly as needed for Sleep  What changed: when to take this            CONTINUE taking these medications      albuterol sulfate  (90 Base) MCG/ACT inhaler  Commonly known as: Ventolin HFA  Inhale 2 puffs into the lungs 4 times daily as needed for Wheezing     amLODIPine 5 MG tablet  Commonly known as: NORVASC  Take 1 tablet by mouth daily     baclofen 10 MG tablet  Commonly known as: LIORESAL  TAKE 1 TABLET TWICE A DAY     carvedilol 25 MG tablet  Commonly known as: COREG  Take 1 tablet by mouth 2 times daily     Cholecalciferol 50 MCG (2000 UT) Caps     Compression Stockings Misc  by Does not apply route 15-25 mm hg     diclofenac sodium 1 % Gel  Commonly known as: VOLTAREN  Apply 2 g topically 4 times daily as needed for Pain     gabapentin 400 MG capsule  Commonly known as: NEURONTIN  TAKE 1 CAPSULE 3 TIMES A   DAY     hydroCHLOROthiazide 25 MG tablet  Commonly known as: HYDRODIURIL  Take 1 tablet by mouth daily     Trelegy Ellipta 200-62.5-25 MCG/INH Aepb  Generic drug: Fluticasone-Umeclidin-Vilant  INHALE ONE PUFF BY MOUTH EVERY DAY            STOP taking these medications      memantine 5 MG tablet  Commonly known as: NAMENDA               Where to Get Your Medications        Information about where to get these medications is not yet available    Ask your nurse or doctor about these medications  amoxicillin-clavulanate 875-125 MG per tablet  predniSONE 10 MG tablet       Note that more than 30 minutes was spent in preparing discharge papers, discussing discharge with patient, medication review, etc.    Electronically signed by Kendy Cole DO on 11/1/2022 at 12:10 PM

## 2022-11-02 NOTE — PROGRESS NOTES
Physician Progress Note      Noah Dickerson  CSN #:                  [id number]  :                       1945  ADMIT DATE:       10/27/2022 12:50 PM  [address] [place] DATE:        2022 2:09 PM  RESPONDING  PROVIDER #:        Christen Gomez DO          QUERY TEXT:    Dear Attending Physician,    Patient admitted with COPD Ex. Documentation reflects \" possible asp   Pneumonia\"  in PCP note 10/29 and Pulmo note 10/28\" Acute on chronic   respiratory failure  Exacerbation of COPD Pneumonia, concern with possibility   of aspiration \" . If possible, please document in the progress notes and   discharge summary if \"  Pneumonia \" was: The medical record reflects the following:  Risk Factors: COPD exacerbation , Ac on chronic Resp Failure, Atelectasis,   tobacco use  Clinical Indicators: Per Pulmo 10/28,\". .. Acute on chronic respiratory failure    Exacerbation of COPD Pneumonia, concern with possibility of   aspiration. Brent Le Brent Le Antibiotic for gram stain and culture . .. \"  Per PCP 10/29-10/31,   \". .. possible asp Pneumonia . Brent Le Brent Le \"  Per Pulmo 10/30,\". Brent Le Brent Le Pneumonia, concern with   possibility of aspiration+ Moraxella (Branhamella) catarrhalis. Brent Le Brent Le \"  Per PCP   10/30,\". ..now resp cx w mod growth of GNR; started on Unasyn  . Brent Le Brent Le \"  Treatment: IV ATB, IV Solumedrol, BIPAP, Nebs    Thank you,  Pravin Lowe RN CCDS  Clinical Documentation Improvement Specialist  Options provided:  -- Pneumonia confirmed after study, present on admission  -- Pneumonia confirmed after study, not present on admission  -- Pneumonia ruled out after study  -- Other - I will add my own diagnosis  -- Disagree - Not applicable / Not valid  -- Disagree - Clinically unable to determine / Unknown  -- Refer to Clinical Documentation Reviewer    PROVIDER RESPONSE TEXT:    Pneumonia confirmed after study, present on admission.     Query created by: Luis Miguel Malhotra on 2022 1:04 PM      Electronically signed by:  Christen Gomez DO 2022 1:30 PM

## 2022-11-10 ENCOUNTER — APPOINTMENT (OUTPATIENT)
Dept: GENERAL RADIOLOGY | Age: 77
DRG: 377 | End: 2022-11-10
Payer: MEDICARE

## 2022-11-10 ENCOUNTER — APPOINTMENT (OUTPATIENT)
Dept: CT IMAGING | Age: 77
DRG: 377 | End: 2022-11-10
Payer: MEDICARE

## 2022-11-10 ENCOUNTER — HOSPITAL ENCOUNTER (INPATIENT)
Age: 77
LOS: 7 days | Discharge: HOME | DRG: 377 | End: 2022-11-17
Attending: EMERGENCY MEDICINE | Admitting: STUDENT IN AN ORGANIZED HEALTH CARE EDUCATION/TRAINING PROGRAM
Payer: MEDICARE

## 2022-11-10 DIAGNOSIS — I95.9 HYPOTENSION, UNSPECIFIED HYPOTENSION TYPE: ICD-10-CM

## 2022-11-10 DIAGNOSIS — K92.2 GASTROINTESTINAL HEMORRHAGE, UNSPECIFIED GASTROINTESTINAL HEMORRHAGE TYPE: Primary | ICD-10-CM

## 2022-11-10 DIAGNOSIS — D64.9 ANEMIA, UNSPECIFIED TYPE: ICD-10-CM

## 2022-11-10 PROBLEM — R57.8 HEMORRHAGIC SHOCK (HCC): Status: ACTIVE | Noted: 2022-01-01

## 2022-11-10 PROBLEM — E87.20 LACTIC ACIDOSIS: Status: ACTIVE | Noted: 2022-11-10

## 2022-11-10 PROBLEM — J96.11 CHRONIC RESPIRATORY FAILURE WITH HYPOXIA (HCC): Status: ACTIVE | Noted: 2022-11-10

## 2022-11-10 LAB
ABO/RH: NORMAL
ALBUMIN SERPL-MCNC: 2.6 G/DL (ref 3.5–5.2)
ALP BLD-CCNC: 41 U/L (ref 35–104)
ALT SERPL-CCNC: 14 U/L (ref 0–32)
ANION GAP SERPL CALCULATED.3IONS-SCNC: 13 MMOL/L (ref 7–16)
ANISOCYTOSIS: ABNORMAL
ANTIBODY SCREEN: NORMAL
AST SERPL-CCNC: 14 U/L (ref 0–31)
BACTERIA: ABNORMAL /HPF
BASOPHILS ABSOLUTE: 0.02 E9/L (ref 0–0.2)
BASOPHILS RELATIVE PERCENT: 0.1 % (ref 0–2)
BILIRUB SERPL-MCNC: 0.5 MG/DL (ref 0–1.2)
BILIRUBIN URINE: NEGATIVE
BLOOD, URINE: ABNORMAL
BUN BLDV-MCNC: 28 MG/DL (ref 6–23)
BURR CELLS: ABNORMAL
CALCIUM SERPL-MCNC: 7.4 MG/DL (ref 8.6–10.2)
CHLORIDE BLD-SCNC: 109 MMOL/L (ref 98–107)
CLARITY: CLEAR
CO2: 20 MMOL/L (ref 22–29)
COLOR: YELLOW
CREAT SERPL-MCNC: 1.4 MG/DL (ref 0.5–1)
D DIMER: 350 NG/ML DDU
EOSINOPHILS ABSOLUTE: 0.01 E9/L (ref 0.05–0.5)
EOSINOPHILS RELATIVE PERCENT: 0.1 % (ref 0–6)
EPITHELIAL CELLS, UA: ABNORMAL /HPF
GFR SERPL CREATININE-BSD FRML MDRD: 39 ML/MIN/1.73
GLUCOSE BLD-MCNC: 160 MG/DL (ref 74–99)
GLUCOSE URINE: NEGATIVE MG/DL
HCT VFR BLD CALC: 22.8 % (ref 34–48)
HCT VFR BLD CALC: 29.4 % (ref 34–48)
HCT VFR BLD CALC: 34 % (ref 34–48)
HEMOGLOBIN: 10.5 G/DL (ref 11.5–15.5)
HEMOGLOBIN: 6.6 G/DL (ref 11.5–15.5)
HEMOGLOBIN: 9.8 G/DL (ref 11.5–15.5)
HYPOCHROMIA: ABNORMAL
IMMATURE GRANULOCYTES #: 0.42 E9/L
IMMATURE GRANULOCYTES %: 2.9 % (ref 0–5)
KETONES, URINE: NEGATIVE MG/DL
LACTIC ACID, SEPSIS: 3.8 MMOL/L (ref 0.5–1.9)
LACTIC ACID, SEPSIS: 7.2 MMOL/L (ref 0.5–1.9)
LEUKOCYTE ESTERASE, URINE: ABNORMAL
LIPASE: 47 U/L (ref 13–60)
LYMPHOCYTES ABSOLUTE: 1.09 E9/L (ref 1.5–4)
LYMPHOCYTES RELATIVE PERCENT: 7.6 % (ref 20–42)
MAGNESIUM: 1.4 MG/DL (ref 1.6–2.6)
MCH RBC QN AUTO: 27.8 PG (ref 26–35)
MCH RBC QN AUTO: 30.1 PG (ref 26–35)
MCHC RBC AUTO-ENTMCNC: 28.9 % (ref 32–34.5)
MCHC RBC AUTO-ENTMCNC: 33.3 % (ref 32–34.5)
MCV RBC AUTO: 90.2 FL (ref 80–99.9)
MCV RBC AUTO: 96.2 FL (ref 80–99.9)
MONOCYTES ABSOLUTE: 0.42 E9/L (ref 0.1–0.95)
MONOCYTES RELATIVE PERCENT: 2.9 % (ref 2–12)
MUCUS: PRESENT /LPF
NEUTROPHILS ABSOLUTE: 12.29 E9/L (ref 1.8–7.3)
NEUTROPHILS RELATIVE PERCENT: 86.4 % (ref 43–80)
NITRITE, URINE: NEGATIVE
OVALOCYTES: ABNORMAL
PDW BLD-RTO: 15.9 FL (ref 11.5–15)
PDW BLD-RTO: 16.6 FL (ref 11.5–15)
PH UA: 5.5 (ref 5–9)
PLATELET # BLD: 212 E9/L (ref 130–450)
PLATELET # BLD: 256 E9/L (ref 130–450)
PMV BLD AUTO: 9.3 FL (ref 7–12)
PMV BLD AUTO: 9.4 FL (ref 7–12)
POIKILOCYTES: ABNORMAL
POLYCHROMASIA: ABNORMAL
POTASSIUM REFLEX MAGNESIUM: 5 MMOL/L (ref 3.5–5)
PRO-BNP: 1869 PG/ML (ref 0–450)
PROTEIN UA: 30 MG/DL
RBC # BLD: 2.37 E12/L (ref 3.5–5.5)
RBC # BLD: 3.26 E12/L (ref 3.5–5.5)
RBC UA: ABNORMAL /HPF (ref 0–2)
SODIUM BLD-SCNC: 142 MMOL/L (ref 132–146)
SPECIFIC GRAVITY UA: 1.02 (ref 1–1.03)
TEAR DROP CELLS: ABNORMAL
TOTAL PROTEIN: 4.5 G/DL (ref 6.4–8.3)
TROPONIN, HIGH SENSITIVITY: 249 NG/L (ref 0–9)
TROPONIN, HIGH SENSITIVITY: 44 NG/L (ref 0–9)
UROBILINOGEN, URINE: 0.2 E.U./DL
WBC # BLD: 14.3 E9/L (ref 4.5–11.5)
WBC # BLD: 32.4 E9/L (ref 4.5–11.5)
WBC UA: ABNORMAL /HPF (ref 0–5)

## 2022-11-10 PROCEDURE — 2500000003 HC RX 250 WO HCPCS: Performed by: EMERGENCY MEDICINE

## 2022-11-10 PROCEDURE — 84484 ASSAY OF TROPONIN QUANT: CPT

## 2022-11-10 PROCEDURE — 87077 CULTURE AEROBIC IDENTIFY: CPT

## 2022-11-10 PROCEDURE — 83735 ASSAY OF MAGNESIUM: CPT

## 2022-11-10 PROCEDURE — 83690 ASSAY OF LIPASE: CPT

## 2022-11-10 PROCEDURE — 71045 X-RAY EXAM CHEST 1 VIEW: CPT

## 2022-11-10 PROCEDURE — 99285 EMERGENCY DEPT VISIT HI MDM: CPT

## 2022-11-10 PROCEDURE — 2500000003 HC RX 250 WO HCPCS

## 2022-11-10 PROCEDURE — 83605 ASSAY OF LACTIC ACID: CPT

## 2022-11-10 PROCEDURE — 81001 URINALYSIS AUTO W/SCOPE: CPT

## 2022-11-10 PROCEDURE — 70450 CT HEAD/BRAIN W/O DYE: CPT

## 2022-11-10 PROCEDURE — 87150 DNA/RNA AMPLIFIED PROBE: CPT

## 2022-11-10 PROCEDURE — 85025 COMPLETE CBC W/AUTO DIFF WBC: CPT

## 2022-11-10 PROCEDURE — 6360000002 HC RX W HCPCS

## 2022-11-10 PROCEDURE — A4216 STERILE WATER/SALINE, 10 ML: HCPCS

## 2022-11-10 PROCEDURE — 6360000002 HC RX W HCPCS: Performed by: EMERGENCY MEDICINE

## 2022-11-10 PROCEDURE — 85027 COMPLETE CBC AUTOMATED: CPT

## 2022-11-10 PROCEDURE — P9016 RBC LEUKOCYTES REDUCED: HCPCS

## 2022-11-10 PROCEDURE — 83880 ASSAY OF NATRIURETIC PEPTIDE: CPT

## 2022-11-10 PROCEDURE — 99222 1ST HOSP IP/OBS MODERATE 55: CPT | Performed by: STUDENT IN AN ORGANIZED HEALTH CARE EDUCATION/TRAINING PROGRAM

## 2022-11-10 PROCEDURE — C9113 INJ PANTOPRAZOLE SODIUM, VIA: HCPCS

## 2022-11-10 PROCEDURE — 85018 HEMOGLOBIN: CPT

## 2022-11-10 PROCEDURE — 2580000003 HC RX 258

## 2022-11-10 PROCEDURE — 86923 COMPATIBILITY TEST ELECTRIC: CPT

## 2022-11-10 PROCEDURE — 2580000003 HC RX 258: Performed by: STUDENT IN AN ORGANIZED HEALTH CARE EDUCATION/TRAINING PROGRAM

## 2022-11-10 PROCEDURE — 85014 HEMATOCRIT: CPT

## 2022-11-10 PROCEDURE — 85378 FIBRIN DEGRADE SEMIQUANT: CPT

## 2022-11-10 PROCEDURE — 2580000003 HC RX 258: Performed by: EMERGENCY MEDICINE

## 2022-11-10 PROCEDURE — 6360000002 HC RX W HCPCS: Performed by: STUDENT IN AN ORGANIZED HEALTH CARE EDUCATION/TRAINING PROGRAM

## 2022-11-10 PROCEDURE — 51702 INSERT TEMP BLADDER CATH: CPT

## 2022-11-10 PROCEDURE — 05HM33Z INSERTION OF INFUSION DEVICE INTO RIGHT INTERNAL JUGULAR VEIN, PERCUTANEOUS APPROACH: ICD-10-PCS | Performed by: INTERNAL MEDICINE

## 2022-11-10 PROCEDURE — 86901 BLOOD TYPING SEROLOGIC RH(D): CPT

## 2022-11-10 PROCEDURE — 87088 URINE BACTERIA CULTURE: CPT

## 2022-11-10 PROCEDURE — 36592 COLLECT BLOOD FROM PICC: CPT

## 2022-11-10 PROCEDURE — 86920 COMPATIBILITY TEST SPIN: CPT

## 2022-11-10 PROCEDURE — 87040 BLOOD CULTURE FOR BACTERIA: CPT

## 2022-11-10 PROCEDURE — 86900 BLOOD TYPING SEROLOGIC ABO: CPT

## 2022-11-10 PROCEDURE — 36415 COLL VENOUS BLD VENIPUNCTURE: CPT

## 2022-11-10 PROCEDURE — 74176 CT ABD & PELVIS W/O CONTRAST: CPT

## 2022-11-10 PROCEDURE — 2000000000 HC ICU R&B

## 2022-11-10 PROCEDURE — 80053 COMPREHEN METABOLIC PANEL: CPT

## 2022-11-10 PROCEDURE — 86850 RBC ANTIBODY SCREEN: CPT

## 2022-11-10 RX ORDER — TRAZODONE HYDROCHLORIDE 50 MG/1
25 TABLET ORAL NIGHTLY PRN
Status: DISCONTINUED | OUTPATIENT
Start: 2022-11-10 | End: 2022-11-17 | Stop reason: HOSPADM

## 2022-11-10 RX ORDER — SODIUM CHLORIDE 0.9 % (FLUSH) 0.9 %
5-40 SYRINGE (ML) INJECTION EVERY 12 HOURS SCHEDULED
Status: DISCONTINUED | OUTPATIENT
Start: 2022-11-10 | End: 2022-11-17 | Stop reason: HOSPADM

## 2022-11-10 RX ORDER — ACETAMINOPHEN 650 MG/1
650 SUPPOSITORY RECTAL EVERY 6 HOURS PRN
Status: DISCONTINUED | OUTPATIENT
Start: 2022-11-10 | End: 2022-11-17 | Stop reason: HOSPADM

## 2022-11-10 RX ORDER — MAGNESIUM SULFATE IN WATER 40 MG/ML
2000 INJECTION, SOLUTION INTRAVENOUS ONCE
Status: COMPLETED | OUTPATIENT
Start: 2022-11-10 | End: 2022-11-11

## 2022-11-10 RX ORDER — SODIUM CHLORIDE 0.9 % (FLUSH) 0.9 %
5-40 SYRINGE (ML) INJECTION PRN
Status: DISCONTINUED | OUTPATIENT
Start: 2022-11-10 | End: 2022-11-17 | Stop reason: HOSPADM

## 2022-11-10 RX ORDER — BACLOFEN 10 MG/1
10 TABLET ORAL 2 TIMES DAILY
Status: DISCONTINUED | OUTPATIENT
Start: 2022-11-10 | End: 2022-11-17 | Stop reason: HOSPADM

## 2022-11-10 RX ORDER — SERTRALINE HYDROCHLORIDE 100 MG/1
100 TABLET, FILM COATED ORAL NIGHTLY
Status: DISCONTINUED | OUTPATIENT
Start: 2022-11-10 | End: 2022-11-17 | Stop reason: HOSPADM

## 2022-11-10 RX ORDER — SODIUM CHLORIDE 9 MG/ML
INJECTION, SOLUTION INTRAVENOUS PRN
Status: DISCONTINUED | OUTPATIENT
Start: 2022-11-10 | End: 2022-11-17 | Stop reason: HOSPADM

## 2022-11-10 RX ORDER — SODIUM CHLORIDE, SODIUM LACTATE, POTASSIUM CHLORIDE, AND CALCIUM CHLORIDE .6; .31; .03; .02 G/100ML; G/100ML; G/100ML; G/100ML
1000 INJECTION, SOLUTION INTRAVENOUS ONCE
Status: COMPLETED | OUTPATIENT
Start: 2022-11-10 | End: 2022-11-10

## 2022-11-10 RX ORDER — SODIUM CHLORIDE 9 MG/ML
INJECTION, SOLUTION INTRAVENOUS CONTINUOUS
Status: DISCONTINUED | OUTPATIENT
Start: 2022-11-10 | End: 2022-11-13

## 2022-11-10 RX ORDER — ATORVASTATIN CALCIUM 20 MG/1
20 TABLET, FILM COATED ORAL NIGHTLY
Status: DISCONTINUED | OUTPATIENT
Start: 2022-11-10 | End: 2022-11-17 | Stop reason: HOSPADM

## 2022-11-10 RX ORDER — SODIUM CHLORIDE 9 MG/ML
INJECTION, SOLUTION INTRAVENOUS PRN
Status: DISCONTINUED | OUTPATIENT
Start: 2022-11-10 | End: 2022-11-11

## 2022-11-10 RX ORDER — ONDANSETRON 4 MG/1
4 TABLET, ORALLY DISINTEGRATING ORAL EVERY 8 HOURS PRN
Status: DISCONTINUED | OUTPATIENT
Start: 2022-11-10 | End: 2022-11-17 | Stop reason: HOSPADM

## 2022-11-10 RX ORDER — ACETAMINOPHEN 325 MG/1
650 TABLET ORAL EVERY 6 HOURS PRN
Status: DISCONTINUED | OUTPATIENT
Start: 2022-11-10 | End: 2022-11-17 | Stop reason: HOSPADM

## 2022-11-10 RX ORDER — ALBUTEROL SULFATE 2.5 MG/3ML
2.5 SOLUTION RESPIRATORY (INHALATION) EVERY 6 HOURS PRN
Status: DISCONTINUED | OUTPATIENT
Start: 2022-11-10 | End: 2022-11-17 | Stop reason: HOSPADM

## 2022-11-10 RX ORDER — 0.9 % SODIUM CHLORIDE 0.9 %
1000 INTRAVENOUS SOLUTION INTRAVENOUS ONCE
Status: COMPLETED | OUTPATIENT
Start: 2022-11-10 | End: 2022-11-10

## 2022-11-10 RX ORDER — ONDANSETRON 2 MG/ML
4 INJECTION INTRAMUSCULAR; INTRAVENOUS EVERY 6 HOURS PRN
Status: DISCONTINUED | OUTPATIENT
Start: 2022-11-10 | End: 2022-11-17 | Stop reason: HOSPADM

## 2022-11-10 RX ORDER — ALBUTEROL SULFATE 2.5 MG/3ML
2.5 SOLUTION RESPIRATORY (INHALATION) 4 TIMES DAILY PRN
Status: DISCONTINUED | OUTPATIENT
Start: 2022-11-10 | End: 2022-11-11

## 2022-11-10 RX ADMIN — SODIUM CHLORIDE 80 MG: 9 INJECTION INTRAMUSCULAR; INTRAVENOUS; SUBCUTANEOUS at 19:00

## 2022-11-10 RX ADMIN — SODIUM CHLORIDE, POTASSIUM CHLORIDE, SODIUM LACTATE AND CALCIUM CHLORIDE 1000 ML: 600; 310; 30; 20 INJECTION, SOLUTION INTRAVENOUS at 18:50

## 2022-11-10 RX ADMIN — SODIUM CHLORIDE 1000 ML: 9 INJECTION, SOLUTION INTRAVENOUS at 17:02

## 2022-11-10 RX ADMIN — ONDANSETRON 4 MG: 2 INJECTION INTRAMUSCULAR; INTRAVENOUS at 20:28

## 2022-11-10 RX ADMIN — SODIUM CHLORIDE: 9 INJECTION, SOLUTION INTRAVENOUS at 23:07

## 2022-11-10 RX ADMIN — SODIUM CHLORIDE, PRESERVATIVE FREE 10 ML: 5 INJECTION INTRAVENOUS at 21:00

## 2022-11-10 RX ADMIN — NOREPINEPHRINE BITARTRATE 5 MCG/MIN: 1 INJECTION, SOLUTION, CONCENTRATE INTRAVENOUS at 17:30

## 2022-11-10 RX ADMIN — PHENYLEPHRINE HYDROCHLORIDE 30 MCG/MIN: 100 INJECTION INTRAVENOUS at 19:45

## 2022-11-10 RX ADMIN — SODIUM CHLORIDE 1000 ML: 9 INJECTION, SOLUTION INTRAVENOUS at 15:45

## 2022-11-10 RX ADMIN — VASOPRESSIN 0.03 UNITS/MIN: 20 INJECTION INTRAVENOUS at 22:13

## 2022-11-10 RX ADMIN — MAGNESIUM SULFATE HEPTAHYDRATE 2000 MG: 40 INJECTION, SOLUTION INTRAVENOUS at 23:11

## 2022-11-10 RX ADMIN — NOREPINEPHRINE BITARTRATE 30 MCG/MIN: 1 INJECTION, SOLUTION, CONCENTRATE INTRAVENOUS at 18:00

## 2022-11-10 RX ADMIN — SODIUM CHLORIDE 8 MG/HR: 9 INJECTION, SOLUTION INTRAVENOUS at 19:17

## 2022-11-10 ASSESSMENT — PAIN - FUNCTIONAL ASSESSMENT
PAIN_FUNCTIONAL_ASSESSMENT: NONE - DENIES PAIN

## 2022-11-10 ASSESSMENT — ENCOUNTER SYMPTOMS
VOMITING: 0
WHEEZING: 0
EYE ITCHING: 0
RHINORRHEA: 0
ABDOMINAL PAIN: 0
NAUSEA: 0
EYE REDNESS: 0
DIARRHEA: 0
SHORTNESS OF BREATH: 0

## 2022-11-10 ASSESSMENT — PAIN SCALES - GENERAL: PAINLEVEL_OUTOF10: 0

## 2022-11-10 NOTE — CONSULTS
Critical Care Admit/Consult Note         Patient - Apple Hernandez   MRN -  [id number]   Acct # - [de-identified]   - 1945      Date of Admission -  11/10/2022  3:06 PM  Date of evaluation -  11/10/2022  24/24   Hospital Day - 0          ADMIT/CONSULT DETAILS     Reason for Admit/Consult   Hemorrhagic shock secondary to GI hemorrhage     Alexa Pelletier MD  Primary Care Physician - Holden Goins DO       HPI   The patient is a 77 y.o. female with significant past medical history of hyperlipidemia, hypertension, COPD. She presented to the hospital on 11/10 for continued episodes of hypotension and altered mental status while at [place] [address]. She was recently admitted for COPD exacerbation and multiple falls, she has been clinically rehab facility recovering since then. Her antihypertensives had been discontinued several days ago with persistent hypotension. She has been on 4 L nasal cannula oxygen since her hospitalization for COPD exacerbation. She has been having intermittent diarrhea but was unable to say if it was black or bloody. In the emergency department she was found to have a hemoglobin of 6.6 with ana blood in her stool, her hemoglobin had been 11.43 days ago. Lactic acid was elevated 3.8, she is not experiencing abdominal pain. Kidney function is relatively at patient's baseline. She was transfused fluid boluses in the emergency department in addition to trauma blood. 2 more units PRBCs have been ordered. Initial blood pressure in the emergency department was noted at 58/44, it did improve with fluids and blood although with high risk of deterioration she will come to the ICU for critical care management of hemorrhagic shock secondary to GI hemorrhage. General surgery is aware of the patient.      Past Medical History         Diagnosis Date    Arthritis     Breast cancer ([facility] [address]     CKD (chronic kidney disease)     COPD (chronic obstructive pulmonary disease) ([facility] [address]     Hyperlipidemia     Hypertension     MS (multiple sclerosis) ([facility] [address] 5/31/2012    Multiple sclerosis ([facility] [address]     diagnosised 8  yrs ago [facility]        Past Surgical History           Procedure Laterality Date    APPENDECTOMY      BREAST SURGERY  4/13/2012    biopsy - negative    HIP ARTHROPLASTY Right 04/13/2011    Right AIDEE  ALLISON Cutler MD    HYSTERECTOMY (CERVIX STATUS UNKNOWN)      KNEE ARTHROPLASTY Left 10/01/2013    Left TKA  ALLISON Cutler MD    KNEE ARTHROPLASTY Right 08/29/2012    Right TKA  ALLISON Cutler MD    TOTAL HIP ARTHROPLASTY Left 3/25/2022    LEFT HIP TOTAL ARTHROPLASTY performed by Mikel Chavez MD at [facility] OR       Current Medications   Current Medications    pantoprazole (PROTONIX) 40 mg injection  40 mg IntraVENous Q12H       IV Drips/Infusions   sodium chloride       Home Medications  Not in a hospital admission. Diet/Nutrition   No diet orders on file    Allergies   Macrodantin [nitrofurantoin macrocrystal]    Social History   Tobacco   reports that she has been smoking cigarettes. She has a 50.00 pack-year smoking history. She has never used smokeless tobacco.    Alcohol     reports no history of alcohol use. Occupational history : retired nurse    Family History         Problem Relation Age of Onset    Mult Sclerosis Father     Thyroid Cancer Mother     Cirrhosis Mother        Sleep History   n/a    ROS   REVIEW OF SYSTEMS:  Please see HPI above. All bolded are positive. All un-bolded are negative.   Constitutional Symptoms:  fever, chills, fatigue, generalized weakness, diaphoresis, increase in thirst, loss of appetite  Eyes: vision change   Ears, Nose, Mouth, Throat: hearing loss, nasal congestion, sores in the mouth  Cardiovascular: chest pain, chest heaviness, palpitations  Respiratory: shortness of breath, wheezing, coughing  Gastrointestinal: abdominal pain, nausea, vomiting, diarrhea, constipation, melena, hematochezia, hematemesis  Genitourinary: dysuria, hematuria, or increase in frequency  Musculoskeletal: lower extremity edema, myalgias, arthralgias, back pain  Integumentary: rashes, itching   Neurological: headache, lightheadedness, dizziness, confusion, syncope, numbness, tingling, focal weakness  Psychiatric: depression, suicidal ideation, or anxiety  Endocrine: unintentional weight change  Hematologic/Lymphatic: lymphadenopathy, easy bruising, easy bleeding   Allergic/Immunologic: recurrent infections      Lines and Devices   IJ 11/10    Drains/Tubes/Outputs   [place] 11/10    Mechanical Ventilation Data   VENT SETTINGS (Comprehensive)     Additional Respiratory Assessments  Heart Rate: 76  Resp: 18  SpO2: 100 %    ABG  Lab Results   Component Value Date/Time    PH 7.408 10/28/2022 05:57 PM    PCO2 47.7 10/28/2022 05:57 PM    PO2 77.3 10/28/2022 05:57 PM    HCO3 29.4 10/28/2022 05:57 PM    O2SAT 95.3 10/28/2022 05:57 PM     Lab Results   Component Value Date/Time    MODE [id number]   L 10/28/2022 05:57 PM         Vitals    height is 5' 2\" (1.575 m) and weight is 155 lb (70.3 kg). Her oral temperature is 97.9 °F (36.6 °C). Her blood pressure is 58/44 (abnormal) and her pulse is 76. Her respiration is 18 and oxygen saturation is 100%.        Temperature Range: Temp: 97.9 °F (36.6 °C) Temp  Av.2 °F (36.8 °C)  Min: 97.9 °F (36.6 °C)  Max: 98.5 °F (36.9 °C)  BP Range:  Systolic (84NVQ), NPX:02 , Min:58 , FEU:83     Diastolic (76WKR), DXY:10, Min:44, Max:44    Pulse Range: Pulse  Av.5  Min: 76  Max: 111  Respiration Range: Resp  Av  Min: 16  Max: 18  Current Pulse Ox[de-identified]  SpO2: 100 %  24HR Pulse Ox Range:  SpO2  Av.5 %  Min: 93 %  Max: 100 %  Oxygen Amount and Delivery: O2 Flow Rate (L/min): 3 L/min    I/O (24 Hours)    Patient Vitals for the past 8 hrs:   BP Temp Temp src Pulse Resp SpO2 Height Weight   11/10/22 1612 -- -- -- -- -- -- 5' 2\" (1.575 m) 155 lb (70.3 kg)   11/10/22 1545 -- 97.9 °F (36.6 °C) Oral 76 18 100 % 5' 2\" (1.575 m) -- 11/10/22 1522 (!) 58/44 98.5 °F (36.9 °C) Oral (!) 111 16 93 % -- --     No intake or output data in the 24 hours ending 11/10/22 1655  No intake/output data recorded. Patient Vitals for the past 96 hrs (Last 3 readings):   Weight   11/10/22 1612 155 lb (70.3 kg)         Exam   PHYSICAL EXAM:  General: awake, alert, oriented to person, place, time, and purpose, appears stated age, cooperative, no acute distress, pleasant mood  Eyes: conjunctivae/corneas pale, sclera non icteric, EOMI  Ears: no obvious scars, no lesions, no masses, hearing intact  Mouth: mucous membranes moist, no obvious oral sores  Head: normocephalic, atraumatic  Neck: no JVD, no adenopathy, no thyromegaly, neck is supple, trachea is midline  Back: ROM normal, no CVA tenderness.   Chest: no pain on palpation  Lungs: clear to auscultation bilaterally, without rhonchi, crackle, generalized wheezing on 6L nasal cannula  Heart: tachycardic rate and regular rhythm  Abdomen: soft, non-tender; bowel sounds normal; no masses, no organomegaly  Extremities: no lower extremity edema, extremities atraumatic, no cyanosis, no clubbing, 2+ pedal pulses palpated  Skin: normal color, normal texture, normal turgor, no rashes, no lesions  Neurologic: strength equal bilaterally, cranial nerves II-XII grossly intact    Data   Old records and images have been reviewed    Lab Results   CBC     Lab Results   Component Value Date/Time    WBC 14.3 11/10/2022 03:33 PM    RBC 2.37 11/10/2022 03:33 PM    HGB 6.6 11/10/2022 03:33 PM    HCT 22.8 11/10/2022 03:33 PM     11/10/2022 03:33 PM    MCV 96.2 11/10/2022 03:33 PM    MCH 27.8 11/10/2022 03:33 PM    MCHC 28.9 11/10/2022 03:33 PM    RDW 16.6 11/10/2022 03:33 PM    NRBC 0.0 11/07/2022 07:10 AM    SEGSPCT 54 10/04/2013 05:50 AM    LYMPHOPCT 19.5 11/07/2022 07:10 AM    MONOPCT 0.9 11/07/2022 07:10 AM    MONOPCT 0.0 10/28/2022 02:37 AM    BASOPCT 0.0 10/28/2022 02:37 AM    MONOSABS 0.00 10/28/2022 02:37 AM LYMPHSABS 0.13 10/28/2022 02:37 AM    EOSABS 0.1 11/07/2022 07:10 AM    EOSABS 0.00 10/28/2022 02:37 AM    BASOSABS 0.0 11/07/2022 07:10 AM    BASOSABS 0.00 10/28/2022 02:37 AM       BMP   Lab Results   Component Value Date/Time     11/10/2022 03:33 PM    K 5.0 11/10/2022 03:33 PM     11/10/2022 03:33 PM    CO2 20 11/10/2022 03:33 PM    BUN 28 11/10/2022 03:33 PM    CREATININE 1.4 11/10/2022 03:33 PM    GLUCOSE 160 11/10/2022 03:33 PM    GLUCOSE 85 11/10/2022 05:22 AM    CALCIUM 7.4 11/10/2022 03:33 PM       LFTS  Lab Results   Component Value Date/Time    ALKPHOS 41 11/10/2022 03:33 PM    ALT 14 11/10/2022 03:33 PM    AST 14 11/10/2022 03:33 PM    PROT 4.5 11/10/2022 03:33 PM    BILITOT 0.5 11/10/2022 03:33 PM    BILIDIR 0.1 11/02/2022 06:20 AM    LABALBU 2.6 11/10/2022 03:33 PM    LABALBU 2.7 11/07/2022 07:10 AM       INR  No results for input(s): PROTIME, INR in the last 72 hours. APTT  No results for input(s): APTT in the last 72 hours. Lactic Acid  Lab Results   Component Value Date/Time    LACTA 1.6 05/30/2012 10:40 PM        BNP   No results for input(s): BNP in the last 72 hours. Cultures   No results for input(s): BC in the last 72 hours. No results for input(s): Ann Dragon in the last 72 hours. No results for input(s): LABURIN in the last 72 hours. Radiology   11/10/2022  XR CHEST PORTABLE   Final Result   Linear atelectasis in both lungs and emphysematous changes in the upper lobes.          CT HEAD WO CONTRAST    (Results Pending)   CT ABDOMEN PELVIS WO CONTRAST Additional Contrast? None    (Results Pending)         SYSTEMS ASSESSMENT    Neuro   #No acute issues  -Monitor mental status, nursing facility had noted that patient was becoming altered with her episodes of hypotension  -AxOx4 on exam, pleasant    Respiratory   #COPD  -Recent admission for COPD exacerbation on 10/27  -Chronically on 6 L nasal cannula oxygen since that admission  -Maintain oxygen requirements continue saturations at 88 to 92%  -Continue albuterol nebulizers    Cardiovascular   #Hemorrhagic shock  -Secondary to GI hemorrhage, has received 1 unit of trauma blood with second at the ready  -Received 1L fluid bolus in the emergency department with plans for second  -We will continue maintenance fluids at 100 mL/h  -Levophed if necessary to maintain MAP greater than 65, transfuse as needed    #History of hypertension  -Hold antihypertensives    Gastrointestinal   #GI hemorrhage  -Santhosh Slough blood in bowel movement, increasing blood as needed  -General surgery aware  -H&H every 6  -Hold anticoagulation and aspirin  -On meloxicam outpatient at least until 10/27, continue to hold  -Protonix twice daily  -NPO    Renal   #Borderline MARCELLO  -Baseline creatinine 1.0, today is 1.4 likely secondary to renal hypoperfusion  -IVF at 100 mL/h    #Lactic acidosis  -Likely secondary to hemorrhagic shock  -Lactic q6  -Plan for fluid and blood resuscitation     Infectious Disease   #No acute issues  -Cultures sent and are pending    Hematology/Oncology   #Acute blood loss anemia  -Hemoglobin 6.6, baseline 11.4 3 days ago  -Concern for rapid hemorrhage, general surgery consulted and is aware  -Monitor H&H every 6 and transfuse if less than 7  -Has received 1 unit PRBCs, second unit ready    Endocrine   #No acute issues  -Add sliding scale insulin necessary to maintain blood glucose less than 180    Social/Spiritual/DNR/Other   #DNR CCA    ______________________________________________________________________    ASSESSMENT/ PLAN     GI prophylaxis: protonix BID  DVT Prophylaxis: hold  Diet NPO  Discuss case and plan with attending      Edinson Hinojosa DO  Resident, PGY-2  11/10/2022  4:55 PM    I personally saw, examined and provided care for the patient. Radiographs, labs and medication list were reviewed by me independently. I spoke with bedside nursing, therapists and consultants.  Critical care services and times documented are independent of procedures and multidisciplinary rounds with Residents. Additionally comprehensive, multidisciplinary rounds were conducted with the MICU team. The case was discussed in detail and plans for care were established. Review of Residents documentation was conducted and revisions were made as appropriate. I agree with the above documented exam, problem list and plan of care with the following additions:    Admitted to the ICU for acute blood loss anemia and hemorrhagic shock  Resuscitation  Vasopressors  Surgery consult  PPI BID  Monitor in ICU given high risk of deterioration    36 minutes of CCT spent with the patient, reviewing the chart including imaging studies, and discussing the case with other health care professionals. This time excludes procedures.      Maria De Jesus Lanier MD

## 2022-11-10 NOTE — CONSULTS
GENERAL SURGERY  CONSULT NOTE  11/10/2022    Physician Consulted: Dr. Lizbet Guillermo  Reason for Consult: GIB  Referring Physician: Dr. Clifton Purdy    Chief Complaint   Patient presents with    Altered Mental Status    Hypotension         HPI  Karen High is a 77 y.o. female who presents for evaluation of hypotension for the past couple days at her skilled nursing facility. Patient was recently admitted for COPD exacerbation and was discharged to a skilled nursing facility on 11/2/22. Since then she has been experiencing intermittent episodes of hypotension and has been taken off all of her normal antihypertensive meds. She presented today for further evaluation after having severe episodes of hypotension. In the emergency department she had multiple large-volume melanic stools. She denies any episodes of abdominal pain, nausea, vomiting or bloody stools before today. She has no history of EGD but she did had a C-scope 3 years ago which was relatively unremarkable per patient. Denies any history of alcohol abuse or prior abdominal surgeries however in her chart states she has had appendectomy and a hysterectomy. Past Medical History:   Diagnosis Date    Arthritis     Breast cancer ([facility] [address]     CKD (chronic kidney disease)     COPD (chronic obstructive pulmonary disease) ([facility])     Hyperlipidemia     Hypertension     MS (multiple sclerosis) ([facility] [address] 5/31/2012    Multiple sclerosis ([facility] [address]     diagnosised 8  yrs ago [facility]       Past Surgical History:   Procedure Laterality Date    APPENDECTOMY      BREAST SURGERY  4/13/2012    biopsy - negative    HIP ARTHROPLASTY Right 04/13/2011    Right AIDEE  ALLISON Hurtado MD    HYSTERECTOMY (CERVIX STATUS UNKNOWN)      KNEE ARTHROPLASTY Left 10/01/2013    Left TKA  ALLISON Hurtado MD    KNEE ARTHROPLASTY Right 08/29/2012    Right TKA  ALLISON Hurtado MD    TOTAL HIP ARTHROPLASTY Left 3/25/2022    LEFT HIP TOTAL ARTHROPLASTY performed by Jeremi Ferguson MD at [facility] OR       Medications Prior to Admission    Prior to Admission medications    Medication Sig Start Date End Date Taking? Authorizing Provider   predniSONE (DELTASONE) 10 MG tablet Take 4 tablets by mouth daily for 3 days, THEN 3 tablets daily for 3 days, THEN 2 tablets daily for 3 days, THEN 1 tablet daily for 3 days. 11/1/22 11/13/22  Rome Cali,    Compression Stockings MISC by Does not apply route 15-25 mm hg 9/27/22   Atul Ayala DO   meloxicam (MOBIC) 15 MG tablet Take 1 tablet by mouth in the morning.  8/5/22   Yohan Selby,    Fluticasone-Umeclidin-Vilant (TRELEGY ELLIPTA) 883-36.7-98 MCG/INH AEPB INHALE ONE PUFF BY MOUTH EVERY DAY 7/27/22   Atul Ayala DO   diclofenac sodium (VOLTAREN) 1 % GEL Apply 2 g topically 4 times daily as needed for Pain 7/25/22   Atul Ayala DO   traZODone (DESYREL) 50 MG tablet Take 0.5 tablets by mouth nightly as needed for Sleep 7/21/22   Atul Ayala DO   memantine (NAMENDA) 5 MG tablet Take 1 tablet by mouth 2 times daily  Patient not taking: Reported on 8/23/2022 7/6/22 8/26/22  Atul Ayala DO   baclofen (LIORESAL) 10 MG tablet TAKE 1 TABLET TWICE A DAY 6/30/22   Atul Ayala DO   hydroCHLOROthiazide (HYDRODIURIL) 25 MG tablet Take 1 tablet by mouth daily 6/27/22   Atul Ayala DO   atorvastatin (LIPITOR) 20 MG tablet Take 1 tablet by mouth daily  Patient taking differently: Take 20 mg by mouth nightly 6/13/22   Atul Ayala DO   carvedilol (COREG) 25 MG tablet Take 1 tablet by mouth 2 times daily 5/3/22   Jodie Flores PA-C   amLODIPine (NORVASC) 5 MG tablet Take 1 tablet by mouth daily 5/3/22   Jodie Flores PA-C   gabapentin (NEURONTIN) 400 MG capsule TAKE 1 CAPSULE 3 TIMES A   DAY 4/19/22 10/27/22  Jodie Flores PA-C   sertraline (ZOLOFT) 100 MG tablet TAKE 1 AND 1/2 TABLETS     DAILY  Patient taking differently: Take 100 mg by mouth nightly 3/31/22   Jodie Flores PA-C   aspirin 81 MG EC tablet Take 1 tablet by mouth 2 times daily for 30 doses DVT prophylaxis x30 days  Patient taking differently: Take 81 mg by mouth daily DVT prophylaxis x30 days 3/26/22 10/27/22  Dre Estrella MD   albuterol sulfate HFA (VENTOLIN HFA) 108 (90 Base) MCG/ACT inhaler Inhale 2 puffs into the lungs 4 times daily as needed for Wheezing 4/27/21   Chelo Betancourt PA-C   Cholecalciferol 50 MCG (2000 UT) CAPS Take 2,000 Units by mouth every morning     Historical Provider, MD       Allergies   Allergen Reactions    Macrodantin [Nitrofurantoin Macrocrystal] Shortness Of Breath       Family History   Problem Relation Age of Onset    Mult Sclerosis Father     Thyroid Cancer Mother     Cirrhosis Mother        Social History     Tobacco Use    Smoking status: Every Day     Packs/day: 1.00     Years: 50.00     Pack years: 50.00     Types: Cigarettes    Smokeless tobacco: Never   Vaping Use    Vaping Use: Never used   Substance Use Topics    Alcohol use: No    Drug use: No         Review of Systems:   General ROS: positive for  - fatigue  negative for - chills or fever  Hematological and Lymphatic ROS: positive for - pallor  negative for - weight loss  Respiratory ROS: positive for - shortness of breath  Cardiovascular ROS: no chest pain or dyspnea on exertion  Gastrointestinal ROS: positive for - melena  Genito-Urinary ROS: no dysuria, trouble voiding, or hematuria  Musculoskeletal ROS: negative      PHYSICAL EXAM:    Vitals:    11/10/22 1655   BP: (!) 78/52   Pulse: (!) 102   Resp: 16   Temp: 98 °F (36.7 °C)   SpO2: 100%       GENERAL: Critically ill  HEAD:  Normocephalic. Atraumatic. EYES:   No scleral icterus. PERRL. Conjunctiva pale  LUNGS:  No increased work of breathing. CARDIOVASCULAR: Tachycardic hypotensive  ABDOMEN:  Soft, non-distended, non-tender. No guarding, rigidity, rebound. EXTREMITIES:   MAEx4. Atraumatic. No LE edema.   SKIN:  Warm and dry, no rashes or lesions, pale  NEUROLOGIC:  no focal deficits  RECTAL: Melanic stools    LABS:  CBC  Recent Labs     11/10/22  1533   WBC 14.3*   HGB 6.6*   HCT 22.8*        BMP  Recent Labs     11/10/22  1533      K 5.0   *   CO2 20*   BUN 28*   CREATININE 1.4*   CALCIUM 7.4*     Liver Function  Recent Labs     11/10/22  1533   LIPASE 47   BILITOT 0.5   AST 14   ALT 14   ALKPHOS 41   PROT 4.5*   LABALBU 2.6*     No results for input(s): LACTATE in the last 72 hours. No results for input(s): INR, PTT in the last 72 hours. Invalid input(s): PT    RADIOLOGY  CT HEAD WO CONTRAST    Result Date: 11/10/2022  EXAMINATION: CT OF THE HEAD WITHOUT CONTRAST  11/10/2022 4:16 pm TECHNIQUE: CT of the head was performed without the administration of intravenous contrast. Automated exposure control, iterative reconstruction, and/or weight based adjustment of the mA/kV was utilized to reduce the radiation dose to as low as reasonably achievable. COMPARISON: None. HISTORY: ORDERING SYSTEM PROVIDED HISTORY: AMS TECHNOLOGIST PROVIDED HISTORY: Has a \"code stroke\" or \"stroke alert\" been called? ->No Reason for exam:->AMS Decision Support Exception - unselect if not a suspected or confirmed emergency medical condition->Emergency Medical Condition (MA) FINDINGS: BRAIN/VENTRICLES: No mass effect, edema or hemorrhage is seen. Mild-to-moderate volume loss is seen in the cerebrum with moderate chronic microvascular ischemic changes. No hydrocephalus or extra-axial fluid is seen. ORBITS: A prosthetic lens is seen in the right globe. The orbits are otherwise unremarkable. SINUSES: The visualized paranasal sinuses and mastoid air cells demonstrate no acute abnormality. SOFT TISSUES/SKULL:  No acute abnormality of the visualized skull or soft tissues. No acute intracranial abnormality. XR CHEST PORTABLE    Result Date: 11/10/2022  EXAMINATION: ONE XRAY VIEW OF THE CHEST 11/10/2022 4:12 pm COMPARISON: None.  HISTORY: ORDERING SYSTEM PROVIDED HISTORY: altered mental status TECHNOLOGIST PROVIDED HISTORY: Reason for exam:->altered mental status FINDINGS: There are linear atelectasis/scarring in the left upper mid lung and right lung base. Emphysematous changes in the upper lobes. No active airspace consolidation. No pneumothorax or pleural effusion. The heart is not enlarged. Linear atelectasis in both lungs and emphysematous changes in the upper lobes. ASSESSMENT/PLAN:  77 y.o. female with acute blood loss anemia, melena, hemorrhagic shock    NPO  PPI drip  Needs resuscitated, currently still receiving 1st unit of PRBC, transfuse as needed for hemoglobin less than 7 and if patient is unstable  EGD tomorrow unless ongoing bleeding and transfusion needs then can do more emergently    Plan discussed with Dr. Lizbet Guillermo.     Hima Solano MD  Resident, PGY-2  11/10/2022  5:20 PM

## 2022-11-10 NOTE — H&P
[facility] History and Physical      CHIEF COMPLAINT: Lightheadedness, bright red blood per rectum    History of Present Illness:    Ms. Elroy Duane is a 58-year-old female with past medical history significant for COPD, chronic hypoxic respiratory failure requiring supplemental oxygen via nasal cannula, MS, CKD stage III, chronic normocytic anemia, hypertension, hyperlipidemia, past breast cancer who presents with lightheadedness, syncopal episode, and bright red blood per rectum. In talking with the patient's daughter and the patient, she was recently discharged to a rehab facility on 11/1/2022 from [facility] following a COPD exacerbation and pneumonia hospitalization. She was discharged on Augmentin and steroid taper. While at the rehab facility she had multiple falls and syncopal episodes per her daughter. Today, she complained of lightheadedness and was found to have low blood pressure. Due to her low blood pressure she was brought via EMS to [facility] emergency department for further evaluation. In the ED, vitals on presentation were temp 98.5, RR 16, , BP 58/44, O2 sat 93% on 4 L supplemental oxygen via nasal cannula. Lab work was obtained which revealed serum bicarbonate 20, creatinine 1.4, lactic acid 3.8, high-sensitivity troponin 44. A CBC was obtained which revealed WBC 14.3, hemoglobin 6.6, platelets 433P. She was found to have evidence of hematochezia on exam in the emergency department. Concern was for hemorrhagic shock. Intensive care was contacted for hospitalization. A central venous catheter was to be inserted and she was to be started on Levophed if needed to maintain MAP greater than 65.    2 units PRBCs were ordered for transfusion. Internal medicine was then contacted for hospitalization. Upon ROS, she reports lightheadedness, syncope, hematochezia.   She denies fevers, chills, seizure, vision change, nausea, vomiting, chest pain, shortness of breath, abdominal pain, constipation, diarrhea, melena, dysuria, increased urinary frequency, myalgias, joint pain, new skin rash. Informant(s) for H&P: Patient's daughter, patient    REVIEW OF SYSTEMS:  A comprehensive review of systems was negative except for: what is in the HPI      PMH:  Past Medical History:   Diagnosis Date    Arthritis     Breast cancer ([facility]     CKD (chronic kidney disease)     COPD (chronic obstructive pulmonary disease) ([facility]     Hyperlipidemia     Hypertension     MS (multiple sclerosis) ([facility] 5/31/2012    [facility]     diagnosised 8  yrs ago [facility]       Surgical History:  Past Surgical History:   Procedure Laterality Date    APPENDECTOMY      BREAST SURGERY  4/13/2012    biopsy - negative    HIP ARTHROPLASTY Right 04/13/2011    Right AIDEE  ALLISON Maya MD    HYSTERECTOMY (CERVIX STATUS UNKNOWN)      KNEE ARTHROPLASTY Left 10/01/2013    Left TKA  ALLISON Maya MD    KNEE ARTHROPLASTY Right 08/29/2012    Right TKA  ALLISON Maya MD    TOTAL HIP ARTHROPLASTY Left 3/25/2022    LEFT HIP TOTAL ARTHROPLASTY performed by Cherylene Libel, MD at [facility] OR       Medications Prior to Admission:    Prior to Admission medications    Medication Sig Start Date End Date Taking? Authorizing Provider   predniSONE (DELTASONE) 10 MG tablet Take 4 tablets by mouth daily for 3 days, THEN 3 tablets daily for 3 days, THEN 2 tablets daily for 3 days, THEN 1 tablet daily for 3 days. 11/1/22 11/13/22  Christen Gomez, DO   Compression Stockings MISC by Does not apply route 15-25 mm hg 9/27/22   [facility] Corie Ayala,    meloxicam (MOBIC) 15 MG tablet Take 1 tablet by mouth in the morning.  8/5/22   Elenora Ready, DO   Fluticasone-Umeclidin-Vilant (TRELEGY ELLIPTA) 488-83.6-76 MCG/INH AEPB INHALE ONE PUFF BY MOUTH EVERY DAY 7/27/22   Cristian Day Lucy, DO   diclofenac sodium (VOLTAREN) 1 % GEL Apply 2 g topically 4 times daily as needed for Pain 7/25/22   Shahla Ceballos DO   traZODone (DESYREL) 50 MG tablet Take 0.5 tablets by mouth nightly as needed for Sleep 7/21/22   Luis A Ayala DO   memantine [facility]) 5 MG tablet Take 1 tablet by mouth 2 times daily  Patient not taking: Reported on 8/23/2022 7/6/22 8/26/22  Luis A Ayala DO   baclofen (LIORESAL) 10 MG tablet TAKE 1 TABLET TWICE A DAY 6/30/22   Luis A Ayala DO   hydroCHLOROthiazide (HYDRODIURIL) 25 MG tablet Take 1 tablet by mouth daily 6/27/22   CarleyConover Amanda Ayala DO   atorvastatin (LIPITOR) 20 MG tablet Take 1 tablet by mouth daily  Patient taking differently: Take 20 mg by mouth nightly 6/13/22   Luis A Ayala DO   carvedilol (COREG) 25 MG tablet Take 1 tablet by mouth 2 times daily 5/3/22   Richard Marie PA-C   amLODIPine (NORVASC) 5 MG tablet Take 1 tablet by mouth daily 5/3/22   Richard Marie PA-C   gabapentin (NEURONTIN) 400 MG capsule TAKE 1 CAPSULE 3 TIMES A   DAY 4/19/22 10/27/22  Richard Marie PA-C   sertraline (ZOLOFT) 100 MG tablet TAKE 1 AND 1/2 TABLETS     DAILY  Patient taking differently: Take 100 mg by mouth nightly 3/31/22   Richard Marie PA-C   aspirin 81 MG EC tablet Take 1 tablet by mouth 2 times daily for 30 doses DVT prophylaxis x30 days  Patient taking differently: Take 81 mg by mouth daily DVT prophylaxis x30 days 3/26/22 10/27/22  Bonilla Gibbs MD   albuterol sulfate HFA (VENTOLIN HFA) 108 (90 Base) MCG/ACT inhaler Inhale 2 puffs into the lungs 4 times daily as needed for Wheezing 4/27/21   Richard Marie PA-C   Cholecalciferol 50 MCG (2000 UT) CAPS Take 2,000 Units by mouth every morning     Historical Provider, MD       Allergies:    Macrodantin [nitrofurantoin macrocrystal]    Social History:    reports that she has been smoking cigarettes. She has a 50.00 pack-year smoking history. She has never used smokeless tobacco. She reports that she does not drink alcohol and does not use drugs.     Family History:   family history includes Cirrhosis in her mother; Mult Sclerosis in her father; Thyroid Cancer in her mother. PHYSICAL EXAM:  Vitals:  BP (!) 78/52   Pulse (!) 102   Temp 98 °F (36.7 °C)   Resp 16   Ht 5' 2\" (1.575 m)   Wt 155 lb (70.3 kg)   SpO2 100%   BMI 28.35 kg/m²     General Appearance: alert and oriented to person, place and time and in no acute distress  Skin: warm and dry  Head: normocephalic and atraumatic  Eyes: pupils equal, round, and reactive to light, extraocular eye movements intact, conjunctivae normal  Neck: neck supple and non tender without mass   Pulmonary/Chest: clear to auscultation bilaterally- no wheezes, rales or rhonchi, normal air movement, no respiratory distress  Cardiovascular: Tachycardic, normal S1 and S2 and no carotid bruits  Abdomen: soft, non-tender, non-distended, normal bowel sounds, no masses or organomegaly  : Bright red blood per rectum  Extremities: no cyanosis, no clubbing and no edema  Neurologic: no cranial nerve deficit and speech normal        LABS:  Recent Labs     11/10/22  0522 11/10/22  1533    142   K 3.4* 5.0    109*   CO2 31 20*   BUN 16 28*   CREATININE 1.02 1.4*   GLUCOSE 85 160*   CALCIUM 7.9* 7.4*       Recent Labs     11/10/22  1533   WBC 14.3*   RBC 2.37*   HGB 6.6*   HCT 22.8*   MCV 96.2   MCH 27.8   MCHC 28.9*   RDW 16.6*      MPV 9.3       No results for input(s): POCGLU in the last 72 hours. Radiology:   CT HEAD WO CONTRAST   Final Result   No acute intracranial abnormality. CT ABDOMEN PELVIS WO CONTRAST Additional Contrast? None   Final Result   1. Gas and fluid distension of the entire colon, no focal mass or wall   thickening. No free air or free fluid detected. Rectum not well visualized   due to streak artifact from the patient's hip arthroplasties. 2.  Atelectasis and minimal consolidation in the medial right lower lobe.          XR CHEST PORTABLE   Final Result   Linear atelectasis in both lungs and emphysematous changes in the upper lobes. XR CHEST PORTABLE    (Results Pending)       EKG: Ordered but not yet obtained    ASSESSMENT:      Principal Problem:    Hemorrhagic shock ([address]  Resolved Problems:    * No resolved hospital problems. *      PLAN:    1. Hemorrhagic shock-transfusing 2 units PRBCs and obtaining central access for continued blood and fluid resuscitation. Critical care and general surgery consulted. Appreciate recommendations. 2.  Hematochezia-could be secondary to brisk upper GI bleed versus lower GI bleed. We will start IV Protonix 40 mg every 12 hours. N.p.o. for now. 3.  Acute blood loss anemia-obtaining posttransfusion H/H. Then H/H every 6 hours. Transfuse for hemoglobin less than 7.  4.  Syncope-likely secondary to first 3 issues. 5.  COPD, not in acute exacerbation-monitor respiratory status closely. 6.  Chronic hypoxic respiratory failure requiring supplemental oxygen via nasal cannula-supplemental oxygen as needed, monitor respiratory status closely. 7.  CKD stage III-monitor with daily BMP  8. Lactic acidosis-repeat lactic acid pending, likely secondary to problem #1.  9.  History of hypertension-hold home antihypertensives  10. History of multiple sclerosis  11. History of breast cancer  12. Hyperlipidemia-continue home statin    DVT prophylaxis: None due to concern for hemorrhage      NOTE: This report was transcribed using voice recognition software. Every effort was made to ensure accuracy; however, inadvertent computerized transcription errors may be present.   Electronically signed by Donna Lopez MD on 11/10/2022 at 5:20 PM

## 2022-11-10 NOTE — ED NOTES
Hg[facility] 6.6 [facility]     John Friday, RN  63/11/75 [id number]

## 2022-11-10 NOTE — PROGRESS NOTES
Database initiated. Patient comes in from [place] [id number]. She is A&O. Ambulates with a rollerator and wears 3L n/c.

## 2022-11-10 NOTE — ED PROVIDER NOTES
Krystyna Steve is a 77 y.o. female    Chief Complaint   Patient presents with    Altered Mental Status    Hypotension         HPI   Krystyna Steve is a 77 y.o. female presenting to the ED for Altered Mental Status and Hypotension    History comes primarily from the patient and Family. Presents to the emergency department for persistent, episodic hypotension, altered mental status. Patient was admitted to the hospital approximately 8 days ago for COPD exacerbation and multiple falls and weakness. The patient was later discharged to an acute rehab facility. She has since then continued to have hypotensive blood pressures and all her antihypertensive medications have been discontinued as of 5 days ago, with hypotension persisting anyway. Patient is not complaining of any pain anywhere including chest pain, abdominal pain, dysuria, headache. She does feel little bit lightheaded at times but has no shortness of breath, fever, difficulty with bowel movements. She has had a little bit of diarrhea intermittently but cannot say much beyond that and the patient's daughter who is present and provides part of the history cannot say more. Patient is alert but does seem fatigued and continually closing her eyes but wakes up easily. After her COPD exacerbation over a week ago, the patient was on up to 15 L of oxygen in the hospital but has been weaned down. Currently she is on 4 L of oxygen which has been stable. Prior to her recent admission, the patient had been on oxygen only at night. Has a history of chronic bronchitis/COPD, multiple sclerosis, CKD. According to the daughter, the patient has no history of heart failure. Review of Systems   Constitutional:  Negative for activity change, appetite change, fatigue and fever. HENT:  Negative for congestion and rhinorrhea. Eyes:  Negative for redness and itching. Respiratory:  Negative for shortness of breath and wheezing.     Cardiovascular:  Negative for chest pain and palpitations. Gastrointestinal:  Negative for abdominal pain, diarrhea, nausea and vomiting. Genitourinary:  Negative for difficulty urinating, frequency and urgency. Musculoskeletal:  Negative for arthralgias and myalgias. Skin:  Negative for pallor and rash. Neurological:  Positive for light-headedness. Negative for dizziness and numbness. Psychiatric/Behavioral:  Negative for agitation and confusion. All other systems reviewed and are negative. Physical Exam  Vitals and nursing note reviewed. Constitutional:       General: She is not in acute distress. Appearance: Normal appearance. She is well-developed. She is obese. She is ill-appearing. HENT:      Head: Normocephalic and atraumatic. Right Ear: External ear normal.      Left Ear: External ear normal.      Nose: Nose normal. No rhinorrhea. Mouth/Throat:      Mouth: Mucous membranes are moist.      Pharynx: Oropharynx is clear. Eyes:      Extraocular Movements: Extraocular movements intact. Conjunctiva/sclera: Conjunctivae normal.      Pupils: Pupils are equal, round, and reactive to light. Cardiovascular:      Rate and Rhythm: Normal rate and regular rhythm. Pulses: Normal pulses. Heart sounds: Normal heart sounds. No murmur heard. Pulmonary:      Effort: Pulmonary effort is normal. No respiratory distress. Breath sounds: Normal breath sounds. No wheezing. Abdominal:      General: Bowel sounds are normal. There is no distension. Palpations: Abdomen is soft. Tenderness: There is no abdominal tenderness. Musculoskeletal:         General: No swelling or deformity. Normal range of motion. Cervical back: Normal range of motion and neck supple. No rigidity. Right lower leg: No edema. Left lower leg: No edema. Skin:     General: Skin is warm and dry. Coloration: Skin is not jaundiced or pale.       Comments: Pale   Neurological:      General: No focal deficit present. Mental Status: She is alert and oriented to person, place, and time. Mental status is at baseline. Motor: No weakness. Psychiatric:         Mood and Affect: Mood normal.         Behavior: Behavior normal.        Procedures   Central Line Placement Procedure Note    Indication: vascular access, hypovolemia, long term access, central venous monitoring, centrally administered medications, and severe bleeding    Consent: The patient was counseled regarding the procedure, its indications, risks, potential complications and alternatives, and any questions were answered. Consent was obtained to proceed. Procedure: The patient was positioned appropriately and the skin over the right internal jugular vein was prepped with betadine and draped in a sterile fashion. Local anesthesia was obtained by infiltration using 1% Lidocaine without epinephrine. A large bore needle was used to identify the vein. A guide wire was then inserted into the vein through the needle. A triple lumen catheter was then inserted into the vessel over the guide wire using the Seldinger technique. All ports showed good, free flowing blood return and were flushed with saline solution. The catheter was then securely fastened to the skin with suture at 15 cm. Two sutures were placed into the kit included tube clamp, proximal eyelets, and a suture end from each of the securing sutures was extended around the catheter and tied to the proximal eyelets as an added measure to prevent dislodgement. An antibiotic disk was placed and the site was then covered with a sterile dressing. A post procedure X-ray was ordered and showed good line position. The patient tolerated the procedure well.     Complications: None          MDM  Patient presented to the Emergency Department for Altered Mental Status and Hypotension    Patient had a large bloody bowel movement in the emergency department and then found to have a hemoglobin of 6.6.  Other notable findings are hypomagnesemia, elevated lactic acid, elevated white count, mild MARCELLO. CT of the abdomen shows gas and fluid distention of the entire colon no free air or focal mass or wall thickening. Patient is started with fluids and given 2 units of uncrossed blood. Patient's blood pressure does improve into the 84T systolic. I spoke with surgery and the intensivist.  The patient is also admitted to the ICU for further management will be provided. I also placed a central line per the procedure note above. Of note, the patient did have a D-dimer added to the blood work and although positive, the patient's shortness of breath is much better explained by her anemia and no further work-up regarding pulmonary embolism was done at this time. ED Course as of 11/10/22 1934   Thu Nov 10, 2022   1552 Patient had a large bloody bowel movement. [KS]   5953 I spoke with Dr. Fe Maldonado from surgery. He will consult on the patient with the resident. [KS]      ED Course User Index  [KS] Letty Art MD       --------------------------------------------- PAST HISTORY ---------------------------------------------  Past Medical History:  has a past medical history of Arthritis, [facility] CKD (chronic kidney disease), COPD (chronic obstructive pulmonary disease) ([facility] Hyperlipidemia, Hypertension, MS (multiple sclerosis) ([facility] and Multiple sclerosis ([facility] Past Surgical History:  has a past surgical history that includes Hysterectomy; Breast surgery (4/13/2012); Appendectomy; Knee Arthroplasty (Left, 10/01/2013); Knee Arthroplasty (Right, 08/29/2012); Hip Arthroplasty (Right, 04/13/2011); and Total hip arthroplasty (Left, 3/25/2022). Social History:  reports that she has been smoking cigarettes. She has a 50.00 pack-year smoking history. She has never used smokeless tobacco. She reports that she does not drink alcohol and does not use drugs.     Family History: family history includes Cirrhosis in her mother; Mult Sclerosis in her father; Thyroid Cancer in her mother. The patients home medications have been reviewed.     Allergies: Macrodantin [nitrofurantoin macrocrystal]    -------------------------------------------------- RESULTS -------------------------------------------------    LABS:  Results for orders placed or performed during the hospital encounter of 11/10/22   CBC with Auto Differential   Result Value Ref Range    WBC 14.3 (H) 4.5 - 11.5 E9/L    RBC 2.37 (L) 3.50 - 5.50 E12/L    Hemoglobin 6.6 (LL) 11.5 - 15.5 g/dL    Hematocrit 22.8 (L) 34.0 - 48.0 %    MCV 96.2 80.0 - 99.9 fL    MCH 27.8 26.0 - 35.0 pg    MCHC 28.9 (L) 32.0 - 34.5 %    RDW 16.6 (H) 11.5 - 15.0 fL    Platelets 452 684 - 838 E9/L    MPV 9.3 7.0 - 12.0 fL    Neutrophils % 86.4 (H) 43.0 - 80.0 %    Immature Granulocytes % 2.9 0.0 - 5.0 %    Lymphocytes % 7.6 (L) 20.0 - 42.0 %    Monocytes % 2.9 2.0 - 12.0 %    Eosinophils % 0.1 0.0 - 6.0 %    Basophils % 0.1 0.0 - 2.0 %    Neutrophils Absolute 12.29 (H) 1.80 - 7.30 E9/L    Immature Granulocytes # 0.42 E9/L    Lymphocytes Absolute 1.09 (L) 1.50 - 4.00 E9/L    Monocytes Absolute 0.42 0.10 - 0.95 E9/L    Eosinophils Absolute 0.01 (L) 0.05 - 0.50 E9/L    Basophils Absolute 0.02 0.00 - 0.20 E9/L    Anisocytosis 1+     Polychromasia 1+     Hypochromia 1+     Poikilocytes 1+     Reji Cells 1+     Ovalocytes 1+     Tear Drop Cells 1+    Comprehensive Metabolic Panel w/ Reflex to MG   Result Value Ref Range    Sodium 142 132 - 146 mmol/L    Potassium reflex Magnesium 5.0 3.5 - 5.0 mmol/L    Chloride 109 (H) 98 - 107 mmol/L    CO2 20 (L) 22 - 29 mmol/L    Anion Gap 13 7 - 16 mmol/L    Glucose 160 (H) 74 - 99 mg/dL    BUN 28 (H) 6 - 23 mg/dL    Creatinine 1.4 (H) 0.5 - 1.0 mg/dL    Est, Glom Filt Rate 39 >=60 mL/min/1.73    Calcium 7.4 (L) 8.6 - 10.2 mg/dL    Total Protein 4.5 (L) 6.4 - 8.3 g/dL    Albumin 2.6 (L) 3.5 - 5.2 g/dL    Total Bilirubin 0.5 0.0 - 1.2 mg/dL Alkaline Phosphatase 41 35 - 104 U/L    ALT 14 0 - 32 U/L    AST 14 0 - 31 U/L   Lipase   Result Value Ref Range    Lipase 47 13 - 60 U/L   Troponin   Result Value Ref Range    Troponin, High Sensitivity 44 (H) 0 - 9 ng/L   Brain Natriuretic Peptide   Result Value Ref Range    Pro-BNP 1,869 (H) 0 - 450 pg/mL   D-Dimer, Quantitative   Result Value Ref Range    D-Dimer, Quant 350 ng/mL DDU   Magnesium   Result Value Ref Range    Magnesium 1.4 (L) 1.6 - 2.6 mg/dL   Lactate, Sepsis   Result Value Ref Range    Lactic Acid, Sepsis 3.8 (HH) 0.5 - 1.9 mmol/L   TYPE AND SCREEN   Result Value Ref Range    ABO/Rh A POS     Antibody Screen NEG    PREPARE RBC (CROSSMATCH)   Result Value Ref Range    Product Code Blood Bank [id number]     Description Blood Bank Red Blood Cells, Apheresis, Leuko-reduced     Unit Number [id number]     Dispense Status Blood Bank issued     Product Code Blood Bank [id number]     Description Blood Bank Red Blood Cells, Leuko-reduced     Unit Number [id number]     Dispense Status Blood Bank issued        RADIOLOGY:  XR CHEST PORTABLE   Final Result   Adequately placed right internal jugular central venous line. CT HEAD WO CONTRAST   Final Result   No acute intracranial abnormality. CT ABDOMEN PELVIS WO CONTRAST Additional Contrast? None   Final Result   1. Gas and fluid distension of the entire colon, no focal mass or wall   thickening. No free air or free fluid detected. Rectum not well visualized   due to streak artifact from the patient's hip arthroplasties. 2.  Atelectasis and minimal consolidation in the medial right lower lobe. XR CHEST PORTABLE   Final Result   Linear atelectasis in both lungs and emphysematous changes in the upper lobes.                  ------------------------- NURSING NOTES AND VITALS REVIEWED ---------------------------  Date / Time Roomed:  11/10/2022  3:06 PM  ED Bed Assignment:  [id number]/[id number]-A    The nursing notes within the ED encounter and vital signs as below have been reviewed. Patient Vitals for the past 24 hrs:   BP Temp Temp src Pulse Resp SpO2 Height Weight   11/10/22 1930 -- -- -- (!) 154 -- -- -- --   11/10/22 1915 (!) 85/55 -- -- (!) 155 -- -- -- --   11/10/22 1900 82/74 -- -- (!) 154 -- -- -- --   11/10/22 1810 (!) 86/49 97.5 °F (36.4 °C) Oral (!) 138 16 99 % -- --   11/10/22 1800 86/68 97.5 °F (36.4 °C) Oral (!) 119 18 100 % -- --   11/10/22 1748 (!) 80/41 98 °F (36.7 °C) -- (!) 107 18 100 % -- --   11/10/22 1737 88/60 97.9 °F (36.6 °C) Oral (!) 111 16 100 % -- --   11/10/22 1730 90/78 97.9 °F (36.6 °C) Oral (!) 105 16 100 % -- --   11/10/22 1715 92/62 98 °F (36.7 °C) Oral (!) 114 16 100 % -- --   11/10/22 1700 (!) 78/60 97.9 °F (36.6 °C) Oral (!) 109 16 100 % -- --   11/10/22 1655 (!) 78/52 98 °F (36.7 °C) -- (!) 102 16 100 % -- --   11/10/22 1612 -- -- -- -- -- -- 5' 2\" (1.575 m) 155 lb (70.3 kg)   11/10/22 1545 -- 97.9 °F (36.6 °C) Oral 76 18 100 % 5' 2\" (1.575 m) --   11/10/22 1522 (!) 58/44 98.5 °F (36.9 °C) Oral (!) 111 16 93 % -- --       Oxygen Saturation Interpretation: Normal    ------------------------------------------ PROGRESS NOTES ------------------------------------------  Re-evaluation(s):  Time: Multiple. Patients symptoms show no change  Repeat physical examination is not changed. Blood pressure did improve otherwise though. Counseling:  I have spoken with the patient and discussed todays results, in addition to providing specific details for the plan of care and counseling regarding the diagnosis and prognosis. Their questions are answered at this time and they are agreeable with the plan of admission.    --------------------------------- ADDITIONAL PROVIDER NOTES ---------------------------------  Consultations:  Time: 1615. Spoke with Dr. Edu Light. Discussed case. They will admit the patient.   This patient's ED course included: a personal history and physicial examination, multiple bedside re-evaluations, IV medications, cardiac monitoring, continuous pulse oximetry, and complex medical decision making and emergency management    This patient has remained hemodynamically stable during their ED course. Diagnosis:  1. Gastrointestinal hemorrhage, unspecified gastrointestinal hemorrhage type    2. Anemia, unspecified type    3. Hypotension, unspecified hypotension type        Disposition:  Patient's disposition: Admit to CCU/ICU  Patient's condition is critical.  Critical care 35 minutes excluding procedures.          Palak Means MD  Resident  11/10/22 [id number]

## 2022-11-11 ENCOUNTER — ANESTHESIA EVENT (OUTPATIENT)
Dept: ENDOSCOPY | Age: 77
DRG: 377 | End: 2022-11-11
Payer: MEDICARE

## 2022-11-11 ENCOUNTER — ANESTHESIA (OUTPATIENT)
Dept: ENDOSCOPY | Age: 77
DRG: 377 | End: 2022-11-11
Payer: MEDICARE

## 2022-11-11 PROBLEM — K92.2 GASTROINTESTINAL HEMORRHAGE: Status: ACTIVE | Noted: 2022-11-11

## 2022-11-11 LAB
ALBUMIN SERPL-MCNC: 2.3 G/DL (ref 3.5–5.2)
ALP BLD-CCNC: 33 U/L (ref 35–104)
ALT SERPL-CCNC: 457 U/L (ref 0–32)
ANION GAP SERPL CALCULATED.3IONS-SCNC: 8 MMOL/L (ref 7–16)
AST SERPL-CCNC: 618 U/L (ref 0–31)
BASOPHILS ABSOLUTE: 0.04 E9/L (ref 0–0.2)
BASOPHILS RELATIVE PERCENT: 0.2 % (ref 0–2)
BILIRUB SERPL-MCNC: 0.3 MG/DL (ref 0–1.2)
BLOOD BANK DISPENSE STATUS: NORMAL
BLOOD BANK PRODUCT CODE: NORMAL
BPU ID: NORMAL
BUN BLDV-MCNC: 43 MG/DL (ref 6–23)
CALCIUM IONIZED: 1.07 MMOL/L (ref 1.15–1.33)
CALCIUM SERPL-MCNC: 6.7 MG/DL (ref 8.6–10.2)
CHLORIDE BLD-SCNC: 113 MMOL/L (ref 98–107)
CO2: 21 MMOL/L (ref 22–29)
CREAT SERPL-MCNC: 1.6 MG/DL (ref 0.5–1)
DESCRIPTION BLOOD BANK: NORMAL
EOSINOPHILS ABSOLUTE: 0 E9/L (ref 0.05–0.5)
EOSINOPHILS RELATIVE PERCENT: 0 % (ref 0–6)
GFR SERPL CREATININE-BSD FRML MDRD: 33 ML/MIN/1.73
GLUCOSE BLD-MCNC: 140 MG/DL (ref 74–99)
HCT VFR BLD CALC: 22.4 % (ref 34–48)
HCT VFR BLD CALC: 24.6 % (ref 34–48)
HCT VFR BLD CALC: 25.5 % (ref 34–48)
HCT VFR BLD CALC: 27.5 % (ref 34–48)
HEMOGLOBIN: 7.3 G/DL (ref 11.5–15.5)
HEMOGLOBIN: 8.2 G/DL (ref 11.5–15.5)
HEMOGLOBIN: 8.4 G/DL (ref 11.5–15.5)
HEMOGLOBIN: 9.2 G/DL (ref 11.5–15.5)
IMMATURE GRANULOCYTES #: 0.51 E9/L
IMMATURE GRANULOCYTES %: 2.5 % (ref 0–5)
INR BLD: 1.7
LACTIC ACID: 0.8 MMOL/L (ref 0.5–2.2)
LACTIC ACID: 0.9 MMOL/L (ref 0.5–2.2)
LACTIC ACID: 1 MMOL/L (ref 0.5–2.2)
LACTIC ACID: 1.2 MMOL/L (ref 0.5–2.2)
LYMPHOCYTES ABSOLUTE: 1.81 E9/L (ref 1.5–4)
LYMPHOCYTES RELATIVE PERCENT: 8.7 % (ref 20–42)
MAGNESIUM: 1.9 MG/DL (ref 1.6–2.6)
MCH RBC QN AUTO: 29.1 PG (ref 26–35)
MCHC RBC AUTO-ENTMCNC: 32.6 % (ref 32–34.5)
MCV RBC AUTO: 89.2 FL (ref 80–99.9)
MONOCYTES ABSOLUTE: 0.98 E9/L (ref 0.1–0.95)
MONOCYTES RELATIVE PERCENT: 4.7 % (ref 2–12)
NEUTROPHILS ABSOLUTE: 17.47 E9/L (ref 1.8–7.3)
NEUTROPHILS RELATIVE PERCENT: 83.9 % (ref 43–80)
PDW BLD-RTO: 16.3 FL (ref 11.5–15)
PHOSPHORUS: 3.1 MG/DL (ref 2.5–4.5)
PLATELET # BLD: 137 E9/L (ref 130–450)
PMV BLD AUTO: 9.5 FL (ref 7–12)
POTASSIUM SERPL-SCNC: 4.7 MMOL/L (ref 3.5–5)
PROTHROMBIN TIME: 19.3 SEC (ref 9.3–12.4)
RBC # BLD: 2.51 E12/L (ref 3.5–5.5)
SODIUM BLD-SCNC: 142 MMOL/L (ref 132–146)
TOTAL PROTEIN: 3.7 G/DL (ref 6.4–8.3)
TROPONIN, HIGH SENSITIVITY: 122 NG/L (ref 0–9)
TROPONIN, HIGH SENSITIVITY: 175 NG/L (ref 0–9)
TROPONIN, HIGH SENSITIVITY: 97 NG/L (ref 0–9)
WBC # BLD: 20.8 E9/L (ref 4.5–11.5)

## 2022-11-11 PROCEDURE — 82330 ASSAY OF CALCIUM: CPT

## 2022-11-11 PROCEDURE — 2580000003 HC RX 258: Performed by: STUDENT IN AN ORGANIZED HEALTH CARE EDUCATION/TRAINING PROGRAM

## 2022-11-11 PROCEDURE — 36415 COLL VENOUS BLD VENIPUNCTURE: CPT

## 2022-11-11 PROCEDURE — 85014 HEMATOCRIT: CPT

## 2022-11-11 PROCEDURE — 2580000003 HC RX 258

## 2022-11-11 PROCEDURE — 94640 AIRWAY INHALATION TREATMENT: CPT

## 2022-11-11 PROCEDURE — 2580000003 HC RX 258: Performed by: EMERGENCY MEDICINE

## 2022-11-11 PROCEDURE — 2709999900 HC NON-CHARGEABLE SUPPLY: Performed by: SURGERY

## 2022-11-11 PROCEDURE — 84100 ASSAY OF PHOSPHORUS: CPT

## 2022-11-11 PROCEDURE — 2500000003 HC RX 250 WO HCPCS

## 2022-11-11 PROCEDURE — 85018 HEMOGLOBIN: CPT

## 2022-11-11 PROCEDURE — 83735 ASSAY OF MAGNESIUM: CPT

## 2022-11-11 PROCEDURE — 99232 SBSQ HOSP IP/OBS MODERATE 35: CPT | Performed by: STUDENT IN AN ORGANIZED HEALTH CARE EDUCATION/TRAINING PROGRAM

## 2022-11-11 PROCEDURE — 6360000002 HC RX W HCPCS: Performed by: INTERNAL MEDICINE

## 2022-11-11 PROCEDURE — C9113 INJ PANTOPRAZOLE SODIUM, VIA: HCPCS

## 2022-11-11 PROCEDURE — 2700000000 HC OXYGEN THERAPY PER DAY

## 2022-11-11 PROCEDURE — 3700000001 HC ADD 15 MINUTES (ANESTHESIA): Performed by: SURGERY

## 2022-11-11 PROCEDURE — 6360000002 HC RX W HCPCS: Performed by: NURSE ANESTHETIST, CERTIFIED REGISTERED

## 2022-11-11 PROCEDURE — 36620 INSERTION CATHETER ARTERY: CPT

## 2022-11-11 PROCEDURE — 2000000000 HC ICU R&B

## 2022-11-11 PROCEDURE — 80053 COMPREHEN METABOLIC PANEL: CPT

## 2022-11-11 PROCEDURE — 37799 UNLISTED PX VASCULAR SURGERY: CPT

## 2022-11-11 PROCEDURE — 94664 DEMO&/EVAL PT USE INHALER: CPT

## 2022-11-11 PROCEDURE — 3700000000 HC ANESTHESIA ATTENDED CARE: Performed by: SURGERY

## 2022-11-11 PROCEDURE — 36430 TRANSFUSION BLD/BLD COMPNT: CPT

## 2022-11-11 PROCEDURE — 0W3P8ZZ CONTROL BLEEDING IN GASTROINTESTINAL TRACT, VIA NATURAL OR ARTIFICIAL OPENING ENDOSCOPIC: ICD-10-PCS | Performed by: SURGERY

## 2022-11-11 PROCEDURE — 6360000002 HC RX W HCPCS: Performed by: STUDENT IN AN ORGANIZED HEALTH CARE EDUCATION/TRAINING PROGRAM

## 2022-11-11 PROCEDURE — 84484 ASSAY OF TROPONIN QUANT: CPT

## 2022-11-11 PROCEDURE — 85025 COMPLETE CBC W/AUTO DIFF WBC: CPT

## 2022-11-11 PROCEDURE — 6360000002 HC RX W HCPCS

## 2022-11-11 PROCEDURE — 83605 ASSAY OF LACTIC ACID: CPT

## 2022-11-11 PROCEDURE — 2580000003 HC RX 258: Performed by: NURSE ANESTHETIST, CERTIFIED REGISTERED

## 2022-11-11 PROCEDURE — 36592 COLLECT BLOOD FROM PICC: CPT

## 2022-11-11 PROCEDURE — 6370000000 HC RX 637 (ALT 250 FOR IP): Performed by: STUDENT IN AN ORGANIZED HEALTH CARE EDUCATION/TRAINING PROGRAM

## 2022-11-11 PROCEDURE — 3609013000 HC EGD TRANSORAL CONTROL BLEEDING ANY METHOD: Performed by: SURGERY

## 2022-11-11 PROCEDURE — 6360000002 HC RX W HCPCS: Performed by: EMERGENCY MEDICINE

## 2022-11-11 PROCEDURE — C1751 CATH, INF, PER/CENT/MIDLINE: HCPCS

## 2022-11-11 PROCEDURE — 85610 PROTHROMBIN TIME: CPT

## 2022-11-11 RX ORDER — ARFORMOTEROL TARTRATE 15 UG/2ML
15 SOLUTION RESPIRATORY (INHALATION) 2 TIMES DAILY
Status: DISCONTINUED | OUTPATIENT
Start: 2022-11-11 | End: 2022-11-17 | Stop reason: HOSPADM

## 2022-11-11 RX ORDER — PROPOFOL 10 MG/ML
INJECTION, EMULSION INTRAVENOUS PRN
Status: DISCONTINUED | OUTPATIENT
Start: 2022-11-11 | End: 2022-11-11 | Stop reason: SDUPTHER

## 2022-11-11 RX ORDER — MAGNESIUM SULFATE IN WATER 40 MG/ML
2000 INJECTION, SOLUTION INTRAVENOUS ONCE
Status: COMPLETED | OUTPATIENT
Start: 2022-11-11 | End: 2022-11-11

## 2022-11-11 RX ORDER — SODIUM CHLORIDE 9 MG/ML
INJECTION, SOLUTION INTRAVENOUS CONTINUOUS PRN
Status: DISCONTINUED | OUTPATIENT
Start: 2022-11-11 | End: 2022-11-11 | Stop reason: SDUPTHER

## 2022-11-11 RX ORDER — BUDESONIDE 0.5 MG/2ML
0.5 INHALANT ORAL 2 TIMES DAILY
Status: DISCONTINUED | OUTPATIENT
Start: 2022-11-11 | End: 2022-11-17 | Stop reason: HOSPADM

## 2022-11-11 RX ADMIN — SODIUM CHLORIDE: 9 INJECTION, SOLUTION INTRAVENOUS at 13:09

## 2022-11-11 RX ADMIN — SODIUM CHLORIDE 8 MG/HR: 9 INJECTION, SOLUTION INTRAVENOUS at 14:46

## 2022-11-11 RX ADMIN — SODIUM CHLORIDE, PRESERVATIVE FREE 10 ML: 5 INJECTION INTRAVENOUS at 09:33

## 2022-11-11 RX ADMIN — PROPOFOL 250 MG: 10 INJECTION, EMULSION INTRAVENOUS at 13:15

## 2022-11-11 RX ADMIN — BUDESONIDE 500 MCG: 0.5 SUSPENSION RESPIRATORY (INHALATION) at 20:23

## 2022-11-11 RX ADMIN — SODIUM CHLORIDE: 9 INJECTION, SOLUTION INTRAVENOUS at 18:25

## 2022-11-11 RX ADMIN — VASOPRESSIN 0.03 UNITS/MIN: 20 INJECTION INTRAVENOUS at 08:31

## 2022-11-11 RX ADMIN — ONDANSETRON 4 MG: 2 INJECTION INTRAMUSCULAR; INTRAVENOUS at 09:42

## 2022-11-11 RX ADMIN — ARFORMOTEROL TARTRATE 15 MCG: 15 SOLUTION RESPIRATORY (INHALATION) at 20:23

## 2022-11-11 RX ADMIN — BACLOFEN 10 MG: 10 TABLET ORAL at 21:28

## 2022-11-11 RX ADMIN — CALCIUM GLUCONATE 3000 MG: 98 INJECTION, SOLUTION INTRAVENOUS at 09:43

## 2022-11-11 RX ADMIN — MAGNESIUM SULFATE HEPTAHYDRATE 2000 MG: 40 INJECTION, SOLUTION INTRAVENOUS at 07:22

## 2022-11-11 RX ADMIN — SODIUM CHLORIDE 8 MG/HR: 9 INJECTION, SOLUTION INTRAVENOUS at 03:24

## 2022-11-11 RX ADMIN — PHENYLEPHRINE HYDROCHLORIDE 50 MCG/MIN: 100 INJECTION INTRAVENOUS at 02:26

## 2022-11-11 RX ADMIN — SODIUM CHLORIDE, PRESERVATIVE FREE 10 ML: 5 INJECTION INTRAVENOUS at 21:31

## 2022-11-11 RX ADMIN — SERTRALINE 100 MG: 100 TABLET, FILM COATED ORAL at 21:29

## 2022-11-11 RX ADMIN — ATORVASTATIN CALCIUM 20 MG: 20 TABLET, FILM COATED ORAL at 21:30

## 2022-11-11 RX ADMIN — SODIUM CHLORIDE: 9 INJECTION, SOLUTION INTRAVENOUS at 08:43

## 2022-11-11 ASSESSMENT — PAIN SCALES - GENERAL
PAINLEVEL_OUTOF10: 0

## 2022-11-11 NOTE — OP NOTE
Donita Penn  YOB: 1945  [id number]    Pre-operative Diagnosis: UGI bleed    Post-operative Diagnosis: Duodenal ulcer    Procedure: EGD with control of hemorrhage via epinephrine injection and clip application    Anesthesia: LMAC    Surgeon: Ezio Pendleton MD    Assistant: None    Estimated Blood Loss: none    Complications: none    Specimens: None    Procedure: Equipment was brought to the bedside in the ICU. Patient was placed in a left lateral decubitus position. LMAC anesthesia was administered and a bite block was inserted. A lubricated gastroscope was inserted into the oropharynx and advanced into the esophagus. The esophagus was inspected throughout its length. There were no varices. No evidence of esophagitis. The stomach was entered and insufflated. There was no blood in the gastric lumen. No gross pathology was noted. The pylorus was intubated. There was a large ulcer in the posterior duodenum at the junction between the first and second portion. There was an adherent clot. No active bleeding. A sclerotherapy needle was inserted and several cc of epinephrine were injected around the ulcer. I then irrigated to knock off the clot. There was a visible vessel. A resolution clip was inserted and the clip was applied to the vessel. The scope was pulled back into the stomach. The stomach was deflated and the scope was withdrawn and removed. The patient tolerated the procedure well. Impression: Duodenal ulcer with adherent clot and visible vessel. Successful injection of epinephrine and clip application    Plan: Continue PPI.   Observe for recurrent hemorrhage      Ezio Pendleton MD

## 2022-11-11 NOTE — PLAN OF CARE
Problem: Discharge Planning  Goal: Discharge to home or other facility with appropriate resources  Outcome: Not Progressing  Flowsheets (Taken 11/10/2022 1658 by Levon Reilly RN)  Discharge to home or other facility with appropriate resources: Refer to discharge planning if patient needs post-hospital services based on physician order or complex needs related to functional status, cognitive ability or social support system     Problem: Discharge Planning  Goal: Discharge to home or other facility with appropriate resources  Outcome: Not Progressing  Flowsheets (Taken 11/10/2022 1658 by Levon Reilly RN)  Discharge to home or other facility with appropriate resources: Refer to discharge planning if patient needs post-hospital services based on physician order or complex needs related to functional status, cognitive ability or social support system

## 2022-11-11 NOTE — PROGRESS NOTES
[facility] Progress Note    Admitting Date and Time: 11/10/2022  3:06 PM  Admit Dx: Hemorrhagic shock ([facility] [address] [R57.8]    Subjective:  Patient is being followed for Hemorrhagic shock [facility]) [R57.8]     Ms. Terri Henderson is a 50-year-old female who is currently admitted with hematochezia and hemorrhagic shock. Overnight she was transfused a third unit of PRBCs. She had multiple episodes of hematochezia per the night nurse. She denies lightheadedness, nausea, vomiting, chest pain, shortness of breath, headache this morning. She continues to be n.p.o.      ROS: denies fever, chills, cp, sob, n/v, HA unless stated above.       calcium gluconate IVPB  3,000 mg IntraVENous Once    atorvastatin  20 mg Oral Nightly    baclofen  10 mg Oral BID    sertraline  100 mg Oral Nightly    sodium chloride flush  5-40 mL IntraVENous 2 times per day     albuterol, 2.5 mg, Q6H PRN  albuterol, 2.5 mg, 4x Daily PRN  traZODone, 25 mg, Nightly PRN  sodium chloride flush, 5-40 mL, PRN  sodium chloride, , PRN  ondansetron, 4 mg, Q8H PRN   Or  ondansetron, 4 mg, Q6H PRN  acetaminophen, 650 mg, Q6H PRN   Or  acetaminophen, 650 mg, Q6H PRN  sodium chloride, , PRN         Objective:    BP (!) 112/93   Pulse 98   Temp 99.7 °F (37.6 °C) (Axillary)   Resp 16   Ht 5' 2\" (1.575 m)   Wt 170 lb 6.7 oz (77.3 kg)   SpO2 93%   BMI 31.17 kg/m²     General Appearance: alert and oriented to person, place and time and in no acute distress  Skin: warm and dry  Head: normocephalic and atraumatic  Eyes: pupils equal, round, and reactive to light, extraocular eye movements intact, conjunctivae normal  Neck: neck supple and non tender without mass   Pulmonary/Chest: clear to auscultation bilaterally- no wheezes, rales or rhonchi, normal air movement, no respiratory distress  Cardiovascular: normal rate, normal S1 and S2 and no carotid bruits  Abdomen: soft, non-tender, non-distended, normal bowel sounds, no masses or organomegaly  Extremities: no cyanosis, no clubbing and no edema  Neurologic: no cranial nerve deficit and speech normal        Recent Labs     11/10/22  0522 11/10/22  1533 11/11/22  0546    142 142   K 3.4* 5.0 4.7    109* 113*   CO2 31 20* 21*   BUN 16 28* 43*   CREATININE 1.02 1.4* 1.6*   GLUCOSE 85 160* 140*   CALCIUM 7.9* 7.4* 6.7*       Recent Labs     11/10/22  1533 11/10/22  1830 11/10/22  2305 11/11/22  0145 11/11/22  0546   WBC 14.3*  --  32.4*  --  20.8*   RBC 2.37*  --  3.26*  --  2.51*   HGB 6.6*   < > 9.8* 8.2* 7.3*   HCT 22.8*   < > 29.4* 24.6* 22.4*   MCV 96.2  --  90.2  --  89.2   MCH 27.8  --  30.1  --  29.1   MCHC 28.9*  --  33.3  --  32.6   RDW 16.6*  --  15.9*  --  16.3*     --  212  --  137   MPV 9.3  --  9.4  --  9.5    < > = values in this interval not displayed. Radiology: No new imaging to report. Assessment:    Principal Problem:    Hemorrhagic shock (HCC)  Active Problems:    Lactic acidosis    Chronic respiratory failure with hypoxia (HCC)    Gastrointestinal hemorrhage    Acute blood loss anemia  Resolved Problems:    * No resolved hospital problems. *      Plan:  1. Hemorrhagic shock-transfused 3 units PRBCs so far, continue blood and fluid resuscitation. Critical care and general surgery consulted. Appreciate recommendations. 2.  Hematochezia-could be secondary to brisk upper GI bleed versus lower GI bleed. Continue IV Protonix 40 mg every 12 hours. N.p.o. for now. 3.  Acute blood loss anemia-obtaining posttransfusion H/H. Then H/H every 6 hours. Transfuse for hemoglobin less than 7.  4.  Syncope-likely secondary to first 3 issues. 5.  COPD, not in acute exacerbation-monitor respiratory status closely. 6.  Chronic hypoxic respiratory failure requiring supplemental oxygen via nasal cannula-supplemental oxygen as needed, monitor respiratory status closely. 7.  CKD stage III-monitor with daily BMP  8.   Lactic acidosis-repeat lactic acid pending, likely secondary to problem #1.  9.  History of hypertension-hold home antihypertensives  10. History of multiple sclerosis  11. History of breast cancer  12. Hyperlipidemia-continue home statin  13. Nonzero troponin-likely secondary to supply demand mismatch, ACS less likely     DVT prophylaxis: None due to concern for hemorrhage      NOTE: This report was transcribed using voice recognition software. Every effort was made to ensure accuracy; however, inadvertent computerized transcription errors may be present.   Electronically signed by Donna Lopez MD on 11/11/2022 at 10:19 AM

## 2022-11-11 NOTE — PROGRESS NOTES
Critical Care Team - Daily Progress Note      Date and time: 11/11/2022 7:32 AM  Patient's name:  Brooks Gil  Medical Record Number: [id number]  Patient's account/billing number: [de-identified]  Patient's YOB: 1945  Age: 77 y.o. Date of Admission: 11/10/2022  3:06 PM  Length of stay during current admission: 1      Primary Care Physician: Asia Mancia DO  ICU Attending Physician: Dr. Tanner Johnson    Code Status: Full Code    Reason for ICU admission: Hemorrhagic shock secondary to GI hemorrhage      SUBJECTIVE:         11/11/2022: Art line placed yesterday for severe hypotension. There has been minimal urine output overnight patient was of 140s HR he was weaned off levo now on vasopressin and phenylephrine. patient had 3 more melenic stools last night. Oxygen has weaned down to 3 L. Patient received a total 2 units of blood prior to admission. Hemoglobin count today 7.3.  1 unit of blood is ordered. Patient reports no other acute complaints. She denies the following: Chest pain, hemoptysis, hematemesis, abdominal pain, urinary symptoms, leg erythema. On physical exam for right flank hematoma seems to have expanded and hardened. Initial HPI: The patient is a 77 y.o. female with significant past medical history of hyperlipidemia, hypertension, COPD. She presented to the hospital on 11/10 for continued episodes of hypotension and altered mental status while at [place] [id number]. She was recently admitted for COPD exacerbation and multiple falls, she has been clinically rehab facility recovering since then. Her antihypertensives had been discontinued several days ago with persistent hypotension. She has been on 4 L nasal cannula oxygen since her hospitalization for COPD exacerbation. She has been having intermittent diarrhea but was unable to say if it was black or bloody.   In the emergency department she was found to have a hemoglobin of 6.6 with ana blood in her stool, her hemoglobin had been 11.4 3 days ago. Lactic acid was elevated 3.8, she is not experiencing abdominal pain. Kidney function is relatively at patient's baseline. She was transfused fluid boluses in the emergency department in addition to trauma blood. 2 more units PRBCs have been ordered. Initial blood pressure in the emergency department was noted at 58/44, it did improve with fluids and blood although with high risk of deterioration she will come to the ICU for critical care management of hemorrhagic shock secondary to GI hemorrhage. General surgery is aware of the patient. Intake/Output:   No intake/output data recorded. I/O last 3 completed shifts:   In: 6126.5 [I.V.:.6; Blood:.5; IV Piggyback:.4]  Out: 365 [Urine:365]    Awake and following commands: Yes  Current Ventilation: - No ventilator support  Secretions: none  Sedation: none  Paralyzed: No  Vasopressors:  weaned off vasopressin and phenylephrine            OBJECTIVE:     VITAL SIGNS:  BP (!) 70/35   Pulse 93   Temp 98.3 °F (36.8 °C) (Axillary)   Resp 16   Ht 5' 2\" (1.575 m)   Wt 170 lb 6.7 oz (77.3 kg)   SpO2 92%   BMI 31.17 kg/m²   Tmax over 24 hours:  Temp (24hrs), Av °F (36.7 °C), Min:97 °F (36.1 °C), Max:99.3 °F (37.4 °C)      Patient Vitals for the past 6 hrs:   Temp Temp src Pulse Resp SpO2 Weight   22 0700 98.3 °F (36.8 °C) Axillary 93 16 92 % --   22 0630 -- -- 93 10 93 % --   22 0600 -- -- 95 14 94 % --   22 0549 -- -- -- -- -- 170 lb 6.7 oz (77.3 kg)   22 0500 -- -- 94 14 95 % --   22 0400 97.7 °F (36.5 °C) Axillary 100 18 96 % --   22 0300 -- -- (!) 105 15 92 % --   22 0200 99 °F (37.2 °C) Axillary (!) 106 14 95 % --         Intake/Output Summary (Last 24 hours) at 2022 0732  Last data filed at 2022 0648  Gross per 24 hour   Intake 6126.48 ml   Output 365 ml   Net 5761.48 ml     Wt Readings from Last 2 Encounters:   22 170 lb 6.7 oz (77.3 kg)   22 155 lb (70.3 kg)     Body mass index is 31.17 kg/m². Physical Exam  General Appearance: alert and oriented to person, place and time and in no acute distress  Skin: warm and dry  Head: normocephalic and atraumatic  Eyes: pupils equal, round, and reactive to light, extraocular eye movements intact, conjunctivae normal  Neck: neck supple and non tender without mass   Pulmonary/Chest: clear to auscultation bilaterally- generalized expiratory  wheezes, no rales or rhonchi, normal air movement, no respiratory distress. On 3 L Nasal cannula   Cardiovascular: Tachycardic, normal S1 and S2 and no carotid bruits  Abdomen: soft, non-tender, non-distended, normal bowel sounds, no masses or organomegaly, + Right flank hematoma, active rectal bleeding  : deferred  Extremities: no cyanosis, no clubbing and no edema  Neurologic: no cranial nerve deficit and speech normal    MEDICATIONS:    Scheduled Meds:   calcium gluconate IVPB  3,000 mg IntraVENous Once    magnesium sulfate  2,000 mg IntraVENous Once    atorvastatin  20 mg Oral Nightly    baclofen  10 mg Oral BID    sertraline  100 mg Oral Nightly    sodium chloride flush  5-40 mL IntraVENous 2 times per day     Continuous Infusions:   sodium chloride 100 mL/hr at 11/11/22 0648    sodium chloride      pantoprazole 8 mg/hr (11/11/22 0324)    vasopressin (Septic Shock) infusion 0.03 Units/min (11/11/22 0648)    phenylephrine (SHARAN-SYNEPHRINE) 50mg/250mL infusion 5 mcg/min (11/11/22 0648)    norepinephrine 40 mcg/min (11/11/22 0648)    sodium chloride       PRN Meds:   albuterol, 2.5 mg, Q6H PRN  albuterol, 2.5 mg, 4x Daily PRN  traZODone, 25 mg, Nightly PRN  sodium chloride flush, 5-40 mL, PRN  sodium chloride, , PRN  ondansetron, 4 mg, Q8H PRN   Or  ondansetron, 4 mg, Q6H PRN  acetaminophen, 650 mg, Q6H PRN   Or  acetaminophen, 650 mg, Q6H PRN  sodium chloride, , PRN          VENT SETTINGS (Comprehensive) (if applicable):      Additional Respiratory Assessments  Heart Rate: 93  Resp: 16  SpO2: 92 %          Laboratory findings:    Complete Blood Count:   Recent Labs     11/10/22  1533 11/10/22  1830 11/10/22  2305 11/11/22  0145 11/11/22  0546   WBC 14.3*  --  32.4*  --  20.8*   HGB 6.6*   < > 9.8* 8.2* 7.3*   HCT 22.8*   < > 29.4* 24.6* 22.4*     --  212  --  137    < > = values in this interval not displayed. Last 3 Blood Glucose:   Recent Labs     11/10/22  0522 11/10/22  1533 11/11/22  0546   GLUCOSE 85 160* 140*        PT/INR:    Lab Results   Component Value Date/Time    PROTIME 19.3 11/11/2022 05:46 AM    PROTIME 13.0 05/30/2012 10:40 PM    INR 1.7 11/11/2022 05:46 AM     PTT:    Lab Results   Component Value Date/Time    APTT 26.9 05/30/2012 10:40 PM       Comprehensive Metabolic Profile:   Recent Labs     11/10/22  0522 11/10/22  1533 11/10/22  1533 11/11/22  0546    142  --  142   K 3.4* 5.0  --  4.7    109*  --  113*   CO2 31 20*  --  21*   BUN 16 28*  --  43*   CREATININE 1.02 1.4*  --  1.6*   GLUCOSE 85 160*  --  140*   CALCIUM 7.9* 7.4*  --  6.7*   PROT  --  4.5*   < > 3.7*   LABALBU  --  2.6*   < > 2.3*   BILITOT  --  0.5   < > 0.3   ALKPHOS  --  41   < > 33*   AST  --  14   < > 618*   ALT  --  14   < > 457*    < > = values in this interval not displayed. Magnesium:   Lab Results   Component Value Date/Time    MG 1.9 11/11/2022 05:46 AM     Phosphorus:   Lab Results   Component Value Date/Time    PHOS 3.1 11/11/2022 05:46 AM     Ionized Calcium:   Lab Results   Component Value Date/Time    CAION 1.07 11/11/2022 05:46 AM        Urinalysis:     Troponin: No results for input(s): TROPONINI in the last 72 hours.         Radiology/Imaging:         ASSESSMENT:     Patient Active Problem List    Diagnosis Date Noted    Gastrointestinal hemorrhage 11/11/2022    Hemorrhagic shock ([address] 11/10/2022    Lactic acidosis 11/10/2022    Chronic respiratory failure with hypoxia ([address] 11/10/2022    Bronchiectasis with acute exacerbation ([facility] [address] 10/27/2022 Acute respiratory failure with hypoxia and hypercapnia (HCC) 08/23/2022    COPD exacerbation (HCC)     Primary osteoarthritis of left hip 03/24/2022    Acute respiratory failure with hypoxia (HCC)     MARCELLO (acute kidney injury) ([facility] [address]     Primary hypertension     Ambulatory dysfunction 03/23/2022    Closed fracture of head of femur ([facility] [address] 03/23/2022    Simple chronic bronchitis ([facility] [address] 04/27/2021    Brain aneurysm 04/27/2021    History of total knee replacement, left 09/30/2020    H/O total hip arthroplasty, right 01/10/2019    Reactive depression 06/29/2018    Multiple adenomatous polyps 02/21/2017    Chronic idiopathic constipation 02/21/2017    Acute blood loss anemia 10/04/2013    Hypotension due to hypovolemia 05/31/2012    Hyperlipidemia 05/31/2012    Hypertension, essential, benign 05/31/2012         PLAN:     Neuro  No acute concerns  -Today A&O x3    Respiratory  History of COPD  -Recent admission for COPD exacerbation on 10/27  -Chronically on 6 L NC since that admission, her baseline is 3 L at night  -She is now on 3 L wean as able  -Maintain SPO2 88-92%  -breathing treatment     Cardiovascular  Shock  -Due to hemorrhagic shock from GI bleed  -Received total of 2 units of blood.   1 unit is pending  -Continue maintenance fluid sodium chloride 100  -Wean off Phenylephrine and vasopressin  -Maintain MAP greater than 65    History of hypertension  -Hold hypertensive meds    GI  GI bleed  -Right flank hematuria noted on physical exam  -General surgery planning on scope today  -Protonix infusion  -Transfuse blood as needed  -Hold anticoagulation, NSAIDs and aspirin placed patient intermittent compression  -H&H every 6    Renal  MARCELLO  -Baseline around 1.0, today 1.6  -Due to renal hypoperfusion  -Continue gentle fluids:     Infectious disease  No acute concern  -Cultures pending    Heme/Onc  Acute blood loss anemia  -Hemoglobin baseline 11.4 four days ago   -Has received 2 units of blood with 1 unit pending  -Concern for GI hemorrhage, general surgery is following  -H&H every 6    Endocrine  No acute issues  -Maintain blood glucose between 140-180  Lactic acidosis  -11/10/2022 levels 3.8. Today 0.9  -Due to renal hypoperfusion  -Trend lactic    Social/Spiritual/DNR/Other  Code status: full  Diet Diet NPO  Stress ulcer prophylaxis: Protonix drip  DVT prophylaxis: Intermittent compressions  Consultations needed: Yes  Transfer out of ICU today? No    Lines/catheters  11/10/2022  -Right internal jugular central line  -left hand and antecubital l peripheral IV      Virl DO Shy  7:32 AM  11/11/22     I personally saw, examined and provided care for the patient. Radiographs, labs and medication list were reviewed by me independently. I spoke with bedside nursing, therapists and consultants. Critical care services and times documented are independent of procedures and multidisciplinary rounds with Residents. Additionally comprehensive, multidisciplinary rounds were conducted with the MICU team. The case was discussed in detail and plans for care were established. Review of Residents documentation was conducted and revisions were made as appropriate. I agree with the above documented exam, problem list and plan of care with the following additions:    Sp EGD with duodenal ulcer and visible ulcer s/p epi administration  Continue PPI BID  Continue serial H/H  Transfuse <7  Monitor in ICU    32 minutes of CCT spent with the patient, reviewing the chart including imaging studies, and discussing the case with other health care professionals. This time excludes procedures.      Jennifer Simpson MD

## 2022-11-11 NOTE — CARE COORDINATION
Pt admit ICU for hemorrhagic shock, hgb 6.6 upon admit, elevated lactic acid. Pt received 3 units PRBC. EGD today. Pressors, iv protonix. Met with pt and daughter, Helon Severs. Pt from [address] [facility]. Does not want to return. Wants [facility] at the [place]. Left VM with adriana Martino. Await call back. Pt readmit. Prior to [facility], pt lives with spouse in ranch home. Has roll[place]. PCP is Dr Jewell Gallego. Will follow-o    ADDEND: spoke with Salena at [facility]. No beds available at the [place]. Left vm with daughter, Joleen Busby for more choices. Await call back-o    The Plan for Transition of Care is related to the following treatment goals: [facility]     The Patient and/or patient representative pt was provided with a choice of provider and agrees   with the discharge plan. [x] Yes [] No    Freedom of choice list was provided with basic dialogue that supports the patient's individualized plan of care/goals, treatment preferences and shares the quality data associated with the providers.  [x] Yes [] No

## 2022-11-11 NOTE — PATIENT CARE CONFERENCE
Intensive Care Daily Quality Rounding Checklist      ICU Team Members:     ICU Day #: 2    Intubation Date:      Ventilator Day #:     Central Line Insertion Date:  11/10/2022        Day #: 2     Arterial Line Insertion Date:  11/10/2022      Day #: 2    Temporary Hemodialysis Catheter Insertion Date:        Day #     DVT Prophylaxis: pcds    GI Prophylaxis:  protonix gtt    [place]ach[place] Catheter Insertion Date:  11/10/2022       Day #: 2      Continued need (if yes, reason documented and discussed with physician): yes.  Strict I+O    Skin Issues/ Wounds and ordered treatment discussed on rounds: none    Goals/ Plans for the Day:

## 2022-11-11 NOTE — PROCEDURES
11/10/2022  1920    Arterial Line Placement Procedure Note                     Indication: severe hypotension    Consent: Risks, benefits and alternatives (for applicable procedures below) described. The patient provided verbal consent for this procedure with daughter at bedside . Procedure: The skin over the right radial artery was prepped with Chloraprep and draped in a sterile fashion. Local anesthesia was obtained by infiltration using 1.0 cc of 1% Lidocaine without epinephrine. A 20 gauge arterial line catheter was then inserted under ultrasound guidance, using a modified Seldinger technique, into the vessel. The transducer set was then attached and securely fastened to the skin with sutures. Waveforms on the monitor were observed and found to be adequate. The patient had good distal perfusion after the procedure. The site was then dressed in a sterile fashion. The patient tolerated the procedure well.      Complications: None    Electronically signed by HIRA Díaz CNP on 11/10/2022 at 8:16 PM

## 2022-11-11 NOTE — ANESTHESIA POSTPROCEDURE EVALUATION
Department of Anesthesiology  Postprocedure Note    Patient: Maik Villegas  MRN: [id number]  YOB: 1945  Date of evaluation: 11/11/2022      Procedure Summary     Date: 11/11/22 Room / Location: [facility]    Anesthesia Start: 7158 Anesthesia Stop: 5583    Procedure: EGD ESOPHAGOGASTRODUODENOSCOPY Diagnosis:       Pain      (Pain [R52])    Surgeons: Jerilee Peabody, MD Responsible Provider: Tamara Stewart DO    Anesthesia Type: MAC ASA Status: 4          Anesthesia Type: No value filed.     Florinda Phase I:      Florinda Phase II:        Anesthesia Post Evaluation    Patient location during evaluation: ICU  Patient participation: complete - patient participated  Level of consciousness: awake  Airway patency: patent  Nausea & Vomiting: no nausea and no vomiting  Complications: no  Cardiovascular status: blood pressure returned to baseline  Respiratory status: acceptable  Hydration status: euvolemic

## 2022-11-11 NOTE — ANESTHESIA PRE PROCEDURE
Department of Anesthesiology  Preprocedure Note       Name:  Deni Gonzáles   Age:  77 y.o.  :  1945                                          MRN:  [id number]         Date:  2022      Surgeon: Nga Briceno):  Kenrick Galeana MD    Procedure: Procedure(s):  EGD ESOPHAGOGASTRODUODENOSCOPY    Medications prior to admission:   Prior to Admission medications    Medication Sig Start Date End Date Taking? Authorizing Provider   predniSONE (DELTASONE) 10 MG tablet Take 4 tablets by mouth daily for 3 days, THEN 3 tablets daily for 3 days, THEN 2 tablets daily for 3 days, THEN 1 tablet daily for 3 days. 22  Juwna Beltrán,    Compression Stockings MISC by Does not apply route 15-25 mm hg 22   Lona Ayala DO   meloxicam (MOBIC) 15 MG tablet Take 1 tablet by mouth in the morning.  22   Ricki Escamilla,    Fluticasone-Umeclidin-Vilant (TRELEGY ELLIPTA) 652-51.0-86 MCG/INH AEPB INHALE ONE PUFF BY MOUTH EVERY DAY 22   Lona Ayala DO   diclofenac sodium (VOLTAREN) 1 % GEL Apply 2 g topically 4 times daily as needed for Pain 22   Lona Ayala DO   traZODone (DESYREL) 50 MG tablet Take 0.5 tablets by mouth nightly as needed for Sleep 22   Lona Ayala DO   memantine (NAMENDA) 5 MG tablet Take 1 tablet by mouth 2 times daily  Patient not taking: Reported on 2022  Lona Ayala DO   baclofen (LIORESAL) 10 MG tablet TAKE 1 TABLET TWICE A DAY 22   Lona Ayala DO   hydroCHLOROthiazide (HYDRODIURIL) 25 MG tablet Take 1 tablet by mouth daily 22   Lona Ayala DO   atorvastatin (LIPITOR) 20 MG tablet Take 1 tablet by mouth daily  Patient taking differently: Take 20 mg by mouth nightly 22   Lona Ayala DO   carvedilol (COREG) 25 MG tablet Take 1 tablet by mouth 2 times daily 5/3/22   Krystle Casarez PA-C   amLODIPine (NORVASC) 5 MG tablet Take 1 tablet by mouth daily 5/3/22   Krystle Casarez PA-C   gabapentin (NEURONTIN) 400 MG capsule TAKE 1 CAPSULE 3 TIMES A   DAY 4/19/22 10/27/22  Bridger Ponce PA-C   sertraline (ZOLOFT) 100 MG tablet TAKE 1 AND 1/2 TABLETS     DAILY  Patient taking differently: Take 100 mg by mouth nightly 3/31/22   Bridger Ponce PA-C   aspirin 81 MG EC tablet Take 1 tablet by mouth 2 times daily for 30 doses DVT prophylaxis x30 days  Patient taking differently: Take 81 mg by mouth daily DVT prophylaxis x30 days 3/26/22 10/27/22  Gabi Maldonado MD   albuterol sulfate HFA (VENTOLIN HFA) 108 (90 Base) MCG/ACT inhaler Inhale 2 puffs into the lungs 4 times daily as needed for Wheezing 4/27/21   Bridger Ponce PA-C   Cholecalciferol 50 MCG (2000 UT) CAPS Take 2,000 Units by mouth every morning     Historical Provider, MD       Current medications:    Current Facility-Administered Medications   Medication Dose Route Frequency Provider Last Rate Last Admin    Arformoterol Tartrate (BROVANA) nebulizer solution 15 mcg  15 mcg Nebulization BID Kizzy Nassar MD        budesonide (PULMICORT) nebulizer suspension 500 mcg  0.5 mg Nebulization BID Kizzy Nassar MD        0.9 % sodium chloride infusion   IntraVENous Continuous Kyra Fox  mL/hr at 11/11/22 0843 New Bag at 11/11/22 0843    albuterol (PROVENTIL) nebulizer solution 2.5 mg  2.5 mg Nebulization Q6H PRN Hardik Ann-Marie, DO        atorvastatin (LIPITOR) tablet 20 mg  20 mg Oral Nightly Violeta Cornejo MD        baclofen (LIORESAL) tablet 10 mg  10 mg Oral BID Violeta Cornejo MD        sertraline (ZOLOFT) tablet 100 mg  100 mg Oral Nightly Violeta Cornejo MD        traZODone (DESYREL) tablet 25 mg  25 mg Oral Nightly PRN Violeta Cornejo MD        sodium chloride flush 0.9 % injection 5-40 mL  5-40 mL IntraVENous 2 times per day Violeta Corneoj MD   10 mL at 11/11/22 0933    sodium chloride flush 0.9 % injection 5-40 mL  5-40 mL IntraVENous PRN Violeta Cornejo MD        0.9 % sodium chloride infusion   IntraVENous PRN Domonique Aburto MD        ondansetron (ZOFRAN-ODT) disintegrating tablet 4 mg  4 mg Oral Q8H PRN Domonique Aburto MD        Or    [facility]ron [facility] injection 4 mg  4 mg IntraVENous Q6H PRN Domonique Aburto MD   4 mg at 11/11/22 0942    acetaminophen (TYLENOL) tablet 650 mg  650 mg Oral Q6H PRN Domonique Aburto MD        Or    acetaminophen (TYLENOL) suppository 650 mg  650 mg Rectal Q6H PRN Domonique Aburto MD        pantoprazole (PROTONIX) 80 mg in sodium chloride 0.9 % 100 mL infusion  8 mg/hr IntraVENous Continuous Mohammad Naiyer, DO 10 mL/hr at 11/11/22 0324 8 mg/hr at 11/11/22 0324    vasopressin 20 Units in dextrose 5 % 100 mL infusion  0.01-0.03 Units/min IntraVENous Continuous Ulysses HIRA Varma - CNP   Stopped at 11/11/22 1130    phenylephrine (SHARAN-SYNEPHRINE) 50 mg in sodium chloride 0.9 % 250 mL infusion   mcg/min IntraVENous Continuous Nadir Pane, DO   Stopped at 11/11/22 1115    norepinephrine (LEVOPHED) 16 mg in dextrose 5 % 250 mL infusion  1-100 mcg/min IntraVENous Continuous Nadir Pane, DO 37.5 mL/hr at 11/11/22 0648 40 mcg/min at 11/11/22 0648       Allergies:     Allergies   Allergen Reactions    Macrodantin [Nitrofurantoin Macrocrystal] Shortness Of Breath       Problem List:    Patient Active Problem List   Diagnosis Code    Hypotension due to hypovolemia I95.89, E86.1    Hyperlipidemia E78.5    Hypertension, essential, benign I10    Acute blood loss anemia D62    Multiple adenomatous polyps D36.9    Chronic idiopathic constipation K59.04    Reactive depression F32.9    H/O total hip arthroplasty, right Z96.641    History of total knee replacement, left Z96.652    Simple chronic bronchitis (HCC) J41.0    Brain aneurysm I67.1    Ambulatory dysfunction R26.2    Closed fracture of head of femur (HCC) S72.059A    Acute respiratory failure with hypoxia (HCC) J96.01    MARCELLO (acute kidney injury) ([facility] N17.9    Primary hypertension I10    Primary osteoarthritis of left hip M16.12    COPD exacerbation (HCC) J44.1    Acute respiratory failure with hypoxia and hypercapnia (HCC) J96.01, J96.02    Bronchiectasis with acute exacerbation (HCC) J47.1    Hemorrhagic shock (HCC) R57.8    Lactic acidosis E87.20    Chronic respiratory failure with hypoxia (HCC) J96.11    Gastrointestinal hemorrhage K92.2       Past Medical History:        Diagnosis Date    Arthritis     Breast cancer ([facility]     CKD (chronic kidney disease)     COPD (chronic obstructive pulmonary disease) (HCC)     Hyperlipidemia     Hypertension     MS (multiple sclerosis) ([facility] [address] 5/31/2012    Multiple sclerosis ([facility]     diagnosised 8  yrs ago [facility]       Past Surgical History:        Procedure Laterality Date    APPENDECTOMY      BREAST SURGERY  4/13/2012    biopsy - negative    HIP ARTHROPLASTY Right 04/13/2011    Right AIDEE  ALLISON Wong MD    HYSTERECTOMY (CERVIX STATUS UNKNOWN)      KNEE ARTHROPLASTY Left 10/01/2013    Left TKA  ALLISON Wong MD    KNEE ARTHROPLASTY Right 08/29/2012    Right TKA  ALLISON Wong MD    TOTAL HIP ARTHROPLASTY Left 3/25/2022    LEFT HIP TOTAL ARTHROPLASTY performed by Trinh Strange MD at [address] [place] [address] History:    Social History     Tobacco Use    Smoking status: Every Day     Packs/day: 1.00     Years: 50.00     Pack years: 50.00     Types: Cigarettes    Smokeless tobacco: Never   Substance Use Topics    Alcohol use:  No                                Ready to quit: Not Answered  Counseling given: Not Answered      Vital Signs (Current):   Vitals:    11/11/22 1145 11/11/22 1200 11/11/22 1215 11/11/22 1242   BP:  126/72     Pulse: 95 93 97 99   Resp: 14 13 14    Temp:       TempSrc:       SpO2: 91% 92% 96%    Weight:       Height:                                                  BP Readings from Last 3 Encounters:   11/11/22 126/72   11/01/22 (!) 117/57   09/27/22 128/72       NPO Status:                                                                                 BMI:   Wt Readings from Last 3 Encounters:   11/11/22 170 lb 6.7 oz (77.3 kg)   11/01/22 155 lb (70.3 kg)   09/27/22 171 lb (77.6 kg)     Body mass index is 31.17 kg/m². CBC:   Lab Results   Component Value Date/Time    WBC 20.8 11/11/2022 05:46 AM    RBC 2.51 11/11/2022 05:46 AM    HGB 7.3 11/11/2022 05:46 AM    HCT 22.4 11/11/2022 05:46 AM    MCV 89.2 11/11/2022 05:46 AM    RDW 16.3 11/11/2022 05:46 AM     11/11/2022 05:46 AM       CMP:   Lab Results   Component Value Date/Time     11/11/2022 05:46 AM    K 4.7 11/11/2022 05:46 AM    K 5.0 11/10/2022 03:33 PM     11/11/2022 05:46 AM    CO2 21 11/11/2022 05:46 AM    BUN 43 11/11/2022 05:46 AM    CREATININE 1.6 11/11/2022 05:46 AM    GFRAA > 60 11/10/2022 05:22 AM    LABGLOM 33 11/11/2022 05:46 AM    GLUCOSE 140 11/11/2022 05:46 AM    GLUCOSE 85 11/10/2022 05:22 AM    PROT 3.7 11/11/2022 05:46 AM    CALCIUM 6.7 11/11/2022 05:46 AM    BILITOT 0.3 11/11/2022 05:46 AM    ALKPHOS 33 11/11/2022 05:46 AM     11/11/2022 05:46 AM     11/11/2022 05:46 AM       POC Tests: No results for input(s): POCGLU, POCNA, POCK, POCCL, POCBUN, POCHEMO, POCHCT in the last 72 hours.     Coags:   Lab Results   Component Value Date/Time    PROTIME 19.3 11/11/2022 05:46 AM    PROTIME 13.0 05/30/2012 10:40 PM    INR 1.7 11/11/2022 05:46 AM    APTT 26.9 05/30/2012 10:40 PM       HCG (If Applicable): No results found for: PREGTESTUR, PREGSERUM, HCG, HCGQUANT     ABGs: No results found for: PHART, PO2ART, VQG0BFB, GQQ6CNQ, BEART, C3EIRXRM     Type & Screen (If Applicable):  No results found for: LABABO, LABRH    Drug/Infectious Status (If Applicable):  No results found for: HIV, HEPCAB    COVID-19 Screening (If Applicable):   Lab Results   Component Value Date/Time    COVID19 Not Detected 11/01/2022 09:11 AM    COVID19 Not Detected 10/27/2022 01:57 PM Anesthesia Evaluation  Patient summary reviewed and Nursing notes reviewed no history of anesthetic complications:   Airway: Mallampati: III  TM distance: >3 FB   Neck ROM: full  Mouth opening: > = 3 FB   Dental: normal exam         Pulmonary: breath sounds clear to auscultation  (+) COPD:                             Cardiovascular:    (+) hypertension:,         Rhythm: regular  Rate: normal                 ROS comment: S/P hemorrhagic shock  Elevated troponin     Neuro/Psych:   (+) depression/anxiety              ROS comment: H/O brain aneurysm GI/Hepatic/Renal:   (+) renal disease (MARCELLO):,          ROS comment: GI bleed. Endo/Other:    (+) blood dyscrasia: anemia:., .                 Abdominal:             Vascular: negative vascular ROS. Other Findings:           Anesthesia Plan      MAC     ASA 4             Anesthetic plan and risks discussed with patient and child/children. Plan discussed with CRNA. Eliseo Juan DO   11/11/2022      Patient seen and examined, chart reviewed, agree with above findings. Anesthetic plan, risks, benefits, alternatives, and personnel involved discussed with patient and her daughter. Patient and her daughter verbalized an understanding and agreed to proceed. NPO status confirmed. Daughter signed consent. Anesthetic plan discussed with care team members and agreed upon. Eliseo Juan DO   11/11/2022  1:04 PM      Patient seen; chart reviewed and agree with above.     HIRA Stern - NADINE

## 2022-11-11 NOTE — PROGRESS NOTES
Pt brought from ED to ICU, placed in ICU bed, placed on monitors, levo continues to titrate rapidly. Daughter at bedside, bedside report provided to night shift ICU RN. Pt finished blood product. ICU NP to bedside to assess/evaluate, new orders placed, care passed off to night shift RN at this time.

## 2022-11-12 LAB
ACINETOBACTER CALCOAC BAUMANNII COMPLEX BY PCR: NOT DETECTED
ALBUMIN SERPL-MCNC: 2.3 G/DL (ref 3.5–5.2)
ALP BLD-CCNC: 44 U/L (ref 35–104)
ALT SERPL-CCNC: 515 U/L (ref 0–32)
AST SERPL-CCNC: 489 U/L (ref 0–31)
BACTEROIDES FRAGILIS BY PCR: NOT DETECTED
BILIRUB SERPL-MCNC: 0.4 MG/DL (ref 0–1.2)
BILIRUBIN DIRECT: <0.2 MG/DL (ref 0–0.3)
BILIRUBIN, INDIRECT: ABNORMAL MG/DL (ref 0–1)
BOTTLE TYPE: ABNORMAL
CANDIDA ALBICANS BY PCR: NOT DETECTED
CANDIDA AURIS BY PCR: NOT DETECTED
CANDIDA GLABRATA BY PCR: NOT DETECTED
CANDIDA KRUSEI BY PCR: NOT DETECTED
CANDIDA PARAPSILOSIS BY PCR: NOT DETECTED
CANDIDA TROPICALIS BY PCR: NOT DETECTED
CRYPTOCOCCUS NEOFORMANS/GATTII BY PCR: NOT DETECTED
ENTEROBACTER CLOACAE COMPLEX BY PCR: NOT DETECTED
ENTEROBACTERALES BY PCR: NOT DETECTED
ENTEROCOCCUS FAECALIS BY PCR: NOT DETECTED
ENTEROCOCCUS FAECIUM BY PCR: NOT DETECTED
ESCHERICHIA COLI BY PCR: NOT DETECTED
HAEMOPHILUS INFLUENZAE BY PCR: NOT DETECTED
HCT VFR BLD CALC: 25.2 % (ref 34–48)
HEMOGLOBIN: 8.1 G/DL (ref 11.5–15.5)
KLEBSIELLA AEROGENES BY PCR: NOT DETECTED
KLEBSIELLA OXYTOCA BY PCR: NOT DETECTED
KLEBSIELLA PNEUMONIAE GROUP BY PCR: NOT DETECTED
LACTIC ACID: 0.7 MMOL/L (ref 0.5–2.2)
LACTIC ACID: 0.8 MMOL/L (ref 0.5–2.2)
LISTERIA MONOCYTOGENES BY PCR: NOT DETECTED
NEISSERIA MENINGITIDIS BY PCR: NOT DETECTED
ORDER NUMBER: ABNORMAL
PROTEUS SPECIES BY PCR: NOT DETECTED
PSEUDOMONAS AERUGINOSA BY PCR: NOT DETECTED
REASON FOR REJECTION: NORMAL
REJECTED TEST: NORMAL
SALMONELLA SPECIES BY PCR: NOT DETECTED
SERRATIA MARCESCENS BY PCR: NOT DETECTED
SOURCE OF BLOOD CULTURE: ABNORMAL
STAPHYLOCOCCUS AUREUS BY PCR: NOT DETECTED
STAPHYLOCOCCUS EPIDERMIDIS BY PCR: NOT DETECTED
STAPHYLOCOCCUS LUGDUNENSIS BY PCR: NOT DETECTED
STAPHYLOCOCCUS SPECIES BY PCR: DETECTED
STENOTROPHOMONAS MALTOPHILIA BY PCR: NOT DETECTED
STREPTOCOCCUS AGALACTIAE BY PCR: NOT DETECTED
STREPTOCOCCUS PNEUMONIAE BY PCR: NOT DETECTED
STREPTOCOCCUS PYOGENES  BY PCR: NOT DETECTED
STREPTOCOCCUS SPECIES BY PCR: NOT DETECTED
TOTAL PROTEIN: 3.9 G/DL (ref 6.4–8.3)

## 2022-11-12 PROCEDURE — 37799 UNLISTED PX VASCULAR SURGERY: CPT

## 2022-11-12 PROCEDURE — C9113 INJ PANTOPRAZOLE SODIUM, VIA: HCPCS

## 2022-11-12 PROCEDURE — 87040 BLOOD CULTURE FOR BACTERIA: CPT

## 2022-11-12 PROCEDURE — 94640 AIRWAY INHALATION TREATMENT: CPT

## 2022-11-12 PROCEDURE — 6370000000 HC RX 637 (ALT 250 FOR IP): Performed by: INTERNAL MEDICINE

## 2022-11-12 PROCEDURE — 80076 HEPATIC FUNCTION PANEL: CPT

## 2022-11-12 PROCEDURE — C9113 INJ PANTOPRAZOLE SODIUM, VIA: HCPCS | Performed by: SURGERY

## 2022-11-12 PROCEDURE — C9113 INJ PANTOPRAZOLE SODIUM, VIA: HCPCS | Performed by: INTERNAL MEDICINE

## 2022-11-12 PROCEDURE — 6360000002 HC RX W HCPCS: Performed by: SURGERY

## 2022-11-12 PROCEDURE — 2700000000 HC OXYGEN THERAPY PER DAY

## 2022-11-12 PROCEDURE — 2580000003 HC RX 258: Performed by: INTERNAL MEDICINE

## 2022-11-12 PROCEDURE — 6360000002 HC RX W HCPCS: Performed by: INTERNAL MEDICINE

## 2022-11-12 PROCEDURE — 36415 COLL VENOUS BLD VENIPUNCTURE: CPT

## 2022-11-12 PROCEDURE — 83605 ASSAY OF LACTIC ACID: CPT

## 2022-11-12 PROCEDURE — 2580000003 HC RX 258: Performed by: EMERGENCY MEDICINE

## 2022-11-12 PROCEDURE — 2580000003 HC RX 258

## 2022-11-12 PROCEDURE — 2580000003 HC RX 258: Performed by: STUDENT IN AN ORGANIZED HEALTH CARE EDUCATION/TRAINING PROGRAM

## 2022-11-12 PROCEDURE — 99232 SBSQ HOSP IP/OBS MODERATE 35: CPT | Performed by: STUDENT IN AN ORGANIZED HEALTH CARE EDUCATION/TRAINING PROGRAM

## 2022-11-12 PROCEDURE — 6360000002 HC RX W HCPCS

## 2022-11-12 PROCEDURE — 2060000000 HC ICU INTERMEDIATE R&B

## 2022-11-12 RX ORDER — CARVEDILOL 6.25 MG/1
12.5 TABLET ORAL 2 TIMES DAILY WITH MEALS
Status: DISCONTINUED | OUTPATIENT
Start: 2022-11-12 | End: 2022-11-17 | Stop reason: HOSPADM

## 2022-11-12 RX ORDER — HYDRALAZINE HYDROCHLORIDE 20 MG/ML
10 INJECTION INTRAMUSCULAR; INTRAVENOUS EVERY 6 HOURS PRN
Status: DISCONTINUED | OUTPATIENT
Start: 2022-11-12 | End: 2022-11-17 | Stop reason: HOSPADM

## 2022-11-12 RX ORDER — AMLODIPINE BESYLATE 5 MG/1
5 TABLET ORAL DAILY
Status: DISCONTINUED | OUTPATIENT
Start: 2022-11-12 | End: 2022-11-17 | Stop reason: HOSPADM

## 2022-11-12 RX ORDER — PANTOPRAZOLE SODIUM 40 MG/10ML
40 INJECTION, POWDER, LYOPHILIZED, FOR SOLUTION INTRAVENOUS 2 TIMES DAILY
Status: DISCONTINUED | OUTPATIENT
Start: 2022-11-12 | End: 2022-11-17 | Stop reason: HOSPADM

## 2022-11-12 RX ADMIN — BUDESONIDE 500 MCG: 0.5 SUSPENSION RESPIRATORY (INHALATION) at 20:35

## 2022-11-12 RX ADMIN — CARVEDILOL 12.5 MG: 6.25 TABLET, FILM COATED ORAL at 11:16

## 2022-11-12 RX ADMIN — BUDESONIDE 500 MCG: 0.5 SUSPENSION RESPIRATORY (INHALATION) at 07:58

## 2022-11-12 RX ADMIN — SODIUM CHLORIDE: 9 INJECTION, SOLUTION INTRAVENOUS at 20:29

## 2022-11-12 RX ADMIN — AMLODIPINE BESYLATE 5 MG: 5 TABLET ORAL at 11:17

## 2022-11-12 RX ADMIN — ATORVASTATIN CALCIUM 20 MG: 20 TABLET, FILM COATED ORAL at 20:28

## 2022-11-12 RX ADMIN — SODIUM CHLORIDE 8 MG/HR: 9 INJECTION, SOLUTION INTRAVENOUS at 00:04

## 2022-11-12 RX ADMIN — PANTOPRAZOLE SODIUM 40 MG: 40 INJECTION, POWDER, FOR SOLUTION INTRAVENOUS at 20:28

## 2022-11-12 RX ADMIN — ARFORMOTEROL TARTRATE 15 MCG: 15 SOLUTION RESPIRATORY (INHALATION) at 20:35

## 2022-11-12 RX ADMIN — ONDANSETRON 4 MG: 2 INJECTION INTRAMUSCULAR; INTRAVENOUS at 11:20

## 2022-11-12 RX ADMIN — ARFORMOTEROL TARTRATE 15 MCG: 15 SOLUTION RESPIRATORY (INHALATION) at 07:58

## 2022-11-12 RX ADMIN — SERTRALINE 100 MG: 100 TABLET, FILM COATED ORAL at 20:28

## 2022-11-12 RX ADMIN — PANTOPRAZOLE SODIUM 40 MG: 40 INJECTION, POWDER, FOR SOLUTION INTRAVENOUS at 08:28

## 2022-11-12 RX ADMIN — BACLOFEN 10 MG: 10 TABLET ORAL at 20:28

## 2022-11-12 RX ADMIN — SODIUM CHLORIDE, PRESERVATIVE FREE 10 ML: 5 INJECTION INTRAVENOUS at 08:28

## 2022-11-12 RX ADMIN — SODIUM CHLORIDE: 9 INJECTION, SOLUTION INTRAVENOUS at 04:57

## 2022-11-12 RX ADMIN — SODIUM CHLORIDE, PRESERVATIVE FREE 10 ML: 5 INJECTION INTRAVENOUS at 20:27

## 2022-11-12 RX ADMIN — CARVEDILOL 12.5 MG: 6.25 TABLET, FILM COATED ORAL at 16:50

## 2022-11-12 ASSESSMENT — PAIN SCALES - GENERAL
PAINLEVEL_OUTOF10: 0

## 2022-11-12 NOTE — PLAN OF CARE
Problem: Skin/Tissue Integrity  Goal: Absence of new skin breakdown  Description: 1. Monitor for areas of redness and/or skin breakdown  2. Assess vascular access sites hourly  3. Every 4-6 hours minimum:  Change oxygen saturation probe site  4. Every 4-6 hours:  If on nasal continuous positive airway pressure, respiratory therapy assess nares and determine need for appliance change or resting period.   11/12/2022 0944 by Danay Flores RN  Outcome: Progressing  11/12/2022 0017 by Fernando Myers RN  Outcome: Progressing     Problem: ABCDS Injury Assessment  Goal: Absence of physical injury  11/12/2022 0944 by Danay Flores RN  Outcome: Progressing  11/12/2022 0017 by Fernando Myers RN  Outcome: Progressing  Flowsheets  Taken 11/12/2022 0000 by Fernando Myers RN  Absence of Physical Injury: Implement safety measures based on patient assessment  Taken 11/11/2022 1242 by Bishop Davidson RN  Absence of Physical Injury: Implement safety measures based on patient assessment     Problem: Safety - Adult  Goal: Free from fall injury  Outcome: Progressing     Problem: Discharge Planning  Goal: Discharge to home or other facility with appropriate resources  11/12/2022 0017 by Fernando Myers RN  Outcome: Not Progressing  Flowsheets (Taken 11/11/2022 1545 by Virginie Roth RN)  Discharge to home or other facility with appropriate resources: Identify barriers to discharge with patient and caregiver

## 2022-11-12 NOTE — INTERDISCIPLINARY ROUNDS
Intensive Care Daily Quality Rounding Checklist      ICU Team Members: bedside nurse, charge nurse, Dr.Morrow Berry(resident), RT    ICU Day #: 3    Intubation Date:      Ventilator Day #:     Central Line Insertion Date: NA        Day #:      Arterial Line Insertion Date: NA      Day #:     Temporary Hemodialysis Catheter Insertion Date:        Day #     DVT Prophylaxis:SCD's    GI Prophylaxis:Protonix     Machuca Catheter Insertion Date: November 10       Day #: 3      Continued need (if yes, reason documented and discussed with physician): yes, strict I&O    Skin Issues/ Wounds and ordered treatment discussed on rounds:  N    Goals/ Plans for the Day:  DC arterial and central line, labs, resume home meds,transfer out of ICU

## 2022-11-12 NOTE — PROGRESS NOTES
Critical Care Team - Daily Progress Note      Date and time: 11/12/2022 10:47 AM  Patient's name:  Aditi Lott  Medical Record Number: [id number]  Patient's account/billing number: [de-identified]  Patient's YOB: 1945  Age: 77 y.o. Date of Admission: 11/10/2022  3:06 PM  Length of stay during current admission: 2      Primary Care Physician: Hailey Laura DO  ICU Attending Physician: Dr. Marcus Siu    Code Status: Full Code    Reason for ICU admission: Hemorrhagic shock secondary to GI hemorrhage      SUBJECTIVE:     Initial HPI: The patient is a 77 y.o. female with significant past medical history of hyperlipidemia, hypertension, COPD. She presented to the hospital on 11/10 for continued episodes of hypotension and altered mental status while at [place] [id number]. She was recently admitted for COPD exacerbation and multiple falls, she has been clinically rehab facility recovering since then. Her antihypertensives had been discontinued several days ago with persistent hypotension. She has been on 4 L nasal cannula oxygen since her hospitalization for COPD exacerbation. She has been having intermittent diarrhea but was unable to say if it was black or bloody. In the emergency department she was found to have a hemoglobin of 6.6 with ana blood in her stool, her hemoglobin had been 11.4 3 days ago. Lactic acid was elevated 3.8, she is not experiencing abdominal pain. Kidney function is relatively at patient's baseline. She was transfused fluid boluses in the emergency department in addition to trauma blood. 2 more units PRBCs have been ordered. Initial blood pressure in the emergency department was noted at 58/44, it did improve with fluids and blood although with high risk of deterioration she will come to the ICU for critical care management of hemorrhagic shock secondary to GI hemorrhage. General surgery is aware of the patient.      11/11/2022: Art line placed yesterday for severe hypotension. There has been minimal urine output overnight patient was of 140s HR he was weaned off levo now on vasopressin and phenylephrine. patient had 3 more melenic stools last night. Oxygen has weaned down to 3 L. Patient received a total 2 units of blood prior to admission. Hemoglobin count today 7.3.  1 unit of blood is ordered. Patient reports no other acute complaints. She denies the following: Chest pain, hemoptysis, hematemesis, abdominal pain, urinary symptoms, leg erythema. On physical exam for right flank hematoma seems to have expanded and hardened. 11: Resting overnight, pressors held and continuing to get fluid infusion. Blood cultures were positive in 1 bottle and we will send repeat PCR concerning contamination. General surgery following. Transfer out of the ICU today. Intake/Output:   I/O this shift:  In: 1628.5 [P.O.:480; I.V.:1148.5]  Out: -   I/O last 3 completed shifts: In: 6313.6 [I.V.:4319.8;  Blood:968.8; IV Piggyback:1025.1]  Out: 5 [Urine:2810]    Awake and following commands: Yes  Current Ventilation: - No ventilator support  Secretions: none  Sedation: none  Paralyzed: No  Vasopressors:  weaned off vasopressin and phenylephrine      OBJECTIVE:     VITAL SIGNS:  BP (!) 156/76   Pulse (!) 101   Temp 98.7 °F (37.1 °C) (Oral)   Resp 15   Ht 5' 2\" (1.575 m)   Wt 170 lb 6.7 oz (77.3 kg)   SpO2 100%   BMI 31.17 kg/m²   Tmax over 24 hours:  Temp (24hrs), Av.1 °F (37.3 °C), Min:98.5 °F (36.9 °C), Max:100 °F (37.8 °C)      Patient Vitals for the past 6 hrs:   BP Temp Temp src Pulse Resp SpO2   22 1000 (!) 156/76 -- -- (!) 101 15 100 %   22 0900 (!) 169/79 -- -- (!) 104 19 96 %   22 0800 (!) 159/84 98.7 °F (37.1 °C) Oral (!) 105 14 96 %   22 0700 -- -- -- 97 16 --   22 0600 (!) 181/87 -- -- (!) 103 15 94 %   22 0500 (!) 169/74 -- -- (!) 102 14 95 %           Intake/Output Summary (Last 24 hours) at 2022 1047  Last data filed at 11/12/2022 1000  Gross per 24 hour   Intake 4215.65 ml   Output 2295 ml   Net 1920.65 ml       Wt Readings from Last 2 Encounters:   11/11/22 170 lb 6.7 oz (77.3 kg)   11/01/22 155 lb (70.3 kg)     Body mass index is 31.17 kg/m². Physical Exam  General Appearance: alert and oriented to person, place and time and in no acute distress  Skin: warm and dry  Head: normocephalic and atraumatic  Eyes: pupils equal, round, and reactive to light, extraocular eye movements intact, conjunctivae normal  Neck: neck supple and non tender without mass   Pulmonary/Chest: clear to auscultation bilaterally- generalized expiratory  wheezes, no rales or rhonchi, normal air movement, no respiratory distress. On 3 L Nasal cannula   Cardiovascular: Tachycardic, normal S1 and S2 and no carotid bruits  Abdomen: soft, non-tender, non-distended, normal bowel sounds, no masses or organomegaly, + Right flank hematoma, no rectal bleeding  : deferred  Extremities: no cyanosis, no clubbing and no edema  Neurologic: no cranial nerve deficit and speech normal    MEDICATIONS:    Scheduled Meds:   pantoprazole  40 mg IntraVENous BID    amLODIPine  5 mg Oral Daily    carvedilol  12.5 mg Oral BID WC    Arformoterol Tartrate  15 mcg Nebulization BID    budesonide  0.5 mg Nebulization BID    atorvastatin  20 mg Oral Nightly    baclofen  10 mg Oral BID    sertraline  100 mg Oral Nightly    sodium chloride flush  5-40 mL IntraVENous 2 times per day     Continuous Infusions:   sodium chloride 50 mL/hr at 11/12/22 0730    sodium chloride       PRN Meds:   hydrALAZINE, 10 mg, Q6H PRN  albuterol, 2.5 mg, Q6H PRN  traZODone, 25 mg, Nightly PRN  sodium chloride flush, 5-40 mL, PRN  sodium chloride, , PRN  ondansetron, 4 mg, Q8H PRN   Or  ondansetron, 4 mg, Q6H PRN  acetaminophen, 650 mg, Q6H PRN   Or  acetaminophen, 650 mg, Q6H PRN      VENT SETTINGS (Comprehensive) (if applicable):      Additional Respiratory Assessments  Heart Rate: (!) 101  Resp: 15  SpO2: 100 %      Laboratory findings:    Complete Blood Count:   Recent Labs     11/10/22  1533 11/10/22  1830 11/10/22  2305 11/11/22  0145 11/11/22  0546 11/11/22  1300 11/11/22  1740 11/11/22  2320   WBC 14.3*  --  32.4*  --  20.8*  --   --   --    HGB 6.6*   < > 9.8*   < > 7.3* 9.2* 8.4* 8.1*   HCT 22.8*   < > 29.4*   < > 22.4* 27.5* 25.5* 25.2*     --  212  --  137  --   --   --     < > = values in this interval not displayed. Last 3 Blood Glucose:   Recent Labs     11/10/22  0522 11/10/22  1533 11/11/22  0546   GLUCOSE 85 160* 140*          PT/INR:    Lab Results   Component Value Date/Time    PROTIME 19.3 11/11/2022 05:46 AM    PROTIME 13.0 05/30/2012 10:40 PM    INR 1.7 11/11/2022 05:46 AM     PTT:    Lab Results   Component Value Date/Time    APTT 26.9 05/30/2012 10:40 PM       Comprehensive Metabolic Profile:   Recent Labs     11/10/22  0522 11/10/22  1533 11/10/22  1533 11/11/22  0546 11/12/22  0835    142  --  142  --    K 3.4* 5.0  --  4.7  --     109*  --  113*  --    CO2 31 20*  --  21*  --    BUN 16 28*  --  43*  --    CREATININE 1.02 1.4*  --  1.6*  --    GLUCOSE 85 160*  --  140*  --    CALCIUM 7.9* 7.4*  --  6.7*  --    PROT  --  4.5*   < > 3.7* 3.9*   LABALBU  --  2.6*   < > 2.3* 2.3*   BILITOT  --  0.5   < > 0.3 0.4   ALKPHOS  --  41   < > 33* 44   AST  --  14   < > 618* 489*   ALT  --  14   < > 457* 515*    < > = values in this interval not displayed. Magnesium:   Lab Results   Component Value Date/Time    MG 1.9 11/11/2022 05:46 AM     Phosphorus:   Lab Results   Component Value Date/Time    PHOS 3.1 11/11/2022 05:46 AM     Ionized Calcium:   Lab Results   Component Value Date/Time    CAION 1.07 11/11/2022 05:46 AM        Urinalysis:     Troponin: No results for input(s): TROPONINI in the last 72 hours.         Radiology/Imaging:         ASSESSMENT:     Patient Active Problem List    Diagnosis Date Noted    Gastrointestinal hemorrhage 11/11/2022 Hemorrhagic shock ([address] 11/10/2022    Lactic acidosis 11/10/2022    Chronic respiratory failure with hypoxia ([address] 11/10/2022    Bronchiectasis with acute exacerbation ([address] 10/27/2022    Acute respiratory failure with hypoxia and hypercapnia ([address] 08/23/2022    COPD exacerbation (HCC)     Primary osteoarthritis of left hip 03/24/2022    Acute respiratory failure with hypoxia ([address]     MARCELLO (acute kidney injury) ([address]     Primary hypertension     Ambulatory dysfunction 03/23/2022    Closed fracture of head of femur ([address] 03/23/2022    Simple chronic bronchitis ([address] 04/27/2021    Brain aneurysm 04/27/2021    History of total knee replacement, left 09/30/2020    H/O total hip arthroplasty, right 01/10/2019    Reactive depression 06/29/2018    Multiple adenomatous polyps 02/21/2017    Chronic idiopathic constipation 02/21/2017    Acute blood loss anemia 10/04/2013    Hypotension due to hypovolemia 05/31/2012    Hyperlipidemia 05/31/2012    Hypertension, essential, benign 05/31/2012         PLAN:     Neuro  No acute concerns  -Alert and oriented, Tylenol as needed and continue trazodone nightly    Respiratory  History of COPD  -Recent admission for COPD exacerbation on 10/27  -baseline is 3 L nasal cannula oxygen at night  -Maintain SPO2 88-92%  -Scheduled Brovana and Pulmicort, albuterol as needed    Cardiovascular  Hemorrhagic shock secondary to GI bleeding  -Received total of 3 units of blood  -Continue maintenance fluid sodium chloride 100  -pressors held overnight  -Maintain MAP greater than 65    History of hypertension  -Amlodipine and Coreg as she is hypertensive today    GI  GI hemorrhage  -EGD on 11/11 performed at bedside in the ICU that demonstrated a duodenal ulcer that was injected with epinephrine and clipped  -General surgery continuing to swallow  -Protonix switched to twice daily  -Hold anticoagulation  -CBC daily    Renal  MARCELLO  -Baseline around 1.0, today 1.6  -likely due to renal hypoperfusion  -50 mL/h normal saline infusion    Lactic acidosis  -11/10/2022 levels 3.8. Today 0.8 with fluid resuscitation    Infectious disease  Bacteremia  -1 blood culture positive for gram-positive cocci in clusters, will repeat blood cultures as likely contamination    Heme/Onc  Acute blood loss anemia  -Hemoglobin baseline 11.4 four days ago   -Has received 3 units PRBCs  -CBC daily  -Hemoglobin 8.1 today    Endocrine  No acute issues  -Maintain blood glucose between 140-180    Social/Spiritual/DNR/Other  Code status: full  Diet ADULT DIET; Clear Liquid  Stress ulcer prophylaxis: Protonix twice daily  DVT prophylaxis: Intermittent compressions  Consultations needed: Yes  Transfer out of ICU today? No    Lines/catheters  [address] [facility], DO  10:47 AM  11/12/22     I personally saw, examined and provided care for the patient. Radiographs, labs and medication list were reviewed by me independently. I spoke with bedside nursing, therapists and consultants. Critical care services and times documented are independent of procedures and multidisciplinary rounds with Residents. Additionally comprehensive, multidisciplinary rounds were conducted with the MICU team. The case was discussed in detail and plans for care were established. Review of Residents documentation was conducted and revisions were made as appropriate.  I agree with the above documented exam, problem list and plan of care with the following additions:    Hypertensive - resume antihypertensive meds  H/H stable  PPI  Reviewed EGD  Bronchodilators  May transfer to Winston Ang MD

## 2022-11-12 NOTE — PROGRESS NOTES
Seen/examined  Denies any abdominal pain  Nursing reports some melenic stools  Blood pressure stable  Off vasopressor infusions  Hemoglobin 8.1  Abdomen soft and nondistended, nontender  Plan:  Continue PPI: Stop infusion, changed to IV twice daily  Start clears  Monitor hemoglobin, transfuse as needed.   Expect hemoglobin to drift down slowly as she equilibrates  Mark Cook MD

## 2022-11-12 NOTE — PROGRESS NOTES
Spoke to [place] NP regarding patient SBP increase intermittently, and MAP 90's, patient was on Hypertension medications prior to admit, was off for 5 days prior to admit and during, due to her hypotension. Will monitor and if Systolic goes to 646'Q will add Hydralazine PRN and monitor for further.   Carlito Gilbert RN

## 2022-11-12 NOTE — PROGRESS NOTES
[place] catheter removed at this time. Tolerated procedure well. Instructed patient to put call light on when has to void. Stats, \"ok. \"

## 2022-11-12 NOTE — PROGRESS NOTES
[facility] Progress Note    Admitting Date and Time: 11/10/2022  3:06 PM  Admit Dx: Hemorrhagic shock ([facility] [R57.8]    Subjective:  Patient is being followed for Hemorrhagic shock [facility]) [R57.8]     Ms. Amira Rivas is a 52-year-old female who is currently admitted with hemorrhagic shock. Yesterday she underwent EGD which revealed a duodenal ulcer with adherent clot and visible vessel. She underwent epinephrine injection and clip application. She is currently being monitored for further bleeding. She denies nausea, vomiting, headache, chest pain, shortness of breath. ROS: denies fever, chills, cp, sob, n/v, HA unless stated above.       pantoprazole  40 mg IntraVENous BID    amLODIPine  5 mg Oral Daily    carvedilol  12.5 mg Oral BID WC    Arformoterol Tartrate  15 mcg Nebulization BID    budesonide  0.5 mg Nebulization BID    atorvastatin  20 mg Oral Nightly    baclofen  10 mg Oral BID    sertraline  100 mg Oral Nightly    sodium chloride flush  5-40 mL IntraVENous 2 times per day     hydrALAZINE, 10 mg, Q6H PRN  albuterol, 2.5 mg, Q6H PRN  traZODone, 25 mg, Nightly PRN  sodium chloride flush, 5-40 mL, PRN  sodium chloride, , PRN  ondansetron, 4 mg, Q8H PRN   Or  ondansetron, 4 mg, Q6H PRN  acetaminophen, 650 mg, Q6H PRN   Or  acetaminophen, 650 mg, Q6H PRN       Objective:    BP (!) 156/99   Pulse 98   Temp 98.7 °F (37.1 °C) (Oral)   Resp 15   Ht 5' 2\" (1.575 m)   Wt 170 lb 6.7 oz (77.3 kg)   SpO2 100%   BMI 31.17 kg/m²     General Appearance: alert and oriented to person, place and time and in no acute distress  Skin: warm and dry  Head: normocephalic and atraumatic  Eyes: pupils equal, round, and reactive to light, extraocular eye movements intact, conjunctivae normal  Neck: neck supple and non tender without mass   Pulmonary/Chest: clear to auscultation bilaterally- no wheezes, rales or rhonchi, normal air movement, no respiratory distress  Cardiovascular: normal rate, normal S1 and S2 and no carotid bruits  Abdomen: soft, non-tender, non-distended, normal bowel sounds, no masses or organomegaly  Extremities: no cyanosis, no clubbing and no edema  Neurologic: no cranial nerve deficit and speech normal        Recent Labs     11/10/22  0522 11/10/22  1533 11/11/22  0546    142 142   K 3.4* 5.0 4.7    109* 113*   CO2 31 20* 21*   BUN 16 28* 43*   CREATININE 1.02 1.4* 1.6*   GLUCOSE 85 160* 140*   CALCIUM 7.9* 7.4* 6.7*         Recent Labs     11/10/22  1533 11/10/22  1830 11/10/22  2305 11/11/22  0145 11/11/22  0546 11/11/22  1300 11/11/22  1740 11/11/22  2320   WBC 14.3*  --  32.4*  --  20.8*  --   --   --    RBC 2.37*  --  3.26*  --  2.51*  --   --   --    HGB 6.6*   < > 9.8*   < > 7.3* 9.2* 8.4* 8.1*   HCT 22.8*   < > 29.4*   < > 22.4* 27.5* 25.5* 25.2*   MCV 96.2  --  90.2  --  89.2  --   --   --    MCH 27.8  --  30.1  --  29.1  --   --   --    MCHC 28.9*  --  33.3  --  32.6  --   --   --    RDW 16.6*  --  15.9*  --  16.3*  --   --   --      --  212  --  137  --   --   --    MPV 9.3  --  9.4  --  9.5  --   --   --     < > = values in this interval not displayed. Radiology: No new imaging to report. Assessment:    Principal Problem:    Hemorrhagic shock (HCC)  Active Problems:    Lactic acidosis    Chronic respiratory failure with hypoxia (HCC)    Gastrointestinal hemorrhage    Acute blood loss anemia  Resolved Problems:    * No resolved hospital problems. *      Plan:  1. Hemorrhagic shock-transfused 3 units PRBCs so far, continue blood and fluid resuscitation. Critical care and general surgery consulted. Appreciate recommendations. Underwent EGD on 11/11 which revealed a duodenal ulcer with adherent clot and visible vessel. Underwent epinephrine injection and clip application. Monitor for further bleeding episodes. 2.  Hematochezia versus melena-Continue IV Protonix 40 mg every 12 hours. 3.  Acute blood loss anemia-monitor H/H.   Transfuse for hemoglobin less than 7.  4.  Syncope-likely secondary to first 3 issues. 5.  COPD, not in acute exacerbation-monitor respiratory status closely. 6.  Chronic hypoxic respiratory failure requiring supplemental oxygen via nasal cannula-supplemental oxygen as needed, monitor respiratory status closely. 7.  CKD stage III-monitor with daily BMP  8. Lactic acidosis-resolved, likely secondary to problem #1.  9.  History of hypertension-hold home antihypertensives  10. History of multiple sclerosis  11. History of breast cancer  12. Hyperlipidemia-continue home statin  13. Nonzero troponin-likely secondary to supply demand mismatch, ACS less likely     DVT prophylaxis: None due to concern for hemorrhage      NOTE: This report was transcribed using voice recognition software. Every effort was made to ensure accuracy; however, inadvertent computerized transcription errors may be present.   Electronically signed by Reuben Loaiza MD on 11/12/2022 at 11:44 AM

## 2022-11-13 LAB
ALBUMIN SERPL-MCNC: 2.4 G/DL (ref 3.5–5.2)
ALBUMIN SERPL-MCNC: 2.7 G/DL (ref 3.5–5.2)
ALP BLD-CCNC: 39 U/L (ref 35–104)
ALP BLD-CCNC: 42 U/L (ref 35–104)
ALT SERPL-CCNC: 402 U/L (ref 0–32)
ALT SERPL-CCNC: 438 U/L (ref 0–32)
ANION GAP SERPL CALCULATED.3IONS-SCNC: 8 MMOL/L (ref 7–16)
ANION GAP SERPL CALCULATED.3IONS-SCNC: 9 MMOL/L (ref 7–16)
AST SERPL-CCNC: 252 U/L (ref 0–31)
AST SERPL-CCNC: 335 U/L (ref 0–31)
BASOPHILS ABSOLUTE: 0.01 E9/L (ref 0–0.2)
BASOPHILS RELATIVE PERCENT: 0.1 % (ref 0–2)
BILIRUB SERPL-MCNC: 0.5 MG/DL (ref 0–1.2)
BILIRUB SERPL-MCNC: 0.7 MG/DL (ref 0–1.2)
BLOOD BANK DISPENSE STATUS: NORMAL
BLOOD BANK PRODUCT CODE: NORMAL
BPU ID: NORMAL
BUN BLDV-MCNC: 13 MG/DL (ref 6–23)
BUN BLDV-MCNC: 9 MG/DL (ref 6–23)
CALCIUM SERPL-MCNC: 7.1 MG/DL (ref 8.6–10.2)
CALCIUM SERPL-MCNC: 7.2 MG/DL (ref 8.6–10.2)
CHLORIDE BLD-SCNC: 103 MMOL/L (ref 98–107)
CHLORIDE BLD-SCNC: 99 MMOL/L (ref 98–107)
CO2: 25 MMOL/L (ref 22–29)
CO2: 25 MMOL/L (ref 22–29)
CREAT SERPL-MCNC: 0.8 MG/DL (ref 0.5–1)
CREAT SERPL-MCNC: 1 MG/DL (ref 0.5–1)
CULTURE, BLOOD 2: ABNORMAL
DESCRIPTION BLOOD BANK: NORMAL
EOSINOPHILS ABSOLUTE: 0.13 E9/L (ref 0.05–0.5)
EOSINOPHILS RELATIVE PERCENT: 1.6 % (ref 0–6)
GFR SERPL CREATININE-BSD FRML MDRD: 58 ML/MIN/1.73
GFR SERPL CREATININE-BSD FRML MDRD: >60 ML/MIN/1.73
GLUCOSE BLD-MCNC: 110 MG/DL (ref 74–99)
GLUCOSE BLD-MCNC: 91 MG/DL (ref 74–99)
HCT VFR BLD CALC: 21.2 % (ref 34–48)
HCT VFR BLD CALC: 25.9 % (ref 34–48)
HCT VFR BLD CALC: 26.4 % (ref 34–48)
HEMOGLOBIN: 6.8 G/DL (ref 11.5–15.5)
HEMOGLOBIN: 8.6 G/DL (ref 11.5–15.5)
HEMOGLOBIN: 8.8 G/DL (ref 11.5–15.5)
IMMATURE GRANULOCYTES #: 0.08 E9/L
IMMATURE GRANULOCYTES %: 1 % (ref 0–5)
LYMPHOCYTES ABSOLUTE: 0.77 E9/L (ref 1.5–4)
LYMPHOCYTES RELATIVE PERCENT: 9.3 % (ref 20–42)
MAGNESIUM: 1.1 MG/DL (ref 1.6–2.6)
MAGNESIUM: 2.3 MG/DL (ref 1.6–2.6)
MCH RBC QN AUTO: 28.7 PG (ref 26–35)
MCH RBC QN AUTO: 29.4 PG (ref 26–35)
MCHC RBC AUTO-ENTMCNC: 32.1 % (ref 32–34.5)
MCHC RBC AUTO-ENTMCNC: 33.3 % (ref 32–34.5)
MCV RBC AUTO: 88.3 FL (ref 80–99.9)
MCV RBC AUTO: 89.5 FL (ref 80–99.9)
MONOCYTES ABSOLUTE: 0.34 E9/L (ref 0.1–0.95)
MONOCYTES RELATIVE PERCENT: 4.1 % (ref 2–12)
NEUTROPHILS ABSOLUTE: 6.95 E9/L (ref 1.8–7.3)
NEUTROPHILS RELATIVE PERCENT: 83.9 % (ref 43–80)
ORGANISM: ABNORMAL
PDW BLD-RTO: 17 FL (ref 11.5–15)
PDW BLD-RTO: 18.1 FL (ref 11.5–15)
PHOSPHORUS: 1.4 MG/DL (ref 2.5–4.5)
PLATELET # BLD: 107 E9/L (ref 130–450)
PLATELET # BLD: 113 E9/L (ref 130–450)
PMV BLD AUTO: 9.4 FL (ref 7–12)
PMV BLD AUTO: 9.6 FL (ref 7–12)
POTASSIUM REFLEX MAGNESIUM: 3.4 MMOL/L (ref 3.5–5)
POTASSIUM SERPL-SCNC: 2.9 MMOL/L (ref 3.5–5)
RBC # BLD: 2.37 E12/L (ref 3.5–5.5)
RBC # BLD: 2.99 E12/L (ref 3.5–5.5)
SODIUM BLD-SCNC: 133 MMOL/L (ref 132–146)
SODIUM BLD-SCNC: 136 MMOL/L (ref 132–146)
TOTAL PROTEIN: 4.1 G/DL (ref 6.4–8.3)
TOTAL PROTEIN: 4.1 G/DL (ref 6.4–8.3)
WBC # BLD: 7.5 E9/L (ref 4.5–11.5)
WBC # BLD: 8.3 E9/L (ref 4.5–11.5)

## 2022-11-13 PROCEDURE — 6360000002 HC RX W HCPCS: Performed by: INTERNAL MEDICINE

## 2022-11-13 PROCEDURE — 83735 ASSAY OF MAGNESIUM: CPT

## 2022-11-13 PROCEDURE — 85025 COMPLETE CBC W/AUTO DIFF WBC: CPT

## 2022-11-13 PROCEDURE — 6360000002 HC RX W HCPCS: Performed by: STUDENT IN AN ORGANIZED HEALTH CARE EDUCATION/TRAINING PROGRAM

## 2022-11-13 PROCEDURE — 36415 COLL VENOUS BLD VENIPUNCTURE: CPT

## 2022-11-13 PROCEDURE — 2060000000 HC ICU INTERMEDIATE R&B

## 2022-11-13 PROCEDURE — 80053 COMPREHEN METABOLIC PANEL: CPT

## 2022-11-13 PROCEDURE — 84100 ASSAY OF PHOSPHORUS: CPT

## 2022-11-13 PROCEDURE — 6370000000 HC RX 637 (ALT 250 FOR IP): Performed by: INTERNAL MEDICINE

## 2022-11-13 PROCEDURE — 2500000003 HC RX 250 WO HCPCS: Performed by: NURSE PRACTITIONER

## 2022-11-13 PROCEDURE — 94640 AIRWAY INHALATION TREATMENT: CPT

## 2022-11-13 PROCEDURE — 6370000000 HC RX 637 (ALT 250 FOR IP): Performed by: NURSE PRACTITIONER

## 2022-11-13 PROCEDURE — 2580000003 HC RX 258: Performed by: INTERNAL MEDICINE

## 2022-11-13 PROCEDURE — 85018 HEMOGLOBIN: CPT

## 2022-11-13 PROCEDURE — C9113 INJ PANTOPRAZOLE SODIUM, VIA: HCPCS | Performed by: INTERNAL MEDICINE

## 2022-11-13 PROCEDURE — 2580000003 HC RX 258: Performed by: NURSE PRACTITIONER

## 2022-11-13 PROCEDURE — 36430 TRANSFUSION BLD/BLD COMPNT: CPT

## 2022-11-13 PROCEDURE — 85014 HEMATOCRIT: CPT

## 2022-11-13 PROCEDURE — 6360000002 HC RX W HCPCS: Performed by: NURSE PRACTITIONER

## 2022-11-13 PROCEDURE — 85027 COMPLETE CBC AUTOMATED: CPT

## 2022-11-13 PROCEDURE — 2700000000 HC OXYGEN THERAPY PER DAY

## 2022-11-13 PROCEDURE — 99232 SBSQ HOSP IP/OBS MODERATE 35: CPT | Performed by: STUDENT IN AN ORGANIZED HEALTH CARE EDUCATION/TRAINING PROGRAM

## 2022-11-13 RX ORDER — SODIUM CHLORIDE 9 MG/ML
INJECTION, SOLUTION INTRAVENOUS PRN
Status: DISCONTINUED | OUTPATIENT
Start: 2022-11-13 | End: 2022-11-17 | Stop reason: HOSPADM

## 2022-11-13 RX ORDER — POTASSIUM CHLORIDE 7.45 MG/ML
10 INJECTION INTRAVENOUS
Status: COMPLETED | OUTPATIENT
Start: 2022-11-13 | End: 2022-11-13

## 2022-11-13 RX ORDER — MAGNESIUM SULFATE IN WATER 40 MG/ML
2000 INJECTION, SOLUTION INTRAVENOUS ONCE
Status: COMPLETED | OUTPATIENT
Start: 2022-11-13 | End: 2022-11-13

## 2022-11-13 RX ORDER — POTASSIUM CHLORIDE 20 MEQ/1
40 TABLET, EXTENDED RELEASE ORAL ONCE
Status: COMPLETED | OUTPATIENT
Start: 2022-11-13 | End: 2022-11-13

## 2022-11-13 RX ADMIN — ARFORMOTEROL TARTRATE 15 MCG: 15 SOLUTION RESPIRATORY (INHALATION) at 19:41

## 2022-11-13 RX ADMIN — MAGNESIUM SULFATE HEPTAHYDRATE 2000 MG: 40 INJECTION, SOLUTION INTRAVENOUS at 05:38

## 2022-11-13 RX ADMIN — TRAZODONE HYDROCHLORIDE 25 MG: 50 TABLET ORAL at 20:40

## 2022-11-13 RX ADMIN — PANTOPRAZOLE SODIUM 40 MG: 40 INJECTION, POWDER, FOR SOLUTION INTRAVENOUS at 20:40

## 2022-11-13 RX ADMIN — POTASSIUM CHLORIDE 10 MEQ: 7.45 INJECTION INTRAVENOUS at 16:36

## 2022-11-13 RX ADMIN — ATORVASTATIN CALCIUM 20 MG: 20 TABLET, FILM COATED ORAL at 20:39

## 2022-11-13 RX ADMIN — POTASSIUM CHLORIDE 10 MEQ: 7.45 INJECTION INTRAVENOUS at 12:14

## 2022-11-13 RX ADMIN — MAGNESIUM SULFATE HEPTAHYDRATE 2000 MG: 40 INJECTION, SOLUTION INTRAVENOUS at 10:13

## 2022-11-13 RX ADMIN — POTASSIUM PHOSPHATE, MONOBASIC AND POTASSIUM PHOSPHATE, DIBASIC 20 MMOL: 224; 236 INJECTION, SOLUTION, CONCENTRATE INTRAVENOUS at 06:58

## 2022-11-13 RX ADMIN — AMLODIPINE BESYLATE 5 MG: 5 TABLET ORAL at 10:07

## 2022-11-13 RX ADMIN — PANTOPRAZOLE SODIUM 40 MG: 40 INJECTION, POWDER, FOR SOLUTION INTRAVENOUS at 10:08

## 2022-11-13 RX ADMIN — BUDESONIDE 500 MCG: 0.5 SUSPENSION RESPIRATORY (INHALATION) at 19:41

## 2022-11-13 RX ADMIN — CARVEDILOL 12.5 MG: 6.25 TABLET, FILM COATED ORAL at 17:29

## 2022-11-13 RX ADMIN — CARVEDILOL 12.5 MG: 6.25 TABLET, FILM COATED ORAL at 10:07

## 2022-11-13 RX ADMIN — BACLOFEN 10 MG: 10 TABLET ORAL at 10:07

## 2022-11-13 RX ADMIN — BACLOFEN 10 MG: 10 TABLET ORAL at 20:40

## 2022-11-13 RX ADMIN — POTASSIUM CHLORIDE 40 MEQ: 1500 TABLET, EXTENDED RELEASE ORAL at 05:34

## 2022-11-13 RX ADMIN — SODIUM CHLORIDE, PRESERVATIVE FREE 10 ML: 5 INJECTION INTRAVENOUS at 10:08

## 2022-11-13 RX ADMIN — SODIUM CHLORIDE, PRESERVATIVE FREE 10 ML: 5 INJECTION INTRAVENOUS at 20:40

## 2022-11-13 RX ADMIN — POTASSIUM CHLORIDE 10 MEQ: 7.45 INJECTION INTRAVENOUS at 13:43

## 2022-11-13 RX ADMIN — SERTRALINE 100 MG: 100 TABLET, FILM COATED ORAL at 20:39

## 2022-11-13 RX ADMIN — POTASSIUM CHLORIDE 10 MEQ: 7.45 INJECTION INTRAVENOUS at 15:13

## 2022-11-13 ASSESSMENT — PAIN SCALES - GENERAL
PAINLEVEL_OUTOF10: 0
PAINLEVEL_OUTOF10: 0

## 2022-11-13 NOTE — PROGRESS NOTES
Attending Physician Progress Note     Current Meds:  0.9 % sodium chloride infusion, PRN  potassium phosphate 20 mmol in dextrose 5 % 250 mL IVPB, Once  potassium chloride 10 mEq/100 mL IVPB (Peripheral Line), Q1H  magnesium sulfate 2000 mg in 50 mL IVPB premix, Once  hydrALAZINE (APRESOLINE) injection 10 mg, Q6H PRN  pantoprazole (PROTONIX) injection 40 mg, BID  amLODIPine (NORVASC) tablet 5 mg, Daily  carvedilol (COREG) tablet 12.5 mg, BID WC  trimethobenzamide (TIGAN) injection 200 mg, Q6H PRN  Arformoterol Tartrate (BROVANA) nebulizer solution 15 mcg, BID  budesonide (PULMICORT) nebulizer suspension 500 mcg, BID  albuterol (PROVENTIL) nebulizer solution 2.5 mg, Q6H PRN  atorvastatin (LIPITOR) tablet 20 mg, Nightly  baclofen (LIORESAL) tablet 10 mg, BID  sertraline (ZOLOFT) tablet 100 mg, Nightly  traZODone (DESYREL) tablet 25 mg, Nightly PRN  sodium chloride flush 0.9 % injection 5-40 mL, 2 times per day  sodium chloride flush 0.9 % injection 5-40 mL, PRN  0.9 % sodium chloride infusion, PRN  ondansetron (ZOFRAN-ODT) disintegrating tablet 4 mg, Q8H PRN   Or  ondansetron (ZOFRAN) injection 4 mg, Q6H PRN  acetaminophen (TYLENOL) tablet 650 mg, Q6H PRN   Or  acetaminophen (TYLENOL) suppository 650 mg, Q6H PRN         Vitals/Labs:    Patient Vitals for the past 24 hrs:   BP Temp Temp src Pulse Resp SpO2 Height Weight   11/13/22 0645 (!) 142/69 98.6 °F (37 °C) Oral 82 20 96 % -- --   11/13/22 0600 -- -- -- -- -- -- -- 174 lb 12.8 oz (79.3 kg)   11/13/22 0545 (!) 149/67 98 °F (36.7 °C) Oral 78 20 95 % -- --   11/13/22 0015 (!) 135/59 98 °F (36.7 °C) Oral 85 16 98 % -- --   11/12/22 2027 (!) 153/69 98.5 °F (36.9 °C) Oral 78 18 98 % -- --   11/12/22 1637 135/64 99.3 °F (37.4 °C) Oral 90 16 98 % 5' 2\" (1.575 m) 177 lb 12.8 oz (80.6 kg)   11/12/22 1400 126/64 -- -- 86 18 98 % -- --   11/12/22 1300 126/70 -- -- 86 16 98 % -- --   11/12/22 1230 (!) 88/56 -- -- 80 15 95 % -- --   11/12/22 1220 (!) 95/52 98.2 °F (36.8 °C) -- 82 19 96 % -- --   11/12/22 1215 (!) 81/48 -- -- 83 16 96 % -- --   11/12/22 1200 117/76 -- -- 86 18 95 % -- --   11/12/22 1116 (!) 156/99 -- -- 98 -- -- -- --   11/12/22 1000 (!) 156/76 -- -- (!) 101 15 100 % -- --   11/12/22 0900 (!) 169/79 -- -- (!) 104 19 96 % -- --        I/O last 3 completed shifts: In: 4772.6 [P.O.:960; I.V.:3790.9; IV Piggyback:21.7]  Out: 2925 [Urine:2925]  No intake/output data recorded. Recent Labs     11/10/22  2305 11/11/22  0145 11/11/22  0546 11/11/22  1300 11/11/22  1740 11/11/22  2320 11/13/22  0310   WBC 32.4*  --  20.8*  --   --   --  8.3   HGB 9.8*   < > 7.3*   < > 8.4* 8.1* 6.8*   HCT 29.4*   < > 22.4*   < > 25.5* 25.2* 21.2*     --  137  --   --   --  107*    < > = values in this interval not displayed. Recent Labs     11/10/22  1533 11/11/22  0546 11/13/22  0310   CREATININE 1.4* 1.6* 1.0   BUN 28* 43* 13    142 136   K 5.0 4.7 2.9*   * 113* 103   CO2 20* 21* 25     Recent Labs     11/11/22  0546 11/12/22  0835 11/13/22  0310   * 489* 335*   * 515* 438*   BILITOT 0.3 0.4 0.5   ALKPHOS 33* 44 39     Recent Labs     11/10/22  1533   LIPASE 47     No results for input(s): LACTATE in the last 72 hours. Recent Labs     11/11/22  0546   INR 1.7       Patient is feeling fine  No abdominal pain  Receiving one unit of blood    Physical Exam:    Abdomen:    soft and non distended.    Non-tender    Impression:  Acute duodenal ulcer with hemorrhage  Blood loss anemia; still equilibrating    Plan:  Continue PPI BID  Advance diet  Monitor Hb daily      Electronically by Isaac Marsh MD, MD on 11/13/2022 at 8:34 AM

## 2022-11-13 NOTE — PROGRESS NOTES
[facility] Progress Note    Admitting Date and Time: 11/10/2022  3:06 PM  Admit Dx: Hemorrhagic shock ([facility] [address] [R57.8]    Subjective:  Patient is being followed for Hemorrhagic shock [facility]) [R57.8]     Ms. Maryann Garcia is a 67-year-old female who is currently admitted with hemorrhagic shock. Overnight hemoglobin dropped to 6.8, she was ordered 1 unit PRBC for transfusion. Potassium also noted to be low at 2.9. She is currently being monitored for further bleeding. She denies nausea, vomiting, headache, chest pain, shortness of breath. ROS: denies fever, chills, cp, sob, n/v, HA unless stated above.       potassium phosphate IVPB  20 mmol IntraVENous Once    potassium chloride  10 mEq IntraVENous Q1H    magnesium sulfate  2,000 mg IntraVENous Once    pantoprazole  40 mg IntraVENous BID    amLODIPine  5 mg Oral Daily    carvedilol  12.5 mg Oral BID WC    Arformoterol Tartrate  15 mcg Nebulization BID    budesonide  0.5 mg Nebulization BID    atorvastatin  20 mg Oral Nightly    baclofen  10 mg Oral BID    sertraline  100 mg Oral Nightly    sodium chloride flush  5-40 mL IntraVENous 2 times per day     sodium chloride, , PRN  hydrALAZINE, 10 mg, Q6H PRN  trimethobenzamide, 200 mg, Q6H PRN  albuterol, 2.5 mg, Q6H PRN  traZODone, 25 mg, Nightly PRN  sodium chloride flush, 5-40 mL, PRN  sodium chloride, , PRN  ondansetron, 4 mg, Q8H PRN   Or  ondansetron, 4 mg, Q6H PRN  acetaminophen, 650 mg, Q6H PRN   Or  acetaminophen, 650 mg, Q6H PRN       Objective:    BP (!) 142/69   Pulse 82   Temp 98.6 °F (37 °C) (Oral)   Resp 20   Ht 5' 2\" (1.575 m)   Wt 174 lb 12.8 oz (79.3 kg)   SpO2 96%   BMI 31.97 kg/m²     General Appearance: alert and oriented to person, place and time and in no acute distress  Skin: warm and dry  Head: normocephalic and atraumatic  Eyes: pupils equal, round, and reactive to light, extraocular eye movements intact, conjunctivae normal  Neck: neck supple and non tender without mass   Pulmonary/Chest: clear to auscultation bilaterally- no wheezes, rales or rhonchi, normal air movement, no respiratory distress  Cardiovascular: normal rate, normal S1 and S2 and no carotid bruits  Abdomen: soft, non-tender, non-distended, normal bowel sounds, no masses or organomegaly  Extremities: no cyanosis, no clubbing and no edema  Neurologic: no cranial nerve deficit and speech normal        Recent Labs     11/10/22  1533 11/11/22  0546 11/13/22  0310    142 136   K 5.0 4.7 2.9*   * 113* 103   CO2 20* 21* 25   BUN 28* 43* 13   CREATININE 1.4* 1.6* 1.0   GLUCOSE 160* 140* 91   CALCIUM 7.4* 6.7* 7.2*         Recent Labs     11/10/22  2305 11/11/22  0145 11/11/22  0546 11/11/22  1300 11/11/22  1740 11/11/22  2320 11/13/22  0310   WBC 32.4*  --  20.8*  --   --   --  8.3   RBC 3.26*  --  2.51*  --   --   --  2.37*   HGB 9.8*   < > 7.3*   < > 8.4* 8.1* 6.8*   HCT 29.4*   < > 22.4*   < > 25.5* 25.2* 21.2*   MCV 90.2  --  89.2  --   --   --  89.5   MCH 30.1  --  29.1  --   --   --  28.7   MCHC 33.3  --  32.6  --   --   --  32.1   RDW 15.9*  --  16.3*  --   --   --  18.1*     --  137  --   --   --  107*   MPV 9.4  --  9.5  --   --   --  9.6    < > = values in this interval not displayed. Radiology: No new imaging to report. Assessment:    Principal Problem:    Hemorrhagic shock (HCC)  Active Problems:    Lactic acidosis    Chronic respiratory failure with hypoxia (HCC)    Gastrointestinal hemorrhage    Acute blood loss anemia  Resolved Problems:    * No resolved hospital problems. *      Plan:  1. Hemorrhagic shock-transfused 4 units PRBCs so far. Critical care and general surgery consulted. Appreciate recommendations. Underwent EGD on 11/11 which revealed a duodenal ulcer with adherent clot and visible vessel. Underwent epinephrine injection and clip application. Monitor for further bleeding episodes. She is still equilibrating. Advancing diet. Continue PPI twice daily.   2. Hematochezia versus melena-Continue IV Protonix 40 mg every 12 hours. 3.  Acute blood loss anemia-monitor H/H. Transfuse for hemoglobin less than 7. Receiving additional unit of PRBC infusion for hemoglobin 6.8 on 11/13.  4.  Syncope-likely secondary to first 3 issues. 5.  COPD, not in acute exacerbation-monitor respiratory status closely. 6.  Chronic hypoxic respiratory failure requiring supplemental oxygen via nasal cannula-supplemental oxygen as needed, monitor respiratory status closely. 7.  CKD stage III-monitor with daily BMP  8. Lactic acidosis-resolved, likely secondary to problem #1.  9.  History of hypertension-hold home antihypertensives  10. History of multiple sclerosis  11. History of breast cancer  12. Hyperlipidemia-continue home statin  13. Nonzero troponin-likely secondary to supply demand mismatch, ACS less likely     DVT prophylaxis: None due to concern for hemorrhage      NOTE: This report was transcribed using voice recognition software. Every effort was made to ensure accuracy; however, inadvertent computerized transcription errors may be present.   Electronically signed by Carlos Rodrigues MD on 11/13/2022 at 9:44 AM

## 2022-11-14 LAB
ALBUMIN SERPL-MCNC: 2.7 G/DL (ref 3.5–5.2)
ALP BLD-CCNC: 43 U/L (ref 35–104)
ALT SERPL-CCNC: 307 U/L (ref 0–32)
ANION GAP SERPL CALCULATED.3IONS-SCNC: 8 MMOL/L (ref 7–16)
AST SERPL-CCNC: 143 U/L (ref 0–31)
BASOPHILS ABSOLUTE: 0.01 E9/L (ref 0–0.2)
BASOPHILS RELATIVE PERCENT: 0.1 % (ref 0–2)
BILIRUB SERPL-MCNC: 0.8 MG/DL (ref 0–1.2)
BUN BLDV-MCNC: 6 MG/DL (ref 6–23)
CALCIUM SERPL-MCNC: 7.2 MG/DL (ref 8.6–10.2)
CHLORIDE BLD-SCNC: 99 MMOL/L (ref 98–107)
CO2: 28 MMOL/L (ref 22–29)
CREAT SERPL-MCNC: 0.8 MG/DL (ref 0.5–1)
EOSINOPHILS ABSOLUTE: 0.08 E9/L (ref 0.05–0.5)
EOSINOPHILS RELATIVE PERCENT: 1.1 % (ref 0–6)
GFR SERPL CREATININE-BSD FRML MDRD: >60 ML/MIN/1.73
GLUCOSE BLD-MCNC: 88 MG/DL (ref 74–99)
HCT VFR BLD CALC: 27.1 % (ref 34–48)
HEMOGLOBIN: 9 G/DL (ref 11.5–15.5)
IMMATURE GRANULOCYTES #: 0.05 E9/L
IMMATURE GRANULOCYTES %: 0.7 % (ref 0–5)
LYMPHOCYTES ABSOLUTE: 0.85 E9/L (ref 1.5–4)
LYMPHOCYTES RELATIVE PERCENT: 12.1 % (ref 20–42)
MAGNESIUM: 1.4 MG/DL (ref 1.6–2.6)
MCH RBC QN AUTO: 29.5 PG (ref 26–35)
MCHC RBC AUTO-ENTMCNC: 33.2 % (ref 32–34.5)
MCV RBC AUTO: 88.9 FL (ref 80–99.9)
MONOCYTES ABSOLUTE: 0.45 E9/L (ref 0.1–0.95)
MONOCYTES RELATIVE PERCENT: 6.4 % (ref 2–12)
NEUTROPHILS ABSOLUTE: 5.57 E9/L (ref 1.8–7.3)
NEUTROPHILS RELATIVE PERCENT: 79.6 % (ref 43–80)
PDW BLD-RTO: 16.6 FL (ref 11.5–15)
PHOSPHORUS: 1.7 MG/DL (ref 2.5–4.5)
PLATELET # BLD: 116 E9/L (ref 130–450)
PMV BLD AUTO: 9.4 FL (ref 7–12)
POTASSIUM SERPL-SCNC: 3.4 MMOL/L (ref 3.5–5)
RBC # BLD: 3.05 E12/L (ref 3.5–5.5)
SODIUM BLD-SCNC: 135 MMOL/L (ref 132–146)
TOTAL PROTEIN: 4.2 G/DL (ref 6.4–8.3)
WBC # BLD: 7 E9/L (ref 4.5–11.5)

## 2022-11-14 PROCEDURE — 6370000000 HC RX 637 (ALT 250 FOR IP): Performed by: STUDENT IN AN ORGANIZED HEALTH CARE EDUCATION/TRAINING PROGRAM

## 2022-11-14 PROCEDURE — 99232 SBSQ HOSP IP/OBS MODERATE 35: CPT | Performed by: STUDENT IN AN ORGANIZED HEALTH CARE EDUCATION/TRAINING PROGRAM

## 2022-11-14 PROCEDURE — 85025 COMPLETE CBC W/AUTO DIFF WBC: CPT

## 2022-11-14 PROCEDURE — 94640 AIRWAY INHALATION TREATMENT: CPT

## 2022-11-14 PROCEDURE — 6370000000 HC RX 637 (ALT 250 FOR IP): Performed by: INTERNAL MEDICINE

## 2022-11-14 PROCEDURE — 83735 ASSAY OF MAGNESIUM: CPT

## 2022-11-14 PROCEDURE — 2700000000 HC OXYGEN THERAPY PER DAY

## 2022-11-14 PROCEDURE — C9113 INJ PANTOPRAZOLE SODIUM, VIA: HCPCS | Performed by: INTERNAL MEDICINE

## 2022-11-14 PROCEDURE — 80053 COMPREHEN METABOLIC PANEL: CPT

## 2022-11-14 PROCEDURE — 2580000003 HC RX 258: Performed by: STUDENT IN AN ORGANIZED HEALTH CARE EDUCATION/TRAINING PROGRAM

## 2022-11-14 PROCEDURE — 6360000002 HC RX W HCPCS: Performed by: INTERNAL MEDICINE

## 2022-11-14 PROCEDURE — 2580000003 HC RX 258: Performed by: INTERNAL MEDICINE

## 2022-11-14 PROCEDURE — 6360000002 HC RX W HCPCS: Performed by: STUDENT IN AN ORGANIZED HEALTH CARE EDUCATION/TRAINING PROGRAM

## 2022-11-14 PROCEDURE — 36415 COLL VENOUS BLD VENIPUNCTURE: CPT

## 2022-11-14 PROCEDURE — 2060000000 HC ICU INTERMEDIATE R&B

## 2022-11-14 PROCEDURE — 84100 ASSAY OF PHOSPHORUS: CPT

## 2022-11-14 PROCEDURE — 2500000003 HC RX 250 WO HCPCS: Performed by: STUDENT IN AN ORGANIZED HEALTH CARE EDUCATION/TRAINING PROGRAM

## 2022-11-14 RX ORDER — MAGNESIUM SULFATE IN WATER 40 MG/ML
4000 INJECTION, SOLUTION INTRAVENOUS ONCE
Status: COMPLETED | OUTPATIENT
Start: 2022-11-14 | End: 2022-11-14

## 2022-11-14 RX ADMIN — CARVEDILOL 12.5 MG: 6.25 TABLET, FILM COATED ORAL at 08:19

## 2022-11-14 RX ADMIN — BACLOFEN 10 MG: 10 TABLET ORAL at 08:19

## 2022-11-14 RX ADMIN — AMLODIPINE BESYLATE 5 MG: 5 TABLET ORAL at 08:19

## 2022-11-14 RX ADMIN — PANTOPRAZOLE SODIUM 40 MG: 40 INJECTION, POWDER, FOR SOLUTION INTRAVENOUS at 08:19

## 2022-11-14 RX ADMIN — BUDESONIDE 500 MCG: 0.5 SUSPENSION RESPIRATORY (INHALATION) at 19:35

## 2022-11-14 RX ADMIN — BACLOFEN 10 MG: 10 TABLET ORAL at 19:55

## 2022-11-14 RX ADMIN — SODIUM CHLORIDE, PRESERVATIVE FREE 10 ML: 5 INJECTION INTRAVENOUS at 08:19

## 2022-11-14 RX ADMIN — SODIUM CHLORIDE, PRESERVATIVE FREE 10 ML: 5 INJECTION INTRAVENOUS at 19:57

## 2022-11-14 RX ADMIN — ARFORMOTEROL TARTRATE 15 MCG: 15 SOLUTION RESPIRATORY (INHALATION) at 07:46

## 2022-11-14 RX ADMIN — SERTRALINE 100 MG: 100 TABLET, FILM COATED ORAL at 19:55

## 2022-11-14 RX ADMIN — BUDESONIDE 500 MCG: 0.5 SUSPENSION RESPIRATORY (INHALATION) at 07:46

## 2022-11-14 RX ADMIN — MAGNESIUM SULFATE HEPTAHYDRATE 4000 MG: 40 INJECTION, SOLUTION INTRAVENOUS at 10:00

## 2022-11-14 RX ADMIN — POTASSIUM PHOSPHATE, MONOBASIC AND POTASSIUM PHOSPHATE, DIBASIC 20 MMOL: 224; 236 INJECTION, SOLUTION, CONCENTRATE INTRAVENOUS at 09:36

## 2022-11-14 RX ADMIN — ARFORMOTEROL TARTRATE 15 MCG: 15 SOLUTION RESPIRATORY (INHALATION) at 19:35

## 2022-11-14 RX ADMIN — PANTOPRAZOLE SODIUM 40 MG: 40 INJECTION, POWDER, FOR SOLUTION INTRAVENOUS at 19:55

## 2022-11-14 RX ADMIN — POTASSIUM BICARBONATE 40 MEQ: 782 TABLET, EFFERVESCENT ORAL at 09:35

## 2022-11-14 RX ADMIN — ATORVASTATIN CALCIUM 20 MG: 20 TABLET, FILM COATED ORAL at 19:55

## 2022-11-14 ASSESSMENT — PAIN SCALES - GENERAL
PAINLEVEL_OUTOF10: 0

## 2022-11-14 NOTE — DISCHARGE INSTR - COC
Continuity of Care Form    Patient Name: Deion Cortes   :  1945  MRN:  [id number]    Admit date:  11/10/2022  Discharge date:  22    Code Status Order: Full Code   Advance Directives:     Admitting Physician:  Reuben Loaiza MD  PCP: Holly Holly DO    Discharging Nurse: Lali Styles Unit/Room#: 8965/1610-O  Discharging Unit Phone Number: [phone]    Emergency Contact:   Extended Emergency Contact Information  Primary Emergency Contact: Sharif Rashid  Address: [address] [place] [address] Phone: [phone]  Mobile Phone: [phone]  Relation: Spouse  Secondary Emergency Contact: Hugh Shah [address] Phone: [phone]  Mobile Phone: [phone]  Relation: Child  Preferred language: English   needed? No    Past Surgical History:  Past Surgical History:   Procedure Laterality Date    APPENDECTOMY      BREAST SURGERY  2012    biopsy - negative    HIP ARTHROPLASTY Right 2011    Right AIDEE  ALLISON Cutler MD    HYSTERECTOMY (CERVIX STATUS UNKNOWN)      KNEE ARTHROPLASTY Left 10/01/2013    Left TKA  ALLISON Cutler MD    KNEE ARTHROPLASTY Right 2012    Right TKA  ALLISON Cutler MD    TOTAL HIP ARTHROPLASTY Left 3/25/2022    LEFT HIP TOTAL ARTHROPLASTY performed by Mikel Chavez MD at [facility] ENDOSCOPY N/A 2022    EGD CONTROL HEMORRHAGE performed by Jaymie Duncan MD at [facility] History:   Immunization History   Administered Date(s) Administered    COVID-19, MODERNA BLUE border, Primary or Immunocompromised, (age 12y+), IM, 100 mcg/0.5mL 2021, 2021    Influenza, High Dose (Fluzone 65 yrs and older) 10/16/2018    Influenza, Triv, inactivated, subunit, adjuvanted, IM (Fluad 65 yrs and older) 10/10/2019    Pneumococcal Conjugate 13-valent (Ivette Harris) 2019       Active Problems:  Patient Active Problem List   Diagnosis Code    Hypotension due to hypovolemia I95.89, E86.1    Hyperlipidemia E78.5    Hypertension, essential, benign I10    Acute blood loss anemia D62    Multiple adenomatous polyps D36.9    Chronic idiopathic constipation K59.04    Reactive depression F32.9    H/O total hip arthroplasty, right Z96.641    History of total knee replacement, left Z96.652    Simple chronic bronchitis (HCC) J41.0    Brain aneurysm I67.1    Ambulatory dysfunction R26.2    Closed fracture of head of femur ([address] S72.059A    Acute respiratory failure with hypoxia ([address] J96.01    MARCELLO (acute kidney injury) ([facility] [address] N17.9    Primary hypertension I10    Primary osteoarthritis of left hip M16.12    COPD exacerbation (HCC) J44.1    Acute respiratory failure with hypoxia and hypercapnia (HCC) J96.01, J96.02    Bronchiectasis with acute exacerbation (HCC) J47.1    Hemorrhagic shock (HCC) R57.8    Lactic acidosis E87.20    Chronic respiratory failure with hypoxia (HCC) J96.11    Gastrointestinal hemorrhage K92.2       Isolation/Infection:   Isolation            No Isolation          Patient Infection Status       Infection Onset Added Last Indicated Last Indicated By Review Planned Expiration Resolved Resolved By    None active    Resolved    MRSA 10/28/22 10/29/22 10/28/22 Culture, MRSA, Screening   11/14/22 Meghana Lopes RN    MRSA nares 10/28/2022    COVID-19 (Rule Out) 10/27/22 10/27/22 10/27/22 Respiratory Panel, Molecular, with COVID-19 (Restricted: peds pts or suitable admitted adults) (Ordered)   10/28/22 Rule-Out Test Resulted    COVID-19 (Rule Out) 03/24/22 03/24/22 03/24/22 Respiratory Panel, Molecular, with COVID-19 (Restricted: peds pts or suitable admitted adults) (Ordered)   03/24/22 Rule-Out Test Resulted            Nurse Assessment:  Last Vital Signs: BP (!) 165/86   Pulse 74   Temp 97.9 °F (36.6 °C) (Oral)   Resp 18   Ht 5' 2\" (1.575 m)   Wt 163 lb 9.6 oz (74.2 kg)   SpO2 97%   BMI 29.92 kg/m²     Last documented pain score (0-10 scale): Pain Level: 0  Last Weight:   Wt Readings from Last 1 Encounters:   11/14/22 163 lb 9.6 oz (74.2 kg)     Mental Status:  oriented and alert    IV Access:  - None    Nursing Mobility/ADLs:  Walking   Assisted  Transfer  Assisted  Bathing  Assisted  Dressing  Assisted  Toileting  Assisted  Feeding  Independent  Med Admin  Dependent  Med Delivery   whole    Wound Care Documentation and Therapy:  Wound 11/10/22 Leg Distal;Medial;Lower right lower shin scabbed area (Active)   Dressing Status Other (Comment) 11/11/22 1545   Wound Assessment Dry 11/13/22 2300   Drainage Amount None 11/13/22 2300   Odor None 11/13/22 2300   Number of days: 3        Elimination:  Continence: Bowel: No  Bladder: No  Urinary Catheter: None   Colostomy/Ileostomy/Ileal Conduit: No       Date of Last BM: 11/15/22  No intake or output data in the 24 hours ending 11/17/22 1411    I/O last 3 completed shifts: In: 789.6 [I.V.:768; IV Piggyback:21.7]  Out: 309 [Urine:305; Stool:4]    Safety Concerns: At Risk for Falls    Impairments/Disabilities:      Vision    Nutrition Therapy:  Current Nutrition Therapy:   - Oral Diet:  GERD    Routes of Feeding: Oral  Liquids: Thin Liquids  Daily Fluid Restriction: no  Last Modified Barium Swallow with Video (Video Swallowing Test): not done    Treatments at the Time of Hospital Discharge:   Respiratory Treatments: . Oxygen Therapy:  is on oxygen at 3 L/min per nasal cannula. Ventilator:    - No ventilator support    Rehab Therapies: Physical Therapy and Occupational Therapy  Weight Bearing Status/Restrictions: No weight bearing restrictions  Other Medical Equipment (for information only, NOT a DME order):  walker  Other Treatments: .     Patient's personal belongings (please select all that are sent with patient):  Glasses    RN SIGNATURE:  Electronically signed by Katerine Clarke RN on 11/17/22 at 2:12 PM EST    CASE MANAGEMENT/SOCIAL WORK SECTION    Inpatient Status Date: 11/10/2022    Readmission Risk Assessment Score:  Readmission Risk              Risk of Unplanned Readmission:  20           Discharging to Facility/ Agency   Name: [address] [facility]   Address: [place] [address]  Phone: [phone]  Fax: [phone]    Dialysis Facility (if applicable)   Name:  Address:  Dialysis Schedule:  Phone:  Fax:    / signature: Electronically signed by JIAN Crowe on 11/14/22 at 10:53 AM EST    PHYSICIAN SECTION    Prognosis: Good    Condition at Discharge: Stable    Rehab Potential (if transferring to Rehab): {Prognosis:[id number]}    Recommended Labs or Other Treatments After Discharge: ***    Physician Certification: I certify the above information and transfer of Zara Hoffmann  is necessary for the continuing treatment of the diagnosis listed and that she requires [place] for less 30 days.      Update Admission H&P: {CHP DME Changes in JDFNR:[id number]}    PHYSICIAN SIGNATURE:  {Esignature:[id number]} Electronically signed by Lazarus Mendoza MD on 11/17/2022 at 1:42 PM

## 2022-11-14 NOTE — PLAN OF CARE
Problem: Skin/Tissue Integrity  Goal: Absence of new skin breakdown  Description: 1. Monitor for areas of redness and/or skin breakdown  2. Assess vascular access sites hourly  3. Every 4-6 hours minimum:  Change oxygen saturation probe site  4. Every 4-6 hours:  If on nasal continuous positive airway pressure, respiratory therapy assess nares and determine need for appliance change or resting period.   11/14/2022 1226 by Stanley Norris RN  Outcome: Progressing     Problem: ABCDS Injury Assessment  Goal: Absence of physical injury  Outcome: Progressing     Problem: Discharge Planning  Goal: Discharge to home or other facility with appropriate resources  Outcome: Progressing     Problem: Safety - Adult  Goal: Free from fall injury  11/14/2022 1226 by Stanley Norris RN  Outcome: Progressing     Problem: Pain  Goal: Verbalizes/displays adequate comfort level or baseline comfort level  11/14/2022 1226 by Stanley Norirs RN  Outcome: Progressing

## 2022-11-14 NOTE — PROGRESS NOTES
[facility] Progress Note    Admitting Date and Time: 11/10/2022  3:06 PM  Admit Dx: Hemorrhagic shock ([facility] [address] [R57.8]    Subjective:  Patient is being followed for Hemorrhagic shock [facility]) [R57.8]     Ms. Nathaniel Orellana is a 56-year-old female who is currently admitted with hemorrhagic shock. No acute events overnight. H/H is stabilizing in the 8-9 range after 1 unit PRBC yesterday. She is currently being monitored for further bowel movements with bleeding. She denies complaints and states she is feels well this morning. She denies nausea, vomiting, headache, chest pain, shortness of breath.       ROS: denies fever, chills, cp, sob, n/v, HA unless stated above.      magnesium sulfate  4,000 mg IntraVENous Once    potassium phosphate IVPB  20 mmol IntraVENous Once    pantoprazole  40 mg IntraVENous BID    amLODIPine  5 mg Oral Daily    carvedilol  12.5 mg Oral BID WC    Arformoterol Tartrate  15 mcg Nebulization BID    budesonide  0.5 mg Nebulization BID    atorvastatin  20 mg Oral Nightly    baclofen  10 mg Oral BID    sertraline  100 mg Oral Nightly    sodium chloride flush  5-40 mL IntraVENous 2 times per day     sodium chloride, , PRN  hydrALAZINE, 10 mg, Q6H PRN  trimethobenzamide, 200 mg, Q6H PRN  albuterol, 2.5 mg, Q6H PRN  traZODone, 25 mg, Nightly PRN  sodium chloride flush, 5-40 mL, PRN  sodium chloride, , PRN  ondansetron, 4 mg, Q8H PRN   Or  ondansetron, 4 mg, Q6H PRN  acetaminophen, 650 mg, Q6H PRN   Or  acetaminophen, 650 mg, Q6H PRN       Objective:    BP (!) 165/86   Pulse 74   Temp 97.9 °F (36.6 °C) (Oral)   Resp 18   Ht 5' 2\" (1.575 m)   Wt 163 lb 9.6 oz (74.2 kg)   SpO2 97%   BMI 29.92 kg/m²     General Appearance: alert and oriented to person, place and time and in no acute distress  Skin: warm and dry  Head: normocephalic and atraumatic  Eyes: pupils equal, round, and reactive to light, extraocular eye movements intact, conjunctivae normal  Neck: neck supple and non tender without mass   Pulmonary/Chest: clear to auscultation bilaterally- no wheezes, rales or rhonchi, normal air movement, no respiratory distress  Cardiovascular: normal rate, normal S1 and S2 and no carotid bruits  Abdomen: soft, non-tender, non-distended, normal bowel sounds, no masses or organomegaly  Extremities: no cyanosis, no clubbing and no edema  Neurologic: no cranial nerve deficit and speech normal        Recent Labs     11/13/22 0310 11/13/22 1215 11/14/22  0300    133 135   K 2.9* 3.4* 3.4*    99 99   CO2 25 25 28   BUN 13 9 6   CREATININE 1.0 0.8 0.8   GLUCOSE 91 110* 88   CALCIUM 7.2* 7.1* 7.2*         Recent Labs     11/13/22 0310 11/13/22 1215 11/13/22 2051 11/14/22  0300   WBC 8.3 7.5  --  7.0   RBC 2.37* 2.99*  --  3.05*   HGB 6.8* 8.8* 8.6* 9.0*   HCT 21.2* 26.4* 25.9* 27.1*   MCV 89.5 88.3  --  88.9   MCH 28.7 29.4  --  29.5   MCHC 32.1 33.3  --  33.2   RDW 18.1* 17.0*  --  16.6*   * 113*  --  116*   MPV 9.6 9.4  --  9.4         Radiology: No new imaging to report. Assessment:    Principal Problem:    Hemorrhagic shock (HCC)  Active Problems:    Lactic acidosis    Chronic respiratory failure with hypoxia (HCC)    Gastrointestinal hemorrhage    Acute blood loss anemia  Resolved Problems:    * No resolved hospital problems. *      Plan:  1. Hemorrhagic shock-transfused 4 units PRBCs so far. Critical care and general surgery consulted. Appreciate recommendations. Underwent EGD on 11/11 which revealed a duodenal ulcer with adherent clot and visible vessel. Underwent epinephrine injection and clip application. Monitor for further bowel movements/bleeding episodes. Continue to trend hemoglobin. Advancing to a regular diet GI bland on 11/13. Continue PPI twice daily. 2.  Hematochezia versus melena-Continue IV Protonix 40 mg every 12 hours. 3.  Acute blood loss anemia-monitor H/H. Transfuse for hemoglobin less than 7.   Receiving additional unit of PRBC infusion for hemoglobin 6.8 on 11/13. H/H has been in the 8-9 range in the past 24 hours. 4.  Syncope-likely secondary to first 3 issues. 5.  COPD, not in acute exacerbation-monitor respiratory status closely. 6.  Chronic hypoxic respiratory failure requiring supplemental oxygen via nasal cannula-supplemental oxygen as needed, monitor respiratory status closely. 7.  CKD stage III-monitor with daily BMP  8. Lactic acidosis-resolved, likely secondary to problem #1.  9.  History of hypertension-hold home antihypertensives  10. History of multiple sclerosis  11. History of breast cancer  12. Hyperlipidemia-continue home statin  13. Nonzero troponin-likely secondary to supply demand mismatch, ACS less likely     DVT prophylaxis: None due to concern for hemorrhage      NOTE: This report was transcribed using voice recognition software. Every effort was made to ensure accuracy; however, inadvertent computerized transcription errors may be present.   Electronically signed by Leia Bhatt MD on 11/14/2022 at 11:22 AM

## 2022-11-14 NOTE — CARE COORDINATION
Social Work/Discharge Planning:  Called patient daughter Jah Silva (ph: [phone]) and she indicated [facility] the [place] will have a bed available for her mother at discharge. [id number] [facility] [place] and confirmed facility has a bed available for patient. Patient will need a pre-cert and a negative covid. Therapy to evaluate patient. Electronic N-17 in Epic. Received [id number] from Latesha Thacker. Will continue to follow.   Electronically signed by JIAN Paul on 11/14/2022 at 10:49 AM

## 2022-11-14 NOTE — PROGRESS NOTES
GENERAL SURGERY  DAILY PROGRESS NOTE  11/14/2022    CHIEF COMPLAINT:  Chief Complaint   Patient presents with    Altered Mental Status    Hypotension       SUBJECTIVE:  Patient seen resting comfortably bed, tolerating diet without nausea or emesis. Denies any abdominal pain today. Overall mildly better. OBJECTIVE:  BP (!) 165/86   Pulse 74   Temp 97.9 °F (36.6 °C) (Oral)   Resp 18   Ht 5' 2\" (1.575 m)   Wt 163 lb 9.6 oz (74.2 kg)   SpO2 97%   BMI 29.92 kg/m²     GENERAL: No acute distress, answers questions appropriately  LUNGS:  No increased work of breathing on 3 L nasal cannula  CARDIOVASCULAR: RR, hypertensive, warm extremities  ABDOMEN:  Soft, non-distended, nontender without guarding or rigidity    ASSESSMENT/PLAN:  77 y.o. female with upper GI bleed secondary to bleeding duodenal ulcer status post EGD with control of hemorrhage 11/11  Patient's hemoglobin is uptrending    Plan:  - Continue PPI twice daily  -. Trend hemoglobin  - Transfuse per primary as needed    Joie Quintana DO  Surgery Resident PGY-1  11/14/2022  8:53 AM    Seen/examined  Agree with above  JSGaNatasha

## 2022-11-14 NOTE — CONSULTS
Comprehensive Nutrition Assessment    Type and Reason for Visit:  Initial, Consult (ONS/ Poor PO)    Nutrition Recommendations/Plan:   Continue current diet as tolerated  Start vanilla Ensure BID and vanilla Boost once/day to promote adequate oral intake  Will monitor     Malnutrition Assessment:  Malnutrition Status: At risk for malnutrition (Comment) (11/14/22 1531)    Context:  Acute Illness     Findings of the 6 clinical characteristics of malnutrition:  Energy Intake:  Mild decrease in energy intake (Comment) (poor PO x 4 days adm)  Weight Loss:  Unable to assess (wt flux this adm range from 177-163#)     Body Fat Loss:  Unable to assess     Muscle Mass Loss:  Unable to assess    Fluid Accumulation:  No significant fluid accumulation     Strength:  Not Performed    Nutrition Assessment:    pt adm d/t AMS/hypotension; pt w/ upper GI bleed 2/2 bleeding duodenal ulcer status post EGD w/ control of hemorrhage & transfusion of PRBC ; PMhx of COPD, HTN, MS; pt w/ suboptimal intakes at meals, will start ONS and monitor. Nutrition Related Findings:    +I/O; A&Ox4 (oriented/disoriented at times) ; poor dentition; active BS; no edema; low K/phos/Ca Wound Type: None (scabbed area on shin noted)       Current Nutrition Intake & Therapies:    Average Meal Intake: 1-25%  Average Supplements Intake: None Ordered  ADULT DIET; Regular; GI [place] (GERD/Peptic Ulcer)  ADULT ORAL NUTRITION SUPPLEMENT; Breakfast, Dinner; Standard High Calorie/High Protein Oral Supplement  ADULT ORAL NUTRITION SUPPLEMENT; Lunch; Fortified Pudding Oral Supplement    Anthropometric Measures:  Height: 5' 2\" (157.5 cm)  Ideal Body Weight (IBW): 110 lbs (50 kg)    Admission Body Weight: 170 lb 6.7 oz (77.3 kg) (11/11-BS)  Current Body Weight: 163 lb 9.6 oz (74.2 kg) (11/14-BS), 148.7 % IBW. Current BMI (kg/m2): 29.9        Weight Adjustment For: No Adjustment                 BMI Categories: Overweight (BMI 25.0-29. 9)    Estimated Daily Nutrient Needs:  Energy Requirements Based On: Formula  Weight Used for Energy Requirements: Current  Energy (kcal/day): 6937-0519  Weight Used for Protein Requirements: Ideal  Protein (g/day): 1.3-1.5g/kgxIBW=65-75g  Method Used for Fluid Requirements: 1 ml/kcal  Fluid (ml/day): 3896-7973    Nutrition Diagnosis:   Inadequate oral intake related to altered GI function (GI bleed) as evidenced by intake 0-25%    Nutrition Interventions:   Food and/or Nutrient Delivery: Continue Current Diet, Start Oral Nutrition Supplement (Vanilla Ensure BID ; vanilla boost once/day)  Nutrition Education/Counseling: Education not indicated  Coordination of Nutrition Care: Continue to monitor while inpatient       Goals:     Goals: PO intake 50% or greater, by next RD assessment       Nutrition Monitoring and Evaluation:   Behavioral-Environmental Outcomes: None Identified  Food/Nutrient Intake Outcomes: Food and Nutrient Intake, Supplement Intake  Physical Signs/Symptoms Outcomes: Biochemical Data, Nutrition Focused Physical Findings, Skin, Weight, Chewing or Swallowing, Fluid Status or Edema, GI Status    Discharge Planning:     Too soon to determine     Jazlyn Goldstein RD  Contact: [phone]

## 2022-11-15 LAB
ALBUMIN SERPL-MCNC: 2.7 G/DL (ref 3.5–5.2)
ALP BLD-CCNC: 53 U/L (ref 35–104)
ALT SERPL-CCNC: 225 U/L (ref 0–32)
ANION GAP SERPL CALCULATED.3IONS-SCNC: 8 MMOL/L (ref 7–16)
AST SERPL-CCNC: 75 U/L (ref 0–31)
BASOPHILS ABSOLUTE: 0.01 E9/L (ref 0–0.2)
BASOPHILS RELATIVE PERCENT: 0.2 % (ref 0–2)
BILIRUB SERPL-MCNC: 0.7 MG/DL (ref 0–1.2)
BUN BLDV-MCNC: 5 MG/DL (ref 6–23)
CALCIUM SERPL-MCNC: 7.4 MG/DL (ref 8.6–10.2)
CHLORIDE BLD-SCNC: 96 MMOL/L (ref 98–107)
CO2: 29 MMOL/L (ref 22–29)
CREAT SERPL-MCNC: 0.9 MG/DL (ref 0.5–1)
EOSINOPHILS ABSOLUTE: 0.14 E9/L (ref 0.05–0.5)
EOSINOPHILS RELATIVE PERCENT: 2.4 % (ref 0–6)
GFR SERPL CREATININE-BSD FRML MDRD: >60 ML/MIN/1.73
GLUCOSE BLD-MCNC: 92 MG/DL (ref 74–99)
HCT VFR BLD CALC: 28.4 % (ref 34–48)
HEMOGLOBIN: 9.2 G/DL (ref 11.5–15.5)
IMMATURE GRANULOCYTES #: 0.05 E9/L
IMMATURE GRANULOCYTES %: 0.9 % (ref 0–5)
LYMPHOCYTES ABSOLUTE: 0.94 E9/L (ref 1.5–4)
LYMPHOCYTES RELATIVE PERCENT: 16.4 % (ref 20–42)
MAGNESIUM: 2.1 MG/DL (ref 1.6–2.6)
MCH RBC QN AUTO: 28.8 PG (ref 26–35)
MCHC RBC AUTO-ENTMCNC: 32.4 % (ref 32–34.5)
MCV RBC AUTO: 88.8 FL (ref 80–99.9)
MONOCYTES ABSOLUTE: 0.5 E9/L (ref 0.1–0.95)
MONOCYTES RELATIVE PERCENT: 8.7 % (ref 2–12)
NEUTROPHILS ABSOLUTE: 4.08 E9/L (ref 1.8–7.3)
NEUTROPHILS RELATIVE PERCENT: 71.4 % (ref 43–80)
ORGANISM: ABNORMAL
PDW BLD-RTO: 16.6 FL (ref 11.5–15)
PHOSPHORUS: 2.3 MG/DL (ref 2.5–4.5)
PLATELET # BLD: 152 E9/L (ref 130–450)
PMV BLD AUTO: 9.6 FL (ref 7–12)
POTASSIUM SERPL-SCNC: 3.1 MMOL/L (ref 3.5–5)
RBC # BLD: 3.2 E12/L (ref 3.5–5.5)
SODIUM BLD-SCNC: 133 MMOL/L (ref 132–146)
TOTAL PROTEIN: 4.9 G/DL (ref 6.4–8.3)
URINE CULTURE, ROUTINE: ABNORMAL
WBC # BLD: 5.7 E9/L (ref 4.5–11.5)

## 2022-11-15 PROCEDURE — 85025 COMPLETE CBC W/AUTO DIFF WBC: CPT

## 2022-11-15 PROCEDURE — 99232 SBSQ HOSP IP/OBS MODERATE 35: CPT | Performed by: STUDENT IN AN ORGANIZED HEALTH CARE EDUCATION/TRAINING PROGRAM

## 2022-11-15 PROCEDURE — 2500000003 HC RX 250 WO HCPCS: Performed by: STUDENT IN AN ORGANIZED HEALTH CARE EDUCATION/TRAINING PROGRAM

## 2022-11-15 PROCEDURE — 6370000000 HC RX 637 (ALT 250 FOR IP): Performed by: INTERNAL MEDICINE

## 2022-11-15 PROCEDURE — 97165 OT EVAL LOW COMPLEX 30 MIN: CPT

## 2022-11-15 PROCEDURE — 97161 PT EVAL LOW COMPLEX 20 MIN: CPT

## 2022-11-15 PROCEDURE — 6360000002 HC RX W HCPCS: Performed by: INTERNAL MEDICINE

## 2022-11-15 PROCEDURE — 36415 COLL VENOUS BLD VENIPUNCTURE: CPT

## 2022-11-15 PROCEDURE — 94640 AIRWAY INHALATION TREATMENT: CPT

## 2022-11-15 PROCEDURE — 6370000000 HC RX 637 (ALT 250 FOR IP): Performed by: STUDENT IN AN ORGANIZED HEALTH CARE EDUCATION/TRAINING PROGRAM

## 2022-11-15 PROCEDURE — 2060000000 HC ICU INTERMEDIATE R&B

## 2022-11-15 PROCEDURE — 80053 COMPREHEN METABOLIC PANEL: CPT

## 2022-11-15 PROCEDURE — 2580000003 HC RX 258: Performed by: INTERNAL MEDICINE

## 2022-11-15 PROCEDURE — 83735 ASSAY OF MAGNESIUM: CPT

## 2022-11-15 PROCEDURE — 2580000003 HC RX 258: Performed by: STUDENT IN AN ORGANIZED HEALTH CARE EDUCATION/TRAINING PROGRAM

## 2022-11-15 PROCEDURE — C9113 INJ PANTOPRAZOLE SODIUM, VIA: HCPCS | Performed by: INTERNAL MEDICINE

## 2022-11-15 PROCEDURE — 84100 ASSAY OF PHOSPHORUS: CPT

## 2022-11-15 RX ADMIN — PANTOPRAZOLE SODIUM 40 MG: 40 INJECTION, POWDER, FOR SOLUTION INTRAVENOUS at 19:51

## 2022-11-15 RX ADMIN — POTASSIUM BICARBONATE 40 MEQ: 782 TABLET, EFFERVESCENT ORAL at 09:25

## 2022-11-15 RX ADMIN — SODIUM CHLORIDE, PRESERVATIVE FREE 10 ML: 5 INJECTION INTRAVENOUS at 09:25

## 2022-11-15 RX ADMIN — ARFORMOTEROL TARTRATE 15 MCG: 15 SOLUTION RESPIRATORY (INHALATION) at 20:34

## 2022-11-15 RX ADMIN — PANTOPRAZOLE SODIUM 40 MG: 40 INJECTION, POWDER, FOR SOLUTION INTRAVENOUS at 09:25

## 2022-11-15 RX ADMIN — ATORVASTATIN CALCIUM 20 MG: 20 TABLET, FILM COATED ORAL at 19:53

## 2022-11-15 RX ADMIN — BACLOFEN 10 MG: 10 TABLET ORAL at 09:25

## 2022-11-15 RX ADMIN — POTASSIUM PHOSPHATE, MONOBASIC AND POTASSIUM PHOSPHATE, DIBASIC 15 MMOL: 224; 236 INJECTION, SOLUTION, CONCENTRATE INTRAVENOUS at 09:29

## 2022-11-15 RX ADMIN — CARVEDILOL 12.5 MG: 6.25 TABLET, FILM COATED ORAL at 09:25

## 2022-11-15 RX ADMIN — CARVEDILOL 12.5 MG: 6.25 TABLET, FILM COATED ORAL at 16:41

## 2022-11-15 RX ADMIN — POTASSIUM BICARBONATE 40 MEQ: 782 TABLET, EFFERVESCENT ORAL at 08:00

## 2022-11-15 RX ADMIN — BUDESONIDE 500 MCG: 0.5 SUSPENSION RESPIRATORY (INHALATION) at 20:34

## 2022-11-15 RX ADMIN — AMLODIPINE BESYLATE 5 MG: 5 TABLET ORAL at 09:26

## 2022-11-15 RX ADMIN — SERTRALINE 100 MG: 100 TABLET, FILM COATED ORAL at 19:53

## 2022-11-15 RX ADMIN — BACLOFEN 10 MG: 10 TABLET ORAL at 19:53

## 2022-11-15 RX ADMIN — SODIUM CHLORIDE, PRESERVATIVE FREE 10 ML: 5 INJECTION INTRAVENOUS at 19:51

## 2022-11-15 ASSESSMENT — PAIN SCALES - GENERAL
PAINLEVEL_OUTOF10: 0

## 2022-11-15 NOTE — PROGRESS NOTES
Occupational Therapy  OCCUPATIONAL THERAPY INITIAL EVALUATION     Rubi Becker [facility] [place] [facility], [place] DLSH:                                                  Patient Name: Nisreen Jj    MRN: [id number]    : 1945    Room: 6018/2548-U      Evaluating OT: Shanice Carlos, OTR/L [id number]      Referring Provider: Shaw Joyner MD    Specific Provider Orders/Date:OT eval and treat 22      Diagnosis: Hemorrhagic shock ([facility] [address] [R57.8]      Pertinent Medical History: arthritis, CKD, COPD, HTN, MS       Precautions:  Fall Risk, contact     Assessment of current deficits    [x] Functional mobility  [x]ADLs  [x] Strength               [x]Cognition    [x] Functional transfers   [x] IADLs         [x] Safety Awareness   [x]Endurance    [] Fine Coordination              [x] Balance      [] Vision/perception   []Sensation     []Gross Motor Coordination  [] ROM  [] Delirium                   [] Motor Control     OT PLAN OF CARE   OT POC based on physician orders, patient diagnosis and results of clinical assessment    Frequency/Duration 2-4 days/wk for 2 weeks PRN   Specific OT Treatment Interventions to include:   * Instruction/training on adapted ADL techniques and AE recommendations to increase functional independence within precautions       * Training on energy conservation strategies, correct breathing pattern and techniques to improve independence/tolerance for self-care routine  * Functional transfer/mobility training/DME recommendations for increased independence, safety, and fall prevention  * Patient/Family education to increase follow through with safety techniques and functional independence  * Recommendation of environmental modifications for increased safety with functional transfers/mobility and ADLs  * Cognitive retraining/development of therapeutic activities to improve problem solving, judgement, memory, and attention for increased safety/participation in ADL/IADL tasks  * Therapeutic exercise to improve motor endurance, ROM, and functional strength for ADLs/functional transfers  * Therapeutic activities to facilitate/challenge dynamic balance, stand tolerance for increased safety and independence with ADLs  * Neuro-muscular re-education: facilitation of righting/equilibrium reactions, midline orientation, scapular stability/mobility, normalization of muscle tone, and facilitation of volitional active controled movement  * Positioning to improve skin integrity, interaction with environment and functional independence        Recommended Adaptive Equipment: TBD     Home Living: Pt unable to report PLOF and where she was admitted from. Per chart, pt admitted from [place] [address]. Prior to to [facility], pt lived with  in 1 story house with ramp and had walk in shower with shower chair. Pt uses rollator for functional mobility. Pain Level: pt did not report pain this date; pt agreeable to therapy  Cognition: A&O: pt alert and oriented grossly; 2 step command follow demonstrated. Pt with confusion noted and lethargic during session. Cues to keep eyes open initially. Memory:  poor    Sequencing:  fair-   Problem solving:  fair-   Judgement/safety:  fair-     Functional Assessment:  AM-PAC Daily Activity Raw Score: 15/24   Initial Eval Status  Date: 11/15/22 Treatment Status  Date: STGs = LTGs  Time frame: 10-14 days   Feeding NPO     Grooming Min A  SBA   UB Dressing Min A  SBA   LB Dressing Max A/Mod A   SBA-with use of AD as appropriate/needed   Bathing Mod A  SBA -with use of AD as appropriate/needed   Toileting Mod A  SBA    Bed Mobility  Supine to sit: Min A    Sit to supine: Min A   Sup   Functional Transfers Min A with rollator  Sit<>stand from EOB  Cues for hand placement and safe technique.   SBA    Functional Mobility Min A with rollator  Short distance in room  Cues and assist for safe rollator use  SBA -with device as needed to maximize independence with ADLs and functional task completion   Balance Sitting:     Static:  SBA    Dynamic:SBA  Standing: Min A with rollator  Sup for static/dynamic sitting balance to maximize independence with ADLs and functional task completion    SBA for standing balance to maximize independence with ADLs and functional task completion   Activity Tolerance Fair- with light activity  Fair+ with ADL activity. Visual/  Perceptual Glasses: none at bedside                Additional long-term goal: Pt will increase functional independence to PLOF to allow pt to live in least restrictive environment. Hand Dominance R   AROM (PROM) Strength   RUE  WFL grossly WFL grossly   LUE WFL grossly WFL grossly     Hearing: WFL   Sensation:  No c/o numbness or tingling   Tone: WFL   Edema: none noted    Comments: Upon arrival patient lying in bed. At end of session, patient returned to bed with call light and phone within reach, all lines and tubes intact, and alarm set. Overall patient demonstrated decreased independence and safety during completion of ADL/functional transfer/mobility tasks. Pt would benefit from continued skilled OT to increase safety and independence with completion of ADL/IADL tasks for functional independence and quality of life. Rehab Potential: Good for established goals     Patient / Family Goal: none stated    Patient and/or family were instructed on functional diagnosis, prognosis/goals and OT plan of care. Demonstrated fair- understanding.      Eval Complexity: Low    Time In: 2129  Time Out: 0910    Min Units   OT Eval Low 97165  x  1   OT Eval Medium 51099      OT Eval High 01158      OT Re-Eval [id number]       Therapeutic Ex [id number]       Therapeutic Activities 97949       ADL/Self Care 26233       Orthotic Management 68474       Manual 38778     Neuro Re-Ed 23525       Non-Billable Time          Evaluation Time additionally includes thorough review of current medical information, gathering information on past medical history/social history and prior level of function, interpretation of standardized testing/informal observation of tasks, assessment of data and development of plan of care and goals.             Konrad Hall, OTR/L, [id number]

## 2022-11-15 NOTE — CARE COORDINATION
Social Work/Discharge Planning:  Chart reviewed. Patient to be evaluated by therapy. Plan is [facility] at the [facility] at discharge and will need a pre-cert. She had an EGD 11/11. Patient on room air. She will need a negative covid at discharge. Will continue to follow.   Electronically signed by JIAN Gutierrez on 11/15/2022 at 7:49 AM

## 2022-11-15 NOTE — PROGRESS NOTES
[facility] Progress Note    Admitting Date and Time: 11/10/2022  3:06 PM  Admit Dx: Hemorrhagic shock ([facility] [address] [R57.8]    Subjective:  Patient is being followed for Hemorrhagic shock [facility]) [R57.8]     Ms. Sydni Ocasio is a 67-year-old female who is currently admitted with hemorrhagic shock. No acute events overnight. H/H is stabilizing in the 9 range. Per nursing, she has had further bowel movements without bleeding. She denies complaints and states she is feels well this morning. She denies nausea, vomiting, headache, chest pain, shortness of breath. ROS: denies fever, chills, cp, sob, n/v, HA unless stated above.       potassium phosphate IVPB  15 mmol IntraVENous Once    pantoprazole  40 mg IntraVENous BID    amLODIPine  5 mg Oral Daily    carvedilol  12.5 mg Oral BID WC    Arformoterol Tartrate  15 mcg Nebulization BID    budesonide  0.5 mg Nebulization BID    atorvastatin  20 mg Oral Nightly    baclofen  10 mg Oral BID    sertraline  100 mg Oral Nightly    sodium chloride flush  5-40 mL IntraVENous 2 times per day     sodium chloride, , PRN  hydrALAZINE, 10 mg, Q6H PRN  trimethobenzamide, 200 mg, Q6H PRN  albuterol, 2.5 mg, Q6H PRN  traZODone, 25 mg, Nightly PRN  sodium chloride flush, 5-40 mL, PRN  sodium chloride, , PRN  ondansetron, 4 mg, Q8H PRN   Or  ondansetron, 4 mg, Q6H PRN  acetaminophen, 650 mg, Q6H PRN   Or  acetaminophen, 650 mg, Q6H PRN       Objective:    BP (!) 161/83   Pulse 83   Temp 98.7 °F (37.1 °C) (Oral)   Resp 18   Ht 5' 2\" (1.575 m)   Wt 163 lb 9 oz (74.2 kg)   SpO2 95%   BMI 29.92 kg/m²     General Appearance: alert and oriented to person, place and time and in no acute distress  Skin: warm and dry  Head: normocephalic and atraumatic  Eyes: pupils equal, round, and reactive to light, extraocular eye movements intact, conjunctivae normal  Neck: neck supple and non tender without mass   Pulmonary/Chest: clear to auscultation bilaterally- no wheezes, rales or rhonchi, normal air movement, no respiratory distress  Cardiovascular: normal rate, normal S1 and S2 and no carotid bruits  Abdomen: soft, non-tender, non-distended, normal bowel sounds, no masses or organomegaly  Extremities: no cyanosis, no clubbing and no edema  Neurologic: no cranial nerve deficit and speech normal        Recent Labs     11/13/22  1215 11/14/22  0300 11/15/22  0231    135 133   K 3.4* 3.4* 3.1*   CL 99 99 96*   CO2 25 28 29   BUN 9 6 5*   CREATININE 0.8 0.8 0.9   GLUCOSE 110* 88 92   CALCIUM 7.1* 7.2* 7.4*         Recent Labs     11/13/22  1215 11/13/22  2051 11/14/22  0300 11/15/22  0231   WBC 7.5  --  7.0 5.7   RBC 2.99*  --  3.05* 3.20*   HGB 8.8* 8.6* 9.0* 9.2*   HCT 26.4* 25.9* 27.1* 28.4*   MCV 88.3  --  88.9 88.8   MCH 29.4  --  29.5 28.8   MCHC 33.3  --  33.2 32.4   RDW 17.0*  --  16.6* 16.6*   *  --  116* 152   MPV 9.4  --  9.4 9.6         Radiology: No new imaging to report. Assessment:    Principal Problem:    Hemorrhagic shock (HCC)  Active Problems:    Lactic acidosis    Chronic respiratory failure with hypoxia (HCC)    Gastrointestinal hemorrhage    Acute blood loss anemia  Resolved Problems:    * No resolved hospital problems. *      Plan:  1. Hemorrhagic shock-resolved, transfused 4 units PRBCs so far. Critical care and general surgery consulted. Appreciate recommendations. Underwent EGD on 11/11 which revealed a duodenal ulcer with adherent clot and visible vessel. Underwent epinephrine injection and clip application. Monitor for further bowel movements/bleeding episodes. Continue to trend hemoglobin. Advancing to a regular diet GI bland on 11/13. Continue PPI twice daily. 2.  Hematochezia versus melena-Continue IV Protonix 40 mg every 12 hours. 3.  Acute blood loss anemia-monitor H/H. Transfuse for hemoglobin less than 7. Receiving additional unit of PRBC infusion for hemoglobin 6.8 on 11/13.   H/H has been in the 8-9 range in the past 48 hours  4. Syncope-likely secondary to first 3 issues. 5.  COPD, not in acute exacerbation-monitor respiratory status closely. 6.  Chronic hypoxic respiratory failure requiring supplemental oxygen via nasal cannula-supplemental oxygen as needed, monitor respiratory status closely. 7.  CKD stage III-monitor with daily BMP  8. Lactic acidosis-resolved, likely secondary to problem #1.  9.  History of hypertension-hold home antihypertensives  10. History of multiple sclerosis  11. History of breast cancer  12. Hyperlipidemia-continue home statin  13. Nonzero troponin-likely secondary to supply demand mismatch, ACS less likely     DVT prophylaxis: SCDs    Dispo: Continue to trend hemoglobin, stabilizing. Patient to be evaluated by PT/OT for recommendations. Patient will need pre-CERT to health care at the [facility]. NOTE: This report was transcribed using voice recognition software. Every effort was made to ensure accuracy; however, inadvertent computerized transcription errors may be present.   Electronically signed by Nereida Hernandez MD on 11/15/2022 at 10:23 AM

## 2022-11-15 NOTE — CARE COORDINATION
Social Work/Discharge Planning:  Spoke with Maximiliano Celis from [facility] at the [facility] and requested for facility to start pre-cert tomorrow. Will continue to follow.   Electronically signed by JIAN Santiago on 11/15/2022 at 2:29 PM

## 2022-11-15 NOTE — PROGRESS NOTES
GENERAL SURGERY  DAILY PROGRESS NOTE  11/15/2022    CHIEF COMPLAINT:  Chief Complaint   Patient presents with    Altered Mental Status    Hypotension       SUBJECTIVE:  Feeling well this morning. No further dark or bloody bowel movements. OBJECTIVE:  /73   Pulse 79   Temp 97.9 °F (36.6 °C) (Oral)   Resp 16   Ht 5' 2\" (1.575 m)   Wt 163 lb 9 oz (74.2 kg)   SpO2 99%   BMI 29.92 kg/m²     GENERAL:  NAD. A&Ox3. HEENT: pupils equal  LUNGS: No acute respiratory distress  CARDIOVASCULAR: hemodynamically stable  SKIN: warm and dry  ABDOMEN:  Soft, non-distended, nontender. No guarding, rigidity, rebound. ASSESSMENT/PLAN:  77 y.o. female with GIB 2/2 bleeding duo ulcer s/p EGD with control of hemorrhage    Plan  -continue ppi   -continue to transfuse for hgb <7  -continue PUD diet.      Carlos Clancy MD  Surgery Resident PGY-2  11/15/2022  4:23 PM     Seen/examined  Agree with above  JSGadyMD

## 2022-11-15 NOTE — PROGRESS NOTES
Physical Therapy  Facility/Department: [address] INTERMEDIATE 1  Physical Therapy Initial Assessment    Name: Donita Penn  : 1945  MRN: [id number]  Date of Service: 11/15/2022      Patient Diagnosis(es): The primary encounter diagnosis was Gastrointestinal hemorrhage, unspecified gastrointestinal hemorrhage type. Diagnoses of Anemia, unspecified type and Hypotension, unspecified hypotension type were also pertinent to this visit. Past Medical History:  has a past medical history of Arthritis, benign breast, CKD (chronic kidney disease), COPD (chronic obstructive pulmonary disease) ([facility] [address] Hyperlipidemia, Hypertension, MS (multiple sclerosis) ([facility] [address] and Multiple sclerosis ([facility] [address] Past Surgical History:  has a past surgical history that includes Hysterectomy; Breast surgery (2012); Appendectomy; Knee Arthroplasty (Left, 10/01/2013); Knee Arthroplasty (Right, 2012); Hip Arthroplasty (Right, 2011); Total hip arthroplasty (Left, 3/25/2022); and Upper gastrointestinal endoscopy (N/A, 2022). Evaluating Therapist: Debbie Gacria PT    Room #:  3126/4803-G  Diagnosis:  Hemorrhagic shock ([facility] [R57.8]  PMHx/PSHx:  CKD, COPD, MS  Precautions:  falls, contact isolation      Social:  Pt could not remember if she was at a [facility] or came in from home. Per chart pt admitted from [facility]. Prior lived with  in a one floor home and ambulated with rollator. Initial Evaluation  Date: 11/15/22 Treatment      Short Term/ Long Term   Goals   Was pt agreeable to Eval/treatment? With much encouragement     Does pt have pain?  No c/o pain     Bed Mobility  Rolling: min assist  Supine to sit: min assist  Sit to supine: min assist  Scooting: min assist  SBA   Transfers Sit to stand: min assist  Stand to sit: min assist  Stand pivot: NT  SBA   Ambulation    5 feet with rollator min assist  50 feet with rollator  with SBA   Stair Negotiation  Ascended and descended  NT   N/A   LE strength     3/5    4-/5 balance      fair     AM-PAC Raw score               16/24         Pt is alert and grossly oriented, some confusion noted  LE ROM: WFL  Sensation: intact  Edema: none  Endurance: fair     ASSESSMENT:    Pt displays functional ability as noted in the objective portion of this evaluation. Patient education  Pt educated on importance of mobility    Patient response to education:   Pt verbalized understanding Pt demonstrated skill Pt requires further education in this area   yes   yes       Comments:  Pt required much encouragement to participate. Educated pt on importance of mobility and spending time out of bed. Pt did agree to stand and take a few steps but declined to sit up in chair. Stating she did not feel safe. Pt returned to bed per her request.  Pt self limiting. Pt's/ family goals   1. None stated    Conditions Requiring Skilled Therapeutic Intervention:    [x]Decreased strength     []Decreased ROM  [x]Decreased functional mobility  [x]Decreased balance   [x]Decreased endurance   []Decreased posture  []Decreased sensation  []Decreased coordination   []Decreased vision  []Decreased safety awareness   []Increased pain       Patient and or family understand(s) diagnosis, prognosis, and plan of care.     Prognosis is fair for reaching above PT goals    PHYSICAL THERAPY PLAN OF CARE:    PT POC is established based on physician order and patient diagnosis     Referring provider/PT Order: Jamila Jordan MD/ PT eval and treat      Current Treatment Recommendations:     [x] Strengthening to improve independence with functional mobility   [] ROM to improve independence with functional mobility   [x] Balance Training to improve static/dynamic balance and to reduce fall risk  [x] Endurance Training to improve activity tolerance during functional mobility   [x] Transfer Training to improve safety and independence with all functional transfers   [x] Gait Training to improve gait mechanics, endurance and assess need for appropriate assistive device  [] Stair Training in preparation for safe discharge home and/or into the community   [] Positioning to prevent skin breakdown and contractures  [x] Safety and Education Training   [x] Patient/Caregiver Education   [] HEP  [] Other     PT long term treatment goals are located in above grid    Frequency of treatments: 2-5x/week x 5-7 days. Time in  0855  Time out  0910        Evaluation Time includes thorough review of current medical information, gathering information on past medical history/social history and prior level of function, completion of standardized testing/informal observation of tasks, assessment of data and education on plan of care and goals.       CPT codes:  [x] Low Complexity PT evaluation 52860  [] Moderate Complexity PT evaluation 67438  [] High Complexity PT evaluation 38382  [] PT Re-evaluation 18584  [] Gait training 63188 minutes  [] Manual therapy 09012 minutes  [] Therapeutic activities 34483 minutes  [] Therapeutic exercises 67256 minutes  [] Neuromuscular reeducation 17680 minutes     Linda Fink PT [id number]

## 2022-11-16 ENCOUNTER — APPOINTMENT (OUTPATIENT)
Dept: GENERAL RADIOLOGY | Age: 77
DRG: 377 | End: 2022-11-16
Payer: MEDICARE

## 2022-11-16 LAB
ALBUMIN SERPL-MCNC: 2.7 G/DL (ref 3.5–5.2)
ALP BLD-CCNC: 52 U/L (ref 35–104)
ALT SERPL-CCNC: 156 U/L (ref 0–32)
ANION GAP SERPL CALCULATED.3IONS-SCNC: 8 MMOL/L (ref 7–16)
AST SERPL-CCNC: 38 U/L (ref 0–31)
BASOPHILS ABSOLUTE: 0.01 E9/L (ref 0–0.2)
BASOPHILS RELATIVE PERCENT: 0.2 % (ref 0–2)
BILIRUB SERPL-MCNC: 0.6 MG/DL (ref 0–1.2)
BLOOD CULTURE, ROUTINE: ABNORMAL
BUN BLDV-MCNC: 6 MG/DL (ref 6–23)
CALCIUM SERPL-MCNC: 7.9 MG/DL (ref 8.6–10.2)
CHLORIDE BLD-SCNC: 94 MMOL/L (ref 98–107)
CO2: 29 MMOL/L (ref 22–29)
CREAT SERPL-MCNC: 0.9 MG/DL (ref 0.5–1)
EOSINOPHILS ABSOLUTE: 0.2 E9/L (ref 0.05–0.5)
EOSINOPHILS RELATIVE PERCENT: 3.7 % (ref 0–6)
GFR SERPL CREATININE-BSD FRML MDRD: >60 ML/MIN/1.73
GLUCOSE BLD-MCNC: 88 MG/DL (ref 74–99)
HCT VFR BLD CALC: 30.3 % (ref 34–48)
HEMOGLOBIN: 10 G/DL (ref 11.5–15.5)
IMMATURE GRANULOCYTES #: 0.07 E9/L
IMMATURE GRANULOCYTES %: 1.3 % (ref 0–5)
LYMPHOCYTES ABSOLUTE: 1.13 E9/L (ref 1.5–4)
LYMPHOCYTES RELATIVE PERCENT: 21 % (ref 20–42)
MAGNESIUM: 1.5 MG/DL (ref 1.6–2.6)
MCH RBC QN AUTO: 29.3 PG (ref 26–35)
MCHC RBC AUTO-ENTMCNC: 33 % (ref 32–34.5)
MCV RBC AUTO: 88.9 FL (ref 80–99.9)
MONOCYTES ABSOLUTE: 0.45 E9/L (ref 0.1–0.95)
MONOCYTES RELATIVE PERCENT: 8.4 % (ref 2–12)
NEUTROPHILS ABSOLUTE: 3.52 E9/L (ref 1.8–7.3)
NEUTROPHILS RELATIVE PERCENT: 65.4 % (ref 43–80)
ORGANISM: ABNORMAL
PDW BLD-RTO: 16.5 FL (ref 11.5–15)
PHOSPHORUS: 2.7 MG/DL (ref 2.5–4.5)
PLATELET # BLD: 179 E9/L (ref 130–450)
PMV BLD AUTO: 9.7 FL (ref 7–12)
POTASSIUM SERPL-SCNC: 3.3 MMOL/L (ref 3.5–5)
RBC # BLD: 3.41 E12/L (ref 3.5–5.5)
SODIUM BLD-SCNC: 131 MMOL/L (ref 132–146)
TOTAL PROTEIN: 5.1 G/DL (ref 6.4–8.3)
WBC # BLD: 5.4 E9/L (ref 4.5–11.5)

## 2022-11-16 PROCEDURE — 6360000002 HC RX W HCPCS

## 2022-11-16 PROCEDURE — 94640 AIRWAY INHALATION TREATMENT: CPT

## 2022-11-16 PROCEDURE — 36415 COLL VENOUS BLD VENIPUNCTURE: CPT

## 2022-11-16 PROCEDURE — 6370000000 HC RX 637 (ALT 250 FOR IP)

## 2022-11-16 PROCEDURE — 6360000002 HC RX W HCPCS: Performed by: INTERNAL MEDICINE

## 2022-11-16 PROCEDURE — 84100 ASSAY OF PHOSPHORUS: CPT

## 2022-11-16 PROCEDURE — 2580000003 HC RX 258: Performed by: INTERNAL MEDICINE

## 2022-11-16 PROCEDURE — 83735 ASSAY OF MAGNESIUM: CPT

## 2022-11-16 PROCEDURE — C9113 INJ PANTOPRAZOLE SODIUM, VIA: HCPCS | Performed by: INTERNAL MEDICINE

## 2022-11-16 PROCEDURE — 71045 X-RAY EXAM CHEST 1 VIEW: CPT

## 2022-11-16 PROCEDURE — 80053 COMPREHEN METABOLIC PANEL: CPT

## 2022-11-16 PROCEDURE — 6370000000 HC RX 637 (ALT 250 FOR IP): Performed by: INTERNAL MEDICINE

## 2022-11-16 PROCEDURE — 85025 COMPLETE CBC W/AUTO DIFF WBC: CPT

## 2022-11-16 PROCEDURE — 2060000000 HC ICU INTERMEDIATE R&B

## 2022-11-16 PROCEDURE — 2700000000 HC OXYGEN THERAPY PER DAY

## 2022-11-16 PROCEDURE — 99232 SBSQ HOSP IP/OBS MODERATE 35: CPT | Performed by: STUDENT IN AN ORGANIZED HEALTH CARE EDUCATION/TRAINING PROGRAM

## 2022-11-16 RX ORDER — MAGNESIUM SULFATE IN WATER 40 MG/ML
4000 INJECTION, SOLUTION INTRAVENOUS ONCE
Status: COMPLETED | OUTPATIENT
Start: 2022-11-16 | End: 2022-11-16

## 2022-11-16 RX ORDER — PANTOPRAZOLE SODIUM 40 MG/1
40 TABLET, DELAYED RELEASE ORAL
Qty: 30 TABLET | Refills: 0 | Status: SHIPPED | OUTPATIENT
Start: 2022-11-16

## 2022-11-16 RX ORDER — SUCRALFATE 1 G/1
1 TABLET ORAL 4 TIMES DAILY
Qty: 120 TABLET | Refills: 3 | Status: SHIPPED | OUTPATIENT
Start: 2022-11-16

## 2022-11-16 RX ORDER — POTASSIUM CHLORIDE 20 MEQ/1
40 TABLET, EXTENDED RELEASE ORAL ONCE
Status: COMPLETED | OUTPATIENT
Start: 2022-11-16 | End: 2022-11-16

## 2022-11-16 RX ADMIN — SERTRALINE 100 MG: 100 TABLET, FILM COATED ORAL at 19:59

## 2022-11-16 RX ADMIN — BACLOFEN 10 MG: 10 TABLET ORAL at 19:59

## 2022-11-16 RX ADMIN — PANTOPRAZOLE SODIUM 40 MG: 40 INJECTION, POWDER, FOR SOLUTION INTRAVENOUS at 20:00

## 2022-11-16 RX ADMIN — CARVEDILOL 12.5 MG: 6.25 TABLET, FILM COATED ORAL at 08:12

## 2022-11-16 RX ADMIN — SODIUM CHLORIDE, PRESERVATIVE FREE 10 ML: 5 INJECTION INTRAVENOUS at 08:12

## 2022-11-16 RX ADMIN — AMLODIPINE BESYLATE 5 MG: 5 TABLET ORAL at 08:12

## 2022-11-16 RX ADMIN — MAGNESIUM SULFATE HEPTAHYDRATE 4000 MG: 40 INJECTION, SOLUTION INTRAVENOUS at 06:00

## 2022-11-16 RX ADMIN — PANTOPRAZOLE SODIUM 40 MG: 40 INJECTION, POWDER, FOR SOLUTION INTRAVENOUS at 08:12

## 2022-11-16 RX ADMIN — CARVEDILOL 12.5 MG: 6.25 TABLET, FILM COATED ORAL at 16:17

## 2022-11-16 RX ADMIN — SODIUM CHLORIDE, PRESERVATIVE FREE 10 ML: 5 INJECTION INTRAVENOUS at 20:00

## 2022-11-16 RX ADMIN — BACLOFEN 10 MG: 10 TABLET ORAL at 08:12

## 2022-11-16 RX ADMIN — ATORVASTATIN CALCIUM 20 MG: 20 TABLET, FILM COATED ORAL at 20:00

## 2022-11-16 RX ADMIN — POTASSIUM CHLORIDE 40 MEQ: 1500 TABLET, EXTENDED RELEASE ORAL at 06:00

## 2022-11-16 RX ADMIN — BUDESONIDE 500 MCG: 0.5 SUSPENSION RESPIRATORY (INHALATION) at 21:12

## 2022-11-16 RX ADMIN — ARFORMOTEROL TARTRATE 15 MCG: 15 SOLUTION RESPIRATORY (INHALATION) at 21:12

## 2022-11-16 ASSESSMENT — PAIN SCALES - GENERAL
PAINLEVEL_OUTOF10: 0
PAINLEVEL_OUTOF10: 0

## 2022-11-16 NOTE — PROGRESS NOTES
Family re-educated again on the importance of wearing PPE while in patient's room. She stated she understood and put a gown on.

## 2022-11-16 NOTE — PROGRESS NOTES
[facility] Progress Note    Admitting Date and Time: 11/10/2022  3:06 PM  Admit Dx: Hemorrhagic shock ([facility] [address] [R57.8]    Subjective:  Patient is being followed for Hemorrhagic shock [facility]) [R57.8]     Ms. Laura Cornejo is a 21-year-old female who is currently admitted with hemorrhagic shock. Overnight, patient was noted to be hypoxic and placed on 3 L supplemental oxygen via nasal cannula with improvement in oxygen saturation. H/H is stabilizing in the 10 range. She denies complaints and states she is feels well this morning. She denies nausea, vomiting, headache, chest pain, shortness of breath. Counseled patient that we will get a chest x-ray this morning to investigate hypoxia further. She does have a history of chronic hypoxic respiratory failure requiring supplemental oxygen via nasal cannula however. Daughter is concerned she is not taking in much nutritionally. Also concerned with lack of movement. PT/OT consulted. Counseled patient's daughter and patient that we will have a staff member assist the patient with her meals, encourage her to get through her meals, encourage her to finish most of her meals. Discussed the possibility of starting Remeron if her nutritional intake does not  much due to lack of appetite.       ROS: denies fever, chills, cp, sob, n/v, HA unless stated above.      magnesium sulfate  4,000 mg IntraVENous Once    pantoprazole  40 mg IntraVENous BID    amLODIPine  5 mg Oral Daily    carvedilol  12.5 mg Oral BID WC    Arformoterol Tartrate  15 mcg Nebulization BID    budesonide  0.5 mg Nebulization BID    atorvastatin  20 mg Oral Nightly    baclofen  10 mg Oral BID    sertraline  100 mg Oral Nightly    sodium chloride flush  5-40 mL IntraVENous 2 times per day     sodium chloride, , PRN  hydrALAZINE, 10 mg, Q6H PRN  trimethobenzamide, 200 mg, Q6H PRN  albuterol, 2.5 mg, Q6H PRN  traZODone, 25 mg, Nightly PRN  sodium chloride flush, 5-40 mL, PRN  sodium chloride, , PRN  ondansetron, 4 mg, Q8H PRN   Or  ondansetron, 4 mg, Q6H PRN  acetaminophen, 650 mg, Q6H PRN   Or  acetaminophen, 650 mg, Q6H PRN       Objective:    /72   Pulse 82   Temp 97.9 °F (36.6 °C) (Oral)   Resp 18   Ht 5' 2\" (1.575 m)   Wt 166 lb 9.6 oz (75.6 kg)   SpO2 93%   BMI 30.47 kg/m²     General Appearance: alert and oriented to person, place and time and in no acute distress  Skin: warm and dry  Head: normocephalic and atraumatic  Eyes: pupils equal, round, and reactive to light, extraocular eye movements intact, conjunctivae normal  Neck: neck supple and non tender without mass   Pulmonary/Chest: clear to auscultation bilaterally- no wheezes, rales or rhonchi, normal air movement, no respiratory distress  Cardiovascular: normal rate, normal S1 and S2 and no carotid bruits  Abdomen: soft, non-tender, non-distended, normal bowel sounds, no masses or organomegaly  Extremities: no cyanosis, no clubbing and no edema  Neurologic: no cranial nerve deficit and speech normal        Recent Labs     11/14/22  0300 11/15/22  0231 11/16/22  0205    133 131*   K 3.4* 3.1* 3.3*   CL 99 96* 94*   CO2 28 29 29   BUN 6 5* 6   CREATININE 0.8 0.9 0.9   GLUCOSE 88 92 88   CALCIUM 7.2* 7.4* 7.9*         Recent Labs     11/14/22  0300 11/15/22  0231 11/16/22  0205   WBC 7.0 5.7 5.4   RBC 3.05* 3.20* 3.41*   HGB 9.0* 9.2* 10.0*   HCT 27.1* 28.4* 30.3*   MCV 88.9 88.8 88.9   MCH 29.5 28.8 29.3   MCHC 33.2 32.4 33.0   RDW 16.6* 16.6* 16.5*   * 152 179   MPV 9.4 9.6 9.7         Radiology: No new imaging to report. Assessment:    Principal Problem:    Hemorrhagic shock (HCC)  Active Problems:    Lactic acidosis    Chronic respiratory failure with hypoxia (HCC)    Gastrointestinal hemorrhage    Acute blood loss anemia  Resolved Problems:    * No resolved hospital problems. *      Plan:  1. Hemorrhagic shock-resolved, transfused 4 units PRBCs so far. Critical care and general surgery consulted. Appreciate recommendations. Underwent EGD on 11/11 which revealed a duodenal ulcer with adherent clot and visible vessel. Underwent epinephrine injection and clip application. Monitor for further bowel movements/bleeding episodes. Continue to trend hemoglobin. Advancing to a regular diet GI bland on 11/13. Continue PPI twice daily. Hemoglobin stabilized in the 10 range. 2.  Hematochezia versus melena-Continue IV Protonix 40 mg every 12 hours. 3.  Acute blood loss anemia-monitor H/H. Transfuse for hemoglobin less than 7. Receiving additional unit of PRBC infusion for hemoglobin 6.8 on 11/13. H/H is now stable in the 9-10 range. 4.  Syncope-likely secondary to first 3 issues. 5.  COPD, not in acute exacerbation-monitor respiratory status closely. 6.  Acute on chronic hypoxic respiratory failure requiring supplemental oxygen via nasal cannula-supplemental oxygen as needed, monitor respiratory status closely. Obtain chest x-ray. Required 3 L of oxygen overnight. 7.  CKD stage III-monitor with daily BMP  8. Lactic acidosis-resolved, likely secondary to problem #1.  9.  History of hypertension-hold home antihypertensives  10. History of multiple sclerosis  11. History of breast cancer  12. Hyperlipidemia-continue home statin  13. Nonzero troponin-likely secondary to supply demand mismatch, ACS less likely  14. Lack of appetite/failure to thrive-daughter is concerned with her mother's lack of nutritional intake. I have asked the nurse to have a staff member assist her with her meals and encourage her and help her get through her meals. PT/OT. Consider starting Remeron. DVT prophylaxis: SCDs    Dispo: Continue to trend hemoglobin, PT/OT, work on increasing nutrition, obtain chest x-ray to evaluate hypoxia, pre-CERT approved through tomorrow. NOTE: This report was transcribed using voice recognition software.  Every effort was made to ensure accuracy; however, inadvertent computerized transcription errors may be present.   Electronically signed by Tarsha Heredia MD on 11/16/2022 at 9:15 AM

## 2022-11-16 NOTE — PLAN OF CARE
Problem: Skin/Tissue Integrity  Goal: Absence of new skin breakdown  Description: 1. Monitor for areas of redness and/or skin breakdown  2. Assess vascular access sites hourly  3. Every 4-6 hours minimum:  Change oxygen saturation probe site  4. Every 4-6 hours:  If on nasal continuous positive airway pressure, respiratory therapy assess nares and determine need for appliance change or resting period.   Outcome: Progressing     Problem: ABCDS Injury Assessment  Goal: Absence of physical injury  Outcome: Progressing     Problem: Discharge Planning  Goal: Discharge to home or other facility with appropriate resources  Outcome: Progressing     Problem: Safety - Adult  Goal: Free from fall injury  Outcome: Progressing     Problem: Pain  Goal: Verbalizes/displays adequate comfort level or baseline comfort level  Outcome: Progressing     Problem: Nutrition Deficit:  Goal: Optimize nutritional status  Outcome: Progressing

## 2022-11-16 NOTE — PROGRESS NOTES
GENERAL SURGERY  DAILY PROGRESS NOTE  11/16/2022    CHIEF COMPLAINT:  Chief Complaint   Patient presents with    Altered Mental Status    Hypotension       SUBJECTIVE:  No bloody bowel movements overnight. Denies nausea or vomiting. OBJECTIVE:  /72   Pulse 82   Temp 97.9 °F (36.6 °C) (Oral)   Resp 18   Ht 5' 2\" (1.575 m)   Wt 166 lb 9.6 oz (75.6 kg)   SpO2 93%   BMI 30.47 kg/m²     GENERAL:  NAD. A&Ox3. HEENT: pupils equal  LUNGS:  on room air. No acute respiratory distress  CARDIOVASCULAR: hemodynamically stable  SKIN: warm and dry  ABDOMEN:  Soft, non-distended, nontender. No guarding, rigidity, rebound.     ASSESSMENT/PLAN:  77 y.o. female with GIB 2/2 bleeding duodenal ulcer s/p EGD with control of hemorrhage     Plan  -continue PPI and carafate  -ok for discharge from a surgery standpoint   -continue PUD diet   -transfuse for hgb <7     Will DW Dr. Jj Rick MD  Surgery Resident PGY-2  11/16/2022  9:41 AM

## 2022-11-16 NOTE — CARE COORDINATION
Social Work/Discharge Planning:  Salena with [facility] the [facility] called with pre-cert approval.  Pre-cert good until tomorrow. Patient is not ready for discharge. Called patient daughter Sally Mahmood (ph: [phone]) and provided her with an update. Will continue to follow.   Electronically signed by JIAN Ng Chi on 11/16/2022 at 11:44 AM

## 2022-11-16 NOTE — CARE COORDINATION
Social Work/Discharge Planning:  Chart reviewed. Patient on three liters of oxygen. Salena with [facility] the [place] will start pre-cert. Will continue to follow and wait for pre-cert.   Electronically signed by JIAN Crowe on 11/16/2022 at 8:07 AM

## 2022-11-16 NOTE — PLAN OF CARE
Problem: Skin/Tissue Integrity  Goal: Absence of new skin breakdown  Description: 1. Monitor for areas of redness and/or skin breakdown  2. Assess vascular access sites hourly  3. Every 4-6 hours minimum:  Change oxygen saturation probe site  4. Every 4-6 hours:  If on nasal continuous positive airway pressure, respiratory therapy assess nares and determine need for appliance change or resting period.   11/16/2022 0944 by Wesley Zambrano RN  Outcome: Progressing     Problem: ABCDS Injury Assessment  Goal: Absence of physical injury  11/16/2022 0944 by Wesley Zambrano RN  Outcome: Progressing     Problem: Discharge Planning  Goal: Discharge to home or other facility with appropriate resources  11/16/2022 0944 by Wesley Zambrano RN  Outcome: Progressing     Problem: Safety - Adult  Goal: Free from fall injury  11/16/2022 0944 by Wesley Zambrano RN  Outcome: Progressing     Problem: Pain  Goal: Verbalizes/displays adequate comfort level or baseline comfort level  11/16/2022 0944 by Wesley Zambrano RN  Outcome: Progressing     Problem: Nutrition Deficit:  Goal: Optimize nutritional status  11/16/2022 0944 by Wesley Zambrano RN  Outcome: Progressing

## 2022-11-17 VITALS
OXYGEN SATURATION: 98 % | WEIGHT: 164.31 LBS | HEIGHT: 62 IN | DIASTOLIC BLOOD PRESSURE: 60 MMHG | BODY MASS INDEX: 30.24 KG/M2 | RESPIRATION RATE: 16 BRPM | TEMPERATURE: 98 F | HEART RATE: 86 BPM | SYSTOLIC BLOOD PRESSURE: 98 MMHG

## 2022-11-17 LAB
ALBUMIN SERPL-MCNC: 2.6 G/DL (ref 3.5–5.2)
ALP BLD-CCNC: 55 U/L (ref 35–104)
ALT SERPL-CCNC: 110 U/L (ref 0–32)
ANION GAP SERPL CALCULATED.3IONS-SCNC: 9 MMOL/L (ref 7–16)
AST SERPL-CCNC: 23 U/L (ref 0–31)
BASOPHILS ABSOLUTE: 0.01 E9/L (ref 0–0.2)
BASOPHILS RELATIVE PERCENT: 0.2 % (ref 0–2)
BILIRUB SERPL-MCNC: 0.5 MG/DL (ref 0–1.2)
BLOOD CULTURE, ROUTINE: NORMAL
BUN BLDV-MCNC: 11 MG/DL (ref 6–23)
CALCIUM SERPL-MCNC: 8.3 MG/DL (ref 8.6–10.2)
CHLORIDE BLD-SCNC: 95 MMOL/L (ref 98–107)
CO2: 28 MMOL/L (ref 22–29)
CREAT SERPL-MCNC: 1.1 MG/DL (ref 0.5–1)
CULTURE, BLOOD 2: NORMAL
EOSINOPHILS ABSOLUTE: 0.15 E9/L (ref 0.05–0.5)
EOSINOPHILS RELATIVE PERCENT: 2.8 % (ref 0–6)
GFR SERPL CREATININE-BSD FRML MDRD: 52 ML/MIN/1.73
GLUCOSE BLD-MCNC: 99 MG/DL (ref 74–99)
HCT VFR BLD CALC: 29.9 % (ref 34–48)
HEMOGLOBIN: 9.8 G/DL (ref 11.5–15.5)
IMMATURE GRANULOCYTES #: 0.06 E9/L
IMMATURE GRANULOCYTES %: 1.1 % (ref 0–5)
LYMPHOCYTES ABSOLUTE: 1.15 E9/L (ref 1.5–4)
LYMPHOCYTES RELATIVE PERCENT: 21.6 % (ref 20–42)
MAGNESIUM: 2 MG/DL (ref 1.6–2.6)
MCH RBC QN AUTO: 29.3 PG (ref 26–35)
MCHC RBC AUTO-ENTMCNC: 32.8 % (ref 32–34.5)
MCV RBC AUTO: 89.5 FL (ref 80–99.9)
MONOCYTES ABSOLUTE: 0.38 E9/L (ref 0.1–0.95)
MONOCYTES RELATIVE PERCENT: 7.1 % (ref 2–12)
NEUTROPHILS ABSOLUTE: 3.58 E9/L (ref 1.8–7.3)
NEUTROPHILS RELATIVE PERCENT: 67.2 % (ref 43–80)
PDW BLD-RTO: 16.4 FL (ref 11.5–15)
PHOSPHORUS: 3.1 MG/DL (ref 2.5–4.5)
PLATELET # BLD: 220 E9/L (ref 130–450)
PMV BLD AUTO: 9.8 FL (ref 7–12)
POTASSIUM SERPL-SCNC: 3.7 MMOL/L (ref 3.5–5)
RBC # BLD: 3.34 E12/L (ref 3.5–5.5)
SARS-COV-2, NAAT: DETECTED
SODIUM BLD-SCNC: 132 MMOL/L (ref 132–146)
TOTAL PROTEIN: 5.1 G/DL (ref 6.4–8.3)
WBC # BLD: 5.3 E9/L (ref 4.5–11.5)

## 2022-11-17 PROCEDURE — 36415 COLL VENOUS BLD VENIPUNCTURE: CPT

## 2022-11-17 PROCEDURE — 94640 AIRWAY INHALATION TREATMENT: CPT

## 2022-11-17 PROCEDURE — 6370000000 HC RX 637 (ALT 250 FOR IP): Performed by: INTERNAL MEDICINE

## 2022-11-17 PROCEDURE — 99239 HOSP IP/OBS DSCHRG MGMT >30: CPT | Performed by: INTERNAL MEDICINE

## 2022-11-17 PROCEDURE — 84100 ASSAY OF PHOSPHORUS: CPT

## 2022-11-17 PROCEDURE — 80053 COMPREHEN METABOLIC PANEL: CPT

## 2022-11-17 PROCEDURE — APPSS45 APP SPLIT SHARED TIME 31-45 MINUTES: Performed by: NURSE PRACTITIONER

## 2022-11-17 PROCEDURE — 97530 THERAPEUTIC ACTIVITIES: CPT

## 2022-11-17 PROCEDURE — 6360000002 HC RX W HCPCS: Performed by: INTERNAL MEDICINE

## 2022-11-17 PROCEDURE — 85025 COMPLETE CBC W/AUTO DIFF WBC: CPT

## 2022-11-17 PROCEDURE — C9113 INJ PANTOPRAZOLE SODIUM, VIA: HCPCS | Performed by: INTERNAL MEDICINE

## 2022-11-17 PROCEDURE — 83735 ASSAY OF MAGNESIUM: CPT

## 2022-11-17 PROCEDURE — 87635 SARS-COV-2 COVID-19 AMP PRB: CPT

## 2022-11-17 PROCEDURE — 2580000003 HC RX 258: Performed by: INTERNAL MEDICINE

## 2022-11-17 PROCEDURE — 2700000000 HC OXYGEN THERAPY PER DAY

## 2022-11-17 RX ORDER — CARVEDILOL 12.5 MG/1
12.5 TABLET ORAL 2 TIMES DAILY WITH MEALS
Qty: 60 TABLET | Refills: 3 | DISCHARGE
Start: 2022-11-17

## 2022-11-17 RX ORDER — SERTRALINE HYDROCHLORIDE 100 MG/1
100 TABLET, FILM COATED ORAL NIGHTLY
Qty: 30 TABLET | Refills: 3 | DISCHARGE
Start: 2022-11-17

## 2022-11-17 RX ORDER — BUDESONIDE 0.5 MG/2ML
0.5 INHALANT ORAL 2 TIMES DAILY
Qty: 60 EACH | Refills: 3 | DISCHARGE
Start: 2022-11-17

## 2022-11-17 RX ORDER — ALBUTEROL SULFATE 2.5 MG/3ML
2.5 SOLUTION RESPIRATORY (INHALATION) EVERY 6 HOURS PRN
Qty: 120 EACH | Refills: 3 | DISCHARGE
Start: 2022-11-17

## 2022-11-17 RX ORDER — ARFORMOTEROL TARTRATE 15 UG/2ML
15 SOLUTION RESPIRATORY (INHALATION) 2 TIMES DAILY
Qty: 120 ML | Refills: 3 | DISCHARGE
Start: 2022-11-17

## 2022-11-17 RX ADMIN — PANTOPRAZOLE SODIUM 40 MG: 40 INJECTION, POWDER, FOR SOLUTION INTRAVENOUS at 08:07

## 2022-11-17 RX ADMIN — AMLODIPINE BESYLATE 5 MG: 5 TABLET ORAL at 08:07

## 2022-11-17 RX ADMIN — BUDESONIDE 500 MCG: 0.5 SUSPENSION RESPIRATORY (INHALATION) at 09:20

## 2022-11-17 RX ADMIN — BACLOFEN 10 MG: 10 TABLET ORAL at 08:07

## 2022-11-17 RX ADMIN — SODIUM CHLORIDE, PRESERVATIVE FREE 10 ML: 5 INJECTION INTRAVENOUS at 08:08

## 2022-11-17 RX ADMIN — CARVEDILOL 12.5 MG: 6.25 TABLET, FILM COATED ORAL at 08:07

## 2022-11-17 RX ADMIN — ARFORMOTEROL TARTRATE 15 MCG: 15 SOLUTION RESPIRATORY (INHALATION) at 09:21

## 2022-11-17 ASSESSMENT — PAIN SCALES - GENERAL: PAINLEVEL_OUTOF10: 0

## 2022-11-17 NOTE — PROGRESS NOTES
Attempted to give nurse to nurse report to Daina Fulton at [facility] [employer] at the [place], message left and then I was disconnected twice. Will attempt to call again closer to .

## 2022-11-17 NOTE — PLAN OF CARE
Problem: Skin/Tissue Integrity  Goal: Absence of new skin breakdown  Description: 1. Monitor for areas of redness and/or skin breakdown  2. Assess vascular access sites hourly  3. Every 4-6 hours minimum:  Change oxygen saturation probe site  4. Every 4-6 hours:  If on nasal continuous positive airway pressure, respiratory therapy assess nares and determine need for appliance change or resting period.   11/16/2022 2059 by Christina Enriquez RN  Outcome: Progressing     Problem: ABCDS Injury Assessment  Goal: Absence of physical injury  11/16/2022 2059 by Christina Enriquez RN  Outcome: Progressing     Problem: Discharge Planning  Goal: Discharge to home or other facility with appropriate resources  11/16/2022 2059 by Christina Enriquez RN  Outcome: Progressing     Problem: Safety - Adult  Goal: Free from fall injury  11/16/2022 2059 by Christina Enriquez RN  Outcome: Progressing     Problem: Pain  Goal: Verbalizes/displays adequate comfort level or baseline comfort level  11/16/2022 2059 by Christina Enriquez RN  Outcome: Progressing     Problem: Nutrition Deficit:  Goal: Optimize nutritional status  11/16/2022 2059 by Christina Enriquez RN  Outcome: Progressing

## 2022-11-17 NOTE — PROGRESS NOTES
Physical Therapy  Facility/Department: [address] INTERMEDIATE 1  Physical Therapy Treatment Note    Name: Deion Cortes  : 1945  MRN: [id number]  Date of Service: 2022      Patient Diagnosis(es): The primary encounter diagnosis was Gastrointestinal hemorrhage, unspecified gastrointestinal hemorrhage type. Diagnoses of Anemia, unspecified type and Hypotension, unspecified hypotension type were also pertinent to this visit. Past Medical History:  has a past medical history of Arthritis, benign breast, CKD (chronic kidney disease), COPD (chronic obstructive pulmonary disease) ([facility] [address] Hyperlipidemia, Hypertension, MS (multiple sclerosis) ([facility] [address] and Multiple sclerosis ([facility] [address] Past Surgical History:  has a past surgical history that includes Hysterectomy; Breast surgery (2012); Appendectomy; Knee Arthroplasty (Left, 10/01/2013); Knee Arthroplasty (Right, 2012); Hip Arthroplasty (Right, 2011); Total hip arthroplasty (Left, 3/25/2022); and Upper gastrointestinal endoscopy (N/A, 2022). Evaluating Therapist: Josselyn Friedman PT    Room #:  1150/9388-G  Diagnosis:  Hemorrhagic shock ([facility] [R57.8]  PMHx/PSHx:  CKD, COPD, MS  Precautions:  falls, contact isolation      Social:  Pt could not remember if she was at a [facility] or came in from home. Per chart pt admitted from [facility]. Prior lived with  in a one floor home and ambulated with rollator. Initial Evaluation  Date: 11/15/22 Treatment  2022    Short Term/ Long Term   Goals   Was pt agreeable to Eval/treatment? With much encouragement With much encouragement    Does pt have pain?  No c/o pain     Bed Mobility  Rolling: min assist  Supine to sit: min assist  Sit to supine: min assist  Scooting: min assist Rolling: Min A  Supine to sit: Min A  Scooting: Min A seated to EOB SBA   Transfers Sit to stand: min assist  Stand to sit: min assist  Stand pivot: NT Sit <> stand; Min A  Stand Pivot: Min A using [employer] for support SBA   Ambulation 5 feet with rollator min assist NT 50 feet with rollator  with SBA   Stair Negotiation  Ascended and descended  NT   N/A   LE strength     3/5    4-/5   balance      fair     AM-PAC Raw score               16/24 16/24        Pt is alert, required encouragement to participate, agreed to get into a chair  Balance: poor during brief functional mobility using rollator for support    Pt performed therapeutic exercise of the following: NT    Patient education/treatment  Pt was educated on UE usage to assist with transfer safety    Patient response to education:   Pt verbalized understanding Pt demonstrated skill Pt requires further education in this area   yes With instruction yes     ASSESSMENT:   Comments: Nurse ok with Rx. Pt sat EOB SBA. LE movement very slow and consistent. Pt unsteady, required hands on assist for balance and safety. Pt unsafe to transfer or gait alone presently. Pt was left in a recliner chair with call light in reach, waffle cushion in place    Chair/bed alarm: chair alarm active    Time in 1050   Time out 1105   Total Treatment Time 15 minutes   CPT codes:     Therapeutic activities 08403 15 minutes   Therapeutic exercises 44104 0 minutes       Pt is making consistent progress toward established Physical Therapy goals. Continue with physical therapy current plan of care.     Alfreda Min PTA   License Number: [id number]

## 2022-11-17 NOTE — CARE COORDINATION
Social Work/Discharge Planning:  Discharge order noted. Patient to discharge to [facility] at the [place]. Called [facility] and arranged transport for 4:00pm.  Notified patient, her daughter Harish Wilcox, charge nurse and liaison Kia at the [place] of discharge time.  Electronically signed by JIAN Armendariz on 11/17/2022 at 1:41 PM

## 2022-11-17 NOTE — DISCHARGE SUMMARY
[facility] Physician Discharge Summary        The [facility] [address]  [address] [place] [address], [address] [place] [address] 200  [address]  [phone]    Follow up  As needed    German Lee [place] [phone]    Schedule an appointment as soon as possible for a visit  Make an appointment in 1 week to go over hospitalization, medications and follow up. Activity level: As tolerated     Dispo: [facility]    Condition on discharge: Stable         Patient ID:  Brooks Gil  [id number]  70 y.o.  1945    Admit date: 11/10/2022    Discharge date and time:  11/17/2022  1:18 PM    Admission Diagnoses:   Principal Problem:    Hemorrhagic shock ([address]  Active Problems:    Lactic acidosis    Chronic respiratory failure with hypoxia (HCC)    Gastrointestinal hemorrhage    Acute blood loss anemia  Resolved Problems:    * No resolved hospital problems. *      Discharge Diagnoses:   Principal Problem:    Hemorrhagic shock ([address]  Active Problems:    Lactic acidosis    Chronic respiratory failure with hypoxia (HCC)    Gastrointestinal hemorrhage    Acute blood loss anemia  Resolved Problems:    * No resolved hospital problems. *      Consults:  IP CONSULT TO CRITICAL CARE  IP CONSULT TO GENERAL SURGERY  IP CONSULT TO IV TEAM  IP CONSULT TO [facility] Course:   Patient Brooks Gil is a 77 y.o. presented with Hemorrhagic shock ([address] [R57.8]   Patient presented to the ER  from nursing home with lightheadedness and bright red blood per rectum. She was found to be in hemorrhagic shock with a BP in the 23H systolic and hg 6.6 She was initially admitted to the ICU- then later moved to telemetry. Patient was followed and treated by Critical Care/ Pulmonology and General Surgery. She was followed and treated for;    1.   Hemorrhagic shock due to GI bleed/ duodenal ulcer s/p EGD on 11/11 which revealed a duodenal ulcer with adherent clot and visible vessel s/pp epinephrine injection and clip application: s/p 4 PRBCs - resolving. Patient presented to the ER with lightheadedness and bright red blood per rectum. She was recently admitted to hospital following COPD exacerbation/ pneumonia and then dc to Rehab on augmentin and steroid taper. At rehab she had multiple falls and syncopal episodes. Due to hypotension sent to ER for evaluation. Bp on arrival was 58/44. . HG 6.6. She was then admitted to ICU and required pressors/ levophed. Appears she was on pressors for 48 hours. She is now out of ICU and on telemetry. Hg stabilized. Hg 9.8. General Surgery following - ok to discharge from their standpoint. Pt feeling much better. Hg stable. Continue PPI and carafate. 2. Acute blood loss anemia related to above; monitor H & H. Transfuse for hg less than 7.0. hg stable 9.8     3. Syncope and collapse: likely due to above. Resolved     4. Hypotension with h/o HTN: Bp now stable. Norvasc and coreg resumed at lower dose. 5. Chronic respiratory failure: pt on 3 L NC      6. COPD: not in exacerbation. Monitor      7. CKD stage III; monitor creatinine     8. Lactic acidosis: resolved     9. History of multiple sclerosis     10. History of breast cancer     11. HLD: statin     12. Elevated troponin: likely secondary to supply demand mismatch. ACS less likely     13. Lack of appetite/ failure to thrive- documented that daughter is concerned with mother's lack of nutritional intake. Nursing to assist with meals. Dietary to follow. 14. Deconditioning: PT/OT- am pac 16    Patient improving. Plan is to discharge to rehab this afternoon. Patient discharged in stable condition with the following medications, instructions and follow up.        Discharge Exam:  General Appearance: alert and oriented to person, place and time  Skin: warm and dry  Head: normocephalic and atraumatic  Eyes: pupils equal, round, and reactive to light, extraocular eye movements intact, conjunctivae  Neck: neck supple and non tender without mass   Pulmonary/Chest: clear to auscultation bilaterally-  Cardiovascular: normal rate, normal S1 and S2 and no carotid bruits  Abdomen: soft, non-tender, non-distended, normal bowel sounds  Extremities: no cyanosis, no clubbing and no edema  Neurologic: speech normal     I/O last 3 completed shifts:  In: -   Out: 800 [Urine:800]  No intake/output data recorded. LABS:  Recent Labs     11/15/22  0231 11/16/22  0205 11/17/22  0300    131* 132   K 3.1* 3.3* 3.7   CL 96* 94* 95*   CO2 29 29 28   BUN 5* 6 11   CREATININE 0.9 0.9 1.1*   GLUCOSE 92 88 99   CALCIUM 7.4* 7.9* 8.3*       Recent Labs     11/15/22  0231 11/16/22  0205 11/17/22  0300   WBC 5.7 5.4 5.3   RBC 3.20* 3.41* 3.34*   HGB 9.2* 10.0* 9.8*   HCT 28.4* 30.3* 29.9*   MCV 88.8 88.9 89.5   MCH 28.8 29.3 29.3   MCHC 32.4 33.0 32.8   RDW 16.6* 16.5* 16.4*    179 220   MPV 9.6 9.7 9.8       No results for input(s): POCGLU in the last 72 hours. Imaging:  CT ABDOMEN PELVIS WO CONTRAST Additional Contrast? None    Result Date: 11/10/2022  EXAMINATION: CT OF THE ABDOMEN AND PELVIS WITHOUT CONTRAST 11/10/2022 4:16 pm TECHNIQUE: CT of the abdomen and pelvis was performed without the administration of intravenous contrast. Multiplanar reformatted images are provided for review. Automated exposure control, iterative reconstruction, and/or weight based adjustment of the mA/kV was utilized to reduce the radiation dose to as low as reasonably achievable. COMPARISON: None. HISTORY: ORDERING SYSTEM PROVIDED HISTORY: rectal bleed TECHNOLOGIST PROVIDED HISTORY: Reason for exam:->rectal bleed Additional Contrast?->None Decision Support Exception - unselect if not a suspected or confirmed emergency medical condition->Emergency Medical Condition (MA) FINDINGS: Lower Chest: Heart appears normal in size.   There is an area of atelectasis and minimal consolidation in the medial right lower lobe. Slight pleural thickening in both lungs left greater than right. No pleural fluid. No focal suspicious soft tissue mass or endobronchial lesion. Organs: Liver and spleen are normal in size without focal lesion. Pancreas has a normal CT appearance. There is evidence of cholelithiasis without cholecystitis. Common bile duct is normal.  Kidneys are normal in size. There high density rounded lesions involving both kidneys most compatible with bilateral hemorrhagic cysts. No evidence of renal calculus or obstruction. The adrenal glands are normal. GI/Bowel: Small bowel is normal in diameter without evidence of inflammation. Terminal ileum is normal.  There is high density fluid distension of the entire colon extending from the cecum to the rectum. No focal soft tissue mass is visible. Evaluation is limited due to lack of IV contrast material. Pelvis: No free pelvic fluid. Urinary bladder appears normal.  Streak artifact from bilateral hip joint arthroplasties. Peritoneum/Retroperitoneum: No free intraperitoneal air. No ascites. Bones/Soft Tissues: Osteopenia is present. Evidence of prior vertebroplasties at the thoracolumbar junction. Degenerative disc disease at L4-5 and L5-S1. Left spondylolysis without spondylolisthesis at L5.     1.  Gas and fluid distension of the entire colon, no focal mass or wall thickening. No free air or free fluid detected. Rectum not well visualized due to streak artifact from the patient's hip arthroplasties. 2.  Atelectasis and minimal consolidation in the medial right lower lobe.      CT HEAD WO CONTRAST    Result Date: 11/10/2022  EXAMINATION: CT OF THE HEAD WITHOUT CONTRAST  11/10/2022 4:16 pm TECHNIQUE: CT of the head was performed without the administration of intravenous contrast. Automated exposure control, iterative reconstruction, and/or weight based adjustment of the mA/kV was utilized to reduce the radiation dose to as low as reasonably achievable. COMPARISON: None. HISTORY: ORDERING SYSTEM PROVIDED HISTORY: AMS TECHNOLOGIST PROVIDED HISTORY: Has a \"code stroke\" or \"stroke alert\" been called? ->No Reason for exam:->AMS Decision Support Exception - unselect if not a suspected or confirmed emergency medical condition->Emergency Medical Condition (MA) FINDINGS: BRAIN/VENTRICLES: No mass effect, edema or hemorrhage is seen. Mild-to-moderate volume loss is seen in the cerebrum with moderate chronic microvascular ischemic changes. No hydrocephalus or extra-axial fluid is seen. ORBITS: A prosthetic lens is seen in the right globe. The orbits are otherwise unremarkable. SINUSES: The visualized paranasal sinuses and mastoid air cells demonstrate no acute abnormality. SOFT TISSUES/SKULL:  No acute abnormality of the visualized skull or soft tissues. No acute intracranial abnormality. XR CHEST PORTABLE    Result Date: 11/10/2022  EXAMINATION: ONE XRAY VIEW OF THE CHEST 11/10/2022 3:27 pm COMPARISON: 11/10/2022 at 15:58 HISTORY: ORDERING SYSTEM PROVIDED HISTORY: IJ placement TECHNOLOGIST PROVIDED HISTORY: Reason for exam:->IJ placement FINDINGS: A new right internal jugular central venous line is seen with the tip in the distal SVC. The lungs and pleural spaces are stable including linear scarring in the left mid lung zone. Heart is prominent in size. Adequately placed right internal jugular central venous line. XR CHEST PORTABLE    Result Date: 11/10/2022  EXAMINATION: ONE XRAY VIEW OF THE CHEST 11/10/2022 4:12 pm COMPARISON: None. HISTORY: ORDERING SYSTEM PROVIDED HISTORY: altered mental status TECHNOLOGIST PROVIDED HISTORY: Reason for exam:->altered mental status FINDINGS: There are linear atelectasis/scarring in the left upper mid lung and right lung base. Emphysematous changes in the upper lobes. No active airspace consolidation. No pneumothorax or pleural effusion. The heart is not enlarged.      Linear atelectasis in both lungs and emphysematous changes in the upper lobes. Patient Instructions:      Medication List        START taking these medications      albuterol (2.5 MG/3ML) 0.083% nebulizer solution  Commonly known as: PROVENTIL  Take 3 mLs by nebulization every 6 hours as needed for Wheezing  Replaces: albuterol sulfate  (90 Base) MCG/ACT inhaler     Arformoterol Tartrate 15 MCG/2ML Nebu  Commonly known as: BROVANA  Take 2 mLs by nebulization in the morning and 2 mLs in the evening. budesonide 0.5 MG/2ML nebulizer suspension  Commonly known as: PULMICORT  Take 2 mLs by nebulization in the morning and 2 mLs in the evening. Full Kit Nebulizer Set Misc  Use as directed with nebulized medication. pantoprazole 40 MG tablet  Commonly known as: PROTONIX  Take 1 tablet by mouth every morning (before breakfast)     sucralfate 1 GM tablet  Commonly known as: Carafate  Take 1 tablet by mouth 4 times daily            CHANGE how you take these medications      atorvastatin 20 MG tablet  Commonly known as: Lipitor  Take 1 tablet by mouth daily  What changed: when to take this     carvedilol 12.5 MG tablet  Commonly known as: COREG  Take 1 tablet by mouth 2 times daily (with meals)  What changed:   medication strength  how much to take  when to take this     sertraline 100 MG tablet  Commonly known as: ZOLOFT  Take 1 tablet by mouth nightly  What changed: See the new instructions.             CONTINUE taking these medications      amLODIPine 5 MG tablet  Commonly known as: NORVASC  Take 1 tablet by mouth daily     baclofen 10 MG tablet  Commonly known as: LIORESAL  TAKE 1 TABLET TWICE A DAY     Cholecalciferol 50 MCG (2000 UT) Caps     Compression Stockings Misc  by Does not apply route 15-25 mm hg     traZODone 50 MG tablet  Commonly known as: DESYREL  Take 0.5 tablets by mouth nightly as needed for Sleep            STOP taking these medications      albuterol sulfate  (90 Base) MCG/ACT inhaler  Commonly known as: Ventolin HFA  Replaced by: albuterol (2.5 MG/3ML) 0.083% nebulizer solution     aspirin 81 MG EC tablet     diclofenac sodium 1 % Gel  Commonly known as: VOLTAREN     gabapentin 400 MG capsule  Commonly known as: NEURONTIN     hydroCHLOROthiazide 25 MG tablet  Commonly known as: HYDRODIURIL     meloxicam 15 MG tablet  Commonly known as: Mobic     memantine 5 MG tablet  Commonly known as: NAMENDA     predniSONE 10 MG tablet  Commonly known as: Rubye [place]     Trelegy Ellipta 200-62.5-25 MCG/INH Aepb  Generic drug: Fluticasone-Umeclidin-Vilant               Where to Get Your Medications        These medications were sent to [address] [place] [address] [phone] [facility] [phone]  [address] [place] [address]      Phone: [phone]   pantoprazole 40 MG tablet  sucralfate 1 GM tablet       Information about where to get these medications is not yet available    Ask your nurse or doctor about these medications  albuterol (2.5 MG/3ML) 0.083% nebulizer solution  Arformoterol Tartrate 15 MCG/2ML Nebu  budesonide 0.5 MG/2ML nebulizer suspension  carvedilol 12.5 MG tablet  Full Kit Nebulizer Set Misc  sertraline 100 MG tablet           Note that more than 30 minutes was spent in preparing discharge papers, discussing discharge with patient, medication review, etc.    Signed:  Electronically signed by CLARKE Self on 11/17/2022 at 1:18 PM

## 2022-11-17 NOTE — PROGRESS NOTES
Patient tested positive for covid. Facility made aware. They will still accept her. [facility] here to get patient. Patient stated her daughter was made aware. Patient left in well state.

## 2022-11-17 NOTE — PLAN OF CARE
Problem: Skin/Tissue Integrity  Goal: Absence of new skin breakdown  Description: 1. Monitor for areas of redness and/or skin breakdown  2. Assess vascular access sites hourly  3. Every 4-6 hours minimum:  Change oxygen saturation probe site  4. Every 4-6 hours:  If on nasal continuous positive airway pressure, respiratory therapy assess nares and determine need for appliance change or resting period.   11/17/2022 0928 by Dee Morrison RN  Outcome: Progressing     Problem: ABCDS Injury Assessment  Goal: Absence of physical injury  11/17/2022 0928 by Dee Morrison RN  Outcome: Progressing     Problem: Discharge Planning  Goal: Discharge to home or other facility with appropriate resources  11/17/2022 0928 by Dee Morrison RN  Outcome: Progressing     Problem: Safety - Adult  Goal: Free from fall injury  11/17/2022 0928 by Dee Morrison RN  Outcome: Progressing     Problem: Pain  Goal: Verbalizes/displays adequate comfort level or baseline comfort level  11/17/2022 0928 by Dee Morrison RN  Outcome: Progressing     Problem: Nutrition Deficit:  Goal: Optimize nutritional status  11/17/2022 0928 by Dee Morrison RN  Outcome: Progressing

## 2022-11-17 NOTE — CARE COORDINATION
Social Work/Discharge Planning:  Chart reviewed. Patient on three liters oxygen. Plan remains to [address] when medically stable. Pre-cert good until today and patient will need a negative covid result on day of discharge. Will continue to follow.   Electronically signed by JIAN Hinson on 11/17/2022 at 9:26 AM

## 2022-11-18 NOTE — PROGRESS NOTES
22  Magali Roman : 1945 Sex: female  Age 77 y.o. Subjective:  Chief Complaint   Patient presents with    Shoulder Pain     right shoulder started 4 weeks ago         HPI:   Magali Roman , 77 y.o. female presents to [facility] for evaluation of right shoulder pain    HPI  16-year-old female presents to [facility] for evaluation of right shoulder pain. The patient has had this right shoulder pain ongoing for about 4 weeks if not longer. The patient is right-hand dominant. The patient just had a hip replacement. The patient states that she has been using the walker/scooter to get around. She has not had any falls or injury. The patient notes that most of the pain is through the superior and posterior aspect of the right shoulder. No chest pain, shortness of breath, fever, chills. No elbow pain or wrist pain. The patient denies any other injuries or complaints at this time. ROS:   Unless otherwise stated in this report the patient's positive and negative responses for review of systems for constitutional, eyes, ENT, cardiovascular, respiratory, gastrointestinal, neurological, , musculoskeletal, and integument systems and related systems to the presenting problem are either stated in the history of present illness or were not pertinent or were negative for the symptoms and/or complaints related to the presenting medical problem. Positives and pertinent negatives as per HPI. All others reviewed and are negative. PMH:     Past Medical History:   Diagnosis Date    Arthritis     Breast cancer ([address]     [facility]ronic obstructive pulmonary disease) [facility]     Hyperlipidemia     Hypertension     Multiple sclerosis ([facility]     diagnosised 8  yrs ago [facility]       Past Surgical History:   Procedure Laterality Date    APPENDECTOMY      BREAST SURGERY  2012    biopsy - negative    HIP ARTHROPLASTY Right 2011    Right AIDEE  ALLISON Oscra MD    HYSTERECTOMY  KNEE ARTHROPLASTY Left 10/01/2013    Left TKA  ALLISON Knott MD    KNEE ARTHROPLASTY Right 08/29/2012    Right TKA  ALLISON Knott MD    TOTAL HIP ARTHROPLASTY Left 3/25/2022    LEFT HIP TOTAL ARTHROPLASTY performed by Kacy Weiner MD at [facility] OR       Family History   Problem Relation Age of Onset    Mult Sclerosis Father     Thyroid Cancer Mother     Cirrhosis Mother        Medications:     Current Outpatient Medications:     diclofenac sodium (VOLTAREN) 1 % GEL, Apply 2 g topically 4 times daily as needed for Pain, Disp: 50 g, Rfl: 0    methylPREDNISolone (MEDROL DOSEPACK) 4 MG tablet, Take by mouth., Disp: 1 kit, Rfl: 0    gabapentin (NEURONTIN) 400 MG capsule, TAKE 1 CAPSULE 3 TIMES A   DAY, Disp: 270 capsule, Rfl: 0    alendronate (FOSAMAX) 70 MG tablet, TAKE 1 TABLET EVERY 7 DAYS, Disp: 12 tablet, Rfl: 0    carvedilol (COREG) 12.5 MG tablet, Take 1 tablet by mouth 2 times daily, Disp: 180 tablet, Rfl: 0    TRELEGY ELLIPTA 200-62.5-25 MCG/INH AEPB, INHALE ONE PUFF BY MOUTH EVERY DAY, Disp: , Rfl:     baclofen (LIORESAL) 10 MG tablet, TAKE 1 TABLET TWICE A DAY, Disp: 60 tablet, Rfl: 0    sertraline (ZOLOFT) 100 MG tablet, TAKE 1 AND 1/2 TABLETS     DAILY, Disp: 135 tablet, Rfl: 0    aspirin 81 MG EC tablet, Take 1 tablet by mouth 2 times daily for 30 doses DVT prophylaxis x30 days, Disp: 60 tablet, Rfl: 0    simvastatin (ZOCOR) 40 MG tablet, TAKE 1 TABLET NIGHTLY, Disp: 90 tablet, Rfl: 1    albuterol sulfate HFA (VENTOLIN HFA) 108 (90 Base) MCG/ACT inhaler, Inhale 2 puffs into the lungs 4 times daily as needed for Wheezing, Disp: 1 Inhaler, Rfl: 5    Cholecalciferol 50 MCG (2000 UT) CAPS, Take 2,000 Units by mouth every morning , Disp: , Rfl:     Allergies:      Allergies   Allergen Reactions    Macrodantin [Nitrofurantoin Macrocrystal] Shortness Of Breath       Social History:     Social History     Tobacco Use    Smoking status: Current Every Day Smoker     Packs/day: 1.00     Years: 40.00     Pack years: 40.00    Smokeless tobacco: Never Used   Vaping Use    Vaping Use: Never used   Substance Use Topics    Alcohol use: No    Drug use: No       Patient lives at home. Physical Exam:     Vitals:    04/29/22 1103   BP: (!) 142/70   Pulse: 82   Resp: 18   Temp: 97.3 °F (36.3 °C)   SpO2: 90%   Weight: 170 lb 6.4 oz (77.3 kg)   Height: 5' 2\" (1.575 m)       Exam:  Physical Exam  Vital signs reviewed and nurse's notes. The patient is not hypoxic. General: Alert, no acute distress, patient resting comfortably   Skin: warm, intact, no pallor noted   Head: Normocephalic, atraumatic   Eye: Normal conjunctiva   Respiratory: No acute distress   Musculoskeletal: There is no obvious deformity noted to the right shoulder to the right upper extremity. The patient has diffuse tenderness noted to the right shoulder. The patient has difficulty abducting and flexing the right shoulder. The patient had diffuse tenderness throughout the shoulder. The patient had no pain to the mid humerus, right elbow or the right wrist.  Normal equal  strength. Pulses symmetrical at radius 2+. No cyanosis or mottling. No erythema, warmth, or lymphangitic streaking  Neurological: alert and orient x4, normal sensory and motor observed. Psychiatric: Cooperative      Testing:     XR SHOULDER RIGHT (MIN 2 VIEWS)    Result Date: 4/29/2022  EXAMINATION: THREE XRAY VIEWS OF THE RIGHT SHOULDER 4/29/2022 11:18 am COMPARISON: None. HISTORY: ORDERING SYSTEM PROVIDED HISTORY: Acute pain of right shoulder TECHNOLOGIST PROVIDED HISTORY: Reason for exam:->right shoulder pain FINDINGS: Moderate to severe glenohumeral joint degenerative changes present. There is severe narrowing of the subacromial space compatible with probable chronic rotator cuff pathology. Right lung apex is clear. Bone density is diminished. No acute fracture or subluxation. There is evidence of prior thoracic vertebroplasty.      Moderate osteoarthritis of the glenohumeral joint. Narrowing of the subacromial space usually related to chronic rotator cuff disease. No acute fracture detected. Medical Decision Making:     Vital signs reviewed    Past medical history reviewed. Allergies reviewed. Medications reviewed. Patient on arrival does not appear to be in any apparent distress or discomfort. The patient had x-rays obtained in the office today and formal radiology report is pending at this time. I personally reviewed the x-ray images and did not see any evidence of acute process. X-ray shows moderate osteoarthritis of the glenohumeral joint. Narrowing of the subacromial space usually related to chronic rotator cuff disease. There is no evidence of acute fracture, dislocation. The patient will be treated with a Medrol Dosepak and Voltaren gel. We did discuss the potential of occult fracture with the patient and the need for followup. The patient understands the need for follow-up and repeat evaluation. The patient was educated on RICE therapy, nsaids, and tylenol. The patient is to return if any of the signs or symptoms worsen. The patient is to follow-up with PCP in the next 2-3 days for repeat evaluation repeat assessment or go directly to the emergency department. Clinical Impression:   Huma Dacosta was seen today for shoulder pain. Diagnoses and all orders for this visit:    Acute pain of right shoulder  -     XR SHOULDER RIGHT (MIN 2 VIEWS); Future    Other orders  -     diclofenac sodium (VOLTAREN) 1 % GEL; Apply 2 g topically 4 times daily as needed for Pain  -     methylPREDNISolone (MEDROL DOSEPACK) 4 MG tablet; Take by mouth. The patient is to call for any concerns or return if any of the signs or symptoms worsen. The patient is to follow-up with PCP in the next 2-3 days for repeat evaluation repeat assessment or go directly to the emergency department.      SIGNATURE: Ethel Julian III, PA-C 1.76

## 2022-11-23 ENCOUNTER — OUTSIDE SERVICES (OUTPATIENT)
Dept: PRIMARY CARE CLINIC | Age: 77
End: 2022-11-23

## 2022-11-23 DIAGNOSIS — J44.9 CHRONIC OBSTRUCTIVE PULMONARY DISEASE, UNSPECIFIED COPD TYPE (HCC): ICD-10-CM

## 2022-11-23 DIAGNOSIS — E78.2 MIXED HYPERLIPIDEMIA: ICD-10-CM

## 2022-11-23 DIAGNOSIS — I10 PRIMARY HYPERTENSION: ICD-10-CM

## 2022-11-23 DIAGNOSIS — R53.1 WEAKNESS: ICD-10-CM

## 2022-11-23 DIAGNOSIS — G35 MULTIPLE SCLEROSIS (HCC): ICD-10-CM

## 2022-11-23 DIAGNOSIS — J96.11 CHRONIC HYPOXEMIC RESPIRATORY FAILURE (HCC): ICD-10-CM

## 2022-11-23 DIAGNOSIS — K92.2 GASTROINTESTINAL HEMORRHAGE, UNSPECIFIED GASTROINTESTINAL HEMORRHAGE TYPE: Primary | ICD-10-CM

## 2022-11-23 DIAGNOSIS — R57.8 HEMORRHAGIC SHOCK (HCC): ICD-10-CM

## 2022-11-30 ENCOUNTER — OUTSIDE SERVICES (OUTPATIENT)
Dept: PRIMARY CARE CLINIC | Age: 77
End: 2022-11-30

## 2022-11-30 DIAGNOSIS — K92.2 GASTROINTESTINAL HEMORRHAGE, UNSPECIFIED GASTROINTESTINAL HEMORRHAGE TYPE: Primary | ICD-10-CM

## 2022-11-30 DIAGNOSIS — J44.9 CHRONIC OBSTRUCTIVE PULMONARY DISEASE, UNSPECIFIED COPD TYPE (HCC): ICD-10-CM

## 2022-11-30 DIAGNOSIS — G35 MULTIPLE SCLEROSIS (HCC): ICD-10-CM

## 2022-11-30 DIAGNOSIS — J96.11 CHRONIC HYPOXEMIC RESPIRATORY FAILURE (HCC): ICD-10-CM

## 2022-11-30 DIAGNOSIS — R53.1 WEAKNESS: ICD-10-CM

## 2022-11-30 DIAGNOSIS — E78.2 MIXED HYPERLIPIDEMIA: ICD-10-CM

## 2022-11-30 DIAGNOSIS — R57.8 HEMORRHAGIC SHOCK (HCC): ICD-10-CM

## 2022-11-30 DIAGNOSIS — I10 PRIMARY HYPERTENSION: ICD-10-CM

## 2022-12-08 ASSESSMENT — ENCOUNTER SYMPTOMS
SINUS PAIN: 0
WHEEZING: 0
RHINORRHEA: 0
EYE ITCHING: 0
EYE REDNESS: 0
GASTROINTESTINAL NEGATIVE: 1
EYE REDNESS: 0
SHORTNESS OF BREATH: 1
SINUS PAIN: 0
COUGH: 1
EYE DISCHARGE: 0
SORE THROAT: 0
VOICE CHANGE: 0
EYE DISCHARGE: 0
RHINORRHEA: 0
SINUS PRESSURE: 0
EYE ITCHING: 0
VOICE CHANGE: 0
SORE THROAT: 0
SINUS PRESSURE: 0
GASTROINTESTINAL NEGATIVE: 1
COUGH: 1
TROUBLE SWALLOWING: 0
WHEEZING: 0
SHORTNESS OF BREATH: 1
TROUBLE SWALLOWING: 0

## 2022-12-08 ASSESSMENT — VISUAL ACUITY
OU: 1
OU: 1

## 2022-12-08 ASSESSMENT — COPD QUESTIONNAIRES
COPD: 1
COPD: 1

## 2022-12-08 NOTE — PROGRESS NOTES
Visit Date: 11/23/22  Zara Hoffmann  1945  female 77 y.o. Subjective:    CC: Patient presents with acute respiratory failure, MS and weakness. Patient presents for follow up of hypertension, hyperlipidemia, and copd . HPI:  Extremity Weakness  This is a chronic (she has MS and had increased weakness, she is admitted to nursing home for rehab) problem. The current episode started more than 1 year ago. The problem occurs constantly. The problem has been waxing and waning. Associated symptoms include coughing and weakness. Pertinent negatives include no congestion, myalgias or sore throat. Nothing aggravates the symptoms. Treatments tried: taking medications, no medication side effects. The treatment provided mild relief. COPD  She complains of cough and shortness of breath. There is no wheezing. This is a chronic (admitted to the hospital for acute on chronic respiratory failure, she was treated and admitted to the nursing home for rehab) problem. The current episode started more than 1 year ago. The problem occurs constantly. The problem has been waxing and waning. The cough is productive of sputum. Pertinent negatives include no ear pain, myalgias, postnasal drip, rhinorrhea, sneezing, sore throat or trouble swallowing. Her symptoms are aggravated by change in weather and strenuous activity. Relieved by: taking medications, no medication side effects. She reports minimal improvement on treatment. Her past medical history is significant for COPD. Hypertension  This is a chronic problem. The current episode started more than 1 year ago. The problem is unchanged. The problem is controlled. Associated symptoms include shortness of breath. There are no associated agents to hypertension. Risk factors for coronary artery disease include dyslipidemia. Treatments tried: taking medications, no medication side effects. The current treatment provides mild improvement. There are no compliance problems. Hyperlipidemia  This is a chronic problem. The current episode started more than 1 year ago. The problem is controlled. Recent lipid tests were reviewed and are variable. There are no known factors aggravating her hyperlipidemia. Associated symptoms include shortness of breath. Pertinent negatives include no myalgias. Treatments tried: taking medications, no medication side effects. The current treatment provides mild improvement of lipids. There are no compliance problems. Risk factors for coronary artery disease include dyslipidemia and hypertension. GI Problem  Primary symptoms do not include myalgias. The illness began 6 to 7 days ago (admitted to the hospital for gi bleed and was given blood transfusion. she had hemorrhagic shock and was treated and admitted to nursing home for rehab. her H&H have improved). The problem has been gradually improving. ROS:  Review of Systems   Constitutional: Negative. HENT:  Negative for congestion, ear discharge, ear pain, mouth sores, nosebleeds, postnasal drip, rhinorrhea, sinus pressure, sinus pain, sneezing, sore throat, trouble swallowing and voice change. Eyes:  Negative for discharge, redness, itching and visual disturbance. Denies diplopia, recent change in visual acuity, blurred vision, and night vision loss. Does not wear corrective lenses. Respiratory:  Positive for cough and shortness of breath. Negative for wheezing. Denies asthma, COPD, hemoptysis   Cardiovascular: Negative. Gastrointestinal: Negative. Endocrine: Negative. Genitourinary:  Negative for difficulty urinating and urgency. Denies hesitancy, incomplete voiding, and polyuria   Musculoskeletal:  Negative for myalgias. Denies joint pain   Skin: Negative. Neurological:  Positive for weakness. Hematological: Negative. Psychiatric/Behavioral:  Negative for confusion and suicidal ideas. The patient is not nervous/anxious.          Denies difficulty concentrating, depression, flight of ideas, slow thought processes, suicide attempts      Current Meds: Refer to nursing home record    PMH:    Medical Problems:        Diagnosis Date    Arthritis     benign breast     CKD (chronic kidney disease)     COPD (chronic obstructive pulmonary disease) ([facility] [address]     Hyperlipidemia     Hypertension     MS (multiple sclerosis) ([facility] [address] 05/31/2012    Multiple sclerosis ([facility] [address]     diagnosised 8  yrs ago [facility]        Surgical Hx:  Past Surgical History:   Procedure Laterality Date    APPENDECTOMY      BREAST SURGERY  4/13/2012    biopsy - negative    HIP ARTHROPLASTY Right 04/13/2011    Right AIDEE  ALLISON Smith MD    HYSTERECTOMY (CERVIX STATUS UNKNOWN)      KNEE ARTHROPLASTY Left 10/01/2013    Left TKA  ALLISON Smith MD    KNEE ARTHROPLASTY Right 08/29/2012    Right TKA  ALLISON Smith MD    TOTAL HIP ARTHROPLASTY Left 3/25/2022    LEFT HIP [facility] N/A 11/11/2022    EGD CONTROL HEMORRHAGE performed by Dee Clark MD at [facility] ENDOSCOPY        FH:       Problem Relation Age of Onset    Mult Sclerosis Father     Thyroid Cancer Mother     Cirrhosis Mother         SH:    reports that she has been smoking cigarettes. She has a 50.00 pack-year smoking history. She has never used smokeless tobacco. She reports that she does not drink alcohol and does not use drugs. Objective: Wt: 150.4 BP: 138/76 Pulse: 74 Resp: 18 T: 97.9F     Physical Exam:  Physical Exam  Vitals reviewed. Constitutional:       General: She is not in acute distress. Appearance: Normal appearance. She is normal weight. She is not ill-appearing or toxic-appearing. Comments: Appears appropriate for age   HENT:      Head: Normocephalic and atraumatic.       Right Ear: Tympanic membrane and external ear normal.      Left Ear: Tympanic membrane and external ear normal.      Ears:      Comments: Middle ear well aerated. Nose: Nose normal.      Mouth/Throat:      Mouth: Mucous membranes are moist.      Dentition: Normal dentition. No gingival swelling or dental caries. Pharynx: Oropharynx is clear. Uvula midline. Comments: Gums appear healthy. No thrush no mucositis  Eyes:      General: Vision grossly intact. Extraocular Movements: Extraocular movements intact. Conjunctiva/sclera: Conjunctivae normal.      Pupils: Pupils are equal, round, and reactive to light. Comments: Fundoscopic exam is benign  Retinal vessels: Normal   Neck:      Vascular: No carotid bruit. Comments: Thyroid is normal in size. Carotids 2+ and equal bilaterally  Cardiovascular:      Rate and Rhythm: Normal rate and regular rhythm. Pulses: Normal pulses. Heart sounds: Normal heart sounds, S1 normal and S2 normal. No murmur heard. No friction rub. No gallop. Comments: Pedal pulses 2+ and equal bilaterally  Pulmonary:      Effort: Pulmonary effort is normal.      Breath sounds: Normal breath sounds. Abdominal:      General: Bowel sounds are normal. There is no distension. Palpations: Abdomen is soft. There is no mass. Tenderness: There is no abdominal tenderness. There is no guarding or rebound. Hernia: No hernia is present. Comments: No rigidity   Musculoskeletal:         General: Normal range of motion. Right shoulder: Normal.      Left shoulder: Normal.      Right upper arm: Normal.      Left upper arm: Normal.      Cervical back: Normal range of motion. Right hip: Normal.      Left hip: Normal.      Right upper leg: Normal.      Left upper leg: Normal.      Right lower leg: Normal.      Left lower leg: Normal.      Right foot: Normal.      Left foot: Normal.   Lymphadenopathy:      Comments: No palpable or visible regional lymphadenopathy   Skin:     General: Skin is warm and dry. Findings: No bruising, ecchymosis, lesion or rash.    Neurological:      General: No focal deficit present. Mental Status: She is alert. Mental status is at baseline. Cranial Nerves: No cranial nerve deficit. Deep Tendon Reflexes: Reflexes normal.   Psychiatric:         Mood and Affect: Mood and affect normal. Mood is not depressed. Behavior: Behavior normal. Behavior is not agitated. Comments: No apparent anxiety       Assessment & Plan:  1. Gastrointestinal hemorrhage, unspecified gastrointestinal hemorrhage type  2. Hemorrhagic shock ([facility] [address]  3. Chronic hypoxemic respiratory failure (HCC)  4. Chronic obstructive pulmonary disease, unspecified COPD type ([facility] [address]  5. Multiple sclerosis ([facility] [address]  6. Primary hypertension  7. Mixed hyperlipidemia  8. Weakness     Hospital notes reviewed   Chart and medications reviewed   Monitor labs   Continue therapy   Continue current treatment   Advanced directives discussed with patient and family and she is a full code     Temo REZA MA  am scribing for Dr. Eneida Nelson.  [place], [place]   INasreen MD, personally performed the services described in this documentation as scribed by Temo Parker and it is both accurate and complete

## 2022-12-08 NOTE — PROGRESS NOTES
Visit Date: 11/30/22  Arianne Rutledge  1945  female 77 y.o. Subjective:    CC: Patient presents with acute respiratory failure, MS and weakness. Patient presents for follow up of hypertension, hyperlipidemia, and copd . HPI:  Extremity Weakness  This is a chronic (she has MS and had increased weakness, she is admitted to nursing home for rehab) problem. The current episode started more than 1 year ago. The problem occurs constantly. The problem has been waxing and waning. Associated symptoms include coughing and weakness. Pertinent negatives include no congestion, myalgias or sore throat. Nothing aggravates the symptoms. Treatments tried: taking medications, no medication side effects. The treatment provided mild relief. COPD  She complains of cough and shortness of breath. There is no wheezing. This is a chronic (admitted to the hospital for acute on chronic respiratory failure, she was treated and admitted to the nursing home for rehab) problem. The current episode started more than 1 year ago. The problem occurs constantly. The problem has been waxing and waning. The cough is productive of sputum. Pertinent negatives include no ear pain, myalgias, postnasal drip, rhinorrhea, sneezing, sore throat or trouble swallowing. Her symptoms are aggravated by change in weather and strenuous activity. Relieved by: taking medications, no medication side effects. She reports minimal improvement on treatment. Her past medical history is significant for COPD. Hypertension  This is a chronic problem. The current episode started more than 1 year ago. The problem is unchanged. The problem is controlled. Associated symptoms include shortness of breath. There are no associated agents to hypertension. Risk factors for coronary artery disease include dyslipidemia. Treatments tried: taking medications, no medication side effects. The current treatment provides mild improvement. There are no compliance problems. Hyperlipidemia  This is a chronic problem. The current episode started more than 1 year ago. The problem is controlled. Recent lipid tests were reviewed and are variable. There are no known factors aggravating her hyperlipidemia. Associated symptoms include shortness of breath. Pertinent negatives include no myalgias. Treatments tried: taking medications, no medication side effects. The current treatment provides mild improvement of lipids. There are no compliance problems. Risk factors for coronary artery disease include dyslipidemia and hypertension. GI Problem  Primary symptoms do not include myalgias. The illness began 6 to 7 days ago (admitted to the hospital for gi bleed and was given blood transfusion. she had hemorrhagic shock and was treated and admitted to nursing home for rehab. her H&H have improved). The problem has been gradually improving. Other  Chronicity: has hyponatremia NA from 127 to 122 reviewed her medications and they are not the cause of this. The current episode started in the past 7 days. The problem has been gradually worsening. Associated symptoms include coughing and weakness. Pertinent negatives include no congestion, myalgias or sore throat. Nothing aggravates the symptoms. She has tried nothing for the symptoms. ROS:  Review of Systems   Constitutional: Negative. HENT:  Negative for congestion, ear discharge, ear pain, mouth sores, nosebleeds, postnasal drip, rhinorrhea, sinus pressure, sinus pain, sneezing, sore throat, trouble swallowing and voice change. Eyes:  Negative for discharge, redness, itching and visual disturbance. Denies diplopia, recent change in visual acuity, blurred vision, and night vision loss. Does not wear corrective lenses. Respiratory:  Positive for cough and shortness of breath. Negative for wheezing. Denies asthma, COPD, hemoptysis   Cardiovascular: Negative. Gastrointestinal: Negative. Endocrine: Negative. Genitourinary:  Negative for difficulty urinating and urgency. Denies hesitancy, incomplete voiding, and polyuria   Musculoskeletal:  Negative for myalgias. Denies joint pain   Skin: Negative. Neurological:  Positive for weakness. Hematological: Negative. Psychiatric/Behavioral:  Negative for confusion and suicidal ideas. The patient is not nervous/anxious. Denies difficulty concentrating, depression, flight of ideas, slow thought processes, suicide attempts      Current Meds: Refer to nursing home record    PMH:    Medical Problems:        Diagnosis Date    Arthritis     benign breast     CKD (chronic kidney disease)     COPD (chronic obstructive pulmonary disease) ([facility] [address]     Hyperlipidemia     Hypertension     MS (multiple sclerosis) ([facility] [address] 05/31/2012    Multiple sclerosis ([facility] [address]     diagnosised 8  yrs ago [facility]        Surgical Hx:  Past Surgical History:   Procedure Laterality Date    APPENDECTOMY      BREAST SURGERY  4/13/2012    biopsy - negative    HIP ARTHROPLASTY Right 04/13/2011    Right AIDEE  ALLISON Quezada MD    HYSTERECTOMY (CERVIX STATUS UNKNOWN)      KNEE ARTHROPLASTY Left 10/01/2013    Left TKA  ALLISON Quezada MD    KNEE ARTHROPLASTY Right 08/29/2012    Right TKA  ALLISON Quezada MD    TOTAL HIP ARTHROPLASTY Left 3/25/2022    LEFT HIP TOTAL ARTHROPLASTY performed by Bailee Rodriguez MD at [address] [place] [address] ENDOSCOPY N/A 11/11/2022    EGD CONTROL HEMORRHAGE performed by Fernando Maurer MD at [facility] ENDOSCOPY        FH:       Problem Relation Age of Onset    Mult Sclerosis Father     Thyroid Cancer Mother     Cirrhosis Mother         SH:    reports that she has been smoking cigarettes. She has a 50.00 pack-year smoking history. She has never used smokeless tobacco. She reports that she does not drink alcohol and does not use drugs. Objective: Wt: 150.4 BP: 132/60 pulse: 73 Resp: 18 T: 98.1F     Physical Exam:  Physical Exam  Vitals reviewed. Constitutional:       General: She is not in acute distress. Appearance: Normal appearance. She is normal weight. She is not ill-appearing or toxic-appearing. Comments: Appears appropriate for age   HENT:      Head: Normocephalic and atraumatic. Right Ear: Tympanic membrane and external ear normal.      Left Ear: Tympanic membrane and external ear normal.      Ears:      Comments: Middle ear well aerated. Nose: Nose normal.      Mouth/Throat:      Mouth: Mucous membranes are moist.      Dentition: Normal dentition. No gingival swelling or dental caries. Pharynx: Oropharynx is clear. Uvula midline. Comments: Gums appear healthy. No thrush no mucositis  Eyes:      General: Vision grossly intact. Extraocular Movements: Extraocular movements intact. Conjunctiva/sclera: Conjunctivae normal.      Pupils: Pupils are equal, round, and reactive to light. Comments: Fundoscopic exam is benign  Retinal vessels: Normal   Neck:      Vascular: No carotid bruit. Comments: Thyroid is normal in size. Carotids 2+ and equal bilaterally  Cardiovascular:      Rate and Rhythm: Normal rate and regular rhythm. Pulses: Normal pulses. Heart sounds: Normal heart sounds, S1 normal and S2 normal. No murmur heard. No friction rub. No gallop. Comments: Pedal pulses 2+ and equal bilaterally  Pulmonary:      Effort: Pulmonary effort is normal.      Breath sounds: Normal breath sounds. Abdominal:      General: Bowel sounds are normal. There is no distension. Palpations: Abdomen is soft. There is no mass. Tenderness: There is no abdominal tenderness. There is no guarding or rebound. Hernia: No hernia is present. Comments: No rigidity   Musculoskeletal:         General: Normal range of motion. Right shoulder: Normal.      Left shoulder: Normal.      Right upper arm: Normal.      Left upper arm: Normal.      Cervical back: Normal range of motion.       Right hip: Normal.      Left hip: Normal.      Right upper leg: Normal.      Left upper leg: Normal.      Right lower leg: Normal.      Left lower leg: Normal.      Right foot: Normal.      Left foot: Normal.   Lymphadenopathy:      Comments: No palpable or visible regional lymphadenopathy   Skin:     General: Skin is warm and dry. Findings: No bruising, ecchymosis, lesion or rash. Neurological:      General: No focal deficit present. Mental Status: She is alert. Mental status is at baseline. Cranial Nerves: No cranial nerve deficit. Deep Tendon Reflexes: Reflexes normal.   Psychiatric:         Mood and Affect: Mood and affect normal. Mood is not depressed. Behavior: Behavior normal. Behavior is not agitated. Comments: No apparent anxiety       Assessment & Plan:  1. Gastrointestinal hemorrhage, unspecified gastrointestinal hemorrhage type  2. Hemorrhagic shock ([facility] [address]  3. Chronic hypoxemic respiratory failure (HCC)  4. Chronic obstructive pulmonary disease, unspecified COPD type ([address]  5. Multiple sclerosis ([facility] [address]  6. Primary hypertension  7. Mixed hyperlipidemia  8. Weakness     Chart reviewed   Medications reviewed   Add NA chloride 1gm po bid   Check bmp in 1 week   Continue therapy   Continue current treatment     Krystal REZA MA  am scribing for Dr. Tiffanie Fink.  [place]   Shukri REZA MD, personally performed the services described in this documentation as scribed by Krystal Valadez and it is both accurate and complete

## 2022-12-14 ENCOUNTER — TELEPHONE (OUTPATIENT)
Dept: PRIMARY CARE CLINIC | Age: 77
End: 2022-12-14

## 2022-12-14 DIAGNOSIS — R31.9 URINARY TRACT INFECTION WITH HEMATURIA, SITE UNSPECIFIED: Primary | ICD-10-CM

## 2022-12-14 DIAGNOSIS — N39.0 URINARY TRACT INFECTION WITH HEMATURIA, SITE UNSPECIFIED: Primary | ICD-10-CM

## 2022-12-14 NOTE — TELEPHONE ENCOUNTER
Pt daughter called pt having some confusion [facility] going out can you order UA so they can check for uti. Order will need sent over to [facility].

## 2022-12-15 LAB
BILIRUBIN, URINE: NORMAL
BLOOD, URINE: NORMAL
CLARITY: NORMAL
COLOR: NORMAL
GLUCOSE URINE: NORMAL
KETONES, URINE: NORMAL
LEUKOCYTE ESTERASE, URINE: NORMAL
NITRITE, URINE: NORMAL
PH UA: NORMAL
PROTEIN UA: NORMAL
SPECIFIC GRAVITY, URINE: NORMAL
UROBILINOGEN, URINE: NORMAL

## 2022-12-15 RX ORDER — CARVEDILOL 12.5 MG/1
12.5 TABLET ORAL 2 TIMES DAILY WITH MEALS
Qty: 60 TABLET | Refills: 3 | Status: SHIPPED | OUTPATIENT
Start: 2022-12-15

## 2022-12-15 RX ORDER — SUCRALFATE 1 G/1
1 TABLET ORAL 4 TIMES DAILY
Qty: 120 TABLET | Refills: 3 | Status: SHIPPED | OUTPATIENT
Start: 2022-12-15

## 2022-12-15 RX ORDER — PANTOPRAZOLE SODIUM 40 MG/1
40 TABLET, DELAYED RELEASE ORAL
Qty: 30 TABLET | Refills: 0 | Status: SHIPPED | OUTPATIENT
Start: 2022-12-15

## 2022-12-15 NOTE — TELEPHONE ENCOUNTER
Home care nurse called and stated pt has not been taking her medications when she should. Wanted to see if blood work orders could be placed for her to get her iron level checked. Home nurse #[phone]. Please advise.

## 2022-12-19 ENCOUNTER — APPOINTMENT (OUTPATIENT)
Dept: GENERAL RADIOLOGY | Age: 77
End: 2022-12-19
Payer: MEDICARE

## 2022-12-19 ENCOUNTER — HOSPITAL ENCOUNTER (EMERGENCY)
Age: 77
Discharge: HOME | End: 2022-12-19
Attending: EMERGENCY MEDICINE
Payer: MEDICARE

## 2022-12-19 VITALS
DIASTOLIC BLOOD PRESSURE: 87 MMHG | HEART RATE: 82 BPM | WEIGHT: 150 LBS | RESPIRATION RATE: 15 BRPM | BODY MASS INDEX: 27.6 KG/M2 | TEMPERATURE: 98.4 F | OXYGEN SATURATION: 92 % | HEIGHT: 62 IN | SYSTOLIC BLOOD PRESSURE: 144 MMHG

## 2022-12-19 DIAGNOSIS — N30.00 ACUTE CYSTITIS WITHOUT HEMATURIA: Primary | ICD-10-CM

## 2022-12-19 DIAGNOSIS — R53.83 OTHER FATIGUE: ICD-10-CM

## 2022-12-19 LAB
ALBUMIN SERPL-MCNC: 3.6 G/DL (ref 3.5–5.2)
ALP BLD-CCNC: 73 U/L (ref 35–104)
ALT SERPL-CCNC: 7 U/L (ref 0–32)
ANION GAP SERPL CALCULATED.3IONS-SCNC: 15 MMOL/L (ref 7–16)
AST SERPL-CCNC: 24 U/L (ref 0–31)
BACTERIA: ABNORMAL /HPF
BASOPHILS ABSOLUTE: 0.07 E9/L (ref 0–0.2)
BASOPHILS RELATIVE PERCENT: 0.8 % (ref 0–2)
BILIRUB SERPL-MCNC: 0.5 MG/DL (ref 0–1.2)
BILIRUBIN URINE: NEGATIVE
BLOOD, URINE: NEGATIVE
BUN BLDV-MCNC: 18 MG/DL (ref 6–23)
CALCIUM SERPL-MCNC: 9.7 MG/DL (ref 8.6–10.2)
CHLORIDE BLD-SCNC: 100 MMOL/L (ref 98–107)
CLARITY: CLEAR
CO2: 28 MMOL/L (ref 22–29)
COLOR: YELLOW
CREAT SERPL-MCNC: 1.2 MG/DL (ref 0.5–1)
EKG ATRIAL RATE: 82 BPM
EKG P AXIS: 45 DEGREES
EKG P-R INTERVAL: 138 MS
EKG Q-T INTERVAL: 410 MS
EKG QRS DURATION: 82 MS
EKG QTC CALCULATION (BAZETT): 479 MS
EKG R AXIS: -3 DEGREES
EKG T AXIS: 30 DEGREES
EKG VENTRICULAR RATE: 82 BPM
EOSINOPHILS ABSOLUTE: 0.32 E9/L (ref 0.05–0.5)
EOSINOPHILS RELATIVE PERCENT: 3.8 % (ref 0–6)
GFR SERPL CREATININE-BSD FRML MDRD: 47 ML/MIN/1.73
GLUCOSE BLD-MCNC: 101 MG/DL (ref 74–99)
GLUCOSE URINE: NEGATIVE MG/DL
HCT VFR BLD CALC: 38.9 % (ref 34–48)
HEMOGLOBIN: 12.2 G/DL (ref 11.5–15.5)
IMMATURE GRANULOCYTES #: 0.02 E9/L
IMMATURE GRANULOCYTES %: 0.2 % (ref 0–5)
INFLUENZA A BY PCR: NOT DETECTED
INFLUENZA B BY PCR: NOT DETECTED
KETONES, URINE: 15 MG/DL
LEUKOCYTE ESTERASE, URINE: ABNORMAL
LYMPHOCYTES ABSOLUTE: 2.3 E9/L (ref 1.5–4)
LYMPHOCYTES RELATIVE PERCENT: 27.3 % (ref 20–42)
MCH RBC QN AUTO: 28.6 PG (ref 26–35)
MCHC RBC AUTO-ENTMCNC: 31.4 % (ref 32–34.5)
MCV RBC AUTO: 91.3 FL (ref 80–99.9)
MONOCYTES ABSOLUTE: 0.61 E9/L (ref 0.1–0.95)
MONOCYTES RELATIVE PERCENT: 7.3 % (ref 2–12)
NEUTROPHILS ABSOLUTE: 5.09 E9/L (ref 1.8–7.3)
NEUTROPHILS RELATIVE PERCENT: 60.6 % (ref 43–80)
NITRITE, URINE: POSITIVE
PDW BLD-RTO: 17.5 FL (ref 11.5–15)
PH UA: 6 (ref 5–9)
PLATELET # BLD: 403 E9/L (ref 130–450)
PMV BLD AUTO: 9.5 FL (ref 7–12)
POTASSIUM SERPL-SCNC: 3.5 MMOL/L (ref 3.5–5)
PRO-BNP: 457 PG/ML (ref 0–450)
PROTEIN UA: NEGATIVE MG/DL
RBC # BLD: 4.26 E12/L (ref 3.5–5.5)
RBC UA: ABNORMAL /HPF (ref 0–2)
SARS-COV-2, NAAT: NOT DETECTED
SODIUM BLD-SCNC: 143 MMOL/L (ref 132–146)
SPECIFIC GRAVITY UA: 1.02 (ref 1–1.03)
TOTAL PROTEIN: 7.7 G/DL (ref 6.4–8.3)
TROPONIN, HIGH SENSITIVITY: 34 NG/L (ref 0–9)
UROBILINOGEN, URINE: 0.2 E.U./DL
WBC # BLD: 8.4 E9/L (ref 4.5–11.5)
WBC UA: ABNORMAL /HPF (ref 0–5)

## 2022-12-19 PROCEDURE — 93010 ELECTROCARDIOGRAM REPORT: CPT | Performed by: INTERNAL MEDICINE

## 2022-12-19 PROCEDURE — 83880 ASSAY OF NATRIURETIC PEPTIDE: CPT

## 2022-12-19 PROCEDURE — 87077 CULTURE AEROBIC IDENTIFY: CPT

## 2022-12-19 PROCEDURE — 87186 SC STD MICRODIL/AGAR DIL: CPT

## 2022-12-19 PROCEDURE — 2580000003 HC RX 258

## 2022-12-19 PROCEDURE — 81001 URINALYSIS AUTO W/SCOPE: CPT

## 2022-12-19 PROCEDURE — 96365 THER/PROPH/DIAG IV INF INIT: CPT

## 2022-12-19 PROCEDURE — 87635 SARS-COV-2 COVID-19 AMP PRB: CPT

## 2022-12-19 PROCEDURE — 6360000002 HC RX W HCPCS

## 2022-12-19 PROCEDURE — 85025 COMPLETE CBC W/AUTO DIFF WBC: CPT

## 2022-12-19 PROCEDURE — 84484 ASSAY OF TROPONIN QUANT: CPT

## 2022-12-19 PROCEDURE — 87088 URINE BACTERIA CULTURE: CPT

## 2022-12-19 PROCEDURE — 87502 INFLUENZA DNA AMP PROBE: CPT

## 2022-12-19 PROCEDURE — 99285 EMERGENCY DEPT VISIT HI MDM: CPT

## 2022-12-19 PROCEDURE — 93005 ELECTROCARDIOGRAM TRACING: CPT

## 2022-12-19 PROCEDURE — 71045 X-RAY EXAM CHEST 1 VIEW: CPT

## 2022-12-19 PROCEDURE — 80053 COMPREHEN METABOLIC PANEL: CPT

## 2022-12-19 RX ORDER — 0.9 % SODIUM CHLORIDE 0.9 %
1000 INTRAVENOUS SOLUTION INTRAVENOUS ONCE
Status: DISCONTINUED | OUTPATIENT
Start: 2022-12-19 | End: 2022-12-19

## 2022-12-19 RX ORDER — SULFAMETHOXAZOLE AND TRIMETHOPRIM 800; 160 MG/1; MG/1
1 TABLET ORAL 2 TIMES DAILY
Qty: 14 TABLET | Refills: 0 | Status: SHIPPED | OUTPATIENT
Start: 2022-12-19 | End: 2022-12-26

## 2022-12-19 RX ORDER — CARVEDILOL 12.5 MG/1
12.5 TABLET ORAL 2 TIMES DAILY WITH MEALS
Qty: 180 TABLET | Refills: 1 | Status: SHIPPED | OUTPATIENT
Start: 2022-12-19

## 2022-12-19 RX ADMIN — MEROPENEM 1000 MG: 1 INJECTION, POWDER, FOR SOLUTION INTRAVENOUS at 15:19

## 2022-12-19 ASSESSMENT — ENCOUNTER SYMPTOMS
COUGH: 1
SHORTNESS OF BREATH: 1
DIARRHEA: 0
BACK PAIN: 0
VOMITING: 0
SINUS PAIN: 0
NAUSEA: 0
ABDOMINAL PAIN: 0
CONSTIPATION: 0
EYE PAIN: 0

## 2022-12-19 ASSESSMENT — PAIN - FUNCTIONAL ASSESSMENT: PAIN_FUNCTIONAL_ASSESSMENT: NONE - DENIES PAIN

## 2022-12-19 NOTE — ED NOTES
Patient O2 saturation 89% during ambulation on RA. Once in bed Pt rebounded to 93% on 2 L.  Pt stated only uses oxygen at as needed     Naman Ceballos LPN  20/97/21 [id number]

## 2022-12-19 NOTE — DISCHARGE INSTRUCTIONS
Please start taking your medication as indicated for your UTI. Please return to the emergency room if you have worsening, severe or life-threatening symptoms.

## 2022-12-19 NOTE — ED PROVIDER NOTES
ATTENDING PROVIDER ATTESTATION:     Brooks Gil presented to the emergency department for evaluation of Fatigue (Generalized weakness for the past two days. Went to PCP's without appt. , no appts were available and sent to ER.) and Dysuria   and was initially evaluated by the Medical Resident. See Original ED Note for H&P and ED course above. I have reviewed and discussed the case, including pertinent history (medical, surgical, family and social) and exam findings with the Medical Resident assigned to Brooks Gil. I have personally performed and/or participated in the history, exam, medical decision making, and procedures and agree with all pertinent clinical information. I, Dr. Carlene Manzanares MD, am the primary provider of this record. I have reviewed my findings and recommendations with the assigned Medical Resident, Brooks Gil and members of family present at the time of disposition. My findings/plan: The primary encounter diagnosis was Acute cystitis without hematuria. A diagnosis of Other fatigue was also pertinent to this visit. Discharge Medication List as of 2022  4:46 PM        START taking these medications    Details   carvedilol (COREG) 12.5 MG tablet Take 1 tablet by mouth 2 times daily (with meals), Disp-180 tablet, R-1Normal      sulfamethoxazole-trimethoprim (BACTRIM DS;SEPTRA DS) 800-160 MG per tablet Take 1 tablet by mouth 2 times daily for 7 days, Disp-14 tablet, R-0Print           Carlene Manzanares MD    [facility]  Department of Emergency Medicine     Written by: Ade Helms DO  Patient Name: Brooks Gil  Attending Provider: No att. providers found  Admit Date: 2022 11:11 AM  MRN: [id number]                   : 1945        Chief Complaint   Patient presents with    Fatigue     Generalized weakness for the past two days. Went to PCP's without appt. , no appts were available and sent to ER.     Dysuria    - dysuria, fatigue    HPI     77 y.o. female w PMH COPD without chronic hypoxia, MS, CKD3, anemia, HTN, and recent admission for GI bleed arrived with the complaint of generalized weakness ongoing for 3 days, constant. She denies any alleviating or aggravating factors. Associated symptoms of dysuria. She states it is getting progressively worse and she also is having burning with urination. She is on 3L NC prn at home and is a current smoker. She is requiring 1 L here in the ED. She has a chronic cough and denies any viral symptoms such as congestion or worsening cough. She denies fevers, chills, N/V, diarrhea, constipation, CP or SOB. Review of Systems   Constitutional:  Negative for chills and fever. HENT:  Negative for ear pain and sinus pain. Eyes:  Negative for pain. Respiratory:  Positive for cough (chronic) and shortness of breath (chronic). Cardiovascular:  Negative for chest pain. Gastrointestinal:  Negative for abdominal pain, constipation, diarrhea, nausea and vomiting. Genitourinary:  Positive for dysuria. Negative for difficulty urinating and flank pain. Musculoskeletal:  Negative for back pain. Skin:  Negative for rash. Neurological:  Positive for weakness (generalized). Negative for dizziness, light-headedness and headaches. Psychiatric/Behavioral:  Negative for confusion. Physical Exam  Constitutional:       General: She is not in acute distress. Appearance: Normal appearance. She is not ill-appearing. HENT:      Head: Normocephalic. Right Ear: External ear normal.      Left Ear: External ear normal.      Nose: Nose normal. No congestion or rhinorrhea. Mouth/Throat:      Mouth: Mucous membranes are moist.   Eyes:      Conjunctiva/sclera: Conjunctivae normal.      Pupils: Pupils are equal, round, and reactive to light. Cardiovascular:      Rate and Rhythm: Normal rate and regular rhythm. Pulses: Normal pulses.    Pulmonary:      Effort: Pulmonary effort is normal. No respiratory distress. Breath sounds: Normal breath sounds. No stridor. No wheezing or rales. Abdominal:      General: Abdomen is flat. Bowel sounds are normal. There is no distension. Palpations: Abdomen is soft. Tenderness: There is no abdominal tenderness. There is no guarding. Musculoskeletal:         General: No tenderness. Normal range of motion. Cervical back: Normal range of motion and neck supple. Comments: Pt has chronic decreased ROM in the right UE due to a rotator cuff injury. Skin:     General: Skin is warm. Findings: No erythema, lesion or rash. Neurological:      General: No focal deficit present. Mental Status: She is oriented to person, place, and time. Sensory: No sensory deficit. Motor: No weakness. Comments: NIH of 0, neurologically intact, strength equal b/l UE and LE. Psychiatric:         Behavior: Behavior normal.        Procedures   EKG: This EKG is signed by emergency department physician. Rate: 82   Rhythm: Sinus  Interpretation: no acute changes  Comparison: stable as compared to patient's most recent EKG       MDM  Number of Diagnoses or Management Options  Acute cystitis without hematuria  Other fatigue  Diagnosis management comments: 77 y.o. female w PMH COPD without chronic hypoxia, MS, CKD3, anemia, HTN, and recent admission for GI bleed arrived with the complaint of generalized fatigue ongoing for 3 days along with burning urination. She lives at home with her  who is capable of caring for her. She denies fevers, chills, nausea, vomiting, diarrhea, constipation, CP, SOB, black or bloody stools, hematuria. Examination revealed an NIH of 0 and no acute findings. Labs and imaging ordered revealing a UTI otherwise unremarkable. In house pharmacist consulted for history of multi drug resistant Urine cultures and he suggested IV meropenum and outpatient bactrim which she will receive.  She was ambulated with pulse ox and found to be sufficient with her baseline oxygen use. Pt had a creatinine of 1.2 but refused fluid rehydration stating she would rather drink water orally. Pt found stable for discharge. All precautions discussed and she was given a script for her UTI. Amount and/or Complexity of Data Reviewed  Decide to obtain previous medical records or to obtain history from someone other than the patient: yes               ED Course as of 12/19/22 2301   Mon Dec 19, 2022   1152 EKG: This EKG is signed and interpreted by me, Dr. Francine Dong MD    Rate: 82  Rhythm: Sinus  Interpretation: Normal sinus rhythm, normal ND interval, normal QRS, normal axis, normal QT interval, no acute ST or T wave changes  Comparison: stable as compared to patient's most recent EKG   [JA]   1155 Patient is a 50-year-old female with history of MS presenting from home with generalized weakness. She is a poor historian. She is unsure when her symptoms began. She states she is having dysuria, and she is concerned that she has a UTI. She states that she lives at home and walks with a walker normally. She is also on chronic oxygen and denies any new shortness of breath. No increase in her home oxygen. I reviewed the patient's chart. She was recently admitted to the hospital for GI bleed due to duodenal ulcer. She underwent EGD on 11/11/2022 by Dr. Bradley Williamson, and she had control of hemorrhage with epi and clip application at that time. She denies any further bleeding. She denies any hematemesis or black or bloody stools. She denies fevers, chest pain, abdominal pain, nausea, vomiting, and focal numbness or weakness. She denies falls or trauma.     PHYSICAL EXAM:  Vitals Reviewed  Constitutional/General: Alert and oriented x3, appears chronically ill, non toxic in NAD  Head: Normocephalic and atraumatic  Eyes: PERRL, EOMI  Mouth: Oropharynx clear, handling secretions, no trismus  Neck: Supple, full ROM,   Pulmonary: Lungs clear to auscultation bilaterally, no wheezes, rales, or rhonchi. Not in respiratory distress  Cardiovascular:  Regular rate and rhythm, no murmurs, gallops, or rubs. 2+ distal pulses  Abdomen: Soft, non tender, non distended,   Extremities: Moves all extremities x 4. Warm and well perfused  Skin: warm and dry without rash  Neurologic: GCS 15, 5/5 strength of bilateral upper and lower extremities, speech clear  Psych: Normal Affect    Work-up pending. [JA]   [address] with pharmacy in the ED about antibiotic choice for patient with UTI based on her resistant past cultures. Bactrim or meropenum were suggested. [CB]   7920 Patient's troponin is elevated at 34 but improved when compared to prior labs. She has no active chest pain. Fatigue has been ongoing for several days. She has no acute EKG changes. ACS is not suspected. Delta troponin is not indicated [JA]   1504 Reevaluated patient who is comfortable with no new or worsening symptoms [CB]      ED Course User Index  [CB] Larry Green DO  [JA] Jackie Mar MD        --------------------------------------------- PAST HISTORY ---------------------------------------------  Past Medical History:  has a past medical history of Arthritis, benign breast, CKD (chronic kidney disease), COPD (chronic obstructive pulmonary disease) ([facility] Hyperlipidemia, Hypertension, MS (multiple sclerosis) ([facility] and Multiple sclerosis ([facility] Past Surgical History:  has a past surgical history that includes Hysterectomy; Breast surgery (4/13/2012); Appendectomy; Knee Arthroplasty (Left, 10/01/2013); Knee Arthroplasty (Right, 08/29/2012); Hip Arthroplasty (Right, 04/13/2011); Total hip arthroplasty (Left, 3/25/2022); and Upper gastrointestinal endoscopy (N/A, 11/11/2022). Social History:  reports that she has been smoking cigarettes. She has a 50.00 pack-year smoking history.  She has never used smokeless tobacco. She reports that she does not drink alcohol and does not use drugs. Family History: family history includes Cirrhosis in her mother; Mult Sclerosis in her father; Thyroid Cancer in her mother. The patients home medications have been reviewed.     Allergies: Macrodantin [nitrofurantoin macrocrystal]    -------------------------------------------------- RESULTS -------------------------------------------------  Labs:  Results for orders placed or performed during the hospital encounter of 12/19/22   COVID-19, Rapid    Specimen: Nasopharyngeal Swab   Result Value Ref Range    SARS-CoV-2, NAAT Not Detected Not Detected   RAPID INFLUENZA A/B ANTIGENS    Specimen: Nasopharyngeal   Result Value Ref Range    Influenza A by PCR Not Detected Not Detected    Influenza B by PCR Not Detected Not Detected   Urinalysis with Microscopic   Result Value Ref Range    Color, UA Yellow Straw/Yellow    Clarity, UA Clear Clear    Glucose, Ur Negative Negative mg/dL    Bilirubin Urine Negative Negative    Ketones, Urine 15 (A) Negative mg/dL    Specific Gravity, UA 1.020 1.005 - 1.030    Blood, Urine Negative Negative    pH, UA 6.0 5.0 - 9.0    Protein, UA Negative Negative mg/dL    Urobilinogen, Urine 0.2 <2.0 E.U./dL    Nitrite, Urine POSITIVE (A) Negative    Leukocyte Esterase, Urine TRACE (A) Negative    WBC, UA 2-5 0 - 5 /HPF    RBC, UA NONE 0 - 2 /HPF    Bacteria, UA MANY (A) None Seen /HPF   CBC with Auto Differential   Result Value Ref Range    WBC 8.4 4.5 - 11.5 E9/L    RBC 4.26 3.50 - 5.50 E12/L    Hemoglobin 12.2 11.5 - 15.5 g/dL    Hematocrit 38.9 34.0 - 48.0 %    MCV 91.3 80.0 - 99.9 fL    MCH 28.6 26.0 - 35.0 pg    MCHC 31.4 (L) 32.0 - 34.5 %    RDW 17.5 (H) 11.5 - 15.0 fL    Platelets 778 791 - 562 E9/L    MPV 9.5 7.0 - 12.0 fL    Neutrophils % 60.6 43.0 - 80.0 %    Immature Granulocytes % 0.2 0.0 - 5.0 %    Lymphocytes % 27.3 20.0 - 42.0 %    Monocytes % 7.3 2.0 - 12.0 %    Eosinophils % 3.8 0.0 - 6.0 %    Basophils % 0.8 0.0 - 2.0 %    Neutrophils Absolute 5.09 1.80 - 7.30 E9/L    Immature Granulocytes # 0.02 E9/L    Lymphocytes Absolute 2.30 1.50 - 4.00 E9/L    Monocytes Absolute 0.61 0.10 - 0.95 E9/L    Eosinophils Absolute 0.32 0.05 - 0.50 E9/L    Basophils Absolute 0.07 0.00 - 0.20 E9/L   Comprehensive Metabolic Panel   Result Value Ref Range    Sodium 143 132 - 146 mmol/L    Potassium 3.5 3.5 - 5.0 mmol/L    Chloride 100 98 - 107 mmol/L    CO2 28 22 - 29 mmol/L    Anion Gap 15 7 - 16 mmol/L    Glucose 101 (H) 74 - 99 mg/dL    BUN 18 6 - 23 mg/dL    Creatinine 1.2 (H) 0.5 - 1.0 mg/dL    Est, Glom Filt Rate 47 >=60 mL/min/1.73    Calcium 9.7 8.6 - 10.2 mg/dL    Total Protein 7.7 6.4 - 8.3 g/dL    Albumin 3.6 3.5 - 5.2 g/dL    Total Bilirubin 0.5 0.0 - 1.2 mg/dL    Alkaline Phosphatase 73 35 - 104 U/L    ALT 7 0 - 32 U/L    AST 24 0 - 31 U/L   Troponin   Result Value Ref Range    Troponin, High Sensitivity 34 (H) 0 - 9 ng/L   Brain Natriuretic Peptide   Result Value Ref Range    Pro- (H) 0 - 450 pg/mL   EKG 12 Lead   Result Value Ref Range    Ventricular Rate 82 BPM    Atrial Rate 82 BPM    P-R Interval 138 ms    QRS Duration 82 ms    Q-T Interval 410 ms    QTc Calculation (Bazett) 479 ms    P Axis 45 degrees    R Axis -3 degrees    T Axis 30 degrees       Radiology:  XR CHEST PORTABLE   Final Result   No acute process. ------------------------- NURSING NOTES AND VITALS REVIEWED ---------------------------  Date / Time Roomed:  12/19/2022 11:11 AM  ED Bed Assignment:  BELRIN/BERLIN    The nursing notes within the ED encounter and vital signs as below have been reviewed.    BP (!) 144/87   Pulse 82   Temp 98.4 °F (36.9 °C) (Oral)   Resp 15   Ht 5' 2\" (1.575 m)   Wt 150 lb (68 kg)   SpO2 92%   BMI 27.44 kg/m²   Oxygen Saturation Interpretation: Abnormal - but at baseline      ------------------------------------------ PROGRESS NOTES ------------------------------------------  11:00 PM EST  I have spoken with the patient and discussed todays results, in addition to providing specific details for the plan of care and counseling regarding the diagnosis and prognosis. Their questions are answered at this time and they are agreeable with the plan. I discussed at length with them reasons for immediate return here for re evaluation. They will followup with their primary care physician by calling their office tomorrow. --------------------------------- ADDITIONAL PROVIDER NOTES ---------------------------------  At this time the patient is without objective evidence of an acute process requiring hospitalization or inpatient management. They have remained hemodynamically stable throughout their entire ED visit and are stable for discharge with outpatient follow-up. The plan has been discussed in detail and they are aware of the specific conditions for emergent return, as well as the importance of follow-up. Discharge Medication List as of 12/19/2022  4:46 PM        START taking these medications    Details   carvedilol (COREG) 12.5 MG tablet Take 1 tablet by mouth 2 times daily (with meals), Disp-180 tablet, R-1Normal      sulfamethoxazole-trimethoprim (BACTRIM DS;SEPTRA DS) 800-160 MG per tablet Take 1 tablet by mouth 2 times daily for 7 days, Disp-14 tablet, R-0Print             Diagnosis:  1. Acute cystitis without hematuria    2. Other fatigue        Disposition:  Patient's disposition: Discharge to home  Patient's condition is stable. Patient was seen and evaluated by myself and my attending Slime Randle MD. Assessment and Plan discussed with attending provider, please see attestation for final plan of care.      [facility], [address] [place] [address],   Resident  12/19/22 6455       Slime Randle MD  12/20/22 9331

## 2022-12-20 ENCOUNTER — TELEPHONE (OUTPATIENT)
Dept: PHARMACY | Facility: CLINIC | Age: 77
End: 2022-12-20

## 2022-12-20 NOTE — TELEPHONE ENCOUNTER
[facility]: ADHERENCE REVIEW  Identified care gap per [place]: fills at [facility]: Statin adherence    Last Visit: 9/27/22    [id number] Records claims through 12.5.22 YTD [place] =  87%; Potential Fail Date: 12/26/22 ):   Simvastatin 40mg Next refill due: 8/25/22  Discontinued by: Claudette Grim, RN on 10/27/2022    Per Reconciled Dispense Report:  Atorvastatin 20mg filled on 6/13/22 for 30 day supply at [facility] and 11/1/22 #30 at [facility]    Mail order not contacted    Lab Results   Component Value Date    CHOL 146 11/02/2022    TRIG 129 11/02/2022    HDL 58 11/02/2022    LDLCHOLESTEROL 62 11/02/2022    LDLCALC 111 (H) 06/09/2022     ALT   Date Value Ref Range Status   12/19/2022 7 0 - 32 U/L Final     AST   Date Value Ref Range Status   12/19/2022 24 0 - 31 U/L Final     The 10-year ASCVD risk score (Magy DK, et al., 2019) is: 34.8%    Values used to calculate the score:      Age: 77 years      Sex: Female      Is Non- : No      Diabetic: No      Tobacco smoker: Yes      Systolic Blood Pressure: 662 mmHg      Is BP treated: Yes      HDL Cholesterol: 58 mg/dl      Total Cholesterol: 146 mg/dL     PLAN  The following are interventions that have been identified:   Simvastatin discontinued, atorvastatin 20mg past due to refill    Home phone disconnected. Other phone goes to [facility] where no patient or employee with that name is there        No future appointments. Minnie Campa CPhT.    [facility] free: [place] Only    Program: [address] in place:  No  Gap Closed?: No   Time Spent (min): 20

## 2022-12-22 LAB
ORGANISM: ABNORMAL
ORGANISM: ABNORMAL
URINE CULTURE, ROUTINE: ABNORMAL
URINE CULTURE, ROUTINE: ABNORMAL

## 2022-12-28 DIAGNOSIS — R31.9 URINARY TRACT INFECTION WITH HEMATURIA, SITE UNSPECIFIED: ICD-10-CM

## 2022-12-28 DIAGNOSIS — N39.0 URINARY TRACT INFECTION WITH HEMATURIA, SITE UNSPECIFIED: ICD-10-CM

## 2022-12-29 DIAGNOSIS — G47.00 INSOMNIA, UNSPECIFIED TYPE: ICD-10-CM

## 2022-12-29 RX ORDER — TRAZODONE HYDROCHLORIDE 50 MG/1
25 TABLET ORAL NIGHTLY PRN
Qty: 90 TABLET | Refills: 1 | Status: SHIPPED | OUTPATIENT
Start: 2022-12-29

## 2022-12-29 RX ORDER — CARVEDILOL 12.5 MG/1
12.5 TABLET ORAL 2 TIMES DAILY WITH MEALS
Qty: 180 TABLET | Refills: 1 | Status: SHIPPED | OUTPATIENT
Start: 2022-12-29

## 2022-12-29 RX ORDER — BACLOFEN 10 MG/1
TABLET ORAL
Qty: 60 TABLET | Refills: 0 | Status: SHIPPED | OUTPATIENT
Start: 2022-12-29

## 2023-01-01 ENCOUNTER — OUTSIDE SERVICES (OUTPATIENT)
Dept: PRIMARY CARE CLINIC | Age: 78
End: 2023-01-01
Payer: MEDICARE

## 2023-01-01 DIAGNOSIS — J44.9 CHRONIC OBSTRUCTIVE PULMONARY DISEASE, UNSPECIFIED COPD TYPE (HCC): ICD-10-CM

## 2023-01-01 DIAGNOSIS — A41.9 SEPSIS, DUE TO UNSPECIFIED ORGANISM, UNSPECIFIED WHETHER ACUTE ORGAN DYSFUNCTION PRESENT (HCC): ICD-10-CM

## 2023-01-01 DIAGNOSIS — N18.32 STAGE 3B CHRONIC KIDNEY DISEASE (HCC): ICD-10-CM

## 2023-01-01 DIAGNOSIS — G35 MS (MULTIPLE SCLEROSIS) (HCC): ICD-10-CM

## 2023-01-01 DIAGNOSIS — I10 PRIMARY HYPERTENSION: ICD-10-CM

## 2023-01-01 DIAGNOSIS — I50.32 CHRONIC HEART FAILURE WITH PRESERVED EJECTION FRACTION (HFPEF) (HCC): ICD-10-CM

## 2023-01-01 DIAGNOSIS — E78.2 MIXED HYPERLIPIDEMIA: ICD-10-CM

## 2023-01-01 DIAGNOSIS — J96.21 ACUTE AND CHRONIC RESPIRATORY FAILURE WITH HYPOXIA (HCC): Primary | ICD-10-CM

## 2023-01-01 PROCEDURE — 99497 ADVNCD CARE PLAN 30 MIN: CPT | Performed by: INTERNAL MEDICINE

## 2023-01-01 PROCEDURE — 99306 1ST NF CARE HIGH MDM 50: CPT | Performed by: INTERNAL MEDICINE

## 2023-01-17 ENCOUNTER — TELEPHONE (OUTPATIENT)
Dept: PRIMARY CARE CLINIC | Age: 78
End: 2023-01-17

## 2023-01-17 NOTE — TELEPHONE ENCOUNTER
Pt daughter called and states that patient is going \"downhill\" in regards to her health and mental state. States pt doesn't seem to care about anything, will lay down all day and take off her oxygen to smoke cigarettes. Pt had an appointment this morning at 10 am but had to cancel because pt refuses to leave the house. Daughter wanted to see about home health orders or a possible visiting nurse to check on pt and make sure she is taking her medications. Please advise.

## 2023-01-18 ENCOUNTER — TELEPHONE (OUTPATIENT)
Dept: PRIMARY CARE CLINIC | Age: 78
End: 2023-01-18

## 2023-01-18 ENCOUNTER — HOSPITAL ENCOUNTER (INPATIENT)
Age: 78
LOS: 4 days | Discharge: NURSING, REHAB OR LONG-TERM CARE | DRG: 058 | End: 2023-01-24
Attending: STUDENT IN AN ORGANIZED HEALTH CARE EDUCATION/TRAINING PROGRAM | Admitting: INTERNAL MEDICINE
Payer: MEDICARE

## 2023-01-18 ENCOUNTER — APPOINTMENT (OUTPATIENT)
Dept: CT IMAGING | Age: 78
DRG: 058 | End: 2023-01-18
Payer: MEDICARE

## 2023-01-18 ENCOUNTER — APPOINTMENT (OUTPATIENT)
Dept: GENERAL RADIOLOGY | Age: 78
DRG: 058 | End: 2023-01-18
Payer: MEDICARE

## 2023-01-18 DIAGNOSIS — G35 MULTIPLE SCLEROSIS (HCC): Primary | ICD-10-CM

## 2023-01-18 LAB
ALBUMIN SERPL-MCNC: 3.6 G/DL (ref 3.5–5.2)
ALP BLD-CCNC: 68 U/L (ref 35–104)
ALT SERPL-CCNC: 6 U/L (ref 0–32)
ANION GAP SERPL CALCULATED.3IONS-SCNC: 11 MMOL/L (ref 7–16)
AST SERPL-CCNC: 15 U/L (ref 0–31)
BACTERIA: NORMAL /HPF
BASOPHILS ABSOLUTE: 0.03 E9/L (ref 0–0.2)
BASOPHILS RELATIVE PERCENT: 0.4 % (ref 0–2)
BILIRUB SERPL-MCNC: 0.3 MG/DL (ref 0–1.2)
BILIRUBIN URINE: ABNORMAL
BLOOD, URINE: NEGATIVE
BUN BLDV-MCNC: 22 MG/DL (ref 6–23)
CALCIUM SERPL-MCNC: 10 MG/DL (ref 8.6–10.2)
CHLORIDE BLD-SCNC: 96 MMOL/L (ref 98–107)
CLARITY: CLEAR
CO2: 29 MMOL/L (ref 22–29)
COLOR: YELLOW
CREAT SERPL-MCNC: 1.5 MG/DL (ref 0.5–1)
EOSINOPHILS ABSOLUTE: 0.1 E9/L (ref 0.05–0.5)
EOSINOPHILS RELATIVE PERCENT: 1.2 % (ref 0–6)
EPITHELIAL CELLS, UA: NORMAL /HPF
GFR SERPL CREATININE-BSD FRML MDRD: 36 ML/MIN/1.73
GLUCOSE BLD-MCNC: 115 MG/DL (ref 74–99)
GLUCOSE URINE: NEGATIVE MG/DL
HCT VFR BLD CALC: 38.4 % (ref 34–48)
HEMOGLOBIN: 12 G/DL (ref 11.5–15.5)
IMMATURE GRANULOCYTES #: 0.03 E9/L
IMMATURE GRANULOCYTES %: 0.4 % (ref 0–5)
INFLUENZA A BY PCR: NOT DETECTED
INFLUENZA B BY PCR: NOT DETECTED
KETONES, URINE: ABNORMAL MG/DL
LACTIC ACID: 1.2 MMOL/L (ref 0.5–2.2)
LEUKOCYTE ESTERASE, URINE: NEGATIVE
LYMPHOCYTES ABSOLUTE: 2.08 E9/L (ref 1.5–4)
LYMPHOCYTES RELATIVE PERCENT: 25.8 % (ref 20–42)
MCH RBC QN AUTO: 27.5 PG (ref 26–35)
MCHC RBC AUTO-ENTMCNC: 31.3 % (ref 32–34.5)
MCV RBC AUTO: 88.1 FL (ref 80–99.9)
MONOCYTES ABSOLUTE: 0.69 E9/L (ref 0.1–0.95)
MONOCYTES RELATIVE PERCENT: 8.6 % (ref 2–12)
NEUTROPHILS ABSOLUTE: 5.14 E9/L (ref 1.8–7.3)
NEUTROPHILS RELATIVE PERCENT: 63.6 % (ref 43–80)
NITRITE, URINE: NEGATIVE
PDW BLD-RTO: 15.8 FL (ref 11.5–15)
PH UA: 5.5 (ref 5–9)
PLATELET # BLD: 340 E9/L (ref 130–450)
PMV BLD AUTO: 9.9 FL (ref 7–12)
POTASSIUM SERPL-SCNC: 3.2 MMOL/L (ref 3.5–5)
PROTEIN UA: ABNORMAL MG/DL
RBC # BLD: 4.36 E12/L (ref 3.5–5.5)
RBC UA: NORMAL /HPF (ref 0–2)
SARS-COV-2, NAAT: NOT DETECTED
SODIUM BLD-SCNC: 136 MMOL/L (ref 132–146)
SPECIFIC GRAVITY UA: 1.02 (ref 1–1.03)
TOTAL PROTEIN: 7 G/DL (ref 6.4–8.3)
TROPONIN, HIGH SENSITIVITY: 33 NG/L (ref 0–9)
UROBILINOGEN, URINE: 0.2 E.U./DL
WBC # BLD: 8.1 E9/L (ref 4.5–11.5)
WBC UA: NORMAL /HPF (ref 0–5)

## 2023-01-18 PROCEDURE — 84484 ASSAY OF TROPONIN QUANT: CPT

## 2023-01-18 PROCEDURE — 36415 COLL VENOUS BLD VENIPUNCTURE: CPT

## 2023-01-18 PROCEDURE — 6370000000 HC RX 637 (ALT 250 FOR IP)

## 2023-01-18 PROCEDURE — 93005 ELECTROCARDIOGRAM TRACING: CPT

## 2023-01-18 PROCEDURE — 87186 SC STD MICRODIL/AGAR DIL: CPT

## 2023-01-18 PROCEDURE — 70450 CT HEAD/BRAIN W/O DYE: CPT

## 2023-01-18 PROCEDURE — 85025 COMPLETE CBC W/AUTO DIFF WBC: CPT

## 2023-01-18 PROCEDURE — 87077 CULTURE AEROBIC IDENTIFY: CPT

## 2023-01-18 PROCEDURE — 87635 SARS-COV-2 COVID-19 AMP PRB: CPT

## 2023-01-18 PROCEDURE — 80053 COMPREHEN METABOLIC PANEL: CPT

## 2023-01-18 PROCEDURE — 71046 X-RAY EXAM CHEST 2 VIEWS: CPT

## 2023-01-18 PROCEDURE — 83605 ASSAY OF LACTIC ACID: CPT

## 2023-01-18 PROCEDURE — 96361 HYDRATE IV INFUSION ADD-ON: CPT

## 2023-01-18 PROCEDURE — 87150 DNA/RNA AMPLIFIED PROBE: CPT

## 2023-01-18 PROCEDURE — 87502 INFLUENZA DNA AMP PROBE: CPT

## 2023-01-18 PROCEDURE — 99285 EMERGENCY DEPT VISIT HI MDM: CPT

## 2023-01-18 PROCEDURE — 87040 BLOOD CULTURE FOR BACTERIA: CPT

## 2023-01-18 PROCEDURE — 81001 URINALYSIS AUTO W/SCOPE: CPT

## 2023-01-18 PROCEDURE — 2580000003 HC RX 258

## 2023-01-18 RX ORDER — POTASSIUM CHLORIDE 1.5 G/1.77G
20 POWDER, FOR SOLUTION ORAL ONCE
Status: DISCONTINUED | OUTPATIENT
Start: 2023-01-18 | End: 2023-01-18 | Stop reason: CLARIF

## 2023-01-18 RX ORDER — POTASSIUM CHLORIDE 20 MEQ/1
20 TABLET, EXTENDED RELEASE ORAL DAILY
Status: ON HOLD | COMMUNITY
Start: 2022-11-02 | End: 2023-01-24 | Stop reason: HOSPADM

## 2023-01-18 RX ORDER — MIRTAZAPINE 15 MG/1
15 TABLET, FILM COATED ORAL NIGHTLY
COMMUNITY
Start: 2022-12-07 | End: 2023-02-10 | Stop reason: SDUPTHER

## 2023-01-18 RX ADMIN — SODIUM CHLORIDE 500 ML: 9 INJECTION, SOLUTION INTRAVENOUS at 19:57

## 2023-01-18 RX ADMIN — POTASSIUM BICARBONATE 20 MEQ: 782 TABLET, EFFERVESCENT ORAL at 19:58

## 2023-01-18 ASSESSMENT — PAIN - FUNCTIONAL ASSESSMENT: PAIN_FUNCTIONAL_ASSESSMENT: NONE - DENIES PAIN

## 2023-01-18 NOTE — TELEPHONE ENCOUNTER
Patient's daughter called today to report that patient has continued to decline and it seems to be more of a failure to thrive situation. Patient has multiple medications that she is supposed to be taking and she is only taking the Carvedilol when she feels like it. Patient did inform daughter that she wants to go to the hospital. I reviewed medication list with daughter so that it is correct when she goes to ER. Meds listed are what patient should be taking.

## 2023-01-19 LAB
EKG ATRIAL RATE: 84 BPM
EKG P AXIS: 58 DEGREES
EKG P-R INTERVAL: 152 MS
EKG Q-T INTERVAL: 384 MS
EKG QRS DURATION: 72 MS
EKG QTC CALCULATION (BAZETT): 453 MS
EKG R AXIS: -15 DEGREES
EKG T AXIS: 31 DEGREES
EKG VENTRICULAR RATE: 84 BPM

## 2023-01-19 PROCEDURE — 96374 THER/PROPH/DIAG INJ IV PUSH: CPT

## 2023-01-19 PROCEDURE — 93010 ELECTROCARDIOGRAM REPORT: CPT | Performed by: INTERNAL MEDICINE

## 2023-01-19 PROCEDURE — 96375 TX/PRO/DX INJ NEW DRUG ADDON: CPT

## 2023-01-19 PROCEDURE — 6360000002 HC RX W HCPCS: Performed by: STUDENT IN AN ORGANIZED HEALTH CARE EDUCATION/TRAINING PROGRAM

## 2023-01-19 PROCEDURE — 6370000000 HC RX 637 (ALT 250 FOR IP): Performed by: EMERGENCY MEDICINE

## 2023-01-19 RX ORDER — METHYLPREDNISOLONE SODIUM SUCCINATE 125 MG/2ML
125 INJECTION, POWDER, LYOPHILIZED, FOR SOLUTION INTRAMUSCULAR; INTRAVENOUS ONCE
Status: COMPLETED | OUTPATIENT
Start: 2023-01-19 | End: 2023-01-19

## 2023-01-19 RX ORDER — CARVEDILOL 6.25 MG/1
12.5 TABLET ORAL 2 TIMES DAILY WITH MEALS
Status: DISCONTINUED | OUTPATIENT
Start: 2023-01-19 | End: 2023-01-24 | Stop reason: HOSPADM

## 2023-01-19 RX ORDER — FLUTICASONE FUROATE, UMECLIDINIUM BROMIDE AND VILANTEROL TRIFENATATE 200; 62.5; 25 UG/1; UG/1; UG/1
1 POWDER RESPIRATORY (INHALATION) DAILY
Status: ON HOLD | COMMUNITY
End: 2023-01-24 | Stop reason: HOSPADM

## 2023-01-19 RX ORDER — SERTRALINE HYDROCHLORIDE 100 MG/1
150 TABLET, FILM COATED ORAL NIGHTLY
Status: ON HOLD | COMMUNITY
End: 2023-01-24 | Stop reason: HOSPADM

## 2023-01-19 RX ORDER — TRAMADOL HYDROCHLORIDE 50 MG/1
50 TABLET ORAL EVERY 12 HOURS PRN
Status: ON HOLD | COMMUNITY
End: 2023-01-24 | Stop reason: HOSPADM

## 2023-01-19 RX ORDER — HYDROCHLOROTHIAZIDE 25 MG/1
25 TABLET ORAL DAILY
Status: ON HOLD | COMMUNITY
End: 2023-01-24 | Stop reason: HOSPADM

## 2023-01-19 RX ORDER — MELOXICAM 15 MG/1
15 TABLET ORAL DAILY
Status: ON HOLD | COMMUNITY
End: 2023-01-24 | Stop reason: HOSPADM

## 2023-01-19 RX ORDER — SIMVASTATIN 40 MG
40 TABLET ORAL NIGHTLY
Status: ON HOLD | COMMUNITY
End: 2023-01-24 | Stop reason: HOSPADM

## 2023-01-19 RX ORDER — ACETAMINOPHEN 325 MG/1
650 TABLET ORAL ONCE
Status: COMPLETED | OUTPATIENT
Start: 2023-01-19 | End: 2023-01-19

## 2023-01-19 RX ORDER — GABAPENTIN 400 MG/1
400 CAPSULE ORAL 3 TIMES DAILY
Status: ON HOLD | COMMUNITY
End: 2023-01-24 | Stop reason: HOSPADM

## 2023-01-19 RX ORDER — BACLOFEN 10 MG/1
10 TABLET ORAL 2 TIMES DAILY
COMMUNITY
End: 2023-02-22 | Stop reason: SDUPTHER

## 2023-01-19 RX ORDER — TRAZODONE HYDROCHLORIDE 50 MG/1
25 TABLET ORAL NIGHTLY
Status: ON HOLD | COMMUNITY
End: 2023-01-24 | Stop reason: HOSPADM

## 2023-01-19 RX ORDER — ALENDRONATE SODIUM 70 MG/1
70 TABLET ORAL
Status: ON HOLD | COMMUNITY
End: 2023-04-06 | Stop reason: HOSPADM

## 2023-01-19 RX ADMIN — ACETAMINOPHEN 650 MG: 325 TABLET ORAL at 22:11

## 2023-01-19 RX ADMIN — METHYLPREDNISOLONE SODIUM SUCCINATE 125 MG: 125 INJECTION, POWDER, FOR SOLUTION INTRAMUSCULAR; INTRAVENOUS at 02:16

## 2023-01-19 RX ADMIN — CARVEDILOL 12.5 MG: 6.25 TABLET, FILM COATED ORAL at 18:47

## 2023-01-19 ASSESSMENT — PAIN - FUNCTIONAL ASSESSMENT
PAIN_FUNCTIONAL_ASSESSMENT: NONE - DENIES PAIN
PAIN_FUNCTIONAL_ASSESSMENT: NONE - DENIES PAIN

## 2023-01-19 NOTE — ED NOTES
Medication List Obtained from [facility], Reviewed with Patient's Daughter Ju Min ([phone]) and Updated. Patient has not been taking her medications at home like she should be. Patient sleeps 23 out of 24 hrs and does not move from bed, which is causing her to become weak. All Medications with recent fill dates that she should be taking were left on her Med Rec.  Electronically signed by Blaise Richardson on 1/19/23 at 10:02 AM EST

## 2023-01-20 PROBLEM — R78.81 BACTEREMIA: Status: ACTIVE | Noted: 2023-01-01

## 2023-01-20 PROBLEM — R53.1 WEAKNESS: Status: ACTIVE | Noted: 2023-01-20

## 2023-01-20 LAB
ACINETOBACTER CALCOAC BAUMANNII COMPLEX BY PCR: NOT DETECTED
ANION GAP SERPL CALCULATED.3IONS-SCNC: 11 MMOL/L (ref 7–16)
BACTEROIDES FRAGILIS BY PCR: NOT DETECTED
BOTTLE TYPE: ABNORMAL
BUN BLDV-MCNC: 16 MG/DL (ref 6–23)
CALCIUM SERPL-MCNC: 9 MG/DL (ref 8.6–10.2)
CANDIDA ALBICANS BY PCR: NOT DETECTED
CANDIDA AURIS BY PCR: NOT DETECTED
CANDIDA GLABRATA BY PCR: NOT DETECTED
CANDIDA KRUSEI BY PCR: NOT DETECTED
CANDIDA PARAPSILOSIS BY PCR: NOT DETECTED
CANDIDA TROPICALIS BY PCR: NOT DETECTED
CHLORIDE BLD-SCNC: 96 MMOL/L (ref 98–107)
CO2: 28 MMOL/L (ref 22–29)
CREAT SERPL-MCNC: 1 MG/DL (ref 0.5–1)
CRYPTOCOCCUS NEOFORMANS/GATTII BY PCR: NOT DETECTED
ENTEROBACTER CLOACAE COMPLEX BY PCR: NOT DETECTED
ENTEROBACTERALES BY PCR: NOT DETECTED
ENTEROCOCCUS FAECALIS BY PCR: NOT DETECTED
ENTEROCOCCUS FAECIUM BY PCR: NOT DETECTED
ESCHERICHIA COLI BY PCR: NOT DETECTED
FOLATE: 4.6 NG/ML (ref 4.8–24.2)
GFR SERPL CREATININE-BSD FRML MDRD: 58 ML/MIN/1.73
GLUCOSE BLD-MCNC: 100 MG/DL (ref 74–99)
HAEMOPHILUS INFLUENZAE BY PCR: NOT DETECTED
HCT VFR BLD CALC: 36.5 % (ref 34–48)
HEMOGLOBIN: 11.7 G/DL (ref 11.5–15.5)
KLEBSIELLA AEROGENES BY PCR: NOT DETECTED
KLEBSIELLA OXYTOCA BY PCR: NOT DETECTED
KLEBSIELLA PNEUMONIAE GROUP BY PCR: NOT DETECTED
LISTERIA MONOCYTOGENES BY PCR: NOT DETECTED
MCH RBC QN AUTO: 28 PG (ref 26–35)
MCHC RBC AUTO-ENTMCNC: 32.1 % (ref 32–34.5)
MCV RBC AUTO: 87.3 FL (ref 80–99.9)
NEISSERIA MENINGITIDIS BY PCR: NOT DETECTED
ORDER NUMBER: ABNORMAL
PDW BLD-RTO: 15.6 FL (ref 11.5–15)
PLATELET # BLD: 361 E9/L (ref 130–450)
PMV BLD AUTO: 9.9 FL (ref 7–12)
POTASSIUM SERPL-SCNC: 3.3 MMOL/L (ref 3.5–5)
PROCALCITONIN: 0.04 NG/ML (ref 0–0.08)
PROTEUS SPECIES BY PCR: NOT DETECTED
PSEUDOMONAS AERUGINOSA BY PCR: NOT DETECTED
RBC # BLD: 4.18 E12/L (ref 3.5–5.5)
SALMONELLA SPECIES BY PCR: NOT DETECTED
SERRATIA MARCESCENS BY PCR: NOT DETECTED
SODIUM BLD-SCNC: 135 MMOL/L (ref 132–146)
SOURCE OF BLOOD CULTURE: ABNORMAL
STAPHYLOCOCCUS AUREUS BY PCR: NOT DETECTED
STAPHYLOCOCCUS EPIDERMIDIS BY PCR: NOT DETECTED
STAPHYLOCOCCUS LUGDUNENSIS BY PCR: NOT DETECTED
STAPHYLOCOCCUS SPECIES BY PCR: DETECTED
STENOTROPHOMONAS MALTOPHILIA BY PCR: NOT DETECTED
STREPTOCOCCUS AGALACTIAE BY PCR: NOT DETECTED
STREPTOCOCCUS PNEUMONIAE BY PCR: NOT DETECTED
STREPTOCOCCUS PYOGENES  BY PCR: NOT DETECTED
STREPTOCOCCUS SPECIES BY PCR: NOT DETECTED
TSH SERPL DL<=0.05 MIU/L-ACNC: 1.09 UIU/ML (ref 0.27–4.2)
VITAMIN B-12: 460 PG/ML (ref 211–946)
VITAMIN D 25-HYDROXY: 48 NG/ML (ref 30–100)
WBC # BLD: 11.3 E9/L (ref 4.5–11.5)

## 2023-01-20 PROCEDURE — 99222 1ST HOSP IP/OBS MODERATE 55: CPT | Performed by: NURSE PRACTITIONER

## 2023-01-20 PROCEDURE — 96376 TX/PRO/DX INJ SAME DRUG ADON: CPT

## 2023-01-20 PROCEDURE — 6360000002 HC RX W HCPCS: Performed by: EMERGENCY MEDICINE

## 2023-01-20 PROCEDURE — 80048 BASIC METABOLIC PNL TOTAL CA: CPT

## 2023-01-20 PROCEDURE — 6360000002 HC RX W HCPCS: Performed by: NURSE PRACTITIONER

## 2023-01-20 PROCEDURE — 2580000003 HC RX 258: Performed by: EMERGENCY MEDICINE

## 2023-01-20 PROCEDURE — 94664 DEMO&/EVAL PT USE INHALER: CPT

## 2023-01-20 PROCEDURE — 84145 PROCALCITONIN (PCT): CPT

## 2023-01-20 PROCEDURE — 87040 BLOOD CULTURE FOR BACTERIA: CPT

## 2023-01-20 PROCEDURE — 84443 ASSAY THYROID STIM HORMONE: CPT

## 2023-01-20 PROCEDURE — 82607 VITAMIN B-12: CPT

## 2023-01-20 PROCEDURE — 82306 VITAMIN D 25 HYDROXY: CPT

## 2023-01-20 PROCEDURE — 2060000000 HC ICU INTERMEDIATE R&B

## 2023-01-20 PROCEDURE — 2580000003 HC RX 258: Performed by: NURSE PRACTITIONER

## 2023-01-20 PROCEDURE — 85027 COMPLETE CBC AUTOMATED: CPT

## 2023-01-20 PROCEDURE — 6370000000 HC RX 637 (ALT 250 FOR IP): Performed by: NURSE PRACTITIONER

## 2023-01-20 PROCEDURE — 6370000000 HC RX 637 (ALT 250 FOR IP): Performed by: EMERGENCY MEDICINE

## 2023-01-20 PROCEDURE — 96375 TX/PRO/DX INJ NEW DRUG ADDON: CPT

## 2023-01-20 PROCEDURE — 94640 AIRWAY INHALATION TREATMENT: CPT

## 2023-01-20 PROCEDURE — 36415 COLL VENOUS BLD VENIPUNCTURE: CPT

## 2023-01-20 PROCEDURE — 82746 ASSAY OF FOLIC ACID SERUM: CPT

## 2023-01-20 RX ORDER — BACLOFEN 10 MG/1
10 TABLET ORAL 2 TIMES DAILY
Status: DISCONTINUED | OUTPATIENT
Start: 2023-01-20 | End: 2023-01-24 | Stop reason: HOSPADM

## 2023-01-20 RX ORDER — POLYETHYLENE GLYCOL 3350 17 G/17G
17 POWDER, FOR SOLUTION ORAL DAILY PRN
Status: DISCONTINUED | OUTPATIENT
Start: 2023-01-20 | End: 2023-01-24 | Stop reason: HOSPADM

## 2023-01-20 RX ORDER — DIPHENHYDRAMINE HYDROCHLORIDE 50 MG/ML
25 INJECTION INTRAMUSCULAR; INTRAVENOUS EVERY 6 HOURS PRN
Status: DISCONTINUED | OUTPATIENT
Start: 2023-01-20 | End: 2023-01-24 | Stop reason: HOSPADM

## 2023-01-20 RX ORDER — MIRTAZAPINE 15 MG/1
15 TABLET, FILM COATED ORAL NIGHTLY
Status: DISCONTINUED | OUTPATIENT
Start: 2023-01-20 | End: 2023-01-24 | Stop reason: HOSPADM

## 2023-01-20 RX ORDER — AMLODIPINE BESYLATE 5 MG/1
5 TABLET ORAL DAILY
Status: DISCONTINUED | OUTPATIENT
Start: 2023-01-20 | End: 2023-01-24 | Stop reason: HOSPADM

## 2023-01-20 RX ORDER — TRAZODONE HYDROCHLORIDE 50 MG/1
25 TABLET ORAL NIGHTLY
Status: DISCONTINUED | OUTPATIENT
Start: 2023-01-20 | End: 2023-01-20

## 2023-01-20 RX ORDER — ACETAMINOPHEN 650 MG/1
650 SUPPOSITORY RECTAL EVERY 6 HOURS PRN
Status: DISCONTINUED | OUTPATIENT
Start: 2023-01-20 | End: 2023-01-24 | Stop reason: HOSPADM

## 2023-01-20 RX ORDER — PANTOPRAZOLE SODIUM 40 MG/1
40 TABLET, DELAYED RELEASE ORAL
Status: DISCONTINUED | OUTPATIENT
Start: 2023-01-21 | End: 2023-01-24 | Stop reason: HOSPADM

## 2023-01-20 RX ORDER — BUDESONIDE 0.25 MG/2ML
0.25 INHALANT ORAL 2 TIMES DAILY
Status: DISCONTINUED | OUTPATIENT
Start: 2023-01-20 | End: 2023-01-22

## 2023-01-20 RX ORDER — ONDANSETRON 4 MG/1
4 TABLET, ORALLY DISINTEGRATING ORAL EVERY 8 HOURS PRN
Status: DISCONTINUED | OUTPATIENT
Start: 2023-01-20 | End: 2023-01-24 | Stop reason: HOSPADM

## 2023-01-20 RX ORDER — ONDANSETRON 2 MG/ML
4 INJECTION INTRAMUSCULAR; INTRAVENOUS EVERY 6 HOURS PRN
Status: DISCONTINUED | OUTPATIENT
Start: 2023-01-20 | End: 2023-01-24 | Stop reason: HOSPADM

## 2023-01-20 RX ORDER — ARFORMOTEROL TARTRATE 15 UG/2ML
15 SOLUTION RESPIRATORY (INHALATION) 2 TIMES DAILY
Status: DISCONTINUED | OUTPATIENT
Start: 2023-01-20 | End: 2023-01-22

## 2023-01-20 RX ORDER — SODIUM CHLORIDE 0.9 % (FLUSH) 0.9 %
5-40 SYRINGE (ML) INJECTION EVERY 12 HOURS SCHEDULED
Status: DISCONTINUED | OUTPATIENT
Start: 2023-01-20 | End: 2023-01-24 | Stop reason: HOSPADM

## 2023-01-20 RX ORDER — SODIUM CHLORIDE 9 MG/ML
INJECTION, SOLUTION INTRAVENOUS CONTINUOUS
Status: DISCONTINUED | OUTPATIENT
Start: 2023-01-20 | End: 2023-01-23

## 2023-01-20 RX ORDER — SUCRALFATE 1 G/1
1 TABLET ORAL
Status: DISCONTINUED | OUTPATIENT
Start: 2023-01-20 | End: 2023-01-24 | Stop reason: HOSPADM

## 2023-01-20 RX ORDER — SERTRALINE HYDROCHLORIDE 100 MG/1
100 TABLET, FILM COATED ORAL NIGHTLY
Status: DISCONTINUED | OUTPATIENT
Start: 2023-01-20 | End: 2023-01-24 | Stop reason: HOSPADM

## 2023-01-20 RX ORDER — ATORVASTATIN CALCIUM 20 MG/1
20 TABLET, FILM COATED ORAL NIGHTLY
Status: DISCONTINUED | OUTPATIENT
Start: 2023-01-20 | End: 2023-01-24 | Stop reason: HOSPADM

## 2023-01-20 RX ORDER — SODIUM CHLORIDE 9 MG/ML
INJECTION, SOLUTION INTRAVENOUS PRN
Status: DISCONTINUED | OUTPATIENT
Start: 2023-01-20 | End: 2023-01-24 | Stop reason: HOSPADM

## 2023-01-20 RX ORDER — GABAPENTIN 400 MG/1
400 CAPSULE ORAL 3 TIMES DAILY
Status: DISCONTINUED | OUTPATIENT
Start: 2023-01-20 | End: 2023-01-20

## 2023-01-20 RX ORDER — SODIUM CHLORIDE 0.9 % (FLUSH) 0.9 %
5-40 SYRINGE (ML) INJECTION PRN
Status: DISCONTINUED | OUTPATIENT
Start: 2023-01-20 | End: 2023-01-24 | Stop reason: HOSPADM

## 2023-01-20 RX ORDER — SIMVASTATIN 40 MG
40 TABLET ORAL NIGHTLY
Status: DISCONTINUED | OUTPATIENT
Start: 2023-01-20 | End: 2023-01-20 | Stop reason: CLARIF

## 2023-01-20 RX ORDER — ACETAMINOPHEN 325 MG/1
650 TABLET ORAL EVERY 6 HOURS PRN
Status: DISCONTINUED | OUTPATIENT
Start: 2023-01-20 | End: 2023-01-24 | Stop reason: HOSPADM

## 2023-01-20 RX ADMIN — ATORVASTATIN CALCIUM 20 MG: 20 TABLET, FILM COATED ORAL at 20:09

## 2023-01-20 RX ADMIN — CARVEDILOL 12.5 MG: 6.25 TABLET, FILM COATED ORAL at 17:12

## 2023-01-20 RX ADMIN — DIPHENHYDRAMINE HYDROCHLORIDE 25 MG: 50 INJECTION, SOLUTION INTRAMUSCULAR; INTRAVENOUS at 02:13

## 2023-01-20 RX ADMIN — BACLOFEN 10 MG: 10 TABLET ORAL at 20:09

## 2023-01-20 RX ADMIN — DIPHENHYDRAMINE HYDROCHLORIDE 25 MG: 50 INJECTION, SOLUTION INTRAMUSCULAR; INTRAVENOUS at 21:44

## 2023-01-20 RX ADMIN — AMLODIPINE BESYLATE 5 MG: 5 TABLET ORAL at 13:55

## 2023-01-20 RX ADMIN — MIRTAZAPINE 15 MG: 15 TABLET, FILM COATED ORAL at 20:09

## 2023-01-20 RX ADMIN — SODIUM CHLORIDE, PRESERVATIVE FREE 10 ML: 5 INJECTION INTRAVENOUS at 20:09

## 2023-01-20 RX ADMIN — ARFORMOTEROL TARTRATE 15 MCG: 15 SOLUTION RESPIRATORY (INHALATION) at 20:00

## 2023-01-20 RX ADMIN — ACETAMINOPHEN 650 MG: 325 TABLET ORAL at 20:58

## 2023-01-20 RX ADMIN — SODIUM CHLORIDE: 9 INJECTION, SOLUTION INTRAVENOUS at 13:56

## 2023-01-20 RX ADMIN — IPRATROPIUM BROMIDE 0.5 MG: 0.5 SOLUTION RESPIRATORY (INHALATION) at 20:00

## 2023-01-20 RX ADMIN — SERTRALINE 100 MG: 100 TABLET, FILM COATED ORAL at 20:09

## 2023-01-20 RX ADMIN — SUCRALFATE 1 G: 1 TABLET ORAL at 17:13

## 2023-01-20 RX ADMIN — BACLOFEN 10 MG: 10 TABLET ORAL at 13:55

## 2023-01-20 RX ADMIN — BUDESONIDE 250 MCG: 0.25 SUSPENSION RESPIRATORY (INHALATION) at 20:00

## 2023-01-20 RX ADMIN — WATER 1000 MG: 1 INJECTION INTRAMUSCULAR; INTRAVENOUS; SUBCUTANEOUS at 11:05

## 2023-01-20 RX ADMIN — IPRATROPIUM BROMIDE 0.5 MG: 0.5 SOLUTION RESPIRATORY (INHALATION) at 16:11

## 2023-01-20 RX ADMIN — SUCRALFATE 1 G: 1 TABLET ORAL at 20:09

## 2023-01-20 ASSESSMENT — PAIN DESCRIPTION - LOCATION: LOCATION: HEAD

## 2023-01-20 ASSESSMENT — PAIN SCALES - GENERAL: PAINLEVEL_OUTOF10: 4

## 2023-01-20 NOTE — ED NOTES
Pt was concerned that she was not going to able to walk sitting in bed all day. Pt stands on the side of the bed with little assistance from this RN.       Fanny Miller RN  01/20/23 2168

## 2023-01-20 NOTE — ED NOTES
ED to Inpatient Handoff Report    Notified [facility] that electronic handoff available and patient ready for transport to room 646. Safety Risks: Difficulty with daily activities and Risk of falls    Patient in Restraints: no    Constant Observer or Patient : no    Telemetry Monitoring Ordered :Yes           Order to transfer to unit without monitor:YES    Last MEWS: 1 Time completed: 12:22    Vitals:    01/20/23 0011 01/20/23 0216 01/20/23 1107 01/20/23 1222   BP: (!) 150/92 (!) 157/67 (!) 169/91 (!) 172/92   Pulse: (!) 102 95 89 88   Resp: 18 18 16 16   Temp: 98 °F (36.7 °C) 98.2 °F (36.8 °C)  98.2 °F (36.8 °C)   TempSrc: Oral Oral  Oral   SpO2: 94% 94% 95% 94%   Weight:       Height:           Opportunity for questions and clarification was provided.        Yvonne Patel RN  01/20/23 8507

## 2023-01-20 NOTE — PROGRESS NOTES
Database initiated. Patient is A&O from home with . States she uses a rollerator and wheelchair. She wears 3 liters oxygen at night only.

## 2023-01-20 NOTE — H&P
[facility] History and Physical      CHIEF COMPLAINT:  \" I came to ER to get 3 day stay to go to rehab,\" + blood cultures called from ER- pt was awaiting bed at [facility] for Neurology- decision to admit here. History of Present Illness:    Patient is a 68 yo female patient who follows with PCP Dr. Corbin Field with a past medical history of arthritis, CKD, COPD, HLD, HTN, And multiple sclerosis- diagnosed at [facility] 2010. Patient presented to the ER yesterday with concerns of increased fatigue and weakness. Reporting that has been ongoing x 1 month. Worse over the last few days. Reporting her legs have been \" giving out. \" And she did fall at home- denied injury. Reporting she came to ER in hopes that she could get 3 day hospitalization so that she could go to rehab to get stronger. 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 Symptoms moderate, ongoing and progressive. She did have hospitalization in November for GI bleed and went to rehab for 1 week after and did ok. Reporting she has not followed with neurology in quite some times. She was diagnosed with questionable MS in 2010-. She is on baclofen prescribed by PCP for muscle spasms. Denies confusion, fogginess. She does have a history of horizontal/ vertical nystagmus- but reporting this is normal due to MS. Patient awake and alert- resting in ER in no acute distress. Reporting she does not feel ill. Denies fevers, chills, urinary symptoms, diarrhea, URI symptoms, cough, sob, chest pain. Denies wounds/ rashes. Treated for UTI early December. Reporting history of knee/ hip surgery denies painful joints/ swelling. Biggest complaint currently is that she has missed several doses of her medications.         Informant(s) for H&P:Patient/ chart review/      REVIEW OF SYSTEMS:  A comprehensive review of systems was negative except for: what is in the HPI    PMH:  Past Medical History:   Diagnosis Date    Arthritis     benign breast     CKD (chronic kidney disease)     COPD (chronic obstructive pulmonary disease) (HCC)     Hyperlipidemia     Hypertension     MS (multiple sclerosis) ([facility] [address] 05/31/2012    Multiple sclerosis ([facility] [address]     diagnosised 8  yrs ago [facility]       Surgical History:  Past Surgical History:   Procedure Laterality Date    APPENDECTOMY      BREAST SURGERY  4/13/2012    biopsy - negative    HIP ARTHROPLASTY Right 04/13/2011    Right AIDEE  ALLISON Carlin MD    HYSTERECTOMY (CERVIX STATUS UNKNOWN)      KNEE ARTHROPLASTY Left 10/01/2013    Left TKA  ALLISON Carlin MD    KNEE ARTHROPLASTY Right 08/29/2012    Right TKA  ALLISON Carlin MD    TOTAL HIP ARTHROPLASTY Left 3/25/2022    LEFT HIP TOTAL ARTHROPLASTY performed by Luis Koch MD at [address] N/A 11/11/2022    EGD CONTROL HEMORRHAGE performed by Alfonso Ko MD at [address]       Medications Prior to Admission:    Prior to Admission medications    Medication Sig Start Date End Date Taking? Authorizing Provider   baclofen (LIORESAL) 10 MG tablet Take 10 mg by mouth 2 times daily   Yes Historical Provider, MD   sertraline (ZOLOFT) 100 MG tablet Take 150 mg by mouth nightly   Yes Historical Provider, MD   traZODone (DESYREL) 50 MG tablet Take 25 mg by mouth nightly   Yes Historical Provider, MD   hydroCHLOROthiazide (HYDRODIURIL) 25 MG tablet Take 25 mg by mouth daily   Yes Historical Provider, MD   traMADol (ULTRAM) 50 MG tablet Take 50 mg by mouth every 12 hours as needed for Pain. Yes Historical Provider, MD   gabapentin (NEURONTIN) 400 MG capsule Take 400 mg by mouth 3 times daily.    Yes Historical Provider, MD   meloxicam (MOBIC) 15 MG tablet Take 15 mg by mouth daily   Yes Historical Provider, MD   alendronate (FOSAMAX) 70 MG tablet Take 70 mg by mouth every 7 days   Yes Historical Provider, MD   fluticasone-umeclidin-vilant (TRELEGY ELLIPTA) 200-62.5-25 MCG/ACT AEPB inhaler Inhale 1 puff into the lungs daily   Yes Historical Provider, MD simvastatin (ZOCOR) 40 MG tablet Take 40 mg by mouth nightly   Yes Historical Provider, MD   mirtazapine (REMERON) 15 MG tablet Take 15 mg by mouth nightly 12/7/22   Historical Provider, MD   potassium chloride (KLOR-CON M) 20 MEQ extended release tablet Take 20 mEq by mouth daily 11/2/22   Historical Provider, MD   carvedilol (COREG) 12.5 MG tablet Take 1 tablet by mouth 2 times daily (with meals) 12/29/22   Sunny Hernandez DO   sucralfate (CARAFATE) 1 GM tablet Take 1 tablet by mouth 4 times daily 12/15/22   Marylene Sickles Rudnicki, DO   pantoprazole (PROTONIX) 40 MG tablet Take 1 tablet by mouth every morning (before breakfast) 12/15/22   Sunny Hernandez,    memantine (NAMENDA) 5 MG tablet Take 1 tablet by mouth 2 times daily  Patient not taking: No sig reported 7/6/22 8/26/22  Marylene Sickles Rudnicki, DO   amLODIPine (NORVASC) 5 MG tablet Take 1 tablet by mouth daily 5/3/22   Dana Alcantara PA-C       Allergies:    Macrodantin [nitrofurantoin macrocrystal]    Social History:    reports that she has been smoking cigarettes. She has a 50.00 pack-year smoking history. She has never used smokeless tobacco. She reports that she does not drink alcohol and does not use drugs. Family History:   family history includes Cirrhosis in her mother; Mult Sclerosis in her father; Thyroid Cancer in her mother. PHYSICAL EXAM:  Vitals:  BP (!) 172/92   Pulse 88   Temp 98.2 °F (36.8 °C) (Oral)   Resp 16   Ht 5' (1.524 m)   Wt 140 lb (63.5 kg)   SpO2 94%   BMI 27.34 kg/m²     General Appearance: alert and oriented to person, place and time   Skin: warm and dry  Head: normocephalic and atraumatic  Neck: neck supple and non tender without mass   Pulmonary/Chest: diminished throughout to auscultation   Cardiovascular: normal rate, normal S1 and S2 and no carotid bruits  Abdomen: soft, non-tender, non-distended, normal bowel sounds  Extremities: no cyanosis, no clubbing and no edema strength good in all ext. 4-5/5  Neurologic: speech normal         LABS:  Recent Labs     01/18/23  1823      K 3.2*   CL 96*   CO2 29   BUN 22   CREATININE 1.5*   GLUCOSE 115*   CALCIUM 10.0       Recent Labs     01/18/23  1823   WBC 8.1   RBC 4.36   HGB 12.0   HCT 38.4   MCV 88.1   MCH 27.5   MCHC 31.3*   RDW 15.8*      MPV 9.9       No results for input(s): POCGLU in the last 72 hours. Radiology:   XR CHEST (2 VW)   Final Result   Bronchitis with the atelectasis in the right perihilar region, right lung   base and left upper lobe. Surveillance recommended. CT Head W/O Contrast   Final Result   No acute intracranial abnormality. ASSESSMENT:      Principal Problem:    Bacteremia  Resolved Problems:    * No resolved hospital problems. *      PLAN:    1. Concern for bacteremia/ Gram positive cocci 4/4 bottles: pt originally presented to the ER for concern for fatigue/ weakness x 1month. Reporting main reason she came to ER was hopeful placement at rehab. Denies feeling ill/ She has a history of Multiple Sclerosis- has not followed with neurology in quite some time. She was diagnosed in 2010 at [facility]. She is on baclofen. Initial concern in ER was for possible flare- and plan was to transfer patient to [facility]- However blood cultures 4/4 resulted positive and plan was for admission here to work up. Pt denies feeling ill. No fevers or leukocytosis. No evidence of current infection. She was treated for UTI 1 month ago. Will consult ID and repeat blood cultures. 2. Weakness/ fatigue/ failure to thrive: PT/ OT-    3. H/o MS: initial concern was for possible flare and plan was to admit pt to neurology service. However- blood cultures then resulted positive and plan was to admit patient here at [facility]. Will obtain MRI brain to further evaluate.     4. Recent hemorrhagic shock due to duodenal ulcer s/p EGD on 11/11 with duodenal ulcer with adherent clot and visibile vessel/ S/p epi and clip    5. CKD: creatinine 1.5 on admission- gentle hydration     6. COPD; stable-     7. H/o breast cancer    8. Chronic respiratory failure- pt wears 3L at home prn    9. Recent UTI: seen in ER 12/19-  e coli/ enterobacter- treated with bactrim            Code Status: FULL  DVT prophylaxis: PCDS      NOTE: This report was transcribed using voice recognition software. Every effort was made to ensure accuracy; however, inadvertent computerized transcription errors may be present.   Electronically signed by CLARKE Tim on 1/20/2023 at 12:23 PM

## 2023-01-20 NOTE — PROGRESS NOTES
This patient's CrCl is 27 mL/min. Please consider decreasing the gabapentin dose to 200 mg three times daily.   Flor Guerrero RPh 1/20/2023 1:28 PM

## 2023-01-20 NOTE — ED NOTES
Received call from microbiology informed this RN 4/4 positive blood cultures gram positive cocci in clusters. Dr. Cliff Gonzales Notified no orders given to this RN.      Adelaida Hammer RN  01/20/23 1038

## 2023-01-20 NOTE — ED PROVIDER NOTES
[address] ENCOUNTER        Pt Name: Sonia Ruiz  MRN: [id number]  [place] 1945  Date of evaluation: 1/18/2023  Provider: Peter Greenberg DO  PCP: Elva Pope DO  Note Started: 1:14 AM EST 1/20/23    CHIEF COMPLAINT       Chief Complaint   Patient presents with    Fatigue     Pt has hx of ms and has increased fatigue and weakness       HISTORY OF PRESENT ILLNESS: 1 or more Elements   History From: Patient        Sonia Ruiz is a 69 y.o. female who presents for 1 month of weakness. Patient states that over the past month she has been global weakness. Patient has a relevant past medical history of multiple sclerosis for which she is not being treated for. Patient does take baclofen for spasms associated with multiple sclerosis. Patient has no focal deficits at time of examination. Patient is alert and oriented x3. She has no pronator drift. She does have horizontal and vertical nystagmus but she states this is normal due to her multiple sclerosis. Patient has sensation in all 4 extremities. Patient is also had having the symptoms for approximately 4 weeks. Nursing Notes were all reviewed and agreed with or any disagreements were addressed in the HPI. REVIEW OF SYSTEMS :      Positives and Pertinent negatives as per HPI. SURGICAL HISTORY     Past Surgical History:   Procedure Laterality Date    APPENDECTOMY      BREAST SURGERY  4/13/2012    biopsy - negative    HIP ARTHROPLASTY Right 04/13/2011    Right AIDEE  ALLISON Holly MD    HYSTERECTOMY (CERVIX STATUS UNKNOWN)      KNEE ARTHROPLASTY Left 10/01/2013    Left TKA  ALLISON Holly MD    KNEE ARTHROPLASTY Right 08/29/2012    Right TKA  ALLISON Holly MD    TOTAL HIP ARTHROPLASTY Left 3/25/2022    LEFT HIP TOTAL ARTHROPLASTY performed by Sonya Whitaker MD at [address] [facility] N/A 11/11/2022    EGD CONTROL HEMORRHAGE performed by Luis Miguel Gamble MD at [address]       Current Discharge Medication List        CONTINUE these medications which have NOT CHANGED    Details   baclofen (LIORESAL) 10 MG tablet Take 10 mg by mouth 2 times daily      sertraline (ZOLOFT) 100 MG tablet Take 150 mg by mouth nightly      traZODone (DESYREL) 50 MG tablet Take 25 mg by mouth nightly      hydroCHLOROthiazide (HYDRODIURIL) 25 MG tablet Take 25 mg by mouth daily      traMADol (ULTRAM) 50 MG tablet Take 50 mg by mouth every 12 hours as needed for Pain.      gabapentin (NEURONTIN) 400 MG capsule Take 400 mg by mouth 3 times daily.       meloxicam (MOBIC) 15 MG tablet Take 15 mg by mouth daily      alendronate (FOSAMAX) 70 MG tablet Take 70 mg by mouth every 7 days      fluticasone-umeclidin-vilant (TRELEGY ELLIPTA) 200-62.5-25 MCG/ACT AEPB inhaler Inhale 1 puff into the lungs daily      simvastatin (ZOCOR) 40 MG tablet Take 40 mg by mouth nightly      mirtazapine (REMERON) 15 MG tablet Take 15 mg by mouth nightly      potassium chloride (KLOR-CON M) 20 MEQ extended release tablet Take 20 mEq by mouth daily      carvedilol (COREG) 12.5 MG tablet Take 1 tablet by mouth 2 times daily (with meals)  Qty: 180 tablet, Refills: 1      sucralfate (CARAFATE) 1 GM tablet Take 1 tablet by mouth 4 times daily  Qty: 120 tablet, Refills: 3      pantoprazole (PROTONIX) 40 MG tablet Take 1 tablet by mouth every morning (before breakfast)  Qty: 30 tablet, Refills: 0      amLODIPine (NORVASC) 5 MG tablet Take 1 tablet by mouth daily  Qty: 90 tablet, Refills: 1    Associated Diagnoses: Hypertension, essential, benign             ALLERGIES     Macrodantin [nitrofurantoin macrocrystal]    FAMILYHISTORY       Family History   Problem Relation Age of Onset    Mult Sclerosis Father     Thyroid Cancer Mother     Cirrhosis Mother         SOCIAL HISTORY       Social History     Tobacco Use    Smoking status: Every Day     Packs/day: 1.00     Years: 50.00     Pack years: 50.00     Types: Cigarettes Smokeless tobacco: Never   Vaping Use    Vaping Use: Never used   Substance Use Topics    Alcohol use: No    Drug use: No       SCREENINGS        Charity Coma Scale  Eye Opening: Spontaneous  Best Verbal Response: Oriented  Best Motor Response: Obeys commands  Lena Coma Scale Score: 15                CIWA Assessment  BP: (!) 109/59  Heart Rate: 89           PHYSICAL EXAM  1 or more Elements     ED Triage Vitals   BP Temp Temp Source Heart Rate Resp SpO2 Height Weight   01/18/23 1733 01/18/23 1733 01/20/23 0011 01/18/23 1733 01/18/23 1733 01/18/23 1733 01/18/23 1733 01/18/23 1733   (!) 156/86 98.6 °F (37 °C) Oral 88 18 93 % 5' 2\" (1.575 m) 140 lb (63.5 kg)         Constitutional/General: Alert and oriented x3  Head: Normocephalic and atraumatic  Eyes: PERRL, EOMI, conjunctiva normal, sclera non icteric  ENT:  Oropharynx clear, handling secretions, no trismus, no asymmetry of the posterior oropharynx or uvular edema  Neck: Supple, full ROM, no stridor, no meningeal signs  Respiratory: Lungs clear to auscultation bilaterally, no wheezes, rales, or rhonchi. Not in respiratory distress  Cardiovascular:  Regular rate. Regular rhythm. No murmurs, no gallops, no rubs. 2+ distal pulses. Equal extremity pulses. Chest: No chest wall tenderness  GI:  Abdomen Soft, Non tender, Non distended. +BS. No rebound, guarding, or rigidity. No pulsatile masses. Musculoskeletal: Moves all extremities x 4. Warm and well perfused, no clubbing, no cyanosis, no edema. Capillary refill <3 seconds  Integument: skin warm and dry. No rashes.    Neurologic: GCS 15, horizontal vertical nystagmus, patient responds appropriately  Psychiatric: Normal Affect      DIAGNOSTIC RESULTS   LABS:    Labs Reviewed   CULTURE, BLOOD 1 - Abnormal; Notable for the following components:       Result Value    Blood Culture, Routine   (*)     Value: Gram stain performed from blood culture bottle media  Gram positive cocci in clusters      Organism Staphylococcus species (*)     Organism Staphylococcus coagulase-negative (*)     All other components within normal limits    Narrative:     Janis Byrnes tel. [phone],  Microbiology results called to and read back by Ellis Hoang RN,  01/20/2023 10:21, by MEG   CULTURE, BLOOD 2 - Abnormal; Notable for the following components:    Culture, Blood 2   (*)     Value: Gram stain performed from blood culture bottle media  Gram positive cocci in clusters  Previously positive blood culture called      Organism Staphylococcus species (*)     Organism Staphylococcus species (*)     All other components within normal limits    Narrative:     CALL  Anaya  HERB tel. [phone],  Previous panic on this admission - call not needed per SOP, 01/20/2023 09:31,  by MEG   BLOOD ID PANEL, MOLECULAR - Abnormal; Notable for the following components:    Staphylococcus species by PCR DETECTED (*)     All other components within normal limits    Narrative:     Janis Byrnes tel. [phone],  Microbiology results called to and read back by Ellis Hoang RN,  01/20/2023 10:21, by Sima Reynoso tel. [phone],  Previous panic on this admission - call not needed per SOP, 01/20/2023 10:40,  by MEG   CBC WITH AUTO DIFFERENTIAL - Abnormal; Notable for the following components:    MCHC 31.3 (*)     RDW 15.8 (*)     All other components within normal limits   COMPREHENSIVE METABOLIC PANEL - Abnormal; Notable for the following components:    Potassium 3.2 (*)     Chloride 96 (*)     Glucose 115 (*)     Creatinine 1.5 (*)     All other components within normal limits   URINALYSIS - Abnormal; Notable for the following components:    Bilirubin Urine SMALL (*)     Ketones, Urine TRACE (*)     All other components within normal limits   TROPONIN - Abnormal; Notable for the following components:    Troponin, High Sensitivity 33 (*)     All other components within normal limits   BASIC METABOLIC PANEL - Abnormal; Notable for the following components:    Potassium 3.3 (*)     Chloride 96 (*)     Glucose 100 (*)     All other components within normal limits   CBC - Abnormal; Notable for the following components:    RDW 15.6 (*)     All other components within normal limits   VITAMIN B12 & FOLATE - Abnormal; Notable for the following components:    Folate 4.6 (*)     All other components within normal limits   BASIC METABOLIC PANEL W/ REFLEX TO MG FOR LOW K - Abnormal; Notable for the following components:    Potassium reflex Magnesium 3.3 (*)     CO2 30 (*)     All other components within normal limits   CBC WITH AUTO DIFFERENTIAL - Abnormal; Notable for the following components:    RDW 15.6 (*)     All other components within normal limits   MAGNESIUM - Abnormal; Notable for the following components:    Magnesium 1.4 (*)     All other components within normal limits   BASIC METABOLIC PANEL W/ REFLEX TO MG FOR LOW K - Abnormal; Notable for the following components:    Anion Gap 6 (*)     Creatinine 1.1 (*)     Calcium 8.4 (*)     All other components within normal limits   BASIC METABOLIC PANEL - Abnormal; Notable for the following components:    Creatinine 1.2 (*)     Calcium 8.3 (*)     All other components within normal limits   CBC - Abnormal; Notable for the following components:    Hemoglobin 10.4 (*)     MCHC 30.6 (*)     RDW 15.7 (*)     All other components within normal limits   URINALYSIS WITH MICROSCOPIC - Abnormal; Notable for the following components:    Bacteria, UA RARE (*)     All other components within normal limits   COVID-19, RAPID   RAPID INFLUENZA A/B ANTIGENS   CULTURE, BLOOD 1    Narrative:     Source: BLOOD       Site:              CULTURE, BLOOD 2    Narrative:     Source: BLOOD       Site:              CULTURE, URINE   LACTIC ACID   MICROSCOPIC URINALYSIS   PROCALCITONIN   TSH   VITAMIN D 25 HYDROXY   URINE DRUG SCREEN   BASIC METABOLIC PANEL   CBC       As interpreted by me, the above displayed labs are abnormal. All other labs obtained during this visit were within normal range or not returned as of this dictation. EKG Interpretation  Interpreted by emergency department resident physician, Johan Wright, DO  Rate: 84  Rhythm: Sinus  Interpretation: Normal sinus rhythm  Comparison: Stable compared to EKG 12/19/2022      RADIOLOGY:   Non-plain film images such as CT, Ultrasound and MRI are read by the radiologist. Plain radiographic images are visualized and preliminarily interpreted by the ED Provider with the below findings:    Trachea is midline, no pneumothorax    Interpretation per the Radiologist below, if available at the time of this note:    MRI BRAIN WO CONTRAST   Final Result   Moderate hyperintense T2 FLAIR signal abnormality within the periventricular   white matter of both cerebral hemispheres. These changes may represent   combination of demyelination in a patient with possible history of multiple   sclerosis as well as associated chronic microvascular disease. No acute intracranial abnormality is detected. XR CHEST (2 VW)   Final Result   Bronchitis with the atelectasis in the right perihilar region, right lung   base and left upper lobe. Surveillance recommended. CT Head W/O Contrast   Final Result   No acute intracranial abnormality. XR CHEST (2 VW)    Result Date: 1/18/2023  EXAMINATION: TWO XRAY VIEWS OF THE CHEST 1/18/2023 6:10 pm COMPARISON: December 19, 2022 HISTORY: ORDERING SYSTEM PROVIDED HISTORY: weakness TECHNOLOGIST PROVIDED HISTORY: Reason for exam:->weakness FINDINGS: There is borderline cardiac size. Linear perihilar densities are noted extending to right perihilar region, right base and left upper lobe concerning for atelectasis. Previous vertebroplasties are identified. The remainder lungs are clear. Bronchitis with the atelectasis in the right perihilar region, right lung base and left upper lobe. Surveillance recommended.      CT Head W/O Contrast    Result Date: 1/18/2023  EXAMINATION: CT OF THE HEAD WITHOUT CONTRAST  1/18/2023 5:55 pm TECHNIQUE: CT of the head was performed without the administration of intravenous contrast. Automated exposure control, iterative reconstruction, and/or weight based adjustment of the mA/kV was utilized to reduce the radiation dose to as low as reasonably achievable. COMPARISON: None. HISTORY: ORDERING SYSTEM PROVIDED HISTORY: fall, generalized weakness for 2 weeks TECHNOLOGIST PROVIDED HISTORY: Reason for exam:->fall, generalized weakness for 2 weeks Has a \"code stroke\" or \"stroke alert\" been called? ->No Decision Support Exception - unselect if not a suspected or confirmed emergency medical condition->Emergency Medical Condition (MA) FINDINGS: BRAIN/VENTRICLES: There is no acute intracranial hemorrhage, mass effect or midline shift. No abnormal extra-axial fluid collection. The gray-white differentiation is maintained without evidence of an acute infarct. There is no evidence of hydrocephalus. Periventricular white matter changes consistent chronic microvascular disease. ORBITS: The visualized portion of the orbits demonstrate no acute abnormality. SINUSES: The visualized paranasal sinuses and mastoid air cells demonstrate no acute abnormality. SOFT TISSUES/SKULL:  No acute abnormality of the visualized skull or soft tissues. No acute intracranial abnormality. No results found. PROCEDURES   Unless otherwise noted below, none    PAST MEDICAL HISTORY/Chronic Conditions Affecting Care      has a past medical history of Arthritis, benign breast, CKD (chronic kidney disease), COPD (chronic obstructive pulmonary disease) ([facility] [address] Hyperlipidemia, Hypertension, MS (multiple sclerosis) ([facility] [address] (05/31/2012), and Multiple sclerosis ([facility]      EMERGENCY DEPARTMENT COURSE    Vitals:    Vitals:    01/22/23 0811 01/22/23 0928 01/22/23 1558 01/22/23 2000   BP: (!) 167/90  115/63 (!) 109/59   Pulse: 89 93 87 89   Resp: 18 18 19 20   Temp: 98.1 °F (36.7 °C)  98.3 °F (36.8 °C) 98.1 °F (36.7 °C)   TempSrc: Oral  Oral Oral   SpO2: 94% 95%     Weight:       Height:           Patient was given the following medications:  Medications   carvedilol (COREG) tablet 12.5 mg (12.5 mg Oral Given 1/22/23 1642)   diphenhydrAMINE (BENADRYL) injection 25 mg (25 mg IntraVENous Given 1/20/23 2144)   amLODIPine (NORVASC) tablet 5 mg (5 mg Oral Given 1/22/23 0911)   baclofen (LIORESAL) tablet 10 mg (10 mg Oral Given 1/22/23 0911)   mirtazapine (REMERON) tablet 15 mg (15 mg Oral Given 1/21/23 2020)   pantoprazole (PROTONIX) tablet 40 mg (40 mg Oral Given 1/22/23 0523)   sucralfate (CARAFATE) tablet 1 g (1 g Oral Given 1/22/23 1642)   sodium chloride flush 0.9 % injection 5-40 mL (10 mLs IntraVENous Given 1/22/23 0912)   sodium chloride flush 0.9 % injection 5-40 mL (has no administration in time range)   0.9 % sodium chloride infusion (has no administration in time range)   ondansetron (ZOFRAN-ODT) disintegrating tablet 4 mg (has no administration in time range)     Or   ondansetron (ZOFRAN) injection 4 mg (has no administration in time range)   polyethylene glycol (GLYCOLAX) packet 17 g (has no administration in time range)   acetaminophen (TYLENOL) tablet 650 mg (650 mg Oral Given 1/20/23 2058)     Or   acetaminophen (TYLENOL) suppository 650 mg ( Rectal See Alternative 1/20/23 2058)   sertraline (ZOLOFT) tablet 100 mg (100 mg Oral Given 1/21/23 2019)   atorvastatin (LIPITOR) tablet 20 mg (20 mg Oral Given 1/21/23 2020)   0.9 % sodium chloride infusion ( IntraVENous New Bag 1/21/23 0946)   folic acid (FOLVITE) tablet 1 mg (1 mg Oral Given 1/22/23 0911)   0.9 % NaCl bolus (0 mLs IntraVENous Stopped 1/18/23 2113)   potassium bicarb-citric acid (EFFER-K) effervescent tablet 20 mEq (20 mEq Oral Given 1/18/23 1958)   methylPREDNISolone sodium (SOLU-MEDROL) injection 125 mg (125 mg IntraVENous Given 1/19/23 0216)   acetaminophen (TYLENOL) tablet 650  mg (650 mg Oral Given 1/19/23 2211)   ceFAZolin (ANCEF) 1,000 mg in sterile water 10 mL IV syringe (1,000 mg IntraVENous Given 1/20/23 1105)   magnesium sulfate 2000 mg in 50 mL IVPB premix (0 mg IntraVENous Stopped 1/21/23 0931)   potassium chloride (KLOR-CON M) extended release tablet 40 mEq (40 mEq Oral Given 1/21/23 0616)       Medical Decision Making/Differential Diagnosis:    CC/HPI Summary, Social Determinants of health, Records Reviewed, DDx, testing done/not done, ED Course, Reassessment, disposition considerations/shared decision making with patient, consults, disposition:        CC/HPI Summary, DDx, ED Course, Reassessment, Tests Considered, Patient expectation:   Patient has generalized weakness with a known history of multiple sclerosis. Patient has no focal neurologic deficits. Patient has nystagmus at baseline. Patient is not currently on any Biologics or steroids for the treatment of her known MS. the diagnosis of an intracranial mass, ischemic stroke, hemorrhagic stroke, and cardiac causes of her weakness were considered. Imaging of her head showed there is no abnormality. Patient was also not being treated for MS which further suggests this is the sequelae of untreated multiple sclerosis. Patient was found to be hypokalemic so supplemental potassium was given patient's troponin was found to be 33 but this is lower than her previous troponins. Patient was also found to have an MARCELLO most likely secondary to dehydration. Patient admits she has not been eating or drinking adequate amounts. EKG showed no signs of ischemic changes. Signout was given to my attending. Her interpretation and actions are recorded in the ED course which is posted below. ED Course as of 01/22/23 2009   Thu Jan 19, 2023   3957 Session with the patient's daughter who feels that this is not an MS flare but rather just deconditioning.   Patient does have an MRI from 2018 which suggested multiple sclerosis however despite that the daughter states that the patient is just become deconditioned because of refusal to do anything for herself and she states that the patient's  is been dragging her around the house. I contacted the hospitalist to relay this information and attempt to get the patient admitted at this facility pending transfer but they were apprehensive and resistant due to the diagnosis of MS and felt the patient would be best served by seeing a neurologist.  Although I do not disagree, there are no beds available at the receiving facility and is unlikely the patient will get a bed anytime soon and I relayed this information as well requesting an the consideration the patient be placed on the floor here pending transfer assuring there was stay in contact with the transfer center to make sure that the patient did not get bumped back and hospitalist stated that if no movement was obtained throughout the day today they would consider it later but not right now. [RM]   Fri Jan 20, 2023   1159 Patient was seen and examined by me. Patient had a reexamination. She does have a fatigable nystagmus to the right. Patient states she has had this for 18 years. She was last worked up for [address] 18 years ago by Dr. Radha Hein at [facility]. Patient states she has had MRIs and lumbar puncture which were all inconclusive. She is currently not being treated for MS. On neurological exam she is got 5 out of 5 motor strength upper and lower extremities bilaterally. She is no cerebellar signs. She has no pronator drift. She has equal  strength. She has no dysphagia or dysphonia. Lab called with critical value patient has gram-positive cocci in clusters growing in all 4 bottles. Consultation was made with Estelita Riedel hospitalist Dr. Steven solorzano to admit to to intermediate.   [JN]      ED Course User Index  [JN] Eze Fernandez DO  [RM] Negrito Pearce,         Social Determinants affecting Dx or Tx:   Group home    Chronic Conditions: Multiple sclerosis, hypertension, chronic anemia, COPD    Records Reviewed:   ED from 12/19/2022  Hospital visit from 11/10/2022    I am the Primary Clinician of Record. CONSULTS: (Who and What was discussed)  IP CONSULT TO INFECTIOUS DISEASES  IP CONSULT TO SOCIAL WORK      I am the Primary Clinician of Record. FINAL IMPRESSION      1. Multiple sclerosis ([facility] [address]          DISPOSITION/PLAN     DISPOSITION Admitted 01/20/2023 12:09:07 PM      PATIENT REFERRED TO:  No follow-up provider specified.     DISCHARGE MEDICATIONS:  Current Discharge Medication List               (Please note that portions of this note were completed with a voice recognition program.  Efforts were made to edit the dictations but occasionally words are mis-transcribed.)    Shukri Gallardo DO (electronically signed)          Shukri Gallardo DO  Resident  01/22/23 2015

## 2023-01-20 NOTE — ED NOTES
Per Dr. Rosemarie solorzano to be transported off of telemetry monitoring to the floor      [address], RN  01/20/23 3301

## 2023-01-20 NOTE — CONSULTS
[address] [place] [address] [facility]    Consultation Note     Admit Date: 1/18/2023  5:24 PM    Reason for Consult:   +ve blood cx    Attending Physician:  Le Tarango MD     Chief Complaint: debilitated    HISTORY OF PRESENT ILLNESS:   The patient is a 69 y.o.  female not known to the Infectious Diseases service. The patient has hx of neurological disorder with nystagmus and ataxia, she was not able to take care of herself at home and finish house hold duties so she spioke to her PCP and decided to get admitted and get placed in rehab and get better and go home so she came to ER. No fever or cough or SOB or abdominal pain or diarrhea. She has 4 joint replacements with no issues. Since admission, pt is afebrile. HS stable. Normal CBC. Cr is 1.5/Bun of 22. Normal Lfts. blood cx both drawn in ER recorded as different times and one recorded as left antecubital but patient says no one draw any blood from her left arm. Pt got one dose of cefazolin and she refused IV vancomycin. I got consulted for further recommendations. Past Medical History:        Diagnosis Date    Arthritis     benign breast     CKD (chronic kidney disease)     COPD (chronic obstructive pulmonary disease) (HCC)     Hyperlipidemia     Hypertension     MS (multiple sclerosis) ([facility] 05/31/2012    Multiple sclerosis ([facility]     diagnosised 8  yrs ago [facility]     Past Surgical History:        Procedure Laterality Date    APPENDECTOMY      BREAST SURGERY  4/13/2012    biopsy - negative    HIP ARTHROPLASTY Right 04/13/2011    Right AIDEE  ALLISON Bond MD    HYSTERECTOMY (CERVIX STATUS UNKNOWN)      KNEE ARTHROPLASTY Left 10/01/2013    Left TKA  ALLISON Bond MD    KNEE ARTHROPLASTY Right 08/29/2012    Right TKA  ALLISON Bond MD    TOTAL HIP ARTHROPLASTY Left 3/25/2022    LEFT HIP TOTAL ARTHROPLASTY performed by Kavon Medina MD at [address] [place] [address] N/A 11/11/2022    EGD CONTROL HEMORRHAGE performed by Debby Haque MD at [facility] END[facility]COPY     Current Medications:   Scheduled Meds:   vancomycin  1,250 mg IntraVENous Once    amLODIPine  5 mg Oral Daily    baclofen  10 mg Oral BID    mirtazapine  15 mg Oral Nightly    [START ON 1/21/2023] pantoprazole  40 mg Oral QAM AC    sucralfate  1 g Oral 4x Daily AC & HS    arformoterol tartrate  15 mcg Nebulization BID    And    budesonide  0.25 mg Nebulization BID    And    ipratropium  0.5 mg Nebulization 4x daily    sodium chloride flush  5-40 mL IntraVENous 2 times per day    sertraline  100 mg Oral Nightly    atorvastatin  20 mg Oral Nightly    carvedilol  12.5 mg Oral BID WC     Continuous Infusions:   sodium chloride      sodium chloride 50 mL/hr at 01/20/23 1356     PRN Meds:diphenhydrAMINE, sodium chloride flush, sodium chloride, ondansetron **OR** ondansetron, polyethylene glycol, acetaminophen **OR** acetaminophen    Allergies:  Macrodantin [nitrofurantoin macrocrystal]    Social History:   Social History     Socioeconomic History    Marital status:      Spouse name: None    Number of children: None    Years of education: None    Highest education level: None   Occupational History     Employer: RETIRED   Tobacco Use    Smoking status: Every Day     Packs/day: 1.00     Years: 50.00     Pack years: 50.00     Types: Cigarettes    Smokeless tobacco: Never   Vaping Use    Vaping Use: Never used   Substance and Sexual Activity    Alcohol use: No    Drug use: No    Sexual activity: Yes     Social Determinants of Health     Physical Activity: Inactive    Days of Exercise per Week: 0 days    Minutes of Exercise per Session: 0 min   Intimate Partner Violence: Not At Risk    Fear of Current or Ex-Partner: No    Emotionally Abused: No    Physically Abused: No    Sexually Abused: No       Family History:       Problem Relation Age of Onset    Mult Sclerosis Father     Thyroid Cancer Mother     Cirrhosis Mother    . Otherwise non-pertinent to the chief complaint.     REVIEW OF SYSTEMS:    As mentioned in HPI, all other systems negative. PHYSICAL EXAM:    Vitals:    BP (!) 150/81   Pulse 89   Temp 98.2 °F (36.8 °C) (Oral)   Resp 18   Ht 5' (1.524 m)   Wt 140 lb (63.5 kg)   SpO2 92%   BMI 27.34 kg/m²   Constitutional: The patient is awake, alert, and oriented. Skin: Warm and dry. No rashes were noted. No jaundice. HEENT: Eyes show round, and reactive pupils. Moist mucous membranes, no ulcerations, no thrush. Neck: Supple to movements. No lymphadenopathy. Chest: No use of accessory muscles to breathe. Symmetrical expansion. Auscultation reveals no wheezing, crackles, or rhonchi. Cardiovascular: S1 and S2 are rhythmic and regular. No murmurs appreciated. Abdomen: soft, non tender  Extremities: No clubbing, no cyanosis, no edema. Musculoskeletal: both knees and both hips with scars: no pain. Has bruise left thigh.    Neurological: alert, oriented x 3  Lines: peripheral right antecubital      CBC+dif:  Recent Labs     01/18/23  1823   WBC 8.1   HGB 12.0   HCT 38.4   MCV 88.1      NEUTROABS 5.14     Lab Results   Component Value Date    CRP 3.7 (H) 10/29/2022     No results found for: CRPHS  Lab Results   Component Value Date    SEDRATE 44 (H) 10/29/2022    SEDRATE 40 (H) 05/30/2012     Lab Results   Component Value Date    ALT 6 01/18/2023    AST 15 01/18/2023    ALKPHOS 68 01/18/2023    BILITOT 0.3 01/18/2023     Lab Results   Component Value Date/Time     01/18/2023 06:23 PM    K 3.2 01/18/2023 06:23 PM    K 3.4 11/13/2022 12:15 PM    CL 96 01/18/2023 06:23 PM    CO2 29 01/18/2023 06:23 PM    BUN 22 01/18/2023 06:23 PM    CREATININE 1.5 01/18/2023 06:23 PM    GFRAA > 60 11/10/2022 05:22 AM    LABGLOM 36 01/18/2023 06:23 PM    GLUCOSE 115 01/18/2023 06:23 PM    GLUCOSE 85 11/10/2022 05:22 AM    PROT 7.0 01/18/2023 06:23 PM    LABALBU 3.6 01/18/2023 06:23 PM    LABALBU 2.7 11/07/2022 07:10 AM    CALCIUM 10.0 01/18/2023 06:23 PM    BILITOT 0.3 01/18/2023 06:23 PM    ALKPHOS 68 01/18/2023 06:23 PM    AST 15 01/18/2023 06:23 PM    ALT 6 01/18/2023 06:23 PM       Lab Results   Component Value Date/Time    PROTIME 19.3 11/11/2022 05:46 AM    PROTIME 13.0 05/30/2012 10:40 PM    INR 1.7 11/11/2022 05:46 AM       Lab Results   Component Value Date/Time    TSH 0.475 09/27/2022 04:18 PM       Lab Results   Component Value Date/Time    COLORU Yellow 01/18/2023 06:23 PM    PHUR 5.5 01/18/2023 06:23 PM    [address] NONE 01/18/2023 06:23 PM    [address] NONE 04/11/2011 11:55 AM    RBCUA NONE 01/18/2023 06:23 PM    RBCUA NONE 04/11/2011 11:55 AM    MUCUS Present 11/10/2022 03:33 PM    BACTERIA NONE SEEN 01/18/2023 06:23 PM    CLARITYU Clear 01/18/2023 06:23 PM    SPECGRAV 1.025 01/18/2023 06:23 PM    LEUKOCYTESUR Negative 01/18/2023 06:23 PM    UROBILINOGEN 0.2 01/18/2023 06:23 PM    BILIRUBINUR SMALL 01/18/2023 06:23 PM    BILIRUBINUR NEGATIVE 05/30/2012 10:50 PM    BLOODU Negative 01/18/2023 06:23 PM    GLUCOSEU Negative 01/18/2023 06:23 PM    GLUCOSEU NEGATIVE 05/30/2012 10:50 PM       No results found for: HSS5VHC, BEART, N1WHYJKR, PHART, THGBART, OYA3FDL, PO2ART, JYX7ZCW  Radiology:  XR CHEST (2 VW)   Final Result   Bronchitis with the atelectasis in the right perihilar region, right lung   base and left upper lobe. Surveillance recommended. CT Head W/O Contrast   Final Result   No acute intracranial abnormality. MRI BRAIN WO CONTRAST    (Results Pending)       Microbiology:  Pending  Recent Labs     01/18/23  1823   BC Gram stain performed from blood culture bottle media  Gram positive cocci in clusters  *     No results for input(s): ORG in the last 72 hours. Recent Labs     01/18/23  1940   BLOODCULT2 Gram stain performed from blood culture bottle media  Gram positive cocci in clusters  Previously positive blood culture called  *     No results for input(s): STREPNEUMAGU in the last 72 hours. No results for input(s): LP1UAG in the last 72 hours.   No results for input(s): ASO in the last 72 hours. No results for input(s): CULTRESP in the last 72 hours. Assessment:  Staph species bacteremia: likely contamination    Plan:    Repeat blood cx ordered; to be collected  Watch her off antibiotics. Monitor labs  Will follow with you    Thank you for having us see this patient in consultation. I will be discussing this case with the treating physicians.       Electronically signed by Rebekah Francois MD on 1/20/2023 at 2:51 PM

## 2023-01-21 LAB
ANION GAP SERPL CALCULATED.3IONS-SCNC: 6 MMOL/L (ref 7–16)
ANION GAP SERPL CALCULATED.3IONS-SCNC: 9 MMOL/L (ref 7–16)
BASOPHILS ABSOLUTE: 0.03 E9/L (ref 0–0.2)
BASOPHILS RELATIVE PERCENT: 0.3 % (ref 0–2)
BUN BLDV-MCNC: 17 MG/DL (ref 6–23)
BUN BLDV-MCNC: 18 MG/DL (ref 6–23)
CALCIUM SERPL-MCNC: 8.4 MG/DL (ref 8.6–10.2)
CALCIUM SERPL-MCNC: 9 MG/DL (ref 8.6–10.2)
CHLORIDE BLD-SCNC: 102 MMOL/L (ref 98–107)
CHLORIDE BLD-SCNC: 98 MMOL/L (ref 98–107)
CO2: 29 MMOL/L (ref 22–29)
CO2: 30 MMOL/L (ref 22–29)
CREAT SERPL-MCNC: 1 MG/DL (ref 0.5–1)
CREAT SERPL-MCNC: 1.1 MG/DL (ref 0.5–1)
EOSINOPHILS ABSOLUTE: 0.06 E9/L (ref 0.05–0.5)
EOSINOPHILS RELATIVE PERCENT: 0.6 % (ref 0–6)
GFR SERPL CREATININE-BSD FRML MDRD: 52 ML/MIN/1.73
GFR SERPL CREATININE-BSD FRML MDRD: 58 ML/MIN/1.73
GLUCOSE BLD-MCNC: 94 MG/DL (ref 74–99)
GLUCOSE BLD-MCNC: 96 MG/DL (ref 74–99)
HCT VFR BLD CALC: 36.5 % (ref 34–48)
HEMOGLOBIN: 11.7 G/DL (ref 11.5–15.5)
IMMATURE GRANULOCYTES #: 0.03 E9/L
IMMATURE GRANULOCYTES %: 0.3 % (ref 0–5)
LYMPHOCYTES ABSOLUTE: 3.2 E9/L (ref 1.5–4)
LYMPHOCYTES RELATIVE PERCENT: 34.1 % (ref 20–42)
MAGNESIUM: 1.4 MG/DL (ref 1.6–2.6)
MCH RBC QN AUTO: 27.7 PG (ref 26–35)
MCHC RBC AUTO-ENTMCNC: 32.1 % (ref 32–34.5)
MCV RBC AUTO: 86.3 FL (ref 80–99.9)
MONOCYTES ABSOLUTE: 0.89 E9/L (ref 0.1–0.95)
MONOCYTES RELATIVE PERCENT: 9.5 % (ref 2–12)
NEUTROPHILS ABSOLUTE: 5.18 E9/L (ref 1.8–7.3)
NEUTROPHILS RELATIVE PERCENT: 55.2 % (ref 43–80)
PDW BLD-RTO: 15.6 FL (ref 11.5–15)
PLATELET # BLD: 355 E9/L (ref 130–450)
PMV BLD AUTO: 9.7 FL (ref 7–12)
POTASSIUM REFLEX MAGNESIUM: 3.3 MMOL/L (ref 3.5–5)
POTASSIUM REFLEX MAGNESIUM: 3.6 MMOL/L (ref 3.5–5)
RBC # BLD: 4.23 E12/L (ref 3.5–5.5)
SODIUM BLD-SCNC: 133 MMOL/L (ref 132–146)
SODIUM BLD-SCNC: 141 MMOL/L (ref 132–146)
WBC # BLD: 9.4 E9/L (ref 4.5–11.5)

## 2023-01-21 PROCEDURE — 6370000000 HC RX 637 (ALT 250 FOR IP): Performed by: EMERGENCY MEDICINE

## 2023-01-21 PROCEDURE — 83735 ASSAY OF MAGNESIUM: CPT

## 2023-01-21 PROCEDURE — 96366 THER/PROPH/DIAG IV INF ADDON: CPT

## 2023-01-21 PROCEDURE — 6370000000 HC RX 637 (ALT 250 FOR IP): Performed by: NURSE PRACTITIONER

## 2023-01-21 PROCEDURE — 99232 SBSQ HOSP IP/OBS MODERATE 35: CPT | Performed by: NURSE PRACTITIONER

## 2023-01-21 PROCEDURE — 85025 COMPLETE CBC W/AUTO DIFF WBC: CPT

## 2023-01-21 PROCEDURE — 94640 AIRWAY INHALATION TREATMENT: CPT

## 2023-01-21 PROCEDURE — 96365 THER/PROPH/DIAG IV INF INIT: CPT

## 2023-01-21 PROCEDURE — 2700000000 HC OXYGEN THERAPY PER DAY

## 2023-01-21 PROCEDURE — 2580000003 HC RX 258: Performed by: NURSE PRACTITIONER

## 2023-01-21 PROCEDURE — 6360000002 HC RX W HCPCS: Performed by: NURSE PRACTITIONER

## 2023-01-21 PROCEDURE — 36415 COLL VENOUS BLD VENIPUNCTURE: CPT

## 2023-01-21 PROCEDURE — 2060000000 HC ICU INTERMEDIATE R&B

## 2023-01-21 PROCEDURE — 80048 BASIC METABOLIC PNL TOTAL CA: CPT

## 2023-01-21 RX ORDER — MAGNESIUM SULFATE IN WATER 40 MG/ML
2000 INJECTION, SOLUTION INTRAVENOUS ONCE
Status: COMPLETED | OUTPATIENT
Start: 2023-01-21 | End: 2023-01-21

## 2023-01-21 RX ORDER — POTASSIUM CHLORIDE 20 MEQ/1
40 TABLET, EXTENDED RELEASE ORAL ONCE
Status: COMPLETED | OUTPATIENT
Start: 2023-01-21 | End: 2023-01-21

## 2023-01-21 RX ORDER — POTASSIUM CHLORIDE 20 MEQ/1
40 TABLET, EXTENDED RELEASE ORAL 2 TIMES DAILY WITH MEALS
Status: DISCONTINUED | OUTPATIENT
Start: 2023-01-21 | End: 2023-01-21

## 2023-01-21 RX ORDER — FOLIC ACID 1 MG/1
1 TABLET ORAL DAILY
Status: DISCONTINUED | OUTPATIENT
Start: 2023-01-21 | End: 2023-01-24 | Stop reason: HOSPADM

## 2023-01-21 RX ADMIN — AMLODIPINE BESYLATE 5 MG: 5 TABLET ORAL at 08:09

## 2023-01-21 RX ADMIN — IPRATROPIUM BROMIDE 0.5 MG: 0.5 SOLUTION RESPIRATORY (INHALATION) at 19:49

## 2023-01-21 RX ADMIN — MAGNESIUM SULFATE HEPTAHYDRATE 2000 MG: 40 INJECTION, SOLUTION INTRAVENOUS at 06:21

## 2023-01-21 RX ADMIN — SODIUM CHLORIDE, PRESERVATIVE FREE 10 ML: 5 INJECTION INTRAVENOUS at 20:21

## 2023-01-21 RX ADMIN — BUDESONIDE 250 MCG: 0.25 SUSPENSION RESPIRATORY (INHALATION) at 19:49

## 2023-01-21 RX ADMIN — CARVEDILOL 12.5 MG: 6.25 TABLET, FILM COATED ORAL at 08:09

## 2023-01-21 RX ADMIN — POTASSIUM CHLORIDE 40 MEQ: 1500 TABLET, EXTENDED RELEASE ORAL at 06:16

## 2023-01-21 RX ADMIN — BUDESONIDE 250 MCG: 0.25 SUSPENSION RESPIRATORY (INHALATION) at 08:29

## 2023-01-21 RX ADMIN — FOLIC ACID 1 MG: 1 TABLET ORAL at 09:41

## 2023-01-21 RX ADMIN — IPRATROPIUM BROMIDE 0.5 MG: 0.5 SOLUTION RESPIRATORY (INHALATION) at 12:27

## 2023-01-21 RX ADMIN — SODIUM CHLORIDE: 9 INJECTION, SOLUTION INTRAVENOUS at 09:46

## 2023-01-21 RX ADMIN — SUCRALFATE 1 G: 1 TABLET ORAL at 06:16

## 2023-01-21 RX ADMIN — SUCRALFATE 1 G: 1 TABLET ORAL at 16:59

## 2023-01-21 RX ADMIN — SUCRALFATE 1 G: 1 TABLET ORAL at 20:19

## 2023-01-21 RX ADMIN — IPRATROPIUM BROMIDE 0.5 MG: 0.5 SOLUTION RESPIRATORY (INHALATION) at 15:42

## 2023-01-21 RX ADMIN — SUCRALFATE 1 G: 1 TABLET ORAL at 11:29

## 2023-01-21 RX ADMIN — IPRATROPIUM BROMIDE 0.5 MG: 0.5 SOLUTION RESPIRATORY (INHALATION) at 08:29

## 2023-01-21 RX ADMIN — ARFORMOTEROL TARTRATE 15 MCG: 15 SOLUTION RESPIRATORY (INHALATION) at 19:49

## 2023-01-21 RX ADMIN — ARFORMOTEROL TARTRATE 15 MCG: 15 SOLUTION RESPIRATORY (INHALATION) at 08:29

## 2023-01-21 RX ADMIN — BACLOFEN 10 MG: 10 TABLET ORAL at 08:09

## 2023-01-21 RX ADMIN — BACLOFEN 10 MG: 10 TABLET ORAL at 20:19

## 2023-01-21 RX ADMIN — SERTRALINE 100 MG: 100 TABLET, FILM COATED ORAL at 20:19

## 2023-01-21 RX ADMIN — CARVEDILOL 12.5 MG: 6.25 TABLET, FILM COATED ORAL at 16:59

## 2023-01-21 RX ADMIN — PANTOPRAZOLE SODIUM 40 MG: 40 TABLET, DELAYED RELEASE ORAL at 06:16

## 2023-01-21 RX ADMIN — MIRTAZAPINE 15 MG: 15 TABLET, FILM COATED ORAL at 20:20

## 2023-01-21 RX ADMIN — ATORVASTATIN CALCIUM 20 MG: 20 TABLET, FILM COATED ORAL at 20:20

## 2023-01-21 ASSESSMENT — PAIN DESCRIPTION - DESCRIPTORS: DESCRIPTORS: ACHING;DISCOMFORT;SORE

## 2023-01-21 ASSESSMENT — PAIN DESCRIPTION - LOCATION: LOCATION: GENERALIZED;BACK

## 2023-01-21 ASSESSMENT — PAIN SCALES - GENERAL: PAINLEVEL_OUTOF10: 2

## 2023-01-21 NOTE — PROGRESS NOTES
[facility] Progress Note    Admitting Date and Time: 1/18/2023  5:24 PM  Admit Dx: Multiple sclerosis ([facility]  Bacteremia [R78.81]    Subjective:  Patient is being followed for Multiple sclerosis ([facility]  Bacteremia [R78.81]     Patient awake and alert- resting in bed comfortably in no acute distress  Reporting she feels better today  Feels stronger   Has no complaints  Reporting she is still hopeful to qualify for rehab placement   Pt reporting she is declining neurology evaluation and would prefer to stay at [facility] until until possible rehab placement  She denies vision complaints, dizziness, confusion, fogginess, increased muscle spasms      ROS: denies fever, chills, cp, sob, n/v, HA unless stated above.       amLODIPine  5 mg Oral Daily    baclofen  10 mg Oral BID    mirtazapine  15 mg Oral Nightly    pantoprazole  40 mg Oral QAM AC    sucralfate  1 g Oral 4x Daily AC & HS    arformoterol tartrate  15 mcg Nebulization BID    And    budesonide  0.25 mg Nebulization BID    And    ipratropium  0.5 mg Nebulization 4x daily    sodium chloride flush  5-40 mL IntraVENous 2 times per day    sertraline  100 mg Oral Nightly    atorvastatin  20 mg Oral Nightly    carvedilol  12.5 mg Oral BID WC     diphenhydrAMINE, 25 mg, Q6H PRN  sodium chloride flush, 5-40 mL, PRN  sodium chloride, , PRN  ondansetron, 4 mg, Q8H PRN   Or  ondansetron, 4 mg, Q6H PRN  polyethylene glycol, 17 g, Daily PRN  acetaminophen, 650 mg, Q6H PRN   Or  acetaminophen, 650 mg, Q6H PRN         Objective:    BP (!) 147/70   Pulse 85   Temp 97.7 °F (36.5 °C) (Oral)   Resp 20   Ht 5' (1.524 m)   Wt 135 lb (61.2 kg)   SpO2 91%   BMI 26.37 kg/m²   General Appearance: alert and oriented to person, place and time   Skin: warm and dry  Head: normocephalic and atraumatic  Neck: neck supple and non tender without mass   Pulmonary/Chest: diminished throughout to auscultation   Cardiovascular: normal rate, normal S1 and S2 and no carotid bruits  Abdomen: soft, non-tender, non-distended, normal bowel sounds  Extremities: no cyanosis, no clubbing and no edema strength good in all ext. 4-5/5  Neurologic: speech normal            Recent Labs     01/18/23 1823 01/20/23  1600 01/21/23  0402    135 141   K 3.2* 3.3* 3.3*   CL 96* 96* 102   CO2 29 28 30*   BUN 22 16 17   CREATININE 1.5* 1.0 1.0   GLUCOSE 115* 100* 94   CALCIUM 10.0 9.0 9.0       Recent Labs     01/18/23 1823 01/20/23  1600 01/21/23  0402   WBC 8.1 11.3 9.4   RBC 4.36 4.18 4.23   HGB 12.0 11.7 11.7   HCT 38.4 36.5 36.5   MCV 88.1 87.3 86.3   MCH 27.5 28.0 27.7   MCHC 31.3* 32.1 32.1   RDW 15.8* 15.6* 15.6*    361 355   MPV 9.9 9.9 9.7           Assessment:    Principal Problem:    Bacteremia  Active Problems:    Weakness  Resolved Problems:    * No resolved hospital problems. *      Plan:  1. Concern for bacteremia/ Gram positive cocci 4/4 bottles- likely contamination: pt originally presented to the ER for concern for fatigue/ weakness x 1month. Reporting main reason she came to ER was hopeful placement at rehab. Denies feeling ill/ She has a history of Multiple Sclerosis- has not followed with neurology in quite some time. She was diagnosed in 2010 at [facility]. She is on baclofen. Initial concern in ER was for possible flare- and plan was to transfer patient to [facility]- However blood cultures 4/4 resulted positive and plan was for admission here to work up. Pt denies feeling ill. No fevers or leukocytosis. No evidence of current infection. She was treated for UTI 1 month ago. Appreciate ID input. Repeat blood cultures are pending. Observing off of antibiotics. 2. Weakness/ fatigue/ failure to thrive: PT/ OT-     3. H/o MS: initial concern was for possible flare and plan was to admit pt to neurology service. However- blood cultures then resulted positive and plan was to admit patient here at [address] [place] [address]. Will obtain MRI brain to further evaluate.  Pt is now declining transfer to [facility]. Will need outpt neurology evaluation. Pt feels that her symptoms of weakness are deconditioning. Reporting she never recovered from last hospitalization. 4. Recent hemorrhagic shock due to duodenal ulcer s/p EGD on 11/11 with duodenal ulcer with adherent clot and visibile vessel/ S/p epi and clip     5. CKD: creatinine 1.5 on admission- gentle hydration      6. COPD; stable-      7. H/o breast cancer     8. Chronic respiratory failure- pt wears 3L at home prn     9. Recent UTI: seen in ER 12/19-  e coli/ enterobacter- treated with bactrim     10. Hypokalemia/ hypomagnesemia: supplement    11. Folate deficiency: start folic acid. Dispo: ok to cancel transfer to [facility] for neurology evaluation as pt is declining transfer. Updated access center. Currently refusing neurology evaluation. Reporting she would prefer to follow up outpt with neurology. She is feeling better and hoping to qualify for rehab. Time spent reviewing chart, clinical exam, discussing case and answering questions with staff/consultants/patient/family = 20 minutes          Code Status: FULL  DVT prophylaxis: PCDS      NOTE: This report was transcribed using voice recognition software. Every effort was made to ensure accuracy; however, inadvertent computerized transcription errors may be present.   Electronically signed by CLARKE Flynn on 1/21/2023 at 8:54 AM

## 2023-01-21 NOTE — PROGRESS NOTES
This nurse went into the pt's room due to the pt screaming for help. Upon arrival to the room pt was laying in bed, with her purwick in. Pt was confused for the second time in 30 minutes. Pt was reoriented again that she was in the hospital. Daughter was notified and spoke with her myself.

## 2023-01-21 NOTE — PROGRESS NOTES
[address] [place] [address] [facility]  [facility]  Progress Note    SUBJECTIVE:  Chief Complaint   Patient presents with    Fatigue     Pt has hx of ms and has increased fatigue and weakness     Patient is tolerating medications. No reported adverse drug reactions. No nausea, vomiting, diarrhea. She is laying in bed, feeling well  No new reported issues   No fevers     Review of systems:  As stated above in the chief complaint, otherwise negative. Medications:  Scheduled Meds:   folic acid  1 mg Oral Daily    amLODIPine  5 mg Oral Daily    baclofen  10 mg Oral BID    mirtazapine  15 mg Oral Nightly    pantoprazole  40 mg Oral QAM AC    sucralfate  1 g Oral 4x Daily AC & HS    arformoterol tartrate  15 mcg Nebulization BID    And    budesonide  0.25 mg Nebulization BID    And    ipratropium  0.5 mg Nebulization 4x daily    sodium chloride flush  5-40 mL IntraVENous 2 times per day    sertraline  100 mg Oral Nightly    atorvastatin  20 mg Oral Nightly    carvedilol  12.5 mg Oral BID WC     Continuous Infusions:   sodium chloride      sodium chloride 50 mL/hr at 23 0946     PRN Meds:diphenhydrAMINE, sodium chloride flush, sodium chloride, ondansetron **OR** ondansetron, polyethylene glycol, acetaminophen **OR** acetaminophen    OBJECTIVE:  BP (!) 147/70   Pulse 85   Temp 97.7 °F (36.5 °C) (Oral)   Resp 20   Ht 5' (1.524 m)   Wt 135 lb (61.2 kg)   SpO2 91%   BMI 26.37 kg/m²   Temp  Av °F (36.7 °C)  Min: 97.5 °F (36.4 °C)  Max: 98.7 °F (37.1 °C)  Constitutional: The patient is awake, alert, and oriented. In bed. In no distress. Skin: Warm and dry. No rashes were noted. HEENT: Round and reactive pupils. Moist mucous membranes. No ulcerations or thrush. Neck: Supple to movements. Chest: No use of accessory muscles to breathe. Symmetrical expansion. No wheezing, crackles or rhonchi. Cardiovascular: S1 and S2 are rhythmic and regular. No murmurs appreciated.    Abdomen: Positive bowel sounds to auscultation. Benign to palpation. Extremities: both knees and both hips with scars: no pain. Has bruise left thigh. Lines: peripheral    Laboratory and Tests Review:  Lab Results   Component Value Date    WBC 9.4 01/21/2023    WBC 11.3 01/20/2023    WBC 8.1 01/18/2023    HGB 11.7 01/21/2023    HCT 36.5 01/21/2023    MCV 86.3 01/21/2023     01/21/2023     Lab Results   Component Value Date    NEUTROABS 5.18 01/21/2023    NEUTROABS 5.14 01/18/2023    NEUTROABS 5.09 12/19/2022     No results found for: CRPHS  Lab Results   Component Value Date    ALT 6 01/18/2023    AST 15 01/18/2023    ALKPHOS 68 01/18/2023    BILITOT 0.3 01/18/2023     Lab Results   Component Value Date/Time     01/21/2023 04:02 AM    K 3.3 01/21/2023 04:02 AM     01/21/2023 04:02 AM    CO2 30 01/21/2023 04:02 AM    BUN 17 01/21/2023 04:02 AM    CREATININE 1.0 01/21/2023 04:02 AM    CREATININE 1.0 01/20/2023 04:00 PM    CREATININE 1.5 01/18/2023 06:23 PM    GFRAA > 60 11/10/2022 05:22 AM    LABGLOM 58 01/21/2023 04:02 AM    GLUCOSE 94 01/21/2023 04:02 AM    GLUCOSE 85 11/10/2022 05:22 AM    PROT 7.0 01/18/2023 06:23 PM    LABALBU 3.6 01/18/2023 06:23 PM    LABALBU 2.7 11/07/2022 07:10 AM    CALCIUM 9.0 01/21/2023 04:02 AM    BILITOT 0.3 01/18/2023 06:23 PM    ALKPHOS 68 01/18/2023 06:23 PM    AST 15 01/18/2023 06:23 PM    ALT 6 01/18/2023 06:23 PM     Lab Results   Component Value Date    CRP 3.7 (H) 10/29/2022     Lab Results   Component Value Date    SEDRATE 44 (H) 10/29/2022    SEDRATE 40 (H) 05/30/2012     Radiology:      Microbiology:   Blood Cultures 1/18/23: GPC in clusters  BCID: staph species  Blood Cultures 1/20/23: pending     ASSESSMENT:  Staph species bacteremia: likely contamination    PLAN:  Continue to watch off antibiotics  Repeat blood cultures pending  Check final cultures  Monitor labs  Will continue to follow     HIRA Duke - CNP  9:55 AM  1/21/2023     Pt seen and examined.  Above discussed agree with advanced practice nurse. Labs, cultures, and radiographs reviewed. Face to Face encounter occurred. Changes made as necessary.      Colton Thurman MD

## 2023-01-21 NOTE — PLAN OF CARE
Problem: Discharge Planning  Goal: Discharge to home or other facility with appropriate resources  1/21/2023 0433 by Ramesh Strickland RN  Outcome: Progressing  Flowsheets (Taken 1/20/2023 1945)  Discharge to home or other facility with appropriate resources: Identify barriers to discharge with patient and caregiver  1/20/2023 1845 by Rob Redd RN  Outcome: Progressing     Problem: Safety - Adult  Goal: Free from fall injury  1/21/2023 0433 by Ramesh Strickland RN  Outcome: Progressing  1/20/2023 1845 by Rob Redd RN  Outcome: Progressing     Problem: Skin/Tissue Integrity  Goal: Absence of new skin breakdown  Description: 1. Monitor for areas of redness and/or skin breakdown  2. Assess vascular access sites hourly  3. Every 4-6 hours minimum:  Change oxygen saturation probe site  4. Every 4-6 hours:  If on nasal continuous positive airway pressure, respiratory therapy assess nares and determine need for appliance change or resting period.   Outcome: Progressing Received call from cardiac rehab concerned about the amount of pvcs patient has with exercise. They will bring a strips over

## 2023-01-22 ENCOUNTER — APPOINTMENT (OUTPATIENT)
Dept: MRI IMAGING | Age: 78
DRG: 058 | End: 2023-01-22
Payer: MEDICARE

## 2023-01-22 LAB
AMPHETAMINE SCREEN, URINE: NOT DETECTED
ANION GAP SERPL CALCULATED.3IONS-SCNC: 8 MMOL/L (ref 7–16)
BACTERIA: ABNORMAL /HPF
BARBITURATE SCREEN URINE: NOT DETECTED
BENZODIAZEPINE SCREEN, URINE: NOT DETECTED
BILIRUBIN URINE: NEGATIVE
BLOOD, URINE: NEGATIVE
BUN BLDV-MCNC: 23 MG/DL (ref 6–23)
CALCIUM SERPL-MCNC: 8.3 MG/DL (ref 8.6–10.2)
CANNABINOID SCREEN URINE: NOT DETECTED
CHLORIDE BLD-SCNC: 107 MMOL/L (ref 98–107)
CLARITY: CLEAR
CO2: 27 MMOL/L (ref 22–29)
COCAINE METABOLITE SCREEN URINE: NOT DETECTED
COLOR: YELLOW
CREAT SERPL-MCNC: 1.2 MG/DL (ref 0.5–1)
EPITHELIAL CELLS, UA: ABNORMAL /HPF
FENTANYL SCREEN, URINE: NOT DETECTED
GFR SERPL CREATININE-BSD FRML MDRD: 47 ML/MIN/1.73
GLUCOSE BLD-MCNC: 96 MG/DL (ref 74–99)
GLUCOSE URINE: NEGATIVE MG/DL
HCT VFR BLD CALC: 34 % (ref 34–48)
HEMOGLOBIN: 10.4 G/DL (ref 11.5–15.5)
KETONES, URINE: NEGATIVE MG/DL
LEUKOCYTE ESTERASE, URINE: NEGATIVE
Lab: NORMAL
MCH RBC QN AUTO: 27.4 PG (ref 26–35)
MCHC RBC AUTO-ENTMCNC: 30.6 % (ref 32–34.5)
MCV RBC AUTO: 89.5 FL (ref 80–99.9)
METHADONE SCREEN, URINE: NOT DETECTED
NITRITE, URINE: NEGATIVE
OPIATE SCREEN URINE: NOT DETECTED
OXYCODONE URINE: NOT DETECTED
PDW BLD-RTO: 15.7 FL (ref 11.5–15)
PH UA: 5.5 (ref 5–9)
PHENCYCLIDINE SCREEN URINE: NOT DETECTED
PLATELET # BLD: 319 E9/L (ref 130–450)
PMV BLD AUTO: 9.9 FL (ref 7–12)
POTASSIUM SERPL-SCNC: 4 MMOL/L (ref 3.5–5)
PROTEIN UA: NEGATIVE MG/DL
RBC # BLD: 3.8 E12/L (ref 3.5–5.5)
RBC UA: ABNORMAL /HPF (ref 0–2)
SODIUM BLD-SCNC: 142 MMOL/L (ref 132–146)
SPECIFIC GRAVITY UA: 1.01 (ref 1–1.03)
UROBILINOGEN, URINE: 0.2 E.U./DL
WBC # BLD: 9 E9/L (ref 4.5–11.5)
WBC UA: ABNORMAL /HPF (ref 0–5)

## 2023-01-22 PROCEDURE — 85027 COMPLETE CBC AUTOMATED: CPT

## 2023-01-22 PROCEDURE — 94640 AIRWAY INHALATION TREATMENT: CPT

## 2023-01-22 PROCEDURE — 6360000002 HC RX W HCPCS: Performed by: NURSE PRACTITIONER

## 2023-01-22 PROCEDURE — 70551 MRI BRAIN STEM W/O DYE: CPT

## 2023-01-22 PROCEDURE — 36415 COLL VENOUS BLD VENIPUNCTURE: CPT

## 2023-01-22 PROCEDURE — 80048 BASIC METABOLIC PNL TOTAL CA: CPT

## 2023-01-22 PROCEDURE — 2060000000 HC ICU INTERMEDIATE R&B

## 2023-01-22 PROCEDURE — 6370000000 HC RX 637 (ALT 250 FOR IP): Performed by: NURSE PRACTITIONER

## 2023-01-22 PROCEDURE — 87088 URINE BACTERIA CULTURE: CPT

## 2023-01-22 PROCEDURE — APPSS30 APP SPLIT SHARED TIME 16-30 MINUTES: Performed by: NURSE PRACTITIONER

## 2023-01-22 PROCEDURE — 80307 DRUG TEST PRSMV CHEM ANLYZR: CPT

## 2023-01-22 PROCEDURE — 2580000003 HC RX 258: Performed by: NURSE PRACTITIONER

## 2023-01-22 PROCEDURE — 81001 URINALYSIS AUTO W/SCOPE: CPT

## 2023-01-22 PROCEDURE — 6370000000 HC RX 637 (ALT 250 FOR IP): Performed by: EMERGENCY MEDICINE

## 2023-01-22 RX ADMIN — FOLIC ACID 1 MG: 1 TABLET ORAL at 09:11

## 2023-01-22 RX ADMIN — BUDESONIDE 250 MCG: 0.25 SUSPENSION RESPIRATORY (INHALATION) at 09:28

## 2023-01-22 RX ADMIN — CARVEDILOL 12.5 MG: 6.25 TABLET, FILM COATED ORAL at 16:42

## 2023-01-22 RX ADMIN — MIRTAZAPINE 15 MG: 15 TABLET, FILM COATED ORAL at 20:29

## 2023-01-22 RX ADMIN — SERTRALINE 100 MG: 100 TABLET, FILM COATED ORAL at 20:23

## 2023-01-22 RX ADMIN — SUCRALFATE 1 G: 1 TABLET ORAL at 05:23

## 2023-01-22 RX ADMIN — PANTOPRAZOLE SODIUM 40 MG: 40 TABLET, DELAYED RELEASE ORAL at 05:23

## 2023-01-22 RX ADMIN — SUCRALFATE 1 G: 1 TABLET ORAL at 09:11

## 2023-01-22 RX ADMIN — SUCRALFATE 1 G: 1 TABLET ORAL at 20:23

## 2023-01-22 RX ADMIN — BACLOFEN 10 MG: 10 TABLET ORAL at 20:23

## 2023-01-22 RX ADMIN — AMLODIPINE BESYLATE 5 MG: 5 TABLET ORAL at 09:11

## 2023-01-22 RX ADMIN — CARVEDILOL 12.5 MG: 6.25 TABLET, FILM COATED ORAL at 09:11

## 2023-01-22 RX ADMIN — SODIUM CHLORIDE, PRESERVATIVE FREE 10 ML: 5 INJECTION INTRAVENOUS at 20:25

## 2023-01-22 RX ADMIN — SODIUM CHLORIDE, PRESERVATIVE FREE 10 ML: 5 INJECTION INTRAVENOUS at 09:12

## 2023-01-22 RX ADMIN — ATORVASTATIN CALCIUM 20 MG: 20 TABLET, FILM COATED ORAL at 20:28

## 2023-01-22 RX ADMIN — IPRATROPIUM BROMIDE 0.5 MG: 0.5 SOLUTION RESPIRATORY (INHALATION) at 09:28

## 2023-01-22 RX ADMIN — ARFORMOTEROL TARTRATE 15 MCG: 15 SOLUTION RESPIRATORY (INHALATION) at 09:28

## 2023-01-22 RX ADMIN — SUCRALFATE 1 G: 1 TABLET ORAL at 16:42

## 2023-01-22 RX ADMIN — BACLOFEN 10 MG: 10 TABLET ORAL at 09:11

## 2023-01-22 ASSESSMENT — PAIN SCALES - GENERAL
PAINLEVEL_OUTOF10: 0

## 2023-01-22 NOTE — PROGRESS NOTES
Pt again states that she is confused and that she does not know where she is or what it going on. Pt was re orientated and pt settled back down.  She ate her breakfast.

## 2023-01-22 NOTE — PROGRESS NOTES
Physical Therapy  Facility/Department: [address] INTERMEDIATE    Name: Davin Shirley  : 1945  MRN: [id number]  Date of Service: 2023    PT eval was attempted this am and pt was out of the room at MRI. Will re-attempt PT eval another time. Lily Corral., P.T.   License Number: [id number]

## 2023-01-22 NOTE — PROGRESS NOTES
[facility] Progress Note    Admitting Date and Time: 1/18/2023  5:24 PM  Admit Dx: Multiple sclerosis ([facility] Dima Cisneros  Bacteremia [R78.81]    Subjective:  Patient is being followed for Multiple sclerosis ([facility] Dima Cisneros  Bacteremia [R78.81]     Noted that pt had confusion last evening- was yelling out for help  Pt seen awake and alert- resting in bed comfortably today  Just returned from MRI brain  Reporting she does feel confused this morning- does not recall why she is in the hospital  While I was on the floor-- daughter called in  Reporting it seemed like a flip switched last night- She was doing really well- and then became confused  Reporting when she was in rehab recently- concern that she had confusion/ \" sun downing\" per daughter  Pt denies weakness  Daughter was concerned that pt's  may have brought her in medication yesterday.  on a lot of medication. ? May have given her medication at home in the past. Reporting she was on trazadone in the past which caused confusion. Pt does not take her prescribed medications regularly as she should  Per nursing- no visitors last evening  UDS negative. ROS: denies fever, chills, cp, sob, n/v, HA unless stated above.       folic acid  1 mg Oral Daily    amLODIPine  5 mg Oral Daily    baclofen  10 mg Oral BID    mirtazapine  15 mg Oral Nightly    pantoprazole  40 mg Oral QAM AC    sucralfate  1 g Oral 4x Daily AC & HS    arformoterol tartrate  15 mcg Nebulization BID    And    budesonide  0.25 mg Nebulization BID    And    ipratropium  0.5 mg Nebulization 4x daily    sodium chloride flush  5-40 mL IntraVENous 2 times per day    sertraline  100 mg Oral Nightly    atorvastatin  20 mg Oral Nightly    carvedilol  12.5 mg Oral BID WC     diphenhydrAMINE, 25 mg, Q6H PRN  sodium chloride flush, 5-40 mL, PRN  sodium chloride, , PRN  ondansetron, 4 mg, Q8H PRN   Or  ondansetron, 4 mg, Q6H PRN  polyethylene glycol, 17 g, Daily PRN  acetaminophen, 650 mg, Q6H PRN   Or  acetaminophen, 650 mg, Q6H PRN         Objective:    BP (!) 167/90   Pulse 89   Temp 98.1 °F (36.7 °C) (Oral)   Resp 18   Ht 5' (1.524 m)   Wt 141 lb 11.2 oz (64.3 kg)   SpO2 94%   BMI 27.67 kg/m²   General Appearance: alert and oriented to person, place and time- intermittent confusion to situation  Skin: warm and dry  Head: normocephalic and atraumatic  Neck: neck supple and non tender without mass   Pulmonary/Chest: clear to auscultation bilaterally  Cardiovascular: normal rate, normal S1 and S2 and no carotid bruits  Abdomen: soft, non-tender, non-distended, normal bowel sounds, no masses or organomegaly  Extremities: no cyanosis, no clubbing and no edema  Neurologic: speech normal         Recent Labs     01/21/23  0402 01/21/23  1035 01/22/23  0305    133 142   K 3.3* 3.6 4.0    98 107   CO2 30* 29 27   BUN 17 18 23   CREATININE 1.0 1.1* 1.2*   GLUCOSE 94 96 96   CALCIUM 9.0 8.4* 8.3*       Recent Labs     01/20/23  1600 01/21/23  0402 01/22/23  0305   WBC 11.3 9.4 9.0   RBC 4.18 4.23 3.80   HGB 11.7 11.7 10.4*   HCT 36.5 36.5 34.0   MCV 87.3 86.3 89.5   MCH 28.0 27.7 27.4   MCHC 32.1 32.1 30.6*   RDW 15.6* 15.6* 15.7*    355 319   MPV 9.9 9.7 9.9         Assessment:    Principal Problem:    Bacteremia  Active Problems:    Weakness  Resolved Problems:    * No resolved hospital problems. *      Plan:  1. Concern for bacteremia/ Gram positive cocci 4/4 bottles- likely contamination: pt originally presented to the ER for concern for fatigue/ weakness x 1month. Reporting main reason she came to ER was hopeful placement at rehab. Denies feeling ill/ She has a history of Multiple Sclerosis- has not followed with neurology in quite some time. She was diagnosed in 2010 at [facility]. She is on baclofen.  Initial concern in ER was for possible flare- and plan was to transfer patient to [facility]- However blood cultures 4/4 resulted positive and plan was for admission here to work up. Pt denies feeling ill. No fevers or leukocytosis. No evidence of current infection. She was treated for UTI 1 month ago. Appreciate ID input. Repeat blood cultures are pending. Observing off of antibiotics. 2. Weakness/ fatigue/ failure to thrive: PT/ OT-     3. H/o MS: initial concern was for possible flare and plan was to admit pt to neurology service. However- blood cultures then resulted positive and plan was to admit patient here at [facility]. Will obtain MRI brain to further evaluate. Pt is now declining transfer to [facility]. Will need outpt neurology evaluation. Pt feels that her symptoms of weakness are deconditioning. Reporting she never recovered from last hospitalization. 4. Recent hemorrhagic shock due to duodenal ulcer s/p EGD on 11/11 with duodenal ulcer with adherent clot and visibile vessel/ S/p epi and clip     5. CKD: creatinine 1.5 on admission- gentle hydration      6. COPD; stable-      7. H/o breast cancer     8. Chronic respiratory failure- pt wears 3L at home prn     9. Recent UTI: seen in ER 12/19- UC e coli/ enterobacter- treated with bactrim. Recheck UA     10. Hypokalemia/ hypomagnesemia: supplement     11. Folate deficiency: start folic acid. 12. Encephalopathy: ? Unclear etiology- pt reported feeling great yesterday. Overnight- worsening confusion/ could not recall why she was in hospital. Yelling out and calling family confused. There was concern her  may have brought her in medication. UDS was negative. No offending meds overnight. PT still somewhat confused this AM/ MRI brain no acute findings. Recheck UA. ? Sun downing. Apparently had sun downing while at recent [facility] stay/         Dispo: PT/ OT social work         Time spent reviewing chart, clinical exam, discussing case and answering questions with staff/consultants/patient/family = 20 minutes                NOTE: This report was transcribed using voice recognition software. Every effort was made to ensure accuracy; however, inadvertent computerized transcription errors may be present.   Electronically signed by CLARKE Corral on 1/22/2023 at 8:17 AM

## 2023-01-22 NOTE — PROGRESS NOTES
Attempted to call pt daughter, phone went to voicemail and her mailbox was full.  Unable to leave a message

## 2023-01-22 NOTE — CARE COORDINATION
Social Work Consult-Attempted to meet with patient at the bedside, she was sleeping soundly, did not respond to verbal cues. Call placed to her daughter Linda Rutledge # [phone], to discuss discharge planning. Patient had a past stay at [facility], she did not like it there, after her last hospitalization she went to [address] [facility]. She has been home with  for the last month or so, however her daughter stated she needs more rehab, requesting [address] the [place] again. Weekend referral sent to liaison, Teo/SW will need to follow up in am for acceptance. PT/OT ordered, however patient refused to work with today. CM/SW following. The Plan for Transition of Care is related to the following treatment goals: Skilled Nursing Rehab    The patient representative daughter Yvette Mclain was provided with a choice of provider and agrees   with the discharge plan. [x] Yes [] No    Freedom of choice list was provided with basic dialogue that supports the patient's individualized plan of care/goals, treatment preferences and shares the quality data associated with the providers.  [x] Yes [] No      Lyly JINN, RN  [facility]

## 2023-01-22 NOTE — PROGRESS NOTES
[facility]  SHARANIDA  Progress Note    SUBJECTIVE:  Chief Complaint   Patient presents with    Fatigue     Pt has hx of ms and has increased fatigue and weakness     Patient is tolerating medications. No reported adverse drug reactions.  No nausea, vomiting, diarrhea.  She is laying in bed, receiving a breathing treatment  Feeling okay today, just states she is really cold and needs more blankets   Nursing reports she sun downs at night   For MRI today   No fevers, 2L NC      Review of systems:  As stated above in the chief complaint, otherwise negative.    Medications:  Scheduled Meds:   folic acid  1 mg Oral Daily    amLODIPine  5 mg Oral Daily    baclofen  10 mg Oral BID    mirtazapine  15 mg Oral Nightly    pantoprazole  40 mg Oral QAM AC    sucralfate  1 g Oral 4x Daily AC & HS    arformoterol tartrate  15 mcg Nebulization BID    And    budesonide  0.25 mg Nebulization BID    And    ipratropium  0.5 mg Nebulization 4x daily    sodium chloride flush  5-40 mL IntraVENous 2 times per day    sertraline  100 mg Oral Nightly    atorvastatin  20 mg Oral Nightly    carvedilol  12.5 mg Oral BID WC     Continuous Infusions:   sodium chloride      sodium chloride 50 mL/hr at 23 0946     PRN Meds:diphenhydrAMINE, sodium chloride flush, sodium chloride, ondansetron **OR** ondansetron, polyethylene glycol, acetaminophen **OR** acetaminophen    OBJECTIVE:  BP (!) 167/90   Pulse 93   Temp 98.1 °F (36.7 °C) (Oral)   Resp 18   Ht 5' (1.524 m)   Wt 141 lb 11.2 oz (64.3 kg)   SpO2 95%   BMI 27.67 kg/m²   Temp  Av.1 °F (36.7 °C)  Min: 97.7 °F (36.5 °C)  Max: 98.7 °F (37.1 °C)  Constitutional: The patient is awake, alert, and oriented. In bed. In no distress. Receiving a breathing treatment.   Skin: Warm and dry. No rashes were noted.   HEENT: Round and reactive pupils.  Moist mucous membranes.  No ulcerations or thrush.  Neck: Supple to movements.   Chest: No use of accessory  muscles to breathe. Symmetrical expansion. No wheezing, crackles or rhonchi. Cardiovascular: S1 and S2 are rhythmic and regular. No murmurs appreciated. Abdomen: Positive bowel sounds to auscultation. Benign to palpation. Extremities: both knees and both hips with scars: no pain. Has bruise left thigh.    Lines: peripheral    Laboratory and Tests Review:  Lab Results   Component Value Date    WBC 9.0 01/22/2023    WBC 9.4 01/21/2023    WBC 11.3 01/20/2023    HGB 10.4 (L) 01/22/2023    HCT 34.0 01/22/2023    MCV 89.5 01/22/2023     01/22/2023     Lab Results   Component Value Date    NEUTROABS 5.18 01/21/2023    NEUTROABS 5.14 01/18/2023    NEUTROABS 5.09 12/19/2022     No results found for: [facility]  Lab Results   Component Value Date    ALT 6 01/18/2023    AST 15 01/18/2023    ALKPHOS 68 01/18/2023    BILITOT 0.3 01/18/2023     Lab Results   Component Value Date/Time     01/22/2023 03:05 AM    K 4.0 01/22/2023 03:05 AM    K 3.6 01/21/2023 10:35 AM     01/22/2023 03:05 AM    CO2 27 01/22/2023 03:05 AM    BUN 23 01/22/2023 03:05 AM    CREATININE 1.2 01/22/2023 03:05 AM    CREATININE 1.1 01/21/2023 10:35 AM    CREATININE 1.0 01/21/2023 04:02 AM    GFRAA > 60 11/10/2022 05:22 AM    LABGLOM 47 01/22/2023 03:05 AM    GLUCOSE 96 01/22/2023 03:05 AM    GLUCOSE 85 11/10/2022 05:22 AM    PROT 7.0 01/18/2023 06:23 PM    LABALBU 3.6 01/18/2023 06:23 PM    LABALBU 2.7 11/07/2022 07:10 AM    CALCIUM 8.3 01/22/2023 03:05 AM    BILITOT 0.3 01/18/2023 06:23 PM    ALKPHOS 68 01/18/2023 06:23 PM    AST 15 01/18/2023 06:23 PM    ALT 6 01/18/2023 06:23 PM     Lab Results   Component Value Date    CRP 3.7 (H) 10/29/2022     Lab Results   Component Value Date    SEDRATE 44 (H) 10/29/2022    SEDRATE 40 (H) 05/30/2012     Radiology:      Microbiology:   Blood Cultures 1/18/23: 2 types of staph species  Blood Cultures 1/20/23: negative so far     ASSESSMENT:  Staph species bacteremia: likely contamination    PLAN:  Continue to watch off antibiotics  Monitor labs  ID signs off at this time, please call if needed     HIRA Cortez - CNP  9:44 AM  1/22/2023     Pt seen and examined. Above discussed agree with advanced practice nurse. Labs, cultures, and radiographs reviewed. Face to Face encounter occurred. Changes made as necessary.      Nick Reid MD

## 2023-01-23 LAB
ANION GAP SERPL CALCULATED.3IONS-SCNC: 7 MMOL/L (ref 7–16)
BUN BLDV-MCNC: 20 MG/DL (ref 6–23)
CALCIUM SERPL-MCNC: 8 MG/DL (ref 8.6–10.2)
CHLORIDE BLD-SCNC: 107 MMOL/L (ref 98–107)
CO2: 27 MMOL/L (ref 22–29)
CREAT SERPL-MCNC: 1.1 MG/DL (ref 0.5–1)
CULTURE, BLOOD 2: ABNORMAL
CULTURE, BLOOD 2: ABNORMAL
GFR SERPL CREATININE-BSD FRML MDRD: 52 ML/MIN/1.73
GLUCOSE BLD-MCNC: 90 MG/DL (ref 74–99)
HCT VFR BLD CALC: 33.2 % (ref 34–48)
HEMOGLOBIN: 10.2 G/DL (ref 11.5–15.5)
MCH RBC QN AUTO: 27.6 PG (ref 26–35)
MCHC RBC AUTO-ENTMCNC: 30.7 % (ref 32–34.5)
MCV RBC AUTO: 89.7 FL (ref 80–99.9)
ORGANISM: ABNORMAL
PDW BLD-RTO: 15.6 FL (ref 11.5–15)
PLATELET # BLD: 300 E9/L (ref 130–450)
PMV BLD AUTO: 9.6 FL (ref 7–12)
POTASSIUM SERPL-SCNC: 3.7 MMOL/L (ref 3.5–5)
RBC # BLD: 3.7 E12/L (ref 3.5–5.5)
SODIUM BLD-SCNC: 141 MMOL/L (ref 132–146)
WBC # BLD: 9.2 E9/L (ref 4.5–11.5)

## 2023-01-23 PROCEDURE — 6370000000 HC RX 637 (ALT 250 FOR IP): Performed by: EMERGENCY MEDICINE

## 2023-01-23 PROCEDURE — 97530 THERAPEUTIC ACTIVITIES: CPT

## 2023-01-23 PROCEDURE — 2580000003 HC RX 258: Performed by: NURSE PRACTITIONER

## 2023-01-23 PROCEDURE — 2060000000 HC ICU INTERMEDIATE R&B

## 2023-01-23 PROCEDURE — 97161 PT EVAL LOW COMPLEX 20 MIN: CPT

## 2023-01-23 PROCEDURE — 85027 COMPLETE CBC AUTOMATED: CPT

## 2023-01-23 PROCEDURE — 80048 BASIC METABOLIC PNL TOTAL CA: CPT

## 2023-01-23 PROCEDURE — 97165 OT EVAL LOW COMPLEX 30 MIN: CPT

## 2023-01-23 PROCEDURE — 6370000000 HC RX 637 (ALT 250 FOR IP): Performed by: NURSE PRACTITIONER

## 2023-01-23 PROCEDURE — 99232 SBSQ HOSP IP/OBS MODERATE 35: CPT | Performed by: NURSE PRACTITIONER

## 2023-01-23 PROCEDURE — 36415 COLL VENOUS BLD VENIPUNCTURE: CPT

## 2023-01-23 RX ADMIN — SUCRALFATE 1 G: 1 TABLET ORAL at 16:53

## 2023-01-23 RX ADMIN — CARVEDILOL 12.5 MG: 6.25 TABLET, FILM COATED ORAL at 09:55

## 2023-01-23 RX ADMIN — PANTOPRAZOLE SODIUM 40 MG: 40 TABLET, DELAYED RELEASE ORAL at 06:03

## 2023-01-23 RX ADMIN — FOLIC ACID 1 MG: 1 TABLET ORAL at 09:55

## 2023-01-23 RX ADMIN — SERTRALINE 100 MG: 100 TABLET, FILM COATED ORAL at 20:07

## 2023-01-23 RX ADMIN — ATORVASTATIN CALCIUM 20 MG: 20 TABLET, FILM COATED ORAL at 20:07

## 2023-01-23 RX ADMIN — MIRTAZAPINE 15 MG: 15 TABLET, FILM COATED ORAL at 20:07

## 2023-01-23 RX ADMIN — AMLODIPINE BESYLATE 5 MG: 5 TABLET ORAL at 09:55

## 2023-01-23 RX ADMIN — SODIUM CHLORIDE, PRESERVATIVE FREE 10 ML: 5 INJECTION INTRAVENOUS at 20:07

## 2023-01-23 RX ADMIN — SUCRALFATE 1 G: 1 TABLET ORAL at 12:09

## 2023-01-23 RX ADMIN — BACLOFEN 10 MG: 10 TABLET ORAL at 20:07

## 2023-01-23 RX ADMIN — BACLOFEN 10 MG: 10 TABLET ORAL at 09:55

## 2023-01-23 RX ADMIN — CARVEDILOL 12.5 MG: 6.25 TABLET, FILM COATED ORAL at 16:53

## 2023-01-23 RX ADMIN — SODIUM CHLORIDE, PRESERVATIVE FREE 10 ML: 5 INJECTION INTRAVENOUS at 09:55

## 2023-01-23 RX ADMIN — SUCRALFATE 1 G: 1 TABLET ORAL at 20:07

## 2023-01-23 RX ADMIN — SUCRALFATE 1 G: 1 TABLET ORAL at 06:03

## 2023-01-23 ASSESSMENT — PAIN SCALES - GENERAL
PAINLEVEL_OUTOF10: 0

## 2023-01-23 NOTE — PROGRESS NOTES
Physical Therapy  Facility/Department: [address] INTERMEDIATE  Physical Therapy Initial Assessment    Name: Artur Soto  : 1945  MRN: [id number]  Date of Service: 2023           Patient Diagnosis(es): The encounter diagnosis was Multiple sclerosis ([facility] Past Medical History:  has a past medical history of Arthritis, benign breast, CKD (chronic kidney disease), COPD (chronic obstructive pulmonary disease) ([facility] Hyperlipidemia, Hypertension, MS (multiple sclerosis) ([facility] and Multiple sclerosis ([facility] Past Surgical History:  has a past surgical history that includes Hysterectomy; Breast surgery (2012); Appendectomy; Knee Arthroplasty (Left, 10/01/2013); Knee Arthroplasty (Right, 2012); Hip Arthroplasty (Right, 2011); Total hip arthroplasty (Left, 3/25/2022); and Upper gastrointestinal endoscopy (N/A, 2022). Requires PT Follow-Up: Yes       Requires PT Follow-Up: Yes      Evaluating Therapist: Marina Ferrari PT          Referring Provider: CLARKE Soto     PT order : PT eval and treat      Room #:646  DIAGNOSIS:  MS      PRECAUTIONS: falls, O2      Social:  Pt lives with  spouse  in a  1  floor plan  and ramp  to enter. Prior to admission pt walked with  rollator. Reports home O2, wears at night         Initial Evaluation  Date:  2023   Treatment      Short Term/ Long Term   Goals   Was pt agreeable to Eval/treatment? Yes        Does pt have pain?   L shoulder pain        Bed Mobility  Rolling:  NT   Supine to sit:  min assist   Sit to supine: NT   Scooting: independent in sit     Independent    Transfers Sit to stand:  CGA/min assist    Stand to sit:  CGA/min assist    Stand pivot:  NT     Independent    Ambulation     15  feet  x 2 with ww  with  CGA/min assist     150  feet with  ww  with  independent          Stair negotiation: ascended and descended NT    N/A    LE ROM  WFL       LE strength  4-/5    4/ 5    AM- PAC RAW score                   Pt is alert and grossly Oriented x  3, except no month       Balance: CGA/min assist  Fall risk due to  decreased activity tolerance, weakness  Endurance: decreased   Bed/Chair alarm:  yes      ASSESSMENT    Pt displays functional ability as noted in the objective portion of this evaluation. Conditions Requiring Skilled Therapeutic Intervention:     [x]Decreased strength                                      []Decreased ROM  [x]Decreased functional mobility  [x]Decreased balance   [x]Decreased endurance   []Decreased posture  []Decreased sensation  []Decreased coordination   []Decreased vision  []Decreased safety awareness   [x]Increased pain                Treatment/Education:    Pt in bed   upon arrival ; agreeable to PT. Mobility as above. Cues for hand placement with transfers  and ww safety with gait. Pt reports feeling exhausted after minimal mobility. Needed assist for hygiene after commode use. Pt educated on fall risk, safe and proper technique with mobility         Patient response to education:   Pt verbalized understanding Pt demonstrated skill Pt requires further education in this area   x With cues   x         Comments:  Pt left  in chair after session, with call light in reach. Chair alarm placed . Rehab potential is Good for reaching above PT goals. Pts/ family goals     1. none stated      Patient and or family understand(s) diagnosis, prognosis, and plan of care. -  yes      PLAN    PT care will be provided in accordance with the objectives noted above. Whenever appropriate, clear delegation orders will be provided for nursing staff. Exercises and functional mobility practice will be used as well as appropriate assistive devices or modalities to obtain goals. Patient and family education will also be administered as needed.            PLAN OF CARE:     Current Treatment Recommendations      [x] Strengthening to improve independence with functional mobility   [] ROM to improve independence with functional mobility   [x] Balance Training to improve static/dynamic balance and to reduce fall risk  [x] Endurance Training to improve activity tolerance during functional mobility   [x] Transfer Training to improve safety and independence with all functional transfers   [x] Gait Training to improve gait mechanics, endurance and assess need for appropriate assistive device  [] Stair Training in preparation for safe discharge home and/or into the community   [x] Positioning to prevent skin breakdown and contractures  [x] Safety and Education Training   [x] Patient/Caregiver Education   [] HEP  [] Other      Frequency of treatments will be 2-5x/week x  7 -10 days. Time in: 0814  Time out: 0840         Evaluation Time includes thorough review of current medical information, gathering information on past medical history/social history and prior level of function, completion of standardized testing/informal observation of tasks, assessment of data and education on plan of care and goals.      CPT codes:  [x] Low Complexity PT evaluation 04857  [] Moderate Complexity PT evaluation 76605  [] High Complexity PT evaluation 67095  [] PT Re-evaluation 17184  [] Gait training 47396  minutes  [x] Therapeutic activities 34930 10 minutes  [] Therapeutic exercises 48854  minutes  [] Neuromuscular reeducation 18071  minutes         Donya 18 number:  PT 7820

## 2023-01-23 NOTE — CARE COORDINATION
CM placed a call to the pt to confirm pt is agreeable to rehab, she is requesting [address] [facility] the [place]. [facility] [place] can accept, need PT/OT evals for auth. Envelope, INA, yash form and 12634 completed. Will need Covid testing on the day of discharge. CM Will follow along with  and assist with discharge planning as necessary. MACRINA Troncoso, RN  [facility] Case Management  [phone]    The Plan for Transition of Care is related to the following treatment goals: SNF    The Patient was provided with a choice of provider and agrees   with the discharge plan. [x] Yes [] No    Freedom of choice list was provided with basic dialogue that supports the patient's individualized plan of care/goals, treatment preferences and shares the quality data associated with the providers.  [x] Yes [] No

## 2023-01-23 NOTE — DISCHARGE INSTR - COC
Continuity of Care Form    Patient Name: Maryjane Enriquez   :  1945  MRN:  [id number]    Admit date:  2023  Discharge date:      Code Status Order: Full Code   Advance Directives:     Admitting Physician:  Rocío Orourke MD  PCP: Parmjit Hayden DO    Discharging Nurse: Lali Martel Unit/Room#: 7809/3828-K  Discharging Unit Phone Number: [phone]    Emergency Contact:   Extended Emergency Contact Information  Primary Emergency Contact: Sharif Rashid  Address: [facility] [address] [facility] Phone: [phone]  Relation: Spouse  Preferred language: English   needed? No  Secondary Emergency Contact: Halima Diaz  Address: [address] [address] [facility] [address] [facility] Phone: [phone]  Mobile Phone: [phone]  Relation: Child  Preferred language: English   needed? No    Past Surgical History:  Past Surgical History:   Procedure Laterality Date    APPENDECTOMY      BREAST SURGERY  2012    biopsy - negative    HIP ARTHROPLASTY Right 2011    Right AIDEE  ALLISON Escobar MD    HYSTERECTOMY (CERVIX STATUS UNKNOWN)      KNEE ARTHROPLASTY Left 10/01/2013    Left TKA  ALLISON Escobar MD    KNEE ARTHROPLASTY Right 2012    Right TKA  ALLISON Escobar MD    TOTAL HIP ARTHROPLASTY Left 3/25/2022    LEFT HIP TOTAL ARTHROPLASTY performed by Dex Mar MD at [address] [facility] ENDOSCOPY N/A 2022    EGD CONTROL HEMORRHAGE performed by Karthik Li MD at [address] [place] [address] History:   Immunization History   Administered Date(s) Administered    COVID-19, MODERNA BLUE border, Primary or Immunocompromised, (age 12y+), IM, 100 mcg/0.5mL 2021, 2021    Influenza, High Dose (Fluzone 65 yrs and older) 10/16/2018    Influenza, Triv, inactivated, subunit, adjuvanted, IM (Fluad 65 yrs and older) 10/10/2019    Pneumococcal Conjugate 13-valent Shylasevero Rios) 2019       Active Problems:  Patient Active Problem List   Diagnosis Code    Hypotension due to hypovolemia I95.89, E86.1    Hyperlipidemia E78.5    Hypertension, essential, benign I10    Acute blood loss anemia D62    Multiple adenomatous polyps D36.9    Chronic idiopathic constipation K59.04    Reactive depression F32.9    H/O total hip arthroplasty, right Z96.641    History of total knee replacement, left Z96.652    Simple chronic bronchitis ([address] J41.0    Brain aneurysm I67.1    Ambulatory dysfunction R26.2    Closed fracture of head of femur ([address] S72.059A    Acute respiratory failure with hypoxia ([address] J96.01    MARCELLO (acute kidney injury) ([address] N17.9    Primary hypertension I10    Primary osteoarthritis of left hip M16.12    COPD exacerbation (HCC) J44.1    Acute respiratory failure with hypoxia and hypercapnia (HCC) J96.01, J96.02    Bronchiectasis with acute exacerbation (HCC) J47.1    Hemorrhagic shock ([address] R57.8    Lactic acidosis E87.20    Chronic respiratory failure with hypoxia (HCC) J96.11    Gastrointestinal hemorrhage K92.2    Bacteremia R78.81    Weakness R53.1       Isolation/Infection:   Isolation            Contact          Patient Infection Status       Infection Onset Added Last Indicated Last Indicated By Review Planned Expiration Resolved Resolved By    MDRO (multi-drug resistant organism)  11/15/22 11/15/22 Aurora Childers RN        Enterobacter cloacae urine 11/10/2022    Resolved    COVID-19 (Rule Out) 22 COVID-19, Rapid (Ordered)   22 Rule-Out Test Resulted    COVID-19 22 COVID-19, Rapid   22     MRSA 10/28/22 10/29/22 10/28/22 Culture, MRSA, Screening   22 Aurora Childers RN    MRSA nares 10/28/2022    COVID-19 (Rule Out) 10/27/22 10/27/22 10/27/22 Respiratory Panel, Molecular, with COVID-19 (Restricted: peds pts or suitable admitted adults) (Ordered)   10/28/22 Rule-Out Test Resulted    COVID-19 (Rule Out) 03/24/22 03/24/22 03/24/22 Respiratory Panel, Molecular, with COVID-19 (Restricted: peds pts or suitable admitted adults) (Ordered)   03/24/22 Rule-Out Test Resulted            Nurse Assessment:  Last Vital Signs: BP (!) 100/50   Pulse 85   Temp 98.7 °F (37.1 °C) (Oral)   Resp 18   Ht 5' (1.524 m)   Wt 141 lb 11.2 oz (64.3 kg)   SpO2 95%   BMI 27.67 kg/m²     Last documented pain score (0-10 scale): Pain Level: 0  Last Weight:   Wt Readings from Last 1 Encounters:   01/22/23 141 lb 11.2 oz (64.3 kg)     Mental Status:  oriented, alert, coherent, logical, thought processes intact, able to concentrate and follow conversation, and mild nocturnal confusion    IV Access:  - None    Nursing Mobility/ADLs:  Walking   Assisted  Transfer  Assisted  Bathing  Assisted  Dressing  Assisted  Toileting  Assisted  Feeding  Independent after setup  [address] Delivery   whole    Wound Care Documentation and Therapy:  Wound 11/10/22 Leg Distal;Medial;Lower right lower shin scabbed area (Active)   Number of days: 73        Elimination:  Continence: Bowel: Yes  Bladder: No  Urinary Catheter:  was using external urinary catheter (PureWick)    Colostomy/Ileostomy/Ileal Conduit: No       Date of Last BM: 1/23/23    Intake/Output Summary (Last 24 hours) at 1/23/2023 1101  Last data filed at 1/22/2023 2025  Gross per 24 hour   Intake 10 ml   Output --   Net 10 ml     I/O last 3 completed shifts:   In: 10 [I.V.:10]  Out: 850 [Urine:850]    Safety Concerns:     History of Falls (last 30 days), At Risk for Falls, and mild nocturnal confusion, MS (needs to see neurology outpatient)    Impairments/Disabilities:      Weakness, MS    Nutrition Therapy:  Current Nutrition Therapy:   - Oral Diet:  General    Routes of Feeding: Oral  Liquids: No Restrictions  Daily Fluid Restriction: no  Last Modified Barium Swallow with Video (Video Swallowing Test): not done    Treatments at the Time of Hospital Discharge:   Respiratory Treatments: none  Oxygen Therapy:  is on oxygen at 3 L/min per nasal cannula. Ventilator:    - No ventilator support    Rehab Therapies: Physical Therapy and Occupational Therapy  Weight Bearing Status/Restrictions: No weight bearing restrictions  Other Medical Equipment (for information only, NOT a DME order):  walker, bedside commode, and hospital bed  Other Treatments: ---    Patient's personal belongings (please select all that are sent with patient):  Glasses, Jewelry, iPhone,  & cable, purse, glasses (x3), handbag, shoes, toiletries, pants, snacks, hairbrush    RN SIGNATURE:  Electronically signed by Faye Sellers RN on 1/24/23 at 10:26 AM EST    CASE MANAGEMENT/SOCIAL WORK SECTION    Inpatient Status Date: 1/20/23    Readmission Risk Assessment Score:  Readmission Risk              Risk of Unplanned Readmission:  23           Discharging to Facility/ Agency   Name: [facility] Shabana Coffman at the [address] [place] [address]  [phone]  [phone]    Dialysis Facility (if applicable)   Name:  Address:  Dialysis Schedule:  Phone:  Fax:    / signature: Electronically signed by Jacky Griffiths RN on 1/23/23 at 11:03 AM EST    PHYSICIAN SECTION    Prognosis: Fair    Condition at Discharge: Stable    Rehab Potential (if transferring to Rehab): Fair    Recommended Labs or Other Treatments After Discharge:   BMP on 1/25/23. Results to SNF MD    Physician Certification: I certify the above information and transfer of Katarina Fernandez  is necessary for the continuing treatment of the diagnosis listed and that she requires [facility] for less 30 days.      Update Admission H&P: No change in H&P    PHYSICIAN SIGNATURE:  Electronically signed by Magaly Roldan MD on 1/24/23 at 8:39 AM EST

## 2023-01-23 NOTE — PROGRESS NOTES
[facility] Progress Note    Admitting Date and Time: 1/18/2023  5:24 PM  Admit Dx: Multiple sclerosis ([facility] Mirta Stone  Bacteremia [R78.81]    Subjective:  Patient is being followed for Multiple sclerosis ([facility] Mirta Stone  Bacteremia [R78.81]     Patient resting comfortably in bed in no acute distress  Reporting she feels ok  Feels less confused today  Social work- setting up [facility]          ROS: denies fever, chills, cp, sob, n/v, HA unless stated above.       folic acid  1 mg Oral Daily    amLODIPine  5 mg Oral Daily    baclofen  10 mg Oral BID    mirtazapine  15 mg Oral Nightly    pantoprazole  40 mg Oral QAM AC    sucralfate  1 g Oral 4x Daily AC & HS    sodium chloride flush  5-40 mL IntraVENous 2 times per day    sertraline  100 mg Oral Nightly    atorvastatin  20 mg Oral Nightly    carvedilol  12.5 mg Oral BID WC     diphenhydrAMINE, 25 mg, Q6H PRN  sodium chloride flush, 5-40 mL, PRN  sodium chloride, , PRN  ondansetron, 4 mg, Q8H PRN   Or  ondansetron, 4 mg, Q6H PRN  polyethylene glycol, 17 g, Daily PRN  acetaminophen, 650 mg, Q6H PRN   Or  acetaminophen, 650 mg, Q6H PRN         Objective:    BP (!) 152/67   Pulse 85   Temp 98.3 °F (36.8 °C) (Oral)   Resp 16   Ht 5' (1.524 m)   Wt 141 lb 11.2 oz (64.3 kg)   SpO2 90%   BMI 27.67 kg/m²   General Appearance: alert and oriented to person, place and time- intermittent confusion to situation  Skin: warm and dry  Head: normocephalic and atraumatic  Neck: neck supple and non tender without mass   Pulmonary/Chest: clear to auscultation bilaterally  Cardiovascular: normal rate, normal S1 and S2 and no carotid bruits  Abdomen: soft, non-tender, non-distended, normal bowel sounds, no masses or organomegaly  Extremities: no cyanosis, no clubbing and no edema  Neurologic: speech normal                Recent Labs     01/21/23  1035 01/22/23  0305 01/23/23  0401    142 141   K 3.6 4.0 3.7   CL 98 107 107   CO2 29 27 27   BUN 18 23 20   CREATININE 1.1* 1.2* 1.1*   GLUCOSE 96 96 90   CALCIUM 8.4* 8.3* 8.0*       Recent Labs     01/21/23  0402 01/22/23  0305 01/23/23  0401   WBC 9.4 9.0 9.2   RBC 4.23 3.80 3.70   HGB 11.7 10.4* 10.2*   HCT 36.5 34.0 33.2*   MCV 86.3 89.5 89.7   MCH 27.7 27.4 27.6   MCHC 32.1 30.6* 30.7*   RDW 15.6* 15.7* 15.6*    319 300   MPV 9.7 9.9 9.6           Assessment:    Principal Problem:    Bacteremia  Active Problems:    Weakness  Resolved Problems:    * No resolved hospital problems. *      Plan:  1. Concern for bacteremia/ Gram positive cocci 4/4 bottles- likely contamination: pt originally presented to the ER for concern for fatigue/ weakness x 1month. Reporting main reason she came to ER was hopeful placement at rehab. Denies feeling ill/ She has a history of Multiple Sclerosis- has not followed with neurology in quite some time. She was diagnosed in 2010 at [facility]. She is on baclofen. Initial concern in ER was for possible flare- and plan was to transfer patient to [facility]- However blood cultures 4/4 resulted positive and plan was for admission here to work up. Pt denies feeling ill. No fevers or leukocytosis. No evidence of current infection. She was treated for UTI 1 month ago. Appreciate ID input. Repeat blood cultures negative. Observing off of antibiotics. ID s/o     2. Weakness/ fatigue/ failure to thrive: PT/ OT-am pac 16- plan is [facility] on dc.     3. H/o MS: initial concern was for possible flare and plan was to admit pt to neurology service. However- blood cultures then resulted positive and plan was to admit patient here at [address]. Will obtain MRI brain to further evaluate. Pt is now declining transfer to [facility]. Will need outpt neurology evaluation. Pt feels that her symptoms of weakness are deconditioning. Reporting she never recovered from last hospitalization.       4. Recent hemorrhagic shock due to duodenal ulcer s/p EGD on 11/11 with duodenal ulcer with adherent clot and visibile vessel/ S/p epi and clip     5. CKD: creatinine 1.5 on admission- gentle hydration - resolving. Stop IVF     6. COPD; stable-      7. H/o breast cancer     8. Chronic respiratory failure- pt wears 3L at home prn     9. Recent UTI: seen in ER 12/19- UC e coli/ enterobacter- treated with bactrim. Recheck UA- neg     10. Hypokalemia/ hypomagnesemia: supplement     11. Folate deficiency: start folic acid. 12. Encephalopathy: ? Unclear etiology- resolved. pt reported feeling great 1/21. Overnight- 1/22 worsening confusion/ could not recall why she was in hospital. Yelling out and calling family confused. There was concern her  may have brought her in medication. UDS was negative. No offending meds overnight. PT still somewhat confused this AM/ MRI brain no acute findings. Recheck UA. ? Sun downing. Apparently had sun downing while at recent [place] [id number] stay. Resolved. Dispo: PT/ OT social work - plan is [facility] on [employer]        Time spent reviewing chart, clinical exam, discussing case and answering questions with staff/consultants/patient/family = 20 minutes                NOTE: This report was transcribed using voice recognition software. Every effort was made to ensure accuracy; however, inadvertent computerized transcription errors may be present.   Electronically signed by CLARKE Lyons on 1/23/2023 at 9:23 AM

## 2023-01-23 NOTE — PROGRESS NOTES
Patient SPO2 82% on room air. Patient states she wears 3 liters nasal cannula at night when she is at home. Advised patient to start wearing oxygen all the time. Placed on 3 liters nasal cannula SPO2 92%.

## 2023-01-23 NOTE — PROGRESS NOTES
Occupational Therapy    OCCUPATIONAL THERAPY INITIAL EVALUATION     [address] [place], [place]         GWVY:                                                  Patient Name: Davin Shirley    MRN: [id number]    : 1945    Room: [address] [place] [address]      Evaluating OT: Danielle Cowan OTR/L   [id number]      Referring Deitra Going, APN    Specific Provider Orders/Date:OT eval and treat 2023      Diagnosis:  Multiple sclerosis ([address] David Aschoff  Bacteremia [R78.81]     Pertinent Medical History: COPD, hip surgery, knee surgery , R  rotator injury      Precautions:  Fall Risk,      Assessment of current deficits    [x] Functional mobility  [x]ADLs  [x] Strength               []Cognition    [x] Functional transfers   [x] IADLs         [x] Safety Awareness   [x]Endurance    [] Fine Coordination              [x] Balance      [] Vision/perception   []Sensation     []Gross Motor Coordination  [] ROM  [] Delirium                   [] Motor Control     OT PLAN OF CARE   OT POC based on physician orders, patient diagnosis and results of clinical assessment    Frequency/Duration  2-4 days/wk for 2 weeks PRN   Specific OT Treatment Interventions to include:   ADL retraining/adapted techniques and AE recommendations to increase functional independence within precautions                    Energy conservation techniques to improve tolerance for selfcare routine   Functional transfer/mobility training/DME recommendations for increased independence, safety and fall prevention         Patient/family education to increase safety and functional independence             Environmental modifications for safe mobility and completion of ADLs                             Therapeutic activity to improve functional performance during ADLs.                                          Therapeutic exercise to improve tolerance and functional strength for ADLs    Balance retraining/tolerance tasks for facilitation of postural control with dynamic challenges during ADLs . Positioning to improve functional independence  []    Recommended Adaptive Equipment: TBD     Home Living: Pt lives with ,  1 story with ramp to enter   Bathroom setup: walk in shower, shower chair    Equipment owned: rollator, home O2    Prior Level of Function: assist as needed  with ADLs , assist  with IADLs; ambulated with rollator     Pain Level: no pain this session ;   Cognition: A&O:  pleasant, following commands, conversing    Memory:  fair +   Sequencing:  fair+   Problem solving:  fair+   Judgement/safety:  fair_+     Functional Assessment:  AM-PAC Daily Activity Raw Score: 16/24   Initial Eval Status  Date: 1/23/2023 Treatment Status  Date: STGs = LTGs  Time frame: 10-14 days   Feeding Set-up     Grooming Min A,seated   Supervision    UB Dressing Min A   SBA    LB Dressing Mod A  Able to cross legs to reach feet to manage socks   SBA    Bathing Min A   SBA    Toileting Min A  Supervision    Bed Mobility  Min A  Supine to sit   Mod A  Sit-supine  SBA   Functional Transfers Min/CGA  Sit-stand from bed   SBA/supervision    Functional Mobility Rupali,w/walker   Couple steps forward     Patient reported she feels like her legs  start to spasm and feel jelly like - Reports she has been having this feeling more recently , and is fearful of falling   SBA  with good tolerance    Balance Sitting:     Static:  SBA- EOB     Dynamic:SBA   Standing: Min A  Independent/supervision - sitting   SBA- standing    Activity Tolerance No SOB   Good  with ADL activity    Visual/  Perceptual Glasses: reading                 Hand Dominance right   AROM (PROM) Strength Additional Info:    R/L UE  B shoulder ROM limiited, approx 80 degrees .   Hx R torn rotator tear   Distally WFL Fair-proximal   Fair+ distally  good  and wfl FMC/dexterity noted during ADL tasks         [facility]   Sensation:  No c/o numbness or tingling   Tone: WFL  Edema: none observed     Comments: Upon arrival patient lying in bed . At end of session, patient  returned to bed  with call light and phone within reach, all lines and tubes intact. *ALARM ON     Overall patient demonstrated  decreased independence and safety during completion of ADL/functional transfer/mobility tasks. Pt would benefit from continued skilled OT to increase safety and independence with completion of ADL/IADL tasks for functional independence and quality of life. Comments/Treatment:      Patient practiced and was instructed on following during evaluation and OT session:          -Bed mobility - technique and safety         -Tranfers - hand placement, tech and safety         -Mobility - safety, and tech with  a device         -ADLs - tech, AE if appropriate/needed  -continue to educate and instruct         -Education: , importance of OOB activity and participating in ADLs,safety awareness            Rehab Potential: good for established goals     Patient / Family Goal: rehab       Patient and/or family were instructed on functional diagnosis, prognosis/goals and OT plan of care. Demonstrated good  understanding. Eval Complexity: Low    Time In: 1515  Time Out: 1538  Total Treatment Time: 8    Min Units   OT Eval Low 97165 x  1   OT Eval Medium 26211      OT Eval High 86357      OT Re-Eval [id number]       Therapeutic Ex 81341      Therapeutic Activities 89606 9  1   ADL/Self Care 66571       Orthotic Management 10595       Manual 95335     Neuro Re-Ed 18399       Non-Billable Time         Evaluation Time additionally includes thorough review of current medical information, gathering information on past medical history/social history and prior level of function, interpretation of standardized testing/informal observation of tasks, assessment of data and development of plan of care and goals.             Cristela Mcburney  OTR/L  OT [id number]

## 2023-01-24 VITALS
BODY MASS INDEX: 27.82 KG/M2 | RESPIRATION RATE: 18 BRPM | WEIGHT: 141.7 LBS | OXYGEN SATURATION: 93 % | TEMPERATURE: 97.7 F | HEIGHT: 60 IN | DIASTOLIC BLOOD PRESSURE: 80 MMHG | HEART RATE: 93 BPM | SYSTOLIC BLOOD PRESSURE: 151 MMHG

## 2023-01-24 LAB
ANION GAP SERPL CALCULATED.3IONS-SCNC: 8 MMOL/L (ref 7–16)
BUN BLDV-MCNC: 16 MG/DL (ref 6–23)
CALCIUM SERPL-MCNC: 8.6 MG/DL (ref 8.6–10.2)
CHLORIDE BLD-SCNC: 105 MMOL/L (ref 98–107)
CO2: 27 MMOL/L (ref 22–29)
CREAT SERPL-MCNC: 1.1 MG/DL (ref 0.5–1)
GFR SERPL CREATININE-BSD FRML MDRD: 52 ML/MIN/1.73
GLUCOSE BLD-MCNC: 90 MG/DL (ref 74–99)
HCT VFR BLD CALC: 33.2 % (ref 34–48)
HEMOGLOBIN: 10.2 G/DL (ref 11.5–15.5)
MAGNESIUM: 1.4 MG/DL (ref 1.6–2.6)
MCH RBC QN AUTO: 27.5 PG (ref 26–35)
MCHC RBC AUTO-ENTMCNC: 30.7 % (ref 32–34.5)
MCV RBC AUTO: 89.5 FL (ref 80–99.9)
PDW BLD-RTO: 15.4 FL (ref 11.5–15)
PLATELET # BLD: 318 E9/L (ref 130–450)
PMV BLD AUTO: 9.6 FL (ref 7–12)
POTASSIUM SERPL-SCNC: 3.3 MMOL/L (ref 3.5–5)
RBC # BLD: 3.71 E12/L (ref 3.5–5.5)
SARS-COV-2, NAAT: NOT DETECTED
SODIUM BLD-SCNC: 140 MMOL/L (ref 132–146)
URINE CULTURE, ROUTINE: NORMAL
WBC # BLD: 8.3 E9/L (ref 4.5–11.5)

## 2023-01-24 PROCEDURE — 6370000000 HC RX 637 (ALT 250 FOR IP): Performed by: NURSE PRACTITIONER

## 2023-01-24 PROCEDURE — 99239 HOSP IP/OBS DSCHRG MGMT >30: CPT | Performed by: NURSE PRACTITIONER

## 2023-01-24 PROCEDURE — 85027 COMPLETE CBC AUTOMATED: CPT

## 2023-01-24 PROCEDURE — 87635 SARS-COV-2 COVID-19 AMP PRB: CPT

## 2023-01-24 PROCEDURE — 80048 BASIC METABOLIC PNL TOTAL CA: CPT

## 2023-01-24 PROCEDURE — 2700000000 HC OXYGEN THERAPY PER DAY

## 2023-01-24 PROCEDURE — 36415 COLL VENOUS BLD VENIPUNCTURE: CPT

## 2023-01-24 PROCEDURE — 83735 ASSAY OF MAGNESIUM: CPT

## 2023-01-24 PROCEDURE — 6370000000 HC RX 637 (ALT 250 FOR IP): Performed by: EMERGENCY MEDICINE

## 2023-01-24 PROCEDURE — 2580000003 HC RX 258: Performed by: NURSE PRACTITIONER

## 2023-01-24 RX ORDER — ATORVASTATIN CALCIUM 20 MG/1
20 TABLET, FILM COATED ORAL NIGHTLY
Qty: 30 TABLET | Refills: 3 | DISCHARGE
Start: 2023-01-24 | End: 2023-02-10 | Stop reason: SDUPTHER

## 2023-01-24 RX ORDER — POTASSIUM CHLORIDE 20 MEQ/1
20 TABLET, EXTENDED RELEASE ORAL ONCE
Status: COMPLETED | OUTPATIENT
Start: 2023-01-24 | End: 2023-01-24

## 2023-01-24 RX ORDER — FOLIC ACID 1 MG/1
1 TABLET ORAL DAILY
Qty: 30 TABLET | Refills: 3 | DISCHARGE
Start: 2023-01-24 | End: 2023-02-10 | Stop reason: SDUPTHER

## 2023-01-24 RX ORDER — SERTRALINE HYDROCHLORIDE 100 MG/1
100 TABLET, FILM COATED ORAL NIGHTLY
Qty: 30 TABLET | Refills: 3 | DISCHARGE
Start: 2023-01-24 | End: 2023-02-16 | Stop reason: SDUPTHER

## 2023-01-24 RX ORDER — LANOLIN ALCOHOL/MO/W.PET/CERES
400 CREAM (GRAM) TOPICAL DAILY
Status: DISCONTINUED | OUTPATIENT
Start: 2023-01-24 | End: 2023-01-24 | Stop reason: HOSPADM

## 2023-01-24 RX ORDER — LANOLIN ALCOHOL/MO/W.PET/CERES
400 CREAM (GRAM) TOPICAL DAILY
Qty: 14 TABLET | DISCHARGE
Start: 2023-01-24 | End: 2023-02-10 | Stop reason: SDUPTHER

## 2023-01-24 RX ADMIN — BACLOFEN 10 MG: 10 TABLET ORAL at 09:13

## 2023-01-24 RX ADMIN — FOLIC ACID 1 MG: 1 TABLET ORAL at 09:13

## 2023-01-24 RX ADMIN — SODIUM CHLORIDE, PRESERVATIVE FREE 10 ML: 5 INJECTION INTRAVENOUS at 09:14

## 2023-01-24 RX ADMIN — PANTOPRAZOLE SODIUM 40 MG: 40 TABLET, DELAYED RELEASE ORAL at 05:19

## 2023-01-24 RX ADMIN — CARVEDILOL 12.5 MG: 6.25 TABLET, FILM COATED ORAL at 09:14

## 2023-01-24 RX ADMIN — POTASSIUM CHLORIDE 20 MEQ: 1500 TABLET, EXTENDED RELEASE ORAL at 09:14

## 2023-01-24 RX ADMIN — AMLODIPINE BESYLATE 5 MG: 5 TABLET ORAL at 09:13

## 2023-01-24 RX ADMIN — SUCRALFATE 1 G: 1 TABLET ORAL at 05:19

## 2023-01-24 RX ADMIN — Medication 400 MG: at 10:00

## 2023-01-24 ASSESSMENT — PAIN SCALES - GENERAL
PAINLEVEL_OUTOF10: 0
PAINLEVEL_OUTOF10: 0

## 2023-01-24 NOTE — PLAN OF CARE
Problem: Discharge Planning  Goal: Discharge to home or other facility with appropriate resources  1/24/2023 1011 by Jacinto Suazo RN  Outcome: Completed  1/23/2023 2232 by Sonia Diaz RN  Outcome: Progressing     Problem: Safety - Adult  Goal: Free from fall injury  1/24/2023 1011 by Jacinto Suazo RN  Outcome: Completed  1/23/2023 2232 by Sonia Diaz RN  Outcome: Progressing     Problem: Skin/Tissue Integrity  Goal: Absence of new skin breakdown  Description: 1. Monitor for areas of redness and/or skin breakdown  2. Assess vascular access sites hourly  3. Every 4-6 hours minimum:  Change oxygen saturation probe site  4. Every 4-6 hours:  If on nasal continuous positive airway pressure, respiratory therapy assess nares and determine need for appliance change or resting period.   1/24/2023 1011 by Jacinto Suazo RN  Outcome: Completed  1/23/2023 2232 by Sonia Diaz RN  Outcome: Progressing

## 2023-01-24 NOTE — CARE COORDINATION
Dc order in place w/transportation arranged via [facility] for 1030, nursing, facility and pt notified of pick time. [facility] has precert. Envelope, INA, yash form and 62535 completed. Will need Covid testing on the day of discharge. Will follow.   ANNABEL RiggsN, RN  [facility] Case Management  [phone]

## 2023-01-24 NOTE — DISCHARGE SUMMARY
[facility] Physician Discharge Summary       [facility]  [address] [address]  [phone]        [address]  [facility] [address]  [phone]    Follow up      Neurology    Schedule an appointment as soon as possible for a visit      Activity level: As tolerated     Dispo: [facility]    Condition on discharge: Stable     Patient ID:  Oliver Lopez  [id number]  89 y.o.  1945    Admit date: 1/18/2023    Discharge date and time:  1/24/2023  9:45 AM    Admission Diagnoses:   Principal Problem:    Bacteremia  Active Problems:    Weakness  Resolved Problems:    * No resolved hospital problems. *      Discharge Diagnoses:   Principal Problem:    Bacteremia  Active Problems:    Weakness  Resolved Problems:    * No resolved hospital problems. *      Consults:  IP CONSULT TO INFECTIOUS DISEASES  IP CONSULT TO [address] [place] [address]  Course:   Patient Oliver Lopez is a 69 y.o. presented with Multiple sclerosis ([address] Glynn Sell  Bacteremia [R78.81]  Patient presented to the ER for concern for increased fatigue and weakness from home. She has a history of questionable Multiple Sclerosis diagnosed in 2010 at [facility]. Has not followed with neurology in many years. In ER plan was for transfer to [facility] for neurology evaluation- however bed was not available. While waiting for a bed in ER she had 4/4 + blood cultures. Plan was then for patient to be admitted to [facility] until bed became available. Patient was admitted. ID was consulted. Patient was followed and treated for;    1. Concern for bacteremia/ Gram positive cocci 4/4 bottles- likely contamination: pt originally presented to the ER for concern for fatigue/ weakness x 1month. Reporting main reason she came to ER was hopeful placement at rehab. Denies feeling ill/ She has a history of Multiple Sclerosis- has not followed with neurology in quite some time. She was diagnosed in 2010 at [facility].  She is on baclofen. Initial concern in ER was for possible flare- and plan was to transfer patient to [facility]- However blood cultures 4/4 resulted positive and plan was for admission here to work up. Pt denies feeling ill. No fevers or leukocytosis. No evidence of current infection. She was treated for UTI 1 month ago. Appreciate ID input. Repeat blood cultures negative. Observing off of antibiotics. ID s/o     2. Weakness/ fatigue/ failure to thrive: PT/ OT-am pac 16- plan is [facility] on dc. Discharge to [facility] this afternoon. 3. H/o MS: initial concern was for possible flare and plan was to admit pt to neurology service. However- blood cultures then resulted positive and plan was to admit patient here at [address]. Will obtain MRI brain to further evaluate. Pt declined transfer to [facility]. Pt feels that her symptoms of weakness are deconditioning. Reporting she never recovered from last hospitalization. I do feel that patient would benefit from outpatient neurology evaluation. Discussed with patient and daughter. 4. Recent hemorrhagic shock due to duodenal ulcer s/p EGD on 11/11 with duodenal ulcer with adherent clot and visibile vessel/ S/p epi and clip. Continue PPI     5. CKD: creatinine 1.5 on admission- gentle hydration - resolved     6. COPD; stable-      7. H/o breast cancer     8. Chronic respiratory failure- pt wears 3L at home prn     9. Recent UTI: seen in ER 12/19- UC e coli/ enterobacter- treated with bactrim. Recheck UA- neg     10. Hypokalemia/ hypomagnesemia: supplement     11. Folate deficiency: start folic acid. 12. Encephalopathy: ? Unclear etiology- resolved. pt reported feeling great 1/21. Overnight- 1/22 worsening confusion/ could not recall why she was in hospital. Yelling out and calling family confused. There was concern her  may have brought her in medication. UDS was negative. No offending meds overnight. =/ MRI brain no acute findings. Recheck UA. ? Sun downing. Apparently had sun downing while at recent [place] [id number] stay. Resolved. Patient to be discharged in stable condition this morning with the following medications, instructions and follow up. Discharge Exam:  General Appearance: alert and oriented to person, place and time- intermittent confusion to situation  Skin: warm and dry  Head: normocephalic and atraumatic  Neck: neck supple and non tender without mass   Pulmonary/Chest: clear to auscultation bilaterally  Cardiovascular: normal rate, normal S1 and S2 and no carotid bruits  Abdomen: soft, non-tender, non-distended, normal bowel sounds, no masses or organomegaly  Extremities: no cyanosis, no clubbing and no edema  Neurologic: speech normal           I/O last 3 completed shifts: In: 10 [I.V.:10]  Out: -   I/O this shift:  In: 240 [P.O.:240]  Out: 1000 [Urine:1000]      LABS:  Recent Labs     01/22/23  0305 01/23/23  0401 01/24/23  0535    141 140   K 4.0 3.7 3.3*    107 105   CO2 27 27 27   BUN 23 20 16   CREATININE 1.2* 1.1* 1.1*   GLUCOSE 96 90 90   CALCIUM 8.3* 8.0* 8.6       Recent Labs     01/22/23  0305 01/23/23  0401 01/24/23  0535   WBC 9.0 9.2 8.3   RBC 3.80 3.70 3.71   HGB 10.4* 10.2* 10.2*   HCT 34.0 33.2* 33.2*   MCV 89.5 89.7 89.5   MCH 27.4 27.6 27.5   MCHC 30.6* 30.7* 30.7*   RDW 15.7* 15.6* 15.4*    300 318   MPV 9.9 9.6 9.6       No results for input(s): POCGLU in the last 72 hours. Imaging:  XR CHEST (2 VW)    Result Date: 1/18/2023  EXAMINATION: TWO XRAY VIEWS OF THE CHEST 1/18/2023 6:10 pm COMPARISON: December 19, 2022 HISTORY: ORDERING SYSTEM PROVIDED HISTORY: weakness TECHNOLOGIST PROVIDED HISTORY: Reason for exam:->weakness FINDINGS: There is borderline cardiac size. Linear perihilar densities are noted extending to right perihilar region, right base and left upper lobe concerning for atelectasis. Previous vertebroplasties are identified. The remainder lungs are clear.      Bronchitis with the atelectasis in the right perihilar region, right lung base and left upper lobe. Surveillance recommended. CT Head W/O Contrast    Result Date: 1/18/2023  EXAMINATION: CT OF THE HEAD WITHOUT CONTRAST  1/18/2023 5:55 pm TECHNIQUE: CT of the head was performed without the administration of intravenous contrast. Automated exposure control, iterative reconstruction, and/or weight based adjustment of the mA/kV was utilized to reduce the radiation dose to as low as reasonably achievable. COMPARISON: None. HISTORY: ORDERING SYSTEM PROVIDED HISTORY: fall, generalized weakness for 2 weeks TECHNOLOGIST PROVIDED HISTORY: Reason for exam:->fall, generalized weakness for 2 weeks Has a \"code stroke\" or \"stroke alert\" been called? ->No Decision Support Exception - unselect if not a suspected or confirmed emergency medical condition->Emergency Medical Condition (MA) FINDINGS: BRAIN/VENTRICLES: There is no acute intracranial hemorrhage, mass effect or midline shift. No abnormal extra-axial fluid collection. The gray-white differentiation is maintained without evidence of an acute infarct. There is no evidence of hydrocephalus. Periventricular white matter changes consistent chronic microvascular disease. ORBITS: The visualized portion of the orbits demonstrate no acute abnormality. SINUSES: The visualized paranasal sinuses and mastoid air cells demonstrate no acute abnormality. SOFT TISSUES/SKULL:  No acute abnormality of the visualized skull or soft tissues. No acute intracranial abnormality.        Patient Instructions:      Medication List        START taking these medications      folic acid 1 MG tablet  Commonly known as: FOLVITE  Take 1 tablet by mouth daily     magnesium oxide 400 (240 Mg) MG tablet  Commonly known as: MAG-OX  Take 1 tablet by mouth daily            CHANGE how you take these medications      atorvastatin 20 MG tablet  Commonly known as: LIPITOR  Take 1 tablet by mouth nightly  What changed: when to take this sertraline 100 MG tablet  Commonly known as: ZOLOFT  Take 1 tablet by mouth at bedtime  What changed:   how much to take  when to take this            CONTINUE taking these medications      alendronate 70 MG tablet  Commonly known as: FOSAMAX     amLODIPine 5 MG tablet  Commonly known as: NORVASC  Take 1 tablet by mouth daily     baclofen 10 MG tablet  Commonly known as: LIORESAL     carvedilol 12.5 MG tablet  Commonly known as: COREG  Take 1 tablet by mouth 2 times daily (with meals)     mirtazapine 15 MG tablet  Commonly known as: REMERON     pantoprazole 40 MG tablet  Commonly known as: PROTONIX  Take 1 tablet by mouth every morning (before breakfast)     sucralfate 1 GM tablet  Commonly known as: Carafate  Take 1 tablet by mouth 4 times daily            STOP taking these medications      gabapentin 400 MG capsule  Commonly known as: NEURONTIN     hydroCHLOROthiazide 25 MG tablet  Commonly known as: HYDRODIURIL     meloxicam 15 MG tablet  Commonly known as: MOBIC     memantine 5 MG tablet  Commonly known as: NAMENDA     potassium chloride 20 MEQ extended release tablet  Commonly known as: KLOR-CON M     simvastatin 40 MG tablet  Commonly known as: ZOCOR     traMADol 50 MG tablet  Commonly known as: ULTRAM     traZODone 50 MG tablet  Commonly known as: DESYREL     Trelegy Ellipta 200-62.5-25 MCG/ACT Aepb inhaler  Generic drug: fluticasone-umeclidin-vilant               Where to Get Your Medications        Information about where to get these medications is not yet available    Ask your nurse or doctor about these medications  atorvastatin 20 MG tablet  folic acid 1 MG tablet  magnesium oxide 400 (240 Mg) MG tablet  sertraline 100 MG tablet           Note that more than 30 minutes was spent in preparing discharge papers, discussing discharge with patient, medication review, etc.    Signed:  Electronically signed by CLARKE Michaud on 1/24/2023 at 9:45 AM

## 2023-01-24 NOTE — PROGRESS NOTES
IV & telemetry removed during prep for discharge. Patient & daughter (on phone with patient) informed of D/C plan/time. Nurse report called to Bhanu Gardner at [facility]. All questions answered. AVS/Sky/MAR report printed and in ambulance folder. Awaiting pickup by transport crew.

## 2023-01-25 LAB
BLOOD CULTURE, ROUTINE: NORMAL
CULTURE, BLOOD 2: NORMAL

## 2023-02-01 ENCOUNTER — OUTSIDE SERVICES (OUTPATIENT)
Dept: PRIMARY CARE CLINIC | Age: 78
End: 2023-02-01

## 2023-02-01 DIAGNOSIS — G35 MULTIPLE SCLEROSIS (HCC): Primary | ICD-10-CM

## 2023-02-01 DIAGNOSIS — E78.2 MIXED HYPERLIPIDEMIA: ICD-10-CM

## 2023-02-01 DIAGNOSIS — J44.9 CHRONIC OBSTRUCTIVE PULMONARY DISEASE, UNSPECIFIED COPD TYPE (HCC): ICD-10-CM

## 2023-02-01 DIAGNOSIS — R53.1 WEAKNESS: ICD-10-CM

## 2023-02-01 DIAGNOSIS — N18.32 STAGE 3B CHRONIC KIDNEY DISEASE (HCC): ICD-10-CM

## 2023-02-01 DIAGNOSIS — F41.9 ANXIETY DISORDER, UNSPECIFIED TYPE: ICD-10-CM

## 2023-02-01 DIAGNOSIS — I10 PRIMARY HYPERTENSION: ICD-10-CM

## 2023-02-10 DIAGNOSIS — I10 HYPERTENSION, ESSENTIAL, BENIGN: ICD-10-CM

## 2023-02-10 RX ORDER — MIRTAZAPINE 15 MG/1
15 TABLET, FILM COATED ORAL NIGHTLY
Qty: 90 TABLET | Refills: 0 | Status: SHIPPED | OUTPATIENT
Start: 2023-02-10

## 2023-02-10 RX ORDER — FOLIC ACID 1 MG/1
1 TABLET ORAL DAILY
Qty: 90 TABLET | Refills: 1 | Status: SHIPPED | OUTPATIENT
Start: 2023-02-10

## 2023-02-10 RX ORDER — LANOLIN ALCOHOL/MO/W.PET/CERES
400 CREAM (GRAM) TOPICAL DAILY
Qty: 90 TABLET | Refills: 1 | Status: SHIPPED | OUTPATIENT
Start: 2023-02-10

## 2023-02-10 RX ORDER — ATORVASTATIN CALCIUM 20 MG/1
20 TABLET, FILM COATED ORAL NIGHTLY
Qty: 90 TABLET | Refills: 1 | Status: SHIPPED | OUTPATIENT
Start: 2023-02-10

## 2023-02-10 RX ORDER — AMLODIPINE BESYLATE 5 MG/1
5 TABLET ORAL DAILY
Qty: 90 TABLET | Refills: 1 | Status: SHIPPED | OUTPATIENT
Start: 2023-02-10

## 2023-02-16 ENCOUNTER — OFFICE VISIT (OUTPATIENT)
Dept: PRIMARY CARE CLINIC | Age: 78
End: 2023-02-16
Payer: MEDICARE

## 2023-02-16 VITALS
TEMPERATURE: 97.3 F | WEIGHT: 140 LBS | SYSTOLIC BLOOD PRESSURE: 122 MMHG | HEART RATE: 91 BPM | OXYGEN SATURATION: 95 % | BODY MASS INDEX: 26.43 KG/M2 | HEIGHT: 61 IN | RESPIRATION RATE: 16 BRPM | DIASTOLIC BLOOD PRESSURE: 86 MMHG

## 2023-02-16 DIAGNOSIS — R53.83 FATIGUE, UNSPECIFIED TYPE: ICD-10-CM

## 2023-02-16 DIAGNOSIS — E55.9 VITAMIN D DEFICIENCY: ICD-10-CM

## 2023-02-16 DIAGNOSIS — D50.9 IRON DEFICIENCY ANEMIA, UNSPECIFIED IRON DEFICIENCY ANEMIA TYPE: ICD-10-CM

## 2023-02-16 DIAGNOSIS — I10 HYPERTENSION, ESSENTIAL, BENIGN: ICD-10-CM

## 2023-02-16 DIAGNOSIS — Z00.00 INITIAL MEDICARE ANNUAL WELLNESS VISIT: Primary | ICD-10-CM

## 2023-02-16 LAB
ALBUMIN SERPL-MCNC: 4.1 G/DL (ref 3.5–5.2)
ALP BLD-CCNC: 70 U/L (ref 35–104)
ALT SERPL-CCNC: <5 U/L (ref 0–32)
ANION GAP SERPL CALCULATED.3IONS-SCNC: 10 MMOL/L (ref 7–16)
AST SERPL-CCNC: 17 U/L (ref 0–31)
BILIRUB SERPL-MCNC: 0.2 MG/DL (ref 0–1.2)
BUN BLDV-MCNC: 19 MG/DL (ref 6–23)
CALCIUM SERPL-MCNC: 9.9 MG/DL (ref 8.6–10.2)
CHLORIDE BLD-SCNC: 103 MMOL/L (ref 98–107)
CO2: 27 MMOL/L (ref 22–29)
CREAT SERPL-MCNC: 1.2 MG/DL (ref 0.5–1)
GFR SERPL CREATININE-BSD FRML MDRD: 47 ML/MIN/1.73
GLUCOSE BLD-MCNC: 90 MG/DL (ref 74–99)
HCT VFR BLD CALC: 36.4 % (ref 34–48)
HEMOGLOBIN: 11.3 G/DL (ref 11.5–15.5)
MCH RBC QN AUTO: 28 PG (ref 26–35)
MCHC RBC AUTO-ENTMCNC: 31 % (ref 32–34.5)
MCV RBC AUTO: 90.3 FL (ref 80–99.9)
PDW BLD-RTO: 15.8 FL (ref 11.5–15)
PLATELET # BLD: 356 E9/L (ref 130–450)
PMV BLD AUTO: 9.9 FL (ref 7–12)
POTASSIUM SERPL-SCNC: 4.2 MMOL/L (ref 3.5–5)
RBC # BLD: 4.03 E12/L (ref 3.5–5.5)
SODIUM BLD-SCNC: 140 MMOL/L (ref 132–146)
TOTAL PROTEIN: 7.4 G/DL (ref 6.4–8.3)
TSH SERPL DL<=0.05 MIU/L-ACNC: 0.78 UIU/ML (ref 0.27–4.2)
VITAMIN B-12: 305 PG/ML (ref 211–946)
VITAMIN D 25-HYDROXY: 46 NG/ML (ref 30–100)
WBC # BLD: 6.8 E9/L (ref 4.5–11.5)

## 2023-02-16 PROCEDURE — 3079F DIAST BP 80-89 MM HG: CPT | Performed by: INTERNAL MEDICINE

## 2023-02-16 PROCEDURE — G0438 PPPS, INITIAL VISIT: HCPCS | Performed by: INTERNAL MEDICINE

## 2023-02-16 PROCEDURE — 1123F ACP DISCUSS/DSCN MKR DOCD: CPT | Performed by: INTERNAL MEDICINE

## 2023-02-16 PROCEDURE — 3074F SYST BP LT 130 MM HG: CPT | Performed by: INTERNAL MEDICINE

## 2023-02-16 RX ORDER — SUCRALFATE 1 G/1
1 TABLET ORAL 4 TIMES DAILY
Qty: 360 TABLET | Refills: 1 | Status: SHIPPED | OUTPATIENT
Start: 2023-02-16

## 2023-02-16 RX ORDER — SERTRALINE HYDROCHLORIDE 100 MG/1
100 TABLET, FILM COATED ORAL NIGHTLY
Qty: 90 TABLET | Refills: 1 | Status: SHIPPED
Start: 2023-02-16 | End: 2023-02-16

## 2023-02-16 RX ORDER — FOLIC ACID 1 MG/1
1 TABLET ORAL DAILY
Qty: 90 TABLET | Refills: 1 | Status: SHIPPED | OUTPATIENT
Start: 2023-02-16

## 2023-02-16 RX ORDER — ATORVASTATIN CALCIUM 20 MG/1
20 TABLET, FILM COATED ORAL NIGHTLY
Qty: 90 TABLET | Refills: 1 | Status: SHIPPED | OUTPATIENT
Start: 2023-02-16

## 2023-02-16 RX ORDER — AMLODIPINE BESYLATE 5 MG/1
5 TABLET ORAL DAILY
Qty: 90 TABLET | Refills: 1 | Status: SHIPPED | OUTPATIENT
Start: 2023-02-16

## 2023-02-16 RX ORDER — SERTRALINE HYDROCHLORIDE 100 MG/1
150 TABLET, FILM COATED ORAL NIGHTLY
Qty: 135 TABLET | Refills: 1
Start: 2023-02-16

## 2023-02-16 RX ORDER — CARVEDILOL 12.5 MG/1
12.5 TABLET ORAL 2 TIMES DAILY WITH MEALS
Qty: 180 TABLET | Refills: 1 | Status: SHIPPED | OUTPATIENT
Start: 2023-02-16

## 2023-02-16 RX ORDER — LANOLIN ALCOHOL/MO/W.PET/CERES
400 CREAM (GRAM) TOPICAL DAILY
Qty: 90 TABLET | Refills: 1 | Status: SHIPPED | OUTPATIENT
Start: 2023-02-16

## 2023-02-16 RX ORDER — MIRTAZAPINE 15 MG/1
15 TABLET, FILM COATED ORAL NIGHTLY
Qty: 90 TABLET | Refills: 1 | Status: SHIPPED | OUTPATIENT
Start: 2023-02-16

## 2023-02-16 RX ORDER — PANTOPRAZOLE SODIUM 40 MG/1
40 TABLET, DELAYED RELEASE ORAL
Qty: 90 TABLET | Refills: 1 | Status: SHIPPED | OUTPATIENT
Start: 2023-02-16

## 2023-02-16 SDOH — ECONOMIC STABILITY: FOOD INSECURITY: WITHIN THE PAST 12 MONTHS, YOU WORRIED THAT YOUR FOOD WOULD RUN OUT BEFORE YOU GOT MONEY TO BUY MORE.: NEVER TRUE

## 2023-02-16 SDOH — ECONOMIC STABILITY: INCOME INSECURITY: HOW HARD IS IT FOR YOU TO PAY FOR THE VERY BASICS LIKE FOOD, HOUSING, MEDICAL CARE, AND HEATING?: NOT HARD AT ALL

## 2023-02-16 SDOH — ECONOMIC STABILITY: FOOD INSECURITY: WITHIN THE PAST 12 MONTHS, THE FOOD YOU BOUGHT JUST DIDN'T LAST AND YOU DIDN'T HAVE MONEY TO GET MORE.: NEVER TRUE

## 2023-02-16 SDOH — ECONOMIC STABILITY: HOUSING INSECURITY
IN THE LAST 12 MONTHS, WAS THERE A TIME WHEN YOU DID NOT HAVE A STEADY PLACE TO SLEEP OR SLEPT IN A SHELTER (INCLUDING NOW)?: NO

## 2023-02-16 ASSESSMENT — PATIENT HEALTH QUESTIONNAIRE - PHQ9
SUM OF ALL RESPONSES TO PHQ9 QUESTIONS 1 & 2: 6
SUM OF ALL RESPONSES TO PHQ QUESTIONS 1-9: 11
9. THOUGHTS THAT YOU WOULD BE BETTER OFF DEAD, OR OF HURTING YOURSELF: 0
6. FEELING BAD ABOUT YOURSELF - OR THAT YOU ARE A FAILURE OR HAVE LET YOURSELF OR YOUR FAMILY DOWN: 1
7. TROUBLE CONCENTRATING ON THINGS, SUCH AS READING THE NEWSPAPER OR WATCHING TELEVISION: 1
SUM OF ALL RESPONSES TO PHQ QUESTIONS 1-9: 11
8. MOVING OR SPEAKING SO SLOWLY THAT OTHER PEOPLE COULD HAVE NOTICED. OR THE OPPOSITE, BEING SO FIGETY OR RESTLESS THAT YOU HAVE BEEN MOVING AROUND A LOT MORE THAN USUAL: 0
10. IF YOU CHECKED OFF ANY PROBLEMS, HOW DIFFICULT HAVE THESE PROBLEMS MADE IT FOR YOU TO DO YOUR WORK, TAKE CARE OF THINGS AT HOME, OR GET ALONG WITH OTHER PEOPLE: 1
2. FEELING DOWN, DEPRESSED OR HOPELESS: 3
4. FEELING TIRED OR HAVING LITTLE ENERGY: 2
5. POOR APPETITE OR OVEREATING: 1
SUM OF ALL RESPONSES TO PHQ QUESTIONS 1-9: 11
1. LITTLE INTEREST OR PLEASURE IN DOING THINGS: 3
SUM OF ALL RESPONSES TO PHQ QUESTIONS 1-9: 11
3. TROUBLE FALLING OR STAYING ASLEEP: 0

## 2023-02-16 ASSESSMENT — LIFESTYLE VARIABLES
HOW OFTEN DO YOU HAVE A DRINK CONTAINING ALCOHOL: NEVER
HOW MANY STANDARD DRINKS CONTAINING ALCOHOL DO YOU HAVE ON A TYPICAL DAY: PATIENT DOES NOT DRINK

## 2023-02-16 NOTE — PROGRESS NOTES
Juliet Willams, a female of 69 y.o. presents today for Medicare AWV and Follow-Up from Hospital    Her medical history includes chronic bronchitis osteoarthritis hypertension multiple sclerosis hypercholesterolemia    Her medications include Fosamax baclofen Neurontin Zoloft Zocor Trelegy Coreg Ventolin Norvasc aspirin    She follows with orthopedics    At last appointment,   she was started on trazodone for sleep. Sleep study was also ordered. She has been having some leg swelling over the past couple of weeks. She was in the ER on 1/23 for weakness. She continues to have weakness. She denies any issues or side effects with medications. /86   Pulse 91   Temp 97.3 °F (36.3 °C)   Resp 16   Ht 5' 1\" (1.549 m)   Wt 140 lb (63.5 kg)   SpO2 95%   BMI 26.45 kg/m²     Diagnoses and all orders for this visit:  Initial Medicare annual wellness visit  Hypertension, essential, benign  -     amLODIPine (NORVASC) 5 MG tablet; Take 1 tablet by mouth daily  Iron deficiency anemia, unspecified iron deficiency anemia type  Fatigue, unspecified type  -     CBC; Future  -     Comprehensive Metabolic Panel; Future  -     TSH; Future  -     Vitamin B12; Future  Vitamin D deficiency  -     Vitamin D 25 Hydroxy; Future  Other orders  -     atorvastatin (LIPITOR) 20 MG tablet; Take 1 tablet by mouth nightly  -     carvedilol (COREG) 12.5 MG tablet; Take 1 tablet by mouth 2 times daily (with meals)  -     folic acid (FOLVITE) 1 MG tablet; Take 1 tablet by mouth daily  -     magnesium oxide (MAG-OX) 400 (240 Mg) MG tablet; Take 1 tablet by mouth daily  -     mirtazapine (REMERON) 15 MG tablet; Take 1 tablet by mouth nightly  -     pantoprazole (PROTONIX) 40 MG tablet; Take 1 tablet by mouth every morning (before breakfast)  -     sucralfate (CARAFATE) 1 GM tablet; Take 1 tablet by mouth 4 times daily  -     sertraline (ZOLOFT) 100 MG tablet;  Take 1.5 tablets by mouth at bedtime      Plan    Increase Zoloft 150 mg nightly  Continue Remeron  Obtain blood work to rule out any metabolic causes for fatigue  Otherwise continue current medication regimen for now  Consider referral to outpatient GI depending on her blood counts          Medicare Annual Wellness Visit    Mar Maldonado is here for Medicare AWV and Follow-Up from [address]   Initial Medicare annual wellness visit  Hypertension, essential, benign  -     amLODIPine (NORVASC) 5 MG tablet; Take 1 tablet by mouth daily, Disp-90 tablet, R-1Normal  Iron deficiency anemia, unspecified iron deficiency anemia type  Fatigue, unspecified type  -     CBC; Future  -     Comprehensive Metabolic Panel; Future  -     TSH; Future  -     Vitamin B12; Future  Vitamin D deficiency  -     Vitamin D 25 Hydroxy; Future    Recommendations for Preventive Services Due: see orders and patient instructions/AVS.  Recommended screening schedule for the next 5-10 years is provided to the patient in written form: see Patient Instructions/AVS.     Return for Medicare Annual Wellness Visit in 1 year. Subjective       Patient's complete Health Risk Assessment and screening values have been reviewed and are found in Flowsheets. The following problems were reviewed today and where indicated follow up appointments were made and/or referrals ordered.     Positive Risk Factor Screenings with Interventions:    Fall Risk:  Do you feel unsteady or are you worried about falling? : (!) yes  2 or more falls in past year?: (!) yes  Fall with injury in past year?: (!) yes     Interventions:    See AVS for additional education material  See A/P for plan and any pertinent orders     Depression:  PHQ-2 Score: 6  PHQ-9 Total Score: 11    Interpretation:   1-4 = minimal  5-9 = mild  10-14 = moderate  15-19 = moderately severe  20-27 = severe  Interventions:  See AVS for additional education material  See A/P for any pertinent orders            Weight and Activity:  Physical Activity: Inactive Days of Exercise per Week: 0 days    Minutes of Exercise per Session: 0 min     On average, how many days per week do you engage in moderate to strenuous exercise (like a brisk walk)?: 0 days  Have you lost any weight without trying in the past 3 months?: (!) Yes  Body mass index: (!) 26.45    Inactivity Interventions:  See AVS for additional education material  See A/P for plan and any pertinent orders    Unintentional Weight Loss Interventions:  See AVS for additional education material  See A/P for plan and any pertinent orders      Dentist Screen:  Have you seen the dentist within the past year?: (!) No    Intervention:  See AVS for additional education material  See A/P for any pertinent orders       ADL's:   Patient reports needing help with:  Select all that apply: (!) Dressing  Select all that apply: [employer], Housekeeping, Shopping, Food Preparation, Transportation, Taking Medications  Interventions:  See AVS for additional education material  See A/P for plan and any pertinent orders     Tobacco Use:  Tobacco Use: High Risk    Smoking Tobacco Use: Every Day    Smokeless Tobacco Use: Never    Passive Exposure: Not on file     E-cigarette/Vaping       Questions Responses    E-cigarette/Vaping Use Never User    Start Date     Passive Exposure     Quit Date     Counseling Given     Comments         Interventions:  See AVS for additional education material  See A/P for plan and any pertinent orders                        Objective   Vitals:    02/16/23 1142   BP: 122/86   Pulse: 91   Resp: 16   Temp: 97.3 °F (36.3 °C)   SpO2: 95%   Weight: 140 lb (63.5 kg)   Height: 5' 1\" (1.549 m)      Body mass index is 26.45 kg/m². Allergies   Allergen Reactions    Macrodantin [Nitrofurantoin Macrocrystal] Shortness Of Breath     Prior to Visit Medications    Medication Sig Taking?  Authorizing Provider   amLODIPine (NORVASC) 5 MG tablet Take 1 tablet by mouth daily Yes Dina Ayala, DO   atorvastatin (LIPITOR) 20 MG tablet Take 1 tablet by mouth nightly Yes Lee Ayala DO   carvedilol (COREG) 12.5 MG tablet Take 1 tablet by mouth 2 times daily (with meals) Yes Lee Ayala DO   folic acid (FOLVITE) 1 MG tablet Take 1 tablet by mouth daily Yes Lee Ayala DO   magnesium oxide (MAG-OX) 400 (240 Mg) MG tablet Take 1 tablet by mouth daily Yes Lee Ayala DO   mirtazapine (REMERON) 15 MG tablet Take 1 tablet by mouth nightly Yes Lee Ayala DO   pantoprazole (PROTONIX) 40 MG tablet Take 1 tablet by mouth every morning (before breakfast) Yes Lee Ayala DO   sucralfate (CARAFATE) 1 GM tablet Take 1 tablet by mouth 4 times daily Yes Lee Ayala DO   sertraline (ZOLOFT) 100 MG tablet Take 1.5 tablets by mouth at bedtime Yes Lee Ayala DO   baclofen (LIORESAL) 10 MG tablet Take 10 mg by mouth 2 times daily Yes Historical Provider, MD   alendronate (FOSAMAX) 70 MG tablet Take 70 mg by mouth every 7 days Yes Historical Provider, MD   memantine (NAMENDA) 5 MG tablet Take 1 tablet by mouth 2 times daily  Patient not taking: No sig reported  DO Naz Marino (Including outside providers/suppliers regularly involved in providing care):   Patient Care Team:  Reza Torres DO as PCP - General (Internal Medicine)  Reza Torres DO as PCP - Empaneled Provider     Reviewed and updated this visit:  Tobacco  Allergies  Meds  Med Hx  Surg Hx  Soc Hx  Fam Hx             Katerine Deras DO

## 2023-02-21 DIAGNOSIS — I10 HYPERTENSION, ESSENTIAL, BENIGN: ICD-10-CM

## 2023-02-21 RX ORDER — MIRTAZAPINE 15 MG/1
15 TABLET, FILM COATED ORAL NIGHTLY
Qty: 90 TABLET | Refills: 1 | Status: ON HOLD | OUTPATIENT
Start: 2023-02-21

## 2023-02-21 RX ORDER — AMLODIPINE BESYLATE 5 MG/1
5 TABLET ORAL DAILY
Qty: 90 TABLET | Refills: 1 | Status: SHIPPED
Start: 2023-02-21 | End: 2023-02-22 | Stop reason: SDUPTHER

## 2023-02-21 RX ORDER — ATORVASTATIN CALCIUM 20 MG/1
20 TABLET, FILM COATED ORAL NIGHTLY
Qty: 90 TABLET | Refills: 1 | Status: SHIPPED
Start: 2023-02-21 | End: 2023-02-22 | Stop reason: SDUPTHER

## 2023-02-21 RX ORDER — FOLIC ACID 1 MG/1
1 TABLET ORAL DAILY
Qty: 90 TABLET | Refills: 1 | Status: ON HOLD | OUTPATIENT
Start: 2023-02-21

## 2023-02-21 NOTE — TELEPHONE ENCOUNTER
Pt is going to run out of these medications in a few days, needs tide overs sent to [facility] in [place].

## 2023-02-22 DIAGNOSIS — I10 HYPERTENSION, ESSENTIAL, BENIGN: ICD-10-CM

## 2023-02-22 RX ORDER — MIRTAZAPINE 15 MG/1
15 TABLET, FILM COATED ORAL NIGHTLY
Qty: 28 TABLET | Refills: 5 | Status: SHIPPED
Start: 2023-02-22 | End: 2023-02-24 | Stop reason: SDUPTHER

## 2023-02-22 RX ORDER — BACLOFEN 10 MG/1
10 TABLET ORAL 2 TIMES DAILY
Qty: 60 TABLET | Refills: 5 | Status: ON HOLD | OUTPATIENT
Start: 2023-02-22

## 2023-02-22 RX ORDER — SUCRALFATE 1 G/1
1 TABLET ORAL 4 TIMES DAILY
Qty: 120 TABLET | Refills: 5 | Status: ON HOLD | OUTPATIENT
Start: 2023-02-22

## 2023-02-22 RX ORDER — ATORVASTATIN CALCIUM 20 MG/1
20 TABLET, FILM COATED ORAL NIGHTLY
Qty: 28 TABLET | Refills: 5 | Status: SHIPPED
Start: 2023-02-22 | End: 2023-02-24 | Stop reason: SDUPTHER

## 2023-02-22 RX ORDER — CARVEDILOL 12.5 MG/1
12.5 TABLET ORAL 2 TIMES DAILY WITH MEALS
Qty: 60 TABLET | Refills: 5 | Status: ON HOLD | OUTPATIENT
Start: 2023-02-22

## 2023-02-22 RX ORDER — SERTRALINE HYDROCHLORIDE 100 MG/1
150 TABLET, FILM COATED ORAL NIGHTLY
Qty: 45 TABLET | Refills: 5 | Status: ON HOLD | OUTPATIENT
Start: 2023-02-22

## 2023-02-22 RX ORDER — AMLODIPINE BESYLATE 5 MG/1
5 TABLET ORAL DAILY
Qty: 28 TABLET | Refills: 5 | Status: SHIPPED
Start: 2023-02-22 | End: 2023-02-24 | Stop reason: SDUPTHER

## 2023-02-22 RX ORDER — FOLIC ACID 1 MG/1
1 TABLET ORAL DAILY
Qty: 28 TABLET | Refills: 5 | Status: ON HOLD | OUTPATIENT
Start: 2023-02-22

## 2023-02-22 RX ORDER — PANTOPRAZOLE SODIUM 40 MG/1
40 TABLET, DELAYED RELEASE ORAL
Qty: 28 TABLET | Refills: 5 | Status: ON HOLD | OUTPATIENT
Start: 2023-02-22

## 2023-02-23 DIAGNOSIS — I10 HYPERTENSION, ESSENTIAL, BENIGN: ICD-10-CM

## 2023-02-24 ENCOUNTER — HOSPITAL ENCOUNTER (INPATIENT)
Age: 78
LOS: 6 days | Discharge: HOME | End: 2023-03-03
Attending: EMERGENCY MEDICINE | Admitting: STUDENT IN AN ORGANIZED HEALTH CARE EDUCATION/TRAINING PROGRAM
Payer: MEDICARE

## 2023-02-24 ENCOUNTER — APPOINTMENT (OUTPATIENT)
Dept: CT IMAGING | Age: 78
End: 2023-02-24
Payer: MEDICARE

## 2023-02-24 ENCOUNTER — APPOINTMENT (OUTPATIENT)
Dept: GENERAL RADIOLOGY | Age: 78
End: 2023-02-24
Payer: MEDICARE

## 2023-02-24 DIAGNOSIS — L73.9 FOLLICULITIS: ICD-10-CM

## 2023-02-24 DIAGNOSIS — K92.2 GASTROINTESTINAL HEMORRHAGE, UNSPECIFIED GASTROINTESTINAL HEMORRHAGE TYPE: Primary | ICD-10-CM

## 2023-02-24 DIAGNOSIS — R41.82 ALTERED MENTAL STATUS, UNSPECIFIED ALTERED MENTAL STATUS TYPE: ICD-10-CM

## 2023-02-24 PROCEDURE — 99285 EMERGENCY DEPT VISIT HI MDM: CPT

## 2023-02-24 PROCEDURE — 96375 TX/PRO/DX INJ NEW DRUG ADDON: CPT

## 2023-02-24 PROCEDURE — 87635 SARS-COV-2 COVID-19 AMP PRB: CPT

## 2023-02-24 PROCEDURE — 71045 X-RAY EXAM CHEST 1 VIEW: CPT

## 2023-02-24 PROCEDURE — 96365 THER/PROPH/DIAG IV INF INIT: CPT

## 2023-02-24 PROCEDURE — 87502 INFLUENZA DNA AMP PROBE: CPT

## 2023-02-24 RX ORDER — SODIUM CHLORIDE 9 MG/ML
INJECTION, SOLUTION INTRAVENOUS PRN
Status: DISCONTINUED | OUTPATIENT
Start: 2023-02-24 | End: 2023-03-03 | Stop reason: HOSPADM

## 2023-02-24 RX ORDER — PANTOPRAZOLE SODIUM 40 MG/10ML
80 INJECTION, POWDER, LYOPHILIZED, FOR SOLUTION INTRAVENOUS ONCE
Status: COMPLETED | OUTPATIENT
Start: 2023-02-24 | End: 2023-02-25

## 2023-02-24 RX ORDER — ATORVASTATIN CALCIUM 20 MG/1
20 TABLET, FILM COATED ORAL NIGHTLY
Qty: 28 TABLET | Refills: 5 | Status: ON HOLD | OUTPATIENT
Start: 2023-02-24

## 2023-02-24 RX ORDER — MIRTAZAPINE 15 MG/1
15 TABLET, FILM COATED ORAL NIGHTLY
Qty: 28 TABLET | Refills: 5 | Status: ON HOLD | OUTPATIENT
Start: 2023-02-24

## 2023-02-24 RX ORDER — AMLODIPINE BESYLATE 5 MG/1
5 TABLET ORAL DAILY
Qty: 28 TABLET | Refills: 5 | Status: ON HOLD | OUTPATIENT
Start: 2023-02-24

## 2023-02-24 NOTE — Clinical Note
Discharge Plan[de-identified] Other/[facility])   Telemetry/Cardiac Monitoring Required?: Yes

## 2023-02-25 ENCOUNTER — APPOINTMENT (OUTPATIENT)
Dept: ULTRASOUND IMAGING | Age: 78
End: 2023-02-25
Payer: MEDICARE

## 2023-02-25 ENCOUNTER — APPOINTMENT (OUTPATIENT)
Dept: CT IMAGING | Age: 78
End: 2023-02-25
Payer: MEDICARE

## 2023-02-25 PROBLEM — G93.41 ACUTE METABOLIC ENCEPHALOPATHY: Status: ACTIVE | Noted: 2023-02-25

## 2023-02-25 PROBLEM — R41.82 AMS (ALTERED MENTAL STATUS): Status: ACTIVE | Noted: 2023-02-25

## 2023-02-25 PROBLEM — E87.6 HYPOKALEMIA: Status: ACTIVE | Noted: 2023-01-01

## 2023-02-25 PROBLEM — K92.1 GASTROINTESTINAL HEMORRHAGE WITH MELENA: Status: ACTIVE | Noted: 2023-01-01

## 2023-02-25 PROBLEM — J44.9 COPD (CHRONIC OBSTRUCTIVE PULMONARY DISEASE) (HCC): Status: ACTIVE | Noted: 2023-02-25

## 2023-02-25 PROBLEM — N30.00 ACUTE CYSTITIS: Status: ACTIVE | Noted: 2023-02-25

## 2023-02-25 PROBLEM — A41.9 SEPSIS (HCC): Status: ACTIVE | Noted: 2023-02-25

## 2023-02-25 LAB
ABO/RH: NORMAL
ACETAMINOPHEN LEVEL: <5 MCG/ML (ref 10–30)
ALBUMIN SERPL-MCNC: 3.7 G/DL (ref 3.5–5.2)
ALP BLD-CCNC: 84 U/L (ref 35–104)
ALT SERPL-CCNC: 5 U/L (ref 0–32)
AMMONIA: 26.9 UMOL/L (ref 11–51)
AMPHETAMINE SCREEN, URINE: NOT DETECTED
ANION GAP SERPL CALCULATED.3IONS-SCNC: 10 MMOL/L (ref 7–16)
ANTIBODY SCREEN: NORMAL
AST SERPL-CCNC: 21 U/L (ref 0–31)
B.E.: 1.9 MMOL/L (ref -3–3)
BACTERIA: ABNORMAL /HPF
BARBITURATE SCREEN URINE: NOT DETECTED
BASOPHILS ABSOLUTE: 0.05 E9/L (ref 0–0.2)
BASOPHILS RELATIVE PERCENT: 0.4 % (ref 0–2)
BENZODIAZEPINE SCREEN, URINE: NOT DETECTED
BILIRUB SERPL-MCNC: 0.3 MG/DL (ref 0–1.2)
BILIRUBIN URINE: NEGATIVE
BLOOD, URINE: ABNORMAL
BUN BLDV-MCNC: 24 MG/DL (ref 6–23)
CALCIUM SERPL-MCNC: 10.1 MG/DL (ref 8.6–10.2)
CANNABINOID SCREEN URINE: NOT DETECTED
CHLORIDE BLD-SCNC: 105 MMOL/L (ref 98–107)
CLARITY: CLEAR
CO2: 31 MMOL/L (ref 22–29)
COCAINE METABOLITE SCREEN URINE: NOT DETECTED
COHB: 1.3 % (ref 0–1.5)
COLOR: YELLOW
CREAT SERPL-MCNC: 1.1 MG/DL (ref 0.5–1)
CRITICAL: ABNORMAL
DATE ANALYZED: ABNORMAL
DATE OF COLLECTION: ABNORMAL
EKG ATRIAL RATE: 100 BPM
EKG P AXIS: 39 DEGREES
EKG P-R INTERVAL: 144 MS
EKG Q-T INTERVAL: 354 MS
EKG QRS DURATION: 82 MS
EKG QTC CALCULATION (BAZETT): 456 MS
EKG R AXIS: -16 DEGREES
EKG T AXIS: 25 DEGREES
EKG VENTRICULAR RATE: 100 BPM
EOSINOPHILS ABSOLUTE: 0.01 E9/L (ref 0.05–0.5)
EOSINOPHILS RELATIVE PERCENT: 0.1 % (ref 0–6)
EPITHELIAL CELLS, UA: ABNORMAL /HPF
ETHANOL: <10 MG/DL (ref 0–0.08)
FENTANYL SCREEN, URINE: NOT DETECTED
GFR SERPL CREATININE-BSD FRML MDRD: 52 ML/MIN/1.73
GLUCOSE BLD-MCNC: 120 MG/DL (ref 74–99)
GLUCOSE URINE: 100 MG/DL
HCO3: 27.7 MMOL/L (ref 22–26)
HCT VFR BLD CALC: 31.9 % (ref 34–48)
HCT VFR BLD CALC: 34.1 % (ref 34–48)
HCT VFR BLD CALC: 42.5 % (ref 34–48)
HEMOGLOBIN: 10 G/DL (ref 11.5–15.5)
HEMOGLOBIN: 10.8 G/DL (ref 11.5–15.5)
HEMOGLOBIN: 13.1 G/DL (ref 11.5–15.5)
HHB: 5.5 % (ref 0–5)
IMMATURE GRANULOCYTES #: 0.05 E9/L
IMMATURE GRANULOCYTES %: 0.4 % (ref 0–5)
INFLUENZA A BY PCR: NOT DETECTED
INFLUENZA B BY PCR: NOT DETECTED
KETONES, URINE: ABNORMAL MG/DL
LAB: ABNORMAL
LACTIC ACID: 1.1 MMOL/L (ref 0.5–2.2)
LEUKOCYTE ESTERASE, URINE: ABNORMAL
LYMPHOCYTES ABSOLUTE: 1.66 E9/L (ref 1.5–4)
LYMPHOCYTES RELATIVE PERCENT: 14 % (ref 20–42)
Lab: ABNORMAL
Lab: NORMAL
MAGNESIUM: 1.8 MG/DL (ref 1.6–2.6)
MCH RBC QN AUTO: 27.3 PG (ref 26–35)
MCHC RBC AUTO-ENTMCNC: 30.8 % (ref 32–34.5)
MCV RBC AUTO: 88.5 FL (ref 80–99.9)
METHADONE SCREEN, URINE: NOT DETECTED
METHB: 0.3 % (ref 0–1.5)
MODE: ABNORMAL
MONOCYTES ABSOLUTE: 0.74 E9/L (ref 0.1–0.95)
MONOCYTES RELATIVE PERCENT: 6.2 % (ref 2–12)
NEUTROPHILS ABSOLUTE: 9.36 E9/L (ref 1.8–7.3)
NEUTROPHILS RELATIVE PERCENT: 78.9 % (ref 43–80)
NITRITE, URINE: NEGATIVE
O2 CONTENT: 16.8 ML/DL
O2 SATURATION: 94.4 % (ref 92–98.5)
O2HB: 92.9 % (ref 94–97)
OPERATOR ID: 789
OPIATE SCREEN URINE: NOT DETECTED
OXYCODONE URINE: NOT DETECTED
PATIENT TEMP: 37 C
PCO2: 48.1 MMHG (ref 35–45)
PDW BLD-RTO: 15.5 FL (ref 11.5–15)
PH BLOOD GAS: 7.38 (ref 7.35–7.45)
PH UA: 5.5 (ref 5–9)
PHENCYCLIDINE SCREEN URINE: NOT DETECTED
PLATELET # BLD: 367 E9/L (ref 130–450)
PMV BLD AUTO: 9.4 FL (ref 7–12)
PO2: 72.4 MMHG (ref 75–100)
POTASSIUM SERPL-SCNC: 3.4 MMOL/L (ref 3.5–5)
PROTEIN UA: NEGATIVE MG/DL
RBC # BLD: 4.8 E12/L (ref 3.5–5.5)
RBC UA: ABNORMAL /HPF (ref 0–2)
SALICYLATE, SERUM: <0.3 MG/DL (ref 0–30)
SARS-COV-2, NAAT: NOT DETECTED
SODIUM BLD-SCNC: 146 MMOL/L (ref 132–146)
SOURCE, BLOOD GAS: ABNORMAL
SPECIFIC GRAVITY UA: 1.01 (ref 1–1.03)
T4 FREE: 1.08 NG/DL (ref 0.93–1.7)
THB: 12.8 G/DL (ref 11.5–16.5)
TIME ANALYZED: 136
TOTAL CK: 512 U/L (ref 20–180)
TOTAL PROTEIN: 7.6 G/DL (ref 6.4–8.3)
TRICYCLIC ANTIDEPRESSANTS SCREEN SERUM: NEGATIVE NG/ML
TSH SERPL DL<=0.05 MIU/L-ACNC: 0.34 UIU/ML (ref 0.27–4.2)
UROBILINOGEN, URINE: 0.2 E.U./DL
WBC # BLD: 11.9 E9/L (ref 4.5–11.5)
WBC UA: ABNORMAL /HPF (ref 0–5)

## 2023-02-25 PROCEDURE — 76705 ECHO EXAM OF ABDOMEN: CPT

## 2023-02-25 PROCEDURE — 36415 COLL VENOUS BLD VENIPUNCTURE: CPT

## 2023-02-25 PROCEDURE — 80179 DRUG ASSAY SALICYLATE: CPT

## 2023-02-25 PROCEDURE — 84439 ASSAY OF FREE THYROXINE: CPT

## 2023-02-25 PROCEDURE — 80053 COMPREHEN METABOLIC PANEL: CPT

## 2023-02-25 PROCEDURE — 82140 ASSAY OF AMMONIA: CPT

## 2023-02-25 PROCEDURE — 1200000000 HC SEMI PRIVATE

## 2023-02-25 PROCEDURE — C9113 INJ PANTOPRAZOLE SODIUM, VIA: HCPCS

## 2023-02-25 PROCEDURE — 6360000002 HC RX W HCPCS: Performed by: STUDENT IN AN ORGANIZED HEALTH CARE EDUCATION/TRAINING PROGRAM

## 2023-02-25 PROCEDURE — APPSS45 APP SPLIT SHARED TIME 31-45 MINUTES

## 2023-02-25 PROCEDURE — 83605 ASSAY OF LACTIC ACID: CPT

## 2023-02-25 PROCEDURE — A4216 STERILE WATER/SALINE, 10 ML: HCPCS | Performed by: STUDENT IN AN ORGANIZED HEALTH CARE EDUCATION/TRAINING PROGRAM

## 2023-02-25 PROCEDURE — 86901 BLOOD TYPING SEROLOGIC RH(D): CPT

## 2023-02-25 PROCEDURE — 84443 ASSAY THYROID STIM HORMONE: CPT

## 2023-02-25 PROCEDURE — 74177 CT ABD & PELVIS W/CONTRAST: CPT

## 2023-02-25 PROCEDURE — 85018 HEMOGLOBIN: CPT

## 2023-02-25 PROCEDURE — 86850 RBC ANTIBODY SCREEN: CPT

## 2023-02-25 PROCEDURE — 87040 BLOOD CULTURE FOR BACTERIA: CPT

## 2023-02-25 PROCEDURE — 82805 BLOOD GASES W/O2 SATURATION: CPT

## 2023-02-25 PROCEDURE — 70450 CT HEAD/BRAIN W/O DYE: CPT

## 2023-02-25 PROCEDURE — 93010 ELECTROCARDIOGRAM REPORT: CPT | Performed by: INTERNAL MEDICINE

## 2023-02-25 PROCEDURE — C9113 INJ PANTOPRAZOLE SODIUM, VIA: HCPCS | Performed by: STUDENT IN AN ORGANIZED HEALTH CARE EDUCATION/TRAINING PROGRAM

## 2023-02-25 PROCEDURE — 93005 ELECTROCARDIOGRAM TRACING: CPT

## 2023-02-25 PROCEDURE — 2580000003 HC RX 258

## 2023-02-25 PROCEDURE — 6360000004 HC RX CONTRAST MEDICATION: Performed by: RADIOLOGY

## 2023-02-25 PROCEDURE — 6370000000 HC RX 637 (ALT 250 FOR IP): Performed by: STUDENT IN AN ORGANIZED HEALTH CARE EDUCATION/TRAINING PROGRAM

## 2023-02-25 PROCEDURE — 80143 DRUG ASSAY ACETAMINOPHEN: CPT

## 2023-02-25 PROCEDURE — 82550 ASSAY OF CK (CPK): CPT

## 2023-02-25 PROCEDURE — 2060000000 HC ICU INTERMEDIATE R&B

## 2023-02-25 PROCEDURE — 87088 URINE BACTERIA CULTURE: CPT

## 2023-02-25 PROCEDURE — 82077 ASSAY SPEC XCP UR&BREATH IA: CPT

## 2023-02-25 PROCEDURE — 2580000003 HC RX 258: Performed by: STUDENT IN AN ORGANIZED HEALTH CARE EDUCATION/TRAINING PROGRAM

## 2023-02-25 PROCEDURE — 81001 URINALYSIS AUTO W/SCOPE: CPT

## 2023-02-25 PROCEDURE — 86900 BLOOD TYPING SEROLOGIC ABO: CPT

## 2023-02-25 PROCEDURE — 85014 HEMATOCRIT: CPT

## 2023-02-25 PROCEDURE — 87150 DNA/RNA AMPLIFIED PROBE: CPT

## 2023-02-25 PROCEDURE — 80307 DRUG TEST PRSMV CHEM ANLYZR: CPT

## 2023-02-25 PROCEDURE — 83735 ASSAY OF MAGNESIUM: CPT

## 2023-02-25 PROCEDURE — 85025 COMPLETE CBC W/AUTO DIFF WBC: CPT

## 2023-02-25 PROCEDURE — 6360000002 HC RX W HCPCS

## 2023-02-25 PROCEDURE — 2700000000 HC OXYGEN THERAPY PER DAY

## 2023-02-25 PROCEDURE — 86923 COMPATIBILITY TEST ELECTRIC: CPT

## 2023-02-25 PROCEDURE — 99222 1ST HOSP IP/OBS MODERATE 55: CPT | Performed by: STUDENT IN AN ORGANIZED HEALTH CARE EDUCATION/TRAINING PROGRAM

## 2023-02-25 RX ORDER — MIRTAZAPINE 15 MG/1
15 TABLET, FILM COATED ORAL NIGHTLY
Status: DISCONTINUED | OUTPATIENT
Start: 2023-02-25 | End: 2023-03-03 | Stop reason: HOSPADM

## 2023-02-25 RX ORDER — SODIUM CHLORIDE 0.9 % (FLUSH) 0.9 %
5-40 SYRINGE (ML) INJECTION EVERY 12 HOURS SCHEDULED
Status: DISCONTINUED | OUTPATIENT
Start: 2023-02-25 | End: 2023-03-03 | Stop reason: HOSPADM

## 2023-02-25 RX ORDER — ATORVASTATIN CALCIUM 20 MG/1
20 TABLET, FILM COATED ORAL NIGHTLY
Status: DISCONTINUED | OUTPATIENT
Start: 2023-02-25 | End: 2023-03-03 | Stop reason: HOSPADM

## 2023-02-25 RX ORDER — SUCRALFATE 1 G/1
1 TABLET ORAL 4 TIMES DAILY
Status: DISCONTINUED | OUTPATIENT
Start: 2023-02-25 | End: 2023-03-03 | Stop reason: HOSPADM

## 2023-02-25 RX ORDER — SODIUM CHLORIDE 9 MG/ML
INJECTION, SOLUTION INTRAVENOUS PRN
Status: DISCONTINUED | OUTPATIENT
Start: 2023-02-25 | End: 2023-03-03 | Stop reason: HOSPADM

## 2023-02-25 RX ORDER — AMLODIPINE BESYLATE 5 MG/1
5 TABLET ORAL DAILY
Status: DISCONTINUED | OUTPATIENT
Start: 2023-02-25 | End: 2023-03-03 | Stop reason: HOSPADM

## 2023-02-25 RX ORDER — ONDANSETRON 2 MG/ML
4 INJECTION INTRAMUSCULAR; INTRAVENOUS EVERY 6 HOURS PRN
Status: DISCONTINUED | OUTPATIENT
Start: 2023-02-25 | End: 2023-03-03 | Stop reason: HOSPADM

## 2023-02-25 RX ORDER — POLYETHYLENE GLYCOL 3350 17 G/17G
17 POWDER, FOR SOLUTION ORAL DAILY PRN
Status: DISCONTINUED | OUTPATIENT
Start: 2023-02-25 | End: 2023-03-03 | Stop reason: HOSPADM

## 2023-02-25 RX ORDER — SODIUM CHLORIDE, SODIUM LACTATE, POTASSIUM CHLORIDE, CALCIUM CHLORIDE 600; 310; 30; 20 MG/100ML; MG/100ML; MG/100ML; MG/100ML
INJECTION, SOLUTION INTRAVENOUS CONTINUOUS
Status: DISCONTINUED | OUTPATIENT
Start: 2023-02-25 | End: 2023-02-26

## 2023-02-25 RX ORDER — ONDANSETRON 4 MG/1
4 TABLET, ORALLY DISINTEGRATING ORAL EVERY 8 HOURS PRN
Status: DISCONTINUED | OUTPATIENT
Start: 2023-02-25 | End: 2023-03-03 | Stop reason: HOSPADM

## 2023-02-25 RX ORDER — POTASSIUM CHLORIDE 7.45 MG/ML
10 INJECTION INTRAVENOUS ONCE
Status: COMPLETED | OUTPATIENT
Start: 2023-02-25 | End: 2023-02-25

## 2023-02-25 RX ORDER — FOLIC ACID 1 MG/1
1 TABLET ORAL DAILY
Status: DISCONTINUED | OUTPATIENT
Start: 2023-02-25 | End: 2023-03-03 | Stop reason: HOSPADM

## 2023-02-25 RX ORDER — ENOXAPARIN SODIUM 100 MG/ML
40 INJECTION SUBCUTANEOUS DAILY
Status: DISCONTINUED | OUTPATIENT
Start: 2023-02-25 | End: 2023-03-03 | Stop reason: HOSPADM

## 2023-02-25 RX ORDER — SODIUM CHLORIDE 0.9 % (FLUSH) 0.9 %
5-40 SYRINGE (ML) INJECTION PRN
Status: DISCONTINUED | OUTPATIENT
Start: 2023-02-25 | End: 2023-03-03 | Stop reason: HOSPADM

## 2023-02-25 RX ORDER — PANTOPRAZOLE SODIUM 40 MG/1
40 TABLET, DELAYED RELEASE ORAL
Status: DISCONTINUED | OUTPATIENT
Start: 2023-02-25 | End: 2023-02-25

## 2023-02-25 RX ORDER — BACLOFEN 10 MG/1
10 TABLET ORAL 2 TIMES DAILY
Status: DISCONTINUED | OUTPATIENT
Start: 2023-02-25 | End: 2023-03-03 | Stop reason: HOSPADM

## 2023-02-25 RX ORDER — PETROLATUM,WHITE
OINTMENT IN PACKET (GRAM) TOPICAL 2 TIMES DAILY
Status: DISCONTINUED | OUTPATIENT
Start: 2023-02-25 | End: 2023-02-28 | Stop reason: SDUPTHER

## 2023-02-25 RX ORDER — CARVEDILOL 6.25 MG/1
12.5 TABLET ORAL 2 TIMES DAILY WITH MEALS
Status: DISCONTINUED | OUTPATIENT
Start: 2023-02-25 | End: 2023-03-03 | Stop reason: HOSPADM

## 2023-02-25 RX ORDER — IPRATROPIUM BROMIDE AND ALBUTEROL SULFATE 2.5; .5 MG/3ML; MG/3ML
1 SOLUTION RESPIRATORY (INHALATION) EVERY 4 HOURS PRN
Status: DISCONTINUED | OUTPATIENT
Start: 2023-02-25 | End: 2023-03-03 | Stop reason: HOSPADM

## 2023-02-25 RX ORDER — ACETAMINOPHEN 325 MG/1
650 TABLET ORAL EVERY 6 HOURS PRN
Status: DISCONTINUED | OUTPATIENT
Start: 2023-02-25 | End: 2023-03-03 | Stop reason: HOSPADM

## 2023-02-25 RX ORDER — ACETAMINOPHEN 650 MG/1
650 SUPPOSITORY RECTAL EVERY 6 HOURS PRN
Status: DISCONTINUED | OUTPATIENT
Start: 2023-02-25 | End: 2023-03-03 | Stop reason: HOSPADM

## 2023-02-25 RX ADMIN — PANTOPRAZOLE SODIUM 80 MG: 40 INJECTION, POWDER, FOR SOLUTION INTRAVENOUS at 00:29

## 2023-02-25 RX ADMIN — BACLOFEN 10 MG: 10 TABLET ORAL at 20:02

## 2023-02-25 RX ADMIN — SODIUM CHLORIDE, POTASSIUM CHLORIDE, SODIUM LACTATE AND CALCIUM CHLORIDE: 600; 310; 30; 20 INJECTION, SOLUTION INTRAVENOUS at 03:42

## 2023-02-25 RX ADMIN — VANCOMYCIN HYDROCHLORIDE 1000 MG: 1 INJECTION, POWDER, LYOPHILIZED, FOR SOLUTION INTRAVENOUS at 01:56

## 2023-02-25 RX ADMIN — CARVEDILOL 12.5 MG: 6.25 TABLET, FILM COATED ORAL at 11:03

## 2023-02-25 RX ADMIN — CEFEPIME 2000 MG: 2 INJECTION, POWDER, FOR SOLUTION INTRAVENOUS at 14:34

## 2023-02-25 RX ADMIN — SUCRALFATE 1 G: 1 TABLET ORAL at 16:40

## 2023-02-25 RX ADMIN — CARVEDILOL 12.5 MG: 6.25 TABLET, FILM COATED ORAL at 16:40

## 2023-02-25 RX ADMIN — SERTRALINE 150 MG: 100 TABLET, FILM COATED ORAL at 20:02

## 2023-02-25 RX ADMIN — AMLODIPINE BESYLATE 5 MG: 5 TABLET ORAL at 11:02

## 2023-02-25 RX ADMIN — MIRTAZAPINE 15 MG: 15 TABLET, FILM COATED ORAL at 20:03

## 2023-02-25 RX ADMIN — SUCRALFATE 1 G: 1 TABLET ORAL at 20:02

## 2023-02-25 RX ADMIN — IOPAMIDOL 75 ML: 755 INJECTION, SOLUTION INTRAVENOUS at 01:22

## 2023-02-25 RX ADMIN — BACLOFEN 10 MG: 10 TABLET ORAL at 11:02

## 2023-02-25 RX ADMIN — PANTOPRAZOLE SODIUM 40 MG: 40 INJECTION, POWDER, LYOPHILIZED, FOR SOLUTION INTRAVENOUS at 11:16

## 2023-02-25 RX ADMIN — Medication 10 ML: at 20:03

## 2023-02-25 RX ADMIN — CEFEPIME 2000 MG: 2 INJECTION, POWDER, FOR SOLUTION INTRAVENOUS at 03:42

## 2023-02-25 RX ADMIN — SUCRALFATE 1 G: 1 TABLET ORAL at 11:04

## 2023-02-25 RX ADMIN — Medication 5 G: at 11:05

## 2023-02-25 RX ADMIN — POTASSIUM CHLORIDE 10 MEQ: 7.46 INJECTION, SOLUTION INTRAVENOUS at 01:43

## 2023-02-25 RX ADMIN — PANTOPRAZOLE SODIUM 40 MG: 40 INJECTION, POWDER, LYOPHILIZED, FOR SOLUTION INTRAVENOUS at 22:41

## 2023-02-25 RX ADMIN — Medication 10 ML: at 11:05

## 2023-02-25 RX ADMIN — Medication: at 20:04

## 2023-02-25 RX ADMIN — ATORVASTATIN CALCIUM 20 MG: 20 TABLET, FILM COATED ORAL at 20:02

## 2023-02-25 RX ADMIN — FOLIC ACID 1 MG: 1 TABLET ORAL at 11:03

## 2023-02-25 ASSESSMENT — PAIN SCALES - GENERAL
PAINLEVEL_OUTOF10: 0
PAINLEVEL_OUTOF10: 0

## 2023-02-25 NOTE — H&P
[facility] History and Physical      CHIEF COMPLAINT:  altered mental status    History of Present Illness: This is a 69year old female with a past medical history of multiple sclerosis, hypertension, and hyperlipidemia who presents to the ED with altered mental status. Per ED note, patient's  states she is normally A&O x 3. Unknown last well time. Patient is alert and oriented to self. Does not remember coming to the hospital and does not know why she is here. Denies any symptoms, including shortness of breath, chest or abdominal pain, bowel changes, nausea, or urinary symptoms. Patient does wear oxygen at home PRN at 3 liters. Informant(s) for H&P: patient and chart review    REVIEW OF SYSTEMS:  A comprehensive review of systems was negative except for: what is in the HPI      PMH:  Past Medical History:   Diagnosis Date    Arthritis     benign breast     CKD (chronic kidney disease)     COPD (chronic obstructive [facility] disease) [facility]     Hyperlipidemia     Hypertension     MS (multiple sclerosis) ([facility] 05/31/2012    Multiple sclerosis ([facility]     diagnosised 8  yrs ago [facility]       Surgical History:  Past Surgical History:   Procedure Laterality Date    APPENDECTOMY      BREAST SURGERY  4/13/2012    biopsy - negative    HIP ARTHROPLASTY Right 04/13/2011    Right AIDEE  ALLISON Barker MD    HYSTERECTOMY (CERVIX STATUS UNKNOWN)      KNEE ARTHROPLASTY Left 10/01/2013    Left TKA  ALLISON Barker MD    KNEE ARTHROPLASTY Right 08/29/2012    Right TKA  ALLISON Barker MD    TOTAL HIP ARTHROPLASTY Left 3/25/2022    LEFT HIP TOTAL ARTHROPLASTY performed by Mike Mcdonald MD at [address] N/A 11/11/2022    EGD CONTROL HEMORRHAGE performed by Claire Reyna MD at [address]       Medications Prior to Admission:    Prior to Admission medications    Medication Sig Start Date End Date Taking?  Authorizing Provider   atorvastatin (LIPITOR) 20 MG tablet Take 1 tablet by mouth nightly 2/24/23   Pilar Comfort Rudnicki, DO   amLODIPine (NORVASC) 5 MG tablet Take 1 tablet by mouth daily 2/24/23   Pilar Comfort Rudnicki, DO   mirtazapine (REMERON) 15 MG tablet Take 1 tablet by mouth nightly 2/24/23   Pilar Comfort Rudnicki, DO   sertraline (ZOLOFT) 100 MG tablet Take 1.5 tablets by mouth at bedtime 2/22/23   Pilar Comfort Rudnicki, DO   carvedilol (COREG) 12.5 MG tablet Take 1 tablet by mouth 2 times daily (with meals) 2/22/23   Lupe Donald, DO   baclofen (LIORESAL) 10 MG tablet Take 1 tablet by mouth 2 times daily 2/22/23   Pilar Comfort Rudnicki, DO   pantoprazole (PROTONIX) 40 MG tablet Take 1 tablet by mouth every morning (before breakfast) 2/22/23   Lupe Donald,    sucralfate (CARAFATE) 1 GM tablet Take 1 tablet by mouth 4 times daily 2/22/23   Pilar Comfort Rudnicki, DO   folic acid (FOLVITE) 1 MG tablet Take 1 tablet by mouth daily 2/22/23   Pilar Comfort Rudnicki, DO   folic acid (FOLVITE) 1 MG tablet Take 1 tablet by mouth daily 2/21/23   Pilar Comfort Rudnicki, DO   mirtazapine (REMERON) 15 MG tablet Take 1 tablet by mouth nightly 2/21/23   Pilar Comfort Rudnicki, DO   magnesium oxide (MAG-OX) 400 (240 Mg) MG tablet Take 1 tablet by mouth daily 2/16/23   Pilar Comfort Rudnicki, DO   alendronate (FOSAMAX) 70 MG tablet Take 70 mg by mouth every 7 days    Historical Provider, MD   memantine (NAMENDA) 5 MG tablet Take 1 tablet by mouth 2 times daily  Patient not taking: No sig reported 7/6/22 8/26/22  Lupe Donald DO       Allergies:    Macrodantin [nitrofurantoin macrocrystal]    Social History:    reports that she has been smoking cigarettes. She has a 50.00 pack-year smoking history. She has never used smokeless tobacco. She reports that she does not drink alcohol and does not use drugs. Family History:   family history includes Cirrhosis in her mother; Mult Sclerosis in her father; Thyroid Cancer in her mother.        PHYSICAL EXAM:  Vitals:  BP (!) 156/98   Pulse (!) 103   Temp 98.8 °F (37.1 °C)   Resp 12   Ht 5' (1.524 m)   Wt 135 lb (61.2 kg)   SpO2 98%   BMI 26.37 kg/m²     General Appearance: alert and oriented to person, in no acute distress  Skin: warm and dry, dry mucous membranes  Head: normocephalic and atraumatic  Eyes: pupils equal, round, and reactive to light, conjunctivae normal  Pulmonary/Chest: clear to auscultation bilaterally- no wheezes, rales or rhonchi, normal air movement, no respiratory distress  Cardiovascular: normal rate, normal S1 and S2  Abdomen: soft, non-tender, non-distended, normal bowel sounds, no masses or organomegaly  Extremities: no cyanosis, no clubbing and no edema  Neurologic: speech normal, oriented to person, no focal deficits noted, GARCIA, follows commands        LABS:  Recent Labs     02/25/23  0005      K 3.4*      CO2 31*   BUN 24*   CREATININE 1.1*   GLUCOSE 120*   CALCIUM 10.1       Recent Labs     02/25/23  0005   WBC 11.9*   RBC 4.80   HGB 13.1   HCT 42.5   MCV 88.5   MCH 27.3   MCHC 30.8*   RDW 15.5*      MPV 9.4       No results for input(s): POCGLU in the last 72 hours. Radiology:   CT Head W/O Contrast   Final Result   No acute intracranial abnormality. CT ABDOMEN PELVIS W IV CONTRAST Additional Contrast? None   Final Result   Circumferential bladder wall thickening and inflammatory stranding,   concerning for cystitis. Clinical and laboratory correlation recommended. Cholelithiasis. XR CHEST PORTABLE   Final Result   No acute process. Bilateral atelectasis. EKG: not available     ASSESSMENT:      Principal Problem:    AMS (altered mental status)  Active Problems:    COPD (chronic obstructive pulmonary disease) ([facility])    Acute cystitis    Gastrointestinal hemorrhage with melena    Hypokalemia    MS (multiple sclerosis) ([facility])    Hyperlipidemia    Primary hypertension  Resolved Problems:    * No resolved hospital problems. *      PLAN:    1.   Acute encephalopathy: Toxic vs metabolic. CT scan of head negative for acute intracranial abnormality. No focal deficits noted. Serum and urine drug screens negative. May be due to acute cystitis. Monitor mental status. 2. Leukocytosis: Secondary to cystitis. Continue antibiotics and IVF. Monitor. 3. Acute cystitis: CT scan of A/P showed circumferential bladder wall thickening and inflammatory stranding concerning for cystitis. UA positive for infection. Prior urine culture positive for E. Coli and Enterobacter cloacae, sensitive to cefepime. Urine culture pending. 4. GI bleed: H&H 13.1/ 42.5. Hemoccult positive. Melena noted in ED. Consult general surgery. Monitor H&H. Continue Protonix. 5. Hypokalemia: Potassium was 3.4. Received 10 meq in ED. Monitor and replete as needed. 6. CKD: Creatinine at baseline of 1.1. Continue gentle IV hydration. Avoid nephrotoxins. 7. COPD: Wears 3 liters nasal cannula PRN at baseline. Stable. 8. Hypertension: Continue Norvasc. 9. Hyperlipidemia: Continue statin. 10. History of MS: Continue Baclofen. Code Status: full  DVT prophylaxis: Bleeding risk    35 minutes or more spent reviewing patient chart, assessing patient, discussing plan of care with patient and family, discussing plan of care with collaborating physician, and documentation. NOTE: This report was transcribed using voice recognition software. Every effort was made to ensure accuracy; however, inadvertent computerized transcription errors may be present. Electronically signed by HIRA Fox CNP on 2/25/2023 at 2:47 AM     Addendum: I have personally participated in the history, exam, medical decision making with  Marlys Londono NP  on the date of service and I agree with all of the pertinent clinical information unless otherwise noted. I have also reviewed and agree with the past medical, family, and social history unless otherwise noted.      Patient is a 60-year-old female with past medical history significant for multiple sclerosis, hypertension, hyperlipidemia, chronic hypoxic respiratory failure requiring supplemental oxygen via nasal cannula (baseline 3 L) who presents with altered mental status to the [facility] emergency department. Reportedly, patient is normally alert and oriented x3. She comes in from skilled nursing facility due to altered mental status. Unclear start of her altered mentation. No family is available at bedside in the ED. Patient was found to have evidence of possible GI bleed and Hemoccult positive testing and melena. General surgery was consulted by the ED. CT head without contrast nonacute. UA obtained in the emergency department revealed findings consistent with acute complicated UTI. Internal medicine was asked to hospitalize. PHYSICAL EXAM:  Vitals:  BP (!) 159/84   Pulse 97   Temp 98.7 °F (37.1 °C)   Resp 16   Ht 5' 1\" (1.549 m)   Wt 139 lb 8 oz (63.3 kg)   SpO2 97%   BMI 26.36 kg/m²   Gen: awake, resting in hospital bed with eyes closed, NAD, oriented to person only, elderly  Lungs: clear to auscultation bilaterally no crackles no wheezing. Heart: RRR, no murmur   Abdomen: soft nontender nondistended positive bowel sounds. Extremities: full range of motion no peripheral edema. Impression:  Principal Problem:    AMS (altered mental status)  Active Problems:    COPD (chronic obstructive pulmonary disease) (HCC)    Acute cystitis    Gastrointestinal hemorrhage with melena    Hypokalemia    Acute metabolic encephalopathy    MS (multiple sclerosis) (HCC)    Hyperlipidemia    Primary hypertension  Resolved Problems:    * No resolved hospital problems.  *      My findings/plan include:  Patient is a 61-year-old female with past medical history significant for multiple sclerosis, hypertension, hyperlipidemia, chronic hypoxic respiratory failure requiring supplemental oxygen via nasal cannula (baseline 3 L) who presents with acute metabolic encephalopathy. Unclear time course as to when her encephalopathy may have started. Comes in from skilled nursing facility. Typically she is alert and oriented x3 reportedly. No family is available at the bedside. CT head without contrast obtained in the ED was nonacute. UA was obtained which revealed findings consistent with possible acute complicated UTI. She does meet sepsis criteria. We will start IV cefepime based on culture data sensitivities. Patient also received a dose of IV vancomycin in the ED, will hold off on further IV vancomycin at this time. Start IV Protonix 40 mg every 12 hours for possible GI bleed. Monitor encephalopathy, monitor for clinical improvement. Monitor for further GI bleeding. Follow-up general surgery recommendations. NOTE: This report was transcribed using voice recognition software. Every effort was made to ensure accuracy; however, inadvertent computerized transcription errors may be present.   Electronically signed by Gerry Kimball MD on 2/25/2023 at 3:28 AM

## 2023-02-25 NOTE — ED NOTES
Patient cleaned up for incontinence of bowel and bladder at this time.        Lawyer Joseph RN  02/25/23 [id number]

## 2023-02-25 NOTE — CARE COORDINATION
Social work / Discharge Planning:         Social work spoke to patient's daughter Todd Bautista via phone for initial assessment and explained role. Patient is from home with her  and uses a rollator, shower chair,  and oxygen at . Patient had a past stay at [facility] and is currently active with Cheryl . Patient's daughter states that a home assessment was completed by staff from [address] [place] [address], [address] for possible assistance from [employer] services. PT/OT evaluations ordered. Patient's daughter anticipates the need for [facility] and is requesting a referral for [facility] [place]. No need for freedom of choice list.     Referral will need to be made on Monday. Case Management Assessment  Initial Evaluation    Date/Time of Evaluation: 2/25/2023 9:34 AM  Assessment Completed by: JIAN Ocampo    If patient is discharged prior to next notation, then this note serves as note for discharge by case management. Patient Name: Ramon Wynne                   Date of Birth: [birth year]  Diagnosis: Folliculitis [L24.1]  Altered mental status, unspecified altered mental status type [R41.82]  Gastrointestinal hemorrhage, unspecified gastrointestinal hemorrhage type [Z25.5]  Acute metabolic encephalopathy [P39.53]  AMS (altered mental status) [R41.82]                   Date / Time: 2/24/2023  6:59 PM    Patient Admission Status: Inpatient   Readmission Risk (Low < 19, Mod (19-27), High > 27): Readmission Risk Score: 24.6    Current PCP: Jess Henriquez, DO  PCP verified by CM? Yes    Chart Reviewed: Yes      History Provided by: Child/Family  Patient Orientation: Unable to Assess    Patient Cognition: Alert    Hospitalization in the last 30 days (Readmission):  No    If yes, Readmission Assessment in  Navigator will be completed.     Advance Directives:      Code Status: Full Code   Patient's Primary Decision Maker is:      Primary Decision Maker: Fadumo Garcia Child - [phone]    Discharge Planning:    Patient lives with: Spouse/Significant Other Type of Home: House  Primary Care Giver: Family  Patient Support Systems include: Spouse/Significant Other, Children, Family Members   Current Financial resources:    Current community resources:    Current services prior to admission: Oxygen Therapy            Current DME:              Type of Home Care services:  None    ADLS  Prior functional level: Assistance with the following:, Bathing, Cooking, Housework, Shopping  Current functional level: Assistance with the following:, Bathing, Dressing, Toileting, Cooking, Housework, Shopping, Mobility    PT AM-PAC:   /24  OT AM-PAC:   /24    Family can provide assistance at DC: No  Would you like Case Management to discuss the discharge plan with any other family members/significant others, and if so, who?  Yes  Plans to Return to Present Housing: No  Other Identified Issues/Barriers to RETURNING to current housing:   Potential Assistance needed at discharge: N/A            Potential DME:    Patient expects to discharge to: [address] [place] [address] for transportation at discharge:      Financial    Payor: Malathi Allen / Plan: Sandie Man ESSENTIAL/PLUS / Product Type: *No Product type* /     Does insurance require precert for SNF: Yes    Potential assistance Purchasing Medications:    Meds-to-Beds request:        [id number] - [facility] - [address] [facility] [phone] [place] [phone]  [address] [facility] [address] [facility] [address]  Phone: [phone] Fax: [phone] for [facility] [address] [phone] [facility] [phone]  [address] [place], [place] [id number]  Phone: [phone] Fax: [phone]    [address] [phone] [address] [place] [address]  Phone: [phone] Fax: [phone]    [facility] [place], [phone] Yris Tobin [phone] [address] [address]  Phone: [phone] Fax: [phone]      Notes:    Factors facilitating achievement of predicted outcomes: Family support and Caregiver support    Barriers to discharge: Long standing deficits    Additional Case Management Notes: The Plan for Transition of Care is related to the following treatment goals of Folliculitis [U34.0]  Altered mental status, unspecified altered mental status type [R41.82]  Gastrointestinal hemorrhage, unspecified gastrointestinal hemorrhage type [B47.8]  Acute metabolic encephalopathy [D91.50]  AMS (altered mental status) [E90.08]    IF APPLICABLE: The Patient and/or patient representative Silvia Ortez and her family were provided with a choice of provider and agrees with the discharge plan. Freedom of choice list with basic dialogue that supports the patient's individualized plan of care/goals and shares the quality data associated with the providers was provided to:     Patient Representative Name:       The Patient and/or Patient Representative Agree with the Discharge Plan?       Wayman Litten, [facility]  Case Management Department  Ph: [phone] Fax: [phone]  Electronically signed by Wayman Litten, LSW on 2/25/2023 at 9:35 AM

## 2023-02-25 NOTE — PROGRESS NOTES
[facility] Progress Note    Admitting Date and Time: 2/24/2023  6:59 PM  Admit Dx: Folliculitis [M63.2]  Altered mental status, unspecified altered mental status type [R41.82]  Gastrointestinal hemorrhage, unspecified gastrointestinal hemorrhage type [P59.7]  Acute metabolic encephalopathy [S01.13]  AMS (altered mental status) [R41.82]      Assessment/Plan     Acute encephalopathy : toxic vs metabolic ,likely cause is UTI unsure of her baseline UA revealed signs of infection  CXR neg  CT head neg  UDS neg  WBC 11.9 lactic acid 1.1 . C/w cefepime blood cultures IVF avoid sedating meds  check ammonia     GI bleed : fecal occult pos  HGB 13.1/42 trend H/H Protonix GI  consult     Leukocytosis : sec to cystitis will monitor     Acute rhabdomyolysis :  , IVF and repeat in am       Cholelithiasis  :  as seen on CT A/P RUQ US       Acute cystitis   CT A/P revealed bladder wall thickening and inflammatory stranding concerning for cystitis . Ucx 12/2022 revealed Enterobacter clocae  and Ecoli , that is sensitive to cefepime C/w cefepime await urine culture     Hypokalemia: 3.4, replaced     CKD stage  3: Scr 1.1 stable     COPD /chronic hypoxic respiratory failure ; on 3L NC daily  duonebs PRN     Hypertension  Norvasc     Hyperlipidemia : Lipitor     Depression : Remeron /Zoloft     History of MS : baclofen       CODE : FULL   DVT px : none due to GI bleed   Dispo:  likely home PT/OT ordered       Principal Problem:    AMS (altered mental status)  Active Problems:    COPD (chronic obstructive pulmonary disease) (HCC)    Acute cystitis    Gastrointestinal hemorrhage with melena    Hypokalemia    Acute metabolic encephalopathy    Sepsis ([address]    MS (multiple sclerosis) ([facility]    Hyperlipidemia    Primary hypertension  Resolved Problems:    * No resolved hospital problems.  *            Subjective:  Patient is being followed for Folliculitis [H35.3]  Altered mental status, unspecified altered mental status type [R41.82]  Gastrointestinal hemorrhage, unspecified gastrointestinal hemorrhage type [Y09.6]  Acute metabolic encephalopathy [T91.50]  AMS (altered mental status) [R41.82]     This is a 69year old female with a past medical history of multiple sclerosis, hypertension, and hyperlipidemia who presents to the ED with altered mental status. Per ED note, patient's  states she is normally A&O x 3. Unknown last well time. Patient is alert and oriented to self. Does not remember coming to the hospital and does not know why she is here. Denies any symptoms, including shortness of breath, chest or abdominal pain, bowel changes, nausea, or urinary symptoms. Patient does wear oxygen at home PRN at 3 liters. Pt  is lying in bed alert to self and place. She reports no chest pain abdominal pain fever or chills .        ROS: denies fever, chills, cp, sob, n/v, HA      cefepime  2,000 mg IntraVENous Q12H    amLODIPine  5 mg Oral Daily    atorvastatin  20 mg Oral Nightly    baclofen  10 mg Oral BID    carvedilol  12.5 mg Oral BID WC    folic acid  1 mg Oral Daily    mirtazapine  15 mg Oral Nightly    sertraline  150 mg Oral Nightly    sucralfate  1 g Oral 4x Daily    sodium chloride flush  5-40 mL IntraVENous 2 times per day    enoxaparin  40 mg SubCUTAneous Daily    pantoprazole (PROTONIX) 40 mg injection  40 mg IntraVENous Q12H    White Petrolatum   Topical BID     sodium chloride flush, 5-40 mL, PRN  sodium chloride, , PRN  ondansetron, 4 mg, Q8H PRN   Or  ondansetron, 4 mg, Q6H PRN  polyethylene glycol, 17 g, Daily PRN  acetaminophen, 650 mg, Q6H PRN   Or  acetaminophen, 650 mg, Q6H PRN  sodium chloride, , PRN         Objective:    /80   Pulse 96   Temp 97.7 °F (36.5 °C) (Oral)   Resp 16   Ht 5' 1\" (1.549 m)   Wt 139 lb 8 oz (63.3 kg)   SpO2 96%   BMI 26.36 kg/m²     General Appearance: alert and oriented to person, place and time and in no acute distress  Skin: warm and dry  Head: normocephalic and atraumatic  Eyes: pupils equal, round, and reactive to light, extraocular eye movements intact, conjunctivae normal  Neck: neck supple and non tender without mass   Pulmonary/Chest: clear to auscultation bilaterally- no wheezes, rales or rhonchi, normal air movement, no respiratory distress  Cardiovascular: normal rate, normal S1 and S2 and no carotid bruits  Abdomen: soft, non-tender, non-distended, normal bowel sounds, no masses or organomegaly  Extremities: no cyanosis, no clubbing and no edema  Neurologic: no cranial nerve deficit and speech normal        Recent Labs     02/25/23  0005      K 3.4*      CO2 31*   BUN 24*   CREATININE 1.1*   GLUCOSE 120*   CALCIUM 10.1       Recent Labs     02/25/23  0005   WBC 11.9*   RBC 4.80   HGB 13.1   HCT 42.5   MCV 88.5   MCH 27.3   MCHC 30.8*   RDW 15.5*      MPV 9.4       Radiology:     NOTE: This report was transcribed using voice recognition software. Every effort was made to ensure accuracy; however, inadvertent computerized transcription errors may be present.   Electronically signed by Ina Gilford, MD on 2/25/2023 at 10:22 AM

## 2023-02-25 NOTE — CONSULTS
General Surgery Consult    Patient's Name/Date of Birth: Zoë Hernandez / [id number]    Date: February 25, 2023     Consulting Surgeon: Hany Pedroza M.D.    PCP: Ashley Cardoza DO     Chief Complaint:     HPI:   Zoë Hernandez is a 69 y.o. female who presents for evaluation of reports of melena. Reports hx of distant gastric ulcers. Denied any abdominal pian. Denied any nausea or vomiting. Past Medical History:   Diagnosis Date    Arthritis     benign breast     CKD (chronic kidney disease)     COPD (chronic obstructive pulmonary disease) (HCC)     Hyperlipidemia     Hypertension     MS (multiple sclerosis) ([facility] [address] 05/31/2012    Multiple sclerosis ([facility] [address]     diagnosised 8  yrs ago [facility]       Past Surgical History:   Procedure Laterality Date    APPENDECTOMY      BREAST SURGERY  4/13/2012    biopsy - negative    HIP ARTHROPLASTY Right 04/13/2011    Right AIDEE  ALLISON Michaud MD    HYSTERECTOMY (CERVIX STATUS UNKNOWN)      KNEE ARTHROPLASTY Left 10/01/2013    Left TKA  ALLISON Michaud MD    KNEE ARTHROPLASTY Right 08/29/2012    Right TKA  ALLISON Michaud MD    TOTAL HIP ARTHROPLASTY Left 3/25/2022    LEFT HIP TOTAL ARTHROPLASTY performed by Ritesh Jolly MD at [address] [place] [address] N/A 11/11/2022    EGD CONTROL HEMORRHAGE performed by Emmanuel Esquivel MD at [place] [address] Facility-Administered Medications   Medication Dose Route Frequency Provider Last Rate Last Admin    cefepime (MAXIPIME) 2,000 mg in sodium chloride 0.9 % 50 mL IVPB  2,000 mg IntraVENous Q12H Chica Coker  mL/hr at 02/25/23 1434 2,000 mg at 02/25/23 1434    amLODIPine (NORVASC) tablet 5 mg  5 mg Oral Daily Chica Coker MD   5 mg at 02/25/23 1102    atorvastatin (LIPITOR) tablet 20 mg  20 mg Oral Nightly Chica Coker MD        baclofen (LIORESAL) tablet 10 mg  10 mg Oral BID Chica Coker MD   10 mg at 02/25/23 1102    carvedilol (COREG) tablet 12.5 mg 12.5 mg Oral BID WC Rosalba Metz MD   12.5 mg at 32/88/16 7869    folic acid (FOLVITE) tablet 1 mg  1 mg Oral Daily Rosalba Metz MD   1 mg at 02/25/23 1103    mirtazapine (REMERON) tablet 15 mg  15 mg Oral Nightly Rosalba Metz MD        sertraline (ZOLOFT) tablet 150 mg  150 mg Oral Nightly Rosalba Metz MD        sucralfate (CARAFATE) tablet 1 g  1 g Oral 4x Daily Rosalba Metz MD   1 g at 02/25/23 1104    sodium chloride flush 0.9 % injection 5-40 mL  5-40 mL IntraVENous 2 times per day Rosalba Metz MD   10 mL at 02/25/23 1105    sodium chloride flush 0.9 % injection 5-40 mL  5-40 mL IntraVENous PRN Rosalba Metz MD        0.9 % sodium chloride infusion   IntraVENous PRN Rosalba Metz MD        enoxaparin (LOVENOX) injection 40 mg  40 mg SubCUTAneous Daily Rosalba Metz MD        ondansetron (ZOFRAN-ODT) disintegrating tablet 4 mg  4 mg Oral Q8H PRN Rosalba Metz MD        Or    ondansetron [facility]) injection 4 mg  4 mg IntraVENous Q6H PRN Rosalba Metz MD        polyethylene glycol (GLYCOLAX) packet 17 g  17 g Oral Daily PRN Rosalba Metz MD        acetaminophen (TYLENOL) tablet 650 mg  650 mg Oral Q6H PRN Rosalba Metz MD        Or    acetaminophen (TYLENOL) suppository 650 mg  650 mg Rectal Q6H PRN Rosalba Metz MD        lactated ringers IV soln infusion   IntraVENous Continuous Rosalba Metz MD 75 mL/hr at 02/25/23 0342 New Bag at 02/25/23 0342    pantoprazole (PROTONIX) 40 mg in sodium chloride (PF) 0.9 % 10 mL injection  40 mg IntraVENous Q12H Rosalba Metz MD   40 mg at 02/25/23 1116    White Petrolatum OINT   Topical BID Rosalba Metz MD   5 g at 02/25/23 1105    ipratropium-albuterol (DUONEB) nebulizer solution 1 ampule  1 ampule Inhalation Q4H PRN Zenobia Huff MD        0.9 % sodium chloride infusion   IntraVENous DOC Estrella DO Francia           Allergies   Allergen Reactions    Macrodantin [Nitrofurantoin Macrocrystal] Shortness Of Breath       Family History   Problem Relation Age of Onset    Mult Sclerosis Father     Thyroid Cancer Mother     Cirrhosis Mother        Social History     Socioeconomic History    Marital status:      Spouse name: Not on file    Number of children: Not on file    Years of education: Not on file    Highest education level: Not on file   Occupational History     Employer: RETIRED   Tobacco Use    Smoking status: Every Day     Packs/day: 1.00     Years: 50.00     Pack years: 50.00     Types: Cigarettes    Smokeless tobacco: Never   Vaping Use    Vaping Use: Never used   Substance and Sexual Activity    Alcohol use: No    Drug use: No    Sexual activity: Yes   Other Topics Concern    Not on file   Social History Narrative    Not on file     Social Determinants of Health     Financial Resource Strain: Low Risk     Difficulty of Paying Living Expenses: Not hard at all   Food Insecurity: No Food Insecurity    Worried About Running Out of Food in the Last Year: Never true    Ran Out of Food in the Last Year: Never true   Transportation Needs: Unknown    Lack of Transportation (Medical): Not on file    Lack of Transportation (Non-Medical):  No   Physical Activity: Inactive    Days of Exercise per Week: 0 days    Minutes of Exercise per Session: 0 min   Stress: Not on file   Social Connections: Not on file   Intimate Partner Violence: Not At Risk    Fear of Current or Ex-Partner: No    Emotionally Abused: No    Physically Abused: No    Sexually Abused: No   Housing Stability: Unknown    Unable to Pay for Housing in the Last Year: Not on file    Number of Places Lived in the Last Year: Not on file    Unstable Housing in the Last Year: No           Review of Systems  Negative times ten except what was stated in HPI and PMH    Physical exam:   /80   Pulse 96   Temp 97.7 °F (36.5 °C) (Oral)   Resp 16 Ht 5' 1\" (1.549 m)   Wt 139 lb 8 oz (63.3 kg)   SpO2 96%   BMI 26.36 kg/m²   General appearance: AAOx3, NAD  Head: NCAT, PERRLA, EOMI, red conjunctiva  Neck: supple, no masses  Lungs: CTAB, equal chest rise bilateral  Heart: Reg rate  Abdomen: soft, non tender, non distended, no masses or organomegaly, no palpable hernias or defects, +bowel sounds, surgical scars were not present.   Skin; no lesions  Gu: no cva tenderness  Extremities: extremities normal, atraumatic, no cyanosis or edema      Radiology:  CT abdomen/pelvis:     Assessment:  69 y.o. female with reportd gib    Plan:  Hg stable, monitor H/H  Ok to change PPI to PO  Advance diet       Tod Soria MD  2/25/2023  2:52 PM

## 2023-02-25 NOTE — ED NOTES
ED to Inpatient Handoff Report    Notified Jennie Burkitt that electronic handoff available and patient ready for transport to room 618. Safety Risks: None identified and Risk of falls    Patient in Restraints: no    Constant Observer or Patient : no    Telemetry Monitoring Ordered :Yes           Order to transfer to unit without monitor:NO    Last MEWS: 1 Time completed: 0245    Vitals:    02/25/23 0045 02/25/23 0100 02/25/23 0145 02/25/23 0245   BP:   (!) 113/99 (!) 111/95   Pulse: (!) 105 (!) 103 98 98   Resp: 12 12 10 12   Temp:    98.2 °F (36.8 °C)   SpO2: 99% 98% 95% 96%   Weight:       Height:           Opportunity for questions and clarification was provided.          Lisandro Mckeon RN  02/25/23 8005

## 2023-02-25 NOTE — ED PROVIDER NOTES
Patient is confused and has been not present at bedside and I was unable to reach out to him. Patient's daughter, registered nurse for cell, whose phone number is not on file called to give further history and information. 50-year-old female with history of multiple sclerosis and GI bleed with duodenal ulcers likely from prolonged steroid use and physiological stress that was managed and treated by general surgery presents to the emergency department for concerns of altered mental status. Daughter said that patient's  called her and said that the patient was having rectal bleed, and patient daughter was concerned for another episode of a GI bleed. No reports of fever, chills, chest pain, shortness of breath, trauma urinary symptoms and focal neurodeficits  Chief Complaint   Patient presents with    Altered Mental Status     Pt presents to the ED via EMS secondary to altered mentation. Per  Pt is normally A/Ox3.  is unsure of last know well time. Pt follows command and able to state her name. Review of Systems   Unable to obtain due to altered mental status  Physical Exam  Constitutional:       Appearance: She is not ill-appearing. Eyes:      General: No scleral icterus. Pupils: Pupils are equal.   Cardiovascular:      Rate and Rhythm: Normal rate and regular rhythm. Heart sounds: No murmur heard. No friction rub. Pulmonary:      Effort: Pulmonary effort is normal. No respiratory distress. Breath sounds: Normal breath sounds. No stridor. No wheezing. Abdominal:      Palpations: Abdomen is soft. Tenderness: There is abdominal tenderness. Musculoskeletal:         General: Normal range of motion. Cervical back: Normal range of motion and neck supple. Neurological:      Mental Status: She is alert. She is confused. Cranial Nerves: No facial asymmetry. Sensory: No sensory deficit. Motor: No weakness. Procedures     EKG:   This EKG is signed by emergency department physician. 00 57  Rate: 100  Rhythm: Sinus  Axis: Regular  ST: No ST elevations no hyperacute T waves  Interpretation normal sinus rhythm with a slightly prolonged QTc. Previous EKG available    EKG that was signed by emergency department physician 02: 25  Rate 100  Rhythm sinus  Hugo regular  ST: No ST changes no new T wave changes slightly prolonged QTc  Interpretation normal sinus rhythm with with slightly prolonged QTc. This EKG is similar and with no acute concerns of changes from prior ECG. MDM       Patient is confused and has been not present at bedside and I was unable to reach out to him. Patient's daughter, registered nurse for cell, whose phone number is not on file called to give further history and information. 80-year-old female with history of multiple sclerosis and GI bleed with duodenal ulcers likely from prolonged steroid use and physiological stress that was managed and treated by general surgery presents to the emergency department for concerns of altered mental status. Daughter said that patient's  called her and said that the patient was having rectal bleed, and patient daughter was concerned for another episode of a GI bleed. No reports of fever, chills, chest pain, shortness of breath, trauma urinary symptoms and focal neurodeficits. She care for the patient wears 3 L nasal cannula at baseline. Chief Complaint   Patient presents with    Altered Mental Status     Pt presents to the ED via EMS secondary to altered mentation. Per  Pt is normally A/Ox3.  is unsure of last know well time. Pt follows command and able to state her name. Upon entering room the patient was by herself with no family at bedside. She is alert herself but is otherwise disoriented. She was 8485% on her normal 3 L nasal cannula I bumped her up to 4 L which improved her oxygenation.   Heart is regular rate and rhythm, lungs are clear to auscultation, there is diffuse tenderness on abdominal exam as the patient is wincing in EXTR pressing pain focally. Patient is able to open and close eyes wheeze my hand and move extremities and full range of motion's on command. I do not appreciate any facial asymmetry or sensory deficits as the patient is able to respond with one-word phrase when being touched. Patient is afebrile and normotensive without any acute respiratory distress. Altered mental status work-up was initiated. Fecal occult blood test is positive there is melena stool. She is given 80 Protonix bolus along with type and screen and there was 2 units of packed red blood cells prepared but were not needed for the patient's hemoglobin was 13.1 treatment from 11 of last admission. COVID and influenza are negative.  is remarkable for hypokalemia with 3.5, slightly. Potassiumwith a K rider. Creatinine is at 1.1 however this is patient's baseline. Glucose was only 120 no anion gap bicarb was slightly elevated patient is likely not in diabetic ketoacidosis. ABG is unremarkable for acidosis however there is slight hypoxia. Urine drug screen is negative TSH and T4 are within normal limits. Urinalysis is on remarkable. Lactic is normal at 1.1. Patient does have a slight leukocytosis at 11.9. CT of the head was evaluated to show for acute hemorrhage or masses which were all negative and showed no acute intracranial process. CT abdomen pelvis was reviewed and showedIMPRESSION:  Circumferential bladder wall thickening and inflammatory stranding,  concerning for cystitis. X-ray imaging showed bilateral atelectasis which is likely causing the patient's hypoxic symptoms. Please see ED course below for findings of folliculitis on patient. My impression is lower GI bleed in which the patient had a Protonix bolus and there are 2 prepared red blood cells however there is no need for transfusion for hemoglobin is above 7.   In addition with slightly hypokalemia which was replaced with a K rider. Acute respiratory failure with hypoxia that improved with going above 1 L from patient's baseline of nasal cannula. Cystitis and leukocytosis. I considered cerebral hemorrhage however this is unlikely due to the fact patient's CT imaging is within normal limits. I considered rhabdomyolysis however the patient CK is less than 5 times upper normal limit. I considered urinary tract infection although there are signs of cystitis on the CT abdomen the patient urinalysis is unremarkable. She is being given cefepime and vancomycin which does cover urine Jamal tract infections microbes. Urine culture is sent.     Medications   0.9 % sodium chloride infusion (has no administration in time range)   cefepime (MAXIPIME) 2,000 mg in sodium chloride 0.9 % 50 mL IVPB (has no administration in time range)   amLODIPine (NORVASC) tablet 5 mg (has no administration in time range)   atorvastatin (LIPITOR) tablet 20 mg (has no administration in time range)   baclofen (LIORESAL) tablet 10 mg (has no administration in time range)   carvedilol (COREG) tablet 12.5 mg (has no administration in time range)   folic acid (FOLVITE) tablet 1 mg (has no administration in time range)   mirtazapine (REMERON) tablet 15 mg (has no administration in time range)   pantoprazole (PROTONIX) tablet 40 mg (has no administration in time range)   sertraline (ZOLOFT) tablet 150 mg (has no administration in time range)   sucralfate (CARAFATE) tablet 1 g (has no administration in time range)   sodium chloride flush 0.9 % injection 5-40 mL (has no administration in time range)   sodium chloride flush 0.9 % injection 5-40 mL (has no administration in time range)   0.9 % sodium chloride infusion (has no administration in time range)   enoxaparin (LOVENOX) injection 40 mg (has no administration in time range)   ondansetron (ZOFRAN-ODT) disintegrating tablet 4 mg (has no administration in time range)     Or   ondansetron [facility]) injection 4 mg (has no administration in time range)   polyethylene glycol (GLYCOLAX) packet 17 g (has no administration in time range)   acetaminophen (TYLENOL) tablet 650 mg (has no administration in time range)     Or   acetaminophen (TYLENOL) suppository 650 mg (has no administration in time range)   lactated ringers IV soln infusion (has no administration in time range)   pantoprazole (PROTONIX) injection 80 mg (80 mg IntraVENous Given 2/25/23 0029)   vancomycin 1000 mg IVPB in 250 mL NS addavial (0 mg IntraVENous Stopped 2/25/23 0256)   iopamidol (ISOVUE-370) 76 % injection 75 mL (75 mLs IntraVENous Given 2/25/23 0122)   potassium chloride 10 mEq/100 mL IVPB (Peripheral Line) (10 mEq IntraVENous New Bag 2/25/23 0143)          ED Course as of 02/25/23 0326 Fri Feb 24, 2023 1945 2/16/2023 office note reviewed Initial Medicare annual wellness visit  Hypertension, essential, benign  -     amLODIPine (NORVASC) 5 MG tablet; Take 1 tablet by mouth daily  Iron deficiency anemia, unspecified iron deficiency anemia type  Fatigue, unspecified type  -     CBC; Future  -     Comprehensive Metabolic Panel; Future  -     TSH; Future  -     Vitamin B12; Future  Vitamin D deficiency  -     Vitamin D 25 Hydroxy; Future  Other orders  -     atorvastatin (LIPITOR) 20 MG tablet; Take 1 tablet by mouth nightly  -     carvedilol (COREG) 12.5 MG tablet; Take 1 tablet by mouth 2 times daily (with meals)  -     folic acid (FOLVITE) 1 MG tablet; Take 1 tablet by mouth daily  -     magnesium oxide (MAG-OX) 400 (240 Mg) MG tablet; Take 1 tablet by mouth daily  -     mirtazapine (REMERON) 15 MG tablet; Take 1 tablet by mouth nightly  -     pantoprazole (PROTONIX) 40 MG tablet; Take 1 tablet by mouth every morning (before breakfast)  -     sucralfate (CARAFATE) 1 GM tablet; Take 1 tablet by mouth 4 times daily  -     sertraline (ZOLOFT) 100 MG tablet; Take 1.5 tablets by mouth at bedtime        Plan     1.  Increase Zoloft 150 mg nightly  2. Continue Remeron  3. Obtain blood work to rule out any metabolic causes for fatigue  4. Otherwise continue current medication regimen for now  5. Consider referral to outpatient GI depending on her blood counts [MN]   1947 9. Recent UTI: seen in ER 12/19- UC e coli/ enterobacter- treated with bactrim. Recheck UA- neg [MN]   1947 According to external chart review patient wears 3 L NC at baseline. Upon entering the room she was hypoxic at 84 percent on 3 L NC, which improved after bumpng up to 4 liters  [MN]   2348 Nurse notified me to come take a look at patient vaginal area when she was doing. A [place]. On physical exam there were seem to be 3 spots of localized pus formation impression is folliculitis/abscess. Due to patient's elevated white blood cell count and hypoxia she is treated with vancomycin and a dose of Rocephin [MN]   2352 Dw/ general surgery dr Chong Rossi will consult [KRYEE]   Sat Feb 25, 2023   0013 Ecg:  Ecg reviewed by me  0000  Normal sinus rhythm  Hr 100  Age undetermined inferior infract no acute ischemia stable qtc   [KYREE]   0109 CK is 512 otherwise elevated however CK is less than 5 times greater than upper limit.  [MN]      ED Course User Index  [KYREE] Owen Ordoñez DO  [MN] Carli Stafford DO     Medications   0.9 % sodium chloride infusion (has no administration in time range)   cefepime (MAXIPIME) 2,000 mg in sodium chloride 0.9 % 50 mL IVPB (has no administration in time range)   lactated ringers IV soln infusion (has no administration in time range)   pantoprazole (PROTONIX) injection 80 mg (80 mg IntraVENous Given 2/25/23 0029)   vancomycin 1000 mg IVPB in 250 mL NS addavial (1,000 mg IntraVENous New Bag 2/25/23 0156)   iopamidol (ISOVUE-370) 76 % injection 75 mL (75 mLs IntraVENous Given 2/25/23 0122)   potassium chloride 10 mEq/100 mL IVPB (Peripheral Line) (10 mEq IntraVENous New Bag 2/25/23 0143)        --------------------------------------------- PAST HISTORY ---------------------------------------------  Past Medical History:  has a past medical history of Arthritis, benign breast, CKD (chronic kidney disease), COPD (chronic obstructive pulmonary disease) ([facility] Hyperlipidemia, Hypertension, MS (multiple sclerosis) ([facility] and Multiple sclerosis ([facility] Past Surgical History:  has a past surgical history that includes Hysterectomy; Breast surgery (4/13/2012); Appendectomy; Knee Arthroplasty (Left, 10/01/2013); Knee Arthroplasty (Right, 08/29/2012); Hip Arthroplasty (Right, 04/13/2011); Total hip arthroplasty (Left, 3/25/2022); and Upper gastrointestinal endoscopy (N/A, 11/11/2022). Social History:  reports that she has been smoking cigarettes. She has a 50.00 pack-year smoking history. She has never used smokeless tobacco. She reports that she does not drink alcohol and does not use drugs. Family History: family history includes Cirrhosis in her mother; Mult Sclerosis in her father; Thyroid Cancer in her mother. The patients home medications have been reviewed.     Allergies: Macrodantin [nitrofurantoin macrocrystal]    -------------------------------------------------- RESULTS -------------------------------------------------    LABS:  Results for orders placed or performed during the hospital encounter of 02/24/23   COVID-19, Rapid    Specimen: Nasopharyngeal Swab   Result Value Ref Range    SARS-CoV-2, NAAT Not Detected Not Detected   Rapid influenza A/B antigens    Specimen: Nasopharyngeal   Result Value Ref Range    Influenza A by PCR Not Detected Not Detected    Influenza B by PCR Not Detected Not Detected   CBC with Auto Differential   Result Value Ref Range    WBC 11.9 (H) 4.5 - 11.5 E9/L    RBC 4.80 3.50 - 5.50 E12/L    Hemoglobin 13.1 11.5 - 15.5 g/dL    Hematocrit 42.5 34.0 - 48.0 %    MCV 88.5 80.0 - 99.9 fL    MCH 27.3 26.0 - 35.0 pg    MCHC 30.8 (L) 32.0 - 34.5 %    RDW 15.5 (H) 11.5 - 15.0 fL    Platelets 395 635 - 976 E9/L    MPV 9.4 7.0 - 12.0 fL    Neutrophils % 78.9 43.0 - 80.0 %    Immature Granulocytes % 0.4 0.0 - 5.0 %    Lymphocytes % 14.0 (L) 20.0 - 42.0 %    Monocytes % 6.2 2.0 - 12.0 %    Eosinophils % 0.1 0.0 - 6.0 %    Basophils % 0.4 0.0 - 2.0 %    Neutrophils Absolute 9.36 (H) 1.80 - 7.30 E9/L    Immature Granulocytes # 0.05 E9/L    Lymphocytes Absolute 1.66 1.50 - 4.00 E9/L    Monocytes Absolute 0.74 0.10 - 0.95 E9/L    Eosinophils Absolute 0.01 (L) 0.05 - 0.50 E9/L    Basophils Absolute 0.05 0.00 - 0.20 E9/L   CMP   Result Value Ref Range    Sodium 146 132 - 146 mmol/L    Potassium 3.4 (L) 3.5 - 5.0 mmol/L    Chloride 105 98 - 107 mmol/L    CO2 31 (H) 22 - 29 mmol/L    Anion Gap 10 7 - 16 mmol/L    Glucose 120 (H) 74 - 99 mg/dL    BUN 24 (H) 6 - 23 mg/dL    Creatinine 1.1 (H) 0.5 - 1.0 mg/dL    Est, Glom Filt Rate 52 >=60 mL/min/1.73    Calcium 10.1 8.6 - 10.2 mg/dL    Total Protein 7.6 6.4 - 8.3 g/dL    Albumin 3.7 3.5 - 5.2 g/dL    Total Bilirubin 0.3 0.0 - 1.2 mg/dL    Alkaline Phosphatase 84 35 - 104 U/L    ALT 5 0 - 32 U/L    AST 21 0 - 31 U/L   Magnesium   Result Value Ref Range    Magnesium 1.8 1.6 - 2.6 mg/dL   TSH   Result Value Ref Range    TSH 0.345 0.270 - 4.200 uIU/mL   T4, Free   Result Value Ref Range    T4 Free 1.08 0.93 - 1.70 ng/dL   Urinalysis with Microscopic   Result Value Ref Range    Color, UA Yellow Straw/Yellow    Clarity, UA Clear Clear    Glucose, Ur 100 (A) Negative mg/dL    Bilirubin Urine Negative Negative    Ketones, Urine TRACE (A) Negative mg/dL    Specific Gravity, UA 1.015 1.005 - 1.030    Blood, Urine SMALL (A) Negative    pH, UA 5.5 5.0 - 9.0    Protein, UA Negative Negative mg/dL    Urobilinogen, Urine 0.2 <2.0 E.U./dL    Nitrite, Urine Negative Negative    Leukocyte Esterase, Urine TRACE (A) Negative    WBC, UA 0-1 0 - 5 /HPF    RBC, UA 1-3 0 - 2 /HPF    Epithelial Cells, UA FEW /HPF    Bacteria, UA RARE (A) None Seen /HPF   CK   Result Value Ref Range    Total  (H) 20 - 180 U/L   Serum Drug Screen   Result Value Ref Range    Ethanol Lvl <10 mg/dL    Acetaminophen Level <5.0 (L) 10.0 - 12.7 mcg/mL    Salicylate, Serum <2.0 0.0 - 30.0 mg/dL   Urine Drug Screen   Result Value Ref Range    Amphetamine Screen, Urine NOT DETECTED Negative <1000 ng/mL    Barbiturate Screen, Ur NOT DETECTED Negative < 200 ng/mL    Benzodiazepine Screen, Urine NOT DETECTED Negative < 200 ng/mL    Cannabinoid Scrn, Ur NOT DETECTED Negative < 50ng/mL    Cocaine Metabolite Screen, Urine NOT DETECTED Negative < 300 ng/mL    Opiate Scrn, Ur NOT DETECTED Negative < 300ng/mL    PCP Screen, Urine NOT DETECTED Negative < 25 ng/mL    Methadone Screen, Urine NOT DETECTED Negative <300 ng/mL    Oxycodone Urine NOT DETECTED Negative <100 ng/mL    FENTANYL SCREEN, URINE NOT DETECTED Negative <1 ng/mL    Drug Screen Comment: see below    Lactic Acid   Result Value Ref Range    Lactic Acid 1.1 0.5 - 2.2 mmol/L   Blood Gas, Arterial   Result Value Ref Range    Date Analyzed [id number]     Time Analyzed 0136     Source: Blood Arterial     pH, Blood Gas 7.378 7.350 - 7.450    PCO2 48.1 (H) 35.0 - 45.0 mmHg    PO2 72.4 (L) 75.0 - 100.0 mmHg    HCO3 27.7 (H) 22.0 - 26.0 mmol/L    B.E. 1.9 -3.0 - 3.0 mmol/L    O2 Sat 94.4 92.0 - 98.5 %    O2Hb 92.9 (L) 94.0 - 97.0 %    COHb 1.3 0.0 - 1.5 %    MetHb 0.3 0.0 - 1.5 %    O2 Content 16.8 mL/dL    HHb 5.5 (H) 0.0 - 5.0 %    tHb (est) 12.8 11.5 - 16.5 g/dL    Mode NC- 2 L     Date Of Collection      Time Collected      Pt Temp 37.0 C     ID [id number]     Lab 05631     Critical(s) Notified .  No Critical Values    TYPE AND SCREEN   Result Value Ref Range    ABO/Rh A POS     Antibody Screen NEG    PREPARE RBC (CROSSMATCH), 2 Units   Result Value Ref Range    Product Code Blood Bank [id number]     Description Blood Bank Red Blood Cells, Leuko-reduced     Unit Number [id number]     Dispense Status Blood Bank selected     Product Code Blood Bank [id number]     Description Blood Bank Red Blood Cells, Leuko-reduced     Unit Number [id number]     Dispense Status Blood Bank selected        RADIOLOGY:  CT Head W/O Contrast   Final Result   No acute intracranial abnormality. CT ABDOMEN PELVIS W IV CONTRAST Additional Contrast? None   Final Result   Circumferential bladder wall thickening and inflammatory stranding,   concerning for cystitis. Clinical and laboratory correlation recommended. Cholelithiasis. XR CHEST PORTABLE   Final Result   No acute process. Bilateral atelectasis.                 ------------------------- NURSING NOTES AND VITALS REVIEWED ---------------------------  Date / Time Roomed:  2/24/2023  6:59 PM  ED Bed Assignment:  [id number]    The nursing notes within the ED encounter and vital signs as below have been reviewed. Patient Vitals for the past 24 hrs:   BP Temp Pulse Resp SpO2 Height Weight   02/25/23 0324 (!) 159/84 98.7 °F (37.1 °C) 97 16 97 % 5' 1\" (1.549 m) 139 lb 8 oz (63.3 kg)   02/25/23 0245 (!) 111/95 98.2 °F (36.8 °C) 98 12 96 % -- --   02/25/23 0145 (!) 113/99 -- 98 10 95 % -- --   02/25/23 0100 -- -- (!) 103 12 98 % -- --   02/25/23 0045 -- -- (!) 105 12 99 % -- --   02/25/23 0030 -- -- (!) 103 16 93 % -- --   02/25/23 0015 -- -- (!) 101 11 100 % -- --   02/25/23 0000 -- -- 100 10 99 % -- --   02/24/23 2345 -- -- (!) 101 17 100 % -- --   02/24/23 2330 (!) 156/98 -- -- -- -- -- --   02/24/23 2315 (!) 147/100 -- -- -- -- -- --   02/24/23 2215 88/66 -- -- -- -- -- --   02/24/23 2115 (!) 138/97 -- -- -- -- -- --   02/24/23 2015 (!) 178/119 -- -- -- -- -- --   02/24/23 1927 (!) 137/100 98.8 °F (37.1 °C) 100 18 96 % 5' (1.524 m) 135 lb (61.2 kg)       Oxygen Saturation Interpretation: Improved after treatment          This patient has remained hemodynamically stable during their ED course. Diagnosis:  1. Gastrointestinal hemorrhage, unspecified gastrointestinal hemorrhage type    2. Altered mental status, unspecified altered mental status type    3. Folliculitis        Disposition:  Patient's disposition: Admit to med/surg floor  Patient's condition is stable. Attending was present and available throughout encounter including all critical portions;  See Attending Note/Attestation for Final Plan      Abilio Mejia DO  Resident  02/25/23 1102

## 2023-02-26 LAB
ACINETOBACTER CALCOAC BAUMANNII COMPLEX BY PCR: NOT DETECTED
ANION GAP SERPL CALCULATED.3IONS-SCNC: 10 MMOL/L (ref 7–16)
BACTEROIDES FRAGILIS BY PCR: NOT DETECTED
BASOPHILS ABSOLUTE: 0.04 E9/L (ref 0–0.2)
BASOPHILS RELATIVE PERCENT: 0.4 % (ref 0–2)
BOTTLE TYPE: ABNORMAL
BUN BLDV-MCNC: 24 MG/DL (ref 6–23)
CALCIUM SERPL-MCNC: 9.2 MG/DL (ref 8.6–10.2)
CANDIDA ALBICANS BY PCR: NOT DETECTED
CANDIDA AURIS BY PCR: NOT DETECTED
CANDIDA GLABRATA BY PCR: NOT DETECTED
CANDIDA KRUSEI BY PCR: NOT DETECTED
CANDIDA PARAPSILOSIS BY PCR: NOT DETECTED
CANDIDA TROPICALIS BY PCR: NOT DETECTED
CHLORIDE BLD-SCNC: 103 MMOL/L (ref 98–107)
CO2: 27 MMOL/L (ref 22–29)
CREAT SERPL-MCNC: 1 MG/DL (ref 0.5–1)
CRYPTOCOCCUS NEOFORMANS/GATTII BY PCR: NOT DETECTED
ENTEROBACTER CLOACAE COMPLEX BY PCR: NOT DETECTED
ENTEROBACTERALES BY PCR: NOT DETECTED
ENTEROCOCCUS FAECALIS BY PCR: NOT DETECTED
ENTEROCOCCUS FAECIUM BY PCR: NOT DETECTED
EOSINOPHILS ABSOLUTE: 0.16 E9/L (ref 0.05–0.5)
EOSINOPHILS RELATIVE PERCENT: 1.8 % (ref 0–6)
ESCHERICHIA COLI BY PCR: NOT DETECTED
GFR SERPL CREATININE-BSD FRML MDRD: 58 ML/MIN/1.73
GLUCOSE BLD-MCNC: 84 MG/DL (ref 74–99)
HAEMOPHILUS INFLUENZAE BY PCR: NOT DETECTED
HCT VFR BLD CALC: 32.8 % (ref 34–48)
HEMOGLOBIN: 10.2 G/DL (ref 11.5–15.5)
IMMATURE GRANULOCYTES #: 0.02 E9/L
IMMATURE GRANULOCYTES %: 0.2 % (ref 0–5)
KLEBSIELLA AEROGENES BY PCR: NOT DETECTED
KLEBSIELLA OXYTOCA BY PCR: NOT DETECTED
KLEBSIELLA PNEUMONIAE GROUP BY PCR: NOT DETECTED
LISTERIA MONOCYTOGENES BY PCR: NOT DETECTED
LYMPHOCYTES ABSOLUTE: 1.98 E9/L (ref 1.5–4)
LYMPHOCYTES RELATIVE PERCENT: 22.2 % (ref 20–42)
MAGNESIUM: 1.7 MG/DL (ref 1.6–2.6)
MCH RBC QN AUTO: 27.4 PG (ref 26–35)
MCHC RBC AUTO-ENTMCNC: 31.1 % (ref 32–34.5)
MCV RBC AUTO: 88.2 FL (ref 80–99.9)
METHICILLIN RESISTANCE MECA/C  BY PCR: DETECTED
MONOCYTES ABSOLUTE: 0.82 E9/L (ref 0.1–0.95)
MONOCYTES RELATIVE PERCENT: 9.2 % (ref 2–12)
NEISSERIA MENINGITIDIS BY PCR: NOT DETECTED
NEUTROPHILS ABSOLUTE: 5.89 E9/L (ref 1.8–7.3)
NEUTROPHILS RELATIVE PERCENT: 66.2 % (ref 43–80)
ORDER NUMBER: ABNORMAL
PDW BLD-RTO: 15.6 FL (ref 11.5–15)
PLATELET # BLD: 284 E9/L (ref 130–450)
PMV BLD AUTO: 9.8 FL (ref 7–12)
POTASSIUM SERPL-SCNC: 2.9 MMOL/L (ref 3.5–5)
PROTEUS SPECIES BY PCR: NOT DETECTED
PSEUDOMONAS AERUGINOSA BY PCR: NOT DETECTED
RBC # BLD: 3.72 E12/L (ref 3.5–5.5)
SALMONELLA SPECIES BY PCR: NOT DETECTED
SERRATIA MARCESCENS BY PCR: NOT DETECTED
SODIUM BLD-SCNC: 140 MMOL/L (ref 132–146)
SOURCE OF BLOOD CULTURE: ABNORMAL
STAPHYLOCOCCUS AUREUS BY PCR: NOT DETECTED
STAPHYLOCOCCUS EPIDERMIDIS BY PCR: DETECTED
STAPHYLOCOCCUS LUGDUNENSIS BY PCR: NOT DETECTED
STAPHYLOCOCCUS SPECIES BY PCR: DETECTED
STENOTROPHOMONAS MALTOPHILIA BY PCR: NOT DETECTED
STREPTOCOCCUS AGALACTIAE BY PCR: NOT DETECTED
STREPTOCOCCUS PNEUMONIAE BY PCR: NOT DETECTED
STREPTOCOCCUS PYOGENES  BY PCR: NOT DETECTED
STREPTOCOCCUS SPECIES BY PCR: NOT DETECTED
TOTAL CK: 166 U/L (ref 20–180)
WBC # BLD: 8.9 E9/L (ref 4.5–11.5)

## 2023-02-26 PROCEDURE — 2060000000 HC ICU INTERMEDIATE R&B

## 2023-02-26 PROCEDURE — 6370000000 HC RX 637 (ALT 250 FOR IP): Performed by: FAMILY MEDICINE

## 2023-02-26 PROCEDURE — 80048 BASIC METABOLIC PNL TOTAL CA: CPT

## 2023-02-26 PROCEDURE — 6370000000 HC RX 637 (ALT 250 FOR IP): Performed by: STUDENT IN AN ORGANIZED HEALTH CARE EDUCATION/TRAINING PROGRAM

## 2023-02-26 PROCEDURE — 36415 COLL VENOUS BLD VENIPUNCTURE: CPT

## 2023-02-26 PROCEDURE — 6360000002 HC RX W HCPCS: Performed by: STUDENT IN AN ORGANIZED HEALTH CARE EDUCATION/TRAINING PROGRAM

## 2023-02-26 PROCEDURE — 87040 BLOOD CULTURE FOR BACTERIA: CPT

## 2023-02-26 PROCEDURE — 83735 ASSAY OF MAGNESIUM: CPT

## 2023-02-26 PROCEDURE — 82550 ASSAY OF CK (CPK): CPT

## 2023-02-26 PROCEDURE — 1200000000 HC SEMI PRIVATE

## 2023-02-26 PROCEDURE — 2700000000 HC OXYGEN THERAPY PER DAY

## 2023-02-26 PROCEDURE — 85025 COMPLETE CBC W/AUTO DIFF WBC: CPT

## 2023-02-26 PROCEDURE — 99222 1ST HOSP IP/OBS MODERATE 55: CPT | Performed by: FAMILY MEDICINE

## 2023-02-26 PROCEDURE — 2580000003 HC RX 258: Performed by: STUDENT IN AN ORGANIZED HEALTH CARE EDUCATION/TRAINING PROGRAM

## 2023-02-26 RX ORDER — LANOLIN ALCOHOL/MO/W.PET/CERES
6 CREAM (GRAM) TOPICAL NIGHTLY PRN
Status: DISCONTINUED | OUTPATIENT
Start: 2023-02-26 | End: 2023-03-03 | Stop reason: HOSPADM

## 2023-02-26 RX ORDER — POTASSIUM CHLORIDE 20 MEQ/1
40 TABLET, EXTENDED RELEASE ORAL ONCE
Status: COMPLETED | OUTPATIENT
Start: 2023-02-26 | End: 2023-02-26

## 2023-02-26 RX ORDER — POTASSIUM CHLORIDE 20 MEQ/1
40 TABLET, EXTENDED RELEASE ORAL
Status: DISCONTINUED | OUTPATIENT
Start: 2023-02-26 | End: 2023-03-03 | Stop reason: HOSPADM

## 2023-02-26 RX ORDER — PANTOPRAZOLE SODIUM 40 MG/1
40 TABLET, DELAYED RELEASE ORAL
Status: DISCONTINUED | OUTPATIENT
Start: 2023-02-27 | End: 2023-02-28

## 2023-02-26 RX ADMIN — CEFEPIME 2000 MG: 2 INJECTION, POWDER, FOR SOLUTION INTRAVENOUS at 14:31

## 2023-02-26 RX ADMIN — ENOXAPARIN SODIUM 40 MG: 100 INJECTION SUBCUTANEOUS at 09:06

## 2023-02-26 RX ADMIN — CARVEDILOL 12.5 MG: 6.25 TABLET, FILM COATED ORAL at 07:26

## 2023-02-26 RX ADMIN — Medication 5 G: at 09:08

## 2023-02-26 RX ADMIN — BACLOFEN 10 MG: 10 TABLET ORAL at 20:47

## 2023-02-26 RX ADMIN — BACLOFEN 10 MG: 10 TABLET ORAL at 09:04

## 2023-02-26 RX ADMIN — AMLODIPINE BESYLATE 5 MG: 5 TABLET ORAL at 09:04

## 2023-02-26 RX ADMIN — SERTRALINE 150 MG: 100 TABLET, FILM COATED ORAL at 20:47

## 2023-02-26 RX ADMIN — SUCRALFATE 1 G: 1 TABLET ORAL at 20:48

## 2023-02-26 RX ADMIN — Medication 10 ML: at 09:08

## 2023-02-26 RX ADMIN — Medication 10 ML: at 20:48

## 2023-02-26 RX ADMIN — SUCRALFATE 1 G: 1 TABLET ORAL at 05:45

## 2023-02-26 RX ADMIN — CEFEPIME 2000 MG: 2 INJECTION, POWDER, FOR SOLUTION INTRAVENOUS at 02:50

## 2023-02-26 RX ADMIN — ATORVASTATIN CALCIUM 20 MG: 20 TABLET, FILM COATED ORAL at 20:46

## 2023-02-26 RX ADMIN — POTASSIUM CHLORIDE 40 MEQ: 1500 TABLET, EXTENDED RELEASE ORAL at 07:25

## 2023-02-26 RX ADMIN — MIRTAZAPINE 15 MG: 15 TABLET, FILM COATED ORAL at 20:47

## 2023-02-26 RX ADMIN — CARVEDILOL 12.5 MG: 6.25 TABLET, FILM COATED ORAL at 16:21

## 2023-02-26 RX ADMIN — FOLIC ACID 1 MG: 1 TABLET ORAL at 09:05

## 2023-02-26 RX ADMIN — SUCRALFATE 1 G: 1 TABLET ORAL at 10:25

## 2023-02-26 RX ADMIN — Medication: at 20:48

## 2023-02-26 RX ADMIN — SUCRALFATE 1 G: 1 TABLET ORAL at 16:21

## 2023-02-26 RX ADMIN — POTASSIUM CHLORIDE 40 MEQ: 1500 TABLET, EXTENDED RELEASE ORAL at 10:25

## 2023-02-26 ASSESSMENT — PAIN SCALES - GENERAL
PAINLEVEL_OUTOF10: 0
PAINLEVEL_OUTOF10: 0

## 2023-02-26 NOTE — PROGRESS NOTES
[facility] Progress Note    Admitting Date and Time: 2/24/2023  6:59 PM  Admit Dx: Folliculitis [Q25.4]  Altered mental status, unspecified altered mental status type [R41.82]  Gastrointestinal hemorrhage, unspecified gastrointestinal hemorrhage type [P45.4]  Acute metabolic encephalopathy [V77.06]  AMS (altered mental status) [R41.82]      Assessment/Plan         Acute encephalopathy :  likely  metabolic ,cause is UTI . Mentation is improved today alert to person place and time  UA revealed signs of infection  CXR neg  CT head neg  UDS neg  WBC  11.9 >>8.9  lactic acid 1.1 .ammonia was normal  C/w cefepime blood cultures -no growth to date  avoid sedating meds       GI bleed : fecal occult pos  H/H stable at 10.2   Gen  Surgery recommended to monitor H/H and PPI and sucralfate  will continue to trend  H/H       Bacteremia :    2/4 BCX  pos for  GPC likely a contaminant will repeat        Acute cystitis   CT A/P revealed bladder wall thickening and inflammatory stranding concerning for cystitis .  Ucx 12/2022 revealed Enterobacter clocae  and Ecoli , that is sensitive to cefepime C/w cefepime await urine culture blood cultures -no growth to date     Leukocytosis :  resolved sec to cystitis will monitor      Acute rhabdomyolysis :  >> 166  improved with IVF       Cholelithiasis  :  as seen on CT A/P RUQ US revealed no cholecystitis         Hypokalemia: 2.9 , replaced      CKD stage  3: Scr 1.0  stable      COPD /chronic hypoxic respiratory failure ; on 3L NC daily  duonebs PRN      Hypertension  Norvasc      Hyperlipidemia : Lipitor      Depression : Remeron /Zoloft      History of MS : baclofen         CODE : FULL   DVT px : none due to GI bleed   Dispo:  likely home vs SNF  PT/OT ordered         Principal Problem:    AMS (altered mental status)  Active Problems:    COPD (chronic obstructive pulmonary disease) (HCC)    Acute cystitis    Gastrointestinal hemorrhage with melena Hypokalemia    Acute metabolic encephalopathy    Sepsis ([facility] [address]    MS (multiple sclerosis) ([facility] [address]    Hyperlipidemia    Primary hypertension  Resolved Problems:    * No resolved hospital problems. *            Subjective:  Patient is being followed for Folliculitis [K86.2]  Altered mental status, unspecified altered mental status type [R41.82]  Gastrointestinal hemorrhage, unspecified gastrointestinal hemorrhage type [R96.8]  Acute metabolic encephalopathy [P14.93]  AMS (altered mental status) [R41.82]   This is a 69year old female with a past medical history of multiple sclerosis, hypertension, and hyperlipidemia who presents to the ED with altered mental status. Per ED note, patient's  states she is normally A&O x 3. Unknown last well time. Patient is alert and oriented to self. Does not remember coming to the hospital and does not know why she is here. Denies any symptoms, including shortness of breath, chest or abdominal pain, bowel changes, nausea, or urinary symptoms. Patient does wear oxygen at home PRN at 3 liters. Pt is much more alert today . She is able to state year place and time . She  reports no chest pain shortness of breath nausea or vomiting       ROS: denies fever, chills, cp, sob, n/v, HA unless stated above.       [START ON 2/27/2023] pantoprazole  40 mg Oral QAM AC    cefepime  2,000 mg IntraVENous Q12H    amLODIPine  5 mg Oral Daily    atorvastatin  20 mg Oral Nightly    baclofen  10 mg Oral BID    carvedilol  12.5 mg Oral BID WC    folic acid  1 mg Oral Daily    mirtazapine  15 mg Oral Nightly    sertraline  150 mg Oral Nightly    sucralfate  1 g Oral 4x Daily    sodium chloride flush  5-40 mL IntraVENous 2 times per day    enoxaparin  40 mg SubCUTAneous Daily    White Petrolatum   Topical BID     melatonin, 6 mg, Nightly PRN  sodium chloride flush, 5-40 mL, PRN  sodium chloride, , PRN  ondansetron, 4 mg, Q8H PRN   Or  ondansetron, 4 mg, Q6H PRN  polyethylene glycol, 17 g, Daily PRN  acetaminophen, 650 mg, Q6H PRN   Or  acetaminophen, 650 mg, Q6H PRN  ipratropium-albuterol, 1 ampule, Q4H PRN  sodium chloride, , PRN         Objective:    BP (!) 111/58   Pulse 90   Temp 97.8 °F (36.6 °C) (Oral)   Resp 16   Ht 5' 1\" (1.549 m)   Wt 142 lb 12.8 oz (64.8 kg)   SpO2 99%   BMI 26.98 kg/m²     General Appearance: alert and oriented to person, place and time and in no acute distress  Skin: warm and dry  Head: normocephalic and atraumatic  Eyes: pupils equal, round, and reactive to light, extraocular eye movements intact, conjunctivae normal  Neck: neck supple and non tender without mass   Pulmonary/Chest: clear to auscultation bilaterally- no wheezes, rales or rhonchi, normal air movement, no respiratory distress  Cardiovascular: normal rate, normal S1 and S2 and no carotid bruits  Abdomen: soft, non-tender, non-distended, normal bowel sounds, no masses or organomegaly  Extremities: no cyanosis, no clubbing and no edema  Neurologic: no cranial nerve deficit and speech normal        Recent Labs     02/25/23  0005 02/26/23  0248    140   K 3.4* 2.9*    103   CO2 31* 27   BUN 24* 24*   CREATININE 1.1* 1.0   GLUCOSE 120* 84   CALCIUM 10.1 9.2       Recent Labs     02/25/23  0005 02/25/23  1550 02/25/23  2232 02/26/23  0248   WBC 11.9*  --   --  8.9   RBC 4.80  --   --  3.72   HGB 13.1 10.8* 10.0* 10.2*   HCT 42.5 34.1 31.9* 32.8*   MCV 88.5  --   --  88.2   MCH 27.3  --   --  27.4   MCHC 30.8*  --   --  31.1*   RDW 15.5*  --   --  15.6*     --   --  284   MPV 9.4  --   --  9.8       Radiology:     NOTE: This report was transcribed using voice recognition software. Every effort was made to ensure accuracy; however, inadvertent computerized transcription errors may be present.   Electronically signed by Lolita Finley MD on 2/26/2023 at 9:13 AM

## 2023-02-26 NOTE — PROGRESS NOTES
Dr. Wero Fernandez notified of positive blood culture results at this time. No new orders at this time.

## 2023-02-27 LAB
ANION GAP SERPL CALCULATED.3IONS-SCNC: 5 MMOL/L (ref 7–16)
BASOPHILS ABSOLUTE: 0.05 E9/L (ref 0–0.2)
BASOPHILS RELATIVE PERCENT: 0.8 % (ref 0–2)
BUN BLDV-MCNC: 20 MG/DL (ref 6–23)
CALCIUM SERPL-MCNC: 9.2 MG/DL (ref 8.6–10.2)
CHLORIDE BLD-SCNC: 105 MMOL/L (ref 98–107)
CO2: 27 MMOL/L (ref 22–29)
CREAT SERPL-MCNC: 1.1 MG/DL (ref 0.5–1)
EOSINOPHILS ABSOLUTE: 0.24 E9/L (ref 0.05–0.5)
EOSINOPHILS RELATIVE PERCENT: 3.7 % (ref 0–6)
GFR SERPL CREATININE-BSD FRML MDRD: 52 ML/MIN/1.73
GLUCOSE BLD-MCNC: 87 MG/DL (ref 74–99)
HCT VFR BLD CALC: 28.2 % (ref 34–48)
HEMOGLOBIN: 8.6 G/DL (ref 11.5–15.5)
IMMATURE GRANULOCYTES #: 0.02 E9/L
IMMATURE GRANULOCYTES %: 0.3 % (ref 0–5)
LYMPHOCYTES ABSOLUTE: 2.03 E9/L (ref 1.5–4)
LYMPHOCYTES RELATIVE PERCENT: 31.1 % (ref 20–42)
MAGNESIUM: 1.6 MG/DL (ref 1.6–2.6)
MCH RBC QN AUTO: 27.2 PG (ref 26–35)
MCHC RBC AUTO-ENTMCNC: 30.5 % (ref 32–34.5)
MCV RBC AUTO: 89.2 FL (ref 80–99.9)
MONOCYTES ABSOLUTE: 0.68 E9/L (ref 0.1–0.95)
MONOCYTES RELATIVE PERCENT: 10.4 % (ref 2–12)
NEUTROPHILS ABSOLUTE: 3.51 E9/L (ref 1.8–7.3)
NEUTROPHILS RELATIVE PERCENT: 53.7 % (ref 43–80)
PDW BLD-RTO: 15.5 FL (ref 11.5–15)
PLATELET # BLD: 252 E9/L (ref 130–450)
PMV BLD AUTO: 10.4 FL (ref 7–12)
POTASSIUM SERPL-SCNC: 4 MMOL/L (ref 3.5–5)
RBC # BLD: 3.16 E12/L (ref 3.5–5.5)
SODIUM BLD-SCNC: 137 MMOL/L (ref 132–146)
WBC # BLD: 6.5 E9/L (ref 4.5–11.5)

## 2023-02-27 PROCEDURE — 99231 SBSQ HOSP IP/OBS SF/LOW 25: CPT | Performed by: STUDENT IN AN ORGANIZED HEALTH CARE EDUCATION/TRAINING PROGRAM

## 2023-02-27 PROCEDURE — 6370000000 HC RX 637 (ALT 250 FOR IP): Performed by: STUDENT IN AN ORGANIZED HEALTH CARE EDUCATION/TRAINING PROGRAM

## 2023-02-27 PROCEDURE — 2700000000 HC OXYGEN THERAPY PER DAY

## 2023-02-27 PROCEDURE — 6360000002 HC RX W HCPCS: Performed by: STUDENT IN AN ORGANIZED HEALTH CARE EDUCATION/TRAINING PROGRAM

## 2023-02-27 PROCEDURE — 80048 BASIC METABOLIC PNL TOTAL CA: CPT

## 2023-02-27 PROCEDURE — 85025 COMPLETE CBC W/AUTO DIFF WBC: CPT

## 2023-02-27 PROCEDURE — 36415 COLL VENOUS BLD VENIPUNCTURE: CPT

## 2023-02-27 PROCEDURE — 6370000000 HC RX 637 (ALT 250 FOR IP): Performed by: FAMILY MEDICINE

## 2023-02-27 PROCEDURE — 83735 ASSAY OF MAGNESIUM: CPT

## 2023-02-27 PROCEDURE — 2580000003 HC RX 258: Performed by: STUDENT IN AN ORGANIZED HEALTH CARE EDUCATION/TRAINING PROGRAM

## 2023-02-27 PROCEDURE — 1200000000 HC SEMI PRIVATE

## 2023-02-27 PROCEDURE — 2060000000 HC ICU INTERMEDIATE R&B

## 2023-02-27 PROCEDURE — 97161 PT EVAL LOW COMPLEX 20 MIN: CPT

## 2023-02-27 RX ADMIN — CEFEPIME 2000 MG: 2 INJECTION, POWDER, FOR SOLUTION INTRAVENOUS at 02:50

## 2023-02-27 RX ADMIN — Medication 5 G: at 21:08

## 2023-02-27 RX ADMIN — FOLIC ACID 1 MG: 1 TABLET ORAL at 08:52

## 2023-02-27 RX ADMIN — CEFEPIME 2000 MG: 2 INJECTION, POWDER, FOR SOLUTION INTRAVENOUS at 14:47

## 2023-02-27 RX ADMIN — SUCRALFATE 1 G: 1 TABLET ORAL at 11:23

## 2023-02-27 RX ADMIN — ENOXAPARIN SODIUM 40 MG: 100 INJECTION SUBCUTANEOUS at 08:52

## 2023-02-27 RX ADMIN — Medication 5 G: at 08:46

## 2023-02-27 RX ADMIN — CARVEDILOL 12.5 MG: 6.25 TABLET, FILM COATED ORAL at 16:23

## 2023-02-27 RX ADMIN — Medication 10 ML: at 21:09

## 2023-02-27 RX ADMIN — BACLOFEN 10 MG: 10 TABLET ORAL at 21:09

## 2023-02-27 RX ADMIN — CARVEDILOL 12.5 MG: 6.25 TABLET, FILM COATED ORAL at 08:52

## 2023-02-27 RX ADMIN — AMLODIPINE BESYLATE 5 MG: 5 TABLET ORAL at 08:53

## 2023-02-27 RX ADMIN — MIRTAZAPINE 15 MG: 15 TABLET, FILM COATED ORAL at 21:08

## 2023-02-27 RX ADMIN — SERTRALINE 150 MG: 100 TABLET, FILM COATED ORAL at 21:08

## 2023-02-27 RX ADMIN — SUCRALFATE 1 G: 1 TABLET ORAL at 16:23

## 2023-02-27 RX ADMIN — PANTOPRAZOLE SODIUM 40 MG: 40 TABLET, DELAYED RELEASE ORAL at 06:06

## 2023-02-27 RX ADMIN — Medication 10 ML: at 08:53

## 2023-02-27 RX ADMIN — SUCRALFATE 1 G: 1 TABLET ORAL at 06:06

## 2023-02-27 RX ADMIN — ATORVASTATIN CALCIUM 20 MG: 20 TABLET, FILM COATED ORAL at 21:09

## 2023-02-27 RX ADMIN — BACLOFEN 10 MG: 10 TABLET ORAL at 08:52

## 2023-02-27 RX ADMIN — SUCRALFATE 1 G: 1 TABLET ORAL at 21:08

## 2023-02-27 NOTE — PROGRESS NOTES
GENERAL SURGERY  DAILY PROGRESS NOTE  2/27/2023    Subjective:  No events overnight. Per nursing, patient with no BMs overnight. Patient denies any abdominal pain. Patient has any nausea/vomiting with diet. Objective:  BP (!) 129/59   Pulse 81   Temp 98.2 °F (36.8 °C) (Oral)   Resp 16   Ht 5' 1\" (1.549 m)   Wt 147 lb 11.2 oz (67 kg)   SpO2 95%   BMI 27.91 kg/m²     General appearance: alert, cooperative and in no acute distress. Eyes: grossly normal  Lungs: nonlabored breathing on 3L NC  Heart: regular rate  Abdomen: Soft, nondistended, nontender without rebound/guarding/rigidity noted. Skin: No skin abnormalities  Neurologic: Alert and oriented x 3. Grossly normal  Musculoskeletal: No clubbing cyanosis or edema. Assessment/Plan:  69 y.o. female currently admitted with anemia and reported melena    No melena noted per nursing  Trend H&H and transfuse necessary  No plans for acute endoscopic/surgical interventions  Continue regular diet which patient is currently tolerating. Continue Protonix/Carafate. Electronically signed by Keren Cristina DO on 2/27/2023 at 9:12 AM    ATTENDING PHYSICIAN PROGRESS NOTE      I have examined the patient, reviewed the record, and discussed the case with the Resident. I have reviewed all relevant labs and imaging data. Please refer to the resident's note. I agree with the assessment and plan with the following corrections/ additions. The following summarizes my clinical findings and independent assessment.      Tresea Landau, MD

## 2023-02-27 NOTE — CARE COORDINATION
Social work / Discharge planning:         Referral made to Jefferson Coffman at the [facility] . Awaiting response. PT/OT evaluations ordered.     Electronically signed by JIAN Vickers on 2/27/2023 at 1:01 PM

## 2023-02-27 NOTE — PROGRESS NOTES
[facility] Progress Note    Admitting Date and Time: 2/24/2023  6:59 PM  Admit Dx: Folliculitis [O21.5]  Altered mental status, unspecified altered mental status type [R41.82]  Gastrointestinal hemorrhage, unspecified gastrointestinal hemorrhage type [M34.9]  Acute metabolic encephalopathy [A94.21]  AMS (altered mental status) [R41.82]    Subjective:  Patient is being followed for Folliculitis [V47.8]  Altered mental status, unspecified altered mental status type [R41.82]  Gastrointestinal hemorrhage, unspecified gastrointestinal hemorrhage type [M50.3]  Acute metabolic encephalopathy [Z82.10]  AMS (altered mental status) [R41.82]   Pt feels better, resting in her bed comfortably. Per RN: No major concerns. ROS: denies fever, chills, cp, sob, n/v, HA unless stated above.       pantoprazole  40 mg Oral QAM AC    potassium chloride  40 mEq Oral Daily with breakfast    cefepime  2,000 mg IntraVENous Q12H    amLODIPine  5 mg Oral Daily    atorvastatin  20 mg Oral Nightly    baclofen  10 mg Oral BID    carvedilol  12.5 mg Oral BID WC    folic acid  1 mg Oral Daily    mirtazapine  15 mg Oral Nightly    sertraline  150 mg Oral Nightly    sucralfate  1 g Oral 4x Daily    sodium chloride flush  5-40 mL IntraVENous 2 times per day    enoxaparin  40 mg SubCUTAneous Daily    White Petrolatum   Topical BID     melatonin, 6 mg, Nightly PRN  sodium chloride flush, 5-40 mL, PRN  sodium chloride, , PRN  ondansetron, 4 mg, Q8H PRN   Or  ondansetron, 4 mg, Q6H PRN  polyethylene glycol, 17 g, Daily PRN  acetaminophen, 650 mg, Q6H PRN   Or  acetaminophen, 650 mg, Q6H PRN  ipratropium-albuterol, 1 ampule, Q4H PRN  sodium chloride, , PRN         Objective:    BP (!) 129/59   Pulse 81   Temp 98.2 °F (36.8 °C) (Oral)   Resp 16   Ht 5' 1\" (1.549 m)   Wt 147 lb 11.2 oz (67 kg)   SpO2 95%   BMI 27.91 kg/m²     General Appearance: alert and oriented to person, place and time and in no acute distress, saturating well on 3l of oxygen via [place]  Skin: warm and dry  Head: normocephalic and atraumatic  Eyes: pupils equal, round, and reactive to light, extraocular eye movements intact, conjunctivae normal  Neck: neck supple and non tender without mass   Pulmonary/Chest: clear to auscultation bilaterally- no wheezes, rales or rhonchi, normal air movement, no respiratory distress  Cardiovascular: normal rate, normal S1 and S2 and no carotid bruits  Abdomen: soft, non-tender, non-distended, normal bowel sounds, no masses or organomegaly  Extremities: no cyanosis, no clubbing and no edema  Neurologic: no cranial nerve deficit and speech normal        Recent Labs     02/25/23  0005 02/26/23  0248 02/27/23  0250    140 137   K 3.4* 2.9* 4.0    103 105   CO2 31* 27 27   BUN 24* 24* 20   CREATININE 1.1* 1.0 1.1*   GLUCOSE 120* 84 87   CALCIUM 10.1 9.2 9.2       Recent Labs     02/25/23  0005 02/25/23  1550 02/25/23  2232 02/26/23  0248 02/27/23  0250   WBC 11.9*  --   --  8.9 6.5   RBC 4.80  --   --  3.72 3.16*   HGB 13.1   < > 10.0* 10.2* 8.6*   HCT 42.5   < > 31.9* 32.8* 28.2*   MCV 88.5  --   --  88.2 89.2   MCH 27.3  --   --  27.4 27.2   MCHC 30.8*  --   --  31.1* 30.5*   RDW 15.5*  --   --  15.6* 15.5*     --   --  284 252   MPV 9.4  --   --  9.8 10.4    < > = values in this interval not displayed. Micro:  No components found for: [facility])    Radiology:   No results found. Assessment:    Principal Problem:    AMS (altered mental status)  Active Problems:    COPD (chronic obstructive pulmonary disease) (HCC)    Acute cystitis    Gastrointestinal hemorrhage with melena    Hypokalemia    Acute metabolic encephalopathy    Sepsis ([facility] [address]    MS (multiple sclerosis) ([email or web address]    Hyperlipidemia    Primary hypertension  Resolved Problems:    * No resolved hospital problems. *      Plan:  Acute encephalopathy - Improved :  likely  metabolic, underlying UTI .  UA revealed signs of infection  CXR neg  CT head neg  UDS neg,  WBC down trending, lactic acid 1.1 ammonia was normal  C/w cefepime blood cultures - GPC - staph epidermidis - likely a contaminant, MRSA - 1 set, f/u repeat blood cxs, urine cx -no growth to date. 2.   GI bleed : fecal occult pos  H/H stable at 10.2   Gen  Surgery recommended to monitor H/H and PPI and sucralfate trend  H/H        3. Acute cystitis   CT A/P revealed bladder wall thickening and inflammatory stranding concerning for cystitis . Ucx 12/2022 revealed Enterobacter clocae  and Ecoli , that is sensitive to cefepime C/w cefepime await urine culture blood cultures -no growth to date      4. Acute rhabdomyolysis :  >> 166  improved with IVF       5. Cholelithiasis  :  as seen on CT A/P RUQ US revealed no cholecystitis. 6. CKD stage  3:   stable      7. COPD /chronic hypoxic respiratory failure ; on 3L NC daily  duonebs PRN      8. Hypertension  Norvasc      9. Hyperlipidemia : Lipitor      10. Depression : Remeron /Zoloft      11. History of MS : baclofen     DVT Prophylaxis - Lovenox      NOTE: This report was transcribed using voice recognition software. Every effort was made to ensure accuracy; however, inadvertent computerized transcription errors may be present.   Electronically signed by Geno Multani MD on 2/27/2023 at 11:57 AM

## 2023-02-27 NOTE — PROGRESS NOTES
Physical Therapy  Facility/Department: [facility]  Physical Therapy Initial Assessment    Name: Padmini Dean  : 1945  MRN: [id number]  Date of Service: 2023           Patient Diagnosis(es): The primary encounter diagnosis was Gastrointestinal hemorrhage, unspecified gastrointestinal hemorrhage type. Diagnoses of Altered mental status, unspecified altered mental status type and Folliculitis were also pertinent to this visit. Past Medical History:  has a past medical history of Arthritis, benign breast, CKD (chronic kidney disease), COPD (chronic obstructive pulmonary disease) ([facility] [address] Hyperlipidemia, Hypertension, MS (multiple sclerosis) ([facility] [address] and Multiple sclerosis ([facility] [address] Past Surgical History:  has a past surgical history that includes Hysterectomy; Breast surgery (2012); Appendectomy; Knee Arthroplasty (Left, 10/01/2013); Knee Arthroplasty (Right, 2012); Hip Arthroplasty (Right, 2011); Total hip arthroplasty (Left, 3/25/2022); and Upper gastrointestinal endoscopy (N/A, 2022). Requires PT Follow-Up: Yes      Evaluating Therapist: Solomon Rivera PT          Referring Provider: Joaquin Díaz MD     PT order : PT eval and treat      Room #:618   DIAGNOSIS:  The primary encounter diagnosis was Gastrointestinal hemorrhage, unspecified gastrointestinal hemorrhage type. Diagnoses of Altered mental status, unspecified altered mental status type and Folliculitis were also pertinent to this visit. PRECAUTIONS: falls     Social:  Pt lives with  spouse  in a  1  floor plan  and ramp  to enter. Prior to admission pt walked with  rollator. Reports home O2, wears at night         Initial Evaluation  Date:  2023   Treatment      Short Term/ Long Term   Goals   Was pt agreeable to Eval/treatment? Yes        Does pt have pain?  None reported        Bed Mobility  Rolling:  NT   Supine to sit:  SBA   Sit to supine: SBA   Scooting: independent in sit     Independent Transfers Sit to stand:  CGA  Stand to sit:  CGA  Stand pivot:  NT     Independent    Ambulation     15  and 25 feet x 1 with ww  with  CGA   150  feet with  ww  with  independent          Stair negotiation: ascended and descended NT    N/A    LE ROM  WFL       LE strength  4-/5    4/ 5    AM- PAC RAW score  16/ 24                 Pt is alert and grossly Oriented x  3     Balance: CGA. Fall risk due to  decreased activity tolerance, weakness  Endurance: decreased   Bed/Chair alarm:  yes      ASSESSMENT     Pt displays functional ability as noted in the objective portion of this evaluation. Conditions Requiring Skilled Therapeutic Intervention:     [x]Decreased strength                                      []Decreased ROM  [x]Decreased functional mobility  [x]Decreased balance   [x]Decreased endurance   []Decreased posture  []Decreased sensation  []Decreased coordination   []Decreased vision  []Decreased safety awareness   [x]Increased pain                   Treatment/Education:    Pt in bed   upon arrival ; agreeable to PT. Mobility as above. Cues for hand placement with transfers  and ww safety with gait. Pt reports feeling weak after minimal mobility, fear of falling               Pt educated on fall risk, safe and proper technique with mobility         Patient response to education:   Pt verbalized understanding Pt demonstrated skill Pt requires further education in this area   x With cues   x         Comments:  Pt left  in bed  after session, with call light in reach. Rehab potential is Good for reaching above PT goals. Pts/ family goals      1. to get stronger      Patient and or family understand(s) diagnosis, prognosis, and plan of care. -  yes      PLAN     PT care will be provided in accordance with the objectives noted above. Whenever appropriate, clear delegation orders will be provided for nursing staff.   Exercises and functional mobility practice will be used as well as appropriate assistive devices or modalities to obtain goals. Patient and family education will also be administered as needed. PLAN OF CARE:     Current Treatment Recommendations      [x] Strengthening to improve independence with functional mobility   [] ROM to improve independence with functional mobility   [x] Balance Training to improve static/dynamic balance and to reduce fall risk  [x] Endurance Training to improve activity tolerance during functional mobility   [x] Transfer Training to improve safety and independence with all functional transfers   [x] Gait Training to improve gait mechanics, endurance and assess need for appropriate assistive device  [] Stair Training in preparation for safe discharge home and/or into the community   [x] Positioning to prevent skin breakdown and contractures  [x] Safety and Education Training   [x] Patient/Caregiver Education   [] HEP  [] Other      Frequency of treatments will be 2-5x/week x  7 -10 days. Time in: 1625   Time out: 1642         Evaluation Time includes thorough review of current medical information, gathering information on past medical history/social history and prior level of function, completion of standardized testing/informal observation of tasks, assessment of data and education on plan of care and goals.      CPT codes:  [x] Low Complexity PT evaluation 72301  [] Moderate Complexity PT evaluation 44897  [] High Complexity PT evaluation 14903  [] PT Re-evaluation 03927  [] Gait training 74141  minutes  [] Therapeutic activities 08076  minutes  [] Therapeutic exercises 40850  minutes  [] Neuromuscular reeducation 54094  minutes         Donya 18 number:  PT 1410

## 2023-02-28 LAB
ANION GAP SERPL CALCULATED.3IONS-SCNC: 7 MMOL/L (ref 7–16)
BASOPHILS ABSOLUTE: 0.04 E9/L (ref 0–0.2)
BASOPHILS RELATIVE PERCENT: 0.7 % (ref 0–2)
BLOOD BANK DISPENSE STATUS: NORMAL
BLOOD BANK DISPENSE STATUS: NORMAL
BLOOD BANK PRODUCT CODE: NORMAL
BLOOD BANK PRODUCT CODE: NORMAL
BPU ID: NORMAL
BPU ID: NORMAL
BUN BLDV-MCNC: 19 MG/DL (ref 6–23)
CALCIUM SERPL-MCNC: 9.2 MG/DL (ref 8.6–10.2)
CHLORIDE BLD-SCNC: 109 MMOL/L (ref 98–107)
CO2: 25 MMOL/L (ref 22–29)
CREAT SERPL-MCNC: 1.1 MG/DL (ref 0.5–1)
DESCRIPTION BLOOD BANK: NORMAL
DESCRIPTION BLOOD BANK: NORMAL
EOSINOPHILS ABSOLUTE: 0.3 E9/L (ref 0.05–0.5)
EOSINOPHILS RELATIVE PERCENT: 5.3 % (ref 0–6)
GFR SERPL CREATININE-BSD FRML MDRD: 52 ML/MIN/1.73
GLUCOSE BLD-MCNC: 86 MG/DL (ref 74–99)
HCT VFR BLD CALC: 27.5 % (ref 34–48)
HEMOGLOBIN: 8.4 G/DL (ref 11.5–15.5)
IMMATURE GRANULOCYTES #: 0.02 E9/L
IMMATURE GRANULOCYTES %: 0.4 % (ref 0–5)
LYMPHOCYTES ABSOLUTE: 1.95 E9/L (ref 1.5–4)
LYMPHOCYTES RELATIVE PERCENT: 34.6 % (ref 20–42)
MAGNESIUM: 1.6 MG/DL (ref 1.6–2.6)
MCH RBC QN AUTO: 27.3 PG (ref 26–35)
MCHC RBC AUTO-ENTMCNC: 30.5 % (ref 32–34.5)
MCV RBC AUTO: 89.3 FL (ref 80–99.9)
MONOCYTES ABSOLUTE: 0.66 E9/L (ref 0.1–0.95)
MONOCYTES RELATIVE PERCENT: 11.7 % (ref 2–12)
NEUTROPHILS ABSOLUTE: 2.67 E9/L (ref 1.8–7.3)
NEUTROPHILS RELATIVE PERCENT: 47.3 % (ref 43–80)
PDW BLD-RTO: 15.7 FL (ref 11.5–15)
PLATELET # BLD: 240 E9/L (ref 130–450)
PMV BLD AUTO: 10.3 FL (ref 7–12)
POTASSIUM SERPL-SCNC: 3.5 MMOL/L (ref 3.5–5)
RBC # BLD: 3.08 E12/L (ref 3.5–5.5)
SODIUM BLD-SCNC: 141 MMOL/L (ref 132–146)
URINE CULTURE, ROUTINE: NORMAL
WBC # BLD: 5.6 E9/L (ref 4.5–11.5)

## 2023-02-28 PROCEDURE — 6360000002 HC RX W HCPCS: Performed by: STUDENT IN AN ORGANIZED HEALTH CARE EDUCATION/TRAINING PROGRAM

## 2023-02-28 PROCEDURE — 99231 SBSQ HOSP IP/OBS SF/LOW 25: CPT | Performed by: STUDENT IN AN ORGANIZED HEALTH CARE EDUCATION/TRAINING PROGRAM

## 2023-02-28 PROCEDURE — 6370000000 HC RX 637 (ALT 250 FOR IP): Performed by: FAMILY MEDICINE

## 2023-02-28 PROCEDURE — 36415 COLL VENOUS BLD VENIPUNCTURE: CPT

## 2023-02-28 PROCEDURE — 80048 BASIC METABOLIC PNL TOTAL CA: CPT

## 2023-02-28 PROCEDURE — 97530 THERAPEUTIC ACTIVITIES: CPT

## 2023-02-28 PROCEDURE — 2580000003 HC RX 258: Performed by: STUDENT IN AN ORGANIZED HEALTH CARE EDUCATION/TRAINING PROGRAM

## 2023-02-28 PROCEDURE — 6370000000 HC RX 637 (ALT 250 FOR IP): Performed by: STUDENT IN AN ORGANIZED HEALTH CARE EDUCATION/TRAINING PROGRAM

## 2023-02-28 PROCEDURE — 97165 OT EVAL LOW COMPLEX 30 MIN: CPT

## 2023-02-28 PROCEDURE — 1200000000 HC SEMI PRIVATE

## 2023-02-28 PROCEDURE — 83735 ASSAY OF MAGNESIUM: CPT

## 2023-02-28 PROCEDURE — 85025 COMPLETE CBC W/AUTO DIFF WBC: CPT

## 2023-02-28 RX ORDER — PANTOPRAZOLE SODIUM 40 MG/1
40 TABLET, DELAYED RELEASE ORAL
Status: DISCONTINUED | OUTPATIENT
Start: 2023-02-28 | End: 2023-03-03 | Stop reason: HOSPADM

## 2023-02-28 RX ADMIN — Medication 10 ML: at 10:19

## 2023-02-28 RX ADMIN — Medication 6 MG: at 20:10

## 2023-02-28 RX ADMIN — PANTOPRAZOLE SODIUM 40 MG: 40 TABLET, DELAYED RELEASE ORAL at 05:54

## 2023-02-28 RX ADMIN — ENOXAPARIN SODIUM 40 MG: 100 INJECTION SUBCUTANEOUS at 10:18

## 2023-02-28 RX ADMIN — SUCRALFATE 1 G: 1 TABLET ORAL at 16:11

## 2023-02-28 RX ADMIN — Medication 5 G: at 10:18

## 2023-02-28 RX ADMIN — PETROLATUM: 420 OINTMENT TOPICAL at 21:30

## 2023-02-28 RX ADMIN — CARVEDILOL 12.5 MG: 6.25 TABLET, FILM COATED ORAL at 16:11

## 2023-02-28 RX ADMIN — SERTRALINE 150 MG: 100 TABLET, FILM COATED ORAL at 20:09

## 2023-02-28 RX ADMIN — MIRTAZAPINE 15 MG: 15 TABLET, FILM COATED ORAL at 20:09

## 2023-02-28 RX ADMIN — PANTOPRAZOLE SODIUM 40 MG: 40 TABLET, DELAYED RELEASE ORAL at 16:11

## 2023-02-28 RX ADMIN — CARVEDILOL 12.5 MG: 6.25 TABLET, FILM COATED ORAL at 10:18

## 2023-02-28 RX ADMIN — Medication 10 ML: at 20:10

## 2023-02-28 RX ADMIN — AMLODIPINE BESYLATE 5 MG: 5 TABLET ORAL at 10:18

## 2023-02-28 RX ADMIN — CEFEPIME 2000 MG: 2 INJECTION, POWDER, FOR SOLUTION INTRAVENOUS at 03:39

## 2023-02-28 RX ADMIN — POTASSIUM CHLORIDE 40 MEQ: 1500 TABLET, EXTENDED RELEASE ORAL at 10:18

## 2023-02-28 RX ADMIN — BACLOFEN 10 MG: 10 TABLET ORAL at 10:18

## 2023-02-28 RX ADMIN — SUCRALFATE 1 G: 1 TABLET ORAL at 05:54

## 2023-02-28 RX ADMIN — BACLOFEN 10 MG: 10 TABLET ORAL at 20:09

## 2023-02-28 RX ADMIN — SUCRALFATE 1 G: 1 TABLET ORAL at 20:10

## 2023-02-28 RX ADMIN — FOLIC ACID 1 MG: 1 TABLET ORAL at 10:18

## 2023-02-28 RX ADMIN — CEFEPIME 2000 MG: 2 INJECTION, POWDER, FOR SOLUTION INTRAVENOUS at 15:08

## 2023-02-28 RX ADMIN — ATORVASTATIN CALCIUM 20 MG: 20 TABLET, FILM COATED ORAL at 20:10

## 2023-02-28 RX ADMIN — SUCRALFATE 1 G: 1 TABLET ORAL at 10:18

## 2023-02-28 RX ADMIN — MUPIROCIN: 20 OINTMENT TOPICAL at 15:09

## 2023-02-28 ASSESSMENT — PAIN SCALES - GENERAL
PAINLEVEL_OUTOF10: 1
PAINLEVEL_OUTOF10: 3

## 2023-02-28 ASSESSMENT — PAIN DESCRIPTION - LOCATION: LOCATION: GENERALIZED

## 2023-02-28 ASSESSMENT — PAIN - FUNCTIONAL ASSESSMENT: PAIN_FUNCTIONAL_ASSESSMENT: PREVENTS OR INTERFERES SOME ACTIVE ACTIVITIES AND ADLS

## 2023-02-28 ASSESSMENT — PAIN DESCRIPTION - DESCRIPTORS: DESCRIPTORS: ACHING;DISCOMFORT

## 2023-02-28 NOTE — PROGRESS NOTES
Occupational Therapy    OCCUPATIONAL THERAPY INITIAL EVALUATION    [facility] [place], [place]         Date:2023                                                  Patient Name: Shabnam French    MRN: [id number]    : 1945    Room: [address] [place] [address]      Evaluating OT: Janice Riddle OTR/L   [id number]      Referring Tremayne Hagan MD    Specific Provider Orders/Date:OT eval and treat 2023      Diagnosis:  Folliculitis [S06.5]  Altered mental status, unspecified altered mental status type [R41.82]  Gastrointestinal hemorrhage, unspecified gastrointestinal hemorrhage type [K60.1]  Acute metabolic encephalopathy [P12.62]  AMS (altered mental status) [R41.82]     Pertinent Medical History:  MS, COPD, hip surgery, knee surgery , R  rotator injury     Precautions:  Fall Risk,      Assessment of current deficits    [x] Functional mobility  [x]ADLs  [x] Strength               []Cognition    [x] Functional transfers   [x] IADLs         [x] Safety Awareness   [x]Endurance    [] Fine Coordination              [x] Balance      [] Vision/perception   []Sensation     []Gross Motor Coordination  [] ROM  [] Delirium                   [] Motor Control     OT PLAN OF CARE   OT POC based on physician orders, patient diagnosis and results of clinical assessment    Frequency/Duration  2-4 days/wk for 2 weeks PRN   Specific OT Treatment Interventions to include:   ADL retraining/adapted techniques and AE recommendations to increase functional independence within precautions                    Energy conservation techniques to improve tolerance for selfcare routine   Functional transfer/mobility training/DME recommendations for increased independence, safety and fall prevention         Patient/family education to increase safety and functional independence             Environmental modifications for safe mobility and completion of ADLs Therapeutic activity to improve functional performance during ADLs. Therapeutic exercise to improve tolerance and functional strength for ADLs    Balance retraining/tolerance tasks for facilitation of postural control with dynamic challenges during ADLs . Positioning to improve functional independence  []    Recommended Adaptive Equipment: TBD     Home Living: Pt lives with ,  1 story with ramp to enter   Bathroom setup: walk in shower, shower chair    Equipment owned: rollator, home O2, reacher      Prior Level of Function: assist as needed  with ADLs , assist  with IADLs; ambulated with rollator     Pain Level: no pain this session ;   Cognition: A&O: 4/4;    Memory:  good    Sequencing:  good    Problem solving:  good    Judgement/safety:  good      Functional Assessment:  AM-PAC Daily Activity Raw Score: 17/24   Initial Eval Status  Date: 2/28/2023 Treatment Status  Date: STGs = LTGs  Time frame: 10-14 days   Feeding Independent      Grooming SBA. set-up   Independent    UB Dressing Min A   SBA    LB Dressing Min A  Able to lift and cross legs to reach feet to manage socks   SBA    Bathing Min A   SBA    Toileting SBA  Independent    Bed Mobility  SBA  Supine to sit   Independent    Functional Transfers SBA   Sit - stand from bed   Mod I    Functional Mobility SBA, w/walker   Ambulating in room   Mod I  with good tolerance    Balance Sitting:     Static:  Independent    Dynamic:SBA   Standing: SBA   Independent    Activity Tolerance No SOB  Good  with ADL activity    Visual/  Perceptual Glasses: none by bedside                 Hand Dominance    AROM (PROM)   R/L UE  B shoulder ROM limiited, approx 80 degrees . Hx R torn rotator tear   Distally WFL     Hearing: WFL   Sensation:  No c/o numbness or tingling   Tone: WFL   Edema: none observed     Comments: Upon arrival patient lying in bed .   At end of session, patient sitting in chair  with call light and phone within reach, all lines and tubes intact. *ALARM ON     Overall patient demonstrated  decreased independence and safety during completion of ADL/functional transfer/mobility tasks. Pt would benefit from continued skilled OT to increase safety and independence with completion of ADL/IADL tasks for functional independence and quality of life. Rehab Potential: good for established goals     Patient / Family Goal: return home       Patient and/or family were instructed on functional diagnosis, prognosis/goals and OT plan of care. Demonstrated good  understanding. Eval Complexity: Low    Time In: 1330  Time Out: 1345      Min Units   OT Eval Low 97165 x  1   OT Eval Medium 68204      OT Eval High 13132      OT Re-Eval [id number]       Therapeutic Ex 80987      Therapeutic Activities 94411       ADL/Self Care 78078       Orthotic Management 75721       Manual 71371     Neuro Re-Ed 94040       Non-Billable Time          Evaluation Time additionally includes thorough review of current medical information, gathering information on past medical history/social history and prior level of function, interpretation of standardized testing/informal observation of tasks, assessment of data and development of plan of care and goals.             Jayant Farleys  OTR/L  OT [id number]

## 2023-02-28 NOTE — PROGRESS NOTES
Physical Therapy  Facility/Department: [facility]  Physical Therapy Treatment Note    Name: Speedy Johnson  : 1945  MRN: [id number]  Date of Service: 2023           Patient Diagnosis(es): The primary encounter diagnosis was Gastrointestinal hemorrhage, unspecified gastrointestinal hemorrhage type. Diagnoses of Altered mental status, unspecified altered mental status type and Folliculitis were also pertinent to this visit. Past Medical History:  has a past medical history of Arthritis, benign breast, CKD (chronic kidney disease), COPD (chronic obstructive pulmonary disease) ([facility] Hyperlipidemia, Hypertension, MS (multiple sclerosis) ([facility] and Multiple sclerosis ([facility] Past Surgical History:  has a past surgical history that includes Hysterectomy; Breast surgery (2012); Appendectomy; Knee Arthroplasty (Left, 10/01/2013); Knee Arthroplasty (Right, 2012); Hip Arthroplasty (Right, 2011); Total hip arthroplasty (Left, 3/25/2022); and Upper gastrointestinal endoscopy (N/A, 2022). Requires PT Follow-Up: Yes      Evaluating Therapist: Aarti Crocker PT          Referring Provider: Tobi Quintana MD     PT order : PT eval and treat      Room #:618   DIAGNOSIS:  The primary encounter diagnosis was Gastrointestinal hemorrhage, unspecified gastrointestinal hemorrhage type. Diagnoses of Altered mental status, unspecified altered mental status type and Folliculitis were also pertinent to this visit. PRECAUTIONS: falls     Social:  Pt lives with  spouse  in a  1  floor plan  and ramp  to enter. Prior to admission pt walked with  rollator. Reports home O2, wears at night         Initial Evaluation  Date:  2023   Treatment  2023    Short Term/ Long Term   Goals   Was pt agreeable to Eval/treatment? Yes  yes      Does pt have pain?  None reported  No complaints      Bed Mobility  Rolling:  NT   Supine to sit:  SBA   Sit to supine: SBA   Scooting: independent in sit Supine to sit: SBA  Scooting: SBA seated to EOB  Independent    Transfers Sit to stand:  CGA  Stand to sit:  CGA  Stand pivot:  NT  Sit <> stand: SBA  Independent    Ambulation     15  and 25 feet x 1 with ww  with  CGA 35 feet x 1 using WW for support SBA for balance 150  feet with  ww  with  independent          Stair negotiation: ascended and descended NT    N/A    LE ROM  WFL       LE strength  4-/5    4/ 5    AM- PAC RAW score  16/ 24 17/24          Pt is alert and able to follow instruction, states wants to walk more, wants to walk on her own. Pt instructed to have staff present and to use the [employer] consistently  Balance: fair dynamic using [employer] for support    Pt performed therapeutic exercise of the following: NT    Patient education/treatment  Pt was educated on slow steady gait    Patient response to education:   Pt verbalized understanding Pt demonstrated skill Pt requires further education in this area   yes With instruction yes     ASSESSMENT:   Comments: Nurse ok with Rx. Pt sat EOB SBA for balance. Gait slow and consistent, no loss of balance, shortness of breath or dizziness noted. Pt fatigued after activity. Pt was left in a bedside chair with call light in reach    Chair/bed alarm: chair alarm active    Time in 1325   Time out 1345   Total Treatment Time 20 minutes   CPT codes:     Therapeutic activities 63113 20 minutes   Therapeutic exercises 93780 0 minutes       Pt is making fair progress toward established Physical Therapy goals as per improved balance today. Continue with physical therapy current plan of care.     Reyna Gudino \[facility]\""   License Number: [id number]

## 2023-02-28 NOTE — PROGRESS NOTES
GENERAL SURGERY  DAILY PROGRESS NOTE  2/28/2023    Subjective:  No events overnight. No further episodes of melena. Patient with a 1 brown bowel movement yesterday. Denies any symptoms including abdominal pain/nausea or vomiting. Objective:  BP (!) 158/69   Pulse 90   Temp 98.8 °F (37.1 °C) (Oral)   Resp 21   Ht 5' 1\" (1.549 m)   Wt 145 lb 9.6 oz (66 kg)   SpO2 91%   BMI 27.51 kg/m²     General appearance: alert, cooperative and in no acute distress. Eyes: grossly normal  Lungs: nonlabored breathing  Heart: regular rate  Abdomen: Soft, nondistended, nontender without rebound/guarding/rigidity noted. Skin: No skin abnormalities  Neurologic: Alert and oriented x 3. Grossly normal  Musculoskeletal: No clubbing cyanosis or edema. Assessment/Plan:  69 y.o. female currently admitted with anemia and reported melena    No melena noted per nursing  Trend H&H and transfuse necessary which has continued to remain stable. No plans for acute endoscopic/surgical interventions  Continue regular diet which patient is currently tolerating. Continue Protonix/Carafate after discharge  Surgery be available as needed.     Electronically signed by Graylon Phalen, DO on 2/28/2023 at 8:05 AM

## 2023-02-28 NOTE — CARE COORDINATION
Social work / Discharge Planning:        Patient accepted by [facility] at the [facility]. is being submitted now. INA, [address] and transport form completed.      Awaiting precert approval.   Electronically signed by JIAN Amaya on 2/28/2023 at 3:22 PM

## 2023-02-28 NOTE — DISCHARGE INSTRUCTIONS
Continue taking protonix/carafate as prescribed. Follow up as needed for further workup with Dr. Walter Arriola.

## 2023-02-28 NOTE — DISCHARGE INSTR - COC
Continuity of Care Form    Patient Name: Ramon Wynne   :  1945  MRN:  [id number]    Admit date:  2023  Discharge date:  ***    Code Status Order: Full Code   Advance Directives:     Admitting Physician:  Angelo Mujica MD  PCP: Jess Henriquez DO    Discharging Nurse: [facility] Unit/Room#: 6768/8639-S  Discharging Unit Phone Number: [phone]    Emergency Contact:   Extended Emergency Contact Information  Primary Emergency Contact: Sharif Rashid  Address: [facility] [address] [facility] Phone: [phone]  Relation: Spouse  Preferred language: English   needed? No  Secondary Emergency Contact: Halima Diaz  Address: [address] [facility] [facility] [address] [facility] Phone: [phone]  Mobile Phone: [phone]  Relation: Child  Preferred language: English   needed? No    Past Surgical History:  Past Surgical History:   Procedure Laterality Date    APPENDECTOMY      BREAST SURGERY  2012    biopsy - negative    HIP ARTHROPLASTY Right 2011    Right AIDEE  ALLISON Renner MD    HYSTERECTOMY (CERVIX STATUS UNKNOWN)      KNEE ARTHROPLASTY Left 10/01/2013    Left TKA  ALLISON Renner MD    KNEE ARTHROPLASTY Right 2012    Right TKA  ALLISON Renner MD    TOTAL HIP ARTHROPLASTY Left 3/25/2022    LEFT HIP TOTAL ARTHROPLASTY performed by Myrtle Addison MD at [address] [facility] ENDOSCOPY N/A 2022    EGD CONTROL HEMORRHAGE performed by Gisselle Christie MD at [address] [place] [address] History:   Immunization History   Administered Date(s) Administered    COVID-19, MODERNA BLUE border, Primary or Immunocompromised, (age 12y+), IM, 100 mcg/0.5mL 2021, 2021    Influenza, High Dose (Fluzone 65 yrs and older) 10/16/2018    Influenza, Triv, inactivated, subunit, adjuvanted, IM (Fluad 65 yrs and older) 10/10/2019    Pneumococcal Conjugate 13-emmanuel Galvan) 07/30/2019       Active Problems:  Patient Active Problem List   Diagnosis Code    Hypotension due to hypovolemia I95.89, E86.1    MS (multiple sclerosis) ([facility] G35    Hyperlipidemia E78.5    Hypertension, essential, benign I10    Acute blood loss anemia D62    Multiple adenomatous polyps D36.9    Chronic idiopathic constipation K59.04    Reactive depression F32.9    H/O total hip arthroplasty, right Z96.641    History of total knee replacement, left Z96.652    Simple chronic bronchitis ([facility] J41.0    Brain aneurysm I67.1    Ambulatory dysfunction R26.2    Closed fracture of head of femur ([facility]) S72.059A    Acute respiratory failure with hypoxia ([facility]) J96.01    MARCELLO (acute kidney injury) ([facility] N17.9    Primary hypertension I10    Primary osteoarthritis of left hip M16.12    COPD exacerbation ([facility]) J44.1    Acute respiratory failure with hypoxia and hypercapnia ([facility]) J96.01, J96.02    Bronchiectasis with acute exacerbation ([facility]) J47.1    Hemorrhagic shock ([facility]) R57.8    Lactic acidosis E87.20    Chronic respiratory failure with hypoxia ([facility]) J96.11    Gastrointestinal hemorrhage K92.2    Bacteremia R78.81    Weakness R53.1    AMS (altered mental status) R41.82    COPD (chronic obstructive pulmonary disease) ([facility]) J44.9    Acute cystitis N30.00    Gastrointestinal hemorrhage with melena K92.1    Hypokalemia C87.6    Acute metabolic encephalopathy S11.09    Sepsis ([facility]) A41.9       Isolation/Infection:   Isolation            Contact          Patient Infection Status       Infection Onset Added Last Indicated Last Indicated By Review Planned Expiration Resolved Resolved By    MDRO (multi-drug resistant organism)  11/15/22 11/15/22 Viky Maloney RN        Enterobacter cloacae urine 11/10/2022    Resolved    COVID-19 (Rule Out) 02/24/23 02/24/23 02/25/23 COVID-19, Rapid (Ordered)   02/25/23 Rule-Out Test Resulted    COVID-19 (Rule Out) 12/19/22 12/19/22 12/19/22 COVID-19, Rapid (Ordered) 22 Rule-Out Test Resulted    COVID-19 22 COVID-19, Rapid   22     MRSA 10/28/22 10/29/22 10/28/22 Culture, MRSA, Screening   22 Susan Fowler RN    MRSA nares 10/28/2022    COVID-19 (Rule Out) 10/27/22 10/27/22 10/27/22 Respiratory Panel, Molecular, with COVID-19 (Restricted: peds pts or suitable admitted adults) (Ordered)   10/28/22 Rule-Out Test Resulted    COVID-19 (Rule Out) 22 Respiratory Panel, Molecular, with COVID-19 (Restricted: peds pts or suitable admitted adults) (Ordered)   22 Rule-Out Test Resulted            Nurse Assessment:  Last Vital Signs: BP (!) 158/69   Pulse 90   Temp 98.8 °F (37.1 °C) (Oral)   Resp 21   Ht 5' 1\" (1.549 m)   Wt 145 lb 9.6 oz (66 kg)   SpO2 91%   BMI 27.51 kg/m²     Last documented pain score (0-10 scale): Pain Level: 0  Last Weight:   Wt Readings from Last 1 Encounters:   23 145 lb 9.6 oz (66 kg)     Mental Status:  {IP PT MENTAL STATUS:}    IV Access:  { INA IV ACCESS:253208999}    Nursing Mobility/ADLs:  Walking   {[facility] DME GJBC:650973653}  Transfer  {[facility] DME BCRS:420737197}  Bathing  {[facility] DME MNDD:392359076}  Dressing  {[facility] DME FTEY:712175056}  Toileting  {[facility] DME LIAC:345034995}  Feeding  {[facility] DME IKPO:874362216}  Med Admin  {[facility] DME KOJT:297653548}  Med Delivery   { INA MED Delivery:339326247}    Wound Care Documentation and Therapy:        Elimination:  Continence: Bowel: {YES / MQ:29827}  Bladder: {YES / FF:92113}  Urinary Catheter: {Urinary Catheter:956711195}   Colostomy/Ileostomy/Ileal Conduit: {YES / NS:07593}       Date of Last BM: ***    Intake/Output Summary (Last 24 hours) at 2023 1523  Last data filed at 2023 1030  Gross per 24 hour   Intake 240 ml   Output 400 ml   Net -160 ml     I/O last 3 completed shifts:   In: 80 [P.O.:580]  Out: -     Safety Concerns:     [address] BU[address] Safety Concerns:051280936}    Impairments/Disabilities:      [address] BUCKLEY Impairments/Disabilities:201258597}    Nutrition Therapy:  Current Nutrition Therapy:   [address] Zavala INA Diet List:789561243}    Routes of Feeding: {[facility]P DME Other Feedings:591733377}  Liquids: {Slp liquid thickness:75069}  Daily Fluid Restriction: {CHP DME Yes amt example:685992165}  Last Modified Barium Swallow with Video (Video Swallowing Test): {Done Not Done VNDA:868206170}    Treatments at the Time of Hospital Discharge:   Respiratory Treatments: ***  Oxygen Therapy:  {Therapy; copd oxygen:23287}  Ventilator:    {[facility] Vent OFO}    Rehab Therapies: Physical Therapy and Occupational Therapy  Weight Bearing Status/Restrictions: {[facility] Weight Bearin}  Other Medical Equipment (for information only, NOT a DME order):  {EQUIPMENT:609580169}  Other Treatments: ***    Patient's personal belongings (please select all that are sent with patient):  {[facility] DME Belongings:807285726}    RN SIGNATURE:  {Esignature:998836874}    CASE MANAGEMENT/SOCIAL WORK SECTION    Inpatient Status Date: ***    Readmission Risk Assessment Score:  Readmission Risk              Risk of Unplanned Readmission:  30           Discharging to Facility/ Agency   Name: [facility], [address]  Phone:[phone]  Fax:[phone]    Dialysis Facility (if applicable)   Name:  Address:  Dialysis Schedule:  Phone:  Fax:    / signature: Electronically signed by JIAN Estrada on 23 at 3:26 PM EST    PHYSICIAN SECTION    Prognosis: {Prognosis:2976384979}    Condition at Discharge: Stable    Rehab Potential (if transferring to Rehab): {Prognosis:7873273116}    Recommended Labs or Other Treatments After Discharge: ***    Physician Certification: I certify the above information and transfer of Abbe Bone  is necessary for the continuing treatment of the diagnosis listed and that she requires Jakub Zuletauel for less 30 days. Update Admission H&P: {[facility]P DME Changes in [facility]:883002600}    PHYSICIAN SIGNATURE:  {Esignature:632140274}

## 2023-02-28 NOTE — PROGRESS NOTES
[facility] Progress Note    Admitting Date and Time: 2/24/2023  6:59 PM  Admit Dx: Folliculitis [L91.3]  Altered mental status, unspecified altered mental status type [R41.82]  Gastrointestinal hemorrhage, unspecified gastrointestinal hemorrhage type [U35.4]  Acute metabolic encephalopathy [V43.65]  AMS (altered mental status) [R41.82]    Subjective:  Patient is being followed for Folliculitis [M99.6]  Altered mental status, unspecified altered mental status type [R41.82]  Gastrointestinal hemorrhage, unspecified gastrointestinal hemorrhage type [T22.7]  Acute metabolic encephalopathy [W71.15]  AMS (altered mental status) [R41.82]   Pt feels better, resting in her bed comfortably, wants to go home, does not want to go back to NH. Per RN: No major concerns. ROS: denies fever, chills, cp, sob, n/v, HA unless stated above.       pantoprazole  40 mg Oral BID AC    mupirocin   Topical Daily    potassium chloride  40 mEq Oral Daily with breakfast    cefepime  2,000 mg IntraVENous Q12H    amLODIPine  5 mg Oral Daily    atorvastatin  20 mg Oral Nightly    baclofen  10 mg Oral BID    carvedilol  12.5 mg Oral BID WC    folic acid  1 mg Oral Daily    mirtazapine  15 mg Oral Nightly    sertraline  150 mg Oral Nightly    sucralfate  1 g Oral 4x Daily    sodium chloride flush  5-40 mL IntraVENous 2 times per day    enoxaparin  40 mg SubCUTAneous Daily    White Petrolatum   Topical BID     melatonin, 6 mg, Nightly PRN  sodium chloride flush, 5-40 mL, PRN  sodium chloride, , PRN  ondansetron, 4 mg, Q8H PRN   Or  ondansetron, 4 mg, Q6H PRN  polyethylene glycol, 17 g, Daily PRN  acetaminophen, 650 mg, Q6H PRN   Or  acetaminophen, 650 mg, Q6H PRN  ipratropium-albuterol, 1 ampule, Q4H PRN  sodium chloride, , PRN       Objective:    BP (!) 158/69   Pulse 90   Temp 98.8 °F (37.1 °C) (Oral)   Resp 21   Ht 5' 1\" (1.549 m)   Wt 145 lb 9.6 oz (66 kg)   SpO2 91%   BMI 27.51 kg/m²     General Appearance: alert and oriented to person, place and time and in no acute distress, saturating well on 3l of oxygen via [place]  Skin: warm and dry  Head: normocephalic and atraumatic  Eyes: pupils equal, round, and reactive to light, extraocular eye movements intact, conjunctivae normal  Neck: neck supple and non tender without mass   Pulmonary/Chest: clear to auscultation bilaterally- no wheezes, rales or rhonchi, normal air movement, no respiratory distress  Cardiovascular: normal rate, normal S1 and S2 and no carotid bruits  Abdomen: soft, non-tender, non-distended, normal bowel sounds, no masses or organomegaly  Extremities: no cyanosis, no clubbing and no edema  Neurologic: no cranial nerve deficit and speech normal        Recent Labs     02/26/23 0248 02/27/23  0250 02/28/23  0339    137 141   K 2.9* 4.0 3.5    105 109*   CO2 27 27 25   BUN 24* 20 19   CREATININE 1.0 1.1* 1.1*   GLUCOSE 84 87 86   CALCIUM 9.2 9.2 9.2       Recent Labs     02/26/23 0248 02/27/23  0250 02/28/23  0339   WBC 8.9 6.5 5.6   RBC 3.72 3.16* 3.08*   HGB 10.2* 8.6* 8.4*   HCT 32.8* 28.2* 27.5*   MCV 88.2 89.2 89.3   MCH 27.4 27.2 27.3   MCHC 31.1* 30.5* 30.5*   RDW 15.6* 15.5* 15.7*    252 240   MPV 9.8 10.4 10.3       Micro:  No components found for: [facility])    Radiology:   No results found. Assessment:    Principal Problem:    AMS (altered mental status)  Active Problems:    COPD (chronic obstructive pulmonary disease) (HCC)    Acute cystitis    Gastrointestinal hemorrhage with melena    Hypokalemia    Acute metabolic encephalopathy    Sepsis ([facility]    MS (multiple sclerosis) ([facility]    Hyperlipidemia    Primary hypertension  Resolved Problems:    * No resolved hospital problems. *      Plan:  Acute encephalopathy - Improved :  likely  metabolic, underlying UTI .  UA revealed signs of infection  CXR neg  CT head neg  UDS neg,  WBC down trending, lactic acid 1.1 ammonia was normal  C/w cefepime blood cultures - GPC - staph epidermidis - likely a contaminant, MRSA - 1 set, f/u repeat blood cxs, urine cx - no growth to date. 2.   GI bleed : fecal occult pos  H/H stable at 10.2   Gen  Surgery recommended to monitor H/H and PPI and sucralfate trend  H/H        3. Acute cystitis   CT A/P revealed bladder wall thickening and inflammatory stranding concerning for cystitis . Ucx 12/2022 revealed Enterobacter clocae  and Ecoli , that is sensitive to cefepime C/w cefepime await urine culture blood cultures - no growth to date      4. Acute rhabdomyolysis:  >> 166  improved with IVF       5. Cholelithiasis: as seen on CT A/P RUQ US revealed no cholecystitis. 6. CKD stage  3:  stable      7. COPD /chronic hypoxic respiratory failure ; on 3L NC daily  duonebs PRN      8. Hypertension  Norvasc      9. Hyperlipidemia : Lipitor      10. Depression : Remeron /Zoloft      11. History of MS : baclofen     DVT Prophylaxis - Lovenox  Disposition/ social - [facility] score low, referral made to [facility], wants to go home, does not want to go back to NH. NOTE: This report was transcribed using voice recognition software. Every effort was made to ensure accuracy; however, inadvertent computerized transcription errors may be present.   Electronically signed by Raymond Barkley MD on 2/28/2023 at 1:27 PM

## 2023-02-28 NOTE — PROGRESS NOTES
Problem: At Risk for Falls  Goal: # Patient does not fall  Outcome: Outcome Met, Continue evaluating goal progress toward completion     Problem: Angina/Chest Pain  Goal: # Achieves Chest Pain Control (Pain Score = 0-1, no episodes)  Description: Chest pain control = Pain Score = 0-1, no episodes of pain  Outcome: Outcome Not Met, Continue to Monitor      Daughter theresa notified of trasnfer from 36 to 56. Attempted calling  but there was no answer.

## 2023-03-01 ENCOUNTER — TELEPHONE (OUTPATIENT)
Dept: PRIMARY CARE CLINIC | Age: 78
End: 2023-03-01

## 2023-03-01 LAB
ANION GAP SERPL CALCULATED.3IONS-SCNC: 6 MMOL/L (ref 7–16)
BASOPHILS ABSOLUTE: 0.03 E9/L (ref 0–0.2)
BASOPHILS RELATIVE PERCENT: 0.6 % (ref 0–2)
BUN BLDV-MCNC: 22 MG/DL (ref 6–23)
CALCIUM SERPL-MCNC: 9.2 MG/DL (ref 8.6–10.2)
CHLORIDE BLD-SCNC: 106 MMOL/L (ref 98–107)
CO2: 26 MMOL/L (ref 22–29)
CREAT SERPL-MCNC: 1.2 MG/DL (ref 0.5–1)
EOSINOPHILS ABSOLUTE: 0.25 E9/L (ref 0.05–0.5)
EOSINOPHILS RELATIVE PERCENT: 4.9 % (ref 0–6)
GFR SERPL CREATININE-BSD FRML MDRD: 47 ML/MIN/1.73
GLUCOSE BLD-MCNC: 99 MG/DL (ref 74–99)
HCT VFR BLD CALC: 30.3 % (ref 34–48)
HEMOGLOBIN: 9.2 G/DL (ref 11.5–15.5)
IMMATURE GRANULOCYTES #: 0.02 E9/L
IMMATURE GRANULOCYTES %: 0.4 % (ref 0–5)
LYMPHOCYTES ABSOLUTE: 1.93 E9/L (ref 1.5–4)
LYMPHOCYTES RELATIVE PERCENT: 37.5 % (ref 20–42)
MCH RBC QN AUTO: 27.2 PG (ref 26–35)
MCHC RBC AUTO-ENTMCNC: 30.4 % (ref 32–34.5)
MCV RBC AUTO: 89.6 FL (ref 80–99.9)
MONOCYTES ABSOLUTE: 0.63 E9/L (ref 0.1–0.95)
MONOCYTES RELATIVE PERCENT: 12.3 % (ref 2–12)
NEUTROPHILS ABSOLUTE: 2.28 E9/L (ref 1.8–7.3)
NEUTROPHILS RELATIVE PERCENT: 44.3 % (ref 43–80)
PDW BLD-RTO: 15.7 FL (ref 11.5–15)
PLATELET # BLD: 245 E9/L (ref 130–450)
PMV BLD AUTO: 9.8 FL (ref 7–12)
POTASSIUM SERPL-SCNC: 4 MMOL/L (ref 3.5–5)
RBC # BLD: 3.38 E12/L (ref 3.5–5.5)
SODIUM BLD-SCNC: 138 MMOL/L (ref 132–146)
WBC # BLD: 5.1 E9/L (ref 4.5–11.5)

## 2023-03-01 PROCEDURE — 6370000000 HC RX 637 (ALT 250 FOR IP): Performed by: STUDENT IN AN ORGANIZED HEALTH CARE EDUCATION/TRAINING PROGRAM

## 2023-03-01 PROCEDURE — 6370000000 HC RX 637 (ALT 250 FOR IP): Performed by: FAMILY MEDICINE

## 2023-03-01 PROCEDURE — 1200000000 HC SEMI PRIVATE

## 2023-03-01 PROCEDURE — 2580000003 HC RX 258: Performed by: STUDENT IN AN ORGANIZED HEALTH CARE EDUCATION/TRAINING PROGRAM

## 2023-03-01 PROCEDURE — 80048 BASIC METABOLIC PNL TOTAL CA: CPT

## 2023-03-01 PROCEDURE — 99231 SBSQ HOSP IP/OBS SF/LOW 25: CPT | Performed by: STUDENT IN AN ORGANIZED HEALTH CARE EDUCATION/TRAINING PROGRAM

## 2023-03-01 PROCEDURE — 85025 COMPLETE CBC W/AUTO DIFF WBC: CPT

## 2023-03-01 PROCEDURE — 6360000002 HC RX W HCPCS: Performed by: STUDENT IN AN ORGANIZED HEALTH CARE EDUCATION/TRAINING PROGRAM

## 2023-03-01 PROCEDURE — 36415 COLL VENOUS BLD VENIPUNCTURE: CPT

## 2023-03-01 RX ADMIN — CARVEDILOL 12.5 MG: 6.25 TABLET, FILM COATED ORAL at 16:32

## 2023-03-01 RX ADMIN — SUCRALFATE 1 G: 1 TABLET ORAL at 21:36

## 2023-03-01 RX ADMIN — PANTOPRAZOLE SODIUM 40 MG: 40 TABLET, DELAYED RELEASE ORAL at 05:38

## 2023-03-01 RX ADMIN — PANTOPRAZOLE SODIUM 40 MG: 40 TABLET, DELAYED RELEASE ORAL at 16:32

## 2023-03-01 RX ADMIN — CEFEPIME 2000 MG: 2 INJECTION, POWDER, FOR SOLUTION INTRAVENOUS at 15:05

## 2023-03-01 RX ADMIN — SUCRALFATE 1 G: 1 TABLET ORAL at 05:38

## 2023-03-01 RX ADMIN — PETROLATUM: 420 OINTMENT TOPICAL at 09:21

## 2023-03-01 RX ADMIN — POTASSIUM CHLORIDE 40 MEQ: 1500 TABLET, EXTENDED RELEASE ORAL at 09:18

## 2023-03-01 RX ADMIN — CEFEPIME 2000 MG: 2 INJECTION, POWDER, FOR SOLUTION INTRAVENOUS at 03:00

## 2023-03-01 RX ADMIN — ENOXAPARIN SODIUM 40 MG: 100 INJECTION SUBCUTANEOUS at 09:19

## 2023-03-01 RX ADMIN — ATORVASTATIN CALCIUM 20 MG: 20 TABLET, FILM COATED ORAL at 21:36

## 2023-03-01 RX ADMIN — PETROLATUM: 420 OINTMENT TOPICAL at 21:00

## 2023-03-01 RX ADMIN — BACLOFEN 10 MG: 10 TABLET ORAL at 21:36

## 2023-03-01 RX ADMIN — Medication 10 ML: at 09:19

## 2023-03-01 RX ADMIN — ACETAMINOPHEN 650 MG: 325 TABLET ORAL at 16:32

## 2023-03-01 RX ADMIN — Medication 10 ML: at 21:36

## 2023-03-01 RX ADMIN — BACLOFEN 10 MG: 10 TABLET ORAL at 09:18

## 2023-03-01 RX ADMIN — AMLODIPINE BESYLATE 5 MG: 5 TABLET ORAL at 09:19

## 2023-03-01 RX ADMIN — MIRTAZAPINE 15 MG: 15 TABLET, FILM COATED ORAL at 21:36

## 2023-03-01 RX ADMIN — SUCRALFATE 1 G: 1 TABLET ORAL at 16:32

## 2023-03-01 RX ADMIN — SERTRALINE 150 MG: 100 TABLET, FILM COATED ORAL at 21:36

## 2023-03-01 RX ADMIN — FOLIC ACID 1 MG: 1 TABLET ORAL at 09:18

## 2023-03-01 RX ADMIN — CARVEDILOL 12.5 MG: 6.25 TABLET, FILM COATED ORAL at 09:18

## 2023-03-01 RX ADMIN — SUCRALFATE 1 G: 1 TABLET ORAL at 11:52

## 2023-03-01 RX ADMIN — Medication 6 MG: at 21:36

## 2023-03-01 RX ADMIN — MUPIROCIN: 20 OINTMENT TOPICAL at 09:22

## 2023-03-01 ASSESSMENT — ENCOUNTER SYMPTOMS
EYE DISCHARGE: 0
GASTROINTESTINAL NEGATIVE: 1
TROUBLE SWALLOWING: 0
SINUS PAIN: 0
RHINORRHEA: 0
WHEEZING: 0
VOICE CHANGE: 0
EYE REDNESS: 0
SINUS PRESSURE: 0
EYE ITCHING: 0
SHORTNESS OF BREATH: 1

## 2023-03-01 ASSESSMENT — PAIN - FUNCTIONAL ASSESSMENT: PAIN_FUNCTIONAL_ASSESSMENT: ACTIVITIES ARE NOT PREVENTED

## 2023-03-01 ASSESSMENT — PAIN DESCRIPTION - LOCATION: LOCATION: HEAD

## 2023-03-01 ASSESSMENT — VISUAL ACUITY: OU: 1

## 2023-03-01 ASSESSMENT — PAIN DESCRIPTION - DESCRIPTORS: DESCRIPTORS: ACHING

## 2023-03-01 ASSESSMENT — COPD QUESTIONNAIRES: COPD: 1

## 2023-03-01 ASSESSMENT — PAIN SCALES - GENERAL: PAINLEVEL_OUTOF10: 3

## 2023-03-01 NOTE — PATIENT CARE CONFERENCE
P Quality Flow/Interdisciplinary Rounds Progress Note        Quality Flow Rounds held on March 1, 2023    Disciplines Attending:  Bedside Nurse, , , and Nursing Unit Leadership    Yvonne Wall was admitted on 2/24/2023  6:59 PM    Anticipated Discharge Date:       Disposition:    Von Score:  Von Scale Score: 19    Readmission Risk              Risk of Unplanned Readmission:  30           Discussed patient goal for the day, patient clinical progression, and barriers to discharge.   The following Goal(s) of the Day/Commitment(s) have been identified:  dc planning      Jacqueline Zaldivar RN  March 1, 2023

## 2023-03-01 NOTE — CARE COORDINATION
Social Work discharge [id number] [facility] noted pt awaiting precert for [facility]. SW lvm for Salena with [facility] now. Await her call back re: precert status. Per previous SW N17 in [address] [place] [address],  7000 and transport forms are in folder. Sw updated with pt and dtr Tera Hoover.   Electronically signed by Nava Anders on 3/1/2023 at 11:00 AM     Addendum-    Morena Coelho remains pending per [employer].  Electronically signed by Nava Anders on 3/1/2023 at 3:04 PM

## 2023-03-01 NOTE — PROGRESS NOTES
[facility] Progress Note    Admitting Date and Time: 2/24/2023  6:59 PM  Admit Dx: Folliculitis [T04.5]  Altered mental status, unspecified altered mental status type [R41.82]  Gastrointestinal hemorrhage, unspecified gastrointestinal hemorrhage type [L35.4]  Acute metabolic encephalopathy [W46.86]  AMS (altered mental status) [R41.82]    Subjective:  Patient is being followed for Folliculitis [E89.5]  Altered mental status, unspecified altered mental status type [R41.82]  Gastrointestinal hemorrhage, unspecified gastrointestinal hemorrhage type [X52.5]  Acute metabolic encephalopathy [V70.36]  AMS (altered mental status) [R41.82]   Pt feels better, resting in her bed comfortably. Per RN: No major concerns. ROS: denies fever, chills, cp, sob, n/v, HA unless stated above.       pantoprazole  40 mg Oral BID AC    mupirocin   Topical Daily    white petrolatum   Topical BID    potassium chloride  40 mEq Oral Daily with breakfast    cefepime  2,000 mg IntraVENous Q12H    amLODIPine  5 mg Oral Daily    atorvastatin  20 mg Oral Nightly    baclofen  10 mg Oral BID    carvedilol  12.5 mg Oral BID WC    folic acid  1 mg Oral Daily    mirtazapine  15 mg Oral Nightly    sertraline  150 mg Oral Nightly    sucralfate  1 g Oral 4x Daily    sodium chloride flush  5-40 mL IntraVENous 2 times per day    enoxaparin  40 mg SubCUTAneous Daily     melatonin, 6 mg, Nightly PRN  sodium chloride flush, 5-40 mL, PRN  sodium chloride, , PRN  ondansetron, 4 mg, Q8H PRN   Or  ondansetron, 4 mg, Q6H PRN  polyethylene glycol, 17 g, Daily PRN  acetaminophen, 650 mg, Q6H PRN   Or  acetaminophen, 650 mg, Q6H PRN  ipratropium-albuterol, 1 ampule, Q4H PRN  sodium chloride, , PRN       Objective:    BP (!) 147/70   Pulse 80   Temp 98.1 °F (36.7 °C) (Oral)   Resp 18   Ht 5' 1\" (1.549 m)   Wt 141 lb (64 kg)   SpO2 93%   BMI 26.64 kg/m²     General Appearance: alert and oriented to person, place and time and in no acute distress, saturating well on 3l of oxygen via [place]  Skin: warm and dry  Head: normocephalic and atraumatic  Eyes: pupils equal, round, and reactive to light, extraocular eye movements intact, conjunctivae normal  Neck: neck supple and non tender without mass   Pulmonary/Chest: clear to auscultation bilaterally- no wheezes, rales or rhonchi, normal air movement, no respiratory distress  Cardiovascular: normal rate, normal S1 and S2 and no carotid bruits  Abdomen: soft, non-tender, non-distended, normal bowel sounds, no masses or organomegaly  Extremities: no cyanosis, no clubbing and no edema  Neurologic: no cranial nerve deficit and speech normal        Recent Labs     02/27/23  0250 02/28/23  0339 03/01/23  0238    141 138   K 4.0 3.5 4.0    109* 106   CO2 27 25 26   BUN 20 19 22   CREATININE 1.1* 1.1* 1.2*   GLUCOSE 87 86 99   CALCIUM 9.2 9.2 9.2       Recent Labs     02/27/23  0250 02/28/23 0339 03/01/23  0238   WBC 6.5 5.6 5.1   RBC 3.16* 3.08* 3.38*   HGB 8.6* 8.4* 9.2*   HCT 28.2* 27.5* 30.3*   MCV 89.2 89.3 89.6   MCH 27.2 27.3 27.2   MCHC 30.5* 30.5* 30.4*   RDW 15.5* 15.7* 15.7*    240 245   MPV 10.4 10.3 9.8       Micro:  No components found for: [facility])    Radiology:   No results found. Assessment:    Principal Problem:    AMS (altered mental status)  Active Problems:    COPD (chronic obstructive pulmonary disease) (HCC)    Acute cystitis    Gastrointestinal hemorrhage with melena    Hypokalemia    Acute metabolic encephalopathy    Sepsis ([facility]    MS (multiple sclerosis) ([facility] [facility]    Hyperlipidemia    Primary hypertension  Resolved Problems:    * No resolved hospital problems. *      Plan:  Acute encephalopathy - Improved :  likely  metabolic, underlying UTI .  UA revealed signs of infection  CXR neg  CT head neg  UDS neg,  WBC down trending, lactic acid 1.1 ammonia was normal  C/w cefepime blood cultures - [facility] - staph epidermidis - likely a contaminant, MRSA - 1 set, f/u repeat blood cxs, urine cx - no growth to date. 2.   GI bleed : fecal occult pos  H/H stable at 10.2   Gen  Surgery recommended to monitor H/H and PPI and sucralfate trend  H/H        3. Acute cystitis   CT A/P revealed bladder wall thickening and inflammatory stranding concerning for cystitis . Ucx 12/2022 revealed Enterobacter clocae  and Ecoli , that is sensitive to cefepime C/w cefepime await urine culture blood cultures - no growth to date      4. Acute rhabdomyolysis:  >> 166  improved with IVF       5. Cholelithiasis: as seen on CT A/P RUQ US revealed no cholecystitis. 6. CKD stage  3:  stable      7. COPD /chronic hypoxic respiratory failure ; on 3L NC daily  duonebs PRN      8. Hypertension  Norvasc      9. Hyperlipidemia : Lipitor      10. Depression : Remeron /Zoloft      11. History of MS : baclofen     DVT Prophylaxis - Lovenox  Disposition/ social - ampac score low, referral made to [facility], agreeable to go to [facility]  pt awaiting precert for [facility]. NOTE: This report was transcribed using voice recognition software. Every effort was made to ensure accuracy; however, inadvertent computerized transcription errors may be present.   Electronically signed by Krystyna Acevedo MD on 3/1/2023 at 2:18 PM

## 2023-03-01 NOTE — PROGRESS NOTES
Visit Date: 2/1/23  Roly Marshall  1945  female 69 y.o. Subjective:    CC: Patient presents with acute respiratory failure, MS and weakness. Patient presents for follow up of hypertension, hyperlipidemia, and copd . HPI:  Extremity Weakness  This is a chronic (she has MS and had increased weakness, she is admitted to nursing home for rehab) problem. The current episode started more than 1 year ago. The problem occurs constantly. The problem has been waxing and waning. Nothing aggravates the symptoms. Treatments tried: taking medications, no medication side effects. The treatment provided mild relief. COPD  She complains of shortness of breath. There is no wheezing. This is a chronic (admitted to the hospital for acute on chronic respiratory failure, she was treated and admitted to the nursing home for rehab) problem. The current episode started more than 1 year ago. The problem occurs constantly. The problem has been waxing and waning. The cough is productive of sputum. Pertinent negatives include no ear pain, postnasal drip, rhinorrhea, sneezing or trouble swallowing. Her symptoms are aggravated by change in weather and strenuous activity. Relieved by: taking medications, no medication side effects. She reports minimal improvement on treatment. Her past medical history is significant for COPD. Hypertension  This is a chronic problem. The current episode started more than 1 year ago. The problem is unchanged. The problem is controlled. Associated symptoms include shortness of breath. There are no associated agents to hypertension. Risk factors for coronary artery disease include dyslipidemia. Treatments tried: taking medications, no medication side effects. The current treatment provides mild improvement. There are no compliance problems. Hyperlipidemia  This is a chronic problem. The current episode started more than 1 year ago. The problem is controlled.  Recent lipid tests were reviewed and are variable. There are no known factors aggravating her hyperlipidemia. Associated symptoms include shortness of breath. Treatments tried: taking medications, no medication side effects. The current treatment provides mild improvement of lipids. There are no compliance problems. Risk factors for coronary artery disease include dyslipidemia and hypertension. ROS:  Review of Systems   Constitutional: Negative. HENT:  Negative for ear discharge, ear pain, mouth sores, nosebleeds, postnasal drip, rhinorrhea, sinus pressure, sinus pain, sneezing, trouble swallowing and voice change. Eyes:  Negative for discharge, redness, itching and visual disturbance. Denies diplopia, recent change in visual acuity, blurred vision, and night vision loss. Does not wear corrective lenses. Respiratory:  Positive for shortness of breath. Negative for wheezing. Denies asthma, COPD, hemoptysis   Cardiovascular: Negative. Gastrointestinal: Negative. Endocrine: Negative. Genitourinary:  Negative for difficulty urinating. Denies hesitancy, incomplete voiding, and polyuria   Musculoskeletal:         Denies joint pain   Skin: Negative. Hematological: Negative. Psychiatric/Behavioral:  Negative for confusion and suicidal ideas. The patient is not nervous/anxious.          Denies difficulty concentrating, depression, flight of ideas, slow thought processes, suicide attempts      Current Meds: Refer to nursing home record    PMH:    Medical Problems:        Diagnosis Date    Arthritis     benign breast     CKD (chronic kidney disease)     COPD (chronic obstructive pulmonary disease) ([facility] [address]     Hyperlipidemia     Hypertension     MS (multiple sclerosis) ([facility] [address] 05/31/2012    Multiple sclerosis ([facility] [address]     diagnosised 8  yrs ago [facility]        Surgical Hx:  Past Surgical History:   Procedure Laterality Date    APPENDECTOMY      BREAST SURGERY  4/13/2012    biopsy - negative    HIP ARTHROPLASTY Right 04/13/2011    Right AIDEE  ALLISON Amador MD    HYSTERECTOMY (CERVIX STATUS UNKNOWN)      KNEE ARTHROPLASTY Left 10/01/2013    Left TKA  ALLISON Amador MD    KNEE ARTHROPLASTY Right 08/29/2012    Right TKA  ALLISON Amador MD    TOTAL HIP ARTHROPLASTY Left 3/25/2022    LEFT HIP TOTAL ARTHROPLASTY performed by Yolanda Uribe MD at [address] [place] [address] ENDOSCOPY N/A 11/11/2022    EGD CONTROL HEMORRHAGE performed by Jeannette Purdy MD at [facility] ENDOSCOPY        FH:       Problem Relation Age of Onset    Mult Sclerosis Father     Thyroid Cancer Mother     Cirrhosis Mother         SH:    reports that she has been smoking cigarettes. She has a 50.00 pack-year smoking history. She has never used smokeless tobacco. She reports that she does not drink alcohol and does not use drugs. Objective: Wt: 140 BP: 100/63 pulse: 93 Resp: 18 T: 97.4F     Physical Exam:  Physical Exam  Vitals reviewed. Constitutional:       General: She is not in acute distress. Appearance: Normal appearance. She is normal weight. She is not ill-appearing or toxic-appearing. Comments: Appears appropriate for age   HENT:      Head: Normocephalic and atraumatic. Right Ear: Tympanic membrane and external ear normal.      Left Ear: Tympanic membrane and external ear normal.      Ears:      Comments: Middle ear well aerated. Nose: Nose normal.      Mouth/Throat:      Mouth: Mucous membranes are moist.      Dentition: Normal dentition. No gingival swelling or dental caries. Pharynx: Oropharynx is clear. Uvula midline. Comments: Gums appear healthy. No thrush no mucositis  Eyes:      General: Vision grossly intact. Extraocular Movements: Extraocular movements intact. Conjunctiva/sclera: Conjunctivae normal.      Pupils: Pupils are equal, round, and reactive to light. Comments: Fundoscopic exam is benign  Retinal vessels: Normal   Neck:      Vascular: No carotid bruit.       Comments: Thyroid is normal in size.  Carotids 2+ and equal bilaterally  Cardiovascular:      Rate and Rhythm: Normal rate and regular rhythm. Pulses: Normal pulses. Heart sounds: Normal heart sounds, S1 normal and S2 normal. No murmur heard. No friction rub. No gallop. Comments: Pedal pulses 2+ and equal bilaterally  Pulmonary:      Effort: Pulmonary effort is normal.      Breath sounds: Normal breath sounds. Abdominal:      General: Bowel sounds are normal. There is no distension. Palpations: Abdomen is soft. There is no mass. Tenderness: There is no abdominal tenderness. There is no guarding or rebound. Hernia: No hernia is present. Comments: No rigidity   Musculoskeletal:         General: Normal range of motion. Right shoulder: Normal.      Left shoulder: Normal.      Right upper arm: Normal.      Left upper arm: Normal.      Cervical back: Normal range of motion. Right hip: Normal.      Left hip: Normal.      Right upper leg: Normal.      Left upper leg: Normal.      Right lower leg: Normal.      Left lower leg: Normal.      Right foot: Normal.      Left foot: Normal.   Lymphadenopathy:      Comments: No palpable or visible regional lymphadenopathy   Skin:     General: Skin is warm and dry. Findings: No bruising, ecchymosis, lesion or rash. Neurological:      General: No focal deficit present. Mental Status: She is alert. Mental status is at baseline. Cranial Nerves: No cranial nerve deficit. Deep Tendon Reflexes: Reflexes normal.   Psychiatric:         Mood and Affect: Mood and affect normal. Mood is not depressed. Behavior: Behavior normal. Behavior is not agitated. Comments: No apparent anxiety       Assessment & Plan:  1. Multiple sclerosis ([facility]  2. Chronic obstructive pulmonary disease, unspecified COPD type ([facility]  3. Primary hypertension  4. Mixed hyperlipidemia  5. Weakness  6. Stage 3b chronic kidney disease ([facility]  7.  Anxiety disorder, unspecified type      Hospital notes reviewed   Chart and medications   Pt/ot eval and treat   Monitor labs   Continue current treatment   Advanced directives discussed with patient and she is a full code     I, Arlene Fritz MA  am scribing for Dr. Bj Montenegro.  Karri Nevse Vara Bolk, MD, personally performed the services described in this documentation as scribed by Arlene Fritz and it is both accurate and complete

## 2023-03-01 NOTE — TELEPHONE ENCOUNTER
Patient currently hospitalized and it appears that the Gabapentin has been discontinued. Despite this, patient called today requesting a refill on this medication. Please advise, thanks.

## 2023-03-02 LAB
ANION GAP SERPL CALCULATED.3IONS-SCNC: 7 MMOL/L (ref 7–16)
BASOPHILS ABSOLUTE: 0.03 E9/L (ref 0–0.2)
BASOPHILS RELATIVE PERCENT: 0.5 % (ref 0–2)
BLOOD CULTURE, ROUTINE: ABNORMAL
BUN BLDV-MCNC: 23 MG/DL (ref 6–23)
CALCIUM SERPL-MCNC: 9.5 MG/DL (ref 8.6–10.2)
CHLORIDE BLD-SCNC: 105 MMOL/L (ref 98–107)
CO2: 26 MMOL/L (ref 22–29)
CREAT SERPL-MCNC: 1.1 MG/DL (ref 0.5–1)
EOSINOPHILS ABSOLUTE: 0.31 E9/L (ref 0.05–0.5)
EOSINOPHILS RELATIVE PERCENT: 5.7 % (ref 0–6)
GFR SERPL CREATININE-BSD FRML MDRD: 52 ML/MIN/1.73
GLUCOSE BLD-MCNC: 95 MG/DL (ref 74–99)
HCT VFR BLD CALC: 30.8 % (ref 34–48)
HEMOGLOBIN: 9.6 G/DL (ref 11.5–15.5)
IMMATURE GRANULOCYTES #: 0.04 E9/L
IMMATURE GRANULOCYTES %: 0.7 % (ref 0–5)
LYMPHOCYTES ABSOLUTE: 1.88 E9/L (ref 1.5–4)
LYMPHOCYTES RELATIVE PERCENT: 34.4 % (ref 20–42)
MCH RBC QN AUTO: 27.8 PG (ref 26–35)
MCHC RBC AUTO-ENTMCNC: 31.2 % (ref 32–34.5)
MCV RBC AUTO: 89.3 FL (ref 80–99.9)
MONOCYTES ABSOLUTE: 0.64 E9/L (ref 0.1–0.95)
MONOCYTES RELATIVE PERCENT: 11.7 % (ref 2–12)
NEUTROPHILS ABSOLUTE: 2.57 E9/L (ref 1.8–7.3)
NEUTROPHILS RELATIVE PERCENT: 47 % (ref 43–80)
ORGANISM: ABNORMAL
PDW BLD-RTO: 15.7 FL (ref 11.5–15)
PLATELET # BLD: 250 E9/L (ref 130–450)
PMV BLD AUTO: 9.8 FL (ref 7–12)
POTASSIUM SERPL-SCNC: 4.1 MMOL/L (ref 3.5–5)
RBC # BLD: 3.45 E12/L (ref 3.5–5.5)
SODIUM BLD-SCNC: 138 MMOL/L (ref 132–146)
WBC # BLD: 5.5 E9/L (ref 4.5–11.5)

## 2023-03-02 PROCEDURE — 6370000000 HC RX 637 (ALT 250 FOR IP): Performed by: FAMILY MEDICINE

## 2023-03-02 PROCEDURE — 6360000002 HC RX W HCPCS: Performed by: STUDENT IN AN ORGANIZED HEALTH CARE EDUCATION/TRAINING PROGRAM

## 2023-03-02 PROCEDURE — 6370000000 HC RX 637 (ALT 250 FOR IP): Performed by: STUDENT IN AN ORGANIZED HEALTH CARE EDUCATION/TRAINING PROGRAM

## 2023-03-02 PROCEDURE — 80048 BASIC METABOLIC PNL TOTAL CA: CPT

## 2023-03-02 PROCEDURE — 1200000000 HC SEMI PRIVATE

## 2023-03-02 PROCEDURE — 85025 COMPLETE CBC W/AUTO DIFF WBC: CPT

## 2023-03-02 PROCEDURE — 2580000003 HC RX 258: Performed by: STUDENT IN AN ORGANIZED HEALTH CARE EDUCATION/TRAINING PROGRAM

## 2023-03-02 PROCEDURE — 99231 SBSQ HOSP IP/OBS SF/LOW 25: CPT | Performed by: STUDENT IN AN ORGANIZED HEALTH CARE EDUCATION/TRAINING PROGRAM

## 2023-03-02 PROCEDURE — 36415 COLL VENOUS BLD VENIPUNCTURE: CPT

## 2023-03-02 RX ORDER — GABAPENTIN 100 MG/1
100 CAPSULE ORAL 3 TIMES DAILY
Status: DISCONTINUED | OUTPATIENT
Start: 2023-03-02 | End: 2023-03-03 | Stop reason: HOSPADM

## 2023-03-02 RX ADMIN — SERTRALINE 150 MG: 100 TABLET, FILM COATED ORAL at 21:48

## 2023-03-02 RX ADMIN — Medication 10 ML: at 22:02

## 2023-03-02 RX ADMIN — CEFEPIME 2000 MG: 2 INJECTION, POWDER, FOR SOLUTION INTRAVENOUS at 02:27

## 2023-03-02 RX ADMIN — MIRTAZAPINE 15 MG: 15 TABLET, FILM COATED ORAL at 21:49

## 2023-03-02 RX ADMIN — POTASSIUM CHLORIDE 40 MEQ: 1500 TABLET, EXTENDED RELEASE ORAL at 08:22

## 2023-03-02 RX ADMIN — ATORVASTATIN CALCIUM 20 MG: 20 TABLET, FILM COATED ORAL at 21:48

## 2023-03-02 RX ADMIN — BACLOFEN 10 MG: 10 TABLET ORAL at 21:48

## 2023-03-02 RX ADMIN — CARVEDILOL 12.5 MG: 6.25 TABLET, FILM COATED ORAL at 08:23

## 2023-03-02 RX ADMIN — SUCRALFATE 1 G: 1 TABLET ORAL at 10:44

## 2023-03-02 RX ADMIN — GABAPENTIN 100 MG: 100 CAPSULE ORAL at 10:44

## 2023-03-02 RX ADMIN — PANTOPRAZOLE SODIUM 40 MG: 40 TABLET, DELAYED RELEASE ORAL at 16:07

## 2023-03-02 RX ADMIN — PETROLATUM: 420 OINTMENT TOPICAL at 08:24

## 2023-03-02 RX ADMIN — SUCRALFATE 1 G: 1 TABLET ORAL at 05:49

## 2023-03-02 RX ADMIN — Medication 10 ML: at 09:08

## 2023-03-02 RX ADMIN — PANTOPRAZOLE SODIUM 40 MG: 40 TABLET, DELAYED RELEASE ORAL at 05:49

## 2023-03-02 RX ADMIN — CARVEDILOL 12.5 MG: 6.25 TABLET, FILM COATED ORAL at 16:07

## 2023-03-02 RX ADMIN — GABAPENTIN 100 MG: 100 CAPSULE ORAL at 21:48

## 2023-03-02 RX ADMIN — FOLIC ACID 1 MG: 1 TABLET ORAL at 08:25

## 2023-03-02 RX ADMIN — SUCRALFATE 1 G: 1 TABLET ORAL at 22:01

## 2023-03-02 RX ADMIN — AMLODIPINE BESYLATE 5 MG: 5 TABLET ORAL at 08:23

## 2023-03-02 RX ADMIN — MUPIROCIN: 20 OINTMENT TOPICAL at 08:23

## 2023-03-02 RX ADMIN — CEFEPIME 2000 MG: 2 INJECTION, POWDER, FOR SOLUTION INTRAVENOUS at 14:38

## 2023-03-02 RX ADMIN — SUCRALFATE 1 G: 1 TABLET ORAL at 16:07

## 2023-03-02 RX ADMIN — PETROLATUM: 420 OINTMENT TOPICAL at 21:52

## 2023-03-02 RX ADMIN — BACLOFEN 10 MG: 10 TABLET ORAL at 08:22

## 2023-03-02 RX ADMIN — ENOXAPARIN SODIUM 40 MG: 100 INJECTION SUBCUTANEOUS at 08:22

## 2023-03-02 RX ADMIN — GABAPENTIN 100 MG: 100 CAPSULE ORAL at 14:38

## 2023-03-02 NOTE — PROGRESS NOTES
[facility] Progress Note    Admitting Date and Time: 2/24/2023  6:59 PM  Admit Dx: Folliculitis [Y71.7]  Altered mental status, unspecified altered mental status type [R41.82]  Gastrointestinal hemorrhage, unspecified gastrointestinal hemorrhage type [N33.0]  Acute metabolic encephalopathy [J53.78]  AMS (altered mental status) [R41.82]    Subjective:  Patient is being followed for Folliculitis [C33.5]  Altered mental status, unspecified altered mental status type [R41.82]  Gastrointestinal hemorrhage, unspecified gastrointestinal hemorrhage type [E57.8]  Acute metabolic encephalopathy [H52.45]  AMS (altered mental status) [R41.82]   Pt feels better, resting in her bed comfortably. Per RN: No major concerns.     ROS: denies fever, chills, cp, sob, n/v, HA unless stated above.      gabapentin  100 mg Oral TID    pantoprazole  40 mg Oral BID AC    mupirocin   Topical Daily    white petrolatum   Topical BID    potassium chloride  40 mEq Oral Daily with breakfast    cefepime  2,000 mg IntraVENous Q12H    amLODIPine  5 mg Oral Daily    atorvastatin  20 mg Oral Nightly    baclofen  10 mg Oral BID    carvedilol  12.5 mg Oral BID WC    folic acid  1 mg Oral Daily    mirtazapine  15 mg Oral Nightly    sertraline  150 mg Oral Nightly    sucralfate  1 g Oral 4x Daily    sodium chloride flush  5-40 mL IntraVENous 2 times per day    enoxaparin  40 mg SubCUTAneous Daily     melatonin, 6 mg, Nightly PRN  sodium chloride flush, 5-40 mL, PRN  sodium chloride, , PRN  ondansetron, 4 mg, Q8H PRN   Or  ondansetron, 4 mg, Q6H PRN  polyethylene glycol, 17 g, Daily PRN  acetaminophen, 650 mg, Q6H PRN   Or  acetaminophen, 650 mg, Q6H PRN  ipratropium-albuterol, 1 ampule, Q4H PRN  sodium chloride, , PRN       Objective:    BP (!) 160/71   Pulse 83   Temp 98.6 °F (37 °C) (Oral)   Resp 18   Ht 5' 1\" (1.549 m)   Wt 143 lb (64.9 kg)   SpO2 95%   BMI 27.02 kg/m²     General Appearance: alert and oriented to person, place and time and in no acute distress, saturating well on 3l of oxygen via [place].  Skin: warm and dry  Head: normocephalic and atraumatic  Eyes: pupils equal, round, and reactive to light, extraocular eye movements intact, conjunctivae normal  Neck: neck supple and non tender without mass   Pulmonary/Chest: clear to auscultation bilaterally- no wheezes, rales or rhonchi, normal air movement, no respiratory distress  Cardiovascular: normal rate, normal S1 and S2 and no carotid bruits  Abdomen: soft, non-tender, non-distended, normal bowel sounds, no masses or organomegaly  Extremities: no cyanosis, no clubbing and no edema  Neurologic: no cranial nerve deficit and speech normal        Recent Labs     02/28/23 0339 03/01/23 0238 03/02/23 0210    138 138   K 3.5 4.0 4.1   * 106 105   CO2 25 26 26   BUN 19 22 23   CREATININE 1.1* 1.2* 1.1*   GLUCOSE 86 99 95   CALCIUM 9.2 9.2 9.5       Recent Labs     02/28/23 0339 03/01/23 0238 03/02/23 0210   WBC 5.6 5.1 5.5   RBC 3.08* 3.38* 3.45*   HGB 8.4* 9.2* 9.6*   HCT 27.5* 30.3* 30.8*   MCV 89.3 89.6 89.3   MCH 27.3 27.2 27.8   MCHC 30.5* 30.4* 31.2*   RDW 15.7* 15.7* 15.7*    245 250   MPV 10.3 9.8 9.8       Micro:  No components found for: [facility])    Radiology:   No results found. Assessment:    Principal Problem:    AMS (altered mental status)  Active Problems:    COPD (chronic obstructive pulmonary disease) (HCC)    Acute cystitis    Gastrointestinal hemorrhage with melena    Hypokalemia    Acute metabolic encephalopathy    Sepsis ([address]    MS (multiple sclerosis) ([facility] [address]    Hyperlipidemia    Primary hypertension  Resolved Problems:    * No resolved hospital problems. *      Plan:  Acute encephalopathy - Improved :  likely  metabolic, underlying UTI .  UA revealed signs of infection  CXR neg  CT head neg  UDS neg,  WBC down trending, lactic acid 1.1 ammonia was normal  C/w cefepime blood cultures - [facility] - staph epidermidis - likely a contaminant, MRSA - 1 set, f/u repeat blood cxs, urine cx - no growth to date. 2.   GI bleed : fecal occult pos  H/H stable at 10.2   Gen  Surgery recommended to monitor H/H and PPI and sucralfate trend  H/H        3. Acute cystitis   CT A/P revealed bladder wall thickening and inflammatory stranding concerning for cystitis . Ucx 12/2022 revealed Enterobacter clocae  and Ecoli , that is sensitive to cefepime  s/p cefepime await urine culture blood cultures -  growing gram positive rods. Consulted ID for opinion, recommend contamination. 4. Acute rhabdomyolysis:  >> 166  improved with IVF       5. Cholelithiasis: as seen on CT A/P RUQ US revealed no cholecystitis. 6. CKD stage  3:  stable      7. COPD /chronic hypoxic respiratory failure ; on 3L NC daily  duonebs PRN      8. Hypertension  Norvasc      9. Hyperlipidemia : Lipitor      10. Depression : Remeron /Zoloft      11. History of MS : baclofen     DVT Prophylaxis - Lovenox  Disposition/ social - ampac score low, referral made to [facility], agreeable to go to [facility],  pt awaiting precert for [facility]. NOTE: This report was transcribed using voice recognition software. Every effort was made to ensure accuracy; however, inadvertent computerized transcription errors may be present.   Electronically signed by Jose Antonio Morrison MD on 3/2/2023 at 3:13 PM

## 2023-03-02 NOTE — PATIENT CARE CONFERENCE
[facility] Quality Flow/Interdisciplinary Rounds Progress Note        Quality Flow Rounds held on March 2, 2023    Disciplines Attending:  Bedside Nurse, , , and Nursing Unit Leadership    Zoë Hernandez was admitted on 2/24/2023  6:59 PM    Anticipated Discharge Date:       Disposition:    Von Score:  Von Scale Score: 19    Readmission Risk              Risk of Unplanned Readmission:  31           Discussed patient goal for the day, patient clinical progression, and barriers to discharge.   The following Goal(s) of the Day/Commitment(s) have been identified:  Diagnostics - Report Results and Labs - Report Results      Faheem Begum RN  March 2, 2023

## 2023-03-02 NOTE — CONSULTS
[address] [place] [address] [facility]    Consultation Note     Admit Date: 2/24/2023  6:59 PM    Reason for Consult:   positive blood culture    Attending Physician:  Verner Melbourne, MD     Chief Complaint: none    HISTORY OF PRESENT ILLNESS:   The patient is a 69 y.o.  female known to the Infectious Diseases service. The patient is known to me from prior admission. She was admitted 6 days ago for confusion. Today she says she is fine. No cough or SOB or abdominal pain or diarrhea. She has both knees and hips replaced. No other hardware. Since admission, pt  is afebrile, HS stable. On RA. Labs showed white count was 11 on admission, today is 5.5 hgb dropped at admission, now stabilized around 9. Platelet count is 956. She has CKD which is stable. LFTs normal. DAU9 negative. LFTs normal on admission. UA was clean. Urine cx was negative. Blood cx on admission, one set showed coag negative staph, other set turned positive for gram positive rods, diphtheroids like on day 5. Repeat blood cx on 2/26 negative so far. Patient got one dose of IV vancomycin on admission and cefepime since then till now. It was d/brenton now. I got consulted for antibiotic management. Past Medical History:        Diagnosis Date    Arthritis     benign breast     CKD (chronic kidney disease)     COPD (chronic obstructive pulmonary disease) (HCC)     Hyperlipidemia     Hypertension     MS (multiple sclerosis) ([facility] [address] 05/31/2012    Multiple sclerosis ([facility] [address]     diagnosised 8  yrs ago [facility]     Past Surgical History:        Procedure Laterality Date    APPENDECTOMY      BREAST SURGERY  4/13/2012    biopsy - negative    HIP ARTHROPLASTY Right 04/13/2011    Right AIDEE  ALLISON Isabel MD    HYSTERECTOMY (CERVIX STATUS UNKNOWN)      KNEE ARTHROPLASTY Left 10/01/2013    Left TKA  ALLISON Isabel MD    KNEE ARTHROPLASTY Right 08/29/2012    Right TKA  ALLISON Hernandez MD    TOTAL HIP ARTHROPLASTY Left 3/25/2022    LEFT HIP TOTAL ARTHROPLASTY performed by Guillermo Izquierdo MD at [address] [facility] N/A 11/11/2022    EGD CONTROL HEMORRHAGE performed by Brennen Staley MD at [facility] ENDOSCOPY     Current Medications:   Scheduled Meds:   gabapentin  100 mg Oral TID    pantoprazole  40 mg Oral BID AC    mupirocin   Topical Daily    white petrolatum   Topical BID    potassium chloride  40 mEq Oral Daily with breakfast    cefepime  2,000 mg IntraVENous Q12H    amLODIPine  5 mg Oral Daily    atorvastatin  20 mg Oral Nightly    baclofen  10 mg Oral BID    carvedilol  12.5 mg Oral BID WC    folic acid  1 mg Oral Daily    mirtazapine  15 mg Oral Nightly    sertraline  150 mg Oral Nightly    sucralfate  1 g Oral 4x Daily    sodium chloride flush  5-40 mL IntraVENous 2 times per day    enoxaparin  40 mg SubCUTAneous Daily     Continuous Infusions:   sodium chloride      sodium chloride       PRN Meds:melatonin, sodium chloride flush, sodium chloride, ondansetron **OR** ondansetron, polyethylene glycol, acetaminophen **OR** acetaminophen, ipratropium-albuterol, sodium chloride    Allergies:  Macrodantin [nitrofurantoin macrocrystal]    Social History:   Social History     Socioeconomic History    Marital status:    Occupational History     Employer: RETIRED   Tobacco Use    Smoking status: Every Day     Packs/day: 1.00     Years: 50.00     Pack years: 50.00     Types: Cigarettes    Smokeless tobacco: Never   Vaping Use    Vaping Use: Never used   Substance and Sexual Activity    Alcohol use: No    Drug use: No    Sexual activity: Yes     Social Determinants of Health     Financial Resource Strain: Low Risk     Difficulty of Paying Living Expenses: Not hard at all   Food Insecurity: No Food Insecurity    Worried About Running Out of Food in the Last Year: Never true    Ran Out of Food in the Last Year: Never true   Transportation Needs: Unknown    Lack of Transportation (Non-Medical):  No   Physical Activity: Inactive    Days of Exercise per Week: 0 days    Minutes of Exercise per Session: 0 min   Intimate Partner Violence: Not At Risk    Fear of Current or Ex-Partner: No    Emotionally Abused: No    Physically Abused: No    Sexually Abused: No   Housing Stability: Unknown    Unstable Housing in the Last Year: No       Family History:       Problem Relation Age of Onset    Mult Sclerosis Father     Thyroid Cancer Mother     Cirrhosis Mother    . Otherwise non-pertinent to the chief complaint. REVIEW OF SYSTEMS:    As mentioned in HPI, all other systems negative. PHYSICAL EXAM:    Vitals:    BP (!) 160/71   Pulse 83   Temp 98.6 °F (37 °C) (Oral)   Resp 18   Ht 5' 1\" (1.549 m)   Wt 143 lb (64.9 kg)   SpO2 95%   BMI 27.02 kg/m²   Constitutional: The patient is awake, alert, and oriented. Skin: Warm and dry. No rashes were noted. No jaundice. HEENT: Eyes show round, and reactive pupils. Moist mucous membranes, no ulcerations, no thrush. Neck: Supple to movements. No lymphadenopathy. Chest: No use of accessory muscles to breathe. Symmetrical expansion. Auscultation reveals no wheezing, crackles, or rhonchi. Cardiovascular: S1 and S2 are rhythmic and regular. No murmurs appreciated. Abdomen: soft, non tender  Extremities: No clubbing, no cyanosis, no edema.   Musculoskeletal: scars bilateral hips and knees  Neurological: alert, oriented x 3  Lines: peripheral      CBC+dif:  Recent Labs     02/28/23  0339 03/01/23  0238 03/02/23  0210   WBC 5.6 5.1 5.5   HGB 8.4* 9.2* 9.6*   HCT 27.5* 30.3* 30.8*   MCV 89.3 89.6 89.3    245 250   NEUTROABS 2.67 2.28 2.57     Lab Results   Component Value Date    CRP 3.7 (H) 10/29/2022     No results found for: CRP  Lab Results   Component Value Date    SEDRATE 44 (H) 10/29/2022    SEDRATE 40 (H) 05/30/2012     Lab Results   Component Value Date    ALT 5 02/25/2023    AST 21 02/25/2023    ALKPHOS 84 02/25/2023    BILITOT 0.3 02/25/2023     Lab Results   Component Value Date/Time  03/02/2023 02:10 AM    K 4.1 03/02/2023 02:10 AM    K 3.6 01/21/2023 10:35 AM     03/02/2023 02:10 AM    CO2 26 03/02/2023 02:10 AM    BUN 23 03/02/2023 02:10 AM    CREATININE 1.1 03/02/2023 02:10 AM    GFRAA > 60 11/10/2022 05:22 AM    LABGLOM 52 03/02/2023 02:10 AM    GLUCOSE 95 03/02/2023 02:10 AM    GLUCOSE 85 11/10/2022 05:22 AM    PROT 7.6 02/25/2023 12:05 AM    LABALBU 3.7 02/25/2023 12:05 AM    LABALBU 2.7 11/07/2022 07:10 AM    CALCIUM 9.5 03/02/2023 02:10 AM    BILITOT 0.3 02/25/2023 12:05 AM    ALKPHOS 84 02/25/2023 12:05 AM    AST 21 02/25/2023 12:05 AM    ALT 5 02/25/2023 12:05 AM       Lab Results   Component Value Date/Time    PROTIME 19.3 11/11/2022 05:46 AM    PROTIME 13.0 05/30/2012 10:40 PM    INR 1.7 11/11/2022 05:46 AM       Lab Results   Component Value Date/Time    TSH 0.345 02/25/2023 12:05 AM       Lab Results   Component Value Date/Time    COLORU Yellow 02/25/2023 12:05 AM    PHUR 5.5 02/25/2023 12:05 AM    WBCUA 0-1 02/25/2023 12:05 AM    WBCUA NONE 04/11/2011 11:55 AM    RBCUA 1-3 02/25/2023 12:05 AM    RBCUA NONE 04/11/2011 11:55 AM    MUCUS Present 11/10/2022 03:33 PM    BACTERIA RARE 02/25/2023 12:05 AM    CLARITYU Clear 02/25/2023 12:05 AM    SPECGRAV 1.015 02/25/2023 12:05 AM    LEUKOCYTESUR TRACE 02/25/2023 12:05 AM    UROBILINOGEN 0.2 02/25/2023 12:05 AM    BILIRUBINUR Negative 02/25/2023 12:05 AM    BILIRUBINUR NEGATIVE 05/30/2012 10:50 PM    BLOODU SMALL 02/25/2023 12:05 AM    GLUCOSEU 100 02/25/2023 12:05 AM    GLUCOSEU NEGATIVE 05/30/2012 10:50 PM       No results found for: AKB1HJV, BEART, W7KHMUXF, PHART, THGBART, OXW9WXO, PO2ART, QYO0VAS  Radiology:  US ABDOMEN LIMITED Specify organ? GALLBLADDER, PANCREAS, LIVER, SPLEEN   Final Result   Cholelithiasis without gallbladder wall thickening or pericholecystic fluid   of acute inflammation. No biliary dilatation         CT Head W/O Contrast   Final Result   No acute intracranial abnormality.          CT ABDOMEN PELVIS W IV CONTRAST Additional Contrast? None   Final Result   Circumferential bladder wall thickening and inflammatory stranding,   concerning for cystitis. Clinical and laboratory correlation recommended. Cholelithiasis. XR CHEST PORTABLE   Final Result   No acute process. Bilateral atelectasis. Microbiology:  Pending  No results for input(s): BC in the last 72 hours. No results for input(s): ORG in the last 72 hours. No results for input(s): Brianna Folsom in the last 72 hours. No results for input(s): STREPNEUMAGU in the last 72 hours. No results for input(s): LP1UAG in the last 72 hours. No results for input(s): ASO in the last 72 hours. No results for input(s): CULTRESP in the last 72 hours. Assessment:  Blood culture contamination: one set shows coag negative staph other set turned positive on day 5 for Gram positive rods, diphtheroid like. Both are contaminants. No sign of UTI , however she got treated with 6 days of IV cefepime   Anemia: surgery following    Plan:    No further antibiotics recommended. Pt can be discharged from ID stand point. ID signs off. Spoke to charge nurse. Thank you for having us see this patient in consultation. Please call me with any questions.       Electronically signed by Janelle Freed MD on 3/2/2023 at 11:59 AM

## 2023-03-02 NOTE — PROGRESS NOTES
Occupational Therapy  OT BEDSIDE TREATMENT NOTE      Date:3/2/2023  Patient Name: Speedy Johnson  MRN: [id number]  : 1945  Room: [address] [place], [place] [address]     Evaluating OT: Nicko Caballero OTR/L   [id number]       Referring Lorena Garcia MD    Specific Provider Orders/Date:OT eval and treat 2023       Diagnosis:  Folliculitis [L46.7]  Altered mental status, unspecified altered mental status type [R41.82]  Gastrointestinal hemorrhage, unspecified gastrointestinal hemorrhage type [M86.9]  Acute metabolic encephalopathy [R01.51]  AMS (altered mental status) [R41.82]     Pertinent Medical History:  MS, COPD, hip surgery, knee surgery , R  rotator injury     Precautions:  Fall Risk,       Assessment of current deficits    [x] Functional mobility            [x]ADLs           [x] Strength                  []Cognition    [x] Functional transfers          [x] IADLs         [x] Safety Awareness   [x]Endurance    [] Fine Coordination                         [x] Balance      [] Vision/perception   []Sensation      []Gross Motor Coordination             [] ROM           [] Delirium                   [] Motor Control      OT PLAN OF CARE   OT POC based on physician orders, patient diagnosis and results of clinical assessment     Frequency/Duration  2-4 days/wk for 2 weeks PRN   Specific OT Treatment Interventions to include:   ADL retraining/adapted techniques and AE recommendations to increase functional independence within precautions                    Energy conservation techniques to improve tolerance for selfcare routine   Functional transfer/mobility training/DME recommendations for increased independence, safety and fall prevention         Patient/family education to increase safety and functional independence             Environmental modifications for safe mobility and completion of ADLs                             Therapeutic activity to improve functional performance during ADLs. Therapeutic exercise to improve tolerance and functional strength for ADLs    Balance retraining/tolerance tasks for facilitation of postural control with dynamic challenges during ADLs . Positioning to improve functional independence  []      Recommended Adaptive Equipment: TBD      Home Living: Pt lives with ,  1 story with ramp to enter   Bathroom setup: walk in shower, shower chair    Equipment owned: rollator, home O2, reacher      Prior Level of Function: assist as needed  with ADLs , assist  with IADLs; ambulated with rollator      Pain Level: no pain this session ;   Cognition: A&O: 4/4;               Memory:  good               Sequencing:  good               Problem solving:  good               Judgement/safety:  good                 Functional Assessment:  AM-PAC Daily Activity Raw Score: 17/24    Initial Eval Status  Date: 2/28/2023 Treatment Status  Date: 3/2/23 STGs = LTGs  Time frame: 10-14 days   Feeding Independent        Grooming SBA. set-up   SBA Independent    UB Dressing Min A  Min A to don gown around back  SBA    LB Dressing Min A  Able to lift and cross legs to reach feet to manage socks  CGA to don socks and brief  SBA    Bathing Min A    SBA    Toileting SBA   Independent    Bed Mobility  SBA  Supine to sit   supine to sit SBA Independent    Functional Transfers SBA   Sit - stand from bed   sit <> stand SBA Mod I    Functional Mobility SBA, w/walker   Ambulating in room  SBA with WW in room  Mod I  with good tolerance    Balance Sitting:     Static:  Independent    Dynamic:SBA   Standing: SBA   standing balance SBA Independent    Activity Tolerance No SOB fair  Good  with ADL activity    Visual/  Perceptual Glasses: none by bedside             Comments:  patient cleared with nursing and agreeable to session. Patient fatigued but improvement demonstrated, very motivated to improve.  Patient in chair with call light in reach and alarm on    Education/treatment: ADL and functional transfer/activity performed to increase safety and independence during self care tasks. Education provided on safety awareness, adl reeducation, functional transfer training    Pt has made progress towards set goals.      Time In: 8:45  Time Out: 9:08     Min Units   Therapeutic Ex [id number]     Therapeutic Activities 36513 10 1   ADL/Self Care 87843 13 1   Orthotic Management [id number]     Neuro Re-Ed [id number]     Non-Billable Time     TOTAL TIMED TREATMENT [id number] Sejal Goldstein Nab PAZ/L [id number]

## 2023-03-03 VITALS
TEMPERATURE: 98.2 F | DIASTOLIC BLOOD PRESSURE: 63 MMHG | HEIGHT: 61 IN | BODY MASS INDEX: 27.94 KG/M2 | SYSTOLIC BLOOD PRESSURE: 124 MMHG | WEIGHT: 148 LBS | HEART RATE: 85 BPM | OXYGEN SATURATION: 95 % | RESPIRATION RATE: 18 BRPM

## 2023-03-03 LAB
ANION GAP SERPL CALCULATED.3IONS-SCNC: 7 MMOL/L (ref 7–16)
BASOPHILS ABSOLUTE: 0.05 E9/L (ref 0–0.2)
BASOPHILS RELATIVE PERCENT: 0.8 % (ref 0–2)
BLOOD CULTURE, ROUTINE: NORMAL
BUN BLDV-MCNC: 24 MG/DL (ref 6–23)
CALCIUM SERPL-MCNC: 9.6 MG/DL (ref 8.6–10.2)
CHLORIDE BLD-SCNC: 106 MMOL/L (ref 98–107)
CO2: 27 MMOL/L (ref 22–29)
CREAT SERPL-MCNC: 1.2 MG/DL (ref 0.5–1)
EOSINOPHILS ABSOLUTE: 0.38 E9/L (ref 0.05–0.5)
EOSINOPHILS RELATIVE PERCENT: 5.8 % (ref 0–6)
GFR SERPL CREATININE-BSD FRML MDRD: 47 ML/MIN/1.73
GLUCOSE BLD-MCNC: 99 MG/DL (ref 74–99)
HCT VFR BLD CALC: 32 % (ref 34–48)
HEMOGLOBIN: 9.7 G/DL (ref 11.5–15.5)
IMMATURE GRANULOCYTES #: 0.04 E9/L
IMMATURE GRANULOCYTES %: 0.6 % (ref 0–5)
LYMPHOCYTES ABSOLUTE: 1.97 E9/L (ref 1.5–4)
LYMPHOCYTES RELATIVE PERCENT: 30.2 % (ref 20–42)
MCH RBC QN AUTO: 27.1 PG (ref 26–35)
MCHC RBC AUTO-ENTMCNC: 30.3 % (ref 32–34.5)
MCV RBC AUTO: 89.4 FL (ref 80–99.9)
MONOCYTES ABSOLUTE: 0.78 E9/L (ref 0.1–0.95)
MONOCYTES RELATIVE PERCENT: 11.9 % (ref 2–12)
NEUTROPHILS ABSOLUTE: 3.31 E9/L (ref 1.8–7.3)
NEUTROPHILS RELATIVE PERCENT: 50.7 % (ref 43–80)
PDW BLD-RTO: 15.7 FL (ref 11.5–15)
PLATELET # BLD: 270 E9/L (ref 130–450)
PMV BLD AUTO: 10.2 FL (ref 7–12)
POTASSIUM SERPL-SCNC: 4.1 MMOL/L (ref 3.5–5)
RBC # BLD: 3.58 E12/L (ref 3.5–5.5)
SODIUM BLD-SCNC: 140 MMOL/L (ref 132–146)
WBC # BLD: 6.5 E9/L (ref 4.5–11.5)

## 2023-03-03 PROCEDURE — 6370000000 HC RX 637 (ALT 250 FOR IP): Performed by: STUDENT IN AN ORGANIZED HEALTH CARE EDUCATION/TRAINING PROGRAM

## 2023-03-03 PROCEDURE — 2580000003 HC RX 258: Performed by: STUDENT IN AN ORGANIZED HEALTH CARE EDUCATION/TRAINING PROGRAM

## 2023-03-03 PROCEDURE — 36415 COLL VENOUS BLD VENIPUNCTURE: CPT

## 2023-03-03 PROCEDURE — 97530 THERAPEUTIC ACTIVITIES: CPT

## 2023-03-03 PROCEDURE — 6360000002 HC RX W HCPCS: Performed by: STUDENT IN AN ORGANIZED HEALTH CARE EDUCATION/TRAINING PROGRAM

## 2023-03-03 PROCEDURE — 6370000000 HC RX 637 (ALT 250 FOR IP): Performed by: FAMILY MEDICINE

## 2023-03-03 PROCEDURE — 99239 HOSP IP/OBS DSCHRG MGMT >30: CPT | Performed by: STUDENT IN AN ORGANIZED HEALTH CARE EDUCATION/TRAINING PROGRAM

## 2023-03-03 PROCEDURE — 85025 COMPLETE CBC W/AUTO DIFF WBC: CPT

## 2023-03-03 PROCEDURE — 80048 BASIC METABOLIC PNL TOTAL CA: CPT

## 2023-03-03 RX ORDER — GABAPENTIN 100 MG/1
100 CAPSULE ORAL 3 TIMES DAILY
Qty: 90 CAPSULE | Refills: 3 | Status: SHIPPED | OUTPATIENT
Start: 2023-03-03 | End: 2023-04-02

## 2023-03-03 RX ADMIN — BACLOFEN 10 MG: 10 TABLET ORAL at 09:39

## 2023-03-03 RX ADMIN — CARVEDILOL 12.5 MG: 6.25 TABLET, FILM COATED ORAL at 09:40

## 2023-03-03 RX ADMIN — SUCRALFATE 1 G: 1 TABLET ORAL at 05:38

## 2023-03-03 RX ADMIN — FOLIC ACID 1 MG: 1 TABLET ORAL at 09:40

## 2023-03-03 RX ADMIN — AMLODIPINE BESYLATE 5 MG: 5 TABLET ORAL at 09:40

## 2023-03-03 RX ADMIN — GABAPENTIN 100 MG: 100 CAPSULE ORAL at 09:40

## 2023-03-03 RX ADMIN — CEFEPIME 2000 MG: 2 INJECTION, POWDER, FOR SOLUTION INTRAVENOUS at 05:36

## 2023-03-03 RX ADMIN — Medication 10 ML: at 09:44

## 2023-03-03 RX ADMIN — SUCRALFATE 1 G: 1 TABLET ORAL at 11:11

## 2023-03-03 RX ADMIN — MUPIROCIN: 20 OINTMENT TOPICAL at 09:43

## 2023-03-03 RX ADMIN — ENOXAPARIN SODIUM 40 MG: 100 INJECTION SUBCUTANEOUS at 09:40

## 2023-03-03 RX ADMIN — PETROLATUM: 420 OINTMENT TOPICAL at 09:44

## 2023-03-03 RX ADMIN — PANTOPRAZOLE SODIUM 40 MG: 40 TABLET, DELAYED RELEASE ORAL at 05:38

## 2023-03-03 RX ADMIN — POTASSIUM CHLORIDE 40 MEQ: 1500 TABLET, EXTENDED RELEASE ORAL at 09:40

## 2023-03-03 NOTE — PROGRESS NOTES
Nutrition Assessment     Type and Reason for Visit: Initial    Nutrition Recommendations/Plan:   Continue current diet. Refuses ONS. Will monitor. Nutrition Assessment:  ADM: Gastrointestinal hemorrhage, AMS, Folliculitis. PMH[de-identified] MS, CKD, COPD. Appetite mostly good- states only decreased if she does not like what she orders. Did review with her she is on a Lowfat/Low Cholesterol/High Fiber/KRISTINA diet. Refusing ONS suggestions. Will monitor. Nutrition Related Findings:   A& O, I/O WDL, Soft/rounded abd, active BS, no edema Wound Type: None    Current Nutrition Therapies:    ADULT DIET; Regular; Low Fat/Low Chol/High Fiber/KRISTINA    Anthropometric Measures:  Height: 5' 1\" (154.9 cm)  Current Body Wt: 148 lb (67.1 kg)   BMI: 28    Nutrition Diagnosis:   No nutrition diagnosis at this time     Nutrition Interventions:   Food and/or Nutrient Delivery: Continue Current Diet  Nutrition Education/Counseling: Education completed (Reviewed diet order w/pt-enc'd intake.  She had no questions.)  Coordination of Nutrition Care: Continue to monitor while inpatient       Goals:     Goals: Meet at least 75% of estimated needs, by next RD assessment       Nutrition Monitoring and Evaluation:      Food/Nutrient Intake Outcomes: Food and Nutrient Intake  Physical Signs/Symptoms Outcomes: Biochemical Data, Nutrition Focused Physical Findings, Skin, Weight, Fluid Status or Edema    Discharge Planning:    Continue current diet     Billie Nesbitt RD,LD  Contact: [phone]

## 2023-03-03 NOTE — PATIENT CARE CONFERENCE
P Quality Flow/Interdisciplinary Rounds Progress Note        Quality Flow Rounds held on March 3, 2023    Disciplines Attending:  Bedside Nurse, , , and Nursing Unit Leadership    Tawana Rodriguez was admitted on 2/24/2023  6:59 PM    Anticipated Discharge Date:       Disposition:    Von Score:  Von Scale Score: 19    Readmission Risk              Risk of Unplanned Readmission:  31           Discussed patient goal for the day, patient clinical progression, and barriers to discharge.   The following Goal(s) of the Day/Commitment(s) have been identified:  Discharge - Obtain Order      Sahra Hua RN  March 3, 2023

## 2023-03-03 NOTE — DISCHARGE SUMMARY
[facility] Physician Discharge Summary       Shawnee Maguire MD  [address] [place] [address] [phone]    Follow up  As needed    [place] [id number]  [phone]          Activity level: As tolerated     Dispo: Home      Condition on discharge: Stable     Patient ID:  Rosio Lino  [id number]  33 y.o.  1945    Admit date: 2/24/2023    Discharge date and time:  3/3/2023  1:49 PM    Admission Diagnoses: Principal Problem:    AMS (altered mental status)  Active Problems:    COPD (chronic obstructive pulmonary disease) ([address]    Acute cystitis    Gastrointestinal hemorrhage with melena    Hypokalemia    Acute metabolic encephalopathy    Sepsis ([address]    MS (multiple sclerosis) ([address]    Hyperlipidemia    Primary hypertension  Resolved Problems:    * No resolved hospital problems. *      Discharge Diagnoses: Principal Problem:    AMS (altered mental status)  Active Problems:    COPD (chronic obstructive pulmonary disease) (HCC)    Acute cystitis    Gastrointestinal hemorrhage with melena    Hypokalemia    Acute metabolic encephalopathy    Sepsis ([address]    MS (multiple sclerosis) ([address]    Hyperlipidemia    Primary hypertension  Resolved Problems:    * No resolved hospital problems. *      Consults:  IP CONSULT TO GENERAL SURGERY  IP CONSULT TO IV TEAM  IP CONSULT TO INFECTIOUS DISEASES    Hospital Course:   Patient Rosio Lino is a 69 y.o. presented with Folliculitis [I90.9]  Altered mental status, unspecified altered mental status type [R41.82]  Gastrointestinal hemorrhage, unspecified gastrointestinal hemorrhage type [S86.0]  Acute metabolic encephalopathy [P49.31]  AMS (altered mental status) [R41.82]  Patient was admitted and managed for Acute encephalopathy - Improved :  likely  metabolic, underlying UTI .  UA revealed signs of infection CXR neg  CT head neg  UDS neg,  WBC down trending, lactic acid 1.1 ammonia was normal  s/p IV cefepime blood cultures - GPC - staph epidermidis - likely a contaminant, MRSA - 1 set, urine cx - no growth to date. ID consulted, recommended monitoring off abxs, recommend contamination. GI bleed : fecal occult pos  H/H stable. Gen  Surgery recommended to monitor H/H and PPI and sucralfate trend  H/H       Discharge Exam:  General Appearance: alert and oriented to person, place and time and in no acute distress  Skin: warm and dry  Head: normocephalic and atraumatic  Eyes: pupils equal, round, and reactive to light, extraocular eye movements intact, conjunctivae normal  Neck: neck supple and non tender without mass   Pulmonary/Chest: clear to auscultation bilaterally- no wheezes, rales or rhonchi, normal air movement, no respiratory distress  Cardiovascular: normal rate, normal S1 and S2 and no carotid bruits  Abdomen: soft, non-tender, non-distended, normal bowel sounds, no masses or organomegaly  Extremities: no cyanosis, no clubbing and no edema  Neurologic: no cranial nerve deficit and speech normal    I/O last 3 completed shifts: In: 550 [P.O.:540; I.V.:10]  Out: 1100 [Urine:1100]  I/O this shift:  In: 180 [P.O.:180]  Out: 800 [Urine:800]      LABS:  Recent Labs     03/01/23 0238 03/02/23  0210 03/03/23  0620    138 140   K 4.0 4.1 4.1    105 106   CO2 26 26 27   BUN 22 23 24*   CREATININE 1.2* 1.1* 1.2*   GLUCOSE 99 95 99   CALCIUM 9.2 9.5 9.6       Recent Labs     03/01/23 0238 03/02/23  0210 03/03/23  0620   WBC 5.1 5.5 6.5   RBC 3.38* 3.45* 3.58   HGB 9.2* 9.6* 9.7*   HCT 30.3* 30.8* 32.0*   MCV 89.6 89.3 89.4   MCH 27.2 27.8 27.1   MCHC 30.4* 31.2* 30.3*   RDW 15.7* 15.7* 15.7*    250 270   MPV 9.8 9.8 10.2       No results for input(s): POCGLU in the last 72 hours.     Imaging:  CT Head W/O Contrast    Result Date: 2/25/2023  EXAMINATION: CT OF THE HEAD WITHOUT CONTRAST  2/25/2023 1:23 am TECHNIQUE: CT of the head was performed without the administration of intravenous contrast. Automated exposure control, iterative reconstruction, and/or weight based adjustment of the mA/kV was utilized to reduce the radiation dose to as low as reasonably achievable. COMPARISON: 01/18/2023 HISTORY: ORDERING SYSTEM PROVIDED HISTORY: AMS TECHNOLOGIST PROVIDED HISTORY: Reason for exam:->AMS Has a \"code stroke\" or \"stroke alert\" been called? ->No Decision Support Exception - unselect if not a suspected or confirmed emergency medical condition->Emergency Medical Condition (MA) FINDINGS: BRAIN/VENTRICLES: There is no acute intracranial hemorrhage, mass effect or midline shift. No abnormal extra-axial fluid collection. The gray-white differentiation is maintained without evidence of an acute infarct. There is no evidence of hydrocephalus. There are nonspecific hypoattenuating foci in the subcortical and periventricular white matter that most likely represent chronic microangiopathic ischemic changes in a patient of this age. ORBITS: The visualized portion of the orbits demonstrate no acute abnormality. SINUSES: The visualized paranasal sinuses and mastoid air cells demonstrate no acute abnormality. SOFT TISSUES/SKULL:  No acute abnormality of the visualized skull or soft tissues. No acute intracranial abnormality. CT ABDOMEN PELVIS W IV CONTRAST Additional Contrast? None    Result Date: 2/25/2023  EXAMINATION: CT OF THE ABDOMEN AND PELVIS WITH CONTRAST 2/25/2023 1:23 am TECHNIQUE: CT of the abdomen and pelvis was performed with the administration of intravenous contrast. Multiplanar reformatted images are provided for review. Automated exposure control, iterative reconstruction, and/or weight based adjustment of the mA/kV was utilized to reduce the radiation dose to as low as reasonably achievable.  COMPARISON: 11/10/2022 HISTORY: ORDERING SYSTEM PROVIDED HISTORY: abdominal tenderness and AMS TECHNOLOGIST PROVIDED HISTORY: Reason for exam:->abdominal tenderness and AMS Additional Contrast?->None Decision Support Exception - unselect if not a suspected or confirmed emergency medical condition->Emergency Medical Condition (MA) FINDINGS: Lower Chest:  Visualized portion of the lower chest demonstrates no acute abnormality. Organs: The liver, spleen, pancreas, and adrenals are within normal limits. There is cholelithiasis without CT evidence of acute cholecystitis. There are bilateral renal cysts. No hydronephrosis. GI/Bowel: There is no evidence of bowel obstruction. No evidence of abnormal bowel wall thickening or distension. The appendix is not visualized. Moderate stool within the rectum. Pelvis: The urinary bladder is decompressed. There is circumferential bladder wall thickening and inflammatory stranding. The uterus is absent. Peritoneum/Retroperitoneum: No evidence of ascites or free air. No evidence of lymphadenopathy. Aorta is normal in caliber. Bones/Soft Tissues:  No acute abnormality of the visualized osseous structures. Vertebroplasty material noted within T11 and T12. Chronic anterior wedge deformity at those levels. Status post bilateral hip arthroplasties. Circumferential bladder wall thickening and inflammatory stranding, concerning for cystitis. Clinical and laboratory correlation recommended. Cholelithiasis. XR CHEST PORTABLE    Result Date: 2/24/2023  EXAMINATION: ONE XRAY VIEW OF THE CHEST 2/24/2023 7:52 pm COMPARISON: 01/18/2023 HISTORY: ORDERING SYSTEM PROVIDED HISTORY: AMS TECHNOLOGIST PROVIDED HISTORY: Reason for exam:->AMS FINDINGS: The lungs are without acute focal process. Bilateral atelectasis. There is no effusion or pneumothorax. The cardiomediastinal silhouette is without acute process. The osseous structures are without acute process. No acute process. Bilateral atelectasis. US ABDOMEN LIMITED Specify organ?  GALLBLADDER, PANCREAS, LIVER, SPLEEN    Result Date: 2/25/2023  EXAMINATION: RIGHT UPPER QUADRANT ULTRASOUND 2/25/2023 9:56 am COMPARISON: CT abdomen and pelvis 8 hours prior HISTORY: ORDERING SYSTEM PROVIDED HISTORY: cholecystitis TECHNOLOGIST PROVIDED HISTORY: Reason for exam:->cholecystitis Specify organ?->GALLBLADDER Specify organ?->PANCREAS Specify organ?->LIVER Specify organ?->SPLEEN What reading provider will be dictating this exam?->CRC FINDINGS: LIVER:  The liver demonstrates normal echogenicity without evidence of intrahepatic biliary ductal dilatation. BILIARY SYSTEM:  Gallbladder contains echogenic foci with shadowing of cholelithiasis however no wall thickening or pericholecystic fluid Common bile duct is within normal limits measuring 3 mm. RIGHT KIDNEY: The right kidney is grossly unremarkable without evidence of hydronephrosis. Multiple cystic lesions measuring up to 2.2 cm and 1.3 cm respectively without internal flow of renal cortical cystic lesions. PANCREAS:  Visualized portions of the pancreas are unremarkable. OTHER: No evidence of right upper quadrant ascites. Cholelithiasis without gallbladder wall thickening or pericholecystic fluid of acute inflammation. No biliary dilatation       Patient Instructions:      Medication List        START taking these medications      gabapentin 100 MG capsule  Commonly known as: NEURONTIN  Take 1 capsule by mouth 3 times daily for 30 days. mupirocin 2 % ointment  Commonly known as: BACTROBAN  Apply topically 3 times daily. Start taking on: March 4, 2023            CHANGE how you take these medications      folic acid 1 MG tablet  Commonly known as: FOLVITE  Take 1 tablet by mouth daily  What changed: Another medication with the same name was removed. Continue taking this medication, and follow the directions you see here. mirtazapine 15 MG tablet  Commonly known as: REMERON  Take 1 tablet by mouth nightly  What changed: Another medication with the same name was removed.  Continue taking this medication, and follow the directions you see here.             CONTINUE taking these medications      alendronate 70 MG tablet  Commonly known as: FOSAMAX     amLODIPine 5 MG tablet  Commonly known as: NORVASC  Take 1 tablet by mouth daily     atorvastatin 20 MG tablet  Commonly known as: LIPITOR  Take 1 tablet by mouth nightly     baclofen 10 MG tablet  Commonly known as: LIORESAL  Take 1 tablet by mouth 2 times daily     carvedilol 12.5 MG tablet  Commonly known as: COREG  Take 1 tablet by mouth 2 times daily (with meals)     pantoprazole 40 MG tablet  Commonly known as: PROTONIX  Take 1 tablet by mouth every morning (before breakfast)     sertraline 100 MG tablet  Commonly known as: ZOLOFT  Take 1.5 tablets by mouth at bedtime     sucralfate 1 GM tablet  Commonly known as: Carafate  Take 1 tablet by mouth 4 times daily            STOP taking these medications      magnesium oxide 400 (240 Mg) MG tablet  Commonly known as: MAG-OX     memantine 5 MG tablet  Commonly known as: NAMENDA               Where to Get Your Medications        These medications were sent to [address] [place] [address]      Phone: [phone]   gabapentin 100 MG capsule  mupirocin 2 % ointment           Note that more than 30 minutes was spent in preparing discharge papers, discussing discharge with patient, medication review, etc.    Signed:  Electronically signed by Raymond Barkley MD on 3/3/2023 at 1:49 PM

## 2023-03-03 NOTE — PROGRESS NOTES
Physical Therapy  Facility/Department: [facility]  Physical Therapy Treatment Note    Name: Padmini Dean  : 1945  MRN: [id number]  Date of Service: 3/3/2023           Patient Diagnosis(es): The primary encounter diagnosis was Gastrointestinal hemorrhage, unspecified gastrointestinal hemorrhage type. Diagnoses of Altered mental status, unspecified altered mental status type and Folliculitis were also pertinent to this visit. Past Medical History:  has a past medical history of Arthritis, benign breast, CKD (chronic kidney disease), COPD (chronic obstructive pulmonary disease) ([facility] Hyperlipidemia, Hypertension, MS (multiple sclerosis) ([facility] and Multiple sclerosis ([facility] Past Surgical History:  has a past surgical history that includes Hysterectomy; Breast surgery (2012); Appendectomy; Knee Arthroplasty (Left, 10/01/2013); Knee Arthroplasty (Right, 2012); Hip Arthroplasty (Right, 2011); Total hip arthroplasty (Left, 3/25/2022); and Upper gastrointestinal endoscopy (N/A, 2022). Requires PT Follow-Up: Yes      Evaluating Therapist: Solomon Rivera PT          Referring Provider: Joaquin Díaz MD     PT order : PT eval and treat      Room #:518  DIAGNOSIS:  The primary encounter diagnosis was Gastrointestinal hemorrhage, unspecified gastrointestinal hemorrhage type. Diagnoses of Altered mental status, unspecified altered mental status type and Folliculitis were also pertinent to this visit. PRECAUTIONS: falls     Social:  Pt lives with  spouse  in a  1  floor plan  and ramp  to enter. Prior to admission pt walked with  rollator. Reports home O2, wears at night         Initial Evaluation  Date:  2023   Treatment  3/3/2023    Short Term/ Long Term   Goals   Was pt agreeable to Eval/treatment? Yes  yes      Does pt have pain?  None reported  No complaints      Bed Mobility  Rolling:  NT   Supine to sit:  SBA   Sit to supine: SBA   Scooting: independent in sit Supine to sit: SBA  Scooting: SBA seated to EOB  Independent    Transfers Sit to stand:  CGA  Stand to sit:  CGA  Stand pivot:  NT  Sit <> stand: SBA  Independent    Ambulation     15  and 25 feet x 1 with ww  with  CGA 75 feet x 1 using WW for support SBA for balance 150  feet with  ww  with  independent          Stair negotiation: ascended and descended NT    N/A    LE ROM  WFL       LE strength  4-/5    4/ 5    AM- PAC RAW score  16/ 24  17/24          Pt is alert and able to follow instruction. Balance: fair dynamic using [address] for support    Pt performed therapeutic exercise of the following: NT    Patient education/treatment  Pt was educated on slow steady gait    Patient response to education:   Pt verbalized understanding Pt demonstrated skill Pt requires further education in this area   yes With instruction yes     ASSESSMENT:   Comments: Nurse ok with Rx. Pt sat EOB SBA for balance. Gait remains slow and consistent, no loss of balance, shortness of breath or dizziness noted. Pt fatigued after activity. Pt was left in a bedside chair with call light in reach    Chair/bed alarm: chair alarm active    Time in 1103  Time out 1118   Total Treatment Time 15 minutes   CPT codes:     Therapeutic activities 37421 15 minutes   Therapeutic exercises 91461 0 minutes       Pt is making fair progress toward established Physical Therapy goals. Continue with physical therapy current plan of care.     Melanee Nyhan PTA   License Number: [id number]

## 2023-03-03 NOTE — CARE COORDINATION
Social Work discharge [id number] [facility] contacted Salena with [employer] re: precert status. She advised precert remains pending. Electronically signed by Casi Krishnamurthy on 3/3/2023 at 11:07 AM     Addendum-    Kajal spoke to pt and daughter Lazaro Turpin about pending snf approval with pt's insurance. Both agree that IF precert not approved today by 4, then pt will go home today. Pt is active with [facility]. Kajal notified Crissy with [facility] that pt may go home today. [facility] to resume [facility] at discharge if goes home, not to snf today. Electronically signed by Casi Krishnamurthy on 3/3/2023 at 11:40 AM     Addendum-    Noted pt' PT AMPAC of 17 today. Pt wants home. Per daughter's request, Kajal spoke to Crissy with [facility]. Crissy advised [facility] CAN [facility] at home either tomorrow or Sunday. Kajal notified charge LUCAS Rojas. Need resume hhc order. Electronically signed by Casi Krishnamurthy on 3/3/2023 at 1:12 PM     Addendum-    Per Crissy with [facility], they will see pt at home tomorrow.   Electronically signed by Casi Krishnamurthy on 3/3/2023 at 1:14 PM

## 2023-03-03 NOTE — PROGRESS NOTES
CLINICAL PHARMACY NOTE: MEDS TO BEDS    Total # of Prescriptions Filled: 2   The following medications were delivered to the patient:  Gabapentin 100 mg  Mupirocin 2%    Additional Documentation:

## 2023-03-04 LAB
CULTURE, BLOOD 2: ABNORMAL
ORGANISM: ABNORMAL

## 2023-03-06 ENCOUNTER — TELEPHONE (OUTPATIENT)
Dept: FAMILY MEDICINE CLINIC | Age: 78
End: 2023-03-06

## 2023-03-06 RX ORDER — GABAPENTIN 400 MG/1
CAPSULE ORAL
Qty: 90 CAPSULE | Refills: 0 | Status: SHIPPED | OUTPATIENT
Start: 2023-03-06 | End: 2023-04-05

## 2023-03-06 NOTE — TELEPHONE ENCOUNTER
Pt states she was taking gabapentin 400 MG 3 times a day. The hospital sent in only 100 MG 3 times a day. The pt would like a new script for the correct dosage called in.

## 2023-03-07 ENCOUNTER — TELEPHONE (OUTPATIENT)
Dept: PRIMARY CARE CLINIC | Age: 78
End: 2023-03-07

## 2023-03-07 NOTE — TELEPHONE ENCOUNTER
Care Transitions Initial Follow Up Call    Outreach made within 2 business days of discharge: No    Patient: Betty Arellano Patient : 1945   MRN: [id number]  Reason for Admission: There are no discharge diagnoses documented for the most recent discharge. Discharge Date: 3/3/23       Spoke with: Izabella Linder    Discharge department/facility: [facility] Interactive Patient Contact:  Was patient able to fill all prescriptions: Yes  Was patient instructed to bring all medications to the follow-up visit: Yes  Is patient taking all medications as directed in the discharge summary? Yes  Does patient understand their discharge instructions: Yes  Does patient have questions or concerns that need addressed prior to 7-14 day follow up office visit: no    Patient declined to make appointment at this time states she was just here and feels she does not need to come in at this time.      Follow Up  Future Appointments   Date Time Provider Lizeth Acuña   2023  1:45 PM Matthew Pérez DO [address]

## 2023-03-09 RX ORDER — MIRTAZAPINE 15 MG/1
15 TABLET, FILM COATED ORAL NIGHTLY
Qty: 90 TABLET | Refills: 1 | Status: SHIPPED | OUTPATIENT
Start: 2023-03-09

## 2023-03-13 NOTE — PROGRESS NOTES
Physician Progress Note      Cassandra Alba  CSN #:                  [id number]  :                       1945  ADMIT DATE:       2023 6:59 PM  [address] DATE:        3/3/2023 3:02 PM  RESPONDING  PROVIDER #:        Shaina Purcell MD          QUERY TEXT:    Patient admitted with UTI. Documentation reflects sepsis in note dated . If possible, please document in the progress notes and discharge summary if   sepsis was: The medical record reflects the following:  Risk Factors: UTI  Clinical Indicators: WBC 11.9, BP 88/66, , lactic 1.1, per H&P \". Monika Ford She   does meet sepsis criteria. Monika Ford \"  Treatment: IV Cefepime    Thank you,  Saeid SCHRADER, RN, CDIS  Clinical Documentation Integrity  [email or web address]  Options provided:  -- Sepsis confirmed after study as evidenced by, please document evidence.   -- Sepsis ruled out after study  -- Other - I will add my own diagnosis  -- Disagree - Not applicable / Not valid  -- Disagree - Clinically unable to determine / Unknown  -- Refer to Clinical Documentation Reviewer    PROVIDER RESPONSE TEXT:    Sepsis confirmed after study as evidenced by    Query created by: Eugenio Kumar on 3/7/2023 11:15 AM      Electronically signed by:  Shaina Purcell MD 3/13/2023 7:22 AM

## 2023-03-15 ENCOUNTER — TELEPHONE (OUTPATIENT)
Dept: PRIMARY CARE CLINIC | Age: 78
End: 2023-03-15

## 2023-03-15 DIAGNOSIS — R26.81 GAIT INSTABILITY: Primary | ICD-10-CM

## 2023-03-21 RX ORDER — BACLOFEN 10 MG/1
10 TABLET ORAL 2 TIMES DAILY
Qty: 60 TABLET | Refills: 5 | Status: ON HOLD | OUTPATIENT
Start: 2023-03-21

## 2023-03-21 RX ORDER — PANTOPRAZOLE SODIUM 40 MG/1
40 TABLET, DELAYED RELEASE ORAL
Qty: 30 TABLET | Refills: 5 | Status: ON HOLD | OUTPATIENT
Start: 2023-03-21

## 2023-03-26 ENCOUNTER — APPOINTMENT (OUTPATIENT)
Dept: GENERAL RADIOLOGY | Age: 78
End: 2023-03-26
Payer: MEDICARE

## 2023-03-26 ENCOUNTER — HOSPITAL ENCOUNTER (INPATIENT)
Age: 78
LOS: 11 days | Discharge: NURSING, REHAB OR LONG-TERM CARE | End: 2023-04-06
Attending: EMERGENCY MEDICINE | Admitting: INTERNAL MEDICINE
Payer: MEDICARE

## 2023-03-26 DIAGNOSIS — J18.9 PNEUMONIA OF RIGHT LUNG DUE TO INFECTIOUS ORGANISM, UNSPECIFIED PART OF LUNG: ICD-10-CM

## 2023-03-26 DIAGNOSIS — J96.01 ACUTE RESPIRATORY FAILURE WITH HYPOXIA (HCC): Primary | ICD-10-CM

## 2023-03-26 DIAGNOSIS — S92.302K: ICD-10-CM

## 2023-03-26 LAB
ALBUMIN SERPL-MCNC: 3.5 G/DL (ref 3.5–5.2)
ALP SERPL-CCNC: 72 U/L (ref 35–104)
ALT SERPL-CCNC: 5 U/L (ref 0–32)
ANION GAP SERPL CALCULATED.3IONS-SCNC: 12 MMOL/L (ref 7–16)
AST SERPL-CCNC: 12 U/L (ref 0–31)
B.E.: 0.7 MMOL/L (ref -3–3)
BASOPHILS # BLD: 0.04 E9/L (ref 0–0.2)
BASOPHILS NFR BLD: 0.6 % (ref 0–2)
BILIRUB SERPL-MCNC: 0.5 MG/DL (ref 0–1.2)
BNP BLD-MCNC: 5494 PG/ML (ref 0–450)
BUN SERPL-MCNC: 25 MG/DL (ref 6–23)
CALCIUM SERPL-MCNC: 8.8 MG/DL (ref 8.6–10.2)
CHLORIDE SERPL-SCNC: 101 MMOL/L (ref 98–107)
CO2 SERPL-SCNC: 27 MMOL/L (ref 22–29)
COHB: 6.5 % (ref 0–1.5)
CREAT SERPL-MCNC: 1.3 MG/DL (ref 0.5–1)
CRITICAL: ABNORMAL
D DIMER: 560 NG/ML DDU
DATE ANALYZED: ABNORMAL
DATE OF COLLECTION: ABNORMAL
EKG ATRIAL RATE: 81 BPM
EKG P AXIS: 46 DEGREES
EKG P-R INTERVAL: 150 MS
EKG Q-T INTERVAL: 392 MS
EKG QRS DURATION: 72 MS
EKG QTC CALCULATION (BAZETT): 455 MS
EKG R AXIS: 3 DEGREES
EKG T AXIS: 9 DEGREES
EKG VENTRICULAR RATE: 81 BPM
EOSINOPHIL # BLD: 0.05 E9/L (ref 0.05–0.5)
EOSINOPHIL NFR BLD: 0.7 % (ref 0–6)
ERYTHROCYTE [DISTWIDTH] IN BLOOD BY AUTOMATED COUNT: 16.3 FL (ref 11.5–15)
GLUCOSE SERPL-MCNC: 110 MG/DL (ref 74–99)
HCO3: 26.1 MMOL/L (ref 22–26)
HCT VFR BLD AUTO: 27.2 % (ref 34–48)
HGB BLD-MCNC: 8.4 G/DL (ref 11.5–15.5)
HHB: 11.8 % (ref 0–5)
IMM GRANULOCYTES # BLD: 0.02 E9/L
IMM GRANULOCYTES NFR BLD: 0.3 % (ref 0–5)
INFLUENZA A BY PCR: NOT DETECTED
INFLUENZA B BY PCR: NOT DETECTED
LAB: ABNORMAL
LACTATE BLDV-SCNC: 1.3 MMOL/L (ref 0.5–2.2)
LYMPHOCYTES # BLD: 1.27 E9/L (ref 1.5–4)
LYMPHOCYTES NFR BLD: 19 % (ref 20–42)
Lab: ABNORMAL
MAGNESIUM SERPL-MCNC: 1.7 MG/DL (ref 1.6–2.6)
MCH RBC QN AUTO: 27.3 PG (ref 26–35)
MCHC RBC AUTO-ENTMCNC: 30.9 % (ref 32–34.5)
MCV RBC AUTO: 88.3 FL (ref 80–99.9)
METER GLUCOSE: 150 MG/DL (ref 74–99)
METHB: 0.3 % (ref 0–1.5)
MODE: ABNORMAL
MONOCYTES # BLD: 0.63 E9/L (ref 0.1–0.95)
MONOCYTES NFR BLD: 9.4 % (ref 2–12)
NEUTROPHILS # BLD: 4.68 E9/L (ref 1.8–7.3)
NEUTS SEG NFR BLD: 70 % (ref 43–80)
O2 CONTENT: 10.5 ML/DL
O2 SATURATION: 87.3 % (ref 92–98.5)
O2HB: 81.4 % (ref 94–97)
OPERATOR ID: 3114
PATIENT TEMP: 37 C
PCO2: 45.6 MMHG (ref 35–45)
PH BLOOD GAS: 7.38 (ref 7.35–7.45)
PLATELET # BLD AUTO: 269 E9/L (ref 130–450)
PMV BLD AUTO: 9.7 FL (ref 7–12)
PO2: 57.6 MMHG (ref 75–100)
POTASSIUM SERPL-SCNC: 3.3 MMOL/L (ref 3.5–5)
PROT SERPL-MCNC: 6.7 G/DL (ref 6.4–8.3)
RBC # BLD AUTO: 3.08 E12/L (ref 3.5–5.5)
SARS-COV-2 RDRP RESP QL NAA+PROBE: NOT DETECTED
SODIUM SERPL-SCNC: 140 MMOL/L (ref 132–146)
SOURCE, BLOOD GAS: ABNORMAL
THB: 9.1 G/DL (ref 11.5–16.5)
TIME ANALYZED: 1354
TROPONIN, HIGH SENSITIVITY: 33 NG/L (ref 0–9)
TROPONIN, HIGH SENSITIVITY: 37 NG/L (ref 0–9)
WBC # BLD: 6.7 E9/L (ref 4.5–11.5)

## 2023-03-26 PROCEDURE — 6360000002 HC RX W HCPCS

## 2023-03-26 PROCEDURE — 80053 COMPREHEN METABOLIC PANEL: CPT

## 2023-03-26 PROCEDURE — 2500000003 HC RX 250 WO HCPCS

## 2023-03-26 PROCEDURE — 83540 ASSAY OF IRON: CPT

## 2023-03-26 PROCEDURE — 87635 SARS-COV-2 COVID-19 AMP PRB: CPT

## 2023-03-26 PROCEDURE — 93010 ELECTROCARDIOGRAM REPORT: CPT | Performed by: INTERNAL MEDICINE

## 2023-03-26 PROCEDURE — 99285 EMERGENCY DEPT VISIT HI MDM: CPT

## 2023-03-26 PROCEDURE — 82805 BLOOD GASES W/O2 SATURATION: CPT

## 2023-03-26 PROCEDURE — 2580000003 HC RX 258: Performed by: INTERNAL MEDICINE

## 2023-03-26 PROCEDURE — 83550 IRON BINDING TEST: CPT

## 2023-03-26 PROCEDURE — 2580000003 HC RX 258

## 2023-03-26 PROCEDURE — 85025 COMPLETE CBC W/AUTO DIFF WBC: CPT

## 2023-03-26 PROCEDURE — 73620 X-RAY EXAM OF FOOT: CPT

## 2023-03-26 PROCEDURE — 73562 X-RAY EXAM OF KNEE 3: CPT

## 2023-03-26 PROCEDURE — 6370000000 HC RX 637 (ALT 250 FOR IP): Performed by: INTERNAL MEDICINE

## 2023-03-26 PROCEDURE — 6360000002 HC RX W HCPCS: Performed by: INTERNAL MEDICINE

## 2023-03-26 PROCEDURE — 1200000000 HC SEMI PRIVATE

## 2023-03-26 PROCEDURE — 82962 GLUCOSE BLOOD TEST: CPT

## 2023-03-26 PROCEDURE — 94664 DEMO&/EVAL PT USE INHALER: CPT

## 2023-03-26 PROCEDURE — 6370000000 HC RX 637 (ALT 250 FOR IP)

## 2023-03-26 PROCEDURE — 93005 ELECTROCARDIOGRAM TRACING: CPT

## 2023-03-26 PROCEDURE — 84145 PROCALCITONIN (PCT): CPT

## 2023-03-26 PROCEDURE — 87502 INFLUENZA DNA AMP PROBE: CPT

## 2023-03-26 PROCEDURE — 83735 ASSAY OF MAGNESIUM: CPT

## 2023-03-26 PROCEDURE — 0202U NFCT DS 22 TRGT SARS-COV-2: CPT

## 2023-03-26 PROCEDURE — 96374 THER/PROPH/DIAG INJ IV PUSH: CPT

## 2023-03-26 PROCEDURE — 83880 ASSAY OF NATRIURETIC PEPTIDE: CPT

## 2023-03-26 PROCEDURE — 94640 AIRWAY INHALATION TREATMENT: CPT

## 2023-03-26 PROCEDURE — 83605 ASSAY OF LACTIC ACID: CPT

## 2023-03-26 PROCEDURE — 2700000000 HC OXYGEN THERAPY PER DAY

## 2023-03-26 PROCEDURE — 94660 CPAP INITIATION&MGMT: CPT

## 2023-03-26 PROCEDURE — 84484 ASSAY OF TROPONIN QUANT: CPT

## 2023-03-26 PROCEDURE — 36415 COLL VENOUS BLD VENIPUNCTURE: CPT

## 2023-03-26 PROCEDURE — 99223 1ST HOSP IP/OBS HIGH 75: CPT | Performed by: INTERNAL MEDICINE

## 2023-03-26 PROCEDURE — 82728 ASSAY OF FERRITIN: CPT

## 2023-03-26 PROCEDURE — 71045 X-RAY EXAM CHEST 1 VIEW: CPT

## 2023-03-26 PROCEDURE — 85378 FIBRIN DEGRADE SEMIQUANT: CPT

## 2023-03-26 RX ORDER — BACLOFEN 10 MG/1
10 TABLET ORAL 2 TIMES DAILY
Status: DISCONTINUED | OUTPATIENT
Start: 2023-03-26 | End: 2023-04-06 | Stop reason: HOSPADM

## 2023-03-26 RX ORDER — IPRATROPIUM BROMIDE AND ALBUTEROL SULFATE 2.5; .5 MG/3ML; MG/3ML
3 SOLUTION RESPIRATORY (INHALATION) ONCE
Status: COMPLETED | OUTPATIENT
Start: 2023-03-26 | End: 2023-03-26

## 2023-03-26 RX ORDER — MIRTAZAPINE 15 MG/1
15 TABLET, FILM COATED ORAL NIGHTLY
Status: DISCONTINUED | OUTPATIENT
Start: 2023-03-26 | End: 2023-04-06 | Stop reason: HOSPADM

## 2023-03-26 RX ORDER — FOLIC ACID 1 MG/1
1 TABLET ORAL DAILY
Status: DISCONTINUED | OUTPATIENT
Start: 2023-03-26 | End: 2023-04-06 | Stop reason: HOSPADM

## 2023-03-26 RX ORDER — ACETAMINOPHEN 650 MG/1
650 SUPPOSITORY RECTAL EVERY 6 HOURS PRN
Status: DISCONTINUED | OUTPATIENT
Start: 2023-03-26 | End: 2023-04-06 | Stop reason: HOSPADM

## 2023-03-26 RX ORDER — POTASSIUM CHLORIDE 20 MEQ/1
20 TABLET, EXTENDED RELEASE ORAL 2 TIMES DAILY
Status: COMPLETED | OUTPATIENT
Start: 2023-03-27 | End: 2023-03-27

## 2023-03-26 RX ORDER — ACETAMINOPHEN 325 MG/1
650 TABLET ORAL EVERY 6 HOURS PRN
Status: DISCONTINUED | OUTPATIENT
Start: 2023-03-26 | End: 2023-04-06 | Stop reason: HOSPADM

## 2023-03-26 RX ORDER — GABAPENTIN 100 MG/1
100 CAPSULE ORAL 3 TIMES DAILY
Status: DISCONTINUED | OUTPATIENT
Start: 2023-03-26 | End: 2023-04-06 | Stop reason: HOSPADM

## 2023-03-26 RX ORDER — MAGNESIUM SULFATE 1 G/100ML
1000 INJECTION INTRAVENOUS ONCE
Status: COMPLETED | OUTPATIENT
Start: 2023-03-26 | End: 2023-03-26

## 2023-03-26 RX ORDER — BUDESONIDE 0.5 MG/2ML
0.5 INHALANT ORAL 2 TIMES DAILY
Status: DISCONTINUED | OUTPATIENT
Start: 2023-03-26 | End: 2023-04-06 | Stop reason: HOSPADM

## 2023-03-26 RX ORDER — LANOLIN ALCOHOL/MO/W.PET/CERES
1000 CREAM (GRAM) TOPICAL DAILY
Status: DISCONTINUED | OUTPATIENT
Start: 2023-03-26 | End: 2023-04-06 | Stop reason: HOSPADM

## 2023-03-26 RX ORDER — POTASSIUM CHLORIDE 20 MEQ/1
20 TABLET, EXTENDED RELEASE ORAL ONCE
Status: COMPLETED | OUTPATIENT
Start: 2023-03-26 | End: 2023-03-26

## 2023-03-26 RX ORDER — ATORVASTATIN CALCIUM 20 MG/1
20 TABLET, FILM COATED ORAL NIGHTLY
Status: DISCONTINUED | OUTPATIENT
Start: 2023-03-26 | End: 2023-03-26

## 2023-03-26 RX ORDER — ARFORMOTEROL TARTRATE 15 UG/2ML
15 SOLUTION RESPIRATORY (INHALATION) 2 TIMES DAILY
Status: DISCONTINUED | OUTPATIENT
Start: 2023-03-26 | End: 2023-04-06 | Stop reason: HOSPADM

## 2023-03-26 RX ORDER — POLYETHYLENE GLYCOL 3350 17 G/17G
17 POWDER, FOR SOLUTION ORAL DAILY PRN
Status: DISCONTINUED | OUTPATIENT
Start: 2023-03-26 | End: 2023-04-06 | Stop reason: HOSPADM

## 2023-03-26 RX ORDER — ONDANSETRON 4 MG/1
4 TABLET, ORALLY DISINTEGRATING ORAL EVERY 8 HOURS PRN
Status: DISCONTINUED | OUTPATIENT
Start: 2023-03-26 | End: 2023-04-06 | Stop reason: HOSPADM

## 2023-03-26 RX ORDER — DEXTROSE MONOHYDRATE 100 MG/ML
INJECTION, SOLUTION INTRAVENOUS CONTINUOUS PRN
Status: DISCONTINUED | OUTPATIENT
Start: 2023-03-26 | End: 2023-04-06 | Stop reason: HOSPADM

## 2023-03-26 RX ORDER — MEMANTINE HYDROCHLORIDE 5 MG/1
5 TABLET ORAL 2 TIMES DAILY
Status: DISCONTINUED | OUTPATIENT
Start: 2023-03-26 | End: 2023-04-06 | Stop reason: HOSPADM

## 2023-03-26 RX ORDER — CARVEDILOL 6.25 MG/1
12.5 TABLET ORAL 2 TIMES DAILY WITH MEALS
Status: DISCONTINUED | OUTPATIENT
Start: 2023-03-26 | End: 2023-04-06 | Stop reason: HOSPADM

## 2023-03-26 RX ORDER — SODIUM CHLORIDE 0.9 % (FLUSH) 0.9 %
5-40 SYRINGE (ML) INJECTION PRN
Status: DISCONTINUED | OUTPATIENT
Start: 2023-03-26 | End: 2023-04-06 | Stop reason: HOSPADM

## 2023-03-26 RX ORDER — PANTOPRAZOLE SODIUM 40 MG/1
40 TABLET, DELAYED RELEASE ORAL
Status: DISCONTINUED | OUTPATIENT
Start: 2023-03-27 | End: 2023-04-06 | Stop reason: HOSPADM

## 2023-03-26 RX ORDER — IPRATROPIUM BROMIDE AND ALBUTEROL SULFATE 2.5; .5 MG/3ML; MG/3ML
1 SOLUTION RESPIRATORY (INHALATION)
Status: DISCONTINUED | OUTPATIENT
Start: 2023-03-26 | End: 2023-03-28

## 2023-03-26 RX ORDER — SODIUM CHLORIDE 0.9 % (FLUSH) 0.9 %
5-40 SYRINGE (ML) INJECTION EVERY 12 HOURS SCHEDULED
Status: DISCONTINUED | OUTPATIENT
Start: 2023-03-26 | End: 2023-04-06 | Stop reason: HOSPADM

## 2023-03-26 RX ORDER — SODIUM CHLORIDE 9 MG/ML
INJECTION, SOLUTION INTRAVENOUS PRN
Status: DISCONTINUED | OUTPATIENT
Start: 2023-03-26 | End: 2023-04-06 | Stop reason: HOSPADM

## 2023-03-26 RX ORDER — ENOXAPARIN SODIUM 100 MG/ML
40 INJECTION SUBCUTANEOUS DAILY
Status: DISCONTINUED | OUTPATIENT
Start: 2023-03-26 | End: 2023-04-06 | Stop reason: HOSPADM

## 2023-03-26 RX ORDER — ATORVASTATIN CALCIUM 40 MG/1
40 TABLET, FILM COATED ORAL NIGHTLY
Status: DISCONTINUED | OUTPATIENT
Start: 2023-03-26 | End: 2023-04-06 | Stop reason: HOSPADM

## 2023-03-26 RX ORDER — ONDANSETRON 2 MG/ML
4 INJECTION INTRAMUSCULAR; INTRAVENOUS EVERY 6 HOURS PRN
Status: DISCONTINUED | OUTPATIENT
Start: 2023-03-26 | End: 2023-04-06 | Stop reason: HOSPADM

## 2023-03-26 RX ORDER — AMLODIPINE BESYLATE 5 MG/1
5 TABLET ORAL DAILY
Status: DISCONTINUED | OUTPATIENT
Start: 2023-03-26 | End: 2023-04-06 | Stop reason: HOSPADM

## 2023-03-26 RX ADMIN — BUDESONIDE 500 MCG: 0.5 SUSPENSION RESPIRATORY (INHALATION) at 20:02

## 2023-03-26 RX ADMIN — ATORVASTATIN CALCIUM 40 MG: 40 TABLET, FILM COATED ORAL at 20:45

## 2023-03-26 RX ADMIN — SERTRALINE 150 MG: 100 TABLET, FILM COATED ORAL at 20:45

## 2023-03-26 RX ADMIN — DOXYCYCLINE 100 MG: 100 INJECTION, POWDER, LYOPHILIZED, FOR SOLUTION INTRAVENOUS at 14:28

## 2023-03-26 RX ADMIN — MIRTAZAPINE 15 MG: 15 TABLET, FILM COATED ORAL at 20:48

## 2023-03-26 RX ADMIN — ARFORMOTEROL TARTRATE 15 MCG: 15 SOLUTION RESPIRATORY (INHALATION) at 20:02

## 2023-03-26 RX ADMIN — Medication 10 ML: at 20:48

## 2023-03-26 RX ADMIN — MEMANTINE HYDROCHLORIDE 5 MG: 5 TABLET, FILM COATED ORAL at 20:45

## 2023-03-26 RX ADMIN — BACLOFEN 10 MG: 10 TABLET ORAL at 20:45

## 2023-03-26 RX ADMIN — IPRATROPIUM BROMIDE AND ALBUTEROL SULFATE 1 AMPULE: 2.5; .5 SOLUTION RESPIRATORY (INHALATION) at 20:02

## 2023-03-26 RX ADMIN — CEFTRIAXONE SODIUM 2000 MG: 2 INJECTION, POWDER, FOR SOLUTION INTRAMUSCULAR; INTRAVENOUS at 14:16

## 2023-03-26 RX ADMIN — MAGNESIUM SULFATE IN DEXTROSE 1000 MG: 10 INJECTION, SOLUTION INTRAVENOUS at 21:01

## 2023-03-26 RX ADMIN — IPRATROPIUM BROMIDE AND ALBUTEROL SULFATE 3 AMPULE: 2.5; .5 SOLUTION RESPIRATORY (INHALATION) at 13:07

## 2023-03-26 RX ADMIN — POTASSIUM CHLORIDE 20 MEQ: 1500 TABLET, EXTENDED RELEASE ORAL at 14:15

## 2023-03-26 RX ADMIN — GABAPENTIN 100 MG: 100 CAPSULE ORAL at 20:45

## 2023-03-26 ASSESSMENT — ENCOUNTER SYMPTOMS
COUGH: 1
DIARRHEA: 0
CONSTIPATION: 0
VOMITING: 0
SHORTNESS OF BREATH: 1
BACK PAIN: 0
ABDOMINAL PAIN: 0
BLOOD IN STOOL: 0
NAUSEA: 0

## 2023-03-26 ASSESSMENT — PAIN - FUNCTIONAL ASSESSMENT
PAIN_FUNCTIONAL_ASSESSMENT: NONE - DENIES PAIN
PAIN_FUNCTIONAL_ASSESSMENT: NONE - DENIES PAIN

## 2023-03-26 ASSESSMENT — LIFESTYLE VARIABLES
HOW MANY STANDARD DRINKS CONTAINING ALCOHOL DO YOU HAVE ON A TYPICAL DAY: PATIENT DOES NOT DRINK
HOW OFTEN DO YOU HAVE A DRINK CONTAINING ALCOHOL: NEVER

## 2023-03-26 NOTE — ED NOTES
ED to Inpatient Handoff Report    Notified Emi Oshea  that electronic handoff available and patient ready for transport to room 613. Safety Risks: no}    Patient in Restraints: no    Constant Observer or Patient : no    Telemetry Monitoring Ordered :yes           Order to transfer to unit without monitor: no      Last MEWS: 0 Time completed:1509    Vitals:    03/26/23 1308 03/26/23 1309 03/26/23 1417 03/26/23 1509   BP:    (!) 135/58   Pulse: 82 82  80   Resp: 14 13 17 11   Temp:    97.8 °F (36.6 °C)   TempSrc:       SpO2: 95% 94%  96%   Weight:       Height:           Opportunity for questions and clarification was provided.          Danay Serra RN  03/26/23 [id number]

## 2023-03-26 NOTE — H&P
Component Value Date/Time    FOLATE 4.6 01/20/2023 04:00 PM     IRON:    Lab Results   Component Value Date/Time    IRON 36 12/20/2018 03:15 AM     Iron Saturation:  No components found for: PERCENTFE  TIBC:    Lab Results   Component Value Date/Time    TIBC 267 12/20/2018 03:15 AM     FERRITIN:    Lab Results   Component Value Date/Time    FERRITIN 46 12/20/2018 11:10 AM       Radiology:   XR FOOT LEFT (2 VIEWS)   Final Result   Age indeterminate nondisplaced fracture deformities of the distal 4th and 5th   metatarsals. Transverse nondisplaced fracture of the small toe proximal phalanx. XR CHEST PORTABLE   Final Result   Mid and lower lung predominant pulmonary opacifications right greater than   left of overall confluence appearance increased from prior comparison of   worsening airspace disease such as bronchopneumonia without large effusion. 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   Moderate right and trace to small left suprapatellar joint effusions             EKG: Normal sinus rhythm  Low voltage QRS  ST & T wave abnormality, consider anterior ischemia    ASSESSMENT:      Principal Problem:  Mechanical fall with nondisplaced fracture deformities of the distal 4th and 5th metatarsals  Transverse nondisplaced fracture of the small toe proximal phalanx  Acute respiratory failure with hypoxia (HCC), multifactorial, 2/2 COPD exacerbation versus community-acquired pneumonia  Community acquired pneumonia of left lower lobe of lung  History of COPD on 3 L oxygen at night  Current smoker  Hypertension  Hyperlipidemia  Hypokalemia  CKD stage III, baseline creatinine 1.2-1.3  Elevated proBNP  Chronic

## 2023-03-26 NOTE — ED PROVIDER NOTES
disease such as bronchopneumonia without large effusion. Mid and lower lung predominant pulmonary opacifications right greater than left of overall confluence appearance increased from prior comparison of worsening airspace disease such as bronchopneumonia without large effusion. No results found. Procedures:      ------------------------- NURSING NOTES AND VITALS REVIEWED ---------------------------   The nursing notes within the ED encounter and vital signs as below have been reviewed by myself and ED attending. BP (!) 112/54   Pulse 91   Temp 98.3 °F (36.8 °C) (Oral)   Resp 22   Ht 5' 2\" (1.575 m)   Wt 135 lb (61.2 kg)   SpO2 94%   BMI 24.69 kg/m²   Oxygen Saturation Interpretation: abnormal    The patients available past medical records and past encounters were reviewed by myself and ED attending        ------------------------------ ED COURSE/MEDICAL DECISION MAKING----------------------    Medical Decision Making/Differential Diagnosis:    CC/HPI Summary, Pertinent Physical Exam Findings, Social Determinants of health, Records Reviewed, DDx, testing done/not done, ED Course, Reassessment, disposition considerations/shared decision making with patient, consults, disposition:      Medical Decision Making/ED COURSE:    Chronic Conditions affecting care:    has a past medical history of Arthritis, benign breast, CKD (chronic kidney disease), COPD (chronic obstructive pulmonary disease) ([facility] Hyperlipidemia, Hypertension, MS (multiple sclerosis) ([facility] (05/31/2012), and Multiple sclerosis ([facility] Myself and ED attending interpreted findings during patient's stay. Vital signs BP (!) 112/54   Pulse 91   Temp 98.3 °F (36.8 °C) (Oral)   Resp 22   Ht 5' 2\" (1.575 m)   Wt 135 lb (61.2 kg)   SpO2 94%   BMI 24.69 kg/m²   Patient was placed on the cardiac monitor. Rhythm - Sinus.  While in the ED patient was hypoxic requiring 4-5 L oxygen, she was otherwise hemodynamically stable,

## 2023-03-27 PROBLEM — J15.9 BACTERIAL PNEUMONIA: Status: ACTIVE | Noted: 2023-03-27

## 2023-03-27 LAB
ANION GAP SERPL CALCULATED.3IONS-SCNC: 8 MMOL/L (ref 7–16)
B PARAP IS1001 DNA NPH QL NAA+NON-PROBE: NOT DETECTED
B PERT.PT PRMT NPH QL NAA+NON-PROBE: NOT DETECTED
BASOPHILS # BLD: 0.03 E9/L (ref 0–0.2)
BASOPHILS NFR BLD: 0.4 % (ref 0–2)
BUN SERPL-MCNC: 26 MG/DL (ref 6–23)
C PNEUM DNA NPH QL NAA+NON-PROBE: NOT DETECTED
CALCIUM SERPL-MCNC: 8.3 MG/DL (ref 8.6–10.2)
CHLORIDE SERPL-SCNC: 106 MMOL/L (ref 98–107)
CO2 SERPL-SCNC: 30 MMOL/L (ref 22–29)
CREAT SERPL-MCNC: 1.3 MG/DL (ref 0.5–1)
EOSINOPHIL # BLD: 0.2 E9/L (ref 0.05–0.5)
EOSINOPHIL NFR BLD: 2.7 % (ref 0–6)
ERYTHROCYTE [DISTWIDTH] IN BLOOD BY AUTOMATED COUNT: 16.3 FL (ref 11.5–15)
FERRITIN SERPL-MCNC: 39 NG/ML
FLUAV RNA NPH QL NAA+NON-PROBE: NOT DETECTED
FLUBV RNA NPH QL NAA+NON-PROBE: NOT DETECTED
GLUCOSE SERPL-MCNC: 122 MG/DL (ref 74–99)
HADV DNA NPH QL NAA+NON-PROBE: NOT DETECTED
HCOV 229E RNA NPH QL NAA+NON-PROBE: NOT DETECTED
HCOV HKU1 RNA NPH QL NAA+NON-PROBE: NOT DETECTED
HCOV NL63 RNA NPH QL NAA+NON-PROBE: NOT DETECTED
HCOV OC43 RNA NPH QL NAA+NON-PROBE: NOT DETECTED
HCT VFR BLD AUTO: 24.3 % (ref 34–48)
HGB BLD-MCNC: 7.3 G/DL (ref 11.5–15.5)
HMPV RNA NPH QL NAA+NON-PROBE: NOT DETECTED
HPIV1 RNA NPH QL NAA+NON-PROBE: NOT DETECTED
HPIV2 RNA NPH QL NAA+NON-PROBE: NOT DETECTED
HPIV3 RNA NPH QL NAA+NON-PROBE: NOT DETECTED
HPIV4 RNA NPH QL NAA+NON-PROBE: NOT DETECTED
IMM GRANULOCYTES # BLD: 0.03 E9/L
IMM GRANULOCYTES NFR BLD: 0.4 % (ref 0–5)
IRON SATN MFR SERPL: 8 % (ref 15–50)
IRON SERPL-MCNC: 27 MCG/DL (ref 37–145)
LYMPHOCYTES # BLD: 1.47 E9/L (ref 1.5–4)
LYMPHOCYTES NFR BLD: 19.7 % (ref 20–42)
M PNEUMO DNA NPH QL NAA+NON-PROBE: NOT DETECTED
MAGNESIUM SERPL-MCNC: 2.1 MG/DL (ref 1.6–2.6)
MCH RBC QN AUTO: 26.6 PG (ref 26–35)
MCHC RBC AUTO-ENTMCNC: 30 % (ref 32–34.5)
MCV RBC AUTO: 88.7 FL (ref 80–99.9)
METER GLUCOSE: 107 MG/DL (ref 74–99)
METER GLUCOSE: 126 MG/DL (ref 74–99)
METER GLUCOSE: 132 MG/DL (ref 74–99)
MONOCYTES # BLD: 0.85 E9/L (ref 0.1–0.95)
MONOCYTES NFR BLD: 11.4 % (ref 2–12)
NEUTROPHILS # BLD: 4.89 E9/L (ref 1.8–7.3)
NEUTS SEG NFR BLD: 65.4 % (ref 43–80)
PLATELET # BLD AUTO: 271 E9/L (ref 130–450)
PMV BLD AUTO: 10.2 FL (ref 7–12)
POTASSIUM SERPL-SCNC: 3.4 MMOL/L (ref 3.5–5)
PROCALCITONIN: 0.23 NG/ML (ref 0–0.08)
RBC # BLD AUTO: 2.74 E12/L (ref 3.5–5.5)
RSV RNA NPH QL NAA+NON-PROBE: NOT DETECTED
RV+EV RNA NPH QL NAA+NON-PROBE: NOT DETECTED
SARS-COV-2 RNA NPH QL NAA+NON-PROBE: NOT DETECTED
SODIUM SERPL-SCNC: 144 MMOL/L (ref 132–146)
TIBC SERPL-MCNC: 358 MCG/DL (ref 250–450)
WBC # BLD: 7.5 E9/L (ref 4.5–11.5)

## 2023-03-27 PROCEDURE — 6360000002 HC RX W HCPCS: Performed by: INTERNAL MEDICINE

## 2023-03-27 PROCEDURE — 1200000000 HC SEMI PRIVATE

## 2023-03-27 PROCEDURE — 94660 CPAP INITIATION&MGMT: CPT

## 2023-03-27 PROCEDURE — 94640 AIRWAY INHALATION TREATMENT: CPT

## 2023-03-27 PROCEDURE — 87449 NOS EACH ORGANISM AG IA: CPT

## 2023-03-27 PROCEDURE — 2700000000 HC OXYGEN THERAPY PER DAY

## 2023-03-27 PROCEDURE — 85025 COMPLETE CBC W/AUTO DIFF WBC: CPT

## 2023-03-27 PROCEDURE — 82962 GLUCOSE BLOOD TEST: CPT

## 2023-03-27 PROCEDURE — 2500000003 HC RX 250 WO HCPCS: Performed by: INTERNAL MEDICINE

## 2023-03-27 PROCEDURE — 36415 COLL VENOUS BLD VENIPUNCTURE: CPT

## 2023-03-27 PROCEDURE — 6370000000 HC RX 637 (ALT 250 FOR IP): Performed by: INTERNAL MEDICINE

## 2023-03-27 PROCEDURE — 99232 SBSQ HOSP IP/OBS MODERATE 35: CPT | Performed by: INTERNAL MEDICINE

## 2023-03-27 PROCEDURE — 80048 BASIC METABOLIC PNL TOTAL CA: CPT

## 2023-03-27 PROCEDURE — 2580000003 HC RX 258: Performed by: INTERNAL MEDICINE

## 2023-03-27 PROCEDURE — 83735 ASSAY OF MAGNESIUM: CPT

## 2023-03-27 RX ORDER — NICOTINE 21 MG/24HR
1 PATCH, TRANSDERMAL 24 HOURS TRANSDERMAL DAILY
Status: DISCONTINUED | OUTPATIENT
Start: 2023-03-27 | End: 2023-04-06 | Stop reason: HOSPADM

## 2023-03-27 RX ADMIN — FOLIC ACID 1 MG: 1 TABLET ORAL at 09:34

## 2023-03-27 RX ADMIN — ARFORMOTEROL TARTRATE 15 MCG: 15 SOLUTION RESPIRATORY (INHALATION) at 08:21

## 2023-03-27 RX ADMIN — MIRTAZAPINE 15 MG: 15 TABLET, FILM COATED ORAL at 19:45

## 2023-03-27 RX ADMIN — BACLOFEN 10 MG: 10 TABLET ORAL at 19:46

## 2023-03-27 RX ADMIN — IPRATROPIUM BROMIDE AND ALBUTEROL SULFATE 1 AMPULE: 2.5; .5 SOLUTION RESPIRATORY (INHALATION) at 08:21

## 2023-03-27 RX ADMIN — CARVEDILOL 12.5 MG: 6.25 TABLET, FILM COATED ORAL at 09:29

## 2023-03-27 RX ADMIN — Medication 10 ML: at 09:35

## 2023-03-27 RX ADMIN — IPRATROPIUM BROMIDE AND ALBUTEROL SULFATE 1 AMPULE: 2.5; .5 SOLUTION RESPIRATORY (INHALATION) at 19:57

## 2023-03-27 RX ADMIN — BACLOFEN 10 MG: 10 TABLET ORAL at 09:29

## 2023-03-27 RX ADMIN — SERTRALINE 150 MG: 100 TABLET, FILM COATED ORAL at 19:46

## 2023-03-27 RX ADMIN — MEMANTINE HYDROCHLORIDE 5 MG: 5 TABLET, FILM COATED ORAL at 19:46

## 2023-03-27 RX ADMIN — ENOXAPARIN SODIUM 40 MG: 100 INJECTION SUBCUTANEOUS at 16:23

## 2023-03-27 RX ADMIN — GABAPENTIN 100 MG: 100 CAPSULE ORAL at 14:13

## 2023-03-27 RX ADMIN — BUDESONIDE 500 MCG: 0.5 SUSPENSION RESPIRATORY (INHALATION) at 08:21

## 2023-03-27 RX ADMIN — MEMANTINE HYDROCHLORIDE 5 MG: 5 TABLET, FILM COATED ORAL at 09:33

## 2023-03-27 RX ADMIN — POTASSIUM CHLORIDE 20 MEQ: 1500 TABLET, EXTENDED RELEASE ORAL at 09:34

## 2023-03-27 RX ADMIN — AMLODIPINE BESYLATE 5 MG: 5 TABLET ORAL at 09:29

## 2023-03-27 RX ADMIN — DOXYCYCLINE 100 MG: 100 INJECTION, POWDER, LYOPHILIZED, FOR SOLUTION INTRAVENOUS at 16:23

## 2023-03-27 RX ADMIN — DOXYCYCLINE 100 MG: 100 INJECTION, POWDER, LYOPHILIZED, FOR SOLUTION INTRAVENOUS at 04:13

## 2023-03-27 RX ADMIN — IPRATROPIUM BROMIDE AND ALBUTEROL SULFATE 1 AMPULE: 2.5; .5 SOLUTION RESPIRATORY (INHALATION) at 12:11

## 2023-03-27 RX ADMIN — GABAPENTIN 100 MG: 100 CAPSULE ORAL at 19:45

## 2023-03-27 RX ADMIN — WATER 1000 MG: 1 INJECTION INTRAMUSCULAR; INTRAVENOUS; SUBCUTANEOUS at 16:08

## 2023-03-27 RX ADMIN — ARFORMOTEROL TARTRATE 15 MCG: 15 SOLUTION RESPIRATORY (INHALATION) at 19:57

## 2023-03-27 RX ADMIN — PANTOPRAZOLE SODIUM 40 MG: 40 TABLET, DELAYED RELEASE ORAL at 09:33

## 2023-03-27 RX ADMIN — BUDESONIDE 500 MCG: 0.5 SUSPENSION RESPIRATORY (INHALATION) at 19:57

## 2023-03-27 RX ADMIN — CARVEDILOL 12.5 MG: 6.25 TABLET, FILM COATED ORAL at 16:08

## 2023-03-27 RX ADMIN — POTASSIUM CHLORIDE 20 MEQ: 1500 TABLET, EXTENDED RELEASE ORAL at 19:45

## 2023-03-27 RX ADMIN — GABAPENTIN 100 MG: 100 CAPSULE ORAL at 09:34

## 2023-03-27 RX ADMIN — IPRATROPIUM BROMIDE AND ALBUTEROL SULFATE 1 AMPULE: 2.5; .5 SOLUTION RESPIRATORY (INHALATION) at 15:56

## 2023-03-27 RX ADMIN — CYANOCOBALAMIN TAB 1000 MCG 1000 MCG: 1000 TAB at 09:34

## 2023-03-27 RX ADMIN — ATORVASTATIN CALCIUM 40 MG: 40 TABLET, FILM COATED ORAL at 19:45

## 2023-03-27 ASSESSMENT — PAIN SCALES - GENERAL: PAINLEVEL_OUTOF10: 0

## 2023-03-27 NOTE — ACP (ADVANCE CARE PLANNING)
Advance Care Planning   Healthcare Decision Maker:    Primary Decision Maker: Bradleyvasyl Sandovalsyed [facility] - [phone]    Secondary Decision Maker: Lenord Frankel - Spouse - [phone]      Today we documented Decision Maker(s) consistent with ACP documents on file.

## 2023-03-28 LAB
ANION GAP SERPL CALCULATED.3IONS-SCNC: 7 MMOL/L (ref 7–16)
B.E.: 2.6 MMOL/L (ref -3–3)
BASOPHILS # BLD: 0.02 E9/L (ref 0–0.2)
BASOPHILS NFR BLD: 0.3 % (ref 0–2)
BUN SERPL-MCNC: 22 MG/DL (ref 6–23)
CALCIUM SERPL-MCNC: 8.4 MG/DL (ref 8.6–10.2)
CHLORIDE SERPL-SCNC: 110 MMOL/L (ref 98–107)
CO2 SERPL-SCNC: 29 MMOL/L (ref 22–29)
CREAT SERPL-MCNC: 1.3 MG/DL (ref 0.5–1)
DELIVERY SYSTEMS: ABNORMAL
DEVICE: ABNORMAL
EOSINOPHIL # BLD: 0.24 E9/L (ref 0.05–0.5)
EOSINOPHIL NFR BLD: 3.2 % (ref 0–6)
ERYTHROCYTE [DISTWIDTH] IN BLOOD BY AUTOMATED COUNT: 16.5 FL (ref 11.5–15)
FIO2: 60
GLUCOSE SERPL-MCNC: 115 MG/DL (ref 74–99)
HCO3: 27.8 MMOL/L (ref 22–26)
HCT VFR BLD AUTO: 24.6 % (ref 34–48)
HGB BLD-MCNC: 7.3 G/DL (ref 11.5–15.5)
IMM GRANULOCYTES # BLD: 0.03 E9/L
IMM GRANULOCYTES NFR BLD: 0.4 % (ref 0–5)
LEGIONELLA AG UR QL: NORMAL
LYMPHOCYTES # BLD: 1.53 E9/L (ref 1.5–4)
LYMPHOCYTES NFR BLD: 20.5 % (ref 20–42)
MCH RBC QN AUTO: 26.4 PG (ref 26–35)
MCHC RBC AUTO-ENTMCNC: 29.7 % (ref 32–34.5)
MCV RBC AUTO: 89.1 FL (ref 80–99.9)
METER GLUCOSE: 118 MG/DL (ref 74–99)
METER GLUCOSE: 138 MG/DL (ref 74–99)
METER GLUCOSE: 142 MG/DL (ref 74–99)
MODE: ABNORMAL
MONOCYTES # BLD: 0.94 E9/L (ref 0.1–0.95)
MONOCYTES NFR BLD: 12.6 % (ref 2–12)
NEUTROPHILS # BLD: 4.7 E9/L (ref 1.8–7.3)
NEUTS SEG NFR BLD: 63 % (ref 43–80)
O2 SATURATION: 94.2 % (ref 92–98.5)
OPERATOR ID: 3114
PATIENT TEMP: 37
PCO2 (TEMP CORRECTED): 44.7 MMHG (ref 35–45)
PH (TEMPERATURE CORRECTED): 7.4 (ref 7.35–7.45)
PLATELET # BLD AUTO: 292 E9/L (ref 130–450)
PMV BLD AUTO: 10.2 FL (ref 7–12)
PO2 (TEMP CORRECTED): 72.4 MMHG (ref 60–80)
POC SOURCE: ABNORMAL
POSITIVE END EXP PRESS: 6 CMH2O
POTASSIUM SERPL-SCNC: 3.8 MMOL/L (ref 3.5–5)
RBC # BLD AUTO: 2.76 E12/L (ref 3.5–5.5)
S PNEUM AG SPEC QL: NORMAL
SODIUM SERPL-SCNC: 146 MMOL/L (ref 132–146)
WBC # BLD: 7.5 E9/L (ref 4.5–11.5)

## 2023-03-28 PROCEDURE — 6360000002 HC RX W HCPCS: Performed by: CLINICAL NURSE SPECIALIST

## 2023-03-28 PROCEDURE — 2500000003 HC RX 250 WO HCPCS: Performed by: INTERNAL MEDICINE

## 2023-03-28 PROCEDURE — 94640 AIRWAY INHALATION TREATMENT: CPT

## 2023-03-28 PROCEDURE — 6370000000 HC RX 637 (ALT 250 FOR IP): Performed by: INTERNAL MEDICINE

## 2023-03-28 PROCEDURE — 36600 WITHDRAWAL OF ARTERIAL BLOOD: CPT

## 2023-03-28 PROCEDURE — 97165 OT EVAL LOW COMPLEX 30 MIN: CPT

## 2023-03-28 PROCEDURE — 2580000003 HC RX 258: Performed by: INTERNAL MEDICINE

## 2023-03-28 PROCEDURE — 85025 COMPLETE CBC W/AUTO DIFF WBC: CPT

## 2023-03-28 PROCEDURE — 6360000002 HC RX W HCPCS: Performed by: INTERNAL MEDICINE

## 2023-03-28 PROCEDURE — 97530 THERAPEUTIC ACTIVITIES: CPT

## 2023-03-28 PROCEDURE — 1200000000 HC SEMI PRIVATE

## 2023-03-28 PROCEDURE — 36415 COLL VENOUS BLD VENIPUNCTURE: CPT

## 2023-03-28 PROCEDURE — 6370000000 HC RX 637 (ALT 250 FOR IP): Performed by: CLINICAL NURSE SPECIALIST

## 2023-03-28 PROCEDURE — 99232 SBSQ HOSP IP/OBS MODERATE 35: CPT | Performed by: INTERNAL MEDICINE

## 2023-03-28 PROCEDURE — 94667 MNPJ CHEST WALL 1ST: CPT

## 2023-03-28 PROCEDURE — 82803 BLOOD GASES ANY COMBINATION: CPT

## 2023-03-28 PROCEDURE — 94669 MECHANICAL CHEST WALL OSCILL: CPT

## 2023-03-28 PROCEDURE — 82962 GLUCOSE BLOOD TEST: CPT

## 2023-03-28 PROCEDURE — 94668 MNPJ CHEST WALL SBSQ: CPT

## 2023-03-28 PROCEDURE — 80048 BASIC METABOLIC PNL TOTAL CA: CPT

## 2023-03-28 PROCEDURE — 2700000000 HC OXYGEN THERAPY PER DAY

## 2023-03-28 PROCEDURE — 94660 CPAP INITIATION&MGMT: CPT

## 2023-03-28 RX ORDER — GUAIFENESIN 600 MG/1
600 TABLET, EXTENDED RELEASE ORAL 2 TIMES DAILY
Status: DISCONTINUED | OUTPATIENT
Start: 2023-03-28 | End: 2023-03-28 | Stop reason: CLARIF

## 2023-03-28 RX ORDER — GUAIFENESIN 400 MG/1
400 TABLET ORAL 3 TIMES DAILY
Status: DISCONTINUED | OUTPATIENT
Start: 2023-03-28 | End: 2023-04-06 | Stop reason: HOSPADM

## 2023-03-28 RX ORDER — ALBUTEROL SULFATE 2.5 MG/3ML
2.5 SOLUTION RESPIRATORY (INHALATION) 4 TIMES DAILY
Status: DISCONTINUED | OUTPATIENT
Start: 2023-03-28 | End: 2023-04-03

## 2023-03-28 RX ADMIN — GABAPENTIN 100 MG: 100 CAPSULE ORAL at 08:11

## 2023-03-28 RX ADMIN — Medication 10 ML: at 20:07

## 2023-03-28 RX ADMIN — DOXYCYCLINE 100 MG: 100 INJECTION, POWDER, LYOPHILIZED, FOR SOLUTION INTRAVENOUS at 03:47

## 2023-03-28 RX ADMIN — MIRTAZAPINE 15 MG: 15 TABLET, FILM COATED ORAL at 20:02

## 2023-03-28 RX ADMIN — ARFORMOTEROL TARTRATE 15 MCG: 15 SOLUTION RESPIRATORY (INHALATION) at 20:25

## 2023-03-28 RX ADMIN — WATER 1000 MG: 1 INJECTION INTRAMUSCULAR; INTRAVENOUS; SUBCUTANEOUS at 14:42

## 2023-03-28 RX ADMIN — GUAIFENESIN 400 MG: 400 TABLET ORAL at 10:48

## 2023-03-28 RX ADMIN — ALBUTEROL SULFATE 2.5 MG: 2.5 SOLUTION RESPIRATORY (INHALATION) at 16:22

## 2023-03-28 RX ADMIN — ALBUTEROL SULFATE 2.5 MG: 2.5 SOLUTION RESPIRATORY (INHALATION) at 20:25

## 2023-03-28 RX ADMIN — CARVEDILOL 12.5 MG: 6.25 TABLET, FILM COATED ORAL at 08:09

## 2023-03-28 RX ADMIN — Medication 10 ML: at 08:11

## 2023-03-28 RX ADMIN — AMLODIPINE BESYLATE 5 MG: 5 TABLET ORAL at 08:10

## 2023-03-28 RX ADMIN — MEMANTINE HYDROCHLORIDE 5 MG: 5 TABLET, FILM COATED ORAL at 20:02

## 2023-03-28 RX ADMIN — GABAPENTIN 100 MG: 100 CAPSULE ORAL at 17:00

## 2023-03-28 RX ADMIN — SERTRALINE 150 MG: 100 TABLET, FILM COATED ORAL at 20:02

## 2023-03-28 RX ADMIN — FOLIC ACID 1 MG: 1 TABLET ORAL at 08:09

## 2023-03-28 RX ADMIN — GUAIFENESIN 400 MG: 400 TABLET ORAL at 20:02

## 2023-03-28 RX ADMIN — CYANOCOBALAMIN TAB 1000 MCG 1000 MCG: 1000 TAB at 08:09

## 2023-03-28 RX ADMIN — BACLOFEN 10 MG: 10 TABLET ORAL at 20:02

## 2023-03-28 RX ADMIN — SODIUM CHLORIDE, PRESERVATIVE FREE 10 ML: 5 INJECTION INTRAVENOUS at 20:06

## 2023-03-28 RX ADMIN — BUDESONIDE 500 MCG: 0.5 SUSPENSION RESPIRATORY (INHALATION) at 20:25

## 2023-03-28 RX ADMIN — ENOXAPARIN SODIUM 40 MG: 100 INJECTION SUBCUTANEOUS at 17:05

## 2023-03-28 RX ADMIN — BACLOFEN 10 MG: 10 TABLET ORAL at 08:09

## 2023-03-28 RX ADMIN — ATORVASTATIN CALCIUM 40 MG: 40 TABLET, FILM COATED ORAL at 20:02

## 2023-03-28 RX ADMIN — IPRATROPIUM BROMIDE AND ALBUTEROL SULFATE 1 AMPULE: 2.5; .5 SOLUTION RESPIRATORY (INHALATION) at 07:58

## 2023-03-28 RX ADMIN — GUAIFENESIN 400 MG: 400 TABLET ORAL at 14:41

## 2023-03-28 RX ADMIN — PANTOPRAZOLE SODIUM 40 MG: 40 TABLET, DELAYED RELEASE ORAL at 06:14

## 2023-03-28 RX ADMIN — GABAPENTIN 100 MG: 100 CAPSULE ORAL at 20:02

## 2023-03-28 RX ADMIN — ARFORMOTEROL TARTRATE 15 MCG: 15 SOLUTION RESPIRATORY (INHALATION) at 07:58

## 2023-03-28 RX ADMIN — CARVEDILOL 12.5 MG: 6.25 TABLET, FILM COATED ORAL at 16:59

## 2023-03-28 RX ADMIN — BUDESONIDE 500 MCG: 0.5 SUSPENSION RESPIRATORY (INHALATION) at 07:58

## 2023-03-28 RX ADMIN — MEMANTINE HYDROCHLORIDE 5 MG: 5 TABLET, FILM COATED ORAL at 08:08

## 2023-03-28 RX ADMIN — DOXYCYCLINE 100 MG: 100 INJECTION, POWDER, LYOPHILIZED, FOR SOLUTION INTRAVENOUS at 17:04

## 2023-03-28 ASSESSMENT — PAIN SCALES - GENERAL
PAINLEVEL_OUTOF10: 5

## 2023-03-28 ASSESSMENT — PAIN DESCRIPTION - LOCATION
LOCATION: FOOT

## 2023-03-28 ASSESSMENT — PAIN DESCRIPTION - ORIENTATION
ORIENTATION: LEFT

## 2023-03-28 ASSESSMENT — PAIN DESCRIPTION - DESCRIPTORS
DESCRIPTORS: ACHING
DESCRIPTORS: ACHING

## 2023-03-29 ENCOUNTER — APPOINTMENT (OUTPATIENT)
Dept: GENERAL RADIOLOGY | Age: 78
End: 2023-03-29
Payer: MEDICARE

## 2023-03-29 LAB
ANION GAP SERPL CALCULATED.3IONS-SCNC: 9 MMOL/L (ref 7–16)
B.E.: 1.1 MMOL/L (ref -3–3)
BASOPHILS # BLD: 0.04 E9/L (ref 0–0.2)
BASOPHILS NFR BLD: 0.5 % (ref 0–2)
BUN SERPL-MCNC: 19 MG/DL (ref 6–23)
CALCIUM SERPL-MCNC: 8.6 MG/DL (ref 8.6–10.2)
CHLORIDE SERPL-SCNC: 108 MMOL/L (ref 98–107)
CO2 SERPL-SCNC: 25 MMOL/L (ref 22–29)
COHB: 1.3 % (ref 0–1.5)
CREAT SERPL-MCNC: 1.1 MG/DL (ref 0.5–1)
CRITICAL: ABNORMAL
DATE ANALYZED: ABNORMAL
DATE OF COLLECTION: ABNORMAL
EOSINOPHIL # BLD: 0.24 E9/L (ref 0.05–0.5)
EOSINOPHIL NFR BLD: 2.9 % (ref 0–6)
ERYTHROCYTE [DISTWIDTH] IN BLOOD BY AUTOMATED COUNT: 16.4 FL (ref 11.5–15)
GLUCOSE SERPL-MCNC: 106 MG/DL (ref 74–99)
HCO3: 26.3 MMOL/L (ref 22–26)
HCT VFR BLD AUTO: 23.6 % (ref 34–48)
HGB BLD-MCNC: 7 G/DL (ref 11.5–15.5)
HHB: 15.7 % (ref 0–5)
IMM GRANULOCYTES # BLD: 0.02 E9/L
IMM GRANULOCYTES NFR BLD: 0.2 % (ref 0–5)
LAB: ABNORMAL
LYMPHOCYTES # BLD: 1.94 E9/L (ref 1.5–4)
LYMPHOCYTES NFR BLD: 23.8 % (ref 20–42)
Lab: ABNORMAL
MCH RBC QN AUTO: 26 PG (ref 26–35)
MCHC RBC AUTO-ENTMCNC: 29.7 % (ref 32–34.5)
MCV RBC AUTO: 87.7 FL (ref 80–99.9)
METER GLUCOSE: 107 MG/DL (ref 74–99)
METER GLUCOSE: 131 MG/DL (ref 74–99)
METER GLUCOSE: 156 MG/DL (ref 74–99)
METHB: 0.3 % (ref 0–1.5)
MODE: ABNORMAL
MONOCYTES # BLD: 0.8 E9/L (ref 0.1–0.95)
MONOCYTES NFR BLD: 9.8 % (ref 2–12)
NEUTROPHILS # BLD: 5.1 E9/L (ref 1.8–7.3)
NEUTS SEG NFR BLD: 62.8 % (ref 43–80)
O2 CONTENT: 9.8 ML/DL
O2 SATURATION: 84 % (ref 92–98.5)
O2HB: 82.7 % (ref 94–97)
OPERATOR ID: 46
PATIENT TEMP: 37 C
PCO2: 45 MMHG (ref 35–45)
PH BLOOD GAS: 7.38 (ref 7.35–7.45)
PLATELET # BLD AUTO: 302 E9/L (ref 130–450)
PMV BLD AUTO: 9.7 FL (ref 7–12)
PO2: 52 MMHG (ref 75–100)
POTASSIUM SERPL-SCNC: 4.1 MMOL/L (ref 3.5–5)
RBC # BLD AUTO: 2.69 E12/L (ref 3.5–5.5)
SODIUM SERPL-SCNC: 142 MMOL/L (ref 132–146)
SOURCE, BLOOD GAS: ABNORMAL
THB: 8.4 G/DL (ref 11.5–16.5)
TIME ANALYZED: 1234
WBC # BLD: 8.1 E9/L (ref 4.5–11.5)

## 2023-03-29 PROCEDURE — 99232 SBSQ HOSP IP/OBS MODERATE 35: CPT | Performed by: INTERNAL MEDICINE

## 2023-03-29 PROCEDURE — 2700000000 HC OXYGEN THERAPY PER DAY

## 2023-03-29 PROCEDURE — 36600 WITHDRAWAL OF ARTERIAL BLOOD: CPT

## 2023-03-29 PROCEDURE — 82962 GLUCOSE BLOOD TEST: CPT

## 2023-03-29 PROCEDURE — 85025 COMPLETE CBC W/AUTO DIFF WBC: CPT

## 2023-03-29 PROCEDURE — 2580000003 HC RX 258: Performed by: INTERNAL MEDICINE

## 2023-03-29 PROCEDURE — 6360000002 HC RX W HCPCS: Performed by: INTERNAL MEDICINE

## 2023-03-29 PROCEDURE — 94668 MNPJ CHEST WALL SBSQ: CPT

## 2023-03-29 PROCEDURE — 94640 AIRWAY INHALATION TREATMENT: CPT

## 2023-03-29 PROCEDURE — 80048 BASIC METABOLIC PNL TOTAL CA: CPT

## 2023-03-29 PROCEDURE — 6370000000 HC RX 637 (ALT 250 FOR IP): Performed by: INTERNAL MEDICINE

## 2023-03-29 PROCEDURE — 94660 CPAP INITIATION&MGMT: CPT

## 2023-03-29 PROCEDURE — 2060000000 HC ICU INTERMEDIATE R&B

## 2023-03-29 PROCEDURE — 6360000002 HC RX W HCPCS: Performed by: CLINICAL NURSE SPECIALIST

## 2023-03-29 PROCEDURE — 82805 BLOOD GASES W/O2 SATURATION: CPT

## 2023-03-29 PROCEDURE — 36415 COLL VENOUS BLD VENIPUNCTURE: CPT

## 2023-03-29 PROCEDURE — 2500000003 HC RX 250 WO HCPCS: Performed by: INTERNAL MEDICINE

## 2023-03-29 PROCEDURE — 6370000000 HC RX 637 (ALT 250 FOR IP): Performed by: CLINICAL NURSE SPECIALIST

## 2023-03-29 PROCEDURE — 71045 X-RAY EXAM CHEST 1 VIEW: CPT

## 2023-03-29 RX ORDER — ACETYLCYSTEINE 100 MG/ML
4 SOLUTION ORAL; RESPIRATORY (INHALATION) 2 TIMES DAILY
Status: DISCONTINUED | OUTPATIENT
Start: 2023-03-29 | End: 2023-04-06 | Stop reason: HOSPADM

## 2023-03-29 RX ADMIN — DOXYCYCLINE 100 MG: 100 INJECTION, POWDER, LYOPHILIZED, FOR SOLUTION INTRAVENOUS at 16:50

## 2023-03-29 RX ADMIN — BUDESONIDE 500 MCG: 0.5 SUSPENSION RESPIRATORY (INHALATION) at 20:19

## 2023-03-29 RX ADMIN — ALBUTEROL SULFATE 2.5 MG: 2.5 SOLUTION RESPIRATORY (INHALATION) at 08:03

## 2023-03-29 RX ADMIN — DOXYCYCLINE 100 MG: 100 INJECTION, POWDER, LYOPHILIZED, FOR SOLUTION INTRAVENOUS at 04:15

## 2023-03-29 RX ADMIN — ACETAMINOPHEN 650 MG: 325 TABLET ORAL at 19:45

## 2023-03-29 RX ADMIN — MEMANTINE HYDROCHLORIDE 5 MG: 5 TABLET, FILM COATED ORAL at 19:45

## 2023-03-29 RX ADMIN — ENOXAPARIN SODIUM 40 MG: 100 INJECTION SUBCUTANEOUS at 16:50

## 2023-03-29 RX ADMIN — AMLODIPINE BESYLATE 5 MG: 5 TABLET ORAL at 09:06

## 2023-03-29 RX ADMIN — FOLIC ACID 1 MG: 1 TABLET ORAL at 09:06

## 2023-03-29 RX ADMIN — GABAPENTIN 100 MG: 100 CAPSULE ORAL at 09:06

## 2023-03-29 RX ADMIN — GUAIFENESIN 400 MG: 400 TABLET ORAL at 15:18

## 2023-03-29 RX ADMIN — MEMANTINE HYDROCHLORIDE 5 MG: 5 TABLET, FILM COATED ORAL at 09:06

## 2023-03-29 RX ADMIN — Medication 10 ML: at 19:45

## 2023-03-29 RX ADMIN — ALBUTEROL SULFATE 2.5 MG: 2.5 SOLUTION RESPIRATORY (INHALATION) at 20:19

## 2023-03-29 RX ADMIN — ALBUTEROL SULFATE 2.5 MG: 2.5 SOLUTION RESPIRATORY (INHALATION) at 17:06

## 2023-03-29 RX ADMIN — GUAIFENESIN 400 MG: 400 TABLET ORAL at 19:45

## 2023-03-29 RX ADMIN — CYANOCOBALAMIN TAB 1000 MCG 1000 MCG: 1000 TAB at 09:06

## 2023-03-29 RX ADMIN — WATER 1000 MG: 1 INJECTION INTRAMUSCULAR; INTRAVENOUS; SUBCUTANEOUS at 15:18

## 2023-03-29 RX ADMIN — GUAIFENESIN 400 MG: 400 TABLET ORAL at 09:06

## 2023-03-29 RX ADMIN — ALBUTEROL SULFATE 2.5 MG: 2.5 SOLUTION RESPIRATORY (INHALATION) at 12:30

## 2023-03-29 RX ADMIN — ATORVASTATIN CALCIUM 40 MG: 40 TABLET, FILM COATED ORAL at 19:45

## 2023-03-29 RX ADMIN — CARVEDILOL 12.5 MG: 6.25 TABLET, FILM COATED ORAL at 16:50

## 2023-03-29 RX ADMIN — BUDESONIDE 500 MCG: 0.5 SUSPENSION RESPIRATORY (INHALATION) at 08:03

## 2023-03-29 RX ADMIN — BACLOFEN 10 MG: 10 TABLET ORAL at 19:45

## 2023-03-29 RX ADMIN — PANTOPRAZOLE SODIUM 40 MG: 40 TABLET, DELAYED RELEASE ORAL at 05:50

## 2023-03-29 RX ADMIN — GABAPENTIN 100 MG: 100 CAPSULE ORAL at 19:45

## 2023-03-29 RX ADMIN — ARFORMOTEROL TARTRATE 15 MCG: 15 SOLUTION RESPIRATORY (INHALATION) at 20:19

## 2023-03-29 RX ADMIN — ARFORMOTEROL TARTRATE 15 MCG: 15 SOLUTION RESPIRATORY (INHALATION) at 08:03

## 2023-03-29 RX ADMIN — BACLOFEN 10 MG: 10 TABLET ORAL at 09:06

## 2023-03-29 RX ADMIN — GABAPENTIN 100 MG: 100 CAPSULE ORAL at 15:18

## 2023-03-29 RX ADMIN — Medication 10 ML: at 09:07

## 2023-03-29 RX ADMIN — SERTRALINE 150 MG: 100 TABLET, FILM COATED ORAL at 19:45

## 2023-03-29 RX ADMIN — MIRTAZAPINE 15 MG: 15 TABLET, FILM COATED ORAL at 19:45

## 2023-03-29 RX ADMIN — CARVEDILOL 12.5 MG: 6.25 TABLET, FILM COATED ORAL at 09:06

## 2023-03-29 ASSESSMENT — PAIN SCALES - GENERAL
PAINLEVEL_OUTOF10: 0
PAINLEVEL_OUTOF10: 0

## 2023-03-30 ENCOUNTER — APPOINTMENT (OUTPATIENT)
Dept: GENERAL RADIOLOGY | Age: 78
End: 2023-03-30
Payer: MEDICARE

## 2023-03-30 PROBLEM — D53.1 COMBINED B12 AND FOLATE DEFICIENCY ANEMIA: Status: ACTIVE | Noted: 2023-01-01

## 2023-03-30 PROBLEM — D64.9 CHRONIC ANEMIA: Status: ACTIVE | Noted: 2023-01-01

## 2023-03-30 PROBLEM — G35 HX OF MULTIPLE SCLEROSIS (HCC): Status: ACTIVE | Noted: 2023-01-01

## 2023-03-30 PROBLEM — K27.9 PUD (PEPTIC ULCER DISEASE): Status: ACTIVE | Noted: 2023-01-01

## 2023-03-30 PROBLEM — S22.080A COMPRESSION FRACTURE OF T12 VERTEBRA (HCC): Status: ACTIVE | Noted: 2023-01-01

## 2023-03-30 PROBLEM — J18.9 PNEUMONIA OF LEFT LOWER LOBE DUE TO INFECTIOUS ORGANISM: Status: ACTIVE | Noted: 2023-01-01

## 2023-03-30 PROBLEM — E78.2 COMBINED HYPERLIPIDEMIA: Status: ACTIVE | Noted: 2023-01-01

## 2023-03-30 PROBLEM — Z92.89 HISTORY OF RECENT HOSPITALIZATION: Status: ACTIVE | Noted: 2023-01-01

## 2023-03-30 PROBLEM — I10 CHRONIC HYPERTENSION: Status: ACTIVE | Noted: 2023-01-01

## 2023-03-30 PROBLEM — J96.21 ACUTE AND CHRONIC RESPIRATORY FAILURE WITH HYPOXIA (HCC): Status: ACTIVE | Noted: 2023-01-01

## 2023-03-30 PROBLEM — G62.9 PERIPHERAL POLYNEUROPATHY: Status: ACTIVE | Noted: 2023-01-01

## 2023-03-30 PROBLEM — N18.32 STAGE 3B CHRONIC KIDNEY DISEASE (HCC): Status: ACTIVE | Noted: 2023-01-01

## 2023-03-30 PROBLEM — I50.32 CHRONIC HEART FAILURE WITH PRESERVED EJECTION FRACTION (HFPEF) (HCC): Status: ACTIVE | Noted: 2023-01-01

## 2023-03-30 LAB
ANION GAP SERPL CALCULATED.3IONS-SCNC: 8 MMOL/L (ref 7–16)
BASOPHILS # BLD: 0.06 E9/L (ref 0–0.2)
BASOPHILS NFR BLD: 0.9 % (ref 0–2)
BUN SERPL-MCNC: 17 MG/DL (ref 6–23)
CALCIUM SERPL-MCNC: 9.3 MG/DL (ref 8.6–10.2)
CHLORIDE SERPL-SCNC: 110 MMOL/L (ref 98–107)
CO2 SERPL-SCNC: 27 MMOL/L (ref 22–29)
CREAT SERPL-MCNC: 1 MG/DL (ref 0.5–1)
EOSINOPHIL # BLD: 0.35 E9/L (ref 0.05–0.5)
EOSINOPHIL NFR BLD: 5 % (ref 0–6)
ERYTHROCYTE [DISTWIDTH] IN BLOOD BY AUTOMATED COUNT: 16.1 FL (ref 11.5–15)
GLUCOSE SERPL-MCNC: 135 MG/DL (ref 74–99)
HCT VFR BLD AUTO: 27.4 % (ref 34–48)
HGB BLD-MCNC: 8.2 G/DL (ref 11.5–15.5)
IMM GRANULOCYTES # BLD: 0.02 E9/L
IMM GRANULOCYTES NFR BLD: 0.3 % (ref 0–5)
LYMPHOCYTES # BLD: 1.69 E9/L (ref 1.5–4)
LYMPHOCYTES NFR BLD: 24.1 % (ref 20–42)
MCH RBC QN AUTO: 26.3 PG (ref 26–35)
MCHC RBC AUTO-ENTMCNC: 29.9 % (ref 32–34.5)
MCV RBC AUTO: 87.8 FL (ref 80–99.9)
METER GLUCOSE: 120 MG/DL (ref 74–99)
METER GLUCOSE: 143 MG/DL (ref 74–99)
METER GLUCOSE: 163 MG/DL (ref 74–99)
MONOCYTES # BLD: 0.78 E9/L (ref 0.1–0.95)
MONOCYTES NFR BLD: 11.1 % (ref 2–12)
NEUTROPHILS # BLD: 4.1 E9/L (ref 1.8–7.3)
NEUTS SEG NFR BLD: 58.6 % (ref 43–80)
PLATELET # BLD AUTO: 323 E9/L (ref 130–450)
PMV BLD AUTO: 9.3 FL (ref 7–12)
POTASSIUM SERPL-SCNC: 4.3 MMOL/L (ref 3.5–5)
RBC # BLD AUTO: 3.12 E12/L (ref 3.5–5.5)
SODIUM SERPL-SCNC: 145 MMOL/L (ref 132–146)
WBC # BLD: 7 E9/L (ref 4.5–11.5)

## 2023-03-30 PROCEDURE — 2500000003 HC RX 250 WO HCPCS: Performed by: INTERNAL MEDICINE

## 2023-03-30 PROCEDURE — 6360000002 HC RX W HCPCS: Performed by: CLINICAL NURSE SPECIALIST

## 2023-03-30 PROCEDURE — 94668 MNPJ CHEST WALL SBSQ: CPT

## 2023-03-30 PROCEDURE — 94660 CPAP INITIATION&MGMT: CPT

## 2023-03-30 PROCEDURE — 6370000000 HC RX 637 (ALT 250 FOR IP): Performed by: INTERNAL MEDICINE

## 2023-03-30 PROCEDURE — 6370000000 HC RX 637 (ALT 250 FOR IP): Performed by: CLINICAL NURSE SPECIALIST

## 2023-03-30 PROCEDURE — 2700000000 HC OXYGEN THERAPY PER DAY

## 2023-03-30 PROCEDURE — 80048 BASIC METABOLIC PNL TOTAL CA: CPT

## 2023-03-30 PROCEDURE — APPSS30 APP SPLIT SHARED TIME 16-30 MINUTES: Performed by: NURSE PRACTITIONER

## 2023-03-30 PROCEDURE — 6360000002 HC RX W HCPCS: Performed by: INTERNAL MEDICINE

## 2023-03-30 PROCEDURE — 94669 MECHANICAL CHEST WALL OSCILL: CPT

## 2023-03-30 PROCEDURE — 36415 COLL VENOUS BLD VENIPUNCTURE: CPT

## 2023-03-30 PROCEDURE — 99222 1ST HOSP IP/OBS MODERATE 55: CPT | Performed by: NURSE PRACTITIONER

## 2023-03-30 PROCEDURE — 2580000003 HC RX 258: Performed by: INTERNAL MEDICINE

## 2023-03-30 PROCEDURE — 71045 X-RAY EXAM CHEST 1 VIEW: CPT

## 2023-03-30 PROCEDURE — 85025 COMPLETE CBC W/AUTO DIFF WBC: CPT

## 2023-03-30 PROCEDURE — 94640 AIRWAY INHALATION TREATMENT: CPT

## 2023-03-30 PROCEDURE — 2060000000 HC ICU INTERMEDIATE R&B

## 2023-03-30 PROCEDURE — 82962 GLUCOSE BLOOD TEST: CPT

## 2023-03-30 PROCEDURE — 99233 SBSQ HOSP IP/OBS HIGH 50: CPT | Performed by: INTERNAL MEDICINE

## 2023-03-30 RX ADMIN — Medication 10 ML: at 09:46

## 2023-03-30 RX ADMIN — ACETAMINOPHEN 650 MG: 325 TABLET ORAL at 09:03

## 2023-03-30 RX ADMIN — MEMANTINE HYDROCHLORIDE 5 MG: 5 TABLET, FILM COATED ORAL at 09:03

## 2023-03-30 RX ADMIN — PANTOPRAZOLE SODIUM 40 MG: 40 TABLET, DELAYED RELEASE ORAL at 09:02

## 2023-03-30 RX ADMIN — ALBUTEROL SULFATE 2.5 MG: 2.5 SOLUTION RESPIRATORY (INHALATION) at 15:32

## 2023-03-30 RX ADMIN — ALBUTEROL SULFATE 2.5 MG: 2.5 SOLUTION RESPIRATORY (INHALATION) at 08:20

## 2023-03-30 RX ADMIN — BACLOFEN 10 MG: 10 TABLET ORAL at 09:02

## 2023-03-30 RX ADMIN — CARVEDILOL 12.5 MG: 6.25 TABLET, FILM COATED ORAL at 09:02

## 2023-03-30 RX ADMIN — BUDESONIDE 500 MCG: 0.5 SUSPENSION RESPIRATORY (INHALATION) at 08:20

## 2023-03-30 RX ADMIN — AMLODIPINE BESYLATE 5 MG: 5 TABLET ORAL at 09:02

## 2023-03-30 RX ADMIN — ALBUTEROL SULFATE 2.5 MG: 2.5 SOLUTION RESPIRATORY (INHALATION) at 20:07

## 2023-03-30 RX ADMIN — MEMANTINE HYDROCHLORIDE 5 MG: 5 TABLET, FILM COATED ORAL at 20:41

## 2023-03-30 RX ADMIN — MIRTAZAPINE 15 MG: 15 TABLET, FILM COATED ORAL at 20:41

## 2023-03-30 RX ADMIN — GABAPENTIN 100 MG: 100 CAPSULE ORAL at 15:00

## 2023-03-30 RX ADMIN — GUAIFENESIN 400 MG: 400 TABLET ORAL at 09:03

## 2023-03-30 RX ADMIN — DOXYCYCLINE 100 MG: 100 INJECTION, POWDER, LYOPHILIZED, FOR SOLUTION INTRAVENOUS at 16:30

## 2023-03-30 RX ADMIN — SERTRALINE 150 MG: 100 TABLET, FILM COATED ORAL at 20:41

## 2023-03-30 RX ADMIN — GUAIFENESIN 400 MG: 400 TABLET ORAL at 20:41

## 2023-03-30 RX ADMIN — ALBUTEROL SULFATE 2.5 MG: 2.5 SOLUTION RESPIRATORY (INHALATION) at 11:52

## 2023-03-30 RX ADMIN — CYANOCOBALAMIN TAB 1000 MCG 1000 MCG: 1000 TAB at 09:02

## 2023-03-30 RX ADMIN — ARFORMOTEROL TARTRATE 15 MCG: 15 SOLUTION RESPIRATORY (INHALATION) at 08:20

## 2023-03-30 RX ADMIN — WATER 1000 MG: 1 INJECTION INTRAMUSCULAR; INTRAVENOUS; SUBCUTANEOUS at 15:21

## 2023-03-30 RX ADMIN — ENOXAPARIN SODIUM 40 MG: 100 INJECTION SUBCUTANEOUS at 15:21

## 2023-03-30 RX ADMIN — FOLIC ACID 1 MG: 1 TABLET ORAL at 09:02

## 2023-03-30 RX ADMIN — ATORVASTATIN CALCIUM 40 MG: 40 TABLET, FILM COATED ORAL at 20:41

## 2023-03-30 RX ADMIN — ARFORMOTEROL TARTRATE 15 MCG: 15 SOLUTION RESPIRATORY (INHALATION) at 20:07

## 2023-03-30 RX ADMIN — BACLOFEN 10 MG: 10 TABLET ORAL at 20:41

## 2023-03-30 RX ADMIN — DOXYCYCLINE 100 MG: 100 INJECTION, POWDER, LYOPHILIZED, FOR SOLUTION INTRAVENOUS at 04:19

## 2023-03-30 RX ADMIN — GUAIFENESIN 400 MG: 400 TABLET ORAL at 15:21

## 2023-03-30 RX ADMIN — CARVEDILOL 12.5 MG: 6.25 TABLET, FILM COATED ORAL at 17:26

## 2023-03-30 RX ADMIN — GABAPENTIN 100 MG: 100 CAPSULE ORAL at 09:03

## 2023-03-30 RX ADMIN — Medication 10 ML: at 20:42

## 2023-03-30 RX ADMIN — GABAPENTIN 100 MG: 100 CAPSULE ORAL at 20:46

## 2023-03-30 RX ADMIN — BUDESONIDE 500 MCG: 0.5 SUSPENSION RESPIRATORY (INHALATION) at 20:07

## 2023-03-30 ASSESSMENT — PAIN DESCRIPTION - LOCATION: LOCATION: HEAD

## 2023-03-30 ASSESSMENT — PAIN SCALES - GENERAL
PAINLEVEL_OUTOF10: 0
PAINLEVEL_OUTOF10: 5
PAINLEVEL_OUTOF10: 0

## 2023-03-30 ASSESSMENT — PAIN DESCRIPTION - ORIENTATION: ORIENTATION: MID

## 2023-03-30 ASSESSMENT — PAIN DESCRIPTION - DESCRIPTORS: DESCRIPTORS: ACHING

## 2023-03-30 ASSESSMENT — PAIN - FUNCTIONAL ASSESSMENT: PAIN_FUNCTIONAL_ASSESSMENT: ACTIVITIES ARE NOT PREVENTED

## 2023-03-30 NOTE — CONSULTS
Palliative Care Department  [phone]  Palliative Care Initial Consult  Provider HIRA Camacho CNP      PATIENT: Kemal Earl  : 1945  MRN: [id number]  ADMISSION DATE: 3/26/2023 11:53 AM  Referring Provider: Ismael Ross MD    Palliative Medicine was consulted on hospital day 4 for assistance with Goals of care    HPI:     Ericka Painting is a 69 y.o. y/o female with a history of MS, COPD, current smoker, hypertension, hyperlipidemia, CKD stage III, noncompliance who presented to [facility]) on 3/26/2023 after a fall. Chest x-ray showed lower lung opacities. She was started on antibiotics admitted to telemetry. ASSESSMENT/PLAN:     Pertinent Hospital Diagnoses     Acute on chronic hypoxic respiratory failure  CAP  COPD    Palliative Care Encounter / Counseling Regarding Goals of Care  Please see detailed goals of care discussion as below  At this time, Kemal Earl, Does have capacity for medical decision-making. Capacity is time limited and situation/question specific  Outcome of goals of care meeting: Patient expresses that her main goal is comfort. She is open to receiving more information about hospice. Daughter would like to wait until she arrives tomorrow before hospice consult is placed. Patient expresses that she would not want intubated and does not want the BiPAP. Code status DNR-CCA/ DNI  Advanced Directives: no POA or living will in epic  Surrogate/Legal NOK:  Georgiana Mcghee (daughter)  [phone]  Areli Hopkins () [phone]    Spiritual assessment: no spiritual distress identified  Bereavement and grief: to be determined  Referrals to: none today    Thank you for the opportunity to participate in the care of Kemal Earl. HIRA Camacho CNP   Palliative Medicine     SUBJECTIVE:     Details of Conversation: Chart reviewed and met with Ananias Jeans at the bedside. She was alert and oriented and able to express her wishes.   I introduced
visible vessel, s/p epinephrine injection and clip application. Received 4 unit packed RBC during this admission  12/19/2022 seen in ER for UTI E. coli Enterobacter treated with Bactrim    1/18 - 1/24/2023 with 4 out of 4 blood cultures positive felt to be contaminant      PSH:   Past Surgical History:   Procedure Laterality Date    APPENDECTOMY      BREAST SURGERY  4/13/2012    biopsy - negative    HIP ARTHROPLASTY Right 04/13/2011    Right AIDEE  ALLISON Hansen MD    HYSTERECTOMY (CERVIX STATUS UNKNOWN)      KNEE ARTHROPLASTY Left 10/01/2013    Left TKA  ALLISON Hansen MD    KNEE ARTHROPLASTY Right 08/29/2012    Right TKA  ALLISON Hernandez MD    TOTAL HIP ARTHROPLASTY Left 3/25/2022    LEFT HIP TOTAL ARTHROPLASTY performed by Rony Farmer MD at [address] [place] [address] ENDOSCOPY N/A 11/11/2022    EGD CONTROL HEMORRHAGE performed by Karo Dee MD at [facility] ENDOSCOPY          Medications:     sodium chloride      dextrose        nicotine  1 patch TransDERmal Daily    amLODIPine  5 mg Oral Daily    baclofen  10 mg Oral BID    carvedilol  12.5 mg Oral BID WC    folic acid  1 mg Oral Daily    gabapentin  100 mg Oral TID    memantine  5 mg Oral BID    mirtazapine  15 mg Oral Nightly    pantoprazole  40 mg Oral QAM AC    sertraline  150 mg Oral Nightly    sodium chloride flush  5-40 mL IntraVENous 2 times per day    enoxaparin  40 mg SubCUTAneous Daily    ipratropium-albuterol  1 ampule Inhalation Q4H WA    cefTRIAXone (ROCEPHIN) IV  1,000 mg IntraVENous Q24H    doxycycline (VIBRAMYCIN) IV  100 mg IntraVENous Q12H    budesonide  0.5 mg Nebulization BID    arformoterol tartrate  15 mcg Nebulization BID    vitamin B-12  1,000 mcg Oral Daily    potassium chloride  20 mEq Oral BID    atorvastatin  40 mg Oral Nightly       Allergies: Allergies   Allergen Reactions    Macrodantin [Nitrofurantoin Macrocrystal] Shortness Of Breath       Social History:  50-pack-year smoker continues to smoke 1 pack a day.   Social drinker,
bolus **OR** dextrose bolus, glucagon (rDNA), dextrose    RADIOLOGY:  XR FOOT LEFT (2 VIEWS)   Final Result   Age indeterminate nondisplaced fracture deformities of the distal 4th and 5th   metatarsals. Transverse nondisplaced fracture of the small toe proximal phalanx. XR CHEST PORTABLE   Final Result   Mid and lower lung predominant pulmonary opacifications right greater than   left of overall confluence appearance increased from prior comparison of   worsening airspace disease such as bronchopneumonia without large effusion. 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 Moderate right and trace to small left suprapatellar joint effusions             Vitals:    BP (!) 135/58   Pulse 80   Temp 97.8 °F (36.6 °C)   Resp 11   Ht 5' 2\" (1.575 m)   Wt 135 lb (61.2 kg)   SpO2 96%   BMI 24.69 kg/m²     LABS:   Recent Labs     03/26/23  1243   WBC 6.7   HGB 8.4*   HCT 27.2*        Recent Labs     03/26/23  1243      K 3.3*      CO2 27   BUN 25*   CREATININE 1.3*     Recent Labs     03/26/23  1243   PROT 6.7       ASSESSMENTS:   -Transverse fracture of the left fifth proximal phalanx  -Respiratory failure    PLAN:    - Patient was examined and evaluated. Reviewed patient's recent lab results, charts and pertinent diagnostic imaging. Reviewed ancillary service notes. - X-rays:   Age indeterminate nondisplaced fracture deformities of the distal 4th and 5th   metatarsals. Transverse nondisplaced fracture of the small toe proximal phalanx.     -Surgical shoe ordered for left foot. - Patient to offload bilateral feet in heel cushions.  -No surgical intervention from podiatry perspective.

## 2023-03-31 ENCOUNTER — APPOINTMENT (OUTPATIENT)
Dept: GENERAL RADIOLOGY | Age: 78
End: 2023-03-31
Payer: MEDICARE

## 2023-03-31 LAB
ANION GAP SERPL CALCULATED.3IONS-SCNC: 10 MMOL/L (ref 7–16)
BASOPHILS # BLD: 0.04 E9/L (ref 0–0.2)
BASOPHILS NFR BLD: 0.4 % (ref 0–2)
BUN SERPL-MCNC: 18 MG/DL (ref 6–23)
CALCIUM SERPL-MCNC: 9 MG/DL (ref 8.6–10.2)
CHLORIDE SERPL-SCNC: 110 MMOL/L (ref 98–107)
CO2 SERPL-SCNC: 24 MMOL/L (ref 22–29)
CREAT SERPL-MCNC: 1.1 MG/DL (ref 0.5–1)
EOSINOPHIL # BLD: 0.4 E9/L (ref 0.05–0.5)
EOSINOPHIL NFR BLD: 4.4 % (ref 0–6)
ERYTHROCYTE [DISTWIDTH] IN BLOOD BY AUTOMATED COUNT: 16.2 FL (ref 11.5–15)
GLUCOSE SERPL-MCNC: 122 MG/DL (ref 74–99)
HCT VFR BLD AUTO: 23.3 % (ref 34–48)
HGB BLD-MCNC: 7.1 G/DL (ref 11.5–15.5)
IMM GRANULOCYTES # BLD: 0.03 E9/L
IMM GRANULOCYTES NFR BLD: 0.3 % (ref 0–5)
LYMPHOCYTES # BLD: 1.86 E9/L (ref 1.5–4)
LYMPHOCYTES NFR BLD: 20.3 % (ref 20–42)
MCH RBC QN AUTO: 26.1 PG (ref 26–35)
MCHC RBC AUTO-ENTMCNC: 30.5 % (ref 32–34.5)
MCV RBC AUTO: 85.7 FL (ref 80–99.9)
MONOCYTES # BLD: 0.87 E9/L (ref 0.1–0.95)
MONOCYTES NFR BLD: 9.5 % (ref 2–12)
NEUTROPHILS # BLD: 5.97 E9/L (ref 1.8–7.3)
NEUTS SEG NFR BLD: 65.1 % (ref 43–80)
PLATELET # BLD AUTO: 316 E9/L (ref 130–450)
PMV BLD AUTO: 9.6 FL (ref 7–12)
POTASSIUM SERPL-SCNC: 4 MMOL/L (ref 3.5–5)
PROCALCITONIN: 0.1 NG/ML (ref 0–0.08)
RBC # BLD AUTO: 2.72 E12/L (ref 3.5–5.5)
SODIUM SERPL-SCNC: 144 MMOL/L (ref 132–146)
WBC # BLD: 9.2 E9/L (ref 4.5–11.5)

## 2023-03-31 PROCEDURE — 94668 MNPJ CHEST WALL SBSQ: CPT

## 2023-03-31 PROCEDURE — 6370000000 HC RX 637 (ALT 250 FOR IP): Performed by: INTERNAL MEDICINE

## 2023-03-31 PROCEDURE — 6360000002 HC RX W HCPCS

## 2023-03-31 PROCEDURE — 71045 X-RAY EXAM CHEST 1 VIEW: CPT

## 2023-03-31 PROCEDURE — 36415 COLL VENOUS BLD VENIPUNCTURE: CPT

## 2023-03-31 PROCEDURE — 94640 AIRWAY INHALATION TREATMENT: CPT

## 2023-03-31 PROCEDURE — 84145 PROCALCITONIN (PCT): CPT

## 2023-03-31 PROCEDURE — 2060000000 HC ICU INTERMEDIATE R&B

## 2023-03-31 PROCEDURE — APPSS30 APP SPLIT SHARED TIME 16-30 MINUTES: Performed by: NURSE PRACTITIONER

## 2023-03-31 PROCEDURE — 6360000002 HC RX W HCPCS: Performed by: INTERNAL MEDICINE

## 2023-03-31 PROCEDURE — 2580000003 HC RX 258: Performed by: INTERNAL MEDICINE

## 2023-03-31 PROCEDURE — 6370000000 HC RX 637 (ALT 250 FOR IP): Performed by: CLINICAL NURSE SPECIALIST

## 2023-03-31 PROCEDURE — 2700000000 HC OXYGEN THERAPY PER DAY

## 2023-03-31 PROCEDURE — 94660 CPAP INITIATION&MGMT: CPT

## 2023-03-31 PROCEDURE — 6360000002 HC RX W HCPCS: Performed by: CLINICAL NURSE SPECIALIST

## 2023-03-31 PROCEDURE — 2500000003 HC RX 250 WO HCPCS: Performed by: INTERNAL MEDICINE

## 2023-03-31 PROCEDURE — 80048 BASIC METABOLIC PNL TOTAL CA: CPT

## 2023-03-31 PROCEDURE — 85025 COMPLETE CBC W/AUTO DIFF WBC: CPT

## 2023-03-31 PROCEDURE — 99233 SBSQ HOSP IP/OBS HIGH 50: CPT | Performed by: INTERNAL MEDICINE

## 2023-03-31 RX ORDER — METHYLPREDNISOLONE SODIUM SUCCINATE 40 MG/ML
40 INJECTION, POWDER, LYOPHILIZED, FOR SOLUTION INTRAMUSCULAR; INTRAVENOUS EVERY 12 HOURS
Status: DISCONTINUED | OUTPATIENT
Start: 2023-03-31 | End: 2023-04-04

## 2023-03-31 RX ADMIN — DOXYCYCLINE 100 MG: 100 INJECTION, POWDER, LYOPHILIZED, FOR SOLUTION INTRAVENOUS at 16:54

## 2023-03-31 RX ADMIN — BACLOFEN 10 MG: 10 TABLET ORAL at 09:42

## 2023-03-31 RX ADMIN — Medication 10 ML: at 09:43

## 2023-03-31 RX ADMIN — ENOXAPARIN SODIUM 40 MG: 100 INJECTION SUBCUTANEOUS at 16:50

## 2023-03-31 RX ADMIN — BUDESONIDE 500 MCG: 0.5 SUSPENSION RESPIRATORY (INHALATION) at 08:16

## 2023-03-31 RX ADMIN — MEMANTINE HYDROCHLORIDE 5 MG: 5 TABLET, FILM COATED ORAL at 09:42

## 2023-03-31 RX ADMIN — GABAPENTIN 100 MG: 100 CAPSULE ORAL at 20:04

## 2023-03-31 RX ADMIN — GABAPENTIN 100 MG: 100 CAPSULE ORAL at 09:42

## 2023-03-31 RX ADMIN — ATORVASTATIN CALCIUM 40 MG: 40 TABLET, FILM COATED ORAL at 20:04

## 2023-03-31 RX ADMIN — DOXYCYCLINE 100 MG: 100 INJECTION, POWDER, LYOPHILIZED, FOR SOLUTION INTRAVENOUS at 03:57

## 2023-03-31 RX ADMIN — ALBUTEROL SULFATE 2.5 MG: 2.5 SOLUTION RESPIRATORY (INHALATION) at 16:03

## 2023-03-31 RX ADMIN — ALBUTEROL SULFATE 2.5 MG: 2.5 SOLUTION RESPIRATORY (INHALATION) at 12:13

## 2023-03-31 RX ADMIN — METHYLPREDNISOLONE SODIUM SUCCINATE 40 MG: 40 INJECTION INTRAMUSCULAR; INTRAVENOUS at 12:41

## 2023-03-31 RX ADMIN — BACLOFEN 10 MG: 10 TABLET ORAL at 20:04

## 2023-03-31 RX ADMIN — ARFORMOTEROL TARTRATE 15 MCG: 15 SOLUTION RESPIRATORY (INHALATION) at 08:16

## 2023-03-31 RX ADMIN — CYANOCOBALAMIN TAB 1000 MCG 1000 MCG: 1000 TAB at 09:42

## 2023-03-31 RX ADMIN — GUAIFENESIN 400 MG: 400 TABLET ORAL at 16:50

## 2023-03-31 RX ADMIN — WATER 1000 MG: 1 INJECTION INTRAMUSCULAR; INTRAVENOUS; SUBCUTANEOUS at 16:49

## 2023-03-31 RX ADMIN — ALBUTEROL SULFATE 2.5 MG: 2.5 SOLUTION RESPIRATORY (INHALATION) at 08:16

## 2023-03-31 RX ADMIN — GUAIFENESIN 400 MG: 400 TABLET ORAL at 09:41

## 2023-03-31 RX ADMIN — PANTOPRAZOLE SODIUM 40 MG: 40 TABLET, DELAYED RELEASE ORAL at 06:12

## 2023-03-31 RX ADMIN — SERTRALINE 150 MG: 100 TABLET, FILM COATED ORAL at 20:04

## 2023-03-31 RX ADMIN — ALBUTEROL SULFATE 2.5 MG: 2.5 SOLUTION RESPIRATORY (INHALATION) at 19:56

## 2023-03-31 RX ADMIN — CARVEDILOL 12.5 MG: 6.25 TABLET, FILM COATED ORAL at 09:42

## 2023-03-31 RX ADMIN — GABAPENTIN 100 MG: 100 CAPSULE ORAL at 12:41

## 2023-03-31 RX ADMIN — AMLODIPINE BESYLATE 5 MG: 5 TABLET ORAL at 09:42

## 2023-03-31 RX ADMIN — BUDESONIDE 500 MCG: 0.5 SUSPENSION RESPIRATORY (INHALATION) at 19:56

## 2023-03-31 RX ADMIN — ARFORMOTEROL TARTRATE 15 MCG: 15 SOLUTION RESPIRATORY (INHALATION) at 19:56

## 2023-03-31 RX ADMIN — ACETAMINOPHEN 650 MG: 325 TABLET ORAL at 01:13

## 2023-03-31 RX ADMIN — GUAIFENESIN 400 MG: 400 TABLET ORAL at 20:04

## 2023-03-31 RX ADMIN — CARVEDILOL 12.5 MG: 6.25 TABLET, FILM COATED ORAL at 16:50

## 2023-03-31 RX ADMIN — FOLIC ACID 1 MG: 1 TABLET ORAL at 09:42

## 2023-03-31 RX ADMIN — Medication 10 ML: at 20:49

## 2023-03-31 ASSESSMENT — PAIN SCALES - GENERAL
PAINLEVEL_OUTOF10: 5
PAINLEVEL_OUTOF10: 0

## 2023-03-31 ASSESSMENT — PAIN DESCRIPTION - LOCATION: LOCATION: HEAD

## 2023-04-01 ENCOUNTER — APPOINTMENT (OUTPATIENT)
Dept: ULTRASOUND IMAGING | Age: 78
End: 2023-04-01
Payer: MEDICARE

## 2023-04-01 ENCOUNTER — APPOINTMENT (OUTPATIENT)
Dept: GENERAL RADIOLOGY | Age: 78
End: 2023-04-01
Payer: MEDICARE

## 2023-04-01 LAB
ANION GAP SERPL CALCULATED.3IONS-SCNC: 11 MMOL/L (ref 7–16)
BUN SERPL-MCNC: 24 MG/DL (ref 6–23)
CALCIUM SERPL-MCNC: 9.2 MG/DL (ref 8.6–10.2)
CHLORIDE SERPL-SCNC: 110 MMOL/L (ref 98–107)
CO2 SERPL-SCNC: 25 MMOL/L (ref 22–29)
CREAT SERPL-MCNC: 0.9 MG/DL (ref 0.5–1)
ERYTHROCYTE [DISTWIDTH] IN BLOOD BY AUTOMATED COUNT: 15.7 FL (ref 11.5–15)
FLUID TYPE: NORMAL
GLUCOSE FLD-MCNC: 129 MG/DL
GLUCOSE SERPL-MCNC: 213 MG/DL (ref 74–99)
HCT VFR BLD AUTO: 25.9 % (ref 34–48)
HGB BLD-MCNC: 7.5 G/DL (ref 11.5–15.5)
LDH FLD L TO P-CCNC: 181 U/L
MCH RBC QN AUTO: 25.8 PG (ref 26–35)
MCHC RBC AUTO-ENTMCNC: 29 % (ref 32–34.5)
MCV RBC AUTO: 89 FL (ref 80–99.9)
PLATELET # BLD AUTO: 371 E9/L (ref 130–450)
PMV BLD AUTO: 10.1 FL (ref 7–12)
POTASSIUM SERPL-SCNC: 4 MMOL/L (ref 3.5–5)
PROT FLD-MCNC: 1.6 G/DL
RBC # BLD AUTO: 2.91 E12/L (ref 3.5–5.5)
SODIUM SERPL-SCNC: 146 MMOL/L (ref 132–146)
WBC # BLD: 9.3 E9/L (ref 4.5–11.5)

## 2023-04-01 PROCEDURE — 85027 COMPLETE CBC AUTOMATED: CPT

## 2023-04-01 PROCEDURE — 83615 LACTATE (LD) (LDH) ENZYME: CPT

## 2023-04-01 PROCEDURE — 2580000003 HC RX 258: Performed by: INTERNAL MEDICINE

## 2023-04-01 PROCEDURE — 80048 BASIC METABOLIC PNL TOTAL CA: CPT

## 2023-04-01 PROCEDURE — 88112 CYTOPATH CELL ENHANCE TECH: CPT

## 2023-04-01 PROCEDURE — 94640 AIRWAY INHALATION TREATMENT: CPT

## 2023-04-01 PROCEDURE — 82947 ASSAY GLUCOSE BLOOD QUANT: CPT

## 2023-04-01 PROCEDURE — 89051 BODY FLUID CELL COUNT: CPT

## 2023-04-01 PROCEDURE — 36415 COLL VENOUS BLD VENIPUNCTURE: CPT

## 2023-04-01 PROCEDURE — APPSS30 APP SPLIT SHARED TIME 16-30 MINUTES: Performed by: NURSE PRACTITIONER

## 2023-04-01 PROCEDURE — 6370000000 HC RX 637 (ALT 250 FOR IP): Performed by: INTERNAL MEDICINE

## 2023-04-01 PROCEDURE — 99233 SBSQ HOSP IP/OBS HIGH 50: CPT | Performed by: INTERNAL MEDICINE

## 2023-04-01 PROCEDURE — 6360000002 HC RX W HCPCS: Performed by: CLINICAL NURSE SPECIALIST

## 2023-04-01 PROCEDURE — 94668 MNPJ CHEST WALL SBSQ: CPT

## 2023-04-01 PROCEDURE — 71045 X-RAY EXAM CHEST 1 VIEW: CPT

## 2023-04-01 PROCEDURE — 88305 TISSUE EXAM BY PATHOLOGIST: CPT

## 2023-04-01 PROCEDURE — 6360000002 HC RX W HCPCS: Performed by: INTERNAL MEDICINE

## 2023-04-01 PROCEDURE — 76942 ECHO GUIDE FOR BIOPSY: CPT

## 2023-04-01 PROCEDURE — 84157 ASSAY OF PROTEIN OTHER: CPT

## 2023-04-01 PROCEDURE — 94660 CPAP INITIATION&MGMT: CPT

## 2023-04-01 PROCEDURE — 2580000003 HC RX 258

## 2023-04-01 PROCEDURE — 2060000000 HC ICU INTERMEDIATE R&B

## 2023-04-01 PROCEDURE — 6370000000 HC RX 637 (ALT 250 FOR IP): Performed by: CLINICAL NURSE SPECIALIST

## 2023-04-01 PROCEDURE — 2700000000 HC OXYGEN THERAPY PER DAY

## 2023-04-01 PROCEDURE — 87116 MYCOBACTERIA CULTURE: CPT

## 2023-04-01 PROCEDURE — 6360000002 HC RX W HCPCS

## 2023-04-01 PROCEDURE — 87206 SMEAR FLUORESCENT/ACID STAI: CPT

## 2023-04-01 RX ORDER — FUROSEMIDE 10 MG/ML
40 INJECTION INTRAMUSCULAR; INTRAVENOUS ONCE
Status: COMPLETED | OUTPATIENT
Start: 2023-04-01 | End: 2023-04-01

## 2023-04-01 RX ORDER — WATER 1000 ML/1000ML
INJECTION, SOLUTION INTRAVENOUS
Status: COMPLETED
Start: 2023-04-01 | End: 2023-04-01

## 2023-04-01 RX ADMIN — MEMANTINE HYDROCHLORIDE 5 MG: 5 TABLET, FILM COATED ORAL at 20:27

## 2023-04-01 RX ADMIN — GUAIFENESIN 400 MG: 400 TABLET ORAL at 20:27

## 2023-04-01 RX ADMIN — GABAPENTIN 100 MG: 100 CAPSULE ORAL at 15:04

## 2023-04-01 RX ADMIN — SODIUM CHLORIDE, PRESERVATIVE FREE 10 ML: 5 INJECTION INTRAVENOUS at 13:46

## 2023-04-01 RX ADMIN — Medication 10 ML: at 07:45

## 2023-04-01 RX ADMIN — GUAIFENESIN 400 MG: 400 TABLET ORAL at 07:46

## 2023-04-01 RX ADMIN — BACLOFEN 10 MG: 10 TABLET ORAL at 07:45

## 2023-04-01 RX ADMIN — ATORVASTATIN CALCIUM 40 MG: 40 TABLET, FILM COATED ORAL at 20:27

## 2023-04-01 RX ADMIN — AMLODIPINE BESYLATE 5 MG: 5 TABLET ORAL at 07:46

## 2023-04-01 RX ADMIN — METHYLPREDNISOLONE SODIUM SUCCINATE 40 MG: 40 INJECTION INTRAMUSCULAR; INTRAVENOUS at 23:58

## 2023-04-01 RX ADMIN — SERTRALINE 150 MG: 100 TABLET, FILM COATED ORAL at 20:27

## 2023-04-01 RX ADMIN — GUAIFENESIN 400 MG: 400 TABLET ORAL at 15:04

## 2023-04-01 RX ADMIN — PANTOPRAZOLE SODIUM 40 MG: 40 TABLET, DELAYED RELEASE ORAL at 05:41

## 2023-04-01 RX ADMIN — ALBUTEROL SULFATE 2.5 MG: 2.5 SOLUTION RESPIRATORY (INHALATION) at 16:08

## 2023-04-01 RX ADMIN — BUDESONIDE 500 MCG: 0.5 SUSPENSION RESPIRATORY (INHALATION) at 08:39

## 2023-04-01 RX ADMIN — CARVEDILOL 12.5 MG: 6.25 TABLET, FILM COATED ORAL at 07:46

## 2023-04-01 RX ADMIN — Medication 10 ML: at 20:28

## 2023-04-01 RX ADMIN — METHYLPREDNISOLONE SODIUM SUCCINATE 40 MG: 40 INJECTION INTRAMUSCULAR; INTRAVENOUS at 00:02

## 2023-04-01 RX ADMIN — GABAPENTIN 100 MG: 100 CAPSULE ORAL at 20:27

## 2023-04-01 RX ADMIN — WATER 1 ML: 1 INJECTION INTRAMUSCULAR; INTRAVENOUS; SUBCUTANEOUS at 23:58

## 2023-04-01 RX ADMIN — SODIUM CHLORIDE, PRESERVATIVE FREE 10 ML: 5 INJECTION INTRAVENOUS at 16:39

## 2023-04-01 RX ADMIN — ARFORMOTEROL TARTRATE 15 MCG: 15 SOLUTION RESPIRATORY (INHALATION) at 20:16

## 2023-04-01 RX ADMIN — ALBUTEROL SULFATE 2.5 MG: 2.5 SOLUTION RESPIRATORY (INHALATION) at 20:15

## 2023-04-01 RX ADMIN — CYANOCOBALAMIN TAB 1000 MCG 1000 MCG: 1000 TAB at 07:46

## 2023-04-01 RX ADMIN — FUROSEMIDE 40 MG: 10 INJECTION, SOLUTION INTRAMUSCULAR; INTRAVENOUS at 16:39

## 2023-04-01 RX ADMIN — METHYLPREDNISOLONE SODIUM SUCCINATE 40 MG: 40 INJECTION INTRAMUSCULAR; INTRAVENOUS at 13:46

## 2023-04-01 RX ADMIN — SODIUM CHLORIDE, PRESERVATIVE FREE 10 ML: 5 INJECTION INTRAVENOUS at 00:03

## 2023-04-01 RX ADMIN — GABAPENTIN 100 MG: 100 CAPSULE ORAL at 07:45

## 2023-04-01 RX ADMIN — ALBUTEROL SULFATE 2.5 MG: 2.5 SOLUTION RESPIRATORY (INHALATION) at 08:39

## 2023-04-01 RX ADMIN — MIRTAZAPINE 15 MG: 15 TABLET, FILM COATED ORAL at 20:27

## 2023-04-01 RX ADMIN — ALBUTEROL SULFATE 2.5 MG: 2.5 SOLUTION RESPIRATORY (INHALATION) at 11:56

## 2023-04-01 RX ADMIN — BACLOFEN 10 MG: 10 TABLET ORAL at 20:27

## 2023-04-01 RX ADMIN — FOLIC ACID 1 MG: 1 TABLET ORAL at 07:46

## 2023-04-01 RX ADMIN — ARFORMOTEROL TARTRATE 15 MCG: 15 SOLUTION RESPIRATORY (INHALATION) at 08:39

## 2023-04-01 RX ADMIN — BUDESONIDE 500 MCG: 0.5 SUSPENSION RESPIRATORY (INHALATION) at 20:15

## 2023-04-01 ASSESSMENT — PAIN SCALES - GENERAL
PAINLEVEL_OUTOF10: 0

## 2023-04-01 NOTE — PROCEDURES
Indication: Pleural Effusion    Performed by:Isabelle Anand    Consent: Verbal consent obtained. Written consent obtained. Risks and benefits: risks, benefits and alternatives were discussed including bleeding and pneumothorax  Consent given by: the patient  Patient understanding: patient states understanding of the procedure being performed  Time out: Immediately prior to procedure a \"time out\" was called to verify the correct  Patient was made up in sitting position and ultrasound was done and site of maximum fluid collection was marked on left  Preparation: Patient was prepped and draped in the usual sterile fashion. Local anesthesia used: yes  Local anesthetic: lidocaine 2% without epinephrine  Once there was free fluid return needle was changed with 18 g soft catheter and 30 cc of clear yellow fluid drained from the on left pleural space. There was lung in the way. Patient is coughing up thick secretions. Patient tolerated the procedure well  Complications: None apparent  Chest Xray post procedure ordered to rule out pneumothorax. Following Labs Sent;  LDH, Glucose, total Protein, cell count and Diff  Gram Stain and Cultures and Sensitivity, AFB smear and Cultures, and KOH stain and Fungal cultures.   Cytology

## 2023-04-02 PROBLEM — Z98.890 STATUS POST THORACENTESIS: Status: ACTIVE | Noted: 2023-01-01

## 2023-04-02 LAB
ANION GAP SERPL CALCULATED.3IONS-SCNC: 12 MMOL/L (ref 7–16)
BUN SERPL-MCNC: 29 MG/DL (ref 6–23)
CALCIUM SERPL-MCNC: 9.4 MG/DL (ref 8.6–10.2)
CHLORIDE SERPL-SCNC: 104 MMOL/L (ref 98–107)
CO2 SERPL-SCNC: 27 MMOL/L (ref 22–29)
CREAT SERPL-MCNC: 1.2 MG/DL (ref 0.5–1)
ERYTHROCYTE [DISTWIDTH] IN BLOOD BY AUTOMATED COUNT: 15.9 FL (ref 11.5–15)
GLUCOSE SERPL-MCNC: 149 MG/DL (ref 74–99)
HCT VFR BLD AUTO: 28.7 % (ref 34–48)
HGB BLD-MCNC: 8.4 G/DL (ref 11.5–15.5)
MCH RBC QN AUTO: 25.4 PG (ref 26–35)
MCHC RBC AUTO-ENTMCNC: 29.3 % (ref 32–34.5)
MCV RBC AUTO: 86.7 FL (ref 80–99.9)
PLATELET # BLD AUTO: 436 E9/L (ref 130–450)
PMV BLD AUTO: 9.8 FL (ref 7–12)
POTASSIUM SERPL-SCNC: 4 MMOL/L (ref 3.5–5)
RBC # BLD AUTO: 3.31 E12/L (ref 3.5–5.5)
SODIUM SERPL-SCNC: 143 MMOL/L (ref 132–146)
WBC # BLD: 11.2 E9/L (ref 4.5–11.5)

## 2023-04-02 PROCEDURE — 6360000002 HC RX W HCPCS: Performed by: CLINICAL NURSE SPECIALIST

## 2023-04-02 PROCEDURE — 85027 COMPLETE CBC AUTOMATED: CPT

## 2023-04-02 PROCEDURE — 99233 SBSQ HOSP IP/OBS HIGH 50: CPT | Performed by: INTERNAL MEDICINE

## 2023-04-02 PROCEDURE — 6360000002 HC RX W HCPCS: Performed by: INTERNAL MEDICINE

## 2023-04-02 PROCEDURE — 94640 AIRWAY INHALATION TREATMENT: CPT

## 2023-04-02 PROCEDURE — 6370000000 HC RX 637 (ALT 250 FOR IP): Performed by: INTERNAL MEDICINE

## 2023-04-02 PROCEDURE — 6360000002 HC RX W HCPCS

## 2023-04-02 PROCEDURE — 94668 MNPJ CHEST WALL SBSQ: CPT

## 2023-04-02 PROCEDURE — 2700000000 HC OXYGEN THERAPY PER DAY

## 2023-04-02 PROCEDURE — 36415 COLL VENOUS BLD VENIPUNCTURE: CPT

## 2023-04-02 PROCEDURE — 2580000003 HC RX 258: Performed by: INTERNAL MEDICINE

## 2023-04-02 PROCEDURE — 80048 BASIC METABOLIC PNL TOTAL CA: CPT

## 2023-04-02 PROCEDURE — 6370000000 HC RX 637 (ALT 250 FOR IP): Performed by: CLINICAL NURSE SPECIALIST

## 2023-04-02 PROCEDURE — 94660 CPAP INITIATION&MGMT: CPT

## 2023-04-02 PROCEDURE — APPSS30 APP SPLIT SHARED TIME 16-30 MINUTES: Performed by: NURSE PRACTITIONER

## 2023-04-02 PROCEDURE — 2060000000 HC ICU INTERMEDIATE R&B

## 2023-04-02 RX ADMIN — GUAIFENESIN 400 MG: 400 TABLET ORAL at 15:58

## 2023-04-02 RX ADMIN — AMLODIPINE BESYLATE 5 MG: 5 TABLET ORAL at 08:51

## 2023-04-02 RX ADMIN — SODIUM CHLORIDE, PRESERVATIVE FREE 10 ML: 5 INJECTION INTRAVENOUS at 12:52

## 2023-04-02 RX ADMIN — Medication 10 ML: at 20:19

## 2023-04-02 RX ADMIN — CARVEDILOL 12.5 MG: 6.25 TABLET, FILM COATED ORAL at 08:51

## 2023-04-02 RX ADMIN — ATORVASTATIN CALCIUM 40 MG: 40 TABLET, FILM COATED ORAL at 20:17

## 2023-04-02 RX ADMIN — GUAIFENESIN 400 MG: 400 TABLET ORAL at 20:17

## 2023-04-02 RX ADMIN — GABAPENTIN 100 MG: 100 CAPSULE ORAL at 20:17

## 2023-04-02 RX ADMIN — BUDESONIDE 500 MCG: 0.5 SUSPENSION RESPIRATORY (INHALATION) at 19:46

## 2023-04-02 RX ADMIN — CARVEDILOL 12.5 MG: 6.25 TABLET, FILM COATED ORAL at 15:58

## 2023-04-02 RX ADMIN — Medication 10 ML: at 08:51

## 2023-04-02 RX ADMIN — PANTOPRAZOLE SODIUM 40 MG: 40 TABLET, DELAYED RELEASE ORAL at 06:10

## 2023-04-02 RX ADMIN — CYANOCOBALAMIN TAB 1000 MCG 1000 MCG: 1000 TAB at 08:51

## 2023-04-02 RX ADMIN — FOLIC ACID 1 MG: 1 TABLET ORAL at 08:51

## 2023-04-02 RX ADMIN — ALBUTEROL SULFATE 2.5 MG: 2.5 SOLUTION RESPIRATORY (INHALATION) at 13:02

## 2023-04-02 RX ADMIN — ARFORMOTEROL TARTRATE 15 MCG: 15 SOLUTION RESPIRATORY (INHALATION) at 08:12

## 2023-04-02 RX ADMIN — BACLOFEN 10 MG: 10 TABLET ORAL at 20:16

## 2023-04-02 RX ADMIN — MEMANTINE HYDROCHLORIDE 5 MG: 5 TABLET, FILM COATED ORAL at 20:17

## 2023-04-02 RX ADMIN — GUAIFENESIN 400 MG: 400 TABLET ORAL at 08:51

## 2023-04-02 RX ADMIN — BACLOFEN 10 MG: 10 TABLET ORAL at 08:51

## 2023-04-02 RX ADMIN — ALBUTEROL SULFATE 2.5 MG: 2.5 SOLUTION RESPIRATORY (INHALATION) at 16:50

## 2023-04-02 RX ADMIN — METHYLPREDNISOLONE SODIUM SUCCINATE 40 MG: 40 INJECTION INTRAMUSCULAR; INTRAVENOUS at 12:52

## 2023-04-02 RX ADMIN — SERTRALINE 150 MG: 100 TABLET, FILM COATED ORAL at 20:17

## 2023-04-02 RX ADMIN — GABAPENTIN 100 MG: 100 CAPSULE ORAL at 12:55

## 2023-04-02 RX ADMIN — ARFORMOTEROL TARTRATE 15 MCG: 15 SOLUTION RESPIRATORY (INHALATION) at 19:46

## 2023-04-02 RX ADMIN — BUDESONIDE 500 MCG: 0.5 SUSPENSION RESPIRATORY (INHALATION) at 08:12

## 2023-04-02 RX ADMIN — ENOXAPARIN SODIUM 40 MG: 100 INJECTION SUBCUTANEOUS at 15:58

## 2023-04-02 RX ADMIN — ALBUTEROL SULFATE 2.5 MG: 2.5 SOLUTION RESPIRATORY (INHALATION) at 19:46

## 2023-04-02 RX ADMIN — ALBUTEROL SULFATE 2.5 MG: 2.5 SOLUTION RESPIRATORY (INHALATION) at 08:12

## 2023-04-02 RX ADMIN — GABAPENTIN 100 MG: 100 CAPSULE ORAL at 08:51

## 2023-04-02 RX ADMIN — MIRTAZAPINE 15 MG: 15 TABLET, FILM COATED ORAL at 20:17

## 2023-04-02 ASSESSMENT — PAIN SCALES - GENERAL
PAINLEVEL_OUTOF10: 0
PAINLEVEL_OUTOF10: 0

## 2023-04-03 LAB
ANION GAP SERPL CALCULATED.3IONS-SCNC: 11 MMOL/L (ref 7–16)
BUN SERPL-MCNC: 32 MG/DL (ref 6–23)
CALCIUM SERPL-MCNC: 8.7 MG/DL (ref 8.6–10.2)
CHLORIDE SERPL-SCNC: 105 MMOL/L (ref 98–107)
CO2 SERPL-SCNC: 26 MMOL/L (ref 22–29)
CREAT SERPL-MCNC: 1.1 MG/DL (ref 0.5–1)
ERYTHROCYTE [DISTWIDTH] IN BLOOD BY AUTOMATED COUNT: 15.9 FL (ref 11.5–15)
GLUCOSE SERPL-MCNC: 122 MG/DL (ref 74–99)
HCT VFR BLD AUTO: 26.1 % (ref 34–48)
HGB BLD-MCNC: 7.8 G/DL (ref 11.5–15.5)
MCH RBC QN AUTO: 26.1 PG (ref 26–35)
MCHC RBC AUTO-ENTMCNC: 29.9 % (ref 32–34.5)
MCV RBC AUTO: 87.3 FL (ref 80–99.9)
METER GLUCOSE: 120 MG/DL (ref 74–99)
PLATELET # BLD AUTO: 420 E9/L (ref 130–450)
PMV BLD AUTO: 10.2 FL (ref 7–12)
POTASSIUM SERPL-SCNC: 4.2 MMOL/L (ref 3.5–5)
RBC # BLD AUTO: 2.99 E12/L (ref 3.5–5.5)
SODIUM SERPL-SCNC: 142 MMOL/L (ref 132–146)
WBC # BLD: 9.5 E9/L (ref 4.5–11.5)

## 2023-04-03 PROCEDURE — 94660 CPAP INITIATION&MGMT: CPT

## 2023-04-03 PROCEDURE — 6360000002 HC RX W HCPCS: Performed by: INTERNAL MEDICINE

## 2023-04-03 PROCEDURE — 6370000000 HC RX 637 (ALT 250 FOR IP): Performed by: INTERNAL MEDICINE

## 2023-04-03 PROCEDURE — 6370000000 HC RX 637 (ALT 250 FOR IP): Performed by: CLINICAL NURSE SPECIALIST

## 2023-04-03 PROCEDURE — 2580000003 HC RX 258: Performed by: INTERNAL MEDICINE

## 2023-04-03 PROCEDURE — 80048 BASIC METABOLIC PNL TOTAL CA: CPT

## 2023-04-03 PROCEDURE — 2580000003 HC RX 258

## 2023-04-03 PROCEDURE — 6360000002 HC RX W HCPCS

## 2023-04-03 PROCEDURE — 99233 SBSQ HOSP IP/OBS HIGH 50: CPT | Performed by: INTERNAL MEDICINE

## 2023-04-03 PROCEDURE — 2060000000 HC ICU INTERMEDIATE R&B

## 2023-04-03 PROCEDURE — 97530 THERAPEUTIC ACTIVITIES: CPT

## 2023-04-03 PROCEDURE — 94640 AIRWAY INHALATION TREATMENT: CPT

## 2023-04-03 PROCEDURE — 94669 MECHANICAL CHEST WALL OSCILL: CPT

## 2023-04-03 PROCEDURE — 2700000000 HC OXYGEN THERAPY PER DAY

## 2023-04-03 PROCEDURE — 94668 MNPJ CHEST WALL SBSQ: CPT

## 2023-04-03 PROCEDURE — 99231 SBSQ HOSP IP/OBS SF/LOW 25: CPT | Performed by: NURSE PRACTITIONER

## 2023-04-03 PROCEDURE — 82962 GLUCOSE BLOOD TEST: CPT

## 2023-04-03 PROCEDURE — 85027 COMPLETE CBC AUTOMATED: CPT

## 2023-04-03 PROCEDURE — 36415 COLL VENOUS BLD VENIPUNCTURE: CPT

## 2023-04-03 PROCEDURE — 97161 PT EVAL LOW COMPLEX 20 MIN: CPT

## 2023-04-03 RX ORDER — ALBUTEROL SULFATE 2.5 MG/3ML
2.5 SOLUTION RESPIRATORY (INHALATION) EVERY 4 HOURS PRN
Status: DISCONTINUED | OUTPATIENT
Start: 2023-04-03 | End: 2023-04-06 | Stop reason: HOSPADM

## 2023-04-03 RX ORDER — WATER 1000 ML/1000ML
INJECTION, SOLUTION INTRAVENOUS
Status: COMPLETED
Start: 2023-04-03 | End: 2023-04-03

## 2023-04-03 RX ADMIN — GABAPENTIN 100 MG: 100 CAPSULE ORAL at 14:22

## 2023-04-03 RX ADMIN — GUAIFENESIN 400 MG: 400 TABLET ORAL at 21:49

## 2023-04-03 RX ADMIN — ARFORMOTEROL TARTRATE 15 MCG: 15 SOLUTION RESPIRATORY (INHALATION) at 08:31

## 2023-04-03 RX ADMIN — MEMANTINE HYDROCHLORIDE 5 MG: 5 TABLET, FILM COATED ORAL at 08:11

## 2023-04-03 RX ADMIN — BACLOFEN 10 MG: 10 TABLET ORAL at 21:49

## 2023-04-03 RX ADMIN — ATORVASTATIN CALCIUM 40 MG: 40 TABLET, FILM COATED ORAL at 21:49

## 2023-04-03 RX ADMIN — ARFORMOTEROL TARTRATE 15 MCG: 15 SOLUTION RESPIRATORY (INHALATION) at 19:49

## 2023-04-03 RX ADMIN — CARVEDILOL 12.5 MG: 6.25 TABLET, FILM COATED ORAL at 17:31

## 2023-04-03 RX ADMIN — AMLODIPINE BESYLATE 5 MG: 5 TABLET ORAL at 08:11

## 2023-04-03 RX ADMIN — PANTOPRAZOLE SODIUM 40 MG: 40 TABLET, DELAYED RELEASE ORAL at 06:24

## 2023-04-03 RX ADMIN — Medication 10 ML: at 08:12

## 2023-04-03 RX ADMIN — ENOXAPARIN SODIUM 40 MG: 100 INJECTION SUBCUTANEOUS at 16:03

## 2023-04-03 RX ADMIN — GUAIFENESIN 400 MG: 400 TABLET ORAL at 08:11

## 2023-04-03 RX ADMIN — SERTRALINE 150 MG: 100 TABLET, FILM COATED ORAL at 21:50

## 2023-04-03 RX ADMIN — FOLIC ACID 1 MG: 1 TABLET ORAL at 08:11

## 2023-04-03 RX ADMIN — WATER: 1 INJECTION INTRAMUSCULAR; INTRAVENOUS; SUBCUTANEOUS at 00:57

## 2023-04-03 RX ADMIN — CARVEDILOL 12.5 MG: 6.25 TABLET, FILM COATED ORAL at 08:12

## 2023-04-03 RX ADMIN — GUAIFENESIN 400 MG: 400 TABLET ORAL at 16:03

## 2023-04-03 RX ADMIN — METHYLPREDNISOLONE SODIUM SUCCINATE 40 MG: 40 INJECTION INTRAMUSCULAR; INTRAVENOUS at 11:14

## 2023-04-03 RX ADMIN — MIRTAZAPINE 15 MG: 15 TABLET, FILM COATED ORAL at 21:50

## 2023-04-03 RX ADMIN — BACLOFEN 10 MG: 10 TABLET ORAL at 08:11

## 2023-04-03 RX ADMIN — MEMANTINE HYDROCHLORIDE 5 MG: 5 TABLET, FILM COATED ORAL at 21:49

## 2023-04-03 RX ADMIN — GABAPENTIN 100 MG: 100 CAPSULE ORAL at 08:12

## 2023-04-03 RX ADMIN — Medication 10 ML: at 21:50

## 2023-04-03 RX ADMIN — CYANOCOBALAMIN TAB 1000 MCG 1000 MCG: 1000 TAB at 08:11

## 2023-04-03 RX ADMIN — GABAPENTIN 100 MG: 100 CAPSULE ORAL at 21:50

## 2023-04-03 RX ADMIN — METHYLPREDNISOLONE SODIUM SUCCINATE 40 MG: 40 INJECTION INTRAMUSCULAR; INTRAVENOUS at 23:59

## 2023-04-03 RX ADMIN — METHYLPREDNISOLONE SODIUM SUCCINATE 40 MG: 40 INJECTION INTRAMUSCULAR; INTRAVENOUS at 00:43

## 2023-04-03 RX ADMIN — BUDESONIDE 500 MCG: 0.5 SUSPENSION RESPIRATORY (INHALATION) at 19:49

## 2023-04-03 RX ADMIN — BUDESONIDE 500 MCG: 0.5 SUSPENSION RESPIRATORY (INHALATION) at 08:31

## 2023-04-03 ASSESSMENT — PAIN SCALES - GENERAL
PAINLEVEL_OUTOF10: 0

## 2023-04-04 LAB
ACID FAST STN SPEC QL: NORMAL
ANION GAP SERPL CALCULATED.3IONS-SCNC: 7 MMOL/L (ref 7–16)
B.E.: 2 MMOL/L (ref -3–3)
BUN SERPL-MCNC: 33 MG/DL (ref 6–23)
CALCIUM SERPL-MCNC: 8.5 MG/DL (ref 8.6–10.2)
CHLORIDE SERPL-SCNC: 107 MMOL/L (ref 98–107)
CO2 SERPL-SCNC: 28 MMOL/L (ref 22–29)
COHB: 0.7 % (ref 0–1.5)
CREAT SERPL-MCNC: 1.1 MG/DL (ref 0.5–1)
CRITICAL: ABNORMAL
DATE ANALYZED: ABNORMAL
DATE OF COLLECTION: ABNORMAL
ERYTHROCYTE [DISTWIDTH] IN BLOOD BY AUTOMATED COUNT: 15.9 FL (ref 11.5–15)
GLUCOSE SERPL-MCNC: 153 MG/DL (ref 74–99)
HCO3: 26.9 MMOL/L (ref 22–26)
HCT VFR BLD AUTO: 29.1 % (ref 34–48)
HGB BLD-MCNC: 8.7 G/DL (ref 11.5–15.5)
HHB: 6.6 % (ref 0–5)
LAB: ABNORMAL
LDH SERPL-CCNC: 281 U/L (ref 135–214)
Lab: ABNORMAL
MCH RBC QN AUTO: 25.9 PG (ref 26–35)
MCHC RBC AUTO-ENTMCNC: 29.9 % (ref 32–34.5)
MCV RBC AUTO: 86.6 FL (ref 80–99.9)
METHB: 0.3 % (ref 0–1.5)
MODE: ABNORMAL
O2 CONTENT: 11.9 ML/DL
O2 SATURATION: 93.3 % (ref 92–98.5)
O2HB: 92.4 % (ref 94–97)
OPERATOR ID: ABNORMAL
PATIENT TEMP: 37 C
PCO2: 43.4 MMHG (ref 35–45)
PH BLOOD GAS: 7.41 (ref 7.35–7.45)
PLATELET # BLD AUTO: 478 E9/L (ref 130–450)
PMV BLD AUTO: 9.6 FL (ref 7–12)
PO2: 69.2 MMHG (ref 75–100)
POTASSIUM SERPL-SCNC: 4.2 MMOL/L (ref 3.5–5)
RBC # BLD AUTO: 3.36 E12/L (ref 3.5–5.5)
SODIUM SERPL-SCNC: 142 MMOL/L (ref 132–146)
SOURCE, BLOOD GAS: ABNORMAL
THB: 9.1 G/DL (ref 11.5–16.5)
TIME ANALYZED: 1242
WBC # BLD: 9 E9/L (ref 4.5–11.5)

## 2023-04-04 PROCEDURE — 2580000003 HC RX 258: Performed by: INTERNAL MEDICINE

## 2023-04-04 PROCEDURE — 94640 AIRWAY INHALATION TREATMENT: CPT

## 2023-04-04 PROCEDURE — 1200000000 HC SEMI PRIVATE

## 2023-04-04 PROCEDURE — 36600 WITHDRAWAL OF ARTERIAL BLOOD: CPT

## 2023-04-04 PROCEDURE — 2700000000 HC OXYGEN THERAPY PER DAY

## 2023-04-04 PROCEDURE — 82805 BLOOD GASES W/O2 SATURATION: CPT

## 2023-04-04 PROCEDURE — 94668 MNPJ CHEST WALL SBSQ: CPT

## 2023-04-04 PROCEDURE — 85027 COMPLETE CBC AUTOMATED: CPT

## 2023-04-04 PROCEDURE — 6370000000 HC RX 637 (ALT 250 FOR IP): Performed by: CLINICAL NURSE SPECIALIST

## 2023-04-04 PROCEDURE — 6360000002 HC RX W HCPCS: Performed by: INTERNAL MEDICINE

## 2023-04-04 PROCEDURE — 36415 COLL VENOUS BLD VENIPUNCTURE: CPT

## 2023-04-04 PROCEDURE — 97530 THERAPEUTIC ACTIVITIES: CPT

## 2023-04-04 PROCEDURE — 99232 SBSQ HOSP IP/OBS MODERATE 35: CPT | Performed by: INTERNAL MEDICINE

## 2023-04-04 PROCEDURE — 83615 LACTATE (LD) (LDH) ENZYME: CPT

## 2023-04-04 PROCEDURE — 94660 CPAP INITIATION&MGMT: CPT

## 2023-04-04 PROCEDURE — 80048 BASIC METABOLIC PNL TOTAL CA: CPT

## 2023-04-04 PROCEDURE — 6370000000 HC RX 637 (ALT 250 FOR IP): Performed by: INTERNAL MEDICINE

## 2023-04-04 RX ORDER — METHYLPREDNISOLONE SODIUM SUCCINATE 40 MG/ML
40 INJECTION, POWDER, LYOPHILIZED, FOR SOLUTION INTRAMUSCULAR; INTRAVENOUS DAILY
Status: DISCONTINUED | OUTPATIENT
Start: 2023-04-05 | End: 2023-04-05

## 2023-04-04 RX ADMIN — PANTOPRAZOLE SODIUM 40 MG: 40 TABLET, DELAYED RELEASE ORAL at 06:11

## 2023-04-04 RX ADMIN — BUDESONIDE 500 MCG: 0.5 SUSPENSION RESPIRATORY (INHALATION) at 08:19

## 2023-04-04 RX ADMIN — ATORVASTATIN CALCIUM 40 MG: 40 TABLET, FILM COATED ORAL at 21:47

## 2023-04-04 RX ADMIN — CARVEDILOL 12.5 MG: 6.25 TABLET, FILM COATED ORAL at 17:08

## 2023-04-04 RX ADMIN — CARVEDILOL 12.5 MG: 6.25 TABLET, FILM COATED ORAL at 09:13

## 2023-04-04 RX ADMIN — Medication 10 ML: at 09:15

## 2023-04-04 RX ADMIN — FOLIC ACID 1 MG: 1 TABLET ORAL at 09:12

## 2023-04-04 RX ADMIN — GUAIFENESIN 400 MG: 400 TABLET ORAL at 09:12

## 2023-04-04 RX ADMIN — ARFORMOTEROL TARTRATE 15 MCG: 15 SOLUTION RESPIRATORY (INHALATION) at 20:52

## 2023-04-04 RX ADMIN — GUAIFENESIN 400 MG: 400 TABLET ORAL at 14:39

## 2023-04-04 RX ADMIN — SERTRALINE 150 MG: 100 TABLET, FILM COATED ORAL at 21:47

## 2023-04-04 RX ADMIN — Medication 10 ML: at 20:38

## 2023-04-04 RX ADMIN — ENOXAPARIN SODIUM 40 MG: 100 INJECTION SUBCUTANEOUS at 17:08

## 2023-04-04 RX ADMIN — GABAPENTIN 100 MG: 100 CAPSULE ORAL at 21:47

## 2023-04-04 RX ADMIN — BACLOFEN 10 MG: 10 TABLET ORAL at 09:12

## 2023-04-04 RX ADMIN — ARFORMOTEROL TARTRATE 15 MCG: 15 SOLUTION RESPIRATORY (INHALATION) at 08:19

## 2023-04-04 RX ADMIN — GUAIFENESIN 400 MG: 400 TABLET ORAL at 21:47

## 2023-04-04 RX ADMIN — CYANOCOBALAMIN TAB 1000 MCG 1000 MCG: 1000 TAB at 09:14

## 2023-04-04 RX ADMIN — MEMANTINE HYDROCHLORIDE 5 MG: 5 TABLET, FILM COATED ORAL at 21:47

## 2023-04-04 RX ADMIN — GABAPENTIN 100 MG: 100 CAPSULE ORAL at 09:14

## 2023-04-04 RX ADMIN — BACLOFEN 10 MG: 10 TABLET ORAL at 21:47

## 2023-04-04 RX ADMIN — AMLODIPINE BESYLATE 5 MG: 5 TABLET ORAL at 09:14

## 2023-04-04 RX ADMIN — GABAPENTIN 100 MG: 100 CAPSULE ORAL at 14:39

## 2023-04-04 RX ADMIN — MIRTAZAPINE 15 MG: 15 TABLET, FILM COATED ORAL at 21:47

## 2023-04-04 RX ADMIN — BUDESONIDE 500 MCG: 0.5 SUSPENSION RESPIRATORY (INHALATION) at 20:52

## 2023-04-04 ASSESSMENT — PAIN SCALES - GENERAL
PAINLEVEL_OUTOF10: 0
PAINLEVEL_OUTOF10: 0

## 2023-04-04 NOTE — DISCHARGE INSTR - COC
Pain Level: 0  Last Weight:   Wt Readings from Last 1 Encounters:   04/04/23 156 lb 5 oz (70.9 kg)     Mental Status:  oriented and alert    IV Access:  - None    Nursing Mobility/ADLs:  Walking   Assisted  Transfer  Assisted  Bathing  Assisted  Dressing  Assisted  Toileting  Assisted  Feeding  Independent  Med Admin  Assisted  Med Delivery   whole    Wound Care Documentation and Therapy:        Elimination:  Continence: Bowel: Yes  Bladder: Yes  Urinary Catheter: None   Colostomy/Ileostomy/Ileal Conduit: No       Date of Last BM: 4/6/23    Intake/Output Summary (Last 24 hours) at 4/4/2023 0949  Last data filed at 4/4/2023 0454  Gross per 24 hour   Intake 120 ml   Output 1350 ml   Net -1230 ml     I/O last 3 completed shifts: In: 120 [P.O.:120]  Out: 1350 [Urine:1350]    Safety Concerns:     History of Falls (last 30 days) and At Risk for Falls    Impairments/Disabilities:      None    Nutrition Therapy:  Current Nutrition Therapy:   - Oral Diet:  General    Routes of Feeding: Oral  Liquids: No Restrictions  Daily Fluid Restriction: no  Last Modified Barium Swallow with Video (Video Swallowing Test): not done    Treatments at the Time of Hospital Discharge:   Respiratory Treatments:   Oxygen Therapy:  is on oxygen at 3 L/min per nasal cannula.   Ventilator:    - No ventilator support    Rehab Therapies: Physical Therapy and Occupational Therapy  Weight Bearing Status/Restrictions: No weight bearing restrictions  Other Medical Equipment (for information only, NOT a DME order):  walker and surgical boot    Other Treatments:     Patient's personal belongings (please select all that are sent with patient):      RN SIGNATURE:  Electronically signed by Gavin Sharma RN on 4/6/23 at 3:20 PM EDT    CASE MANAGEMENT/SOCIAL WORK SECTION    Inpatient Status Date: ***    Readmission Risk Assessment Score:  Readmission Risk              Risk of Unplanned Readmission:  50           Discharging to Facility/ Agency   Name:

## 2023-04-05 LAB
ANION GAP SERPL CALCULATED.3IONS-SCNC: 6 MMOL/L (ref 7–16)
APPEARANCE FLUID: CLEAR
BUN SERPL-MCNC: 32 MG/DL (ref 6–23)
CALCIUM SERPL-MCNC: 8.3 MG/DL (ref 8.6–10.2)
CELL COUNT FLUID TYPE: NORMAL
CHLORIDE SERPL-SCNC: 108 MMOL/L (ref 98–107)
CO2 SERPL-SCNC: 30 MMOL/L (ref 22–29)
COLOR FLUID: YELLOW
CREAT SERPL-MCNC: 1.2 MG/DL (ref 0.5–1)
ERYTHROCYTE [DISTWIDTH] IN BLOOD BY AUTOMATED COUNT: 15.8 FL (ref 11.5–15)
GLUCOSE SERPL-MCNC: 96 MG/DL (ref 74–99)
HCT VFR BLD AUTO: 27.4 % (ref 34–48)
HGB BLD-MCNC: 8.2 G/DL (ref 11.5–15.5)
MCH RBC QN AUTO: 25.7 PG (ref 26–35)
MCHC RBC AUTO-ENTMCNC: 29.9 % (ref 32–34.5)
MCV RBC AUTO: 85.9 FL (ref 80–99.9)
MONOCYTE, FLUID: 62 %
NEUTROPHIL, FLUID: 38 %
NUCLEATED CELLS FLUID: 34 /UL
PLATELET # BLD AUTO: 437 E9/L (ref 130–450)
PMV BLD AUTO: 9.4 FL (ref 7–12)
POTASSIUM SERPL-SCNC: 3.9 MMOL/L (ref 3.5–5)
RBC # BLD AUTO: 3.19 E12/L (ref 3.5–5.5)
RBC FLUID: <2000 /UL
SODIUM SERPL-SCNC: 144 MMOL/L (ref 132–146)
WBC # BLD: 8.1 E9/L (ref 4.5–11.5)

## 2023-04-05 PROCEDURE — 6360000002 HC RX W HCPCS: Performed by: CLINICAL NURSE SPECIALIST

## 2023-04-05 PROCEDURE — 6370000000 HC RX 637 (ALT 250 FOR IP): Performed by: INTERNAL MEDICINE

## 2023-04-05 PROCEDURE — 94640 AIRWAY INHALATION TREATMENT: CPT

## 2023-04-05 PROCEDURE — 94668 MNPJ CHEST WALL SBSQ: CPT

## 2023-04-05 PROCEDURE — 6370000000 HC RX 637 (ALT 250 FOR IP): Performed by: CLINICAL NURSE SPECIALIST

## 2023-04-05 PROCEDURE — 94660 CPAP INITIATION&MGMT: CPT

## 2023-04-05 PROCEDURE — 97535 SELF CARE MNGMENT TRAINING: CPT

## 2023-04-05 PROCEDURE — 1200000000 HC SEMI PRIVATE

## 2023-04-05 PROCEDURE — APPSS30 APP SPLIT SHARED TIME 16-30 MINUTES: Performed by: NURSE PRACTITIONER

## 2023-04-05 PROCEDURE — 36415 COLL VENOUS BLD VENIPUNCTURE: CPT

## 2023-04-05 PROCEDURE — 6360000002 HC RX W HCPCS: Performed by: INTERNAL MEDICINE

## 2023-04-05 PROCEDURE — 99232 SBSQ HOSP IP/OBS MODERATE 35: CPT | Performed by: INTERNAL MEDICINE

## 2023-04-05 PROCEDURE — 85027 COMPLETE CBC AUTOMATED: CPT

## 2023-04-05 PROCEDURE — 80048 BASIC METABOLIC PNL TOTAL CA: CPT

## 2023-04-05 PROCEDURE — 2700000000 HC OXYGEN THERAPY PER DAY

## 2023-04-05 PROCEDURE — 97530 THERAPEUTIC ACTIVITIES: CPT

## 2023-04-05 PROCEDURE — 2580000003 HC RX 258: Performed by: INTERNAL MEDICINE

## 2023-04-05 RX ORDER — PREDNISONE 20 MG/1
40 TABLET ORAL DAILY
Status: DISCONTINUED | OUTPATIENT
Start: 2023-04-06 | End: 2023-04-06

## 2023-04-05 RX ADMIN — Medication 10 ML: at 20:36

## 2023-04-05 RX ADMIN — GABAPENTIN 100 MG: 100 CAPSULE ORAL at 10:21

## 2023-04-05 RX ADMIN — METHYLPREDNISOLONE SODIUM SUCCINATE 40 MG: 40 INJECTION INTRAMUSCULAR; INTRAVENOUS at 10:22

## 2023-04-05 RX ADMIN — ATORVASTATIN CALCIUM 40 MG: 40 TABLET, FILM COATED ORAL at 20:36

## 2023-04-05 RX ADMIN — BUDESONIDE 500 MCG: 0.5 SUSPENSION RESPIRATORY (INHALATION) at 08:27

## 2023-04-05 RX ADMIN — MEMANTINE HYDROCHLORIDE 5 MG: 5 TABLET, FILM COATED ORAL at 10:21

## 2023-04-05 RX ADMIN — BACLOFEN 10 MG: 10 TABLET ORAL at 10:20

## 2023-04-05 RX ADMIN — GUAIFENESIN 400 MG: 400 TABLET ORAL at 14:56

## 2023-04-05 RX ADMIN — ARFORMOTEROL TARTRATE 15 MCG: 15 SOLUTION RESPIRATORY (INHALATION) at 19:39

## 2023-04-05 RX ADMIN — Medication 10 ML: at 10:22

## 2023-04-05 RX ADMIN — MEMANTINE HYDROCHLORIDE 5 MG: 5 TABLET, FILM COATED ORAL at 20:36

## 2023-04-05 RX ADMIN — SERTRALINE 150 MG: 100 TABLET, FILM COATED ORAL at 20:36

## 2023-04-05 RX ADMIN — ENOXAPARIN SODIUM 40 MG: 100 INJECTION SUBCUTANEOUS at 15:00

## 2023-04-05 RX ADMIN — GUAIFENESIN 400 MG: 400 TABLET ORAL at 20:36

## 2023-04-05 RX ADMIN — CYANOCOBALAMIN TAB 1000 MCG 1000 MCG: 1000 TAB at 10:21

## 2023-04-05 RX ADMIN — PANTOPRAZOLE SODIUM 40 MG: 40 TABLET, DELAYED RELEASE ORAL at 06:37

## 2023-04-05 RX ADMIN — ARFORMOTEROL TARTRATE 15 MCG: 15 SOLUTION RESPIRATORY (INHALATION) at 08:27

## 2023-04-05 RX ADMIN — CARVEDILOL 12.5 MG: 6.25 TABLET, FILM COATED ORAL at 15:00

## 2023-04-05 RX ADMIN — BACLOFEN 10 MG: 10 TABLET ORAL at 20:36

## 2023-04-05 RX ADMIN — AMLODIPINE BESYLATE 5 MG: 5 TABLET ORAL at 10:21

## 2023-04-05 RX ADMIN — GUAIFENESIN 400 MG: 400 TABLET ORAL at 10:21

## 2023-04-05 RX ADMIN — CARVEDILOL 12.5 MG: 6.25 TABLET, FILM COATED ORAL at 10:21

## 2023-04-05 RX ADMIN — GABAPENTIN 100 MG: 100 CAPSULE ORAL at 20:36

## 2023-04-05 RX ADMIN — MIRTAZAPINE 15 MG: 15 TABLET, FILM COATED ORAL at 20:36

## 2023-04-05 RX ADMIN — GABAPENTIN 100 MG: 100 CAPSULE ORAL at 14:56

## 2023-04-05 RX ADMIN — FOLIC ACID 1 MG: 1 TABLET ORAL at 10:21

## 2023-04-05 RX ADMIN — BUDESONIDE 500 MCG: 0.5 SUSPENSION RESPIRATORY (INHALATION) at 19:39

## 2023-04-05 ASSESSMENT — PAIN SCALES - GENERAL
PAINLEVEL_OUTOF10: 2
PAINLEVEL_OUTOF10: 0
PAINLEVEL_OUTOF10: 0

## 2023-04-05 ASSESSMENT — PAIN DESCRIPTION - DESCRIPTORS: DESCRIPTORS: ACHING;DULL;DISCOMFORT

## 2023-04-05 ASSESSMENT — PAIN DESCRIPTION - LOCATION: LOCATION: GENERALIZED

## 2023-04-05 ASSESSMENT — PAIN - FUNCTIONAL ASSESSMENT: PAIN_FUNCTIONAL_ASSESSMENT: ACTIVITIES ARE NOT PREVENTED

## 2023-04-06 VITALS
TEMPERATURE: 98.2 F | WEIGHT: 155 LBS | BODY MASS INDEX: 28.52 KG/M2 | DIASTOLIC BLOOD PRESSURE: 58 MMHG | HEART RATE: 81 BPM | RESPIRATION RATE: 18 BRPM | SYSTOLIC BLOOD PRESSURE: 118 MMHG | HEIGHT: 62 IN | OXYGEN SATURATION: 94 %

## 2023-04-06 LAB
ANION GAP SERPL CALCULATED.3IONS-SCNC: 7 MMOL/L (ref 7–16)
BUN SERPL-MCNC: 28 MG/DL (ref 6–23)
CALCIUM SERPL-MCNC: 8.6 MG/DL (ref 8.6–10.2)
CHLORIDE SERPL-SCNC: 109 MMOL/L (ref 98–107)
CO2 SERPL-SCNC: 30 MMOL/L (ref 22–29)
CREAT SERPL-MCNC: 1 MG/DL (ref 0.5–1)
ERYTHROCYTE [DISTWIDTH] IN BLOOD BY AUTOMATED COUNT: 15.9 FL (ref 11.5–15)
GLUCOSE SERPL-MCNC: 95 MG/DL (ref 74–99)
HCT VFR BLD AUTO: 27.7 % (ref 34–48)
HGB BLD-MCNC: 8.4 G/DL (ref 11.5–15.5)
MCH RBC QN AUTO: 25.9 PG (ref 26–35)
MCHC RBC AUTO-ENTMCNC: 30.3 % (ref 32–34.5)
MCV RBC AUTO: 85.5 FL (ref 80–99.9)
PLATELET # BLD AUTO: 479 E9/L (ref 130–450)
PMV BLD AUTO: 9.9 FL (ref 7–12)
POTASSIUM SERPL-SCNC: 4.1 MMOL/L (ref 3.5–5)
RBC # BLD AUTO: 3.24 E12/L (ref 3.5–5.5)
SARS-COV-2 RDRP RESP QL NAA+PROBE: NOT DETECTED
SODIUM SERPL-SCNC: 146 MMOL/L (ref 132–146)
WBC # BLD: 8.7 E9/L (ref 4.5–11.5)

## 2023-04-06 PROCEDURE — 85027 COMPLETE CBC AUTOMATED: CPT

## 2023-04-06 PROCEDURE — 99239 HOSP IP/OBS DSCHRG MGMT >30: CPT | Performed by: PHYSICIAN ASSISTANT

## 2023-04-06 PROCEDURE — 2580000003 HC RX 258: Performed by: INTERNAL MEDICINE

## 2023-04-06 PROCEDURE — 6370000000 HC RX 637 (ALT 250 FOR IP): Performed by: INTERNAL MEDICINE

## 2023-04-06 PROCEDURE — 2700000000 HC OXYGEN THERAPY PER DAY

## 2023-04-06 PROCEDURE — 97530 THERAPEUTIC ACTIVITIES: CPT

## 2023-04-06 PROCEDURE — 36415 COLL VENOUS BLD VENIPUNCTURE: CPT

## 2023-04-06 PROCEDURE — 6360000002 HC RX W HCPCS: Performed by: INTERNAL MEDICINE

## 2023-04-06 PROCEDURE — 80048 BASIC METABOLIC PNL TOTAL CA: CPT

## 2023-04-06 PROCEDURE — 94668 MNPJ CHEST WALL SBSQ: CPT

## 2023-04-06 PROCEDURE — 94660 CPAP INITIATION&MGMT: CPT

## 2023-04-06 PROCEDURE — 6370000000 HC RX 637 (ALT 250 FOR IP): Performed by: CLINICAL NURSE SPECIALIST

## 2023-04-06 PROCEDURE — 87635 SARS-COV-2 COVID-19 AMP PRB: CPT

## 2023-04-06 PROCEDURE — 94640 AIRWAY INHALATION TREATMENT: CPT

## 2023-04-06 RX ORDER — PREDNISONE 20 MG/1
40 TABLET ORAL DAILY
Status: DISCONTINUED | OUTPATIENT
Start: 2023-04-07 | End: 2023-04-06 | Stop reason: HOSPADM

## 2023-04-06 RX ORDER — PREDNISONE 20 MG/1
40 TABLET ORAL DAILY
Qty: 4 TABLET | Refills: 0 | DISCHARGE
Start: 2023-04-07 | End: 2023-04-09

## 2023-04-06 RX ADMIN — PANTOPRAZOLE SODIUM 40 MG: 40 TABLET, DELAYED RELEASE ORAL at 06:07

## 2023-04-06 RX ADMIN — GUAIFENESIN 400 MG: 400 TABLET ORAL at 08:51

## 2023-04-06 RX ADMIN — GABAPENTIN 100 MG: 100 CAPSULE ORAL at 14:17

## 2023-04-06 RX ADMIN — BUDESONIDE 500 MCG: 0.5 SUSPENSION RESPIRATORY (INHALATION) at 08:11

## 2023-04-06 RX ADMIN — GUAIFENESIN 400 MG: 400 TABLET ORAL at 14:17

## 2023-04-06 RX ADMIN — CARVEDILOL 12.5 MG: 6.25 TABLET, FILM COATED ORAL at 08:51

## 2023-04-06 RX ADMIN — BACLOFEN 10 MG: 10 TABLET ORAL at 08:51

## 2023-04-06 RX ADMIN — CYANOCOBALAMIN TAB 1000 MCG 1000 MCG: 1000 TAB at 08:51

## 2023-04-06 RX ADMIN — Medication 5 ML: at 08:51

## 2023-04-06 RX ADMIN — AMLODIPINE BESYLATE 5 MG: 5 TABLET ORAL at 08:51

## 2023-04-06 RX ADMIN — FOLIC ACID 1 MG: 1 TABLET ORAL at 08:51

## 2023-04-06 RX ADMIN — ARFORMOTEROL TARTRATE 15 MCG: 15 SOLUTION RESPIRATORY (INHALATION) at 08:10

## 2023-04-06 RX ADMIN — MEMANTINE HYDROCHLORIDE 5 MG: 5 TABLET, FILM COATED ORAL at 08:51

## 2023-04-06 RX ADMIN — PREDNISONE 40 MG: 20 TABLET ORAL at 08:51

## 2023-04-06 RX ADMIN — GABAPENTIN 100 MG: 100 CAPSULE ORAL at 08:51

## 2023-04-06 ASSESSMENT — PAIN SCALES - GENERAL
PAINLEVEL_OUTOF10: 0
PAINLEVEL_OUTOF10: 0

## 2023-04-06 NOTE — PLAN OF CARE
[facility] Addendum    I have personally participated in history and physical exam, and have personally and independently reviewed vitals, labs, imaging reports, microbiology studies, and cardiac studies as appropriate. This participation and review was done on the date of service and in addition to that of Tonio Fagan NP. I have reviewed the documentation of INDIO, with additional thoughts and/or corrections as follows:    Physical Exam  Vitals: BP (!) 133/53   Pulse 98   Temp 97.7 °F (36.5 °C) (Oral)   Resp 20   Ht 5' 2\" (1.575 m)   Wt 155 lb (70.3 kg)   SpO2 (!) 89%   BMI 28.35 kg/m²   General: well-developed, well-nourished, no acute distress, cooperative  Skin: generally warm, dry, and intact, with normal color  HEENT: normocephalic, atraumatic, no gross abnormalities  Respiratory: clear to auscultation bilaterally without respiratory distress  Cardiovascular: regular rate and rhythm without murmur / rub / gallop  Abdominal: soft, nontender, nondistended, normoactive bowel sounds  Extremities: no obvious edema or deformity  Neurologic: awake, alert, no gross deficits  Psychiatric: normal affect, cooperative    Assessment  Acute on chronic hypoxia due to COPD exacerbation due to LLL CAP, setting of continued tobacco abuse  Recent admission ([facility] due to UTI)  Transverse fracture of L 5th proximal phalanx, s/p fall   Hx HFpEF, HTN, HPL, CKDIII, chronic anemia due to B12 and folate deficiency, MS, prior T12 compression fx, past brain aneurysms, PUD, neuropathy    Plan  Remains on 15 L but now intermittently hypoxic in the 80s, may benefit from heated high flow oxygen. Chest x-ray now showing complete opacification of the left hemithorax. Patient is currently on nebulizers including Mucomyst, Brovana, Pulmicort, and albuterol. Also on IV Solu-Medrol and is getting chest physiotherapy, using incentive spirometer, and using NIPPV as much as she can tolerate.   Status post a 5-day course
[facility] Addendum    I have personally participated in history and physical exam, and have personally and independently reviewed vitals, labs, imaging reports, microbiology studies, and cardiac studies as appropriate. This participation and review was done on the date of service and in addition to that of Tremayne Beltrán NP. I have reviewed the documentation of INDIO, with additional thoughts and/or corrections as follows:    Physical Exam  Vitals: BP (!) 143/78   Pulse 91   Temp 97.8 °F (36.6 °C) (Oral)   Resp 16   Ht 5' 2\" (1.575 m)   Wt 150 lb 8 oz (68.3 kg)   SpO2 96%   BMI 27.53 kg/m²   General: well-developed, well-nourished, no acute distress, cooperative  Skin: generally warm, dry, and intact, with normal color  HEENT: normocephalic, atraumatic, no gross abnormalities  Respiratory: clear to auscultation bilaterally without respiratory distress  Cardiovascular: regular rate and rhythm without murmur / rub / gallop  Abdominal: soft, nontender, nondistended, normoactive bowel sounds  Extremities: no obvious edema or deformity  Neurologic: awake, alert, no gross deficits  Psychiatric: normal affect, cooperative    Assessment  Acute on chronic hypoxia due to COPD exacerbation due to LLL CAP, setting of continued tobacco abuse  Recent admission ([facility] due to UTI)  Transverse fracture of L 5th proximal phalanx, s/p fall   Hx HFpEF, HTN, HPL, CKDIII, chronic anemia due to B12 and folate deficiency, MS, prior T12 compression fx, past brain aneurysms, PUD, neuropathy    Plan  Currently has a significant oxygen requirement, she is on 15 L high flow and normally only wears 3 L at night. Continue Rocephin and doxycycline, Mucomyst, albuterol, Brovana, Pulmicort, chest physiotherapy, and incentive spirometry. Pulm following, input reviewed and appreciated.   Palliative care was consulted and saw the patient today, once the patient's daughter is in town tomorrow it looks like there is to be some
[facility] Addendum    I have personally participated in history and physical exam, and have personally and independently reviewed vitals, labs, imaging reports, microbiology studies, and cardiac studies as appropriate. This participation and review was done on the date of service and in addition to that of Mukund Burroughs NP. I have reviewed the documentation of INDIO, with additional thoughts and/or corrections as follows:    Physical Exam  Vitals: BP (!) 110/51   Pulse 83   Temp 98 °F (36.7 °C) (Oral)   Resp 18   Ht 5' 2\" (1.575 m)   Wt 154 lb 8 oz (70.1 kg)   SpO2 95%   BMI 28.26 kg/m²   General: well-developed, well-nourished, no acute distress, cooperative  Skin: generally warm, dry, and intact, with normal color  HEENT: normocephalic, atraumatic, no gross abnormalities  Respiratory: clear to auscultation bilaterally without respiratory distress  Cardiovascular: regular rate and rhythm without murmur / rub / gallop  Abdominal: soft, nontender, nondistended, normoactive bowel sounds  Extremities: no obvious edema or deformity  Neurologic: awake, alert, no gross deficits  Psychiatric: normal affect, cooperative    Assessment  Acute on chronic hypoxia due to COPD exacerbation due to LLL CAP, setting of continued tobacco abuse  Recent admission ([facility] due to UTI)  Transverse fracture of L 5th proximal phalanx, s/p fall   Hx HFpEF, HTN, HPL, CKDIII, chronic anemia due to B12 and folate deficiency, MS, prior T12 compression fx, past brain aneurysms, PUD, neuropathy    Plan  Continues to improve on a daily basis, now down to just 9 L, now hopefully with that improvement can work with therapy. Vitals and labs are stable today with hemoglobin still low around 7. Would continue current measures including nebulizers, steroids, incentive spirometer, flutter valve. Course of antibiotics complete. Patient continues to refuse BiPAP due to how uncomfortable the mask is.   She did say that she would
Problem: Discharge Planning  Goal: Discharge to home or other facility with appropriate resources  4/1/2023 2013 by Vaishali Gandhi RN  Outcome: Progressing  4/1/2023 0817 by Jr Cardoza RN  Outcome: Progressing  Flowsheets  Taken 4/1/2023 0800 by Jr Cardoza RN  Discharge to home or other facility with appropriate resources:   Identify barriers to discharge with patient and caregiver   Arrange for needed discharge resources and transportation as appropriate   Identify discharge learning needs (meds, wound care, etc)   Arrange for interpreters to assist at discharge as needed   Refer to discharge planning if patient needs post-hospital services based on physician order or complex needs related to functional status, cognitive ability or social support system  Taken 3/31/2023 1955 by Jovany Hoffmann RN  Discharge to home or other facility with appropriate resources:   Identify barriers to discharge with patient and caregiver   Identify discharge learning needs (meds, wound care, etc)     Problem: Safety - Adult  Goal: Free from fall injury  4/1/2023 2013 by Vaishali Gandhi RN  Outcome: Progressing  4/1/2023 0817 by Jr Cardoza RN  Outcome: Progressing     Problem: Pain  Goal: Verbalizes/displays adequate comfort level or baseline comfort level  4/1/2023 2013 by Vaishali Gandhi RN  Outcome: Progressing  Flowsheets (Taken 4/1/2023 1628 by Jr Cardoza RN)  Verbalizes/displays adequate comfort level or baseline comfort level:   Encourage patient to monitor pain and request assistance   Assess pain using appropriate pain scale   Administer analgesics based on type and severity of pain and evaluate response   Implement non-pharmacological measures as appropriate and evaluate response   Consider cultural and social influences on pain and pain management   Notify Licensed Independent Practitioner if interventions unsuccessful or patient reports new pain  4/1/2023 0817 by Jr Cardoza RN  Outcome:
Problem: Discharge Planning  Goal: Discharge to home or other facility with appropriate resources  Outcome: Progressing     Problem: Safety - Adult  Goal: Free from fall injury  3/29/2023 1759 by Odalis Camargo  Outcome: Progressing  3/29/2023 0559 by Antionette Tenorio RN  Outcome: Progressing  3/29/2023 0559 by Antionette Tenorio RN  Outcome: Progressing     Problem: Pain  Goal: Verbalizes/displays adequate comfort level or baseline comfort level  3/29/2023 1759 by Odalis Camargo  Outcome: Progressing  3/29/2023 0559 by Antionette Tenorio RN  Outcome: Progressing
Problem: Discharge Planning  Goal: Discharge to home or other facility with appropriate resources  Outcome: Progressing     Problem: Safety - Adult  Goal: Free from fall injury  3/30/2023 1538 by Tammi Ndiaye  Outcome: Progressing  3/30/2023 0607 by Marian Dias RN  Outcome: Progressing     Problem: Pain  Goal: Verbalizes/displays adequate comfort level or baseline comfort level  3/30/2023 1538 by Tammi Ndiaye  Outcome: Progressing  3/30/2023 0607 by Marian Dias RN  Outcome: Progressing     Problem: Skin/Tissue Integrity  Goal: Absence of new skin breakdown  Description: 1. Monitor for areas of redness and/or skin breakdown  2. Assess vascular access sites hourly  3. Every 4-6 hours minimum:  Change oxygen saturation probe site  4. Every 4-6 hours:  If on nasal continuous positive airway pressure, respiratory therapy assess nares and determine need for appliance change or resting period.   Outcome: Progressing
Problem: Discharge Planning  Goal: Discharge to home or other facility with appropriate resources  Outcome: Progressing     Problem: Safety - Adult  Goal: Free from fall injury  Outcome: Progressing     Problem: Pain  Goal: Verbalizes/displays adequate comfort level or baseline comfort level  Outcome: Progressing     Problem: Skin/Tissue Integrity  Goal: Absence of new skin breakdown  Description: 1. Monitor for areas of redness and/or skin breakdown  2. Assess vascular access sites hourly  3. Every 4-6 hours minimum:  Change oxygen saturation probe site  4. Every 4-6 hours:  If on nasal continuous positive airway pressure, respiratory therapy assess nares and determine need for appliance change or resting period.   Outcome: Progressing     Problem: ABCDS Injury Assessment  Goal: Absence of physical injury  Outcome: Progressing
Problem: Discharge Planning  Goal: Discharge to home or other facility with appropriate resources  Outcome: Progressing     Problem: Safety - Adult  Goal: Free from fall injury  Outcome: Progressing  Flowsheets (Taken 4/5/2023 0933 by Antwon Salvador RN)  Free From Fall Injury: Instruct family/caregiver on patient safety     Problem: Pain  Goal: Verbalizes/displays adequate comfort level or baseline comfort level  Outcome: Progressing  Flowsheets (Taken 4/5/2023 1430 by Antwon Salvador RN)  Verbalizes/displays adequate comfort level or baseline comfort level:   Assess pain using appropriate pain scale   Encourage patient to monitor pain and request assistance     Problem: Skin/Tissue Integrity  Goal: Absence of new skin breakdown  Description: 1. Monitor for areas of redness and/or skin breakdown  2. Assess vascular access sites hourly  3. Every 4-6 hours minimum:  Change oxygen saturation probe site  4. Every 4-6 hours:  If on nasal continuous positive airway pressure, respiratory therapy assess nares and determine need for appliance change or resting period.   Outcome: Progressing     Problem: ABCDS Injury Assessment  Goal: Absence of physical injury  Outcome: Progressing  Flowsheets (Taken 4/5/2023 0933 by Antwon Salvador RN)  Absence of Physical Injury: Implement safety measures based on patient assessment
Problem: Discharge Planning  Goal: Discharge to home or other facility with appropriate resources  Outcome: Progressing  Flowsheets  Taken 4/1/2023 0800 by Kike Epps RN  Discharge to home or other facility with appropriate resources:   Identify barriers to discharge with patient and caregiver   Arrange for needed discharge resources and transportation as appropriate   Identify discharge learning needs (meds, wound care, etc)   Arrange for interpreters to assist at discharge as needed   Refer to discharge planning if patient needs post-hospital services based on physician order or complex needs related to functional status, cognitive ability or social support system  Taken 3/31/2023 1955 by Crystal Jules RN  Discharge to home or other facility with appropriate resources:   Identify barriers to discharge with patient and caregiver   Identify discharge learning needs (meds, wound care, etc)     Problem: Safety - Adult  Goal: Free from fall injury  Outcome: Progressing     Problem: Pain  Goal: Verbalizes/displays adequate comfort level or baseline comfort level  Outcome: Progressing  Flowsheets (Taken 4/1/2023 0744)  Verbalizes/displays adequate comfort level or baseline comfort level:   Encourage patient to monitor pain and request assistance   Assess pain using appropriate pain scale   Administer analgesics based on type and severity of pain and evaluate response   Implement non-pharmacological measures as appropriate and evaluate response   Consider cultural and social influences on pain and pain management   Notify Licensed Independent Practitioner if interventions unsuccessful or patient reports new pain     Problem: Skin/Tissue Integrity  Goal: Absence of new skin breakdown  Description: 1. Monitor for areas of redness and/or skin breakdown  2. Assess vascular access sites hourly  3. Every 4-6 hours minimum:  Change oxygen saturation probe site  4.   Every 4-6 hours:  If on nasal continuous positive
Problem: Discharge Planning  Goal: Discharge to home or other facility with appropriate resources  Outcome: Progressing  Flowsheets (Taken 4/2/2023 0800)  Discharge to home or other facility with appropriate resources:   Identify barriers to discharge with patient and caregiver   Arrange for needed discharge resources and transportation as appropriate   Identify discharge learning needs (meds, wound care, etc)   Arrange for interpreters to assist at discharge as needed   Refer to discharge planning if patient needs post-hospital services based on physician order or complex needs related to functional status, cognitive ability or social support system     Problem: Safety - Adult  Goal: Free from fall injury  Outcome: Progressing     Problem: Pain  Goal: Verbalizes/displays adequate comfort level or baseline comfort level  Outcome: Progressing  Flowsheets (Taken 4/2/2023 0849)  Verbalizes/displays adequate comfort level or baseline comfort level:   Encourage patient to monitor pain and request assistance   Assess pain using appropriate pain scale   Administer analgesics based on type and severity of pain and evaluate response   Implement non-pharmacological measures as appropriate and evaluate response   Consider cultural and social influences on pain and pain management   Notify Licensed Independent Practitioner if interventions unsuccessful or patient reports new pain     Problem: Skin/Tissue Integrity  Goal: Absence of new skin breakdown  Description: 1. Monitor for areas of redness and/or skin breakdown  2. Assess vascular access sites hourly  3. Every 4-6 hours minimum:  Change oxygen saturation probe site  4. Every 4-6 hours:  If on nasal continuous positive airway pressure, respiratory therapy assess nares and determine need for appliance change or resting period.   Outcome: Progressing     Problem: ABCDS Injury Assessment  Goal: Absence of physical injury  Outcome: Progressing
Problem: Safety - Adult  Goal: Free from fall injury  3/29/2023 0559 by Antionette Tenorio RN  Outcome: Progressing  3/29/2023 0559 by Antionette Tenorio RN  Outcome: Progressing     Problem: Pain  Goal: Verbalizes/displays adequate comfort level or baseline comfort level  Outcome: Progressing
Problem: Safety - Adult  Goal: Free from fall injury  3/30/2023 0607 by Marian Dias RN  Outcome: Progressing    Problem: Pain  Goal: Verbalizes/displays adequate comfort level or baseline comfort level  3/30/2023 0607 by Marian Dias RN  Outcome: Progressing
Problem: Safety - Adult  Goal: Free from fall injury  3/31/2023 0651 by David Gordon RN  Outcome: Progressing  3/31/2023 0612 by David Gordon RN  Outcome: Progressing       Problem: Skin/Tissue Integrity  Goal: Absence of new skin breakdown  Description: 1. Monitor for areas of redness and/or skin breakdown  2. Assess vascular access sites hourly  3. Every 4-6 hours minimum:  Change oxygen saturation probe site  4. Every 4-6 hours:  If on nasal continuous positive airway pressure, respiratory therapy assess nares and determine need for appliance change or resting period.   Outcome: Progressing
Problem: Safety - Adult  Goal: Free from fall injury  Outcome: Progressing
Problem: Safety - Adult  Goal: Free from fall injury  Outcome: Progressing     Problem: Pain  Goal: Verbalizes/displays adequate comfort level or baseline comfort level  Outcome: Progressing
[facility] Addendum    I have personally participated in history and physical exam, and have personally and independently reviewed vitals, labs, imaging reports, microbiology studies, and cardiac studies as appropriate. This participation and review was done on the date of service and in addition to that of Zayra Dahl NP. I have reviewed the documentation of INDIO, with additional thoughts and/or corrections as follows:    Physical Exam  Vitals: /72   Pulse 97   Temp 98.2 °F (36.8 °C) (Oral)   Resp 18   Ht 5' 2\" (1.575 m)   Wt 157 lb 12.8 oz (71.6 kg)   SpO2 92%   BMI 28.86 kg/m²   General: well-developed, well-nourished, no acute distress, cooperative  Skin: generally warm, dry, and intact, with normal color  HEENT: normocephalic, atraumatic, no gross abnormalities  Respiratory: clear to auscultation bilaterally without respiratory distress  Cardiovascular: regular rate and rhythm without murmur / rub / gallop  Abdominal: soft, nontender, nondistended, normoactive bowel sounds  Extremities: no obvious edema or deformity  Neurologic: awake, alert, no gross deficits  Psychiatric: normal affect, cooperative    Assessment  Acute on chronic hypoxia due to COPD exacerbation due to LLL CAP, setting of continued tobacco abuse  Recent admission ([facility] due to UTI)  Transverse fracture of L 5th proximal phalanx, s/p fall   Hx HFpEF, HTN, HPL, CKDIII, chronic anemia due to B12 and folate deficiency, MS, prior T12 compression fx, past brain aneurysms, PUD, neuropathy    Plan  Still needing 15 L high flow with vitals and labs otherwise fairly stable. Continue Rocephin, doxycycline, nebulized breathing treatments and supportive measures. Don't see any new issues today and pt reports feeling somewhat better. Looks like there will be discussion with the patient's daughter and palliative care today, will follow.     Electronically signed by Lucretia Angulo DO on 3/31/2023 at 9:30 AM
Please see orders for further details regarding plan of care.     Electronically signed by Fernando Navarro DO on 4/4/2023 at 12:31 PM
same date of service. Please see orders for further details regarding plan of care.     Electronically signed by Kaitlynn Painting DO on 4/5/2023 at 12:16 PM
treatment in that regard. Remains DNRCCA/DNI. Plan of care otherwise as outlined in hospitalist NP's note from this same date of service. Please see orders for further details regarding plan of care.     Electronically signed by Ivett Rodriguez DO on 4/2/2023 at 1:33 PM

## 2023-04-06 NOTE — CARE COORDINATION
4/5/2023  Social Work Discharge Planning: Awaiting auth for Pt to  go to [facility] at the [employer]. INA, HENS and Transportation paperwork completed. COVID test will be needed day of discharge.   Electronically signed by JIAN Lucio on 4/5/2023 at 9:49 AM
4/6/2023 Social Work Discharge Planning:Per liaison, still waiting for aut for Pt to go to [facility]. INA, hens and transport form are  completed. COVID test will be needed day of discharge. Electronically signed by JIAN Martines on 4/6/2023 at 9:55 AM      4/6/2023  SW set up ambulette transport via [facility] for Pt to go to [facility] today at 3:30. BENSON liaison, nurse here and were daughter (voicemail)  notified. SW tried to call spouse but unable to leave a message. COVID test is needed. Electronically signed by JIAN Martines on 4/6/2023 at 1:19 PM
Readmission Assessment  Number of Days since last admission?: 8-30 days  Previous Disposition: Home with Home Health  Who is being Interviewed: (P)  (Chart review)  What was the patient's/caregiver's perception as to why they think they needed to return back to the hospital?: Other (Comment) (fall at home)  Did you visit your Primary Care Physician after you left the hospital, before you returned this time?: (P) No  Why weren't you able to visit your PCP?: (P) Other (Comment) (readmitted to the hospital)  Did you see a specialist, such as Cardiac, Pulmonary, Orthopedic Physician, etc. after you left the hospital?: (P) No  Who advised the patient to return to the hospital?: (P) Self-referral  Does the patient report anything that got in the way of taking their medications?: (P) No  In our efforts to provide the best possible care to you and others like you, can you think of anything that we could have done to help you after you left the hospital the first time, so that you might not have needed to return so soon?: (P) Discharge instructions that are concise, clear, and non contradictory, Teaching during hospitalization regarding your illness, Teach back instructions regarding management of illness
Social work / Discharge Planning:          Awaiting PT/OT evaluations. Patient is from home and active with [facility] . Pulmonology consulted. Currently on 12 liters of oxygen, IV Doxycycline and Rocephin.      Electronically signed by JIAN Bustamante on 3/28/2023 at 11:06 AM
Social work / Discharge Planning:         Awaiting PT evaluation. Patient refused yesterday according to chart. Patient is from home and is active with [facility].       Electronically signed by JIAN Weiss on 3/29/2023 at 11:03 AM
Social work / Discharge Planning:        Patient is from home and is active with [facility] . She was unable to work with therapy yesterday due to respiratory status. Palliative medicine has been consulted for goals of care.     Electronically signed by JIAN Moody on 3/30/2023 at 10:24 AM
Social work / Discharge planning:          Palliative medicine is following. Currently on IV doxycycline and rocephin. Patient is currently on 15 liters of oxygen. Code status changed yesterday to limited code. Per IDR, patient is expressing interest in speaking to hospice.      Electronically signed by JIAN Samuel on 3/31/2023 at 10:28 AM
Transition of Care-Met with patient to discuss PT score. She requested [facility]. Referral sent-can accept, will initiate pre-cert submission hopefully today once updated OT is placed. Message sent to the therapy department to prioritize case. Patient is close to discharge. Call to daughter Oh Miranda, she is agreeable to plan. INA, HENS and Transportation paperwork completed. The Plan for Transition of Care is related to the following treatment goals: Jakub Yoon    The Patient  was provided with a choice of provider and agrees   with the discharge plan. [x] Yes [] No    Freedom of choice list was provided with basic dialogue that supports the patient's individualized plan of care/goals, treatment preferences and shares the quality data associated with the providers.  [x] Yes [] No      Oswaldo SCHRADER, RN  [facility] 
The Plan for Transition of Care is related to the following treatment goals of Acute respiratory failure with hypoxia ([address] [J96.01]  Community acquired pneumonia of left lower lobe of lung [X99.5]    IF APPLICABLE: The Patient and/or patient representative Raleigh Anthony and her family were provided with a choice of provider and agrees with the discharge plan. Freedom of choice list with basic dialogue that supports the patient's individualized plan of care/goals and shares the quality data associated with the providers was provided to:     Patient Representative Name:       The Patient and/or Patient Representative Agree with the Discharge Plan?       Eileen Kennedy [facility]  Case Management Department  Ph: [phone] Fax: [phone]  Electronically signed by JIAN Linn on 3/27/2023 at 11:09 AM

## 2023-04-06 NOTE — PATIENT CARE CONFERENCE
[facility] Quality Flow/Interdisciplinary Rounds Progress Note        Quality Flow Rounds held on April 6, 2023    Disciplines Attending:  Bedside Nurse, , , and Nursing Unit Leadership    Bridgett Sanchez was admitted on 3/26/2023 11:53 AM    Anticipated Discharge Date:  Expected Discharge Date: 04/06/23    Disposition:    Von Score:  Von Scale Score: 19    Readmission Risk              Risk of Unplanned Readmission:  45           Discussed patient goal for the day, patient clinical progression, and barriers to discharge.   The following Goal(s) of the Day/Commitment(s) have been identified:  Discharge - Obtain Order await precert      Rashid Lopez RN  April 6, 2023

## 2023-04-06 NOTE — PROGRESS NOTES
03/30/23 0100   NIV Type   Skin Protection for O2 Device Yes   Mode Biphasic   Mask Type Full face mask   Mask Size Medium   Settings/Measurements   IPAP 14 cmH20   CPAP/EPAP 6 cmH2O   Vt (Measured) 520 mL   Rate Ordered 16   Resp 18   FiO2  90 %   Mask Leak (lpm) 26 lpm   Comfort Level Good   Using Accessory Muscles No   Alarm Settings   Alarms On Y   Low Pressure (cmH2O)   (in wall)   Patient Observation   Observations red outlet
All information called to  with orders for boot. Eleni Deluca RN sent order and face sheet to obtain shoe.      Electronically signed by Lian Smith RN on 3/28/2023 at 1:05 PM
Arabella Sertve to Dr. Tommi Cooks with pulmonology consult.     Electronically signed by Serene Gabriel RN on 3/27/2023 at 5:34 PM
[facility] Quality Flow/Interdisciplinary Rounds Progress Note        Quality Flow Rounds held on April 5, 2023    Disciplines Attending:  Bedside Nurse, , , and Nursing Unit Leadership    Laurie Green was admitted on 3/26/2023 11:53 AM    Anticipated Discharge Date:  Expected Discharge Date: 04/06/23    Disposition:    Von Score:  Von Scale Score: 19    Readmission Risk              Risk of Unplanned Readmission:  51           Discussed patient goal for the day, patient clinical progression, and barriers to discharge.   The following Goal(s) of the Day/Commitment(s) have been identified:  Diagnostics - Report Results and Labs - Report Results      Dmitri Fonseca RN  April 5, 2023
[facility] the [facility]. Paperwork will be faxed to [phone].  Electronically signed by Scotty Hendricks RN on 4/6/2023 at 3:17 PM
Date: 3/26/2023    Time: 2:19 PM    Patient Placed On BIPAP/CPAP/ Non-Invasive Ventilation? Yes    If no must comment. Facial area red/color change? No           If YES are Blister/Lesion present? No   If yes must notify nursing staff  BIPAP/CPAP skin barrier?   Yes    Skin barrier type:mepilexlite       Comments:        Corrie Dawkins RCP
Date: 3/26/2023    Time: 4:49 PM    Patient Placed On BIPAP/CPAP/ Non-Invasive Ventilation? Yes    If no must comment. Facial area red/color change? No           If YES are Blister/Lesion present? No   If yes must notify nursing staff  BIPAP/CPAP skin barrier?   Yes    Skin barrier type:mepilexlite       Comments:        Glenys Pichardo RCP
Date: 3/29/2023    Time: 12:36 PM    Patient Placed On BIPAP/CPAP/ Non-Invasive Ventilation? Yes    If no must comment. Facial area red/color change? No           If YES are Blister/Lesion present? No   If yes must notify nursing staff  BIPAP/CPAP skin barrier?   Yes    Skin barrier type:mepilexlite       Comments:        Yadira Brown RCP          03/29/23 1234   NIV Type   Skin Assessment Clean, dry, & intact   Skin Protection for O2 Device Yes   NIV Started/Stopped On   Equipment Type V60   Mode Bilevel   Mask Type Full face mask   Mask Size Medium   Settings/Measurements   IPAP 14 cmH20   CPAP/EPAP 6 cmH2O   Vt (Measured) 521 mL   Rate Ordered 16   Resp 16   Insp Rise Time (%) 2 %   FiO2  90 %   I Time/ I Time % 0.7 s   Minute Volume (L/min) 8.6 Liters   Mask Leak (lpm) 14 lpm   Comfort Level Good   Using Accessory Muscles No   SpO2 89
Date: 4/1/2023    Time: 10:50 PM    Patient Placed On BIPAP/CPAP/ Non-Invasive Ventilation? Yes    If no must comment. Facial area red/color change? No           If YES are Blister/Lesion present? No   If yes must notify nursing staff  BIPAP/CPAP skin barrier?   Yes    Skin barrier type:mepilexlite          04/01/23 5609   NIV Type   Skin Assessment Clean, dry, & intact   Skin Protection for O2 Device Yes   Orientation Middle   Location Nose   Intervention(s) Skin Barrier   NIV Started/Stopped On   Equipment Type V60   Mode Bilevel   Mask Type Full face mask   Mask Size Medium   Settings/Measurements   PIP Observed 14 cm H20   IPAP 14 cmH20   CPAP/EPAP 6 cmH2O   Vt (Measured) 588 mL   Rate Ordered 16   Resp 16   Insp Rise Time (%) 2 %   FiO2  90 %   I Time/ I Time % 0.8 s   Minute Volume (L/min) 10.5 Liters   Mask Leak (lpm) 25 lpm   Comfort Level Good   Using Accessory Muscles No   SpO2 94   Patient's Home Machine No   Alarm Settings   Alarms On ASHLEY Willoughby
Date: 4/1/2023    Time: 3:11 AM    Patient Placed On BIPAP/CPAP/ Non-Invasive Ventilation? Pt placed on by RN    If no must comment. Facial area red/color change? No           If YES are Blister/Lesion present? No   If yes must notify nursing staff  BIPAP/CPAP skin barrier? Yes    Skin barrier type:mepilexlite       Comments: Machine plugged into red outlet and outside alarm plugged in.         Rosas Red, JENNIFER    04/01/23 0310   NIV Type   NIV Started/Stopped On   Mode Bilevel   Mask Type Full face mask   Mask Size Medium   Settings/Measurements   PIP Observed 14 cm H20   IPAP 14 cmH20   CPAP/EPAP 6 cmH2O   Vt (Measured) 572 mL   Rate Ordered 16   Resp 16   FiO2  90 %   Minute Volume (L/min) 9.2 Liters   Mask Leak (lpm) 35 lpm   Comfort Level Good   Using Accessory Muscles No
Department of Podiatry  Progress Note    SUBJECTIVE:    Ms. Ember Vick is seen at bedside for multiple non-displaced fractures of left toes. No acute events overnight. Patient ok to discharge from Podiatry standpoint. No other pedal complaints at this time. OBJECTIVE:    Scheduled Meds:   albuterol  2.5 mg Nebulization 4x daily    guaiFENesin  400 mg Oral TID    nicotine  1 patch TransDERmal Daily    amLODIPine  5 mg Oral Daily    baclofen  10 mg Oral BID    carvedilol  12.5 mg Oral BID WC    folic acid  1 mg Oral Daily    gabapentin  100 mg Oral TID    memantine  5 mg Oral BID    mirtazapine  15 mg Oral Nightly    pantoprazole  40 mg Oral QAM AC    sertraline  150 mg Oral Nightly    sodium chloride flush  5-40 mL IntraVENous 2 times per day    enoxaparin  40 mg SubCUTAneous Daily    cefTRIAXone (ROCEPHIN) IV  1,000 mg IntraVENous Q24H    doxycycline (VIBRAMYCIN) IV  100 mg IntraVENous Q12H    budesonide  0.5 mg Nebulization BID    arformoterol tartrate  15 mcg Nebulization BID    vitamin B-12  1,000 mcg Oral Daily    atorvastatin  40 mg Oral Nightly     Continuous Infusions:   sodium chloride      dextrose       PRN Meds:.sodium chloride flush, sodium chloride, ondansetron **OR** ondansetron, polyethylene glycol, acetaminophen **OR** acetaminophen, glucose, dextrose bolus **OR** dextrose bolus, glucagon (rDNA), dextrose    Allergies   Allergen Reactions    Macrodantin [Nitrofurantoin Macrocrystal] Shortness Of Breath       BP (!) 108/41   Pulse 97   Temp 98.1 °F (36.7 °C) (Oral)   Resp 19   Ht 5' 2\" (1.575 m)   Wt 148 lb 8 oz (67.4 kg)   SpO2 95%   BMI 27.16 kg/m²       EXAM:      VASCULAR:  DP and PT pulses are palpable. CFT < 5 seconds B/L. Warm to warm from the tibial tuberosity to the distal aspect of the digits dorsally. NEUROLOGIC:  Protective sensation is diminished     DERM: Ecchymosis noted to the dorsal aspects of digits 3 4 and 5 of the left foot.   Pain to palpation noted at to the lateral
Department of Podiatry  Progress Note    SUBJECTIVE:    Ms. Skye Nassar is seen at bedside for multiple non-displaced fractures of left toes. No acute events overnight. Patient ok to discharge from Podiatry standpoint and follow up in outpatient setting. No pain reported. Edema and ecchymosis improved. No other pedal complaints at this time. OBJECTIVE:    Scheduled Meds:   albuterol  2.5 mg Nebulization 4x daily    guaiFENesin  400 mg Oral TID    nicotine  1 patch TransDERmal Daily    amLODIPine  5 mg Oral Daily    baclofen  10 mg Oral BID    carvedilol  12.5 mg Oral BID WC    folic acid  1 mg Oral Daily    gabapentin  100 mg Oral TID    memantine  5 mg Oral BID    mirtazapine  15 mg Oral Nightly    pantoprazole  40 mg Oral QAM AC    sertraline  150 mg Oral Nightly    sodium chloride flush  5-40 mL IntraVENous 2 times per day    enoxaparin  40 mg SubCUTAneous Daily    cefTRIAXone (ROCEPHIN) IV  1,000 mg IntraVENous Q24H    doxycycline (VIBRAMYCIN) IV  100 mg IntraVENous Q12H    budesonide  0.5 mg Nebulization BID    arformoterol tartrate  15 mcg Nebulization BID    vitamin B-12  1,000 mcg Oral Daily    atorvastatin  40 mg Oral Nightly     Continuous Infusions:   sodium chloride      dextrose       PRN Meds:.sodium chloride flush, sodium chloride, ondansetron **OR** ondansetron, polyethylene glycol, acetaminophen **OR** acetaminophen, glucose, dextrose bolus **OR** dextrose bolus, glucagon (rDNA), dextrose    Allergies   Allergen Reactions    Macrodantin [Nitrofurantoin Macrocrystal] Shortness Of Breath       BP (!) 108/46   Pulse 99   Temp 98.9 °F (37.2 °C) (Oral)   Resp 17   Ht 5' 2\" (1.575 m)   Wt 149 lb 2 oz (67.6 kg)   SpO2 (!) 88%   BMI 27.28 kg/m²       EXAM:      VASCULAR:  DP and PT pulses are palpable. CFT < 5 seconds B/L. Warm to warm from the tibial tuberosity to the distal aspect of the digits dorsally.       NEUROLOGIC:  Protective sensation is diminished     DERM: Mild ecchymosis noted to the
IV in L AC infiltrated. RN unable to obtain new IV after 2 attempts. IV team consult for new site.
[facility] Quality Flow/Interdisciplinary Rounds Progress Note        Quality Flow Rounds held on April 3, 2023    Disciplines Attending:  Bedside Nurse, , , and Nursing Unit Leadership    Jazlyn Hathaway was admitted on 3/26/2023 11:53 AM    Anticipated Discharge Date:  Expected Discharge Date: 03/30/23    Disposition:    Von Score:  Von Scale Score: 18    Readmission Risk              Risk of Unplanned Readmission:  46           Discussed patient goal for the day, patient clinical progression, and barriers to discharge. The following Goal(s) of the Day/Commitment(s) have been identified:   Continue IV Solumedrol, wean oxygen, PT/OT evaluation, and discharge planning home vs [facility].       Jin Hammer RN  April 3, 2023
New consult sent to Dr. Ector Phillips answering service at this time.
Nurse to nurse report called to [place], RN. Patient assigned to [address] [place] [address] Patient vitals stable, no s/s of discomfort.  Electronically signed by Libby Khalil RN on 4/4/2023 at 6:04 PM
Nutrition Assessment     Type and Reason for Visit: Initial, RD Nutrition Re-Screen/LOS    Nutrition Recommendations/Plan:   Continue on Regular diet. Will monitor per policy. 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 Acute respiratory failure with hypoxia, PNA. PMH: MS, CKD3. States appetite is good on regular diet. No nut'l concerns voiced. Will monitor. Nutrition Related Findings:   A& O, -1.1 I/O, weakness, round/soft abd, active BS, no edema, hyperglycemia, Solumedrol, Cr 1.1, GFR 52 Wound Type: None (none, redness)    Current Nutrition Therapies:    ADULT DIET;  Regular    Anthropometric Measures:  Height: 5' 2\" (157.5 cm)  Current Body Wt: 155 lb (70.3 kg)   BMI: 28.3    Nutrition Diagnosis:   No nutrition diagnosis at this time      Nutrition Interventions:   Food and/or Nutrient Delivery: Continue Current Diet  Nutrition Education/Counseling: Education completed (reviewed diet order w/pt)  Coordination of Nutrition Care: Continue to monitor while inpatient       Goals:     Goals: Meet at least 75% of estimated needs, by next RD assessment       Nutrition Monitoring and Evaluation:      Food/Nutrient Intake Outcomes: Food and Nutrient Intake  Physical Signs/Symptoms Outcomes: Biochemical Data, Weight, Skin, Nutrition Focused Physical Findings, Fluid Status or Edema    Discharge Planning:    Continue current diet     Charanjit Hubbard RD,LD  Contact: [phone]
Occupational Therapy  OT BEDSIDE TREATMENT NOTE      Date:2023  Patient Name: Karen High  MRN: [id number]  : 1945  Room: [address] [place] [address]     Evaluating OT: RD Baldwin/L - [id number]     Referring Provider: Dia Oswald MD  Specific Provider Orders/Date: \"OT eval and treat\" - 2023     Diagnosis: Acute respiratory failure with hypoxia ([address] [J96.01]  Community acquired pneumonia of left lower lobe of lung [J18.9]       Pertinent Medical History: arthritis, COPD, MS, B AIDEE, B TKA     Precautions: fall risk, multiple non-displaced fractures of L toes, NWB L foot in surgical shoe (shoe not present on evaluation)     Assessment of Current Deficits:    [x] Functional mobility             [x]ADLs           [x] Strength                  []Cognition   [x] Functional transfers           [x] IADLs         [x] Safety Awareness   [x]Endurance   [] Fine Coordination              [x] Balance      [] Vision/perception   []Sensation     []Gross Motor Coordination  [] ROM           [] Delirium                   [] Motor Control      OT PLAN OF CARE   OT POC is based on physician orders, patient diagnosis, and results of clinical assessment.   Frequency/Duration 2-5 days/week for 2 weeks PRN   Specific OT Treatment Interventions to Include:   * Instruction/training on adapted ADL techniques and AE recommendations to increase functional independence within precautions       * Training on energy conservation strategies, correct breathing pattern and techniques to improve independence/tolerance for self-care routine  * Functional transfer/mobility training/DME recommendations for increased independence, safety, and fall prevention  * Patient/Family education to increase follow through with safety techniques and functional independence  * Recommendation of environmental modifications for increased safety with functional transfers/mobility and ADLs  * Therapeutic exercise to improve motor endurance, ROM, and
Occupational Therapy  Patient treatment attempted this AM.  Patient on hold per nursing due to respiratory status. Will attempt at a later time.   Rula Watt ALAINA/L [id number]
Occupational Therapy  Patient treatment attempted this PM.  Patient continues to be on hold per nursing. Will continue to monitor for when pt able to participate in therapy.     Francia Candelaria ALAINA/L [id number]
Palliative Care Department  [phone]  Palliative Care Initial Consult  Provider HIRA Kolb CNP      PATIENT: Krystyna Steve  : 1945  MRN: [id number]  ADMISSION DATE: 3/26/2023 11:53 AM  Referring Provider: Adam Torres MD    Palliative Medicine was consulted on hospital day 4 for assistance with Goals of care    HPI:     Paulette Landeros is a 69 y.o. y/o female with a history of MS, COPD, current smoker, hypertension, hyperlipidemia, CKD stage III, noncompliance who presented to [address] [place] [address] on 3/26/2023 after a fall. Chest x-ray showed lower lung opacities. She was started on antibiotics admitted to telemetry. ASSESSMENT/PLAN:     Pertinent Hospital Diagnoses     Acute on chronic hypoxic respiratory failure  CAP  COPD    Palliative Care Encounter / Counseling Regarding Goals of Care  Please see detailed goals of care discussion as below  At this time, Krystyna Steve, Does have capacity for medical decision-making. Capacity is time limited and situation/question specific  Outcome of goals of care meeting: Continue with current medical management. We will have goals of care conversation with family when daughter arrives in town. Code status DNR-CCA/ DNI  Advanced Directives: no POA or living will in [facility]  Surrogate/Legal NOK:  Marleny Viera (daughter)  [phone]  Casey Galeazzi () [phone]    Spiritual assessment: no spiritual distress identified  Bereavement and grief: to be determined  Referrals to: none today    Thank you for the opportunity to participate in the care of Krystyna Steve. HIRA Kolb CNP   Palliative Medicine     SUBJECTIVE:     Details of Conversation: Chart reviewed and spoke with the patient's nurse. Daughter will reportedly not be in until late [de-identified]. I met with the patient and her friend at the bedside. She denies any shortness of breath and states that she is feeling better.   She expresses that she did have a
Palliative Care Department  [phone]  Palliative Care Progress Note  Provider HIRA Mcintyre CNP      PATIENT: Alan Conklin  : 1945  MRN: [id number]  ADMISSION DATE: 3/26/2023 11:53 AM  Referring Provider: Saúl Bright MD    Palliative Medicine was consulted on hospital day 4 for assistance with Goals of care    HPI:     Eliza De La Rosa is a 69 y.o. y/o female with a history of MS, COPD, current smoker, hypertension, hyperlipidemia, CKD stage III, noncompliance who presented to [facility] on 3/26/2023 after a fall. Chest x-ray showed lower lung opacities. She was started on antibiotics admitted to telemetry. ASSESSMENT/PLAN:     Pertinent Hospital Diagnoses     Acute on chronic hypoxic respiratory failure  CAP  COPD    Palliative Care Encounter / Counseling Regarding Goals of Care  Please see detailed goals of care discussion as below  At this time, Alan Conklin, Does have capacity for medical decision-making. Capacity is time limited and situation/question specific  Outcome of goals of care meeting: Continue with current medical management. We will have goals of care conversation with family when daughter arrives in town. Code status DNR-CCA/ DNI  Advanced Directives: no POA or living will in [facility]  Surrogate/Legal NOK:  Bernice Shoemaker (daughter)  [phone]  Shabnam Singer () [phone]    Spiritual assessment: no spiritual distress identified  Bereavement and grief: to be determined  Referrals to: none today    Thank you for the opportunity to participate in the care of Alan Conklin. HIRA Mcintyre CNP   Palliative Medicine     SUBJECTIVE:     Details of Conversation: Chart review met with the patient at the bedside. She explains that she is feeling better. Hi-Flow down to 9 L oxygen saturation 95%. Patient expresses that her daughter did arrive and they had a discussion about goals of care.   She expresses at this time they have decided
Patient is refusing bipap at this time, bipap is in the room on standby if needed.
Patient is waiting on daughter to come, nurse aware and will put patient on bipap.
Patient refused bipap tonight, machine on standby.
Patient stated that she did not want to wear the bipap tonight, bipap in room if needed on standby. Patient understands to call if needed.
Physical Therapy  Facility/Department: [facility] SURG    Name: Raj Rose  : 1945  MRN: [id number]    Chart reviewed and PT eval attempted this date. Per nursing, hold eval at this time due to respiratory status. Will check back at later time/date.      Taiwo Ramirez, [address]
Physical Therapy  Facility/Department: [facility] MED SURG      Name: Raj Rose  : 1945  MRN: [id number]    Chart reviewed and PT eval attempted this date. Per nursing, hold eval at this time. Will check back at later time/date.      Taiwo Ramirez, [address]
Physical Therapy  PT eval attempted. Pt refused in AM and PM. Will re-attempt at later date.
Pt refused to wear BIPAP to sleep
(1.575 m)   Wt 154 lb 8 oz (70.1 kg)   SpO2 95%   BMI 28.26 kg/m²   24HR INTAKE/OUTPUT:    Intake/Output Summary (Last 24 hours) at 2023 1210  Last data filed at 2023 0914  Gross per 24 hour   Intake --   Output 2650 ml   Net -2650 ml     CURRENT PULSE OXIMETRY:  SpO2: 95 %  24HR PULSE OXIMETRY RANGE:  SpO2  Av.8 %  Min: 89 %  Max: 100 %  CVP:    VENT SETTINGS:   Vent Information  Equipment Changed: Humidification  Additional Respiratory Assessments  Heart Rate: 90  Resp: 18  SpO2: 95 %      EXAM:  General: Alert, in NAD, on 13L HF  ENT: No discharge. Pharynx clear. membranes moist   Neck: Supple, Trachea midline. Resp: No accessory muscle use. Decreased breath sounds left more than right. Mild exp wheezing LLL posteriorly   CV: Regular rate. Regular rhythm. No murmur . No edema. ABD: Non-tender. Non-distended. Soft, round . Normal bowel sounds. Skin: Warm and dry. M/S: No cyanosis. No joint deformity. No clubbing. Neuro: Arouses . Follows commands. O x 3, GARCIA  Psych:  calm and interactive     I/O: I/O last 3 completed shifts:  In: -   Out: 2200 [Urine:2200]  I/O this shift:  In: -   Out: 450 [Urine:450]     Results:  CBC:   Recent Labs     23  0400 23  1002 23  0353   WBC 9.2 9.3 11.2   HGB 7.1* 7.5* 8.4*   HCT 23.3* 25.9* 28.7*   MCV 85.7 89.0 86.7    371 436     BMP:   Recent Labs     23  0400 23  1002 23  0353    146 143   K 4.0 4.0 4.0   * 110* 104   CO2 24 25 27   BUN 18 24* 29*   CREATININE 1.1* 0.9 1.2*     LFT:   No results for input(s): ALKPHOS, ALT, AST, PROT, BILITOT, BILIDIR, LABALBU in the last 72 hours. PT/INR: No results for input(s): PROTIME, INR in the last 72 hours.   Procalcitonin:   Recent Labs     23  0400   PROCAL 0.10*       Cultures:   Latest Reference Range & Units 3/26/23 20:00   Human Rhinovirus/Enterovirus by PCR Not Detected  Not Detected   Influenza A by PCR Not Detected  Not Detected   Influenza B
(1.575 m)   Wt 157 lb 12.8 oz (71.6 kg)   SpO2 92%   BMI 28.86 kg/m²   General Appearance: alert and oriented to person, place and time and in no acute distress  Skin: warm and dry  Head: normocephalic and atraumatic  Neck: neck supple and non tender without mass   Pulmonary/Chest: diminished throughout- wheezing throughout today   Cardiovascular: normal rate, normal S1 and S2 and no carotid bruits  Abdomen: soft, non-tender, non-distended, normal bowel sounds, no masses or organomegaly  Extremities: no cyanosis, no clubbing and no edema  Neurologic: speech normal            Recent Labs     03/29/23  0420 03/30/23  1029 03/31/23  0400    145 144   K 4.1 4.3 4.0   * 110* 110*   CO2 25 27 24   BUN 19 17 18   CREATININE 1.1* 1.0 1.1*   GLUCOSE 106* 135* 122*   CALCIUM 8.6 9.3 9.0       Recent Labs     03/29/23  0420 03/30/23  0500 03/31/23  0400   WBC 8.1 7.0 9.2   RBC 2.69* 3.12* 2.72*   HGB 7.0* 8.2* 7.1*   HCT 23.6* 27.4* 23.3*   MCV 87.7 87.8 85.7   MCH 26.0 26.3 26.1   MCHC 29.7* 29.9* 30.5*   RDW 16.4* 16.1* 16.2*    323 316   MPV 9.7 9.3 9.6           Assessment:    Principal Problem:    Acute respiratory failure with hypoxia ([facility])  Active Problems:    Community acquired pneumonia of left lower lobe of lung    Bacterial pneumonia    History of recent hospitalization    Chronic heart failure with preserved ejection fraction (HFpEF) ([facility])    Stage 3b chronic kidney disease (HCC)    Chronic anemia    Combined B12 and folate deficiency anemia    Compression fracture of T12 vertebra (HCC)    PUD (peptic ulcer disease)    Peripheral polyneuropathy    Hx of multiple sclerosis ([facility])    Chronic hypertension    Combined hyperlipidemia    Pneumonia of left lower lobe due to infectious organism    Acute and chronic respiratory failure with hypoxia ([facility])  Resolved Problems:    * No resolved hospital problems. *      Plan:  1.   Acute on chronic respiratory failure with hypoxia- r/to bacterial pneumonia
ADLs/functional transfers  * Therapeutic activities to facilitate/challenge dynamic balance, stand tolerance for increased safety and independence with ADLs  * Therapeutic activities to facilitate gross/fine motor skills for increased independence with ADLs  * Neuro-muscular re-education: facilitation of righting/equilibrium reactions, midline orientation, scapular stability/mobility, normalization of muscle tone, and facilitation of volitional active controled movement     Recommended Adaptive Equipment: continue to assess      Home Living: Patient lives with her  in a 1-floor home with a ramp to enter; patient's bedroom and bathroom are on the main floor. Bathroom Setup: shower chair  Equipment Owned: transport chair, shower chair, FWW, O2     Prior Level of Function (PLOF): Per patient, patient was independent with ADLs, independent with IADLs, and mod I with functional mobility (FWW) prior to this hospitalization. Driving: yes     Pain Level: Pt did not report pain. Cognition: Pt awake and alert. Functional Assessment:                  AM-PAC Daily Activity Raw Score: 16/24    Initial Eval Status  Date: 3/28/2023 Treatment Status  Date: 4/5/23 Short Term Goals = Long Term Goals   Feeding Setup assistance   Mod I   Grooming MIN A, decreased sitting tolerance, limited shoulder ROM, decreased strength Setup seated. Wash face and brush teeth. Mod I   UB Dressing MIN A  min A Mod I    LB Dressing MAX A  mod A  MIN A - with use of AE, as needed/appropriate   Bathing MAX A   MIN A - with use of AE/DME, as needed/appropriate   Toileting Total assistance, unable to stand or pivot at this time  Pt using pure wick. MOD A   Bed Mobility  Supine-to-Sit: MIN A to lift trunk to midline  Sit-to-Supine: MIN A to lift BLE in to bed  Rolling: MIN A for rolling to reposition bed linen   SBA supine to sit  Mod I in order to maximize patient's independence/participation with ADLs and other functional tasks.
HGB 7.8* 8.7* 8.2*   HCT 26.1* 29.1* 27.4*   MCV 87.3 86.6 85.9    478* 437     Recent Labs     23  0210    142 144   K 4.2 4.2 3.9    107 108*   CO2 26 28 30*   BUN 32* 33* 32*   CREATININE 1.1* 1.1* 1.2*       CPK:  Lab Results   Component Value Date    CKTOTAL 166 2023      BNP: No results for input(s): BNP in the last 72 hours. ABGs: No results found for: PHART, PO2ART, CYM4BSN  INR: No results for input(s): INR in the last 72 hours. -----------------------------------------------------------------  RAD:   Results for orders placed during the hospital encounter of 23    XR CHEST PORTABLE    Narrative  EXAMINATION:  ONE XRAY VIEW OF THE CHEST    2023 3:54 pm    COMPARISON:  2023    HISTORY:  ORDERING SYSTEM PROVIDED HISTORY: s/p L tap  TECHNOLOGIST PROVIDED HISTORY:  Reason for exam:->s/p L tap    FINDINGS:  There has been significantly improved aeration of the left hemithorax. Areas  of atelectasis/infiltrate and moderate left effusion persist.  Findings  suggest some right basilar effusion. Heart size appears normal.  There is no  pneumothorax. Impression  Significantly improved aeration of the left lung.   There is no pneumothorax      Micro:    Vent Information  Equipment Changed: Humidification    Additional Respiratory Assessments  Heart Rate: 74  Resp: 16  SpO2: 97 %    Objective:   Vitals:   Vitals:    23 1430   BP: 136/68   Pulse: 74   Resp: 16   Temp: 97.8 °F (36.6 °C)   SpO2: 97%      TEMP:Current: Temp: 97.8 °F (36.6 °C)  Max: Temp  Av.1 °F (36.7 °C)  Min: 97.8 °F (36.6 °C)  Max: 98.4 °F (36.9 °C)    BP Range: Systolic (10OXN), BVK:660 , Min:128 , ENV:249     Diastolic (72EHA), TOQ:96, Min:60, Max:68      General appearance: alert, appears stated age and cooperative  In no acute distress small built with kyphosis  Skin: No rashes or lesions  HEENT: mucous membranes are moist  Neck: No JVD  Lungs:
Long Term   Goals   Was pt agreeable to Eval/treatment? Yes        Does pt have pain? None reported        Bed Mobility  Rolling:  NT   Supine to sit:  NT   Sit to supine: min assist   Scooting: independent in sit     Independent    Transfers Sit to stand:  min assist   Stand to sit:  min assist   Stand pivot:  min assist     CGA    Ambulation     a few steps with chair to bed with ww min assist    CGA with 10 feet with ww L LE NWB          Stair negotiation: ascended and descended NT    N/A    LE ROM  WFL       LE strength  4-/5    4/ 5    AM- PAC RAW score  14/ 24                 Pt is alert and grossly Oriented x  3     Balance: min assist   Fall risk due to  decreased activity tolerance, weakness  Endurance: decreased   Bed/Chair alarm:  yes      ASSESSMENT     Pt displays functional ability as noted in the objective portion of this evaluation. Conditions Requiring Skilled Therapeutic Intervention:     [x]Decreased strength                                      []Decreased ROM  [x]Decreased functional mobility  [x]Decreased balance   [x]Decreased endurance   []Decreased posture  []Decreased sensation  []Decreased coordination   []Decreased vision  []Decreased safety awareness   [x]Increased pain                Treatment/Education:    Pt in chair  upon arrival ; agreeable to PT. Pt's daughter present for session. RN clarified WB status with podiatry today. Instructed in L LE NWB . Pt has boot in room and refused to wear. Pt and daughter both state that pt will not be able to maintain L LE NWB. Obtained surgical shoe for pt and donned prior to mobility. Mobility as above. Cues for hand placement with transfers. Pt was unable to maintain L LE NWB . Instructed pt to try and limit WB to heel with mobility. At high fall risk due to weakness, poor activity tolerance, limited WB status, decreased balance, and h/o falls. Min assist chair to bed. Pt needed assist with LE into bed. O2@ 6 LNC.  SpO2 at rest
SpO2 92%   BMI 28.35 kg/m²   24HR INTAKE/OUTPUT:    Intake/Output Summary (Last 24 hours) at 2023 1146  Last data filed at 2023 1756  Gross per 24 hour   Intake 270 ml   Output --   Net 270 ml     CURRENT PULSE OXIMETRY:  SpO2: 92 %  24HR PULSE OXIMETRY RANGE:  SpO2  Av.7 %  Min: 92 %  Max: 97 %  CVP:    VENT SETTINGS:   Vent Information  Equipment Changed: Humidification  Additional Respiratory Assessments  Heart Rate: 84  Resp: 16  SpO2: 92 %      EXAM:  General: No distress. Alert. 3L  Neck: Trachea midline. Resp: No accessory muscle use. No crackles. No wheezing. +rhonchi, clears with cough   CV: Regular rate. Regular rhythm. No mumur or rub. No edema. ABD: Non-tender. Non-distended. Normal bowel sounds. Skin: Warm and dry. No nodule on exposed extremities. No rash on exposed extremities. M/S: No cyanosis. No joint deformity. No clubbing. GARCIA, brace on LLE  Neuro: Awake. Follows commands. A&Ox3    I/O: I/O last 3 completed shifts: In: 390 [P.O.:390]  Out: 1700 [Urine:1700]  No intake/output data recorded. Results:  CBC:   Recent Labs     23   WBC 9.0 8.1 8.7   HGB 8.7* 8.2* 8.4*   HCT 29.1* 27.4* 27.7*   MCV 86.6 85.9 85.5   * 437 479*     BMP:   Recent Labs     235    144 146   K 4.2 3.9 4.1    108* 109*   CO2 28 30* 30*   BUN 33* 32* 28*   CREATININE 1.1* 1.2* 1.0     LFT: No results for input(s): ALKPHOS, ALT, AST, PROT, BILITOT, BILIDIR, LABALBU in the last 72 hours. PT/INR: No results for input(s): PROTIME, INR in the last 72 hours. Cultures:  No results for input(s): CULTRESP in the last 72 hours.     ABG:   Recent Labs     23  1242   PH 7.410   PO2 69.2*   PCO2 43.4   HCO3 26.9*   BE 2.0   O2SAT 93.3       Films:  XR KNEE LEFT (3 VIEWS)    Result Date: 3/26/2023  EXAMINATION: THREE XRAY VIEWS OF THE LEFT KNEE; THREE XRAY VIEWS OF THE RIGHT KNEE 3/26/2023 12:51 pm
Term/ Long Term   Goals   Was pt agreeable to Eval/treatment? Yes  yes     Does pt have pain? None reported  L foot pain     Bed Mobility  Rolling:  NT   Supine to sit:  NT   Sit to supine: min assist   Scooting: independent in sit  Supine to sit: Min A  Scooting: Mod A seated to EOB  Independent    Transfers Sit to stand:  min assist   Stand to sit:  min assist   Stand pivot:  min assist  Sit <> stand: Min A  Stand Pivot; Min A using [employer] for support. See comments  CGA    Ambulation     a few steps with chair to bed with ww min assist  NT CGA with 10 feet with ww L LE NWB          Stair negotiation: ascended and descended NT    N/A    LE ROM  WFL       LE strength  4-/5    4/ 5    AM- PAC RAW score  14/ 24 14/24         Pt is alert and able to follow instruction, states willing to wear her cast shoe L LE, does not want to wear the boot she recently received. Balance: poor during pivot transfer using [employer] for support    Pt performed therapeutic exercise of the following: NT    Patient education/treatment  Pt was educated on UE usage to assist with transfer safety, NWB L LE during pivot transfer    Patient response to education:   Pt verbalized understanding Pt demonstrated skill Pt requires further education in this area   yes no yes     ASSESSMENT:   Comments: Nurse ok with rx. Pt found in bed, L LE cast shoe donned. Pt sat EOB SBA for balance. Pt unable to maintain NWB L LE during pivot transfer bed to chair, displayed limited WB throughout. Pt educated on need to maintain NWB per orders. Pt was left in a bedside chair with B LEs elevated for comfort and edema management with call light in reach, waffle cushion in place    Time in 1005   Time out 1025   Total Treatment Time 20 minutes   CPT codes:     Therapeutic activities 52117 20 minutes   Therapeutic exercises 42312 0 minutes       Pt is making minimal progress toward established Physical Therapy goals.   Continue with physical therapy current plan of care
place and time and in no acute distress  Skin: warm and dry  Head: normocephalic and atraumatic  Neck: neck supple and non tender without mass   Pulmonary/Chest: diminished throughout- to auscultation   Cardiovascular: normal rate, normal S1 and S2 and no carotid bruits  Abdomen: soft, non-tender, non-distended, normal bowel sounds  Extremities: no cyanosis, no clubbing and no edema  Neurologic: speech normal            Recent Labs     04/01/23  1002 04/02/23  0353 04/03/23  0415    143 142   K 4.0 4.0 4.2   * 104 105   CO2 25 27 26   BUN 24* 29* 32*   CREATININE 0.9 1.2* 1.1*   GLUCOSE 213* 149* 122*   CALCIUM 9.2 9.4 8.7       Recent Labs     04/01/23  1002 04/02/23  0353 04/03/23 0415   WBC 9.3 11.2 9.5   RBC 2.91* 3.31* 2.99*   HGB 7.5* 8.4* 7.8*   HCT 25.9* 28.7* 26.1*   MCV 89.0 86.7 87.3   MCH 25.8* 25.4* 26.1   MCHC 29.0* 29.3* 29.9*   RDW 15.7* 15.9* 15.9*    436 420   MPV 10.1 9.8 10.2       Assessment:    Principal Problem:    Acute respiratory failure with hypoxia (HCC)  Active Problems:    Community acquired pneumonia of left lower lobe of lung    Bacterial pneumonia    History of recent hospitalization    Chronic heart failure with preserved ejection fraction (HFpEF) ([facility])    Stage 3b chronic kidney disease (HCC)    Chronic anemia    Combined B12 and folate deficiency anemia    Compression fracture of T12 vertebra (HCC)    PUD (peptic ulcer disease)    Peripheral polyneuropathy    Hx of multiple sclerosis (HCC)    Chronic hypertension    Combined hyperlipidemia    Pneumonia of left lower lobe due to infectious organism    Acute and chronic respiratory failure with hypoxia (HCC)    Status post thoracentesis  Resolved Problems:    * No resolved hospital problems. *      Plan:  1. Acute on chronic respiratory failure with hypoxia- r/t bacterial pneumonia +/- COPD exacerbation, left pleural effusion: pt presented to the ER with a mechanical fall.  Reporting she fell at home on the way
Date/Time    PH 7.385 03/29/2023 12:34 PM    PCO2 45.0 03/29/2023 12:34 PM    PO2 52.0 03/29/2023 12:34 PM    HCO3 26.3 03/29/2023 12:34 PM    BE 1.1 03/29/2023 12:34 PM    O2SAT 84.0 03/29/2023 12:34 PM        Radiology:   XR CHEST PORTABLE   Final Result   Bilateral pleural and parenchymal disease, greater on the left than right and   significantly increased since 03/26/2023. XR FOOT LEFT (2 VIEWS)   Final Result   Age indeterminate nondisplaced fracture deformities of the distal 4th and 5th   metatarsals. Transverse nondisplaced fracture of the small toe proximal phalanx. XR CHEST PORTABLE   Final Result   Mid and lower lung predominant pulmonary opacifications right greater than   left of overall confluence appearance increased from prior comparison of   worsening airspace disease such as bronchopneumonia without large effusion. 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 Moderate right and trace to small left suprapatellar joint effusions             Assessment:    Principal Problem:    Acute respiratory failure with hypoxia (HCC)  Active Problems:    Community acquired pneumonia of left lower lobe of lung    Bacterial pneumonia  Resolved Problems:    * No resolved hospital problems. *      Plan:  Acute respiratory failure with hypoxia(56%o2sat)POA continue to wean o2 as able  bacterial pneumonia continue abx  Copd  monitor off steroids  continue nebs  Transverse nondisplaced fracture of small toe proximal phalanx and nondisplaced fx deformities of distal 4th and 5th metatarsals  podiatry on consult.  Pain control per podiatry  Htn continue med  Hyperlipidemia continue med  Hypokalemia monitor
Detected   Parainfluenza Virus 1 by PCR Not Detected  Not Detected   Parainfluenza Virus 2 by PCR Not Detected  Not Detected   Parainfluenza Virus 3 by PCR Not Detected  Not Detected   Parainfluenza Virus 4 by PCR Not Detected  Not Detected   Respiratory Syncytial Virus by PCR Not Detected  Not Detected   Bordetella parapertussis by PCR Not Detected  Not Detected   Chlamydophilia pneumoniae by PCR Not Detected  Not Detected   Mycoplasma pneumoniae by PCR Not Detected  Not Detected   SARS-CoV-2, PCR Not Detected  Not Detected   Bordetella pertussis by PCR Not Detected  Not Detected      Latest Reference Range & Units 04/01/23 15:30   Source + CELL CT/DIFF-BF  Pleural   Appearance, Fluid  Clear   Color, Fluid  Yellow   RBC, Fluid /uL <2,000   Fluid Type  Pleural   Glucose, Fluid Not Established mg/dL 129   LD, Fluid Not Established U/L 181   Monocyte Count, Fluid % 62   Neutrophil Count, Fluid % 38   Nucl Cell, Fluid /uL 34   Protein, Fluid Not Established g/dL 1.6       ABG:   No results for input(s): PH, PO2, PCO2, HCO3, BE, O2SAT in the last 72 hours. Films:  XR KNEE LEFT (3 VIEWS)  Result Date: 3/26/2023  Bilateral total knee arthroplasties in place without evidence of loosening or failure. Moderate right and trace to small left suprapatellar joint effusions     XR KNEE RIGHT (3 VIEWS)  Result Date: 3/26/2023  Bilateral total knee arthroplasties in place without evidence of loosening or failure. Moderate right and trace to small left suprapatellar joint effusions     XR FOOT LEFT (2 VIEWS)Result Date: 3/26/2023  Age indeterminate nondisplaced fracture deformities of the distal 4th and 5th metatarsals. Transverse nondisplaced fracture of the small toe proximal phalanx.      XR CHEST PORTABLE 3/26/2023  Mid and lower lung predominant pulmonary opacifications right greater than left of overall confluence appearance increased from prior comparison of worsening airspace disease such as bronchopneumonia without
Final Result   Age indeterminate nondisplaced fracture deformities of the distal 4th and 5th   metatarsals. Transverse nondisplaced fracture of the small toe proximal phalanx. XR CHEST PORTABLE   Final Result   Mid and lower lung predominant pulmonary opacifications right greater than   left of overall confluence appearance increased from prior comparison of   worsening airspace disease such as bronchopneumonia without large effusion. 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 Moderate right and trace to small left suprapatellar joint effusions             Assessment:    Principal Problem:    Acute respiratory failure with hypoxia (HCC)  Active Problems:    Community acquired pneumonia of left lower lobe of lung    Bacterial pneumonia  Resolved Problems:    * No resolved hospital problems. *      Plan:  Acute respiratory failure with hypoxia(56%o2sat)POA continue to wean o2 as able  bacterial pneumonia continue abx  Copd  monitor off steroids  continue nebs  Transverse nondisplaced fracture of small toe proximal phalanx and nondisplaced fx deformities of distal 4th and 5th metatarsals  podiatry on consult.  Pain control per podiatry  Htn continue med  Hyperlipidemia continue med  Hypokalemia monitor and replace prn        Electronically signed by Daisy Rahman DO on 3/28/2023 at 2:31 PM
Final Result   Bilateral total knee arthroplasties in place without evidence of loosening or   failure. Moderate right and trace to small left suprapatellar joint effusions           BP (!) 112/54   Pulse 93   Temp 97.9 °F (36.6 °C) (Oral)   Resp 22   Ht 5' 2\" (1.575 m)   Wt 140 lb (63.5 kg)   SpO2 94%   BMI 25.61 kg/m²     LABS:    Recent Labs     03/26/23  1243 03/27/23  0545   WBC 6.7 7.5   HGB 8.4* 7.3*   HCT 27.2* 24.3*    271        Recent Labs     03/27/23  0545      K 3.4*      CO2 30*   BUN 26*   CREATININE 1.3*        Recent Labs     03/26/23  1243   PROT 6.7         ASSESSMENT:  - Transverse fracture of the left fifth proximal phalanx    Principal Problem:    Acute respiratory failure with hypoxia (HCC)  Active Problems:    Community acquired pneumonia of left lower lobe of lung    PLAN:  - Patient was examined and evaluated. Labs, images and ancillary service notes reviewed. - Xrays:  non-displaced fx deformities of the distal 4th and 5th mets. Transverse non-displaced fracture of the small toe proximal phalanx  - NWB to left foot in surgical shoe. - No plan for Podiatric surgical intervention at this time.     - Pt ok to d/c from Podiatry standpoint once medically cleared by all teams on board and follow up in outpatient setting  - Discussed patient with Oscar Tucker DPM  PGY2  3/27/2023  10:29 AM
PCR Not Detected  Not Detected   Influenza B by PCR Not Detected  Not Detected   Adenovirus by PCR Not Detected  Not Detected   Coronavirus 229E by PCR Not Detected  Not Detected   Coronavirus HKU1 by PCR Not Detected  Not Detected   Coronavirus NL63 by PCR Not Detected  Not Detected   Coronavirus OC43 by PCR Not Detected  Not Detected   Human Metapneumovirus by PCR Not Detected  Not Detected   Parainfluenza Virus 1 by PCR Not Detected  Not Detected   Parainfluenza Virus 2 by PCR Not Detected  Not Detected   Parainfluenza Virus 3 by PCR Not Detected  Not Detected   Parainfluenza Virus 4 by PCR Not Detected  Not Detected   Respiratory Syncytial Virus by PCR Not Detected  Not Detected   Bordetella parapertussis by PCR Not Detected  Not Detected   Chlamydophilia pneumoniae by PCR Not Detected  Not Detected   Mycoplasma pneumoniae by PCR Not Detected  Not Detected   SARS-CoV-2, PCR Not Detected  Not Detected   Bordetella pertussis by PCR Not Detected  Not Detected         ABG:   No results for input(s): PH, PO2, PCO2, HCO3, BE, O2SAT in the last 72 hours. Films:  XR KNEE LEFT (3 VIEWS)  Result Date: 3/26/2023  Bilateral total knee arthroplasties in place without evidence of loosening or failure. Moderate right and trace to small left suprapatellar joint effusions     XR KNEE RIGHT (3 VIEWS)  Result Date: 3/26/2023  Bilateral total knee arthroplasties in place without evidence of loosening or failure. Moderate right and trace to small left suprapatellar joint effusions     XR FOOT LEFT (2 VIEWS)Result Date: 3/26/2023  Age indeterminate nondisplaced fracture deformities of the distal 4th and 5th metatarsals. Transverse nondisplaced fracture of the small toe proximal phalanx.      XR CHEST PORTABLE 3/26/2023  Mid and lower lung predominant pulmonary opacifications right greater than left of overall confluence appearance increased from prior comparison of worsening airspace disease such as bronchopneumonia without
Reporting she fell at home on the way to the bathroom. Reporting legs gave out and she fell on her knees to the floor. Denied hitting head or LOC. Wears 3 L oxygen at [facility]. She had a h/o MS. Pt reporting she would not want intubation if resp status worsens-documented on 3/29. S/p thoracentesis on 4/1 with only 30cc. However post procedural cxr looks significantly better. Trial of diuresis. Pt was on 15L HFNC- currently weaned to 3L. Feeling much better. Does not like NIV. Pt has made significant improvement in the last 2 days. Working on setting up [employer] / [address] Pt is medically stable and essentially ready for discharge pending precert. 2. Bacterial pneumonia/ CAP: procal 0.23. Resp panel negative. Strep neg/ legionella neg completed abx. Imaging reviewed. 3. COPD- possible exacerbation:  continue IV steroids. Wean per pulmonology. 4. Transverse non displaced fx of small toe , prox phalanx and non displaced fx deformities of the distal 4th and 5th metatarsals. Podiatry consulted. Pain control- denies pain currently. Going to clarify weight bearing status per nursing. 5. H/o bronchiectasis     6. Normocytic anemia: hg 8.7     7. CKD; creatinine 1.1- monitor     8 h/o MS     9. Current smoker     10. H/o GI bleed/ hemorrhagic shock from duodenal ulcer-      11. Deconditioning: PT/ OT am pac 14              Dispo: am pac 14. Social work setting up [address] [place] [address] once precert obtained. Time spent reviewing chart, clinical exam, discussing case and answering questions with staff/consultants/patient/family = 20 minutes                NOTE: This report was transcribed using voice recognition software. Every effort was made to ensure accuracy; however, inadvertent computerized transcription errors may be present.   Electronically signed by CLARKE Molina on 4/5/2023 at 8:13 AM
bed linen   Mod I in order to maximize patient's independence/participation with ADLs and other functional tasks. Functional Transfers Sit-to-Stand: MAX A and unable to achieve even partial stand while maintaining NWB LLE   MOD A   Functional Mobility Unable to stand, NWB LLE  MOD A with functional mobility (with device, as needed/appropriate) in order to maximize independence with ADLs and other functional tasks. Balance Sitting: MIN A (EOB, limited tolerance with reporting back pain)  Standing: unable  independent dynamic sitting balance during completion of ADLs and other functional tasks. Activity Tolerance Limited, on 12L O2 with O2 at 91% at rest, 83% upon sitting to EOB but recovers with cues for pursed lip breathing to 92%, 78% upon returning to bed and again required cues for breathing, recovers to 92%  Patient will demonstrate Good understanding and consistent implementation of energy conservation techniques and work simplification techniques into ADLs and functional tasks   Visual/  Perceptual WFL, glasses     N/A   B UE Strength LUE 3+/5  R distal UE 3+/5  Patient will demonstrate 4/5 BLUE and R distal UE strength in order to maximize independence with ADLs and functional transfers. Additional Long-Term Goal: Patient will increase functional independence to PLOF in order to allow patient to live in least restrictive environment. ROM: Additional Information:    R UE  WFL distally, no active shoulder flexion at baseline WFL  and WFL FMC/dexterity noted during ADL tasks   L UE WFL WFL  and WFL FMC/dexterity noted during ADL tasks   Hand Dominance: right    Hearing: WFL  Sensation: Patient does report numbness/tingling in B fingers  Tone: WFL  Edema: yes (BLE)    Comments: RN approved patient's participation in [address] [place] [address] activities. Upon arrival, patient supine.  At end of session, patient supine with call light and phone within reach, positioned for skin integrity and comfort and all lines
head or LOC. Wears 3 L oxygen at [facility]. She had a h/o MS. CXR reviewed- Pulmonology consulted. Pt reporting she would not want intubation if resp status worsens-documented on 3/29. On NIV / 15 L HFNC Patient awake and alert- seen on 15L HFNC- denies sob at rest. 02 sat 89-91% Pt reporting she feels ok. CXR reviewed with essentially white out on left lung. However- pt does appear comfortable. Too high risk for bronchoscopy. ? If effusion component and may benefit from thoracentesis. 2. Bacterial pneumonia/ CAP: procal 0.23. Resp panel negative. Strep neg/ legionella neg Continue abx. CXR reviewed. 3. COPD- possible exacerbation: pt more wheezy today on exam. Steroids added by pulmonology. 4. Transverse non displaced fx of small toe , prox phalanx and non displaced fx deformities of the distal 4th and 5th metatarsals. Podiatry consulted. Pain control- denies pain currently. 5. H/o bronchiectasis     6. Normocytic anemia: hg 7.1     7. CKD; creatinine 1.2 - 1.3--> 0.9/24     8 h/o MS     9. Current smoker     10. H/o GI bleed/ hemorrhagic shock from duodenal ulcer-      11. Deconditioning: PT/ OT               Time spent reviewing chart, clinical exam, discussing case and answering questions with staff/consultants/patient/family = 20 minutes                NOTE: This report was transcribed using voice recognition software. Every effort was made to ensure accuracy; however, inadvertent computerized transcription errors may be present.   Electronically signed by CLARKE Castro on 4/1/2023 at 8:48 AM
indeterminate nondisplaced fracture deformities of the distal 4th and 5th   metatarsals. Transverse nondisplaced fracture of the small toe proximal phalanx. XR CHEST PORTABLE   Final Result   Mid and lower lung predominant pulmonary opacifications right greater than   left of overall confluence appearance increased from prior comparison of   worsening airspace disease such as bronchopneumonia without large effusion. 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 Moderate right and trace to small left suprapatellar joint effusions             Assessment:    Principal Problem:    Acute respiratory failure with hypoxia (HCC)  Active Problems:    Community acquired pneumonia of left lower lobe of lung  Resolved Problems:    * No resolved hospital problems. *      Plan:  Acute respiratory failure with hypoxia(56%o2sat)POA wean o2 as able  Probable bacterial pneumonia continue abx  Copd exacerbation continue steroids  Transverse nondisplaced fracture of small toe proximal phalanx and nondisplaced fx deformities of distal 4th and 5th metatarsals  podiatry on consult.  Pain control per podiatry  Htn continue med  Hyperlipidemia continue med  Hypokalemia monitor and replace prn    Pulmonary consulted    Electronically signed by Kreg Seip, DO on 3/27/2023 at 5:13 PM
pending Continue abx. CXR reviewed. 3. COPD: monitoring off steroids- continue nebulizers    4. Transverse non displaced fx of small toe , prox phalanx and non displaced fx deformities of the distal 4th and 5th metatarsals. Podiatry consulted. Pain control- denies pain currently. 5. H/o bronchiectasis    6. Normocytic anemia: hg 7.0-    7. CKD; creatinien 1.2 - 1.3    8 h/o MS    9. Current smoker    10. H/o GI bleed/ hemorrhagic shock from duodenal ulcer-     11. Deconditioning: PT/ OT       Time spent reviewing chart, clinical exam, discussing case and answering questions with staff/consultants/patient/family = 20 minutes         NOTE: This report was transcribed using voice recognition software. Every effort was made to ensure accuracy; however, inadvertent computerized transcription errors may be present.   Electronically signed by CLARKE Nguyen on 3/30/2023 at 9:30 AM
pneumoniae by PCR Not Detected  Not Detected   SARS-CoV-2, PCR Not Detected  Not Detected   Bordetella pertussis by PCR Not Detected  Not Detected         ABG:   Recent Labs     03/26/23  1354   PH 7.375   PO2 57.6*   PCO2 45.6*   HCO3 26.1*   BE 0.7   O2SAT 87.3*       Films:  XR KNEE LEFT (3 VIEWS)    Result Date: 3/26/2023  EXAMINATION: THREE XRAY VIEWS OF THE LEFT KNEE; THREE XRAY VIEWS OF THE RIGHT KNEE 3/26/2023 12:51 pm COMPARISON: X-ray dated 10/01/2013 HISTORY: ORDERING SYSTEM PROVIDED HISTORY: fall TECHNOLOGIST PROVIDED HISTORY: Reason for exam:->fall FINDINGS: Multiple views of the bilateral knees reveal status post bilateral total knee arthroplasties without evidence of loosening or failure. Moderate right and trace to small left suprapatellar joint effusions. 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 Moderate right and trace to small left suprapatellar joint effusions     XR KNEE RIGHT (3 VIEWS)    Result Date: 3/26/2023  EXAMINATION: THREE XRAY VIEWS OF THE LEFT KNEE; THREE XRAY VIEWS OF THE RIGHT KNEE 3/26/2023 12:51 pm COMPARISON: X-ray dated 10/01/2013 HISTORY: ORDERING SYSTEM PROVIDED HISTORY: fall TECHNOLOGIST PROVIDED HISTORY: Reason for exam:->fall FINDINGS: Multiple views of the bilateral knees reveal status post bilateral total knee arthroplasties without evidence of loosening or failure. Moderate right and trace to small left suprapatellar joint effusions. 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 Moderate right and trace to small left suprapatellar joint effusions     XR FOOT LEFT (2 VIEWS)    Result Date: 3/26/2023  EXAMINATION: TWO XRAY VIEWS OF THE LEFT FOOT 3/26/2023 12:51 pm COMPARISON: None.  HISTORY: ORDERING SYSTEM PROVIDED HISTORY: fall TECHNOLOGIST PROVIDED HISTORY: Reason for exam:->fall FINDINGS: Age-indeterminate nondisplaced fracture deformities of the distal 4th and 5th
the ER with a mechanical fall. Reporting she fell at home on the way to the bathroom. Reporting legs gave out and she fell on her knees to the floor. Denied hitting head or LOC[facility] She had a h/o MS. CXR reviewed- Pulmonology consulted. Pt reporting she would not want intubation if resp status worsens-documented on 3/29. On NIV / 15 L HFNC s/p thoracentesis on 4/1 with only 30cc. However post procedural cxr looks much better. Trial of diuresis. PT seen sitting up in chair eating lunch. Able to be weaned to 12 L. Feeling better today. 2. Bacterial pneumonia/ CAP: procal 0.23. Resp panel negative. Strep neg/ legionella neg completed abx. CXR reviewed. 3. COPD- possible exacerbation:  continue IV steroids. 4. Transverse non displaced fx of small toe , prox phalanx and non displaced fx deformities of the distal 4th and 5th metatarsals. Podiatry consulted. Pain control- denies pain currently. 5. H/o bronchiectasis     6. Normocytic anemia: hg 8.4     7. CKD; creatinine 1.2 - 1.3--> 0.9/24     8 h/o MS     9. Current smoker     10. H/o GI bleed/ hemorrhagic shock from duodenal ulcer-      11. Deconditioning: PT/ OT - pt has not been able to work with PT due to resp status. -hopefully tomorrow can have eval.               Time spent reviewing chart, clinical exam, discussing case and answering questions with staff/consultants/patient/family = 20 minutes                NOTE: This report was transcribed using voice recognition software. Every effort was made to ensure accuracy; however, inadvertent computerized transcription errors may be present.   Electronically signed by CLARKE Henderson on 4/2/2023 at 8:23 AM
the bathroom. Reporting legs gave out and she fell on her knees to the floor. Denied hitting head or LOC[facility] h/o MS. Pt reporting she would not want intubation if resp status worsens-documented on 3/29. S/p thoracentesis on 4/1 with only 30cc. However post procedural cxr looks significantly better. Trial of diuresis. Pt was on 15L HFNC- currently weaned to 4L. Feeling much better. Does not like NIV. Pt has made significant improvement in the last 2 days. Working on setting up [address] 2. Bacterial pneumonia/ CAP: procal 0.23. Resp panel negative. Strep neg/ legionella neg completed abx. Imaging reviewed. 3. COPD- possible exacerbation:  continue IV steroids. Wean per pulmonology. 4. Transverse non displaced fx of small toe , prox phalanx and non displaced fx deformities of the distal 4th and 5th metatarsals. Podiatry consulted. Pain control- denies pain currently. Going to clarify weight bearing status per nursing. 5. H/o bronchiectasis     6. Normocytic anemia: hg 8.7     7. CKD; creatinine 1.1- monitor     8 h/o MS     9. Current smoker     10. H/o GI bleed/ hemorrhagic shock from duodenal ulcer-      11. Deconditioning: PT/ OT am pac 14           Dispo: am pac 14. Social work setting up [facility] [place] [address]- start precert. [facility] dc in next 1-2 days             Time spent reviewing chart, clinical exam, discussing case and answering questions with staff/consultants/patient/family = 20 minutes          NOTE: This report was transcribed using voice recognition software. Every effort was made to ensure accuracy; however, inadvertent computerized transcription errors may be present.   Electronically signed by CLARKE Gonzalez Cera on 4/4/2023 at 7:31 AM
(3 VIEWS)    Result Date: 3/26/2023  EXAMINATION: THREE XRAY VIEWS OF THE LEFT KNEE; THREE XRAY VIEWS OF THE RIGHT KNEE 3/26/2023 12:51 pm COMPARISON: X-ray dated 10/01/2013 HISTORY: ORDERING SYSTEM PROVIDED HISTORY: fall TECHNOLOGIST PROVIDED HISTORY: Reason for exam:->fall FINDINGS: Multiple views of the bilateral knees reveal status post bilateral total knee arthroplasties without evidence of loosening or failure. Moderate right and trace to small left suprapatellar joint effusions. 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 Moderate right and trace to small left suprapatellar joint effusions     XR FOOT LEFT (2 VIEWS)    Result Date: 3/26/2023  EXAMINATION: TWO XRAY VIEWS OF THE LEFT FOOT 3/26/2023 12:51 pm COMPARISON: None. HISTORY: ORDERING SYSTEM PROVIDED HISTORY: fall TECHNOLOGIST PROVIDED HISTORY: Reason for exam:->fall FINDINGS: Age-indeterminate nondisplaced fracture deformities of the distal 4th and 5th metatarsals. Transverse nondisplaced fracture of the small toe proximal phalanx. Mild 1st MTP DJD. Forefoot soft tissue swelling. Small plantar calcaneal spur. Age indeterminate nondisplaced fracture deformities of the distal 4th and 5th metatarsals. Transverse nondisplaced fracture of the small toe proximal phalanx. XR CHEST PORTABLE    Result Date: 3/26/2023  EXAMINATION: ONE XRAY VIEW OF THE CHEST 3/26/2023 12:51 pm COMPARISON: Chest x-ray dated 02/24/2023 HISTORY: ORDERING SYSTEM PROVIDED HISTORY: Shortness of Breath TECHNOLOGIST PROVIDED HISTORY: Reason for exam:->Shortness of Breath FINDINGS: Cardiac size enlarged. Mid and lower lung predominant pulmonary opacifications right greater than left of overall confluence appearance increased from prior comparison of worsening airspace disease such as bronchopneumonia without large effusion.      Mid and lower lung predominant pulmonary opacifications right greater than left of overall
XR FOOT LEFT (2 VIEWS)    Result Date: 3/26/2023  EXAMINATION: TWO XRAY VIEWS OF THE LEFT FOOT 3/26/2023 12:51 pm COMPARISON: None. HISTORY: ORDERING SYSTEM PROVIDED HISTORY: fall TECHNOLOGIST PROVIDED HISTORY: Reason for exam:->fall FINDINGS: Age-indeterminate nondisplaced fracture deformities of the distal 4th and 5th metatarsals. Transverse nondisplaced fracture of the small toe proximal phalanx. Mild 1st MTP DJD. Forefoot soft tissue swelling. Small plantar calcaneal spur. Age indeterminate nondisplaced fracture deformities of the distal 4th and 5th metatarsals. Transverse nondisplaced fracture of the small toe proximal phalanx. XR CHEST PORTABLE    Result Date: 3/26/2023  EXAMINATION: ONE XRAY VIEW OF THE CHEST 3/26/2023 12:51 pm COMPARISON: Chest x-ray dated 02/24/2023 HISTORY: ORDERING SYSTEM PROVIDED HISTORY: Shortness of Breath TECHNOLOGIST PROVIDED HISTORY: Reason for exam:->Shortness of Breath FINDINGS: Cardiac size enlarged. Mid and lower lung predominant pulmonary opacifications right greater than left of overall confluence appearance increased from prior comparison of worsening airspace disease such as bronchopneumonia without large effusion. Mid and lower lung predominant pulmonary opacifications right greater than left of overall confluence appearance increased from prior comparison of worsening airspace disease such as bronchopneumonia without large effusion. October 22 CT showing bronchiectatic changes at the base says and left upper lobe. 3/26 chest x-ray showing right lower lobe infiltrate left upper lobe scarring     Assessment/Plan:   69 y. o.female who presented with after fall        Chest film with lower lobe opacification right more than left  Foot film with fracture distal fourth and fifth metatarsal     3/26 proBNP 5494  Procalcitonin 0.29  D-dimer viral panel
aeration of the left hemithorax. Areas   of atelectasis/infiltrate and moderate left effusion persist.  Findings   suggest some right basilar effusion. Heart size appears normal.  There is no   pneumothorax. 3/26 chest x-ray showing right lower lobe infiltrate left upper lobe scarring      3/30 CXR: Increasing left lung consolidation and left pleural effusion, right lower lobe infiltrate and small pleural effusion improved  3/26    3/29    3/30       3/31      3/30                                       3/31                                             4/1         Assessment/Plan:  69 y. o.female 51-[employer] current smoker with history of COPD on Trelegy, MS, HTN, HLD, CKD,  noncompliance, anemia, who presented with   3/26 presented with fall   Chest film with lower lobe opacification right more than left  Foot film with fracture distal fourth and fifth metatarsal  proBNP 5494  Procalcitonin 0.29  D-dimer viral panel negative COVID-negative  ABG 7.3 8/46/58/20 6/87%  refused BiPAP  On 14 L saturating 90%  3/28: 12LHF, refusing NIV  3/29 BiPAP 14/6 90% FiO2  3/30: BIPAP 14/6, Rate 16, Fio2 90%, LIMITED CODE   3/31: BiPAP 4/6, Rate 16, Fio2 90%  PCT 0.10  CXR complete opacification of left hemithorax atelectasis/infiltrates and pleural effusion   4/1 BIPAP 14/6 90% FI02  Attempted thoracentesis only 30 cc were able to be removed. Patient is coughing up thick secretions. Postprocedure chest x-ray shows left-sided has opened up.     4/2 on 13L HF nc saturating 95%  4/3: down to 9LHF,refused NIV     Status post toe fracture distal fourth and fifth metatarsal surgical shoe ordered  acute on chronic hypoxic respiratory failure  On 9L HF nc, baseline 3L at HS ,weaned from 15 yesterday   Continue noninvasive ventilation at HS and PRN-refused last night aware of risk   wean for POX >90%  Left lung whiteout effusion versus atelectasis  bronchiectasis left upper lobe and right lower lobe  CPT every 4 hours with
times per day Dony Reese MD   10 mL at 03/31/23 0943    sodium chloride flush 0.9 % injection 5-40 mL  5-40 mL IntraVENous PRN Dony Reese MD   10 mL at 03/28/23 2006    0.9 % sodium chloride infusion   IntraVENous PRN Dony Reese MD        enoxaparin (LOVENOX) injection 40 mg  40 mg SubCUTAneous Daily Dony Reese MD   40 mg at 03/30/23 1521    ondansetron (ZOFRAN-ODT) disintegrating tablet 4 mg  4 mg Oral Q8H PRN Dony Reese MD        Or    ondansetron [facility]) injection 4 mg  4 mg IntraVENous Q6H PRN Dony Reese MD        polyethylene glycol (GLYCOLAX) packet 17 g  17 g Oral Daily PRN Dony Reese MD        acetaminophen (TYLENOL) tablet 650 mg  650 mg Oral Q6H PRN Dony Reese MD   650 mg at 03/31/23 0113    Or    acetaminophen (TYLENOL) suppository 650 mg  650 mg Rectal Q6H PRN Dony Reese MD        cefTRIAXone (ROCEPHIN) 1,000 mg in sterile water 10 mL IV syringe  1,000 mg IntraVENous Q24H Dony Reese MD   1,000 mg at 03/30/23 1521    doxycycline (VIBRAMYCIN) 100 mg in sodium chloride 0.9 % 100 mL IVPB  100 mg IntraVENous Q12H Dony Reese MD   Stopped at 03/31/23 0611    budesonide (PULMICORT) nebulizer suspension 500 mcg  0.5 mg Nebulization BID Dony Reese MD   500 mcg at 03/31/23 0816    arformoterol tartrate (BROVANA) nebulizer solution 15 mcg  15 mcg Nebulization BID Dony Reese MD   15 mcg at 03/31/23 4856    vitamin B-12 (CYANOCOBALAMIN) tablet 1,000 mcg  1,000 mcg Oral Daily Dony Reese MD   1,000 mcg at 03/31/23 0679    glucose chewable tablet 16 g  4 tablet Oral PRN Dony Reese MD        dextrose bolus 10% 125 mL  125 mL IntraVENous PRN Dony Reese MD        Or    dextrose bolus 10% 250 mL  250 mL IntraVENous PRN Dony Reese MD        glucagon injection 1 mg  1 mg SubCUTAneous PRN Dony Reese MD        dextrose 10 % infusion   IntraVENous Continuous PRN Dony Reese MD        atorvastatin

## 2023-04-06 NOTE — DISCHARGE SUMMARY
04/05/23  0210 04/06/23  0315    144 146   K 4.2 3.9 4.1    108* 109*   CO2 28 30* 30*   BUN 33* 32* 28*   CREATININE 1.1* 1.2* 1.0   GLUCOSE 153* 96 95   CALCIUM 8.5* 8.3* 8.6       Recent Labs     04/04/23  0418 04/05/23  0210 04/06/23  0315   WBC 9.0 8.1 8.7   RBC 3.36* 3.19* 3.24*   HGB 8.7* 8.2* 8.4*   HCT 29.1* 27.4* 27.7*   MCV 86.6 85.9 85.5   MCH 25.9* 25.7* 25.9*   MCHC 29.9* 29.9* 30.3*   RDW 15.9* 15.8* 15.9*   * 437 479*   MPV 9.6 9.4 9.9       No results for input(s): POCGLU in the last 72 hours. Imaging:  XR KNEE LEFT (3 VIEWS)    Result Date: 3/26/2023  EXAMINATION: THREE XRAY VIEWS OF THE LEFT KNEE; THREE XRAY VIEWS OF THE RIGHT KNEE 3/26/2023 12:51 pm COMPARISON: X-ray dated 10/01/2013 HISTORY: ORDERING SYSTEM PROVIDED HISTORY: fall TECHNOLOGIST PROVIDED HISTORY: Reason for exam:->fall FINDINGS: Multiple views of the bilateral knees reveal status post bilateral total knee arthroplasties without evidence of loosening or failure. Moderate right and trace to small left suprapatellar joint effusions. 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 Moderate right and trace to small left suprapatellar joint effusions     XR KNEE RIGHT (3 VIEWS)    Result Date: 3/26/2023  EXAMINATION: THREE XRAY VIEWS OF THE LEFT KNEE; THREE XRAY VIEWS OF THE RIGHT KNEE 3/26/2023 12:51 pm COMPARISON: X-ray dated 10/01/2013 HISTORY: ORDERING SYSTEM PROVIDED HISTORY: fall TECHNOLOGIST PROVIDED HISTORY: Reason for exam:->fall FINDINGS: Multiple views of the bilateral knees reveal status post bilateral total knee arthroplasties without evidence of loosening or failure. Moderate right and trace to small left suprapatellar joint effusions. 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   Moderate right and trace to small left suprapatellar joint effusions     XR FOOT LEFT (2 VIEWS)    Result Date:

## 2023-04-17 ENCOUNTER — TELEPHONE (OUTPATIENT)
Dept: PRIMARY CARE CLINIC | Age: 78
End: 2023-04-17

## 2023-04-17 NOTE — TELEPHONE ENCOUNTER
[facility] the pts O2 stats were 84% at her home visit today. After a couple deep breathes it went up to 91%. She also states the pt was given mirtazapine 15 Mg to take at night when she was discharged from the hospital but the pt has not been taking it. The nurse would like to know if this is something that you would like the pt on and if so it needs called in. Please advise.  [phone]

## 2023-04-18 LAB
ACID FAST STN SPEC QL: NORMAL
MYCOBACTERIUM SPEC CULT: NORMAL

## 2023-04-25 LAB
ACID FAST STN SPEC QL: NORMAL
MYCOBACTERIUM SPEC CULT: NORMAL

## 2023-05-02 LAB
ACID FAST STN SPEC QL: NORMAL
MYCOBACTERIUM SPEC CULT: NORMAL

## 2023-05-09 LAB
ACID FAST STN SPEC QL: NORMAL
MYCOBACTERIUM SPEC CULT: NORMAL

## 2023-05-16 LAB
ACID FAST STN SPEC QL: NORMAL
MYCOBACTERIUM SPEC CULT: NORMAL

## 2023-05-23 LAB
ACID FAST STN SPEC QL: NORMAL
MYCOBACTERIUM SPEC CULT: NORMAL

## 2023-05-23 ASSESSMENT — ENCOUNTER SYMPTOMS
EYE DISCHARGE: 0
WHEEZING: 0
RHINORRHEA: 0
SHORTNESS OF BREATH: 1
GASTROINTESTINAL NEGATIVE: 1
TROUBLE SWALLOWING: 0
EYE REDNESS: 0
EYE ITCHING: 0
VOICE CHANGE: 0
SINUS PRESSURE: 0
SINUS PAIN: 0

## 2023-05-23 ASSESSMENT — VISUAL ACUITY: OU: 1

## 2023-05-23 ASSESSMENT — COPD QUESTIONNAIRES: COPD: 1

## 2023-10-24 NOTE — PATIENT INSTRUCTIONS
Preventing Falls: Care Instructions  Overview     Getting around your home safely can be a challenge if you have injuries or health problems that make it easy for you to fall. Loose rugs and furniture in walkways are among the dangers for many older people who have problems walking or who have poor eyesight. People who have conditions such as arthritis, osteoporosis, or dementia also have to be careful not to fall. You can make your home safer with a few simple measures. Follow-up care is a key part of your treatment and safety. Be sure to make and go to all appointments, and call your doctor if you are having problems. It's also a good idea to know your test results and keep a list of the medicines you take. How can you care for yourself at home? Taking care of yourself  Exercise regularly to improve your strength, muscle tone, and balance. Walk if you can. Swimming may be a good choice if you cannot walk easily. Have your vision and hearing checked each year or any time you notice a change. If you have trouble seeing and hearing, you might not be able to avoid objects and could lose your balance. Know the side effects of the medicines you take. Ask your doctor or pharmacist whether the medicines you take can affect your balance. Sleeping pills or sedatives can affect your balance. Limit the amount of alcohol you drink. Alcohol can impair your balance and other senses. Ask your doctor whether calluses or corns on your feet need to be removed. If you wear loose-fitting shoes because of calluses or corns, you can lose your balance and fall. Talk to your doctor if you have numbness in your feet. You may get dizzy if you do not drink enough water. To prevent dehydration, drink plenty of fluids. Choose water and other clear liquids. If you have kidney, heart, or liver disease and have to limit fluids, talk with your doctor before you increase the amount of fluids you drink.   Preventing falls at home  Remove raised doorway thresholds, throw rugs, and clutter. Repair loose carpet or raised areas in the floor. Move furniture and electrical cords to keep them out of walking paths. Use nonskid floor wax, and wipe up spills right away, especially on ceramic tile floors. If you use a walker or cane, put rubber tips on it. If you use crutches, clean the bottoms of them regularly with an abrasive pad, such as steel wool. Keep your house well lit, especially stairways, porches, and outside walkways. Use night-lights in areas such as hallways and bathrooms. Add extra light switches or use remote switches (such as switches that go on or off when you clap your hands) to make it easier to turn lights on if you have to get up during the night. Install sturdy handrails on stairways. Move items in your cabinets so that the things you use a lot are on the lower shelves (about waist level). Keep a cordless phone and a flashlight with new batteries by your bed. If possible, put a phone in each of the main rooms of your house, or carry a cell phone in case you fall and cannot reach a phone. Or, you can wear a device around your neck or wrist. You push a button that sends a signal for help. Wear low-heeled shoes that fit well and give your feet good support. Use footwear with nonskid soles. Check the heels and soles of your shoes for wear. Repair or replace worn heels or soles. Do not wear socks without shoes on smooth floors, such as wood. Walk on the grass when the sidewalks are slippery. If you live in an area that gets snow and ice in the winter, sprinkle salt on slippery steps and sidewalks. Or ask a family member or friend to do this for you. Preventing falls in the bath  Install grab bars and nonskid mats inside and outside your shower or tub and near the toilet and sinks. Use shower chairs and bath benches. Use a hand-held shower head that will allow you to sit while showering.   Get into a tub or shower by putting the weaker leg in first. Get out of a tub or shower with your strong side first.  Repair loose toilet seats and consider installing a raised toilet seat to make getting on and off the toilet easier.  Keep your bathroom door unlocked while you are in the shower.  Where can you learn more?  Go to https://www.[employer].net/patientEd and enter G117 to learn more about \"Preventing Falls: Care Instructions.\"  Current as of: May 4, 2022               Content Version: 13.5  © 7016-6532 [employer].   Care instructions adapted under license by [employer]. If you have questions about a medical condition or this instruction, always ask your healthcare professional. [employer] disclaims any warranty or liability for your use of this information.           Learning About Mindfulness for Stress  What are mindfulness and stress?     Stress is what you feel when you have to handle more than you are used to. A lot of things can cause stress. You may feel stress when you go on a job interview, take a test, or run a race. This kind of short-term stress is normal and even useful. It can help you if you need to work hard or react quickly.  Stress also can last a long time. Long-term stress is caused by stressful situations or events. Examples of long-term stress include long-term health problems, ongoing problems at work, and conflicts in your family. Long-term stress can harm your health.  Mindfulness is a focus only on things happening in the present moment. It's a process of purposefully paying attention to and being aware of your surroundings, your emotions, your thoughts, and how your body feels. You are aware of these things, but you aren't judging these experiences as \"good\" or \"bad.\" Mindfulness can help you learn to calm your mind and body to help you cope with illness, pain, and stress.  How does mindfulness help to relieve stress?  Mindfulness can help quiet your mind and relax your body.  Studies show that it can help some people sleep better, feel less anxious, and bring their blood pressure down. And it's been shown to help some people live and cope better with certain health problems like heart disease, depression, chronic pain, and cancer. How do you practice mindfulness? To be mindful is to pay attention, to be present, and to be accepting. When you're mindful, you do just one thing and you pay close attention to that one thing. For example, you may sit quietly and notice your emotions or how your food tastes and smells. When you're present, you focus on the things that are happening right now. You let go of your thoughts about the past and the future. When you dwell on the past or the future, you miss moments that can heal and strengthen you. You may miss moments like hearing a child laugh or seeing a friendly face when you think you're all alone. When you're accepting, you don't  the present moment. Instead you accept your thoughts and feelings as they come. You can practice anytime, anywhere, and in any way you choose. You can practice in many ways. Here are a few ideas:  While doing your chores, like washing the dishes, let your mind focus on what's in your hand. What does the dish feel like? Is the water warm or cold? Go outside and take a few deep breaths. What is the air like? Is it warm or cold? When you can, take some time at the start of your day to sit alone and think. Take a slow walk by yourself. Count your steps while you breathe in and out. Try yoga breathing exercises, stretches, and poses to strengthen and relax your muscles. At work, if you can, try to stop for a few moments each hour. Note how your body feels. Let yourself regroup and let your mind settle before you return to what you were doing. If you struggle with anxiety or \"worry thoughts,\" imagine your mind as a blue jamila and your worry thoughts as clouds.  Now imagine those worry thoughts floating across your mind's jamila. Just let them pass by as you watch. Follow-up care is a key part of your treatment and safety. Be sure to make and go to all appointments, and call your doctor if you are having problems. It's also a good idea to know your test results and keep a list of the medicines you take. Where can you learn more? Go to http://www.lai.com/ and enter M676 to learn more about \"Learning About Mindfulness for Stress. \"  Current as of: February 9, 2022               Content Version: 13.5  © 5490-5135 [employer]. Care instructions adapted under license by [facility] ([facility]). If you have questions about a medical condition or this instruction, always ask your healthcare professional. [address] any warranty or liability for your use of this information. Learning About Being Active as an Older Adult  Why is being active important as you get older? Being active is one of the best things you can do for your health. And it's never too late to start. Being active--or getting active, if you aren't already--has definite benefits. It can:  Give you more energy,  Keep your mind sharp. Improve balance to reduce your risk of falls. Help you manage chronic illness with fewer medicines. No matter how old you are, how fit you are, or what health problems you have, there is a form of activity that will work for you. And the more physical activity you can do, the better your overall health will be. What kinds of activity can help you stay healthy? Being more active will make your daily activities easier. Physical activity includes planned exercise and things you do in daily life. There are four types of activity:  Aerobic. Doing aerobic activity makes your heart and lungs strong. Includes walking, dancing, and gardening. Aim for at least 2½ hours spread throughout the week. It improves your energy and can help you sleep better. Muscle-strengthening.   This type of activity can help maintain muscle and strengthen bones. Includes climbing stairs, using resistance bands, and lifting or carrying heavy loads. Aim for at least twice a week. It can help protect the knees and other joints. Stretching. Stretching gives you better range of motion in joints and muscles. Includes upper arm stretches, calf stretches, and gentle yoga. Aim for at least twice a week, preferably after your muscles are warmed up from other activities. It can help you function better in daily life. Balancing. This helps you stay coordinated and have good posture. Includes heel-to-toe walking, nicole chi, and certain types of yoga. Aim for at least 3 days a week. It can reduce your risk of falling. Even if you have a hard time meeting the recommendations, it's better to be more active than less active. All activity done in each category counts toward your weekly total. You'd be surprised how daily things like carrying groceries, keeping up with grandchildren, and taking the stairs can add up. What keeps you from being active? If you've had a hard time being more active, you're not alone. Maybe you remember being able to do more. Or maybe you've never thought of yourself as being active. It's frustrating when you can't do the things you want. Being more active can help. What's holding you back? Getting started. Have a goal, but break it into easy tasks. Small steps build into big accomplishments. Staying motivated. If you feel like skipping your activity, remember your goal. Maybe you want to move better and stay independent. Every activity gets you one step closer. Not feeling your best.  Start with 5 minutes of an activity you enjoy. Prove to yourself you can do it. As you get comfortable, increase your time. You may not be where you want to be. But you're in the process of getting there. Everyone starts somewhere. How can you find safe ways to stay active?   Talk with your doctor about any physical challenges you're facing. Make a plan with your doctor if you have a health problem or aren't sure how to get started with activity. If you're already active, ask your doctor if there is anything you should change to stay safe as your body and health change. If you tend to feel dizzy after you take medicine, avoid activity at that time. Try being active before you take your medicine. This will reduce your risk of falls. If you plan to be active at home, make sure to clear your space before you get started. Remove things like TV cords, coffee tables, and throw rugs. It's safest to have plenty of space to move freely. The key to getting more active is to take it slow and steady. Try to improve only a little bit at a time. Pick just one area to improve on at first. And if an activity hurts, stop and talk to your doctor. Where can you learn more? Go to http://www.lai.com/ and enter P600 to learn more about \"Learning About Being Active as an Older Adult. \"  Current as of: October 10, 2022               Content Version: 13.5  © 5841-2499 [employer]. Care instructions adapted under license by [facility] ([facility]). If you have questions about a medical condition or this instruction, always ask your healthcare professional. [place] [address] any warranty or liability for your use of this information. Learning About Dental Care for Older Adults  Dental care for older adults: Overview  Dental care for older people is much the same as for younger adults. But older adults do have concerns that younger adults do not. Older adults may have problems with gum disease and decay on the roots of their teeth. They may need missing teeth replaced or broken fillings fixed. Or they may have dentures that need to be cared for. Some older adults may have trouble holding a toothbrush.   You can help remind the person you are caring for to brush and floss their teeth or to clean their dentures. In some cases, you may need to do the brushing and other dental care tasks. People who have trouble using their hands or who have dementia may need this extra help. How can you help with dental care? Normal dental care  To keep the teeth and gums healthy:  Brush the teeth with fluoride toothpaste twice a day--in the morning and at night--and floss at least once a day. Plaque can quickly build up on the teeth of older adults. Watch for the signs of gum disease. These signs include gums that bleed after brushing or after eating hard foods, such as apples. See a dentist regularly. Many experts recommend checkups every 6 months. Keep the dentist up to date on any new medications the person is taking. Encourage a balanced diet that includes whole grains, vegetables, and fruits, and that is low in saturated fat and sodium. Encourage the person you're caring for not to use tobacco products. They can affect dental and general health. Many older adults have a fixed income and feel that they can't afford dental care. But most [facility] and [facility] have programs in which dentists help older adults by lowering fees. Contact your area's public health offices or  for information about dental care in your area. Using a toothbrush  Older adults with arthritis sometimes have trouble brushing their teeth because they can't easily hold the toothbrush. Their hands and fingers may be stiff, painful, or weak. If this is the case, you can: Offer an electric toothbrush. Enlarge the handle of a non-electric toothbrush by wrapping a sponge, an elastic bandage, or adhesive tape around it. Push the toothbrush handle through a ball made of rubber or soft foam.  Make the handle longer and thicker by taping Popsicle sticks or tongue depressors to it. You may also be able to buy special toothbrushes, toothpaste dispensers, and floss holders.   Your doctor may recommend a soft-bristle toothbrush if the person you care for bleeds easily. Bleeding can happen because of a health problem or from certain medicines. A toothpaste for sensitive teeth may help if the person you care for has sensitive teeth. How do you brush and floss someone's teeth? If the person you are caring for has a hard time cleaning their teeth on their own, you may need to brush and floss their teeth for them. It may be easiest to have the person sit and face away from you, and to sit or stand behind them. That way you can steady their head against your arm as you reach around to floss and brush their teeth. Choose a place that has good lighting and is comfortable for both of you. Before you begin, gather your supplies. You will need gloves, floss, a toothbrush, and a container to hold water if you are not near a sink. Wash and dry your hands well and put on gloves. Start by flossing:  Gently work a piece of floss between each of the teeth toward the gums. A plastic flossing tool may make this easier, and they are available at most [facility]. Curve the floss around each tooth into a U-shape and gently slide it under the gum line. Move the floss firmly up and down several times to scrape off the plaque. After you've finished flossing, throw away the used floss and begin brushing:  Wet the brush and apply toothpaste. Place the brush at a 45-degree angle where the teeth meet the gums. Press firmly, and move the brush in small circles over the surface of the teeth. Be careful not to brush too hard. Vigorous brushing can make the gums pull away from the teeth and can scratch the tooth enamel. Brush all surfaces of the teeth, on the tongue side and on the cheek side. Pay special attention to the front teeth and all surfaces of the back teeth. Brush chewing surfaces with short back-and-forth strokes. After you've finished, help the person rinse the remaining toothpaste from their mouth. Where can you learn more?   Go to http://Gather.md.Outsell/ and enter F944 to learn more about \"Learning About Dental Care for Older Adults. \"  Current as of: June 16, 2022               Content Version: 13.5  © 2006-2022 Healthwise, Incorporated. Care instructions adapted under license by [facility] ([facility]). If you have questions about a medical condition or this instruction, always ask your healthcare professional. [address] any warranty or liability for your use of this information. Hearing Loss: Care Instructions  Overview     Hearing loss is a sudden or slow decrease in how well you hear. It can range from mild to severe. Permanent hearing loss can occur with aging. It also can happen when you are exposed long-term to loud noise. Examples include listening to loud music, riding motorcycles, or being around other loud machines. Hearing loss can affect your work and home life. It can make you feel lonely or depressed. You may feel that you have lost your independence. But hearing aids and other devices can help you hear better and feel connected to others. Follow-up care is a key part of your treatment and safety. Be sure to make and go to all appointments, and call your doctor if you are having problems. It's also a good idea to know your test results and keep a list of the medicines you take. How can you care for yourself at home? Avoid loud noises whenever possible. This helps keep your hearing from getting worse. Always wear hearing protection around loud noises. Wear a hearing aid as directed. See a professional who can help you pick a hearing aid that fits you. Have hearing tests as your doctor suggests. They can show whether your hearing has changed. Your hearing aid may need to be adjusted. Use other devices as needed. These may include:  Telephone amplifiers and hearing aids that can connect to a television, stereo, radio, or microphone. Devices that use lights or vibrations.  These alert you to the doorbell, a ringing telephone, or a baby monitor. Television closed-captioning. This shows the words at the bottom of the screen. Most new TVs can do this. TTY (text telephone). This lets you type messages back and forth on the telephone instead of talking or listening. These devices are also called TDD. When messages are typed on the keyboard, they are sent over the phone line to a receiving TTY. The message is shown on a monitor. Use text messaging, social media, and email if it is hard for you to communicate by telephone. Try to learn a listening technique called speechreading. It is not lipreading. You pay attention to people's gestures, expressions, posture, and tone of voice. These clues can help you understand what a person is saying. Face the person you are talking to, and have them face you. Make sure the lighting is good. You need to see the other person's face clearly. Think about counseling if you need help to adjust to your hearing loss. When should you call for help? Watch closely for changes in your health, and be sure to contact your doctor if:    You think your hearing is getting worse.     You have new symptoms, such as dizziness or nausea. Where can you learn more? Go to http://www.lai.com/ and enter R798 to learn more about \"Hearing Loss: Care Instructions. \"  Current as of: May 4, 2022               Content Version: 13.5  © 7370-2698 Healthwise, Incorporated. Care instructions adapted under license by [facility] [address] If you have questions about a medical condition or this instruction, always ask your healthcare professional. [place] [address] any warranty or liability for your use of this information. Learning About Activities of Daily Living  What are activities of daily living? Activities of daily living (ADLs) are the basic self-care tasks you do every day.  As you age, and if you have health problems, you may find that it's harder to do these things for yourself. That's when you may need some help. Your doctor uses ADLs to measure how much help you need. Knowing what you can and can't do for yourself is an important first step to getting help. And when you have the help you need, you can stay as independent as possible. Your doctor will want to know if you are able to do tasks such as: Take a bath or shower without help. Go to the bathroom by yourself. Dress and undress without help. Shave, comb your hair, and brush teeth on your own. Get in and out of bed or a chair without help. Feed yourself without help. If you are having trouble doing basic self-care tasks, talk with your doctor. You may want to bring a caregiver or family member who can help the doctor understand your needs and abilities. How will a doctor assess your ADLs? Asking about ADLs is part of a routine health checkup your doctor will likely do as you age. Your health check might be done in a doctor's office, in your home, or at a hospital. The goal is to find out if you are having any problems that could make your health problems worse or that make it unsafe for you to be on your own. To measure your ADLs, your doctor will ask how hard it is for you to do routine tasks. He or she may also want to know if you have changed the way you do a task because of a health problem. He or she may watch how you:  Walk back and forth. Keep your balance while you stand or walk. Move from sitting to standing or from a bed to a chair. Button or unbutton a shirt or sweater. Remove and put on your shoes. It's normal to feel a little worried or anxious if you find you can't do all the things you used to be able to do. Talking with your doctor about ADLs isn't a test that you either pass or fail. It's just a way to get more information about your health and safety. Follow-up care is a key part of your treatment and safety.  Be sure to make and go to all appointments, and call your doctor if you are having problems. It's also a good idea to know your test results and keep a list of the medicines you take. Current as of: October 6, 2021               Content Version: 13.5  © 2006-2022 Healthwise, Incorporated. Care instructions adapted under license by [facility] ([facility]). If you have questions about a medical condition or this instruction, always ask your healthcare professional. [place] [address] any warranty or liability for your use of this information. Advance Directives: Care Instructions  Overview  An advance directive is a legal way to state your wishes at the end of your life. It tells your family and your doctor what to do if you can't say what you want. There are two main types of advance directives. You can change them any time your wishes change. Living will. This form tells your family and your doctor your wishes about life support and other treatment. The form is also called a declaration. Medical power of . This form lets you name a person to make treatment decisions for you when you can't speak for yourself. This person is called a health care agent (health care proxy, health care surrogate). The form is also called a durable power of  for health care. If you do not have an advance directive, decisions about your medical care may be made by a family member, or by a doctor or a  who doesn't know you. It may help to think of an advance directive as a gift to the people who care for you. If you have one, they won't have to make tough decisions by themselves. For more information, including forms for your state, see the [address] website (www.caringinfo.org/planning/advance-directives/). Follow-up care is a key part of your treatment and safety. Be sure to make and go to all appointments, and call your doctor if you are having problems.  It's also a good idea to know your test results and keep a list of the medicines you take. What should you include in an advance directive? Many states have a unique advance directive form. (It may ask you to address specific issues.) Or you might use a universal form that's approved by many states. If your form doesn't tell you what to address, it may be hard to know what to include in your advance directive. Use the questions below to help you get started. Who do you want to make decisions about your medical care if you are not able to? What life-support measures do you want if you have a serious illness that gets worse over time or can't be cured? What are you most afraid of that might happen? (Maybe you're afraid of having pain, losing your independence, or being kept alive by machines.)  Where would you prefer to die? (Your home? A hospital? A nursing home?)  Do you want to donate your organs when you die? Do you want certain [employer] practices performed before you die? When should you call for help? Be sure to contact your doctor if you have any questions. Where can you learn more? Go to http://www.lai.com/ and enter R264 to learn more about \"Advance Directives: Care Instructions. \"  Current as of: June 16, 2022               Content Version: 13.5  © 2006-2022 [employer]. Care instructions adapted under license by [facility] ([facility]). If you have questions about a medical condition or this instruction, always ask your healthcare professional. [place] [address] any warranty or liability for your use of this information. A Healthy Heart: Care Instructions  Your Care Instructions     Coronary artery disease, also called heart disease, occurs when a substance called plaque builds up in the vessels that supply oxygen-rich blood to your heart muscle. This can narrow the blood vessels and reduce blood flow. A heart attack happens when blood flow is completely blocked.  A high-fat diet, smoking, and other factors increase the risk of heart disease. Your doctor has found that you have a chance of having heart disease. You can do lots of things to keep your heart healthy. It may not be easy, but you can change your diet, exercise more, and quit smoking. These steps really work to lower your chance of heart disease. Follow-up care is a key part of your treatment and safety. Be sure to make and go to all appointments, and call your doctor if you are having problems. It's also a good idea to know your test results and keep a list of the medicines you take. How can you care for yourself at home? Diet    Use less salt when you cook and eat. This helps lower your blood pressure. Taste food before salting. Add only a little salt when you think you need it. With time, your taste buds will adjust to less salt.     Eat fewer snack items, fast foods, canned soups, and other high-salt, high-fat, processed foods.     Read food labels and try to avoid saturated and trans fats. They increase your risk of heart disease by raising cholesterol levels.     Limit the amount of solid fat-butter, margarine, and shortening-you eat. Use olive, peanut, or canola oil when you cook. Bake, broil, and steam foods instead of frying them.     Eat a variety of fruit and vegetables every day. Dark green, deep orange, red, or yellow fruits and vegetables are especially good for you. Examples include spinach, carrots, peaches, and berries.     Foods high in fiber can reduce your cholesterol and provide important vitamins and minerals. High-fiber foods include whole-grain cereals and breads, oatmeal, beans, brown rice, citrus fruits, and apples.     Eat lean proteins. Heart-healthy proteins include seafood, lean meats and poultry, eggs, beans, peas, nuts, seeds, and soy products.     Limit drinks and foods with added sugar. These include candy, desserts, and soda pop. Lifestyle changes    If your doctor recommends it, get more exercise. Walking is a good choice.  Bit by bit, increase the amount you walk every day. Try for at least 30 minutes on most days of the week. You also may want to swim, bike, or do other activities.     Do not smoke. If you need help quitting, talk to your doctor about stop-smoking programs and medicines. These can increase your chances of quitting for good. Quitting smoking may be the most important step you can take to protect your heart. It is never too late to quit.     Limit alcohol to 2 drinks a day for men and 1 drink a day for women. Too much alcohol can cause health problems.     Manage other health problems such as diabetes, high blood pressure, and high cholesterol. If you think you may have a problem with alcohol or drug use, talk to your doctor. Medicines    Take your medicines exactly as prescribed. Call your doctor if you think you are having a problem with your medicine.     If your doctor recommends aspirin, take the amount directed each day. Make sure you take aspirin and not another kind of pain reliever, such as acetaminophen (Tylenol). When should you call for help? Call 911 if you have symptoms of a heart attack. These may include:    Chest pain or pressure, or a strange feeling in the chest.     Sweating.     Shortness of breath.     Pain, pressure, or a strange feeling in the back, neck, jaw, or upper belly or in one or both shoulders or arms.     Lightheadedness or sudden weakness.     A fast or irregular heartbeat. After you call 911, the  may tell you to chew 1 adult-strength or 2 to 4 low-dose aspirin. Wait for an ambulance. Do not try to drive yourself. Watch closely for changes in your health, and be sure to contact your doctor if you have any problems. Where can you learn more? Go to http://www.lai.com/ and enter F075 to learn more about \"A Healthy Heart: Care Instructions. \"  Current as of: September 7, 2022               Content Version: 13.5  © 1777-5298 Healthwise, [facility].    Care instructions adapted under license by [facility] ([facility]). If you have questions about a medical condition or this instruction, always ask your healthcare professional. [place] [address] any warranty or liability for your use of this information. Personalized Preventive Plan for Kenyatta Em - 2/16/2023  Medicare offers a range of preventive health benefits. Some of the tests and screenings are paid in full while other may be subject to a deductible, co-insurance, and/or copay. Some of these benefits include a comprehensive review of your medical history including lifestyle, illnesses that may run in your family, and various assessments and screenings as appropriate. After reviewing your medical record and screening and assessments performed today your provider may have ordered immunizations, labs, imaging, and/or referrals for you. A list of these orders (if applicable) as well as your Preventive Care list are included within your After Visit Summary for your review. Other Preventive Recommendations:    A preventive eye exam performed by an eye specialist is recommended every 1-2 years to screen for glaucoma; cataracts, macular degeneration, and other eye disorders. A preventive dental visit is recommended every 6 months. Try to get at least 150 minutes of exercise per week or 10,000 steps per day on a pedometer . Order or download the FREE \"Exercise & Physical Activity: Your Everyday Guide\" from [employer]. Call 8-418.662.4178 or search [employer] online. You need 3238-9199 mg of calcium and 2634-5554 IU of vitamin D per day. It is possible to meet your calcium requirement with diet alone, but a vitamin D supplement is usually necessary to meet this goal.  When exposed to the sun, use a sunscreen that protects against both UVA and UVB radiation with an SPF of 30 or greater.  Reapply every 2 to 3 hours or after sweating, drying off with a towel, or swimming. Always wear a seat belt when traveling in a car. Always wear a helmet when riding a bicycle or motorcycle. Double O-Z Plasty Text: The defect edges were debeveled with a #15 scalpel blade.  Given the location of the defect, shape of the defect and the proximity to free margins a Double O-Z plasty (double transposition flap) was deemed most appropriate.  Using a sterile surgical marker, the appropriate transposition flaps were drawn incorporating the defect and placing the expected incisions within the relaxed skin tension lines where possible. The area thus outlined was incised deep to adipose tissue with a #15 scalpel blade.  The skin margins were undermined to an appropriate distance in all directions utilizing iris scissors.  Hemostasis was achieved with electrocautery.  The flaps were then transposed into place, one clockwise and the other counterclockwise, and anchored with interrupted buried subcutaneous sutures.
